# Patient Record
Sex: FEMALE | Race: BLACK OR AFRICAN AMERICAN | Employment: OTHER | URBAN - METROPOLITAN AREA
[De-identification: names, ages, dates, MRNs, and addresses within clinical notes are randomized per-mention and may not be internally consistent; named-entity substitution may affect disease eponyms.]

---

## 2017-01-05 ENCOUNTER — OP HISTORICAL/CONVERTED ENCOUNTER (OUTPATIENT)
Dept: OTHER | Age: 72
End: 2017-01-05

## 2017-01-12 ENCOUNTER — OP HISTORICAL/CONVERTED ENCOUNTER (OUTPATIENT)
Dept: OTHER | Age: 72
End: 2017-01-12

## 2017-01-19 ENCOUNTER — OP HISTORICAL/CONVERTED ENCOUNTER (OUTPATIENT)
Dept: OTHER | Age: 72
End: 2017-01-19

## 2017-02-02 ENCOUNTER — OP HISTORICAL/CONVERTED ENCOUNTER (OUTPATIENT)
Dept: OTHER | Age: 72
End: 2017-02-02

## 2017-02-09 ENCOUNTER — OP HISTORICAL/CONVERTED ENCOUNTER (OUTPATIENT)
Dept: OTHER | Age: 72
End: 2017-02-09

## 2017-02-16 ENCOUNTER — OP HISTORICAL/CONVERTED ENCOUNTER (OUTPATIENT)
Dept: OTHER | Age: 72
End: 2017-02-16

## 2017-02-23 ENCOUNTER — OP HISTORICAL/CONVERTED ENCOUNTER (OUTPATIENT)
Dept: OTHER | Age: 72
End: 2017-02-23

## 2017-03-17 ENCOUNTER — IP HISTORICAL/CONVERTED ENCOUNTER (OUTPATIENT)
Dept: OTHER | Age: 72
End: 2017-03-17

## 2017-04-26 ENCOUNTER — OP HISTORICAL/CONVERTED ENCOUNTER (OUTPATIENT)
Dept: OTHER | Age: 72
End: 2017-04-26

## 2017-05-04 ENCOUNTER — OP HISTORICAL/CONVERTED ENCOUNTER (OUTPATIENT)
Dept: OTHER | Age: 72
End: 2017-05-04

## 2017-05-11 ENCOUNTER — OP HISTORICAL/CONVERTED ENCOUNTER (OUTPATIENT)
Dept: OTHER | Age: 72
End: 2017-05-11

## 2017-05-15 ENCOUNTER — OP HISTORICAL/CONVERTED ENCOUNTER (OUTPATIENT)
Dept: OTHER | Age: 72
End: 2017-05-15

## 2017-05-25 ENCOUNTER — OP HISTORICAL/CONVERTED ENCOUNTER (OUTPATIENT)
Dept: OTHER | Age: 72
End: 2017-05-25

## 2017-06-02 ENCOUNTER — OP HISTORICAL/CONVERTED ENCOUNTER (OUTPATIENT)
Dept: OTHER | Age: 72
End: 2017-06-02

## 2017-06-08 ENCOUNTER — OP HISTORICAL/CONVERTED ENCOUNTER (OUTPATIENT)
Dept: OTHER | Age: 72
End: 2017-06-08

## 2017-06-28 ENCOUNTER — OP HISTORICAL/CONVERTED ENCOUNTER (OUTPATIENT)
Dept: OTHER | Age: 72
End: 2017-06-28

## 2017-06-29 ENCOUNTER — IP HISTORICAL/CONVERTED ENCOUNTER (OUTPATIENT)
Dept: OTHER | Age: 72
End: 2017-06-29

## 2017-06-29 ENCOUNTER — OP HISTORICAL/CONVERTED ENCOUNTER (OUTPATIENT)
Dept: OTHER | Age: 72
End: 2017-06-29

## 2017-08-17 ENCOUNTER — OP HISTORICAL/CONVERTED ENCOUNTER (OUTPATIENT)
Dept: OTHER | Age: 72
End: 2017-08-17

## 2017-10-11 ENCOUNTER — OP HISTORICAL/CONVERTED ENCOUNTER (OUTPATIENT)
Dept: OTHER | Age: 72
End: 2017-10-11

## 2018-02-27 ENCOUNTER — OP HISTORICAL/CONVERTED ENCOUNTER (OUTPATIENT)
Dept: OTHER | Age: 73
End: 2018-02-27

## 2018-04-23 ENCOUNTER — OP HISTORICAL/CONVERTED ENCOUNTER (OUTPATIENT)
Dept: OTHER | Age: 73
End: 2018-04-23

## 2018-04-29 ENCOUNTER — IP HISTORICAL/CONVERTED ENCOUNTER (OUTPATIENT)
Dept: OTHER | Age: 73
End: 2018-04-29

## 2018-07-20 LAB
CREATININE, EXTERNAL: 1.95
HBA1C MFR BLD HPLC: 6.1 %
LDL-C, EXTERNAL: 69
MICROALBUMIN UR TEST STR-MCNC: 180.5 MG/DL

## 2018-08-21 ENCOUNTER — ED HISTORICAL/CONVERTED ENCOUNTER (OUTPATIENT)
Dept: OTHER | Age: 73
End: 2018-08-21

## 2018-10-01 ENCOUNTER — OFFICE VISIT (OUTPATIENT)
Dept: ENDOCRINOLOGY | Age: 73
End: 2018-10-01

## 2018-10-01 VITALS
BODY MASS INDEX: 35.82 KG/M2 | RESPIRATION RATE: 16 BRPM | OXYGEN SATURATION: 95 % | HEART RATE: 57 BPM | TEMPERATURE: 97.6 F | HEIGHT: 64 IN | SYSTOLIC BLOOD PRESSURE: 143 MMHG | WEIGHT: 209.8 LBS | DIASTOLIC BLOOD PRESSURE: 59 MMHG

## 2018-10-01 DIAGNOSIS — E11.65 UNCONTROLLED TYPE 2 DIABETES MELLITUS WITH HYPERGLYCEMIA (HCC): ICD-10-CM

## 2018-10-01 DIAGNOSIS — I15.0 RENOVASCULAR HYPERTENSION WITH GOAL BLOOD PRESSURE LESS THAN 140/90: ICD-10-CM

## 2018-10-01 DIAGNOSIS — N18.4 STAGE 4 CHRONIC KIDNEY DISEASE (HCC): ICD-10-CM

## 2018-10-01 DIAGNOSIS — N18.4 TYPE 2 DIABETES MELLITUS WITH STAGE 4 CHRONIC KIDNEY DISEASE, WITH LONG-TERM CURRENT USE OF INSULIN (HCC): Primary | ICD-10-CM

## 2018-10-01 DIAGNOSIS — E11.22 TYPE 2 DIABETES MELLITUS WITH STAGE 4 CHRONIC KIDNEY DISEASE, WITH LONG-TERM CURRENT USE OF INSULIN (HCC): Primary | ICD-10-CM

## 2018-10-01 DIAGNOSIS — Z79.4 TYPE 2 DIABETES MELLITUS WITH STAGE 4 CHRONIC KIDNEY DISEASE, WITH LONG-TERM CURRENT USE OF INSULIN (HCC): Primary | ICD-10-CM

## 2018-10-01 RX ORDER — FAMOTIDINE 20 MG/1
20 TABLET, FILM COATED ORAL
COMMUNITY
End: 2020-09-16

## 2018-10-01 RX ORDER — DONEPEZIL HYDROCHLORIDE 5 MG/1
5 TABLET, FILM COATED ORAL
COMMUNITY
End: 2020-09-16

## 2018-10-01 RX ORDER — HYDRALAZINE HYDROCHLORIDE 10 MG/1
25 TABLET, FILM COATED ORAL 2 TIMES DAILY
COMMUNITY
End: 2020-09-16

## 2018-10-01 NOTE — PROGRESS NOTES
[facility]               Marisol Zuniga MD        [address] 78 [phone] Fax [phone]               Patient Information  Date:10/2/2018  Name : Kurtis Tran 75 y.o.     YOB: 1945         Referred by: No primary care provider on file. Chief Complaint   Patient presents with    Diabetes       History of Present Illness: Kurtis Tran is a 75 y.o. female here for fu of  Type 2 Diabetes Mellitus. She was referred by NP Ronen Escobar for evaluation and management of type 2 diabetes mellitus. She has diabetes for the last 14 years complicated by nephropathy as well as neuropathy. She was seen in 2016 and did not follow-up  She was changed from premixed insulin to Humulin N, analog insulins are expensive  She is taking insulin twice daily  Not on dialysis  Reports hypoglycemia but does not know when  Did not bring the meter, no logbook  No recent dietitian visit. She has had a CVA in the past.     Fasting < 100 sometimes  She has lost weight  No burning sensation in the feet, chest pain, shortness of breath        Wt Readings from Last 3 Encounters:   10/01/18 209 lb 12.8 oz (95.2 kg)   09/12/16 245 lb 9.6 oz (111.4 kg)   08/14/14 249 lb (112.9 kg)       BP Readings from Last 3 Encounters:   10/01/18 143/59   09/12/16 116/50   08/14/14 138/80           Past Medical History:   Diagnosis Date    Arthritis     CAD (coronary artery disease)     Chronic kidney disease     [facility] ([facility]     Mental depression     [facility]     Thyroid disease      Current Outpatient Prescriptions   Medication Sig    donepezil (ARICEPT) 5 mg tablet Take 5 mg by mouth nightly.  famotidine (PEPCID) 20 mg tablet Take 20 mg by mouth nightly.  hydrALAZINE (APRESOLINE) 10 mg tablet Take 10 mg by mouth three (3) times daily.  apixaban (ELIQUIS) 5 mg tablet Take 2.5 mg by mouth two (2) times a day.     nitroglycerin (NITROSTAT) 0.4 mg SL tablet by SubLINGual route every five (5) minutes as needed for Chest Pain.  venlafaxine (EFFEXOR) 75 mg tablet Take 75 mg by mouth daily.  gabapentin (NEURONTIN) 600 mg tablet Take 300 mg by mouth two (2) times a day.  furosemide (LASIX) 40 mg tablet Take 40 mg by mouth daily.  losartan (COZAAR) 100 mg tablet Take 25 mg by mouth daily.  acetaminophen (TYLENOL) 325 mg tablet Take  by mouth every four (4) hours as needed for Pain.  isosorbide mononitrate ER (IMDUR) 30 mg tablet Take 60 mg by mouth two (2) times a day.  latanoprost (XALATAN) 0.005 % ophthalmic solution Administer 1 Drop to both eyes nightly.  colchicine (COLCRYS) 0.6 mg tablet Take 0.6 mg by mouth daily.  pravastatin (PRAVACHOL) 80 mg tablet Take 80 mg by mouth nightly.  metoprolol succinate (TOPROL-XL) 50 mg XL tablet Take 25 mg by mouth daily.  aspirin 81 mg chewable tablet Take 81 mg by mouth daily.  allopurinol (ZYLOPRIM) 100 mg tablet Take 100 mg by mouth three (3) times daily.  insulin NPH (HUMULIN N) 100 unit/mL (3 mL) inpn Inject 20 units in the AM and 15 units in the evening (Stop Novolog 70/30)    metolazone (ZAROXOLYN) 5 mg tablet Take  by mouth daily.  esomeprazole (NEXIUM) 40 mg capsule Take  by mouth daily.  insulin aspart (NOVOLOG FLEXPEN) 100 unit/mL inpn by SubCUTAneous route.  insulin lispro (HUMALOG) 100 unit/mL injection by SubCUTAneous route.  buPROPion SR (WELLBUTRIN SR) 150 mg SR tablet Take  by mouth two (2) times a day.  cholecalciferol (VITAMIN D3) 400 unit tab tablet Take  by mouth daily.  Dexlansoprazole (DEXILANT) 60 mg CpDB Take  by mouth.  clopidogrel (PLAVIX) 75 mg tablet Take  by mouth daily. No current facility-administered medications for this visit.       No Known Allergies      Review of Systems:  - Constitutional Symptoms: no fevers, no chills,   - Eyes: no blurry vision no double vision  - Cardiovascular: no chest pain ,no palpitations  - Respiratory: no cough no shortness of breath  - Gastrointestinal: no dysphagia no  abdominal pain  - Musculoskeletal: no joint pains +  weakness  - Integumentary: no rashes  - Neurological: no numbness, tingling, + headaches  - Psychiatric: no depression no  anxiety  - Endocrine: no heat or cold intolerance    Physical Examination:   Blood pressure 143/59, pulse (!) 57, temperature 97.6 °F (36.4 °C), temperature source Oral, resp. rate 16, height 5' 4\" (1.626 m), weight 209 lb 12.8 oz (95.2 kg), SpO2 95 %. Estimated body mass index is 36.01 kg/(m^2) as calculated from the following:    Height as of this encounter: 5' 4\" (1.626 m). -   Weight as of this encounter: 209 lb 12.8 oz (95.2 kg). - General: pleasant, no distress, good eye contact  - HEENT: no pallor, no periorbital edema, EOMI  - Neck: supple, no thyromegaly, no nodules  - Cardiovascular: regular, normal rate, normal S1 and S2, no murmurs  - Respiratory: clear to auscultation bilaterally  - Gastrointestinal: soft, nontender, nondistended,  BS +  - Musculoskeletal: no proximal muscle weakness in upper or lower extremities  - Integumentary: no acanthosis nigricans, + edema,   - Neurological: ,alert and oriented  - Psychiatric: normal mood and affect  - Skin: color, texture, turgor normal.     Diabetic foot exam: October 2018    Left Foot:   Visual Exam: normal    Pulse DP: 2+ (normal)   Filament test: reduced sensation    Vibratory sensation: absent      Right Foot:   Visual Exam: normal    Pulse DP: 2+ (normal)   Filament test: reduced sensation    Vibratory sensation: absent        Data Reviewed:     [] Glucose records reviewed. [] See glucose records for details (to be scanned). [] A1C  [] Reviewed labs      Assessment/Plan:     1. Type 2 diabetes mellitus with stage 4 chronic kidney disease, with long-term current use of insulin ([facility]        1.  Type 2 Diabetes Mellitus with nephropathy,neuropathy,  Lab Results   Component Value Date/Time    Hemoglobin A1c, External 6.1 07/20/2018      Due to decrease in clearance of insulin secondary to CKD she is at  high risk for hypoglycemia,we will  stop Premixed insulin   Humulin N 20 units in AM and 15 units PM currently I have no data, due to, questionable hypoglycemia decreasing the insulin  Advised to check glucose 2 - 4 times daily   DM classes   Diabetic issues reviewed : , home glucose monitoring emphasized,  hypoglycemia management and long term diabetic complications discussed. FLU annually ,Pneumovax ,aspirin daily,annual eye exam,microalbumin    2. HTN : Continue current therapy     3. Hyperlipidemia : Continue statin. 4.Obesity:Body mass index is 36.01 kg/(m^2). Discussed about the importance of  carbohydrate portion control. 5 CKD /-followed by nephrology  Not on dialysis      Spent more than 40 minutes face-to-face with patient of which greater than 50% of the visit was spent in counseling,  and counseling regarding adjustment of insulin regimen. Patient Instructions   Humulin N 20 units in AM , 15 units at bedtime       Hold night insulin if sugars are less than 90 at bedtime     Eat a small bedtime snack -     Follow-up Disposition:  Return in about 3 months (around 1/1/2019). Thank you for allowing me to participate in the care of this patient.     Asa Garcia MD      Patient verbalized understanding

## 2018-10-01 NOTE — PROGRESS NOTES
Sandy Pulliam is a 75 y.o. female here for   Chief Complaint   Patient presents with    Diabetes       Functional glucose monitor and record keeping system? - yes  Eye exam within last year? - VEI  Foot exam within last year? -due     1. Have you been to the ER, urgent care clinic since your last visit? Hospitalized since your last visit? -no    2. Have you seen or consulted any other health care providers outside of the [address] [place] [address] since your last visit? Include any pap smears or colon screening. -PCP

## 2018-10-01 NOTE — MR AVS SNAPSHOT
[address] [facility] [address] 
[phone] Patient: Daniel Gastelum MRN: [id number] :1945 Visit Information Date & Time Provider Department Dept. Phone Encounter #  
 10/1/2018  1:30 PM Abad Joshi MD [facility] Diabetes & Endocrinology [phone] [id number] Follow-up Instructions Return in about 3 months (around 2019). Upcoming Health Maintenance Date Due  
 FOOT EXAM Q1 1954 EYE EXAM RETINAL OR DILATED Q1 1954 DTaP/Tdap/Td series (1 - Tdap) 1965 Shingrix Vaccine Age 50> (1 of 2) 1994 BREAST CANCER SCRN MAMMOGRAM 1994 FOBT Q 1 YEAR AGE 50-75 1994 GLAUCOMA SCREENING Q2Y 2009 Bone Densitometry (Dexa) Screening 2009 Pneumococcal 65+ Low/Medium Risk (1 of 2 - PCV13) 2009 MEDICARE YEARLY EXAM 3/14/2018 Influenza Age 5 to Adult 2018 HEMOGLOBIN A1C Q6M 2019 MICROALBUMIN Q1 2019 LIPID PANEL Q1 2019 Allergies as of 10/1/2018  Review Complete On: 10/1/2018 By: Abad Joshi MD  
 No Known Allergies Current Immunizations  Never Reviewed No immunizations on file. Not reviewed this visit You Were Diagnosed With   
  
 Codes Comments Type 2 diabetes mellitus with stage 4 chronic kidney disease, with long-term current use of insulin (HCC)    -  Primary ICD-10-CM: E11.22, N18.4, Z79.4 ICD-9-CM: 250.40, 585.4, V58.67 Vitals BP Pulse Temp Resp Height(growth percentile) Weight(growth percentile) 143/59 (BP 1 Location: Right arm, BP Patient Position: Sitting) (!) 57 97.6 °F (36.4 °C) (Oral) 16 5' 4\" (1.626 m) 209 lb 12.8 oz (95.2 kg) SpO2 BMI OB Status Smoking Status 95% 36.01 kg/m2 Menopause Never Smoker BMI and BSA Data Body Mass Index Body Surface Area 36.01 kg/m 2 2.07 m 2 Preferred Pharmacy Pharmacy Name Phone [address] [address] [phone] Your Updated Medication List  
  
   
This list is accurate as of 10/1/18  2:57 PM.  Always use your most recent med list.  
  
  
  
  
 acetaminophen 325 mg tablet Commonly known as:  TYLENOL Take  by mouth every four (4) hours as needed for Pain. allopurinol 100 mg tablet Commonly known as:  Jerrye Munda Take 100 mg by mouth three (3) times daily. aspirin 81 mg chewable tablet Take 81 mg by mouth daily. buPROPion  mg SR tablet Commonly known as:  [place] No Take  by mouth two (2) times a day. cholecalciferol 400 unit Tab tablet Commonly known as:  VITAMIN D3 Take  by mouth daily. COLCRYS 0.6 mg tablet Generic drug:  colchicine Take 0.6 mg by mouth daily. DEXILANT 60 mg Cpdb Generic drug:  Dexlansoprazole Take  by mouth. donepezil 5 mg tablet Commonly known as:  ARICEPT Take 5 mg by mouth nightly. ELIQUIS 5 mg tablet Generic drug:  apixaban Take 2.5 mg by mouth two (2) times a day. famotidine 20 mg tablet Commonly known as:  PEPCID Take 20 mg by mouth nightly.  
  
 gabapentin 600 mg tablet Commonly known as:  NEURONTIN Take 300 mg by mouth two (2) times a day. HumaLOG U-100 Insulin 100 unit/mL injection Generic drug:  insulin lispro  
by SubCUTAneous route. hydrALAZINE 10 mg tablet Commonly known as:  APRESOLINE Take 10 mg by mouth three (3) times daily. insulin  unit/mL (3 mL) Inpn Commonly known as:  HUMULIN N Inject 30 units in the AM and 20 units in the evening (Stop Novolog 70/30) isosorbide mononitrate ER 30 mg tablet Commonly known as:  IMDUR Take 60 mg by mouth two (2) times a day. LASIX 40 mg tablet Generic drug:  furosemide Take 40 mg by mouth daily. latanoprost 0.005 % ophthalmic solution Commonly known as:  [place]  
 Administer 1 Drop to both eyes nightly. losartan 100 mg tablet Commonly known as:  COZAAR Take 25 mg by mouth daily. metOLazone 5 mg tablet Commonly known as:  Jyoti Suzan Take  by mouth daily. metoprolol succinate 50 mg XL tablet Commonly known as:  TOPROL-XL Take 25 mg by mouth daily. NexIUM 40 mg capsule Generic drug:  esomeprazole Take  by mouth daily. NITROSTAT 0.4 mg SL tablet Generic drug:  nitroglycerin  
by SubLINGual route every five (5) minutes as needed for Chest Pain. NovoLOG Flexpen U-100 Insulin 100 unit/mL Inpn Generic drug:  insulin aspart U-100  
by SubCUTAneous route. PLAVIX 75 mg Tab Generic drug:  clopidogrel Take  by mouth daily. PRAVACHOL 80 mg tablet Generic drug:  pravastatin Take 80 mg by mouth nightly. venlafaxine 75 mg tablet Commonly known as:  [facility] Take 75 mg by mouth daily. Follow-up Instructions Return in about 3 months (around 1/1/2019). Patient Instructions Humulin N 20 units in AM , 15 units at bedtime Hold night insulin if sugars are less than 90 at bedtime Eat a small bedtime snack - Introducing \[facility] [place] [facility]\"" & HEALTH SERVICES! [employer] introduces [employer] patient portal. Now you can access parts of your medical record, email your doctor's office, and request medication refills online. 1. In your internet browser, go to [email or web address] 2. Click on the First Time User? Click Here link in the Sign In box. You will see the New Member Sign Up page. 3. Enter your [employer] Access Code exactly as it appears below. You will not need to use this code after youve completed the sign-up process. If you do not sign up before the expiration date, you must request a new code. · [employer] Access Code: [email or web address] Expires: 12/30/2018  2:56 PM 
 
4.  Enter the last four digits of your Social Security Number (xxxx) and Date of Birth (mm/dd/yyyy) as indicated and click Submit. You will be taken to the next sign-up page. 5. Create a [employer] ID. This will be your [employer] login ID and cannot be changed, so think of one that is secure and easy to remember. 6. Create a [employer] password. You can change your password at any time. 7. Enter your Password Reset Question and Answer. This can be used at a later time if you forget your password. 8. Enter your e-mail address. You will receive e-mail notification when new information is available in [address] 9. Click Sign Up. You can now view and download portions of your medical record. 10. Click the Download Summary menu link to download a portable copy of your medical information. If you have questions, please visit the Frequently Asked Questions section of the [employer] website. Remember, [employer] is NOT to be used for urgent needs. For medical emergencies, dial 911. Now available from your iPhone and Android! Please provide this summary of care documentation to your next provider. If you have any questions after today's visit, please call [phone].

## 2018-10-01 NOTE — PATIENT INSTRUCTIONS
Humulin N 20 units in AM , 15 units at bedtime       Hold night insulin if sugars are less than 90 at bedtime     Eat a small bedtime snack -

## 2018-10-04 DIAGNOSIS — Z79.4 TYPE 2 DIABETES MELLITUS WITH HYPERGLYCEMIA, WITH LONG-TERM CURRENT USE OF INSULIN (HCC): Primary | ICD-10-CM

## 2018-10-04 DIAGNOSIS — E11.65 TYPE 2 DIABETES MELLITUS WITH HYPERGLYCEMIA, WITH LONG-TERM CURRENT USE OF INSULIN (HCC): Primary | ICD-10-CM

## 2018-10-04 RX ORDER — SYRINGE AND NEEDLE,INSULIN,1ML 31GX15/64"
SYRINGE, EMPTY DISPOSABLE MISCELLANEOUS
Qty: 100 PEN NEEDLE | Refills: 11 | Status: SHIPPED | OUTPATIENT
Start: 2018-10-04 | End: 2020-03-12

## 2019-02-12 ENCOUNTER — ED HISTORICAL/CONVERTED ENCOUNTER (OUTPATIENT)
Dept: OTHER | Age: 74
End: 2019-02-12

## 2019-02-26 ENCOUNTER — OP HISTORICAL/CONVERTED ENCOUNTER (OUTPATIENT)
Dept: OTHER | Age: 74
End: 2019-02-26

## 2019-04-04 ENCOUNTER — OP HISTORICAL/CONVERTED ENCOUNTER (OUTPATIENT)
Dept: OTHER | Age: 74
End: 2019-04-04

## 2019-04-12 ENCOUNTER — OP HISTORICAL/CONVERTED ENCOUNTER (OUTPATIENT)
Dept: OTHER | Age: 74
End: 2019-04-12

## 2019-04-18 ENCOUNTER — OP HISTORICAL/CONVERTED ENCOUNTER (OUTPATIENT)
Dept: OTHER | Age: 74
End: 2019-04-18

## 2019-04-22 ENCOUNTER — TELEPHONE (OUTPATIENT)
Dept: ENDOCRINOLOGY | Age: 74
End: 2019-04-22

## 2019-04-22 NOTE — TELEPHONE ENCOUNTER
----- Message from Debbie Lind sent at 4/22/2019 12:12 PM EDT -----  Regarding: Dr. Nelly Olivas, [facility], stated, a fax is being sent to the office regarding a meter for pt.    Best contact number (v)[phone]

## 2019-04-24 ENCOUNTER — OP HISTORICAL/CONVERTED ENCOUNTER (OUTPATIENT)
Dept: OTHER | Age: 74
End: 2019-04-24

## 2019-04-25 ENCOUNTER — OP HISTORICAL/CONVERTED ENCOUNTER (OUTPATIENT)
Dept: OTHER | Age: 74
End: 2019-04-25

## 2019-05-11 LAB — LDL-C, EXTERNAL: 65

## 2019-05-30 DIAGNOSIS — Z79.4 TYPE 2 DIABETES MELLITUS WITH HYPERGLYCEMIA, WITH LONG-TERM CURRENT USE OF INSULIN (HCC): ICD-10-CM

## 2019-05-30 DIAGNOSIS — E11.65 TYPE 2 DIABETES MELLITUS WITH HYPERGLYCEMIA, WITH LONG-TERM CURRENT USE OF INSULIN (HCC): ICD-10-CM

## 2019-07-02 ENCOUNTER — OP HISTORICAL/CONVERTED ENCOUNTER (OUTPATIENT)
Dept: OTHER | Age: 74
End: 2019-07-02

## 2019-08-21 PROBLEM — M75.40 IMPINGEMENT SYNDROME OF SHOULDER REGION: Status: ACTIVE | Noted: 2019-08-21

## 2019-08-21 PROBLEM — R25.1 TREMOR: Status: ACTIVE | Noted: 2019-08-21

## 2019-08-21 PROBLEM — E78.5 HYPERLIPIDEMIA: Status: ACTIVE | Noted: 2019-08-21

## 2019-08-21 PROBLEM — F41.9 ANXIETY: Status: ACTIVE | Noted: 2019-08-21

## 2019-08-21 PROBLEM — F32.A DEPRESSIVE DISORDER: Status: ACTIVE | Noted: 2019-08-21

## 2019-08-21 PROBLEM — S62.339A FRACTURE OF NECK OF METACARPAL BONE: Status: ACTIVE | Noted: 2019-08-21

## 2019-08-21 PROBLEM — M19.90 ARTHRITIS: Status: ACTIVE | Noted: 2019-08-21

## 2019-08-21 PROBLEM — I10 HYPERTENSIVE DISORDER: Status: ACTIVE | Noted: 2019-08-21

## 2019-08-21 LAB — CREATININE, EXTERNAL: 2.09

## 2019-08-22 ENCOUNTER — OFFICE VISIT (OUTPATIENT)
Dept: ENDOCRINOLOGY | Age: 74
End: 2019-08-22

## 2019-08-22 VITALS
SYSTOLIC BLOOD PRESSURE: 136 MMHG | HEIGHT: 64 IN | WEIGHT: 203.1 LBS | HEART RATE: 61 BPM | BODY MASS INDEX: 34.67 KG/M2 | DIASTOLIC BLOOD PRESSURE: 57 MMHG | TEMPERATURE: 96.3 F | OXYGEN SATURATION: 96 % | RESPIRATION RATE: 14 BRPM

## 2019-08-22 DIAGNOSIS — Z79.4 TYPE 2 DIABETES MELLITUS WITH HYPERGLYCEMIA, WITH LONG-TERM CURRENT USE OF INSULIN (HCC): Primary | ICD-10-CM

## 2019-08-22 DIAGNOSIS — E11.65 TYPE 2 DIABETES MELLITUS WITH HYPERGLYCEMIA, WITH LONG-TERM CURRENT USE OF INSULIN (HCC): Primary | ICD-10-CM

## 2019-08-22 DIAGNOSIS — E78.2 MIXED HYPERLIPIDEMIA: ICD-10-CM

## 2019-08-22 DIAGNOSIS — I15.0 RENOVASCULAR HYPERTENSION WITH GOAL BLOOD PRESSURE LESS THAN 140/90: ICD-10-CM

## 2019-08-22 LAB
GLUCOSE POC: 111 MG/DL
HBA1C MFR BLD HPLC: 5.4 %

## 2019-08-22 NOTE — PROGRESS NOTES
[facility]               America Fontenot MD        [address] 78 [phone] Fax [phone]               Patient Information  Date:8/22/2019  Name : Yon Holden 75 y.o.     YOB: 1945         Referred by: Loretta Mayer NP         Chief Complaint   Patient presents with    Diabetes       History of Present Illness: Yon Holden is a 75 y.o. female here for fu of  Type 2 Diabetes Mellitus. She was referred by ROM Almaguer for evaluation and management of type 2 diabetes mellitus. She has diabetes for the last 14 years complicated by nephropathy as well as neuropathy. Follow-up is poor with me, she is here with her daughter  Has freestyle [place], she does not know how to use it,  She was changed from premixed insulin to Humulin N, analog insulins are expensive  She is taking insulin twice daily  No hypoglycemia  Did not bring the meter, no logbook            Wt Readings from Last 3 Encounters:   08/22/19 203 lb 1.6 oz (92.1 kg)   10/01/18 209 lb 12.8 oz (95.2 kg)   09/12/16 245 lb 9.6 oz (111.4 kg)       BP Readings from Last 3 Encounters:   08/22/19 136/57   10/01/18 143/59   09/12/16 116/50           Past Medical History:   Diagnosis Date    Arthritis     CAD (coronary artery disease)     Chronic kidney disease     [facility]     Mental depression     Stroke [facility]     Thyroid disease      Current Outpatient Medications   Medication Sig    insulin NPH (NOVOLIN N NPH U-100 INSULIN) 100 unit/mL injection Inject 20 units in the AM and 15 units in the evening. Stop Humulin    insulin syringe-needle U-100 1 mL 31 gauge x 15/64\" syrg Use to inject insulin BID. Dx code: E11.65    donepezil (ARICEPT) 5 mg tablet Take 5 mg by mouth nightly.  famotidine (PEPCID) 20 mg tablet Take 20 mg by mouth nightly.  hydrALAZINE (APRESOLINE) 10 mg tablet Take 10 mg by mouth three (3) times daily.     metolazone (ZAROXOLYN) 5 mg tablet Take  by mouth daily.  apixaban (ELIQUIS) 5 mg tablet Take 2.5 mg by mouth two (2) times a day.  nitroglycerin (NITROSTAT) 0.4 mg SL tablet by SubLINGual route every five (5) minutes as needed for Chest Pain.  venlafaxine (EFFEXOR) 75 mg tablet Take 75 mg by mouth daily.  esomeprazole (NEXIUM) 40 mg capsule Take  by mouth daily.  buPROPion SR (WELLBUTRIN SR) 150 mg SR tablet Take  by mouth two (2) times a day.  gabapentin (NEURONTIN) 600 mg tablet Take 300 mg by mouth two (2) times a day.  furosemide (LASIX) 40 mg tablet Take 40 mg by mouth daily.  losartan (COZAAR) 100 mg tablet Take 25 mg by mouth daily.  acetaminophen (TYLENOL) 325 mg tablet Take  by mouth every four (4) hours as needed for Pain.  cholecalciferol (VITAMIN D3) 400 unit tab tablet Take  by mouth daily.  isosorbide mononitrate ER (IMDUR) 30 mg tablet Take 60 mg by mouth two (2) times a day.  latanoprost (XALATAN) 0.005 % ophthalmic solution Administer 1 Drop to both eyes nightly.  Dexlansoprazole (DEXILANT) 60 mg CpDB Take  by mouth.  colchicine (COLCRYS) 0.6 mg tablet Take 0.6 mg by mouth daily.  pravastatin (PRAVACHOL) 80 mg tablet Take 80 mg by mouth nightly.  metoprolol succinate (TOPROL-XL) 50 mg XL tablet Take 25 mg by mouth daily.  aspirin 81 mg chewable tablet Take 81 mg by mouth daily.  allopurinol (ZYLOPRIM) 100 mg tablet Take 100 mg by mouth three (3) times daily.  insulin aspart (NOVOLOG FLEXPEN) 100 unit/mL inpn by SubCUTAneous route.  insulin lispro (HUMALOG) 100 unit/mL injection by SubCUTAneous route.  clopidogrel (PLAVIX) 75 mg tablet Take  by mouth daily. No current facility-administered medications for this visit.       No Known Allergies      Review of Systems:  -   - Respiratory: no cough no shortness of breath  - Gastrointestinal: no dysphagia no  abdominal pain  - Musculoskeletal: no joint pains +  weakness  - Integumentary: no rashes  - Neurological: no numbness, tingling, + headaches  - Psychiatric: no depression no  anxiety  - Endocrine: no heat or cold intolerance    Physical Examination:   Blood pressure 136/57, pulse 61, temperature 96.3 °F (35.7 °C), temperature source Oral, resp. rate 14, height 5' 4\" (1.626 m), weight 203 lb 1.6 oz (92.1 kg), SpO2 96 %. Estimated body mass index is 34.86 kg/m² as calculated from the following:    Height as of this encounter: 5' 4\" (1.626 m). -   Weight as of this encounter: 203 lb 1.6 oz (92.1 kg). - General: pleasant, no distress, good eye contact  - HEENT: no pallor, no periorbital edema, EOMI  - Neck: supple, no thyromegaly, no nodules  - Cardiovascular: regular, normal rate, normal S1 and S2, no murmurs  - Respiratory: clear to auscultation bilaterally  - Gastrointestinal: soft, nontender, nondistended,  BS +  - Musculoskeletal: no proximal muscle weakness in upper or lower extremities  - Integumentary: no acanthosis nigricans, + edema,   - Neurological: ,alert and oriented  - Psychiatric: normal mood and affect  - Skin: color, texture, turgor normal.     Diabetic foot exam: October 2018    Left Foot:   Visual Exam: normal    Pulse DP: 2+ (normal)   Filament test: reduced sensation    Vibratory sensation: absent      Right Foot:   Visual Exam: normal    Pulse DP: 2+ (normal)   Filament test: reduced sensation    Vibratory sensation: absent        Data Reviewed:   Lab Results   Component Value Date/Time    Hemoglobin A1c, External 6.1 07/20/2018    Glucose  08/22/2019 08:34 AM            Assessment/Plan:     1. Type 2 diabetes mellitus with hyperglycemia, with long-term current use of insulin ([address]    2. Mixed hyperlipidemia    3. 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  Type 2 Diabetes Mellitus with nephropathy,neuropathy,  Lab Results   Component Value Date/Time    Hemoglobin A1c (POC) 5.4 08/22/2019 08:37 AM    Hemoglobin A1c, External 6.1 07/20/2018      Due to decrease in clearance of insulin secondary to CKD she is at  high risk for hypoglycemia  Humulin N 20 units in AM and 15 units PM   Demonstrated how to use freestyle melva, inserted the device on the right arm, no complications    Diabetic issues reviewed : , home glucose monitoring emphasized,  hypoglycemia management and long term diabetic complications discussed. FLU annually ,Pneumovax ,aspirin daily,annual eye exam,microalbumin    2. HTN : Continue current therapy     3. Hyperlipidemia : Continue statin. 4.Obesity:Body mass index is 34.86 kg/m². Discussed about the importance of  carbohydrate portion control. 5 CKD /-followed by nephrology  Not on dialysis          There are no Patient Instructions on file for this visit. Thank you for allowing me to participate in the care of this patient.     Sandra Del Real MD      Patient verbalized understanding

## 2019-08-22 NOTE — PROGRESS NOTES
Michoacano Bradley is a 75 y.o. female here for   Chief Complaint   Patient presents with    Diabetes       Functional glucose monitor and record keeping system? - has new meter  Eye exam within last year? - Dr Hortencia Rojo exam within last year? - on file    1. Have you been to the ER, urgent care clinic since your last visit? Hospitalized since your last visit? - no    2. Have you seen or consulted any other health care providers outside of the [address] [place] [address] since your last visit?   Include any pap smears or colon screening.- PCP

## 2019-08-22 NOTE — LETTER
8/24/19 Patient: Ezekiel Gary YOB: 1945 Date of Visit: 8/22/2019 Marlon Foreman NP 
[address] [facility] [address] [place] [address] VIA Facsimile: [phone] Dear Marlon Foreman NP, Thank you for referring Ms. Jordan Henson to [id number] [place] [address] for evaluation. My notes for this consultation are attached. If you have questions, please do not hesitate to call me. I look forward to following your patient along with you. Sincerely, Minnie Moncada MD

## 2019-09-13 ENCOUNTER — OP HISTORICAL/CONVERTED ENCOUNTER (OUTPATIENT)
Dept: OTHER | Age: 74
End: 2019-09-13

## 2019-10-21 ENCOUNTER — OP HISTORICAL/CONVERTED ENCOUNTER (OUTPATIENT)
Dept: OTHER | Age: 74
End: 2019-10-21

## 2019-11-01 ENCOUNTER — OP HISTORICAL/CONVERTED ENCOUNTER (OUTPATIENT)
Dept: OTHER | Age: 74
End: 2019-11-01

## 2019-12-06 ENCOUNTER — OP HISTORICAL/CONVERTED ENCOUNTER (OUTPATIENT)
Dept: OTHER | Age: 74
End: 2019-12-06

## 2019-12-06 LAB — CREATININE, EXTERNAL: 1.79

## 2019-12-09 ENCOUNTER — OP HISTORICAL/CONVERTED ENCOUNTER (OUTPATIENT)
Dept: OTHER | Age: 74
End: 2019-12-09

## 2020-01-06 ENCOUNTER — OP HISTORICAL/CONVERTED ENCOUNTER (OUTPATIENT)
Dept: OTHER | Age: 75
End: 2020-01-06

## 2020-01-08 ENCOUNTER — OFFICE VISIT (OUTPATIENT)
Dept: ENDOCRINOLOGY | Age: 75
End: 2020-01-08

## 2020-01-08 VITALS
DIASTOLIC BLOOD PRESSURE: 65 MMHG | BODY MASS INDEX: 33.8 KG/M2 | OXYGEN SATURATION: 99 % | TEMPERATURE: 95.7 F | RESPIRATION RATE: 18 BRPM | SYSTOLIC BLOOD PRESSURE: 142 MMHG | WEIGHT: 198 LBS | HEIGHT: 64 IN | HEART RATE: 60 BPM

## 2020-01-08 DIAGNOSIS — E11.65 TYPE 2 DIABETES MELLITUS WITH HYPERGLYCEMIA, WITH LONG-TERM CURRENT USE OF INSULIN (HCC): Primary | ICD-10-CM

## 2020-01-08 DIAGNOSIS — N18.4 STAGE 4 CHRONIC KIDNEY DISEASE (HCC): ICD-10-CM

## 2020-01-08 DIAGNOSIS — Z79.4 TYPE 2 DIABETES MELLITUS WITH DIABETIC NEUROPATHY, WITH LONG-TERM CURRENT USE OF INSULIN (HCC): ICD-10-CM

## 2020-01-08 DIAGNOSIS — E11.40 TYPE 2 DIABETES MELLITUS WITH DIABETIC NEUROPATHY, WITH LONG-TERM CURRENT USE OF INSULIN (HCC): ICD-10-CM

## 2020-01-08 DIAGNOSIS — Z79.4 TYPE 2 DIABETES MELLITUS WITH HYPERGLYCEMIA, WITH LONG-TERM CURRENT USE OF INSULIN (HCC): Primary | ICD-10-CM

## 2020-01-08 DIAGNOSIS — E78.2 MIXED HYPERLIPIDEMIA: ICD-10-CM

## 2020-01-08 LAB — HBA1C MFR BLD HPLC: 5.9 %

## 2020-01-08 NOTE — LETTER
1/8/20 Patient: Miranda Tabor YOB: 1945 Date of Visit: 1/8/2020 Krystian Granados NP 
[address] [facility] One [address] [place] [address] VIA Facsimile: [phone] Dear Krystian Granados NP, Thank you for referring Ms. Justine Vargas to [id number] [place] [address] for evaluation. My notes for this consultation are attached. If you have questions, please do not hesitate to call me. I look forward to following your patient along with you. Sincerely, Soraida Lopez MD

## 2020-01-08 NOTE — PROGRESS NOTES
Malinda Hunt is a 75 y.o. female here for   Chief Complaint   Patient presents with    Diabetes       Functional glucose monitor and record keeping system? -yes   Eye exam within last year? - on file  Foot exam within last year? - on file    1. Have you been to the ER, urgent care clinic since your last visit? Hospitalized since your last visit? -no    2. Have you seen or consulted any other health care providers outside of the [address] [place] [address] since your last visit? Include any pap smears or colon screening. -PCP

## 2020-01-08 NOTE — PROGRESS NOTES
[facility]               Benoit Solis MD        [address] 78 [phone] Fax [phone]               Patient Information  Date:1/8/2020  Name : Yousuf Griffin 75 y.o.     YOB: 1945         Referred by: Becki Millan NP         Chief Complaint   Patient presents with    Diabetes       History of Present Illness: Yousuf Griffin is a 75 y.o. female here for fu of  Type 2 Diabetes Mellitus. She was referred by ROM Lindo for evaluation and management of type 2 diabetes mellitus. She has diabetes for the last 14 years complicated by nephropathy as well as neuropathy. Has freestyle melva, she is checking the blood glucose  She changed NPH to nighttime, she is taking 20 units at night. Reports no hypoglycemia,    Analog insulins are expensive            Wt Readings from Last 3 Encounters:   01/08/20 198 lb (89.8 kg)   08/22/19 203 lb 1.6 oz (92.1 kg)   10/01/18 209 lb 12.8 oz (95.2 kg)       BP Readings from Last 3 Encounters:   01/08/20 142/65   08/22/19 136/57   10/01/18 143/59           Past Medical History:   Diagnosis Date    Arthritis     CAD (coronary artery disease)     Chronic kidney disease     Diabetes ([facility] [facility]     Mental depression     Stroke ([facility] [address] [facility]     Thyroid disease      Current Outpatient Medications   Medication Sig    insulin NPH (NOVOLIN N NPH U-100 INSULIN) 100 unit/mL injection Inject 20 units in the AM and 15 units in the evening. Stop Humulin (Patient taking differently: Inject 20 units in the evening. Stop Humulin)    insulin syringe-needle U-100 1 mL 31 gauge x 15/64\" syrg Use to inject insulin BID. Dx code: E11.65    donepezil (ARICEPT) 5 mg tablet Take 5 mg by mouth nightly.  famotidine (PEPCID) 20 mg tablet Take 20 mg by mouth nightly.  hydrALAZINE (APRESOLINE) 10 mg tablet Take 10 mg by mouth three (3) times daily.  metolazone (ZAROXOLYN) 5 mg tablet Take  by mouth daily.     apixaban (ELIQUIS) 5 mg tablet Take 2.5 mg by mouth two (2) times a day.  nitroglycerin (NITROSTAT) 0.4 mg SL tablet by SubLINGual route every five (5) minutes as needed for Chest Pain.  venlafaxine (EFFEXOR) 75 mg tablet Take 75 mg by mouth daily.  esomeprazole (NEXIUM) 40 mg capsule Take  by mouth daily.  buPROPion SR (WELLBUTRIN SR) 150 mg SR tablet Take  by mouth two (2) times a day.  gabapentin (NEURONTIN) 600 mg tablet Take 300 mg by mouth two (2) times a day.  furosemide (LASIX) 40 mg tablet Take 40 mg by mouth daily.  losartan (COZAAR) 100 mg tablet Take 25 mg by mouth daily.  acetaminophen (TYLENOL) 325 mg tablet Take  by mouth every four (4) hours as needed for Pain.  cholecalciferol (VITAMIN D3) 400 unit tab tablet Take  by mouth daily.  isosorbide mononitrate ER (IMDUR) 30 mg tablet Take 60 mg by mouth two (2) times a day.  latanoprost (XALATAN) 0.005 % ophthalmic solution Administer 1 Drop to both eyes nightly.  Dexlansoprazole (DEXILANT) 60 mg CpDB Take  by mouth.  colchicine (COLCRYS) 0.6 mg tablet Take 0.6 mg by mouth daily.  pravastatin (PRAVACHOL) 80 mg tablet Take 80 mg by mouth nightly.  metoprolol succinate (TOPROL-XL) 50 mg XL tablet Take 25 mg by mouth daily.  aspirin 81 mg chewable tablet Take 81 mg by mouth daily.  allopurinol (ZYLOPRIM) 100 mg tablet Take 100 mg by mouth three (3) times daily.  clopidogrel (PLAVIX) 75 mg tablet Take  by mouth daily. No current facility-administered medications for this visit.       No Known Allergies      Review of Systems:  -   - Respiratory: no cough no shortness of breath  - Gastrointestinal: no dysphagia no  abdominal pain  - Musculoskeletal: no joint pains +  weakness  - Integumentary: no rashes  - Neurological: no numbness, tingling, + headaches  - Psychiatric: no depression no  anxiety  - Endocrine: no heat or cold intolerance    Physical Examination:   Blood pressure 142/65, pulse 60, temperature 95.7 °F (35.4 °C), temperature source Oral, resp. rate 18, height 5' 4\" (1.626 m), weight 198 lb (89.8 kg), SpO2 99 %. Estimated body mass index is 33.99 kg/m² as calculated from the following:    Height as of this encounter: 5' 4\" (1.626 m). -   Weight as of this encounter: 198 lb (89.8 kg). - General: pleasant, no distress, good eye contact  - HEENT: no pallor, no periorbital edema, EOMI  - Neck: supple,  - Cardiovascular: regular, normal rate, normal S1 and S2,  - Respiratory: clear to auscultation bilaterally  - Gastrointestinal: soft, nontender, nondistended,  BS +  - Musculoskeletal: no proximal muscle weakness in upper or lower extremities  - Integumentary: Edema  - Neurological: ,alert and oriented  - Psychiatric: normal mood and affect  - Skin: color, texture, turgor normal.     Diabetic foot exam: October 2018    Left Foot:   Visual Exam: normal    Pulse DP: 2+ (normal)   Filament test: reduced sensation    Vibratory sensation: absent      Right Foot:   Visual Exam: normal    Pulse DP: 2+ (normal)   Filament test: reduced sensation    Vibratory sensation: absent        Data Reviewed:   Lab Results   Component Value Date/Time    Hemoglobin A1c, External 6.1 07/20/2018    Glucose  08/22/2019 08:34 AM            Assessment/Plan:     1. Type 2 diabetes mellitus with hyperglycemia, with long-term current use of insulin ([address]    2. Mixed hyperlipidemia        1.  Type 2 Diabetes Mellitus with nephropathy,neuropathy,  Lab Results   Component Value Date/Time    Hemoglobin A1c (POC) 5.9 01/08/2020 12:30 PM    Hemoglobin A1c, External 6.1 07/20/2018      Due to decrease in clearance of insulin secondary to CKD she is at  high risk for hypoglycemia  Humulin N 20 units at night, she changed it tonight and reports no hypoglycemia  Continuous glucose monitor data downloaded for 2 weeks and reviewed with the patient  No postprandial hyperglycemia  No severe hypoglycemia      Diabetic issues reviewed : , home glucose monitoring emphasized,  hypoglycemia management and long term diabetic complications discussed. FLU annually ,Pneumovax ,aspirin daily,annual eye exam,microalbumin    2. HTN : Continue current therapy     3. Hyperlipidemia : Continue statin. 4.Obesity:Body mass index is 33.99 kg/m². Discussed about the importance of  carbohydrate portion control. 5 CKD /-followed by nephrology  Not on dialysis          There are no Patient Instructions on file for this visit. Thank you for allowing me to participate in the care of this patient.     Aileen Bradford MD      Patient verbalized understanding

## 2020-02-04 ENCOUNTER — OP HISTORICAL/CONVERTED ENCOUNTER (OUTPATIENT)
Dept: OTHER | Age: 75
End: 2020-02-04

## 2020-03-01 ENCOUNTER — OP HISTORICAL/CONVERTED ENCOUNTER (OUTPATIENT)
Dept: OTHER | Age: 75
End: 2020-03-01

## 2020-03-12 DIAGNOSIS — E11.65 TYPE 2 DIABETES MELLITUS WITH HYPERGLYCEMIA, WITH LONG-TERM CURRENT USE OF INSULIN (HCC): ICD-10-CM

## 2020-03-12 DIAGNOSIS — Z79.4 TYPE 2 DIABETES MELLITUS WITH HYPERGLYCEMIA, WITH LONG-TERM CURRENT USE OF INSULIN (HCC): ICD-10-CM

## 2020-03-12 RX ORDER — SYRINGE AND NEEDLE,INSULIN,1ML 31GX15/64"
SYRINGE, EMPTY DISPOSABLE MISCELLANEOUS
Qty: 100 PEN NEEDLE | Refills: 3 | Status: SHIPPED | OUTPATIENT
Start: 2020-03-12 | End: 2020-09-16

## 2020-06-23 ENCOUNTER — TELEPHONE (OUTPATIENT)
Dept: ENDOCRINOLOGY | Age: 75
End: 2020-06-23

## 2020-06-23 NOTE — TELEPHONE ENCOUNTER
Pt's daughter asked if Dr Felton Mueller sent a prescription for a blood sugar meter. Informed pt's daughter that Dr Felton Mueller has not sent a prescription for a glucometer. Asked pt to contact PCP or ask pharmacy who sent the rx. Pt's daughter verbalized understanding.

## 2020-06-25 ENCOUNTER — ED HISTORICAL/CONVERTED ENCOUNTER (OUTPATIENT)
Dept: OTHER | Age: 75
End: 2020-06-25

## 2020-07-14 ENCOUNTER — TELEPHONE (OUTPATIENT)
Dept: ENDOCRINOLOGY | Age: 75
End: 2020-07-14

## 2020-07-14 DIAGNOSIS — Z79.4 TYPE 2 DIABETES MELLITUS WITH HYPERGLYCEMIA, WITH LONG-TERM CURRENT USE OF INSULIN (HCC): ICD-10-CM

## 2020-07-14 DIAGNOSIS — E11.65 TYPE 2 DIABETES MELLITUS WITH HYPERGLYCEMIA, WITH LONG-TERM CURRENT USE OF INSULIN (HCC): Primary | ICD-10-CM

## 2020-07-14 DIAGNOSIS — E78.2 MIXED HYPERLIPIDEMIA: ICD-10-CM

## 2020-07-14 DIAGNOSIS — E11.65 TYPE 2 DIABETES MELLITUS WITH HYPERGLYCEMIA, WITH LONG-TERM CURRENT USE OF INSULIN (HCC): ICD-10-CM

## 2020-07-14 DIAGNOSIS — Z79.4 TYPE 2 DIABETES MELLITUS WITH HYPERGLYCEMIA, WITH LONG-TERM CURRENT USE OF INSULIN (HCC): Primary | ICD-10-CM

## 2020-07-14 NOTE — TELEPHONE ENCOUNTER
Order placed for pt per verbal order with read back from Dr. Gely Pereira 07/14/20    Lab slips mailed

## 2020-07-24 ENCOUNTER — OP HISTORICAL/CONVERTED ENCOUNTER (OUTPATIENT)
Dept: OTHER | Age: 75
End: 2020-07-24

## 2020-08-04 ENCOUNTER — OP HISTORICAL/CONVERTED ENCOUNTER (OUTPATIENT)
Dept: OTHER | Age: 75
End: 2020-08-04

## 2020-08-28 ENCOUNTER — OP HISTORICAL/CONVERTED ENCOUNTER (OUTPATIENT)
Dept: OTHER | Age: 75
End: 2020-08-28

## 2020-09-02 ENCOUNTER — OP HISTORICAL/CONVERTED ENCOUNTER (OUTPATIENT)
Dept: OTHER | Age: 75
End: 2020-09-02

## 2020-09-02 VITALS
SYSTOLIC BLOOD PRESSURE: 163 MMHG | HEIGHT: 72 IN | DIASTOLIC BLOOD PRESSURE: 77 MMHG | WEIGHT: 231 LBS | BODY MASS INDEX: 31.29 KG/M2 | TEMPERATURE: 98.2 F

## 2020-09-02 PROBLEM — I25.9 CHRONIC ISCHEMIC HEART DISEASE: Status: ACTIVE | Noted: 2020-09-02

## 2020-09-02 PROBLEM — E66.01 MORBID OBESITY (HCC): Status: ACTIVE | Noted: 2020-09-02

## 2020-09-02 PROBLEM — I73.9 PERIPHERAL VASCULAR DISEASE (HCC): Status: ACTIVE | Noted: 2020-09-02

## 2020-09-02 PROBLEM — C64.9 ADENOCARCINOMA, RENAL CELL (HCC): Status: ACTIVE | Noted: 2020-09-02

## 2020-09-02 PROBLEM — I89.0 LYMPHEDEMA: Status: ACTIVE | Noted: 2020-09-02

## 2020-09-02 PROBLEM — M12.9 ARTHROPATHY: Status: ACTIVE | Noted: 2020-09-02

## 2020-09-02 PROBLEM — C64.9 PRIMARY MALIGNANT NEOPLASM OF KIDNEY (HCC): Status: ACTIVE | Noted: 2020-09-02

## 2020-09-02 PROBLEM — I87.309 VENOUS HYPERTENSION: Status: ACTIVE | Noted: 2020-09-02

## 2020-09-02 PROBLEM — I70.209 ATHEROSCLEROSIS OF ARTERIES OF EXTREMITIES (HCC): Status: ACTIVE | Noted: 2020-09-02

## 2020-09-02 RX ORDER — HYDROCODONE BITARTRATE AND ACETAMINOPHEN 5; 325 MG/1; MG/1
TABLET ORAL
COMMUNITY
End: 2020-09-16

## 2020-09-02 RX ORDER — TRAMADOL HYDROCHLORIDE 50 MG/1
50 TABLET ORAL
COMMUNITY
End: 2020-09-16

## 2020-09-02 RX ORDER — AMOXICILLIN AND CLAVULANATE POTASSIUM 500; 125 MG/1; MG/1
TABLET, FILM COATED ORAL
COMMUNITY
End: 2020-09-16

## 2020-09-02 RX ORDER — CALCITRIOL 0.25 UG/1
0.25 CAPSULE ORAL DAILY
COMMUNITY
End: 2020-11-24

## 2020-09-02 RX ORDER — BLOOD SUGAR DIAGNOSTIC
STRIP MISCELLANEOUS
COMMUNITY
End: 2020-09-16

## 2020-09-02 RX ORDER — POLYETHYLENE GLYCOL 3350 17 G/17G
17 POWDER, FOR SOLUTION ORAL DAILY
COMMUNITY
End: 2020-09-16

## 2020-09-02 RX ORDER — LIRAGLUTIDE 6 MG/ML
0.6 INJECTION SUBCUTANEOUS
COMMUNITY
End: 2020-09-16

## 2020-09-02 RX ORDER — FLUTICASONE PROPIONATE 50 MCG
2 SPRAY, SUSPENSION (ML) NASAL DAILY
COMMUNITY
End: 2020-09-16

## 2020-09-02 RX ORDER — ISOPROPYL ALCOHOL 70 ML/100ML
SWAB TOPICAL
COMMUNITY
End: 2020-09-16

## 2020-09-02 RX ORDER — CETIRIZINE HCL 10 MG
10 TABLET ORAL DAILY
COMMUNITY
End: 2021-10-24

## 2020-09-02 RX ORDER — ACETAMINOPHEN AND CODEINE PHOSPHATE 300; 30 MG/1; MG/1
1 TABLET ORAL
COMMUNITY
End: 2020-09-16

## 2020-09-02 RX ORDER — AMPICILLIN 250 MG/1
CAPSULE ORAL
COMMUNITY
End: 2020-09-16

## 2020-09-02 RX ORDER — PEN NEEDLE, DIABETIC 31 GX3/16"
NEEDLE, DISPOSABLE MISCELLANEOUS
COMMUNITY
End: 2020-09-16

## 2020-09-02 RX ORDER — LUBIPROSTONE 24 UG/1
CAPSULE, GELATIN COATED ORAL
COMMUNITY
End: 2020-09-16

## 2020-09-02 RX ORDER — PEN NEEDLE, DIABETIC 29 G X1/2"
NEEDLE, DISPOSABLE MISCELLANEOUS
COMMUNITY
End: 2020-09-16

## 2020-09-02 RX ORDER — FLUCONAZOLE 150 MG/1
150 TABLET ORAL DAILY
COMMUNITY
End: 2020-09-16

## 2020-09-16 ENCOUNTER — HOSPITAL ENCOUNTER (OUTPATIENT)
Dept: PHYSICAL THERAPY | Age: 75
Discharge: HOME | End: 2020-09-16
Payer: MEDICARE

## 2020-09-16 ENCOUNTER — APPOINTMENT (OUTPATIENT)
Dept: GENERAL RADIOLOGY | Age: 75
End: 2020-09-16
Attending: EMERGENCY MEDICINE
Payer: MEDICARE

## 2020-09-16 ENCOUNTER — APPOINTMENT (OUTPATIENT)
Dept: CT IMAGING | Age: 75
End: 2020-09-16
Attending: EMERGENCY MEDICINE
Payer: MEDICARE

## 2020-09-16 ENCOUNTER — HOSPITAL ENCOUNTER (OUTPATIENT)
Age: 75
Setting detail: OBSERVATION
Discharge: HOME | End: 2020-09-17
Attending: EMERGENCY MEDICINE | Admitting: HOSPITALIST
Payer: MEDICARE

## 2020-09-16 DIAGNOSIS — R41.89 UNRESPONSIVE: ICD-10-CM

## 2020-09-16 DIAGNOSIS — G45.9 TIA (TRANSIENT ISCHEMIC ATTACK): Primary | ICD-10-CM

## 2020-09-16 PROBLEM — I10 HTN (HYPERTENSION): Status: ACTIVE | Noted: 2020-09-16

## 2020-09-16 PROBLEM — N18.5 STAGE 5 CHRONIC KIDNEY DISEASE (HCC): Status: ACTIVE | Noted: 2020-09-16

## 2020-09-16 PROBLEM — I25.10 ARTERIOSCLEROSIS OF CORONARY ARTERY: Status: ACTIVE | Noted: 2020-09-16

## 2020-09-16 PROBLEM — E11.9 TYPE 2 DIABETES MELLITUS (HCC): Status: ACTIVE | Noted: 2020-09-16

## 2020-09-16 PROBLEM — E78.5 DYSLIPIDEMIA: Status: ACTIVE | Noted: 2020-09-16

## 2020-09-16 LAB
ALBUMIN SERPL-MCNC: 3 G/DL (ref 3.5–5)
ALBUMIN/GLOB SERPL: 0.7 {RATIO} (ref 1.1–2.2)
ALP SERPL-CCNC: 99 U/L (ref 45–117)
ALT SERPL-CCNC: 13 U/L (ref 12–78)
ANION GAP SERPL CALC-SCNC: 4 MMOL/L (ref 5–15)
APTT PPP: 38.1 SEC (ref 23–35.7)
AST SERPL W P-5'-P-CCNC: 19 U/L (ref 15–37)
BASOPHILS # BLD: 0 K/UL (ref 0–0.1)
BASOPHILS NFR BLD: 0 % (ref 0–1)
BILIRUB SERPL-MCNC: 0.2 MG/DL (ref 0.2–1)
BUN SERPL-MCNC: 53 MG/DL (ref 6–20)
BUN/CREAT SERPL: 23 (ref 12–20)
CA-I BLD-MCNC: 9.4 MG/DL (ref 8.5–10.1)
CHLORIDE SERPL-SCNC: 106 MMOL/L (ref 97–108)
CO2 SERPL-SCNC: 30 MMOL/L (ref 21–32)
CREAT SERPL-MCNC: 2.3 MG/DL (ref 0.55–1.02)
D DIMER PPP FEU-MCNC: 0.49 UG/ML(FEU)
DIFFERENTIAL METHOD BLD: ABNORMAL
EOSINOPHIL # BLD: 0.1 K/UL (ref 0–0.4)
EOSINOPHIL NFR BLD: 1 % (ref 0–7)
ERYTHROCYTE [DISTWIDTH] IN BLOOD BY AUTOMATED COUNT: 15.1 % (ref 11.5–14.5)
GLOBULIN SER CALC-MCNC: 4.4 G/DL (ref 2–4)
GLUCOSE SERPL-MCNC: 138 MG/DL (ref 65–100)
HCT VFR BLD AUTO: 30.1 % (ref 35–47)
HGB BLD-MCNC: 9.7 % (ref 11.5–16)
IMM GRANULOCYTES # BLD AUTO: 0 K/UL (ref 0–0.04)
IMM GRANULOCYTES NFR BLD AUTO: 0 % (ref 0–0.5)
INR PPP: 1.2 (ref 0.9–1.1)
LYMPHOCYTES # BLD: 1.1 K/UL (ref 0.8–3.5)
LYMPHOCYTES NFR BLD: 11 % (ref 12–49)
MAGNESIUM SERPL-MCNC: 1.8 MG/DL (ref 1.6–2.4)
MCH RBC QN AUTO: 28.4 PG (ref 26–34)
MCHC RBC AUTO-ENTMCNC: 32.2 G/DL (ref 30–36.5)
MCV RBC AUTO: 88.3 FL (ref 80–99)
MONOCYTES # BLD: 1 K/UL (ref 0–1)
MONOCYTES NFR BLD: 10 % (ref 5–13)
NEUTS SEG # BLD: 7.8 K/UL (ref 1.8–8)
NEUTS SEG NFR BLD: 78 % (ref 32–75)
PLATELET # BLD AUTO: 260 K/UL (ref 150–400)
PMV BLD AUTO: 10.3 FL (ref 8.9–12.9)
POTASSIUM SERPL-SCNC: 3.7 MMOL/L (ref 3.5–5.1)
PROT SERPL-MCNC: 7.4 G/DL (ref 6.4–8.2)
PROTHROMBIN TIME: 14.9 SEC (ref 11.9–14.7)
RBC # BLD AUTO: 3.41 M/UL (ref 3.8–5.2)
SODIUM SERPL-SCNC: 140 MMOL/L (ref 136–145)
THERAPEUTIC RANGE,PTTT: ABNORMAL SEC (ref 68–109)
TROPONIN I SERPL-MCNC: <0.05 NG/ML
WBC # BLD AUTO: 10 K/UL (ref 3.6–11)

## 2020-09-16 PROCEDURE — 71045 X-RAY EXAM CHEST 1 VIEW: CPT

## 2020-09-16 PROCEDURE — 85379 FIBRIN DEGRADATION QUANT: CPT

## 2020-09-16 PROCEDURE — 36415 COLL VENOUS BLD VENIPUNCTURE: CPT

## 2020-09-16 PROCEDURE — 85025 COMPLETE CBC W/AUTO DIFF WBC: CPT

## 2020-09-16 PROCEDURE — 97110 THERAPEUTIC EXERCISES: CPT

## 2020-09-16 PROCEDURE — 85730 THROMBOPLASTIN TIME PARTIAL: CPT

## 2020-09-16 PROCEDURE — 84484 ASSAY OF TROPONIN QUANT: CPT

## 2020-09-16 PROCEDURE — 80053 COMPREHEN METABOLIC PANEL: CPT

## 2020-09-16 PROCEDURE — 85610 PROTHROMBIN TIME: CPT

## 2020-09-16 PROCEDURE — 83735 ASSAY OF MAGNESIUM: CPT

## 2020-09-16 PROCEDURE — 99218 HC RM OBSERVATION: CPT

## 2020-09-16 PROCEDURE — 99285 EMERGENCY DEPT VISIT HI MDM: CPT

## 2020-09-16 PROCEDURE — 70450 CT HEAD/BRAIN W/O DYE: CPT

## 2020-09-16 RX ORDER — FAMOTIDINE 20 MG/1
20 TABLET, FILM COATED ORAL 2 TIMES DAILY
COMMUNITY
Start: 2019-06-18 | End: 2020-12-22 | Stop reason: ALTCHOICE

## 2020-09-16 RX ORDER — ACETAMINOPHEN 650 MG/1
650 SUPPOSITORY RECTAL
Status: DISCONTINUED | OUTPATIENT
Start: 2020-09-16 | End: 2020-09-17 | Stop reason: HOSPADM

## 2020-09-16 RX ORDER — LOSARTAN POTASSIUM 25 MG/1
25 TABLET ORAL DAILY
COMMUNITY
Start: 2020-07-13 | End: 2020-11-24

## 2020-09-16 RX ORDER — INSULIN LISPRO 100 [IU]/ML
INJECTION, SOLUTION INTRAVENOUS; SUBCUTANEOUS
Status: DISCONTINUED | OUTPATIENT
Start: 2020-09-17 | End: 2020-09-17 | Stop reason: HOSPADM

## 2020-09-16 RX ORDER — DEXTROSE 50 % IN WATER (D50W) INTRAVENOUS SYRINGE
25-50 AS NEEDED
Status: DISCONTINUED | OUTPATIENT
Start: 2020-09-16 | End: 2020-09-17 | Stop reason: HOSPADM

## 2020-09-16 RX ORDER — MAGNESIUM SULFATE 100 %
4 CRYSTALS MISCELLANEOUS AS NEEDED
Status: DISCONTINUED | OUTPATIENT
Start: 2020-09-16 | End: 2020-09-16 | Stop reason: ALTCHOICE

## 2020-09-16 RX ORDER — FLUTICASONE PROPIONATE 110 UG/1
AEROSOL, METERED RESPIRATORY (INHALATION)
COMMUNITY
Start: 2019-06-18 | End: 2020-09-16

## 2020-09-16 RX ORDER — GUAIFENESIN 100 MG/5ML
81 LIQUID (ML) ORAL DAILY
Status: DISCONTINUED | OUTPATIENT
Start: 2020-09-17 | End: 2020-09-17

## 2020-09-16 RX ORDER — LANOLIN ALCOHOL/MO/W.PET/CERES
1 CREAM (GRAM) TOPICAL DAILY
COMMUNITY
End: 2020-11-24

## 2020-09-16 RX ORDER — HYDRALAZINE HYDROCHLORIDE 25 MG/1
25 TABLET, FILM COATED ORAL 2 TIMES DAILY
COMMUNITY
End: 2022-02-20

## 2020-09-16 RX ORDER — SODIUM CHLORIDE 0.9 % (FLUSH) 0.9 %
5-40 SYRINGE (ML) INJECTION EVERY 8 HOURS
Status: DISCONTINUED | OUTPATIENT
Start: 2020-09-16 | End: 2020-09-17 | Stop reason: HOSPADM

## 2020-09-16 RX ORDER — DICLOFENAC SODIUM 30 MG/G
0.5 GEL TOPICAL 2 TIMES DAILY
COMMUNITY
Start: 2020-06-29 | End: 2020-12-22 | Stop reason: ALTCHOICE

## 2020-09-16 RX ORDER — GABAPENTIN 300 MG/1
300 CAPSULE ORAL 2 TIMES DAILY
COMMUNITY
Start: 2020-06-29

## 2020-09-16 RX ORDER — METOPROLOL SUCCINATE 25 MG/1
25 TABLET, EXTENDED RELEASE ORAL DAILY
COMMUNITY
End: 2022-01-13

## 2020-09-16 RX ORDER — FUROSEMIDE 40 MG/1
40 TABLET ORAL DAILY
COMMUNITY
End: 2022-02-20

## 2020-09-16 RX ORDER — ACETAMINOPHEN 325 MG/1
650 TABLET ORAL
Status: DISCONTINUED | OUTPATIENT
Start: 2020-09-16 | End: 2020-09-17 | Stop reason: HOSPADM

## 2020-09-16 RX ORDER — FLUTICASONE PROPIONATE 50 MCG
SPRAY, SUSPENSION (ML) NASAL
COMMUNITY
End: 2021-10-24

## 2020-09-16 RX ORDER — SODIUM CHLORIDE 0.9 % (FLUSH) 0.9 %
5-40 SYRINGE (ML) INJECTION AS NEEDED
Status: DISCONTINUED | OUTPATIENT
Start: 2020-09-16 | End: 2020-09-17 | Stop reason: HOSPADM

## 2020-09-16 RX ORDER — POLYETHYLENE GLYCOL 3350 17 G/17G
17 POWDER, FOR SOLUTION ORAL DAILY
COMMUNITY
End: 2020-12-22 | Stop reason: ALTCHOICE

## 2020-09-16 RX ORDER — ISOSORBIDE MONONITRATE 60 MG/1
60 TABLET, EXTENDED RELEASE ORAL 2 TIMES DAILY
COMMUNITY
Start: 2019-07-17 | End: 2022-08-01

## 2020-09-16 RX ORDER — DONEPEZIL HYDROCHLORIDE 10 MG/1
10 TABLET, FILM COATED ORAL
COMMUNITY
End: 2020-09-26

## 2020-09-16 NOTE — ED PROVIDER NOTES
HPI   Chief Complaint   Patient presents with    Extremity Weakness     75yoF hx CAD, PE on Eliquis, RCC, stroke presents with episode of unconsciousness. Per EMS, family called 911 because at 3:30pm pt had witnessed 5 minutes of unconsciousness, she had defecated on herself. Initially upon awakening pt seemed to have slurred speech and right side weakness for the family. EMS arrived to find pt alert oriented, glucose 100. Per report she has baseline right side facial droop from previous stroke. Currently in ED pt reports feeling well, denies recent illness, unable to remember the event.    Past Medical History:   Diagnosis Date    Adenocarcinoma, renal cell ([facility] [address] 9/2/2020    Arthritis     CAD (coronary artery disease)     Chronic kidney disease     Diabetes ([facility] [address]     Gout     Hypercholesterolemia     Hypertension     Mental depression     Pulmonary embolism (HCC)     Stroke ([facility] [address]     Thyroid disease        Past Surgical History:   Procedure Laterality Date    CARDIAC SURG PROCEDURE UNLIST      stents placed     HX GYN      HX HYSTERECTOMY      HX NEPHRECTOMY  02/12/2015    HX ORTHOPAEDIC      HX UROLOGICAL  02/12/2015    PARTIAL URETERECTOMY          Family History:   Problem Relation Age of Onset    Heart Disease Mother     Heart Disease Father     Heart Disease Sister     Cancer Sister     Heart Disease Brother     Cancer Maternal Grandmother     Stroke Son        Social History     Socioeconomic History    Marital status: LEGALLY      Spouse name: Not on file    Number of children: Not on file    Years of education: Not on file    Highest education level: Not on file   Occupational History    Not on file   Social Needs    Financial resource strain: Not on file    Food insecurity     Worry: Not on file     Inability: Not on file    Transportation needs     Medical: Not on file     Non-medical: Not on file   Tobacco Use    Smoking status: Never Smoker    Smokeless tobacco: Never Used   Substance and Sexual Activity    Alcohol use: No    Drug use: No    Sexual activity: Not Currently   Lifestyle    Physical activity     Days per week: Not on file     Minutes per session: Not on file    Stress: Not on file   Relationships    Social connections     Talks on phone: Not on file     Gets together: Not on file     Attends Mandaeism service: Not on file     Active member of club or organization: Not on file     Attends meetings of clubs or organizations: Not on file     Relationship status: Not on file    Intimate partner violence     Fear of current or ex partner: Not on file     Emotionally abused: Not on file     Physically abused: Not on file     Forced sexual activity: Not on file   Other Topics Concern    Not on file   Social History Narrative    Not on file         ALLERGIES: Patient has no known allergies. Review of Systems   Gastrointestinal:        Fecal incontinence   Neurological: Positive for syncope. Slurred speech, right side weak. All other systems reviewed and are negative. Vitals:    09/16/20 1742 09/16/20 1812   BP:  (!) 166/77   Pulse: 70 69   Resp: 20 23   Temp: 97.9 °F (36.6 °C)    SpO2:  98%            Physical Exam   Patient Vitals for the past 8 hrs:   Temp Pulse Resp BP SpO2   09/16/20 1812 -- 69 23 (!) 166/77 98 %   09/16/20 1742 97.9 °F (36.6 °C) 70 20 -- --        Nursing note and vitals reviewed. Constitutional: NAD. Head: Normocephalic and atraumatic. Mouth/Throat: Airway patent. Moist mucous membranes. Eyes: EOMI. PERRL. No scleral icterus. Neck: Neck supple. Cardiovascular: Normal rate, regular rhythm. Good pulses throughout. Pulmonary/Chest: No respiratory distress. Clear to auscultation bilaterally. No wheezes, rales, or rhonchi. Abdominal/GI: BS normal, Soft, non-tender, non-distended. No rebound or guarding. Musculoskeletal: No gross injuries or deformities.   Neurological:Awake, alert, orientated x 4  Speech and naming normal  CN II-XII grossly intact; right facial droop is very minimal.   Normal coordination. Unable to test pronator drift and full motor strength due to right shoulder rotator cuff injury (old).  strength 5/5 in both hands. Plantar flexion and dorsiflexion 5/5 strength b/l. Pt unable to lift legs up at the hips per baseline motor issues. Psych: Pleasant, cooperative. Skin: Skin is warm and dry. No rash noted. MDM   Pt was level 1 stroke alert. CT head stroke protocol shows no acute stroke. DDx = TIA, stroke reactivation, dysrhythmia, vasovagal syncope, autonomic dysfunction, ACS, metabolic derangement, UTI. I [facility] ([facility] [address] [facility], he thinks she may have had TIA, recommend admission for TIA workup. Labs Reviewed   METABOLIC PANEL, COMPREHENSIVE   CBC WITH AUTOMATED DIFF   TROPONIN I   D DIMER   MAGNESIUM   PROTHROMBIN TIME + INR   PTT   URINALYSIS W/ REFLEX CULTURE     XR CHEST PORT   Final Result   Impression: No acute pulmonary process. CT CODE NEURO HEAD WO CONTRAST   Final Result   Impression: No acute intracranial process. Findings conveyed to Dr. Collin Ramachandran on 9/16/2020 at [facility].              Medications   sodium chloride (NS) flush 5-40 mL (has no administration in time range)   sodium chloride (NS) flush 5-40 mL (has no administration in time range)            Critical Care  Performed by: Queenie Valentin MD  Authorized by: Queenie Valentin MD     Critical care provider statement:     Critical care time (minutes):  20    Critical care time was exclusive of:  Separately billable procedures and treating other patients and teaching time    Critical care was necessary to treat or prevent imminent or life-threatening deterioration of the following conditions: level 1 stroke alert.     Critical care was time spent personally by me on the following activities:  Examination of patient, re-evaluation of patient's condition, ordering and review of radiographic studies and discussions with consultants

## 2020-09-16 NOTE — PROGRESS NOTES
PT DAILY TREATMENT NOTE - [facility] 2-15    Patient Name: Reyna Zaragoza  Date:2020  : 1945  [x]  Patient  Verified  Payor: [facility] MEDICARE / Plan: [facility] [address] [facility] CCP / Product Type: Managed Care Medicare /    In time:10:45am  Out time:11:25am  Total Treatment Time (min): 40   Total Timed Codes (min): 40  1:1 Treatment Time ( [address] only): 40   Visit #: 10    Treatment Area: Pain in left shoulder [M25.512]     *Please refer to paper chart for documentation regarding prior visits. SUBJECTIVE  Pain Level (0-10 scale): 2/10  Any medication changes, allergies to medications, adverse drug reactions, diagnosis change, or new procedure performed?: [x] No    [] Yes (see summary sheet for update)  Subjective functional status/changes:   [x] No changes reported    OBJECTIVE    40 min Therapeutic Exercise:  [x] See flow sheet :   Rationale: increase ROM and increase strength to improve the patients ability to reach overhead and carry objects without increase L shoulder pain. With   [x] TE   [] TA   [] neuro   [] other: Patient Education: [x] Review HEP    [] Progressed/Changed HEP based on:   [] positioning   [] body mechanics   [] transfers   [] heat/ice application    [] other:      Other Objective/Functional Measures: Added resisted shoulder ER/IR, resisted horiz abd and bent over ROWs. Pain Level (0-10 scale) post treatment: 1/10    ASSESSMENT/Changes in Function:   Pt able to perform all exercises without c/o increase L shoulder pain. Continues to have ER weakness but progressing. Pt's POC ends 2020. She would to come one more visit to review HEP then DC to HEP. []  See Plan of Care  []  See progress note/recertification  []  See Discharge Summary         Progress towards goals / Updated goals:    Impairment goals/STGs:   1. Pt will be indep with a HEP in 2-3 weeks. [x] Met [] Not met [] Partially met   2. Decreased pain by at least 2 points on the VAS in 2-3 weeks.  [x] Met [] Not met [] Partially met      Functional goals/LTGs  1. Pt will be able to raise her arms up at least 90 degrees of FF/scaption without pain in 4-8 weeks. [x] Met [] Not met [] Partially met   2. Pt will be able to carry at least a 1/2 gallon of milk without difficulty in 4-8 weeks. [x] Met [] Not met [] Partially met   3. Pt will be able to carry objects without dropping them in 4-8 weeks. [x] Met [] Not met [] Partially met   4. Pt will be able to resume light to moderate household and yard activities without difficulty in 4-6 weeks. [x] Met [] Not met [] Partially met    5. Decreased pain to <4/10 at the highest in 4-8 weeks. [x] Met [] Not met [] Partially met      PLAN  []  Upgrade activities as tolerated     []  Continue plan of care  []  Update interventions per flow sheet       []  Discharge due to:_  [x]  Other: complete one more visit to review SILVIA Blackwell 9/16/2020

## 2020-09-17 ENCOUNTER — APPOINTMENT (OUTPATIENT)
Dept: NON INVASIVE DIAGNOSTICS | Age: 75
End: 2020-09-17
Attending: INTERNAL MEDICINE
Payer: MEDICARE

## 2020-09-17 VITALS
HEART RATE: 75 BPM | SYSTOLIC BLOOD PRESSURE: 144 MMHG | RESPIRATION RATE: 18 BRPM | DIASTOLIC BLOOD PRESSURE: 56 MMHG | WEIGHT: 215 LBS | HEIGHT: 64 IN | BODY MASS INDEX: 36.7 KG/M2 | TEMPERATURE: 97.9 F | OXYGEN SATURATION: 98 %

## 2020-09-17 LAB
ALBUMIN SERPL-MCNC: 2.9 G/DL (ref 3.5–5)
ANION GAP SERPL CALC-SCNC: 2 MMOL/L (ref 5–15)
BUN SERPL-MCNC: 50 MG/DL (ref 6–20)
BUN/CREAT SERPL: 22 (ref 12–20)
CA-I BLD-MCNC: 8.7 MG/DL (ref 8.5–10.1)
CHLORIDE SERPL-SCNC: 108 MMOL/L (ref 97–108)
CO2 SERPL-SCNC: 32 MMOL/L (ref 21–32)
CREAT SERPL-MCNC: 2.26 MG/DL (ref 0.55–1.02)
ECHO AO ROOT DIAM: 3.7 CM
ECHO AV MEAN GRADIENT: 14 MMHG
ECHO AV PEAK GRADIENT: 27 MMHG
ECHO AV VTI: 57.7 CM
ECHO EST RA PRESSURE: 3 MMHG
ECHO LV E' SEPTAL VELOCITY: 5.46 CM/S
ECHO LV INTERNAL DIMENSION DIASTOLIC: 4.61 CM (ref 3.9–5.3)
ECHO LV INTERNAL DIMENSION SYSTOLIC: 2.56 CM
ECHO LV IVSD: 1.2 CM (ref 0.6–0.9)
ECHO LV MASS 2D: 185.3 G (ref 67–162)
ECHO LV MASS INDEX 2D: 91.7 G/M2 (ref 43–95)
ECHO LV POSTERIOR WALL DIASTOLIC: 1.03 CM (ref 0.6–0.9)
ECHO LVOT DIAM: 1.8 CM
ECHO LVOT PEAK GRADIENT: 9 MMHG
ECHO LVOT VTI: 32.8 CM
ECHO MV A VELOCITY: 77.1 CM/S
ECHO MV E DECELERATION TIME (DT): 0.14 S
ECHO MV E VELOCITY: 103 CM/S
ECHO MV E/A RATIO: 1.34
ECHO MV E/E' SEPTAL: 18.86
ECHO PV PEAK INSTANTANEOUS GRADIENT SYSTOLIC: 4 MMHG
ECHO PV PEAK INSTANTANEOUS GRADIENT SYSTOLIC: 7 MMHG
ECHO PV REGURGITANT MAX VELOCITY: 101 CM/S
ECHO PV REGURGITANT MAX VELOCITY: 136 CM/S
ECHO PV REGURGITANT MAX VELOCITY: 149 CM/S
ECHO PV REGURGITANT MAX VELOCITY: 262 CM/S
ECHO PVEIN A DURATION: 0.09 S
ECHO PVEIN A VELOCITY: 23.4 CM/S
ECHO RV INTERNAL DIMENSION: 3.65 CM
ECHO TV MAX VELOCITY: 348 CM/S
ECHO TV MEAN GRADIENT: 5 MMHG
ECHO TV REGURGITANT PEAK GRADIENT: 48 MMHG
ERYTHROCYTE [DISTWIDTH] IN BLOOD BY AUTOMATED COUNT: 15.2 % (ref 11.5–14.5)
GLUCOSE BLD STRIP.AUTO-MCNC: 115 MG/DL (ref 65–100)
GLUCOSE BLD STRIP.AUTO-MCNC: 130 MG/DL (ref 65–100)
GLUCOSE BLD STRIP.AUTO-MCNC: 195 MG/DL (ref 65–100)
GLUCOSE SERPL-MCNC: 216 MG/DL (ref 65–100)
HCT VFR BLD AUTO: 29 % (ref 35–47)
HGB BLD-MCNC: 9.4 % (ref 11.5–16)
MCH RBC QN AUTO: 28.6 PG (ref 26–34)
MCHC RBC AUTO-ENTMCNC: 32.4 G/DL (ref 30–36.5)
MCV RBC AUTO: 88.1 FL (ref 80–99)
PERFORMED BY, TECHID: ABNORMAL
PHOSPHATE SERPL-MCNC: 4.4 MG/DL (ref 2.6–4.7)
PLATELET # BLD AUTO: 241 K/UL (ref 150–400)
PMV BLD AUTO: 10.2 FL (ref 8.9–12.9)
POTASSIUM SERPL-SCNC: 3.5 MMOL/L (ref 3.5–5.1)
RBC # BLD AUTO: 3.29 M/UL (ref 3.8–5.2)
SODIUM SERPL-SCNC: 142 MMOL/L (ref 136–145)
TSH SERPL DL<=0.05 MIU/L-ACNC: 0.57 UIU/ML (ref 0.36–3.74)
WBC # BLD AUTO: 9.8 K/UL (ref 3.6–11)

## 2020-09-17 PROCEDURE — 80061 LIPID PANEL: CPT

## 2020-09-17 PROCEDURE — 82962 GLUCOSE BLOOD TEST: CPT

## 2020-09-17 PROCEDURE — 93306 TTE W/DOPPLER COMPLETE: CPT

## 2020-09-17 PROCEDURE — 97530 THERAPEUTIC ACTIVITIES: CPT

## 2020-09-17 PROCEDURE — 74011250637 HC RX REV CODE- 250/637: Performed by: HOSPITALIST

## 2020-09-17 PROCEDURE — 74011636637 HC RX REV CODE- 636/637: Performed by: HOSPITALIST

## 2020-09-17 PROCEDURE — 82746 ASSAY OF FOLIC ACID SERUM: CPT

## 2020-09-17 PROCEDURE — 99218 HC RM OBSERVATION: CPT

## 2020-09-17 PROCEDURE — 97161 PT EVAL LOW COMPLEX 20 MIN: CPT

## 2020-09-17 PROCEDURE — 97165 OT EVAL LOW COMPLEX 30 MIN: CPT

## 2020-09-17 PROCEDURE — 93880 EXTRACRANIAL BILAT STUDY: CPT

## 2020-09-17 PROCEDURE — 36415 COLL VENOUS BLD VENIPUNCTURE: CPT

## 2020-09-17 PROCEDURE — 80069 RENAL FUNCTION PANEL: CPT

## 2020-09-17 PROCEDURE — 85027 COMPLETE CBC AUTOMATED: CPT

## 2020-09-17 PROCEDURE — 84443 ASSAY THYROID STIM HORMONE: CPT

## 2020-09-17 RX ORDER — POLYETHYLENE GLYCOL 3350 17 G/17G
17 POWDER, FOR SOLUTION ORAL DAILY
Status: DISCONTINUED | OUTPATIENT
Start: 2020-09-17 | End: 2020-09-17 | Stop reason: HOSPADM

## 2020-09-17 RX ORDER — FAMOTIDINE 20 MG/1
20 TABLET, FILM COATED ORAL 2 TIMES DAILY
Status: DISCONTINUED | OUTPATIENT
Start: 2020-09-17 | End: 2020-09-17 | Stop reason: HOSPADM

## 2020-09-17 RX ORDER — ATORVASTATIN CALCIUM 20 MG/1
20 TABLET, FILM COATED ORAL
Status: DISCONTINUED | OUTPATIENT
Start: 2020-09-17 | End: 2020-09-17 | Stop reason: HOSPADM

## 2020-09-17 RX ORDER — GUAIFENESIN 100 MG/5ML
81 LIQUID (ML) ORAL DAILY
Status: DISCONTINUED | OUTPATIENT
Start: 2020-09-17 | End: 2020-09-17 | Stop reason: HOSPADM

## 2020-09-17 RX ORDER — ALLOPURINOL 100 MG/1
200 TABLET ORAL 3 TIMES DAILY
Status: DISCONTINUED | OUTPATIENT
Start: 2020-09-17 | End: 2020-09-17 | Stop reason: HOSPADM

## 2020-09-17 RX ORDER — HYDRALAZINE HYDROCHLORIDE 25 MG/1
25 TABLET, FILM COATED ORAL 2 TIMES DAILY
Status: DISCONTINUED | OUTPATIENT
Start: 2020-09-17 | End: 2020-09-17 | Stop reason: HOSPADM

## 2020-09-17 RX ORDER — LOSARTAN POTASSIUM 50 MG/1
25 TABLET ORAL DAILY
Status: DISCONTINUED | OUTPATIENT
Start: 2020-09-17 | End: 2020-09-17 | Stop reason: HOSPADM

## 2020-09-17 RX ORDER — ISOSORBIDE MONONITRATE 30 MG/1
60 TABLET, EXTENDED RELEASE ORAL DAILY
Status: DISCONTINUED | OUTPATIENT
Start: 2020-09-17 | End: 2020-09-17 | Stop reason: HOSPADM

## 2020-09-17 RX ORDER — DONEPEZIL HYDROCHLORIDE 5 MG/1
10 TABLET, FILM COATED ORAL
Status: DISCONTINUED | OUTPATIENT
Start: 2020-09-17 | End: 2020-09-17 | Stop reason: HOSPADM

## 2020-09-17 RX ORDER — LANOLIN ALCOHOL/MO/W.PET/CERES
1 CREAM (GRAM) TOPICAL DAILY
Status: DISCONTINUED | OUTPATIENT
Start: 2020-09-17 | End: 2020-09-17 | Stop reason: HOSPADM

## 2020-09-17 RX ORDER — GABAPENTIN 300 MG/1
300 CAPSULE ORAL 2 TIMES DAILY
Status: DISCONTINUED | OUTPATIENT
Start: 2020-09-17 | End: 2020-09-17 | Stop reason: HOSPADM

## 2020-09-17 RX ORDER — CALCITRIOL 0.25 UG/1
0.25 CAPSULE ORAL DAILY
Status: DISCONTINUED | OUTPATIENT
Start: 2020-09-17 | End: 2020-09-17 | Stop reason: HOSPADM

## 2020-09-17 RX ORDER — METOPROLOL SUCCINATE 25 MG/1
25 TABLET, EXTENDED RELEASE ORAL DAILY
Status: DISCONTINUED | OUTPATIENT
Start: 2020-09-17 | End: 2020-09-17 | Stop reason: HOSPADM

## 2020-09-17 RX ORDER — LATANOPROST 50 UG/ML
1 SOLUTION/ DROPS OPHTHALMIC
Status: DISCONTINUED | OUTPATIENT
Start: 2020-09-17 | End: 2020-09-17 | Stop reason: HOSPADM

## 2020-09-17 RX ORDER — VENLAFAXINE 37.5 MG/1
75 TABLET ORAL DAILY
Status: DISCONTINUED | OUTPATIENT
Start: 2020-09-17 | End: 2020-09-17 | Stop reason: HOSPADM

## 2020-09-17 RX ORDER — PRAVASTATIN SODIUM 40 MG/1
80 TABLET ORAL
Status: DISCONTINUED | OUTPATIENT
Start: 2020-09-17 | End: 2020-09-17

## 2020-09-17 RX ORDER — FLUTICASONE PROPIONATE 50 MCG
2 SPRAY, SUSPENSION (ML) NASAL DAILY
Status: DISCONTINUED | OUTPATIENT
Start: 2020-09-17 | End: 2020-09-17 | Stop reason: HOSPADM

## 2020-09-17 RX ORDER — CETIRIZINE HCL 10 MG
10 TABLET ORAL DAILY
Status: DISCONTINUED | OUTPATIENT
Start: 2020-09-17 | End: 2020-09-17 | Stop reason: HOSPADM

## 2020-09-17 RX ORDER — FUROSEMIDE 40 MG/1
60 TABLET ORAL DAILY
Status: DISCONTINUED | OUTPATIENT
Start: 2020-09-17 | End: 2020-09-17 | Stop reason: HOSPADM

## 2020-09-17 RX ORDER — CYANOCOBALAMIN (VITAMIN B-12) 500 MCG
400 TABLET ORAL DAILY
Status: DISCONTINUED | OUTPATIENT
Start: 2020-09-17 | End: 2020-09-17 | Stop reason: HOSPADM

## 2020-09-17 RX ADMIN — FLUTICASONE PROPIONATE 2 SPRAY: 50 SPRAY, METERED NASAL at 11:41

## 2020-09-17 RX ADMIN — APIXABAN 2.5 MG: 2.5 TABLET, FILM COATED ORAL at 08:43

## 2020-09-17 RX ADMIN — FAMOTIDINE 20 MG: 20 TABLET ORAL at 17:36

## 2020-09-17 RX ADMIN — ALLOPURINOL 200 MG: 100 TABLET ORAL at 08:43

## 2020-09-17 RX ADMIN — FUROSEMIDE 60 MG: 40 TABLET ORAL at 08:43

## 2020-09-17 RX ADMIN — DONEPEZIL HYDROCHLORIDE 10 MG: 5 TABLET, FILM COATED ORAL at 17:36

## 2020-09-17 RX ADMIN — FAMOTIDINE 20 MG: 20 TABLET ORAL at 08:43

## 2020-09-17 RX ADMIN — LOSARTAN POTASSIUM 25 MG: 50 TABLET, FILM COATED ORAL at 08:43

## 2020-09-17 RX ADMIN — HYDRALAZINE HYDROCHLORIDE 25 MG: 25 TABLET, FILM COATED ORAL at 08:42

## 2020-09-17 RX ADMIN — POLYETHYLENE GLYCOL 3350 17 G: 17 POWDER, FOR SOLUTION ORAL at 08:42

## 2020-09-17 RX ADMIN — CALCITRIOL CAPSULES 0.25 MCG 0.25 MCG: 0.25 CAPSULE ORAL at 11:41

## 2020-09-17 RX ADMIN — ACETAMINOPHEN 650 MG: 325 TABLET, FILM COATED ORAL at 17:40

## 2020-09-17 RX ADMIN — ALLOPURINOL 200 MG: 100 TABLET ORAL at 16:19

## 2020-09-17 RX ADMIN — Medication 1 TABLET: at 11:41

## 2020-09-17 RX ADMIN — VENLAFAXINE 75 MG: 37.5 TABLET ORAL at 11:41

## 2020-09-17 RX ADMIN — GABAPENTIN 300 MG: 300 CAPSULE ORAL at 08:42

## 2020-09-17 RX ADMIN — APIXABAN 2.5 MG: 2.5 TABLET, FILM COATED ORAL at 17:36

## 2020-09-17 RX ADMIN — METOPROLOL SUCCINATE 25 MG: 25 TABLET, EXTENDED RELEASE ORAL at 08:43

## 2020-09-17 RX ADMIN — HYDRALAZINE HYDROCHLORIDE 25 MG: 25 TABLET, FILM COATED ORAL at 17:36

## 2020-09-17 RX ADMIN — FERROUS SULFATE TAB 325 MG (65 MG ELEMENTAL FE) 325 MG: 325 (65 FE) TAB at 08:43

## 2020-09-17 RX ADMIN — ASPIRIN 81 MG CHEWABLE TABLET 81 MG: 81 TABLET CHEWABLE at 08:43

## 2020-09-17 RX ADMIN — LINACLOTIDE 145 MCG: 145 CAPSULE, GELATIN COATED ORAL at 07:30

## 2020-09-17 RX ADMIN — CETIRIZINE HYDROCHLORIDE 10 MG: 10 TABLET, FILM COATED ORAL at 11:41

## 2020-09-17 RX ADMIN — GABAPENTIN 300 MG: 300 CAPSULE ORAL at 17:36

## 2020-09-17 RX ADMIN — INSULIN HUMAN 20 UNITS: 100 INJECTION, SUSPENSION SUBCUTANEOUS at 17:35

## 2020-09-17 RX ADMIN — ISOSORBIDE MONONITRATE 60 MG: 30 TABLET, EXTENDED RELEASE ORAL at 08:43

## 2020-09-17 NOTE — ROUTINE PROCESS
TRANSFER - OUT REPORT:    Verbal report given to Mar(name) on Bette Duron  being transferred to [facility](unit) for routine progression of care       Report consisted of patients Situation, Background, Assessment and   Recommendations(SBAR). Information from the following report(s) SBAR and ED Summary was reviewed with the receiving nurse. Lines:   Peripheral IV 09/16/20 Right Antecubital (Active)   Site Assessment Clean, dry, & intact 09/17/20 0004   Infiltration Assessment 0 09/17/20 0004   Dressing Status Clean, dry, & intact 09/17/20 0004   Dressing Type Transparent 09/17/20 0004   Hub Color/Line Status Pink 09/17/20 0004        Opportunity for questions and clarification was provided.       Patient transported with:   Registered Nurse  Tech

## 2020-09-17 NOTE — PROGRESS NOTES
Hospitalist Progress Note               Daily Progress Note: 9/17/2020      Subjective: The patient is seen for follow  up. She is a 69-year-old female with a history of renal cell carcinoma, diabetes, CAD, hypertension and chronic kidney disease who was admitted last night after having an episode of unresponsiveness at home. She apparently defecated on herself. It lasted about 10 minutes and afterward she had some slurred speech initially. On arrival to the ED she was back to baseline. No seizure activity was noted. She was admitted for possible TIA. Says she sees Dr. Karyn Gao once a year for a \"checkup. \"    Problem List:  Problem List as of 9/17/2020 Date Reviewed: 1/8/2020          Codes Class Noted - Resolved    Arteriosclerosis of coronary artery ICD-10-CM: I25.10  ICD-9-CM: 414.00  9/16/2020 - Present        Dyslipidemia ICD-10-CM: E78.5  ICD-9-CM: 272.4  9/16/2020 - Present        Stage 5 chronic kidney disease ([facility] ICD-10-CM: N18.5  ICD-9-CM: 585.5  9/16/2020 - Present        HTN (hypertension) ICD-10-CM: I10  ICD-9-CM: 401.9  9/16/2020 - Present        Type 2 diabetes mellitus ([facility] ICD-10-CM: E11.9  ICD-9-CM: 250.00  9/16/2020 - Present        TIA (transient ischemic attack) ICD-10-CM: G45.9  ICD-9-CM: 435.9  9/16/2020 - Present        Adenocarcinoma, renal cell ([facility] ICD-10-CM: C64.9  ICD-9-CM: 189.0  9/2/2020 - Present        Morbid obesity ([facility] ICD-10-CM: E66.01  ICD-9-CM: 278.01  9/2/2020 - Present        Peripheral vascular disease ([facility] ICD-10-CM: I73.9  ICD-9-CM: 443.9  9/2/2020 - Present        Arthritis ICD-10-CM: M19.90  ICD-9-CM: 716.90  8/21/2019 - Present        Hyperlipidemia ICD-10-CM: E78.5  ICD-9-CM: 272.4  8/21/2019 - Present        Essential hypertension ICD-10-CM: I10  ICD-9-CM: 401.9  8/21/2019 - Present        Impingement syndrome of shoulder region ICD-10-CM: M75.40  ICD-9-CM: 726.2  8/21/2019 - Present        Pulmonary embolism ([facility] ICD-10-CM: I26.99  ICD-9-CM: 415.19  2/1/2015 - Present        Cerebrovascular accident (CVA) [facility]) ICD-10-CM: I63.9  ICD-9-CM: 434.91  8/1/2014 - Present              Medications reviewed  Current Facility-Administered Medications   Medication Dose Route Frequency    allopurinoL (ZYLOPRIM) tablet 200 mg  200 mg Oral TID    apixaban (ELIQUIS) tablet 2.5 mg  2.5 mg Oral BID    cholecalciferol (VITAMIN D3) (400 Units /10 mcg) tablet 1 Tab  400 Units Oral DAILY    latanoprost (XALATAN) 0.005 % ophthalmic solution 1 Drop  1 Drop Both Eyes QHS    aspirin chewable tablet 81 mg  81 mg Oral DAILY    venlafaxine (EFFEXOR) tablet 75 mg  75 mg Oral DAILY    cetirizine (ZYRTEC) tablet 10 mg  10 mg Oral DAILY    linaCLOtide (LINZESS) capsule 145 mcg  145 mcg Oral ACB    calcitRIOL (ROCALTROL) capsule 0.25 mcg  0.25 mcg Oral DAILY    ferrous sulfate tablet 325 mg  1 Tab Oral DAILY    polyethylene glycol (MIRALAX) packet 17 g  17 g Oral DAILY    . PHARMACY TO SUBSTITUTE PER PROTOCOL (Reordered from: diclofenac (SOLARAZE) 3 % topical gel)    Per Protocol    insulin NPH (NOVOLIN N, HUMULIN N) injection 20 Units  20 Units SubCUTAneous DAILY WITH DINNER    donepeziL (ARICEPT) tablet 10 mg  10 mg Oral DAILY WITH DINNER    isosorbide mononitrate ER (IMDUR) tablet 60 mg  60 mg Oral DAILY    hydrALAZINE (APRESOLINE) tablet 25 mg  25 mg Oral BID    losartan (COZAAR) tablet 25 mg  25 mg Oral DAILY    metoprolol succinate (TOPROL-XL) XL tablet 25 mg  25 mg Oral DAILY    gabapentin (NEURONTIN) capsule 300 mg  300 mg Oral BID    furosemide (LASIX) tablet 60 mg  60 mg Oral DAILY    fluticasone propionate (FLONASE) 50 mcg/actuation nasal spray 2 Spray  2 Spray Both Nostrils DAILY    famotidine (PEPCID) tablet 20 mg  20 mg Oral BID    atorvastatin (LIPITOR) tablet 20 mg  20 mg Oral QHS    sodium chloride (NS) flush 5-40 mL  5-40 mL IntraVENous Q8H    sodium chloride (NS) flush 5-40 mL  5-40 mL IntraVENous PRN    insulin lispro (HUMALOG) injection SubCUTAneous AC&HS    dextrose (D50W) injection syrg 12.5-25 g  25-50 mL IntraVENous PRN    glucagon (GLUCAGEN) injection 1 mg  1 mg IntraMUSCular PRN    acetaminophen (TYLENOL) tablet 650 mg  650 mg Oral Q4H PRN    Or    acetaminophen (TYLENOL) solution 650 mg  650 mg Per NG tube Q4H PRN    Or    acetaminophen (TYLENOL) suppository 650 mg  650 mg Rectal Q4H PRN       Review of Systems:   A comprehensive review of systems was negative except for that written in the HPI. Objective:   Physical Exam:     Visit Vitals  BP (!) 161/75   Pulse 76   Temp 98.3 °F (36.8 °C)   Resp 18   SpO2 98%      O2 Device: Room air    Temp (24hrs), Av.3 °F (36.8 °C), Min:97.9 °F (36.6 °C), Max:98.7 °F (37.1 °C)    No intake/output data recorded. No intake/output data recorded. General:  Alert, cooperative, no distress, appears stated age. Lungs:   Clear to auscultation bilaterally. Chest wall:  No tenderness or deformity. Heart:  Regular rate and rhythm, S1, S2 normal, no murmur, click, rub or gallop. Abdomen:   Soft, non-tender. Bowel sounds normal. No masses,  No organomegaly. Extremities: Extremities normal, atraumatic, no cyanosis or edema. Pulses: 2+ and symmetric all extremities. Skin: Skin color, texture, turgor normal. No rashes or lesions   Neurologic: CNII-XII intact. No gross sensory or motor deficits     Data Review:       Recent Days:  Recent Labs     20  0214 20  1730   WBC 9.8 10.0   HGB 9.4* 9.7*   HCT 29.0* 30.1*    260     Recent Labs     20  0214 20  1730    140   K 3.5 3.7    106   CO2 32 30   * 138*   BUN 50* 53*   CREA 2.26* 2.30*   CA 8.7 9.4   MG  --  1.8   PHOS 4.4  --    ALB 2.9* 3.0*   TBILI  --  0.2   ALT  --  13   INR  --  1.2*     No results for input(s): PH, PCO2, PO2, HCO3, FIO2 in the last 72 hours.     24 Hour Results:  Recent Results (from the past 24 hour(s))   METABOLIC PANEL, COMPREHENSIVE    Collection Time: 20 5:30 PM   Result Value Ref Range    Sodium 140 136 - 145 mmol/L    Potassium 3.7 3.5 - 5.1 mmol/L    Chloride 106 97 - 108 mmol/L    CO2 30 21 - 32 mmol/L    Anion gap 4 (L) 5 - 15 mmol/L    Glucose 138 (H) 65 - 100 mg/dL    BUN 53 (H) 6 - 20 mg/dL    Creatinine 2.30 (H) 0.55 - 1.02 mg/dL    BUN/Creatinine ratio 23 (H) 12 - 20      GFR est AA 25 (L) >60 ml/min/1.73m2    GFR est non-AA 21 (L) >60 ml/min/1.73m2    Calcium 9.4 8.5 - 10.1 mg/dL    Bilirubin, total 0.2 0.2 - 1.0 mg/dL    AST (SGOT) 19 15 - 37 U/L    ALT (SGPT) 13 12 - 78 U/L    Alk. phosphatase 99 45 - 117 U/L    Protein, total 7.4 6.4 - 8.2 g/dL    Albumin 3.0 (L) 3.5 - 5.0 g/dL    Globulin 4.4 (H) 2.0 - 4.0 g/dL    A-G Ratio 0.7 (L) 1.1 - 2.2     CBC WITH AUTOMATED DIFF    Collection Time: 09/16/20  5:30 PM   Result Value Ref Range    WBC 10.0 3.6 - 11.0 K/uL    RBC 3.41 (L) 3.80 - 5.20 M/uL    HGB 9.7 (L) 11.5 - 16.0 %    HCT 30.1 (L) 35.0 - 47.0 %    MCV 88.3 80.0 - 99.0 FL    MCH 28.4 26.0 - 34.0 PG    MCHC 32.2 30.0 - 36.5 g/dL    RDW 15.1 (H) 11.5 - 14.5 %    PLATELET 997 685 - 583 K/uL    MPV 10.3 8.9 - 12.9 FL    NEUTROPHILS 78 (H) 32 - 75 %    LYMPHOCYTES 11 (L) 12 - 49 %    MONOCYTES 10 5 - 13 %    EOSINOPHILS 1 0 - 7 %    BASOPHILS 0 0 - 1 %    IMMATURE GRANULOCYTES 0 0.0 - 0.5 %    ABS. NEUTROPHILS 7.8 1.8 - 8.0 K/UL    ABS. LYMPHOCYTES 1.1 0.8 - 3.5 K/UL    ABS. MONOCYTES 1.0 0.0 - 1.0 K/UL    ABS. EOSINOPHILS 0.1 0.0 - 0.4 K/UL    ABS. BASOPHILS 0.0 0.0 - 0.1 K/UL    ABS. IMM.  GRANS. 0.0 0.00 - 0.04 K/UL    DF AUTOMATED     TROPONIN I    Collection Time: 09/16/20  5:30 PM   Result Value Ref Range    Troponin-I, Qt. <0.05 <0.05 ng/mL   D DIMER    Collection Time: 09/16/20  5:30 PM   Result Value Ref Range    D DIMER 0.49 <0.50 ug/ml(FEU)   MAGNESIUM    Collection Time: 09/16/20  5:30 PM   Result Value Ref Range    Magnesium 1.8 1.6 - 2.4 mg/dL   PROTHROMBIN TIME + INR    Collection Time: 09/16/20  5:30 PM   Result Value Ref Range Prothrombin time 14.9 (H) 11.9 - 14.7 sec    INR 1.2 (H) 0.9 - 1.1     PTT    Collection Time: 09/16/20  5:30 PM   Result Value Ref Range    aPTT 38.1 (H) 23.0 - 35.7 sec    aPTT, therapeutic range   68 - 109 sec   CBC W/O DIFF    Collection Time: 09/17/20  2:14 AM   Result Value Ref Range    WBC 9.8 3.6 - 11.0 K/uL    RBC 3.29 (L) 3.80 - 5.20 M/uL    HGB 9.4 (L) 11.5 - 16.0 %    HCT 29.0 (L) 35.0 - 47.0 %    MCV 88.1 80.0 - 99.0 FL    MCH 28.6 26.0 - 34.0 PG    MCHC 32.4 30.0 - 36.5 g/dL    RDW 15.2 (H) 11.5 - 14.5 %    PLATELET 931 548 - 956 K/uL    MPV 10.2 8.9 - 12.9 FL   TSH 3RD GENERATION    Collection Time: 09/17/20  2:14 AM   Result Value Ref Range    TSH 0.57 0.36 - 3.74 uIU/mL   RENAL FUNCTION PANEL    Collection Time: 09/17/20  2:14 AM   Result Value Ref Range    Sodium 142 136 - 145 mmol/L    Potassium 3.5 3.5 - 5.1 mmol/L    Chloride 108 97 - 108 mmol/L    CO2 32 21 - 32 mmol/L    Anion gap 2 (L) 5 - 15 mmol/L    Glucose 216 (H) 65 - 100 mg/dL    BUN 50 (H) 6 - 20 mg/dL    Creatinine 2.26 (H) 0.55 - 1.02 mg/dL    BUN/Creatinine ratio 22 (H) 12 - 20      GFR est AA 26 (L) >60 ml/min/1.73m2    GFR est non-AA 21 (L) >60 ml/min/1.73m2    Calcium 8.7 8.5 - 10.1 mg/dL    Phosphorus 4.4 2.6 - 4.7 mg/dL    Albumin 2.9 (L) 3.5 - 5.0 g/dL   GLUCOSE, POC    Collection Time: 09/17/20  8:49 AM   Result Value Ref Range    Glucose (POC) 130 (H) 65 - 100 mg/dL    Performed by NILES AGUILERA        XR CHEST PORT   Final Result   Impression: No acute pulmonary process. CT CODE NEURO HEAD WO CONTRAST   Final Result   Impression: No acute intracranial process. Findings conveyed to Dr. Neo Guzman on 9/16/2020 at [address] [facility].                 Assessment/     Transient altered mental status: TIA versus seizure versus syncope     History of CVA     History of pulmonary embolism on anticoagulation which we will continue,     History of coronary artery disease:  On aspirin     Benign essential hypertension:       Chronic kidney disease stage IV: Stable renal functions, history of nephrectomy secondary to renal cell carcinoma     Anemia secondary to chronic kidney disease    Pulmonary hypertension    Plan:  Obtain echo and carotid duplex  Await neurology input  Likely discharge home later today    Care Plan discussed with: Patient/Family    Total time spent with patient: 30 minutes.     Robert Montes MD

## 2020-09-17 NOTE — DISCHARGE SUMMARY
Physician Discharge Summary     Patient ID:    Patricia Correia  [id number]  47 y.o.  1945    Admit date: 9/16/2020    Discharge date : 9/17/2020    Chronic Diagnoses:    Problem List as of 9/17/2020 Date Reviewed: 1/8/2020          Codes Class Noted - Resolved    Arteriosclerosis of coronary artery ICD-10-CM: I25.10  ICD-9-CM: 414.00  9/16/2020 - Present        Dyslipidemia ICD-10-CM: E78.5  ICD-9-CM: 272.4  9/16/2020 - Present        Stage 5 chronic kidney disease ([facility] ICD-10-CM: N18.5  ICD-9-CM: 585.5  9/16/2020 - Present        HTN (hypertension) ICD-10-CM: I10  ICD-9-CM: 401.9  9/16/2020 - Present        Type 2 diabetes mellitus ([facility] ICD-10-CM: E11.9  ICD-9-CM: 250.00  9/16/2020 - Present        TIA (transient ischemic attack) ICD-10-CM: G45.9  ICD-9-CM: 435.9  9/16/2020 - Present        Adenocarcinoma, renal cell ([facility] ICD-10-CM: C64.9  ICD-9-CM: 189.0  9/2/2020 - Present        Morbid obesity ([facility] ICD-10-CM: E66.01  ICD-9-CM: 278.01  9/2/2020 - Present        Peripheral vascular disease ([facility] ICD-10-CM: I73.9  ICD-9-CM: 443.9  9/2/2020 - Present        Arthritis ICD-10-CM: M19.90  ICD-9-CM: 716.90  8/21/2019 - Present        Hyperlipidemia ICD-10-CM: E78.5  ICD-9-CM: 272.4  8/21/2019 - Present        Essential hypertension ICD-10-CM: I10  ICD-9-CM: 401.9  8/21/2019 - Present        Impingement syndrome of shoulder region ICD-10-CM: M75.40  ICD-9-CM: 726.2  8/21/2019 - Present        Pulmonary embolism ([facility] ICD-10-CM: I26.99  ICD-9-CM: 415.19  2/1/2015 - Present        Cerebrovascular accident (CVA) ([facility] ICD-10-CM: I63.9  ICD-9-CM: 434.91  8/1/2014 - Present          22    Final Diagnoses:   TIA (transient ischemic attack) [G45.9] Transsient altered mental status: TIA versus seizure versus syncope     History of CVA     History of pulmonary embolism on anticoagulation which we will continue,     History of coronary artery disease:  On aspirin     Benign essential hypertension:    Chronic kidney disease stage IV: Stable renal functions, history of nephrectomy secondary to renal cell carcinoma     Anemia secondary to chronic kidney disease     Pulmonary hypertension    Reason for Hospitalization:  She is a 69-year-old female with a history of renal cell carcinoma, diabetes, CAD, hypertension and chronic kidney disease who was admitted last night after having an episode of unresponsiveness at home. She apparently defecated on herself. It lasted about 10 minutes and afterward she had some slurred speech initially. On arrival to the ED she was back to baseline. No seizure activity was noted. She was admitted for possible TIA.     Says she sees Dr. Nolasco Ran once a year for a \"checkup. \"    CT of the head was unremarkable      Hospital Course:   Patient was admitted overnight as observation. She had no recurrence of symptoms. Echocardiogram was obtained which showed a normal ejection fraction and negative bubble study. Carotid duplex demonstrated no significant carotid stenosis    She was felt stable for discharge home on 9/17. She will continue her previous medication regimen and follow-up with her primary neurologist and PCP          Discharge Medications:   Current Discharge Medication List      CONTINUE these medications which have NOT CHANGED    Details   ferrous sulfate (Iron) 325 mg (65 mg iron) tablet Take 1 Tab by mouth daily. diclofenac (SOLARAZE) 3 % topical gel Apply 0.5 mg to affected area two (2) times a day. apixaban (ELIQUIS) 2.5 mg tablet Take 2.5 mg by mouth two (2) times a day. insulin NPH (NOVOLIN N, HUMULIN N) 100 unit/mL injection 20 Units by SubCUTAneous route daily (with dinner). donepeziL (ARICEPT) 10 mg tablet Take 10 mg by mouth daily (with dinner). isosorbide mononitrate ER (IMDUR) 60 mg CR tablet Take 60 mg by mouth daily. hydrALAZINE (APRESOLINE) 25 mg tablet Take 25 mg by mouth two (2) times a day.       losartan (COZAAR) 25 mg tablet Take 25 mg by mouth daily. metoprolol succinate (TOPROL-XL) 25 mg XL tablet Take 25 mg by mouth daily. gabapentin (NEURONTIN) 300 mg capsule Take 300 mg by mouth two (2) times a day. furosemide (LASIX) 40 mg tablet Take 60 mg by mouth daily. fluticasone propionate (FLONASE) 50 mcg/actuation nasal spray fluticasone propionate 50 mcg/actuation nasal spray,suspension      famotidine (PEPCID) 20 mg tablet Take 20 mg by mouth two (2) times a day. cetirizine (ZYRTEC) 10 mg tablet Take 10 mg by mouth daily. linaCLOtide (Linzess) 145 mcg cap capsule Take  by mouth Daily (before breakfast). calcitRIOL (ROCALTROL) 0.25 mcg capsule Take 0.25 mcg by mouth daily. venlafaxine (EFFEXOR) 75 mg tablet Take 75 mg by mouth daily. cholecalciferol (VITAMIN D3) 400 unit tab tablet Take  by mouth daily. latanoprost (XALATAN) 0.005 % ophthalmic solution Administer 1 Drop to both eyes nightly. pravastatin (PRAVACHOL) 80 mg tablet Take 80 mg by mouth nightly. aspirin 81 mg chewable tablet Take 81 mg by mouth daily. allopurinol (ZYLOPRIM) 100 mg tablet Take 200 mg by mouth three (3) times daily. polyethylene glycol (MIRALAX) 17 gram packet Take 17 g/day by mouth daily. Follow up Care:    1. Emile De Jesus NP in 1-2 weeks. Please call to set up an appointment shortly after discharge. Diet:  Diabetic Diet    Disposition:  Home. Advanced Directive:   FULL x   DNR      Discharge Exam:  See today's note. CONSULTATIONS: Neurology    Significant Diagnostic Studies:   9/16/2020: BUN 53 mg/dL* (Ref range: 6 - 20 mg/dL); Calcium 9.4 mg/dL (Ref range: 8.5 - 10.1 mg/dL); CO2 30 mmol/L (Ref range: 21 - 32 mmol/L); Creatinine 2.30 mg/dL* (Ref range: 0.55 - 1.02 mg/dL); Glucose 138 mg/dL* (Ref range: 65 - 100 mg/dL); HCT 30.1 %* (Ref range: 35.0 - 47.0 %); HGB 9.7 %* (Ref range: 11.5 - 16.0 %);  Potassium 3.7 mmol/L (Ref range: 3.5 - 5.1 mmol/L); Sodium 140 mmol/L (Ref range: 136 - 145 mmol/L)  9/17/2020: BUN 50 mg/dL* (Ref range: 6 - 20 mg/dL); Calcium 8.7 mg/dL (Ref range: 8.5 - 10.1 mg/dL); CO2 32 mmol/L (Ref range: 21 - 32 mmol/L); Creatinine 2.26 mg/dL* (Ref range: 0.55 - 1.02 mg/dL); Glucose 216 mg/dL* (Ref range: 65 - 100 mg/dL); HCT 29.0 %* (Ref range: 35.0 - 47.0 %); HGB 9.4 %* (Ref range: 11.5 - 16.0 %); Potassium 3.5 mmol/L (Ref range: 3.5 - 5.1 mmol/L); Sodium 142 mmol/L (Ref range: 136 - 145 mmol/L)  Recent Labs     09/17/20 0214 09/16/20  1730   WBC 9.8 10.0   HGB 9.4* 9.7*   HCT 29.0* 30.1*    260     Recent Labs     09/17/20 0214 09/16/20  1730    140   K 3.5 3.7    106   CO2 32 30   BUN 50* 53*   CREA 2.26* 2.30*   * 138*   CA 8.7 9.4   MG  --  1.8   PHOS 4.4  --      Recent Labs     09/17/20 0214 09/16/20  1730   ALT  --  13   AP  --  99   TBILI  --  0.2   TP  --  7.4   ALB 2.9* 3.0*   GLOB  --  4.4*     Recent Labs     09/16/20  1730   INR 1.2*   PTP 14.9*   APTT 38.1*      No results for input(s): FE, TIBC, PSAT, FERR in the last 72 hours. No results for input(s): PH, PCO2, PO2 in the last 72 hours. No results for input(s): CPK, CKMB in the last 72 hours.     No lab exists for component: TROPONINI  Lab Results   Component Value Date/Time    Glucose (POC) 115 (H) 09/17/2020 03:55 PM    Glucose (POC) 195 (H) 09/17/2020 11:28 AM    Glucose (POC) 130 (H) 09/17/2020 08:49 AM    Glucose  08/22/2019 08:34 AM       Discharge time spent 35 minutes    Signed:  Robert Montes MD  9/17/2020  3:30 PM

## 2020-09-17 NOTE — H&P
History and Physical            History and Physical    Subjective:      67 y.o. female with a history of renal cell carcinoma, coronary artery disease, chronic kidney disease, hypertension presents with with an episode of unconsciousness. She was at home, suddenly she became unresponsive, defecated on herself. It lasted more than 10 minutes as per the family, after that patient was awake and alert by the time EMS arrived. According to the family she had slurred speech upon waking up, but that is completely resolved by the time she came to the emergency room. Now patient states she is doing fine with no issues. She denies any chest pain, palpitations. States she felt dizzy before the incident happened. Family did not notice any seizure-like episode. She has little facial droop that is her baseline. Due to her risk factors admitted to rule out for cardiac etiology or TIA. Past Medical History:   Diagnosis Date    Adenocarcinoma, renal cell ([address] 9/2/2020    Arthritis     CAD (coronary artery disease)     Chronic kidney disease     Diabetes ([address]     Gout     Hypercholesterolemia     Hypertension     Mental depression     Pulmonary embolism ([address]     [facility]     Thyroid disease      Past Surgical History:   Procedure Laterality Date    CARDIAC SURG PROCEDURE UNLIST      stents placed     HX GYN      HX HYSTERECTOMY      HX NEPHRECTOMY  02/12/2015    HX ORTHOPAEDIC      HX UROLOGICAL  02/12/2015    PARTIAL URETERECTOMY      Family History   Problem Relation Age of Onset    Heart Disease Mother     Heart Disease Father     Heart Disease Sister     Cancer Sister     Heart Disease Brother     Cancer Maternal Grandmother     Stroke Son       Social History     Tobacco Use    Smoking status: Never Smoker    Smokeless tobacco: Never Used   Substance Use Topics    Alcohol use: No       Prior to Admission medications    Medication Sig Start Date End Date Taking?  Authorizing Provider ferrous sulfate (Iron) 325 mg (65 mg iron) tablet Take 1 Tab by mouth daily. Yes Provider, Historical   diclofenac (SOLARAZE) 3 % topical gel Apply 0.5 mg to affected area two (2) times a day. 6/29/20  Yes Provider, Historical   apixaban (ELIQUIS) 2.5 mg tablet Take 2.5 mg by mouth two (2) times a day. 6/18/19  Yes Provider, Historical   insulin NPH (NOVOLIN N, HUMULIN N) 100 unit/mL injection 20 Units by SubCUTAneous route daily (with dinner). 6/18/19  Yes Provider, Historical   donepeziL (ARICEPT) 10 mg tablet Take 10 mg by mouth daily (with dinner). Yes Provider, Historical   isosorbide mononitrate ER (IMDUR) 60 mg CR tablet Take 60 mg by mouth daily. 7/17/19  Yes Provider, Historical   hydrALAZINE (APRESOLINE) 25 mg tablet Take 25 mg by mouth two (2) times a day. Yes Provider, Historical   losartan (COZAAR) 25 mg tablet Take 25 mg by mouth daily. 7/13/20  Yes Provider, Historical   metoprolol succinate (TOPROL-XL) 25 mg XL tablet Take 25 mg by mouth daily. Yes Provider, Historical   gabapentin (NEURONTIN) 300 mg capsule Take 300 mg by mouth two (2) times a day. 6/29/20  Yes Provider, Historical   furosemide (LASIX) 40 mg tablet Take 60 mg by mouth daily. Yes Provider, Historical   fluticasone propionate (FLONASE) 50 mcg/actuation nasal spray fluticasone propionate 50 mcg/actuation nasal spray,suspension   Yes Provider, Historical   famotidine (PEPCID) 20 mg tablet Take 20 mg by mouth two (2) times a day. 6/18/19  Yes Provider, Historical   cetirizine (ZYRTEC) 10 mg tablet Take 10 mg by mouth daily. Yes Provider, Historical   linaCLOtide (Linzess) 145 mcg cap capsule Take  by mouth Daily (before breakfast). Yes Provider, Historical   calcitRIOL (ROCALTROL) 0.25 mcg capsule Take 0.25 mcg by mouth daily. Yes Provider, Historical   venlafaxine (EFFEXOR) 75 mg tablet Take 75 mg by mouth daily. Yes Provider, Historical   cholecalciferol (VITAMIN D3) 400 unit tab tablet Take  by mouth daily.    Yes Provider, Historical   latanoprost (XALATAN) 0.005 % ophthalmic solution Administer 1 Drop to both eyes nightly. Yes Provider, Historical   pravastatin (PRAVACHOL) 80 mg tablet Take 80 mg by mouth nightly. Yes Provider, Historical   aspirin 81 mg chewable tablet Take 81 mg by mouth daily. Yes Provider, Historical   allopurinol (ZYLOPRIM) 100 mg tablet Take 200 mg by mouth three (3) times daily. Yes Provider, Historical   polyethylene glycol (MIRALAX) 17 gram packet Take 17 g/day by mouth daily. Provider, Historical     No Known Allergies     Review of Systems:  Constitutional: Appetite is good, denies weight loss, no fever, no chills, no night sweats  Eye: No recent visual disturbances, no discharge, no double vision  Ear/nose/mouth/throat : Has slight hard of hearing, no ear pain, no nasal congestion, no sore throat, no trouble swallowing. Respiratory : No trouble breathing, no cough, ++ shortness of breath with exertion, no hemoptysis, no wheezing  Cardiovascular : No chest pain, no palpitation, no racing of heart, no orthopnea, no paroxysmal nocturnal dyspnea, no peripheral edema  Gastrointestinal : No nausea, no vomiting, no diarrhea, constipation, heartburn, abdominal pain  Genitourinary : No dysuria, no hematuria, no increased frequency, incontinence,  Lymphatics : No swollen glands  Endocrine : No excessive thirst, no polyuria no cold intolerance, no heat intolerance. Immunologic : No hives, urticaria, no seasonal allergies, medication allergies  Musculoskeletal : No joint swelling, pain, effusion,  no back pain, no neck pain,   Integumentary : No rash, no pruritus, no ecchymosis  Hematology : No petechiae, No easy bruising,  No tendency to bleed easy  Neurology : As in history of present illness. Denies any headache, double vision, or focal weakness at this time.   Psychiatric : No mood swings, no anxiety, depression    Objective:     Visit Vitals  BP (!) 151/88   Pulse 77   Temp 97.9 °F (36.6 °C)   Resp 22   SpO2 98%     Physical Exam:   General : Looks good for her age and condition, very pleasant, no respiratory distress noted  HEENT : PERRLA, normal oral mucosa, atraumatic normocephalic, ear,  nose normal,   Neck : Supple, no JVD, no masses noted, no carotid bruit  Lungs : Breath sounds with good air entry bilaterally, no wheezes or rales, no accessory muscle use,  CVS : Rhythm rate regular, she has murmur left parasternal, 2/6. Abdomen : Soft, nontender, no organomegaly, bowel sounds active  Extremities : No edema noted,  pedal pulses palpable  Musculoskeletal : Fair range of motion, no joint swelling or effusion, muscle tone and power appears fair,   Skin : Moist, warm, skin turgor, no pathological rash  Lymphatic : No cervical lymphadenopathy. Neurological : Awake, alert, oriented to time place person. Cranial nerves intact,  no sensory disturbance, rapid movements of upper extremities normal, finger-nose test appears normal.  Psychiatric : Mood and affect appears appropriate to the situation. Data Review:   Recent Results (from the past 24 hour(s))   METABOLIC PANEL, COMPREHENSIVE    Collection Time: 09/16/20  5:30 PM   Result Value Ref Range    Sodium 140 136 - 145 mmol/L    Potassium 3.7 3.5 - 5.1 mmol/L    Chloride 106 97 - 108 mmol/L    CO2 30 21 - 32 mmol/L    Anion gap 4 (L) 5 - 15 mmol/L    Glucose 138 (H) 65 - 100 mg/dL    BUN 53 (H) 6 - 20 mg/dL    Creatinine 2.30 (H) 0.55 - 1.02 mg/dL    BUN/Creatinine ratio 23 (H) 12 - 20      GFR est AA 25 (L) >60 ml/min/1.73m2    GFR est non-AA 21 (L) >60 ml/min/1.73m2    Calcium 9.4 8.5 - 10.1 mg/dL    Bilirubin, total 0.2 0.2 - 1.0 mg/dL    AST (SGOT) 19 15 - 37 U/L    ALT (SGPT) 13 12 - 78 U/L    Alk.  phosphatase 99 45 - 117 U/L    Protein, total 7.4 6.4 - 8.2 g/dL    Albumin 3.0 (L) 3.5 - 5.0 g/dL    Globulin 4.4 (H) 2.0 - 4.0 g/dL    A-G Ratio 0.7 (L) 1.1 - 2.2     CBC WITH AUTOMATED DIFF    Collection Time: 09/16/20  5:30 PM   Result Value Ref Range    WBC 10.0 3.6 - 11.0 K/uL    RBC 3.41 (L) 3.80 - 5.20 M/uL    HGB 9.7 (L) 11.5 - 16.0 %    HCT 30.1 (L) 35.0 - 47.0 %    MCV 88.3 80.0 - 99.0 FL    MCH 28.4 26.0 - 34.0 PG    MCHC 32.2 30.0 - 36.5 g/dL    RDW 15.1 (H) 11.5 - 14.5 %    PLATELET 862 796 - 261 K/uL    MPV 10.3 8.9 - 12.9 FL    NEUTROPHILS 78 (H) 32 - 75 %    LYMPHOCYTES 11 (L) 12 - 49 %    MONOCYTES 10 5 - 13 %    EOSINOPHILS 1 0 - 7 %    BASOPHILS 0 0 - 1 %    IMMATURE GRANULOCYTES 0 0.0 - 0.5 %    ABS. NEUTROPHILS 7.8 1.8 - 8.0 K/UL    ABS. LYMPHOCYTES 1.1 0.8 - 3.5 K/UL    ABS. MONOCYTES 1.0 0.0 - 1.0 K/UL    ABS. EOSINOPHILS 0.1 0.0 - 0.4 K/UL    ABS. BASOPHILS 0.0 0.0 - 0.1 K/UL    ABS. IMM. GRANS. 0.0 0.00 - 0.04 K/UL    DF AUTOMATED     TROPONIN I    Collection Time: 09/16/20  5:30 PM   Result Value Ref Range    Troponin-I, Qt. <0.05 <0.05 ng/mL   D DIMER    Collection Time: 09/16/20  5:30 PM   Result Value Ref Range    D DIMER 0.49 <0.50 ug/ml(FEU)   MAGNESIUM    Collection Time: 09/16/20  5:30 PM   Result Value Ref Range    Magnesium 1.8 1.6 - 2.4 mg/dL   PROTHROMBIN TIME + INR    Collection Time: 09/16/20  5:30 PM   Result Value Ref Range    Prothrombin time 14.9 (H) 11.9 - 14.7 sec    INR 1.2 (H) 0.9 - 1.1     PTT    Collection Time: 09/16/20  5:30 PM   Result Value Ref Range    aPTT 38.1 (H) 23.0 - 35.7 sec    aPTT, therapeutic range   68 - 109 sec             Assessment and Plan :     Transient altered mental status: She has this loss of consciousness for more than 10 minutes, and she was incontinent at that time. With a history of CVA need to make sure there is no underlying seizures that may be causing this. We will monitor her, neurology consultation is placed. She stated she followed with primary neurologist in the past so consultation placed    History of CVA    History of pulmonary embolism on anticoagulation which we will continue,    History of coronary artery disease:  On aspirin    Benign essential hypertension: On multiple medications, once she is stable we will get an orthostatic hypotension. Chronic kidney disease stage IV: Stable renal functions, history of nephrectomy secondary to renal cell carcinoma    Anemia secondary to chronic kidney disease: Hemoglobin low but appears stable, no further interventions needed at this time. Admitted for observation status, if no improvement we will change to the inpatient status. Full CODE STATUS, home medications reviewed and documented. Her daughter who she lives with will make decisions in case if she cannot.     Signed by Birgti Thompson MD

## 2020-09-17 NOTE — ROUTINE PROCESS
Initial skin assessment performed by myself and Evelin Segal RN. Patient skin is very dry; bilateral lower extremities are darkened; patient has fistula to left upper arm with palpable thrill and audible bruit.

## 2020-09-17 NOTE — CONSULTS
This is an elderly woman that lives in [place] and passed out in front of her daughter while watching television. She became silent. Her eyes closed. She drooled at the mouth. Her daughter lifted her eyes and they had rolled back in her head. The rescue squad was called and brought her to the emergency room where head CT scan was negative. Stroke alert suggested that she be evaluated for a transient ischemic attack. I may have seen the patient in the office a long time ago but I do not remember seeing her recently. It sounds like she used to see Dr. Romayne Slocumb a neurologist in [place] who gave her donepezil for memory loss. To complicate things she has renal cell carcinoma and sees Dr. Ashley Sampson, ran from oncology here in [place]. She lives with her daughter. She is retired. She is a . She is had a right knee replacement and needs a left knee replaced. She is had cataract surgery in both eyes. On exam she was wearing a hair piece. She was alert conversant and obedient. Face was symmetric and tongue is midline. She had no heart murmurs or carotid bruits. She had antigravity strength in her arms. She moved her right leg more easily than the left possibly due to pain in the left knee. She had no tremor or rigidity. I did not test her gait. Reflexes were sluggish. She localize touch stimuli. She had good toe position sense. She had no Babinski sign. Stated above CT of the head showed no acute change. She may have had a strokelike episode in the past and ended up at [facility] neurology having been transported by helicopter but I will have to look at my office computer to get the details. At this point she seems neurologically intact for her age. I am not sure what happened to her. She will be evaluated for transient ischemic attack with a carotid duplex scan and possibly an EEG. I will go to see her in follow-up at the office after discharge.    Dr. Osito Merirll

## 2020-09-17 NOTE — PROGRESS NOTES
Problem: Mobility Impaired (Adult and Pediatric)  Goal: *Acute Goals and Plan of Care (Insert Text)  Description: Bed mobility with  Ind within 7 days   Transfers with Ind within 7 days   Amb approx 150 ft with LRAD  and Mod ind within 7 days   Ind with HEP for LE within 7 days      Outcome: Not Met     Problem: Patient Education: Go to Patient Education Activity  Goal: Patient/Family Education  Outcome: Not Met     PHYSICAL THERAPY EVALUATION  Patient: Jolie Yuen (68 y.o. female)  Date: 9/17/2020  Primary Diagnosis: TIA (transient ischemic attack) [G45.9]        Precautions:   Fall    ASSESSMENT  Based on the objective data described below, the patient presents with decr ROM, strength, bed mobility, transfers, balance, gait, activity tolerance, safety awareness, and functional mobility. Pt agreeable to PT evaluation. Pt admitted following period of unconsciousness, slurred speech. Pmx: history of renal cell carcinoma, coronary artery disease, chronic kidney disease, hypertension. A& O x 4. Pt denies recent falls. Pt reports 8/10 L knee pain. Current Level of Function Impacting Discharge (mobility/balance): Pt presents with decr funcitonal mobility compared to Pt reported PLOF Ind with dressing/bathing and amb with cane due to L knee pain/weakness. Pt denies recent falls. Pt reports 8/10 L knee pain. Other factors to consider for discharge: poor activity tolerance     Patient will benefit from skilled therapy intervention to address the above noted impairments. PLAN :  Recommendations and Planned Interventions: bed mobility training, transfer training, gait training, therapeutic exercises, neuromuscular re-education, patient and family training/education, and therapeutic activities      Frequency/Duration: Patient will be followed by physical therapy:  3 times a week to address goals.     Recommendation for discharge: (in order for the patient to meet his/her long term goals)  PT d/c recc return home with family care when medically appropriate. This discharge recommendation:  Has been made in collaboration with the attending provider and/or case management    IF patient discharges home will need the following DME: rolling walker for safe negotiation in home and community to prevent falls. SUBJECTIVE:   Patient stated my knee hurts.     OBJECTIVE DATA SUMMARY:   HISTORY:    Past Medical History:   Diagnosis Date    Adenocarcinoma, renal cell ([facility] 9/2/2020    Arthritis     CAD (coronary artery disease)     Chronic kidney disease     Diabetes ([facility]     Gout     Hypercholesterolemia     Hypertension     Mental depression     Pulmonary embolism ([facility]     Stroke ([facility]     Thyroid disease      Past Surgical History:   Procedure Laterality Date    CARDIAC SURG PROCEDURE UNLIST      stents placed     HX GYN      HX HYSTERECTOMY      HX NEPHRECTOMY  02/12/2015    HX ORTHOPAEDIC      HX UROLOGICAL  02/12/2015    PARTIAL URETERECTOMY        Personal factors and/or comorbidities impacting plan of care: complicated medical history    Home Situation  Home Environment: Private residence  # Steps to Enter: 5  Rails to Enter: Yes  Hand Rails : Bilateral  One/Two Story Residence: One story  Living Alone: No  Support Systems: Child(connie)(lives with daughter)  Patient Expects to be Discharged to[de-identified] Private residence  Current DME Used/Available at Home: Amelia Piles, straight  Tub or Shower Type: Shower    EXAMINATION/PRESENTATION/DECISION MAKING:   Critical Behavior:  Neurologic State: Alert  Orientation Level: Oriented X4  Cognition: Appropriate decision making, Follows commands  Safety/Judgement: Good awareness of safety precautions  Hearing:   Auditory  Auditory Impairment: None  Skin:  intact  Edema: unremarkable  Range Of Motion:  AROM: Generally decreased, functional, decr on LLE                Strength:    Strength: Generally decreased, functional(Gross RLE 4/5, LLE 3/5)           Tone & Sensation: Sensation: Intact        Functional Mobility:  Bed Mobility:  Rolling: Supervision  Supine to Sit: Supervision  Sit to Supine: Supervision  Scooting: Contact guard assistance    Transfers:  Sit to Stand: Contact guard assistance  Stand to Sit: Contact guard assistance  Stand Pivot Transfers: Contact guard assistance     Bed to Chair: Contact guard assistance         Pt performed bed mobility with Sup and transfers with CGA, req cues for hand placement, sequencing, and safety. Pt presents with fair sitting balance req support with dynamic activities. Pt denies dizziness with change in position. CNA entered room while pt sitting EOB, vitals were taken: /75, O2 Sat 100% on RA, HR 76 bpm     Balance:   Sitting: Intact  Standing: Intact; With support  Ambulation/Gait Training:  Distance (ft): 40 Feet (ft)  Assistive Device: Gait belt;Walker, rolling  Ambulation - Level of Assistance: Contact guard assistance     Gait Description (WDL): Exceptions to WDL     Base of Support: Widened     Speed/Liya: Slow;Shuffled      Pt amb approx 40 ft with RW and CGA, req min cues for walker management, activity pacing, and safety to prevent LOB. Pt presents with slow steady gait and educated to keep walker close to ROBER. Pt amb 10 ft to chair without walker and presents with unsteady gait, UE reaching for surface support. Pt would benefit from RW upon d/c to negotiate safely in home/community and prevent falls. Stairs: Therapeutic Exercises:   Pt educated on LE therex for ROM, strengthening, and functional mobility.           Physical Therapy Evaluation Charge Determination   History Examination Presentation Decision-Making   HIGH Complexity :3+ comorbidities / personal factors will impact the outcome/ POC  MEDIUM Complexity : 3 Standardized tests and measures addressing body structure, function, activity limitation and / or participation in recreation  LOW Complexity : Stable, uncomplicated  Other outcome measures a  LOW       Based on the above components, the patient evaluation is determined to be of the following complexity level: [address] [facility] Form  How much difficulty does the patient currently have. .. Unable A Lot A Little None   1. Turning over in bed (including adjusting bedclothes, sheets and blankets)? [] 1   [] 2   [x] 3   [] 4   2. Sitting down on and standing up from a chair with arms ( e.g., wheelchair, bedside commode, etc.)   [] 1   [] 2   [x] 3   [] 4   3. Moving from lying on back to sitting on the side of the bed? [] 1   [] 2   [x] 3   [] 4          How much help from another person does the patient currently need. .. Total A Lot A Little None   4. Moving to and from a bed to a chair (including a wheelchair)? [] 1   [] 2   [x] 3   [] 4   5. Need to walk in hospital room? [] 1   [] 2   [x] 3   [] 4   6. Climbing 3-5 steps with a railing? [] 1   [] 2   [] 3   [] 4   © , Trustees of [address] [place] [address] [address] [address], under license to [employer]. All rights reserved     Score:  Initial: EN Most Recent: X (Date: 20 )   Interpretation of Tool:  Represents activities that are increasingly more difficult (i.e. Bed mobility, Transfers, Gait). Score 24 23 22-20 19-15 14-10 9-7 6   Modifier CH CI CJ CK CL CM CN      Pain Ratin/10 L knee pain    Activity Tolerance:   Good and requires rest breaks  Please refer to the flowsheet for vital signs taken during this treatment. After treatment patient left in no apparent distress:   Sitting in chair and Call bell within reach    COMMUNICATION/EDUCATION:   The patients plan of care was discussed with: Occupational therapist and Registered nurse. Fall prevention education was provided and the patient/caregiver indicated understanding. and Patient/family have participated as able in goal setting and plan of care.     Thank you for this referral.  Yennifer Landon, PT   Time Calculation: 24 mins

## 2020-09-17 NOTE — DISCHARGE INSTRUCTIONS
Patient Education        Transient Ischemic Attack: Care Instructions  Your Care Instructions     A transient ischemic attack (TIA) is when blood flow to a part of your brain is blocked for a short time. A TIA is like a stroke but usually lasts only a few minutes. A TIA does not cause lasting brain damage. Any vision problems, slurred speech, or other symptoms usually go away in 10 to 20 minutes. But they may last for up to 24 hours. TIAs are often warning signs of a stroke. Some people who have a TIA may have a stroke in the future. A stroke can cause symptoms like those of a TIA. But a stroke causes lasting damage to your brain. You can take steps to help prevent a stroke. One thing you can do is get early treatment. If you have other new symptoms, or if your symptoms do not get better, go back to the emergency room or call your doctor right away. Getting treatment right away may prevent long-term brain damage caused by a stroke. The doctor has checked you carefully, but problems can develop later. If you notice any problems or new symptoms, get medical treatment right away. Follow-up care is a key part of your treatment and safety. Be sure to make and go to all appointments, and call your doctor if you are having problems. It's also a good idea to know your test results and keep a list of the medicines you take. How can you care for yourself at home? Medicines    · Be safe with medicines. Take your medicines exactly as prescribed. Call your doctor if you think you are having a problem with your medicine.     · If you take a blood thinner, such as aspirin, be sure you get instructions about how to take your medicine safely.  Blood thinners can cause serious bleeding problems.     · Call your doctor if you are not able to take your medicines for any reason.     · Do not take any over-the-counter medicines or herbal products without talking to your doctor first.     · If you take birth control pills or hormone therapy, talk to your doctor. Ask if these treatments are right for you. Lifestyle changes    · Do not smoke. If you need help quitting, talk to your doctor about stop-smoking programs and medicines.     · Be active. If your doctor recommends it, get more exercise. Walking is a good choice. Bit by bit, increase the amount you walk every day. Try for at least 30 minutes on most days of the week. You also may want to swim, bike, or do other activities.     · Eat heart-healthy foods. These include fruits, vegetables, high-fiber foods, lean meats, beans, peas, nuts, seeds, and soy products, and foods that are low in sodium, saturated fat, and trans fat.     · Stay at a healthy weight. Lose weight if you need to.     · Limit alcohol to 2 drinks a day for men and 1 drink a day for women. Staying healthy    · Manage other health problems such as diabetes, high blood pressure, and high cholesterol.     · Get the flu vaccine every year. When should you call for help? Call 911 anytime you think you may need emergency care. For example, call if:    · You have new or worse symptoms of a stroke. These may include:  ? Sudden numbness, tingling, weakness, or loss of movement in your face, arm, or leg, especially on only one side of your body. ? Sudden vision changes. ? Sudden trouble speaking. ? Sudden confusion or trouble understanding simple statements. ? Sudden problems with walking or balance. ? A sudden, severe headache that is different from past headaches. Call 911 even if these symptoms go away in a few minutes.     · You feel like you are having another TIA. Watch closely for changes in your health, and be sure to contact your doctor if you have any problems. Where can you learn more? Go to http://www.[employer].com/  Enter I231 in the search box to learn more about \"Transient Ischemic Attack: Care Instructions. \"  Current as of: March 4, 2020               Content Version: 12.6  © 1723-2207 [employer]. Care instructions adapted under license by [employer] (which disclaims liability or warranty for this information). If you have questions about a medical condition or this instruction, always ask your healthcare professional. [place] [address] any warranty or liability for your use of this information. Patient Education        Learning About Transient Ischemic Attack (TIA)  What is a TIA? A transient ischemic attack (TIA) means that the blood flow to a part of the brain is blocked for a short time. A TIA feels like a stroke but usually lasts only 10 to 20 minutes. Unlike a stroke, a TIA does not cause lasting brain damage. A TIA is usually caused by a blood clot that blocks blood flow in the brain. A blood clot can form in another part of the body (often the heart) and travel through the bloodstream to the brain. When blood flow to part of the brain is blocked, the brain cells in that area are affected within seconds. This causes symptoms in parts of the body controlled by those brain cells. When the blood clot dissolves, blood flow returns, and the symptoms go away. Blood clots can be the result of hardening of the arteries (atherosclerosis) or a heart attack. Sometimes a TIA is caused by a sharp drop in blood pressure that reduces blood flow to the brain. This is called a \"low-flow\" TIA. It is not as common as a TIA caused by a blood clot. What happens after a TIA? TIA is a serious warning sign of a possible stroke in the future. If you have other medical conditions such as coronary artery disease or atherosclerosis, you may also have an increased risk for a heart attack. Talk to your doctor about your risk. Understanding your risk will help you and your doctor plan your treatment options. You can do a lot to lower your chance of having another TIA or a stroke. Medicines can help, and you may also need to make lifestyle changes.   What are the symptoms? Symptoms of a TIA are the same as symptoms of a stroke. But symptoms of a TIA don't last very long. Most of the time, they go away in 10 to 20 minutes. They may include:  · Sudden numbness, tingling, weakness, or loss of movement in your face, arm, or leg, especially on only one side of your body. · Sudden vision changes. · Sudden trouble speaking. · Sudden confusion or trouble understanding simple statements. · Sudden problems with walking or balance. If you have any of these symptoms, call 911 or other emergency services right away. Ask your family, friends, and coworkers to learn the signs of a TIA. They may notice these signs before you do. Make sure they know to call 911 if these signs appear. How is a TIA treated? If you've had a TIA, you may need more testing and treatment after you get checked by your doctor. If you have a high risk of stroke, you may have to stay in the hospital for treatment. Your treatment for a TIA may include taking medicines to prevent blood clots or a stroke, or having surgery to reopen narrow arteries. How can you prevent another TIA? · Work with your doctor to treat any health problems you have. High blood pressure, high cholesterol, atrial fibrillation, and diabetes all raise your chances of having a stroke. · Be safe with medicines. Take your medicine exactly as prescribed. Call your doctor if you think you are having a problem with your medicine. · Have a healthy lifestyle. ? Do not smoke or allow others to smoke around you. If you need help quitting, talk to your doctor about stop-smoking programs and medicines. These can increase your chances of quitting for good. Smoking makes a stroke more likely. ? Limit alcohol to 2 drinks a day for men and 1 drink a day for women. ? Lose weight if you need to. A healthy weight will help you keep your heart and body healthy. ? Be active.  Ask your doctor what type and level of activity are safe for you.  ? Eat heart-healthy foods, like fruits, vegetables, and high-fiber foods. Follow-up care is a key part of your treatment and safety. Be sure to make and go to all appointments, and call your doctor if you are having problems. It's also a good idea to know your test results and keep a list of the medicines you take. Where can you learn more? Go to http://www.gray.com/  Enter Z925 in the search box to learn more about \"Learning About Transient Ischemic Attack (TIA). \"  Current as of: March 4, 2020               Content Version: 12.6  © 4608-0989 [employer]. Care instructions adapted under license by [employer] (which disclaims liability or warranty for this information). If you have questions about a medical condition or this instruction, always ask your healthcare professional. [address] any warranty or liability for your use of this information.

## 2020-09-17 NOTE — PROGRESS NOTES
OCCUPATIONAL THERAPY EVALUATION/DISCHARGE  Patient: Ortega Solano (89 y.o. female)  Date: 9/17/2020  Primary Diagnosis: TIA (transient ischemic attack) [G45.9]        Precautions: none       ASSESSMENT  Pt received sitting in chair A&Ox4 and agreeable for OT eval/tx. Per pt report, pt is MI for self care (standing to shower) and functional transfers/mobility with use of cane at times. Pt currently with slight decreased balance (new use of RW as no cane present) and at baseline independence for self care and functional transfers/mobility. PT educated on safe hand placement for sit<->stands and pt demonstrating and verbalizing understanding. Pt with no acute OT needs at this time thus will D/C after OT eval.     Current Level of Function (ADLs/self-care): supervision     PLAN :    Recommendation for discharge: (in order for the patient to meet his/her long term goals)  No skilled occupational therapy/ follow up rehabilitation needs identified at this time. This discharge recommendation:  Has been made in collaboration with the attending provider and/or case management    IF patient discharges home will need the following DME: none       SUBJECTIVE:   Patient stated Mammie Pool would feel better using the walker since I dont have the cane.     OBJECTIVE DATA SUMMARY:   HISTORY:   Past Medical History:   Diagnosis Date    Adenocarcinoma, renal cell ([facility] 9/2/2020    Arthritis     CAD (coronary artery disease)     Chronic kidney disease     Diabetes ([facility]     Gout     Hypercholesterolemia     Hypertension     Mental depression     Pulmonary embolism ([address]     Stroke ([facility]     Thyroid disease      Past Surgical History:   Procedure Laterality Date    CARDIAC SURG PROCEDURE UNLIST      stents placed     HX GYN      HX HYSTERECTOMY      HX NEPHRECTOMY  02/12/2015    HX ORTHOPAEDIC      HX UROLOGICAL  02/12/2015    PARTIAL URETERECTOMY        Prior Level of Function/Environment/Context: independent for self care standing sink, MI for functional transfers/mobility using cane at times  Expanded or extensive additional review of patient history:   Home Situation  Home Environment: Private residence  # Steps to Enter: 5  Rails to Enter: Yes  Hand Rails : Bilateral  One/Two Story Residence: One story  Living Alone: No  Support Systems: Child(connie)(daughter)  Patient Expects to be Discharged to[de-identified] Private residence  Current DME Used/Available at Home: [employer], straight, Wheelchair  Tub or Shower Type: Shower    EXAMINATION OF PERFORMANCE DEFICITS:  Cognitive/Behavioral Status:  Neurologic State: Alert  Orientation Level: Appropriate for age;Oriented X4  Cognition: Appropriate for age attention/concentration        Safety/Judgement: Good awareness of safety precautions    Hearing: Auditory  Auditory Impairment: None    Range of Motion:  VERENICE UE AROM WFL however slower on LUE compared to right which pt states is baseline 2/2 rotator cuff injury      Strength:  RUE Strength  Observation: grossly observed to be 3+/5     LUE Strength  Observation: grossly observed to be 3+/5    Coordination:  LUE Coordination - finger opposition intact, diadochokinesia intact  RUE Coordination - finger opposition intact, diadochokinesia intact    Tone & Sensation:  LUE Sensation - light touch lighter compared to right which pt states is baseline  RUE Sensation - light touch intact     Balance:  Sitting: Intact  Standing: Intact; With support    Functional Mobility and Transfers for ADLs:    Transfers:  Sit to Stand: Supervision  Stand to Sit: Supervision  Bathroom Mobility: Supervision/set up  Toilet Transfer : Supervision  Assistive Device : Gait Belt;Walker, rolling    ADL Assessment:    Oral Facial Hygiene/Grooming: Supervision    Lower Body Dressing: Supervision    Toileting: Supervision     ADL Intervention and task modifications:  Feeding  Feeding Assistance: Independent    Grooming  Grooming Assistance: Supervision  Position Performed: Standing  Washing Face: Supervision  Washing Hands: Supervision  Brushing Teeth: Supervision    Lower Body Dressing Assistance  Dressing Assistance: Supervision  Socks: Supervision  Slip on Shoes with Back: Supervision    Toileting  Toileting Assistance: Supervision  Bladder Hygiene: Supervision  Bowel Hygiene: Supervision  Clothing Management: Supervision  Adaptive Equipment: Grab bars    Cognitive Retraining  Safety/Judgement: Good awareness of safety precautions      [address] [place], [place] [id number] AM-PACTM \"6 Clicks\"                                                       Daily Activity Inpatient Short Form  How much help from another person does the patient currently need. .. Total; A Lot A Little None   1. Putting on and taking off regular lower body clothing? 1   2   3   4   2. Bathing (including washing, rinsing, drying)? 1   2   3   4   3. Toileting, which includes using toilet, bedpan or urinal?   1   2   3   4   4. Putting on and taking off regular upper body clothing? 1   2   3   4   5. Taking care of personal grooming such as brushing teeth? 1   2   3   4   6. Eating meals? 1   2   3   4   © 2007, Trustees of [address] [place] [address], under license to [email or web address]. All rights reserved     Score: 2424     Interpretation of Tool:  Represents clinically-significant functional categories (i.e. Activities of daily living).   Percentage of Impairment CH    0%   CI    1-19% CJ    20-39% CK    40-59% CL    60-79% CM    80-99% CN     100%   [facility]  Score 6-24 24 23 20-22 15-19 10-14 7-9 6       Occupational Therapy Evaluation Charge Determination   History Examination Decision-Making   LOW Complexity : Brief history review  LOW Complexity : 1-3 performance deficits relating to physical, cognitive , or psychosocial skils that result in activity limitations and / or participation restrictions  LOW Complexity : No comorbidities that affect functional and no verbal or physical assistance needed to complete eval tasks       Based on the above components, the patient evaluation is determined to be of the following complexity level: LOW   Pain Ratin/10 initially reduced to 6/10 left knee pain after activity    Activity Tolerance: WNL  Please refer to the flowsheet for vital signs taken during this treatment. After treatment patient left in no apparent distress:    Sitting in chair and Call bell within reach    COMMUNICATION/EDUCATION:   The patients plan of care was discussed with: Physical therapist and Registered nurse.      Thank you for this referral.  Nadege Lynn  Time Calculation: 25 mins

## 2020-09-18 ENCOUNTER — APPOINTMENT (OUTPATIENT)
Dept: PHYSICAL THERAPY | Age: 75
End: 2020-09-18
Payer: MEDICARE

## 2020-09-18 LAB
FOLATE SERPL-MCNC: 10 NG/ML (ref 5–21)
LEFT CCA DIST DIAS: 12.2 CM/S
LEFT CCA DIST SYS: 66.5 CM/S
LEFT CCA PROX DIAS: 13 CM/S
LEFT CCA PROX SYS: 70.3 CM/S
LEFT ECA DIAS: 0 CM/S
LEFT ECA SYS: 66.5 CM/S
LEFT ICA DIST DIAS: 18.4 CM/S
LEFT ICA DIST SYS: 99.2 CM/S
LEFT ICA PROX DIAS: 19.1 CM/S
LEFT ICA PROX SYS: 102 CM/S
LEFT SUBCLAVIAN DIAS: 34.9 CM/S
LEFT SUBCLAVIAN SYS: 148 CM/S
LEFT VERTEBRAL DIAS: 5.89 CM/S
LEFT VERTEBRAL SYS: 26 CM/S
RIGHT CCA DIST DIAS: 9.7 CM/S
RIGHT CCA DIST SYS: 60.3 CM/S
RIGHT CCA PROX DIAS: 10.9 CM/S
RIGHT CCA PROX SYS: 54.3 CM/S
RIGHT ECA DIAS: 0 CM/S
RIGHT ECA SYS: 35.8 CM/S
RIGHT ICA DIST DIAS: 22.9 CM/S
RIGHT ICA DIST SYS: 143 CM/S
RIGHT ICA PROX DIAS: 19.6 CM/S
RIGHT ICA PROX SYS: 73.5 CM/S
RIGHT SUBCLAVIAN DIAS: 0 CM/S
RIGHT SUBCLAVIAN SYS: 90.1 CM/S
RIGHT VERTEBRAL DIAS: 8.94 CM/S
RIGHT VERTEBRAL SYS: 66.7 CM/S
VIT B12 SERPL-MCNC: 331 PG/ML (ref 193–986)

## 2020-09-23 ENCOUNTER — OFFICE VISIT (OUTPATIENT)
Dept: ENDOCRINOLOGY | Age: 75
End: 2020-09-23
Payer: MEDICARE

## 2020-09-23 ENCOUNTER — APPOINTMENT (OUTPATIENT)
Dept: PHYSICAL THERAPY | Age: 75
End: 2020-09-23
Payer: MEDICARE

## 2020-09-23 VITALS
DIASTOLIC BLOOD PRESSURE: 55 MMHG | SYSTOLIC BLOOD PRESSURE: 144 MMHG | BODY MASS INDEX: 34.31 KG/M2 | WEIGHT: 201 LBS | OXYGEN SATURATION: 97 % | HEART RATE: 63 BPM | RESPIRATION RATE: 18 BRPM | HEIGHT: 64 IN | TEMPERATURE: 96.8 F

## 2020-09-23 DIAGNOSIS — N18.5 STAGE 5 CHRONIC KIDNEY DISEASE (HCC): ICD-10-CM

## 2020-09-23 DIAGNOSIS — I10 ESSENTIAL HYPERTENSION: ICD-10-CM

## 2020-09-23 DIAGNOSIS — Z79.4 TYPE 2 DIABETES MELLITUS WITH HYPERGLYCEMIA, WITH LONG-TERM CURRENT USE OF INSULIN (HCC): Primary | ICD-10-CM

## 2020-09-23 DIAGNOSIS — E78.2 MIXED HYPERLIPIDEMIA: ICD-10-CM

## 2020-09-23 DIAGNOSIS — E11.65 TYPE 2 DIABETES MELLITUS WITH HYPERGLYCEMIA, WITH LONG-TERM CURRENT USE OF INSULIN (HCC): Primary | ICD-10-CM

## 2020-09-23 LAB — HBA1C MFR BLD HPLC: 5.8 %

## 2020-09-23 PROCEDURE — G8400 PT W/DXA NO RESULTS DOC: HCPCS | Performed by: INTERNAL MEDICINE

## 2020-09-23 PROCEDURE — G8417 CALC BMI ABV UP PARAM F/U: HCPCS | Performed by: INTERNAL MEDICINE

## 2020-09-23 PROCEDURE — 83036 HEMOGLOBIN GLYCOSYLATED A1C: CPT | Performed by: INTERNAL MEDICINE

## 2020-09-23 PROCEDURE — G8432 DEP SCR NOT DOC, RNG: HCPCS | Performed by: INTERNAL MEDICINE

## 2020-09-23 PROCEDURE — 99214 OFFICE O/P EST MOD 30 MIN: CPT | Performed by: INTERNAL MEDICINE

## 2020-09-23 PROCEDURE — G9231 DOC ESRD DIA TRANS PREG: HCPCS | Performed by: INTERNAL MEDICINE

## 2020-09-23 PROCEDURE — 1101F PT FALLS ASSESS-DOCD LE1/YR: CPT | Performed by: INTERNAL MEDICINE

## 2020-09-23 PROCEDURE — G8536 NO DOC ELDER MAL SCRN: HCPCS | Performed by: INTERNAL MEDICINE

## 2020-09-23 PROCEDURE — 1090F PRES/ABSN URINE INCON ASSESS: CPT | Performed by: INTERNAL MEDICINE

## 2020-09-23 PROCEDURE — G8427 DOCREV CUR MEDS BY ELIG CLIN: HCPCS | Performed by: INTERNAL MEDICINE

## 2020-09-23 RX ORDER — NITROGLYCERIN 0.4 MG/1
0.4 TABLET SUBLINGUAL
COMMUNITY
End: 2022-08-01

## 2020-09-23 RX ORDER — OMEPRAZOLE 20 MG/1
20 CAPSULE, DELAYED RELEASE ORAL DAILY
COMMUNITY
End: 2022-01-13

## 2020-09-23 RX ORDER — LUBIPROSTONE 24 UG/1
24 CAPSULE, GELATIN COATED ORAL AS NEEDED
COMMUNITY
End: 2022-01-13

## 2020-09-23 NOTE — LETTER
9/23/20 Patient: Galina Unger YOB: 1945 Date of Visit: 9/23/2020 Vanessa Alvarez NP 
[address] [facility] [address] [place] [address] VIA Facsimile: [phone] Dear Vanessa Alvarez NP, Thank you for referring Ms. John Fajardo to [address] [address] for evaluation. My notes for this consultation are attached. If you have questions, please do not hesitate to call me. I look forward to following your patient along with you. Sincerely, Kemar Driscoll MD

## 2020-09-23 NOTE — PROGRESS NOTES
Mariya Carmona MD        Patient Information  Date:9/23/2020  Name : Galina Unger 67 y.o.     YOB: 1945         Referred by: Abel Barrera NP         Chief Complaint   Patient presents with    Diabetes       History of Present Illness: Galina Unger is a 67 y.o. female here for fu of  Type 2 Diabetes Mellitus. She was referred by ROM Darby for evaluation and management of type 2 diabetes mellitus. She has diabetes for the last 14 years complicated by nephropathy as well as neuropathy. She had TIA, carotid Dopplers was ordered, followed by neurology  She is on statin  Did not bring meter     Fasting < 130  Dinner < 150   Has freestyle melva, she is checking the blood glucose  She changed NPH to nighttime,   Reports no hypoglycemia,    Analog insulins are expensive            Wt Readings from Last 3 Encounters:   09/23/20 201 lb (91.2 kg)   09/17/20 215 lb (97.5 kg)   01/22/20 231 lb (104.8 kg)       BP Readings from Last 3 Encounters:   09/23/20 (!) 144/55   09/17/20 (!) 144/56   01/22/20 163/77           Past Medical History:   Diagnosis Date    Aden[facility] 9/2/2020    Arthritis     CAD (coronary artery disease)     Chronic kidney disease     Diabetes [facility]     Gout     Hypercholesterolemia     Hypertension     Mental depression     Pulmonary embolism (HCC)     Stroke (HCC)     Thyroid disease      Current Outpatient Medications   Medication Sig    omeprazole (PRILOSEC) 20 mg capsule Take 20 mg by mouth daily.  nitroglycerin (NITROSTAT) 0.4 mg SL tablet 0.4 mg by SubLINGual route every five (5) minutes as needed for Chest Pain. Up to 3 doses.  lubiPROStone (Amitiza) 24 mcg capsule Take 24 mcg by mouth as needed for Constipation.  polyethylene glycol (MIRALAX) 17 gram packet Take 17 g/day by mouth daily.  apixaban (ELIQUIS) 2.5 mg tablet Take 2.5 mg by mouth two (2) times a day.     insulin NPH (NOVOLIN N, HUMULIN N) 100 unit/mL injection 20 Units by SubCUTAneous route daily (with dinner).  donepeziL (ARICEPT) 10 mg tablet Take 10 mg by mouth daily (with dinner).  isosorbide mononitrate ER (IMDUR) 60 mg CR tablet Take 60 mg by mouth daily.  hydrALAZINE (APRESOLINE) 25 mg tablet Take 25 mg by mouth two (2) times a day.  metoprolol succinate (TOPROL-XL) 25 mg XL tablet Take 25 mg by mouth daily.  gabapentin (NEURONTIN) 300 mg capsule Take 300 mg by mouth two (2) times a day.  furosemide (LASIX) 40 mg tablet Take 60 mg by mouth daily.  fluticasone propionate (FLONASE) 50 mcg/actuation nasal spray fluticasone propionate 50 mcg/actuation nasal spray,suspension    cetirizine (ZYRTEC) 10 mg tablet Take 10 mg by mouth daily.  venlafaxine (EFFEXOR) 75 mg tablet Take 75 mg by mouth daily.  latanoprost (XALATAN) 0.005 % ophthalmic solution Administer 1 Drop to both eyes nightly.  pravastatin (PRAVACHOL) 80 mg tablet Take 80 mg by mouth nightly.  aspirin 81 mg chewable tablet Take 81 mg by mouth daily.  allopurinol (ZYLOPRIM) 100 mg tablet Take 200 mg by mouth daily.  ferrous sulfate (Iron) 325 mg (65 mg iron) tablet Take 1 Tab by mouth daily.  diclofenac (SOLARAZE) 3 % topical gel Apply 0.5 mg to affected area two (2) times a day.  losartan (COZAAR) 25 mg tablet Take 25 mg by mouth daily.  famotidine (PEPCID) 20 mg tablet Take 20 mg by mouth two (2) times a day.  linaCLOtide (Linzess) 145 mcg cap capsule Take  by mouth Daily (before breakfast).  calcitRIOL (ROCALTROL) 0.25 mcg capsule Take 0.25 mcg by mouth daily.  cholecalciferol (VITAMIN D3) 400 unit tab tablet Take  by mouth daily. No current facility-administered medications for this visit.       No Known Allergies      Review of Systems:  -   - Respiratory: no cough no shortness of breath  - Gastrointestinal: no dysphagia no  abdominal pain  - Musculoskeletal: no joint pains + weakness  - Integumentary: no rashes  - Neurological: no numbness, tingling, + headaches  - Psychiatric: no depression no  anxiety  - Endocrine: no heat or cold intolerance    Physical Examination:   Blood pressure (!) 144/55, pulse 63, temperature 96.8 °F (36 °C), temperature source Oral, resp. rate 18, height 5' 4.17\" (1.63 m), weight 201 lb (91.2 kg), SpO2 97 %. Estimated body mass index is 34.32 kg/m² as calculated from the following:    Height as of this encounter: 5' 4.17\" (1.63 m). -   Weight as of this encounter: 201 lb (91.2 kg). - General: pleasant, no distress, good eye contact  - HEENT: no pallor, no periorbital edema, EOMI  - Neck: supple,  - Cardiovascular: regular, normal rate, normal S1 and S2,murmur   - Respiratory: clear to auscultation bilaterally  - Gastrointestinal: soft, nontender, nondistended,  BS +  - Musculoskeletal: no proximal muscle weakness in upper or lower extremities  - Integumentary: Edema  - Neurological: ,alert and oriented  - Psychiatric: normal mood and affect  - Skin: color, texture, turgor normal.     Diabetic foot exam: October 2018    Left Foot:   Visual Exam: normal    Pulse DP: 2+ (normal)   Filament test: reduced sensation    Vibratory sensation: absent      Right Foot:   Visual Exam: normal    Pulse DP: 2+ (normal)   Filament test: reduced sensation    Vibratory sensation: absent        Data Reviewed:   Lab Results   Component Value Date/Time    Hemoglobin A1c, External 6.1 07/20/2018    Glucose 216 (H) 09/17/2020 02:14 AM    Glucose (POC) 115 (H) 09/17/2020 03:55 PM    Creatinine 2.26 (H) 09/17/2020 02:14 AM            Assessment/Plan:     1. Type 2 diabetes mellitus with hyperglycemia, with long-term current use of insulin ([address]    2. Essential hypertension    3. Stage 5 chronic kidney disease ([address]    4. Mixed hyperlipidemia        1.  Type 2 Diabetes Mellitus with nephropathy,neuropathy,  Lab Results   Component Value Date/Time    Hemoglobin A1c (POC) 5.9 01/08/2020 12:30 PM    Hemoglobin A1c, External 6.1 07/20/2018      Due to decrease in clearance of insulin secondary to CKD she is at  high risk for hypoglycemia  Humulin N 20 units at night,  Asking for Free style, does not meet the criteria      Diabetic issues reviewed : , home glucose monitoring emphasized,  hypoglycemia management and long term diabetic complications discussed. FLU annually ,Pneumovax ,aspirin daily,annual eye exam,microalbumin    2. HTN : Continue current therapy     3. Hyperlipidemia : Continue statin. 4.Obesity:Body mass index is 34.32 kg/m². Discussed about the importance of  carbohydrate portion control. 5 CKD /-followed by nephrology, renal cell carcinoma   Not on dialysis          There are no Patient Instructions on file for this visit. Thank you for allowing me to participate in the care of this patient.     Lexi Saab MD      Patient verbalized understanding

## 2020-09-23 NOTE — PROGRESS NOTES
Jade Alvarez is a 67 y.o. female here for   Chief Complaint   Patient presents with    Diabetes       1. Have you been to the ER, urgent care clinic since your last visit? Hospitalized since your last visit? [facility] last week for light stroke    2. Have you seen or consulted any other health care providers outside of the [address] [place] [address] since your last visit?   Include any pap smears or colon screening.-no

## 2020-09-24 ENCOUNTER — APPOINTMENT (OUTPATIENT)
Dept: CT IMAGING | Age: 75
End: 2020-09-24
Attending: EMERGENCY MEDICINE
Payer: MEDICARE

## 2020-09-24 ENCOUNTER — HOSPITAL ENCOUNTER (OUTPATIENT)
Age: 75
Setting detail: OBSERVATION
Discharge: HOME | End: 2020-09-26
Attending: EMERGENCY MEDICINE | Admitting: HOSPITALIST
Payer: MEDICARE

## 2020-09-24 ENCOUNTER — APPOINTMENT (OUTPATIENT)
Dept: GENERAL RADIOLOGY | Age: 75
End: 2020-09-24
Attending: EMERGENCY MEDICINE
Payer: MEDICARE

## 2020-09-24 DIAGNOSIS — R56.9 SEIZURE (HCC): Primary | ICD-10-CM

## 2020-09-24 DIAGNOSIS — R53.1 RIGHT SIDED WEAKNESS: ICD-10-CM

## 2020-09-24 DIAGNOSIS — R41.82 ALTERED MENTAL STATUS, UNSPECIFIED ALTERED MENTAL STATUS TYPE: ICD-10-CM

## 2020-09-24 PROBLEM — R41.89 EPISODE OF UNRESPONSIVENESS: Status: ACTIVE | Noted: 2020-09-24

## 2020-09-24 LAB
APTT PPP: 35.7 SEC (ref 23–35.7)
ATRIAL RATE: 70 BPM
BASOPHILS # BLD: 0 K/UL (ref 0–0.1)
BASOPHILS NFR BLD: 0 % (ref 0–1)
CALCULATED P AXIS, ECG09: 68 DEGREES
CALCULATED R AXIS, ECG10: 55 DEGREES
CALCULATED T AXIS, ECG11: 39 DEGREES
DIAGNOSIS, 93000: NORMAL
DIFFERENTIAL METHOD BLD: ABNORMAL
EOSINOPHIL # BLD: 0.1 K/UL (ref 0–0.4)
EOSINOPHIL NFR BLD: 1 % (ref 0–7)
ERYTHROCYTE [DISTWIDTH] IN BLOOD BY AUTOMATED COUNT: 15.4 % (ref 11.5–14.5)
GLUCOSE BLD STRIP.AUTO-MCNC: 137 MG/DL (ref 65–100)
HCT VFR BLD AUTO: 29.6 % (ref 35–47)
HGB BLD-MCNC: 9.6 % (ref 11.5–16)
IMM GRANULOCYTES # BLD AUTO: 0 K/UL (ref 0–0.04)
IMM GRANULOCYTES NFR BLD AUTO: 0 % (ref 0–0.5)
INR PPP: 1.2 (ref 0.9–1.1)
LACTATE SERPL-SCNC: 1.4 MMOL/L (ref 0.4–2)
LYMPHOCYTES # BLD: 1.6 K/UL (ref 0.8–3.5)
LYMPHOCYTES NFR BLD: 14 % (ref 12–49)
MCH RBC QN AUTO: 28.6 PG (ref 26–34)
MCHC RBC AUTO-ENTMCNC: 32.4 G/DL (ref 30–36.5)
MCV RBC AUTO: 88.1 FL (ref 80–99)
MONOCYTES # BLD: 1.1 K/UL (ref 0–1)
MONOCYTES NFR BLD: 9 % (ref 5–13)
NEUTS SEG # BLD: 9 K/UL (ref 1.8–8)
NEUTS SEG NFR BLD: 76 % (ref 32–75)
P-R INTERVAL, ECG05: 108 MS
PERFORMED BY, TECHID: ABNORMAL
PLATELET # BLD AUTO: 243 K/UL (ref 150–400)
PMV BLD AUTO: 10 FL (ref 8.9–12.9)
PROTHROMBIN TIME: 15.4 SEC (ref 11.9–14.7)
Q-T INTERVAL, ECG07: 414 MS
QRS DURATION, ECG06: 86 MS
QTC CALCULATION (BEZET), ECG08: 447 MS
RBC # BLD AUTO: 3.36 M/UL (ref 3.8–5.2)
THERAPEUTIC RANGE,PTTT: NORMAL SEC (ref 68–109)
VENTRICULAR RATE, ECG03: 70 BPM
WBC # BLD AUTO: 11.8 K/UL (ref 3.6–11)

## 2020-09-24 PROCEDURE — 85610 PROTHROMBIN TIME: CPT

## 2020-09-24 PROCEDURE — 93005 ELECTROCARDIOGRAM TRACING: CPT

## 2020-09-24 PROCEDURE — 85025 COMPLETE CBC W/AUTO DIFF WBC: CPT

## 2020-09-24 PROCEDURE — 99285 EMERGENCY DEPT VISIT HI MDM: CPT

## 2020-09-24 PROCEDURE — 70450 CT HEAD/BRAIN W/O DYE: CPT

## 2020-09-24 PROCEDURE — 99218 HC RM OBSERVATION: CPT

## 2020-09-24 PROCEDURE — 83605 ASSAY OF LACTIC ACID: CPT

## 2020-09-24 PROCEDURE — 74011250637 HC RX REV CODE- 250/637: Performed by: HOSPITALIST

## 2020-09-24 PROCEDURE — 36415 COLL VENOUS BLD VENIPUNCTURE: CPT

## 2020-09-24 PROCEDURE — G0378 HOSPITAL OBSERVATION PER HR: HCPCS

## 2020-09-24 PROCEDURE — 94762 N-INVAS EAR/PLS OXIMTRY CONT: CPT

## 2020-09-24 PROCEDURE — 71045 X-RAY EXAM CHEST 1 VIEW: CPT

## 2020-09-24 PROCEDURE — 85730 THROMBOPLASTIN TIME PARTIAL: CPT

## 2020-09-24 PROCEDURE — 82962 GLUCOSE BLOOD TEST: CPT

## 2020-09-24 RX ORDER — LOSARTAN POTASSIUM 25 MG/1
25 TABLET ORAL DAILY
Status: DISCONTINUED | OUTPATIENT
Start: 2020-09-25 | End: 2020-09-26 | Stop reason: HOSPADM

## 2020-09-24 RX ORDER — GUAIFENESIN 100 MG/5ML
81 LIQUID (ML) ORAL DAILY
Status: DISCONTINUED | OUTPATIENT
Start: 2020-09-25 | End: 2020-09-26 | Stop reason: HOSPADM

## 2020-09-24 RX ORDER — ISOSORBIDE MONONITRATE 60 MG/1
60 TABLET, EXTENDED RELEASE ORAL DAILY
Status: DISCONTINUED | OUTPATIENT
Start: 2020-09-25 | End: 2020-09-26 | Stop reason: HOSPADM

## 2020-09-24 RX ORDER — DEXTROSE 50 % IN WATER (D50W) INTRAVENOUS SYRINGE
25-50 AS NEEDED
Status: DISCONTINUED | OUTPATIENT
Start: 2020-09-24 | End: 2020-09-26 | Stop reason: HOSPADM

## 2020-09-24 RX ORDER — FAMOTIDINE 20 MG/1
20 TABLET, FILM COATED ORAL 2 TIMES DAILY
Status: DISCONTINUED | OUTPATIENT
Start: 2020-09-24 | End: 2020-09-26 | Stop reason: HOSPADM

## 2020-09-24 RX ORDER — MAGNESIUM SULFATE 100 %
4 CRYSTALS MISCELLANEOUS AS NEEDED
Status: DISCONTINUED | OUTPATIENT
Start: 2020-09-24 | End: 2020-09-26 | Stop reason: HOSPADM

## 2020-09-24 RX ORDER — LANOLIN ALCOHOL/MO/W.PET/CERES
1 CREAM (GRAM) TOPICAL DAILY
Status: DISCONTINUED | OUTPATIENT
Start: 2020-09-25 | End: 2020-09-26 | Stop reason: HOSPADM

## 2020-09-24 RX ORDER — FLUTICASONE PROPIONATE 50 MCG
2 SPRAY, SUSPENSION (ML) NASAL DAILY
Status: DISCONTINUED | OUTPATIENT
Start: 2020-09-25 | End: 2020-09-26 | Stop reason: HOSPADM

## 2020-09-24 RX ORDER — METOPROLOL SUCCINATE 25 MG/1
25 TABLET, EXTENDED RELEASE ORAL DAILY
Status: DISCONTINUED | OUTPATIENT
Start: 2020-09-25 | End: 2020-09-26 | Stop reason: HOSPADM

## 2020-09-24 RX ORDER — POLYETHYLENE GLYCOL 3350 17 G/17G
17 POWDER, FOR SOLUTION ORAL DAILY
Status: DISCONTINUED | OUTPATIENT
Start: 2020-09-25 | End: 2020-09-26 | Stop reason: HOSPADM

## 2020-09-24 RX ORDER — LATANOPROST 50 UG/ML
1 SOLUTION/ DROPS OPHTHALMIC
Status: DISCONTINUED | OUTPATIENT
Start: 2020-09-24 | End: 2020-09-26 | Stop reason: HOSPADM

## 2020-09-24 RX ORDER — DONEPEZIL HYDROCHLORIDE 5 MG/1
10 TABLET, FILM COATED ORAL
Status: DISCONTINUED | OUTPATIENT
Start: 2020-09-24 | End: 2020-09-25

## 2020-09-24 RX ORDER — CALCITRIOL 0.25 UG/1
0.25 CAPSULE ORAL DAILY
Status: DISCONTINUED | OUTPATIENT
Start: 2020-09-25 | End: 2020-09-26 | Stop reason: HOSPADM

## 2020-09-24 RX ORDER — FUROSEMIDE 40 MG/1
60 TABLET ORAL DAILY
Status: DISCONTINUED | OUTPATIENT
Start: 2020-09-25 | End: 2020-09-25

## 2020-09-24 RX ORDER — GABAPENTIN 300 MG/1
300 CAPSULE ORAL 2 TIMES DAILY
Status: DISCONTINUED | OUTPATIENT
Start: 2020-09-24 | End: 2020-09-26 | Stop reason: HOSPADM

## 2020-09-24 RX ORDER — ALLOPURINOL 100 MG/1
200 TABLET ORAL DAILY
Status: DISCONTINUED | OUTPATIENT
Start: 2020-09-25 | End: 2020-09-26 | Stop reason: HOSPADM

## 2020-09-24 RX ORDER — LUBIPROSTONE 24 UG/1
24 CAPSULE, GELATIN COATED ORAL AS NEEDED
Status: DISCONTINUED | OUTPATIENT
Start: 2020-09-24 | End: 2020-09-26 | Stop reason: HOSPADM

## 2020-09-24 RX ORDER — INSULIN LISPRO 100 [IU]/ML
INJECTION, SOLUTION INTRAVENOUS; SUBCUTANEOUS
Status: DISCONTINUED | OUTPATIENT
Start: 2020-09-24 | End: 2020-09-26 | Stop reason: HOSPADM

## 2020-09-24 RX ORDER — PRAVASTATIN SODIUM 80 MG/1
80 TABLET ORAL
Status: DISCONTINUED | OUTPATIENT
Start: 2020-09-24 | End: 2020-09-26 | Stop reason: HOSPADM

## 2020-09-24 RX ORDER — PANTOPRAZOLE SODIUM 40 MG/1
40 TABLET, DELAYED RELEASE ORAL DAILY
Status: DISCONTINUED | OUTPATIENT
Start: 2020-09-25 | End: 2020-09-26 | Stop reason: HOSPADM

## 2020-09-24 RX ORDER — HYDRALAZINE HYDROCHLORIDE 25 MG/1
25 TABLET, FILM COATED ORAL 2 TIMES DAILY
Status: DISCONTINUED | OUTPATIENT
Start: 2020-09-24 | End: 2020-09-26 | Stop reason: HOSPADM

## 2020-09-24 RX ORDER — LEVETIRACETAM 10 MG/ML
1000 INJECTION INTRAVASCULAR ONCE
Status: COMPLETED | OUTPATIENT
Start: 2020-09-24 | End: 2020-09-25

## 2020-09-24 RX ORDER — HEPARIN SODIUM 10000 [USP'U]/ML
5000 INJECTION, SOLUTION INTRAVENOUS; SUBCUTANEOUS EVERY 8 HOURS
Status: DISCONTINUED | OUTPATIENT
Start: 2020-09-24 | End: 2020-09-24

## 2020-09-24 RX ORDER — DICLOFENAC SODIUM 30 MG/G
1 GEL TOPICAL 2 TIMES DAILY
Status: DISCONTINUED | OUTPATIENT
Start: 2020-09-24 | End: 2020-09-26 | Stop reason: HOSPADM

## 2020-09-24 RX ORDER — VENLAFAXINE 37.5 MG/1
75 TABLET ORAL DAILY
Status: DISCONTINUED | OUTPATIENT
Start: 2020-09-25 | End: 2020-09-26 | Stop reason: HOSPADM

## 2020-09-24 RX ORDER — NITROGLYCERIN 0.4 MG/1
0.4 TABLET SUBLINGUAL
Status: DISCONTINUED | OUTPATIENT
Start: 2020-09-24 | End: 2020-09-26 | Stop reason: HOSPADM

## 2020-09-24 RX ORDER — LORATADINE 10 MG/1
10 TABLET ORAL DAILY
Status: DISCONTINUED | OUTPATIENT
Start: 2020-09-25 | End: 2020-09-26 | Stop reason: HOSPADM

## 2020-09-24 RX ADMIN — HYDRALAZINE HYDROCHLORIDE 25 MG: 25 TABLET, FILM COATED ORAL at 21:17

## 2020-09-24 RX ADMIN — FAMOTIDINE 20 MG: 20 TABLET ORAL at 21:16

## 2020-09-24 RX ADMIN — GABAPENTIN 300 MG: 300 CAPSULE ORAL at 21:19

## 2020-09-24 RX ADMIN — APIXABAN 2.5 MG: 2.5 TABLET, FILM COATED ORAL at 21:24

## 2020-09-24 NOTE — ED TRIAGE NOTES
Pt had CVA one week ago. Right side deficits. Pt had a headache at 1315 that got progressively worse. Pt became unresponsive per family. EMS stated pt steadily increased mental status during transport. EMS stated BS was 101. EMS stated decreased SPO2 in 60's and increased with oxygen.

## 2020-09-24 NOTE — ED PROVIDER NOTES
EMERGENCY DEPARTMENT HISTORY AND PHYSICAL EXAM      Date: 9/24/2020  Patient Name: Mani Aguilar    History of Presenting Illness     Chief Complaint   Patient presents with    Altered mental status       History Provided By: Patient and EMS    HPI: Mani Aguilar, 67 y.o. female with a past medical history significant patient CVA on September 16 with some right-sided persistent weakness, obesity, hypertension, renal failure on dialysis, PE,  who had worsening on side of headache around 130 and then acute loss of consciousness about 30 minutes prior to arrival, when EMS arrived patient's sats were in the 60s blood pressures 70s over 50s patient only responsive to sternal rub brought in as a stroke alert. There are no other complaints, changes, or physical findings at this time. PCP: Maribeth Lange NP    Current Outpatient Medications   Medication Sig Dispense Refill    omeprazole (PRILOSEC) 20 mg capsule Take 20 mg by mouth daily.  nitroglycerin (NITROSTAT) 0.4 mg SL tablet 0.4 mg by SubLINGual route every five (5) minutes as needed for Chest Pain. Up to 3 doses.  lubiPROStone (Amitiza) 24 mcg capsule Take 24 mcg by mouth as needed for Constipation.  ferrous sulfate (Iron) 325 mg (65 mg iron) tablet Take 1 Tab by mouth daily.  polyethylene glycol (MIRALAX) 17 gram packet Take 17 g/day by mouth daily.  diclofenac (SOLARAZE) 3 % topical gel Apply 0.5 mg to affected area two (2) times a day.  apixaban (ELIQUIS) 2.5 mg tablet Take 2.5 mg by mouth two (2) times a day.  insulin NPH (NOVOLIN N, HUMULIN N) 100 unit/mL injection 20 Units by SubCUTAneous route daily (with dinner).  donepeziL (ARICEPT) 10 mg tablet Take 10 mg by mouth daily (with dinner).  isosorbide mononitrate ER (IMDUR) 60 mg CR tablet Take 60 mg by mouth daily.  hydrALAZINE (APRESOLINE) 25 mg tablet Take 25 mg by mouth two (2) times a day.       losartan (COZAAR) 25 mg tablet Take 25 mg by mouth daily.  metoprolol succinate (TOPROL-XL) 25 mg XL tablet Take 25 mg by mouth daily.  gabapentin (NEURONTIN) 300 mg capsule Take 300 mg by mouth two (2) times a day.  furosemide (LASIX) 40 mg tablet Take 60 mg by mouth daily.  fluticasone propionate (FLONASE) 50 mcg/actuation nasal spray fluticasone propionate 50 mcg/actuation nasal spray,suspension      famotidine (PEPCID) 20 mg tablet Take 20 mg by mouth two (2) times a day.  cetirizine (ZYRTEC) 10 mg tablet Take 10 mg by mouth daily.  linaCLOtide (Linzess) 145 mcg cap capsule Take  by mouth Daily (before breakfast).  calcitRIOL (ROCALTROL) 0.25 mcg capsule Take 0.25 mcg by mouth daily.  venlafaxine (EFFEXOR) 75 mg tablet Take 75 mg by mouth daily.  cholecalciferol (VITAMIN D3) 400 unit tab tablet Take  by mouth daily.  latanoprost (XALATAN) 0.005 % ophthalmic solution Administer 1 Drop to both eyes nightly.  pravastatin (PRAVACHOL) 80 mg tablet Take 80 mg by mouth nightly.  aspirin 81 mg chewable tablet Take 81 mg by mouth daily.  allopurinol (ZYLOPRIM) 100 mg tablet Take 200 mg by mouth daily.          Past History     Past Medical History:  Past Medical History:   Diagnosis Date    Adenocarcinoma, renal cell ([address] 9/2/2020    Arthritis     CAD (coronary artery disease)     Chronic kidney disease     Diabetes ([address]     Gout     Hypercholesterolemia     Hypertension     Mental depression     Pulmonary embolism ([address]     Stroke ([address]     Thyroid disease        Past Surgical History:  Past Surgical History:   Procedure Laterality Date    CARDIAC SURG PROCEDURE UNLIST      stents placed     HX GYN      HX HYSTERECTOMY      HX NEPHRECTOMY  02/12/2015    HX ORTHOPAEDIC      HX UROLOGICAL  02/12/2015    PARTIAL URETERECTOMY        Family History:  Family History   Problem Relation Age of Onset    Heart Disease Mother     Heart Disease Father     Heart Disease Sister     Cancer Sister     Heart Disease Brother     Cancer Maternal Grandmother     Stroke Son        Social History:  Social History     Tobacco Use    Smoking status: Never Smoker    Smokeless tobacco: Never Used   Substance Use Topics    Alcohol use: No    Drug use: No       Allergies:  No Known Allergies      Review of Systems     Review of Systems   Constitutional: Negative for activity change, appetite change, fatigue and fever. HENT: Negative. Negative for congestion, rhinorrhea and sore throat. Respiratory: Negative. Negative for cough, shortness of breath and wheezing. Cardiovascular: Negative. Negative for chest pain and leg swelling. Gastrointestinal: Negative. Negative for abdominal distention, abdominal pain, constipation, diarrhea, nausea and vomiting. Endocrine: Negative. Genitourinary: Negative for difficulty urinating, dysuria, menstrual problem, vaginal bleeding and vaginal discharge. Musculoskeletal: Negative. Negative for arthralgias, joint swelling and myalgias. Right sided weakness   Skin: Negative. Negative for rash. Neurological: Positive for weakness and headaches. Negative for dizziness and light-headedness. Psychiatric/Behavioral: Positive for confusion. Physical Exam  Vitals signs and nursing note reviewed. Constitutional:       General: She is not in acute distress. Appearance: She is obese. HENT:      Head: Atraumatic. Eyes:      Conjunctiva/sclera: Conjunctivae normal.      Pupils: Pupils are equal, round, and reactive to light. Neck:      Musculoskeletal: Normal range of motion. Cardiovascular:      Rate and Rhythm: Normal rate and regular rhythm. Heart sounds: Normal heart sounds. No murmur. Comments: Left upper extremity fistula, positive thrill  Pulmonary:      Effort: Pulmonary effort is normal. No respiratory distress. Breath sounds: Normal breath sounds. No wheezing or rales. Chest:      Chest wall: No tenderness. Abdominal:      General: Bowel sounds are normal. There is no distension. Palpations: Abdomen is soft. There is no mass. Tenderness: There is no abdominal tenderness. There is no guarding or rebound. Musculoskeletal: Normal range of motion. Comments: Patient has a mild right-sided pronator drift, equal and symmetric  strength. Plantar flexor plantar flexion and dorsiflexion symmetric mild weakness right hip flexor compared to left. Skin:     General: Skin is warm. Findings: No erythema or rash. Neurological:      Mental Status: She is confused. Cranial Nerves: No cranial nerve deficit. Motor: Weakness present. Diagnostic Study Results     Labs -   No results found for this or any previous visit (from the past 12 hour(s)). I note some  lab abnormalities that have been stable over time,     Anemia HGB 9. 4  Cr 2.26 (BASELINE MID 2'S)  NA+ 142  K+ 4  (9/25)    Radiologic Studies -   [unfilled]  CT Results  (Last 48 hours)    None        15:08)  Provider Status: Open    Study Result     History is stroke. Code neuro.     Comparison is with the head CT of 9/16/2020.     Serial axial images were obtained through the brain at 5 mm intervals without  contrast administration. Bone, stroke and brain windows are evaluated as well as  sagittal and coronal reformatted images.     Dose reduction:  All CT scans at this facility are performed using dose  reduction optimization techniques as appropriate to a performed exam including  the following: automated exposure control, adjustments of the mA and/or kV  according to patient size, or use of iterative reconstruction technique.      The ventricles are midline without extrinsic compression or dilatation. There is  no change in the small bilateral basal ganglia lacunar infarcts. There is no  intra or extra-axial hemorrhage, mass, acute infarct or edema. No midline shift  is identified.  The orbits and their contents appear unremarkable. There is  atrophy, mild periventricular white matter ischemic disease, and vascular  calcifications present.     On bone windows there is no skull fracture or soft tissue swelling. No sinusitis  or mastoiditis is identified.     IMPRESSION  Impression: Small bilateral basal ganglia lacunar infarcts unchanged from the  prior exam.  No acute infarct, mass, or  hemorrhage       CXR Results  (Last 48 hours)    None      Results   XR CHEST PORT (Accession [id number]) (Order [id number])   Allergies      No Known Allergies    Exam Information     Status  Exam Begun   Exam Ended     Final [99]  9/24/2020  16:45  9/24/2020  16:50    Result Information     Status: Final result (Exam End: 9/24/2020 16:50)  Provider Status: Open    Study Result     History is acute mental status changes.        Comparison is with chest x-ray of 9/16/2020.     An AP portable semierect view of the chest demonstrates cardiac monitor leads. The heart remains enlarged. There is no pneumonia, pneumothorax or effusion. There is degenerative change of the osseous structures.     IMPRESSION  IMPRESSION: No acute cardiopulmonary disease identified. Please see full report         Medical Decision Making and ED Course   I am the first provider for this patient. I reviewed the vital signs, available nursing notes, past medical history, past surgical history, family history and social history. Vital Signs-Reviewed the patient's vital signs. No data found. EKG interpretation: (Preliminary) 1459   Rhythm: normal sinus rhythm; and regular . Rate (approx.): 70; Axis: normal; ME interval: short; QRS interval: normal ; ST/T wave: normal; Other findings: borderline ekg. Records Reviewed: Nursing Notes and Old Medical Records    The patient presents with AMS    Provider Notes (Medical Decision Making): MDM  CVA, TIA, ICH, SDH, CHI, Metabolic encephalopathy, Seizures, electrolyte abnormality    ED Course:   Initial assessment performed. The patients presenting problems have been discussed, and they are in agreement with the care plan formulated and outlined with them. I have encouraged them to ask questions as they arise throughout their visit. Procedures       Nura Santana MD      CRITICAL CARE NOTE :  3:02 PM  Amount of Critical Care Time: __65__(minutes)__    IMPENDING DETERIORATION -Cardiovascular and CNS  ASSOCIATED RISK FACTORS - Hypoxia, Dysrhythmia and CNS Decompensation  MANAGEMENT- Bedside Assessment and Supervision of Care  INTERPRETATION -  CT Scan, ECG and Blood Pressure  INTERVENTIONS - hemodynamic mngmt and Neurologic interventions   CASE REVIEW - Hospitalist, Medical Sub-Specialist and Nursing  TREATMENT RESPONSE -Improved  PERFORMED BY - Self    NOTES   :  Pre-arrival time spent assessing patient as patient was reported to be arouseable only by sternal rub, hypoxic into 60%, and hypotensive. .Pt was also assessed for acute CVA and need to TPA. It was determined that this may be more consistent with a seizure versus new stroke so no TPA given as patient's clinical status improved. Pt will need admission for observation and seizure workup and to monitor for recurrence or to determine if this acute event was not neurologic as believed but could be cardiac and syncopal in nature   I have spent critical care time involved in lab review, consultations with specialist, family decision- making, bedside attention and documentationThis time excludes time spent in any separate billed procedures. During this entire length of time I was immediately available to the patient . Nura Santana MD        Disposition     CONSULT NOTE:   4:03 PM  Dr Wendy Erickson spoke with Dr Nava Bhakta, Neurology  Specialty: Neurology  Discussed pt's hx, disposition, and available diagnostic and imaging results. He agrees this is consistent with possible seizure activity and to admit for obs and continue evaluation and workup for cardiac etiology as well.  Reviewed care plans. Consultant will evaluate pt for admission. Written by Gayle Martinez MD    CONSULT NOTE:   4:07 PM  Dr Kathy Reinoso spoke with   Specialty: Hospitalist  Discussed pt's hx, disposition, and available diagnostic and imaging results. Reviewed care plans. Consultant will evaluate pt for admission. Diagnosis     Clinical Impression:   1. Seizure ([address]    2. Altered mental status, unspecified altered mental status type    3. Right sided weakness        Attestations:    Gayle Martinez MD    Please note that this dictation was completed with [employer], the [employer] voice recognition software. Quite often unanticipated grammatical, syntax, homophones, and other interpretive errors are inadvertently transcribed by the computer software. Please disregard these errors. Please excuse any errors that have escaped final proofreading. Thank you.

## 2020-09-24 NOTE — H&P
History and Physical    Patient: Horace Amezcua MRN: [id number]  SSN: [id number]    YOB: 1945  Age: 67 y.o. Sex: female      Subjective:    70F, H/o L-CVA with right sided minimal residual weakness, pulmonary embolism on AC with an episode of unresponsiveness early in the afternoon    According to her and ER and EMS, she was at home when all of a sudden had a LOC, at 17 developed a severe headache and fell to the ground family at home rushed to her where they found her to have had bowel incontinence and an episode shortness of breath. By the time EMS arrived she was still drowsy but recovered enroute. Unsure of any post ictal drowsiness    She denies any auora, chest pain, shortness of breath    ER: concern for seizure    Chronically had left sided stroke with right sided weakness. Past Medical History:   Diagnosis Date    Adenocarcinoma, renal cell ([address] 9/2/2020    Arthritis     CAD (coronary artery disease)     Chronic kidney disease     [facility]     Gout     Hypercholesterolemia     Hypertension     Mental depression     Pul[facility] embolism [facility]     Stroke ([facility]     Thyroid disease      Past Surgical History:   Procedure Laterality Date    CARDIAC SURG PROCEDURE UNLIST      stents placed     HX GYN      HX HYSTERECTOMY      HX NEPHRECTOMY  02/12/2015    HX ORTHOPAEDIC      HX UROLOGICAL  02/12/2015    PARTIAL URETERECTOMY       Family History   Problem Relation Age of Onset    Heart Disease Mother     Heart Disease Father     Heart Disease Sister     Cancer Sister     Heart Disease Brother     Cancer Maternal Grandmother     Stroke Son      Social History     Tobacco Use    Smoking status: Never Smoker    Smokeless tobacco: Never Used   Substance Use Topics    Alcohol use: No      Prior to Admission medications    Medication Sig Start Date End Date Taking? Authorizing Provider   omeprazole (PRILOSEC) 20 mg capsule Take 20 mg by mouth daily. Provider, Historical   nitroglycerin (NITROSTAT) 0.4 mg SL tablet 0.4 mg by SubLINGual route every five (5) minutes as needed for Chest Pain. Up to 3 doses. Provider, Historical   lubiPROStone (Amitiza) 24 mcg capsule Take 24 mcg by mouth as needed for Constipation. Provider, Historical   ferrous sulfate (Iron) 325 mg (65 mg iron) tablet Take 1 Tab by mouth daily. Provider, Historical   polyethylene glycol (MIRALAX) 17 gram packet Take 17 g/day by mouth daily. Provider, Historical   diclofenac (SOLARAZE) 3 % topical gel Apply 0.5 mg to affected area two (2) times a day. 6/29/20   Provider, Historical   apixaban (ELIQUIS) 2.5 mg tablet Take 2.5 mg by mouth two (2) times a day. 6/18/19   Provider, Historical   insulin NPH (NOVOLIN N, HUMULIN N) 100 unit/mL injection 20 Units by SubCUTAneous route daily (with dinner). 6/18/19   Provider, Historical   donepeziL (ARICEPT) 10 mg tablet Take 10 mg by mouth daily (with dinner). Provider, Historical   isosorbide mononitrate ER (IMDUR) 60 mg CR tablet Take 60 mg by mouth daily. 7/17/19   Provider, Historical   hydrALAZINE (APRESOLINE) 25 mg tablet Take 25 mg by mouth two (2) times a day. Provider, Historical   losartan (COZAAR) 25 mg tablet Take 25 mg by mouth daily. 7/13/20   Provider, Historical   metoprolol succinate (TOPROL-XL) 25 mg XL tablet Take 25 mg by mouth daily. Provider, Historical   gabapentin (NEURONTIN) 300 mg capsule Take 300 mg by mouth two (2) times a day. 6/29/20   Provider, Historical   furosemide (LASIX) 40 mg tablet Take 60 mg by mouth daily. Provider, Historical   fluticasone propionate (FLONASE) 50 mcg/actuation nasal spray fluticasone propionate 50 mcg/actuation nasal spray,suspension    Provider, Historical   famotidine (PEPCID) 20 mg tablet Take 20 mg by mouth two (2) times a day. 6/18/19   Provider, Historical   cetirizine (ZYRTEC) 10 mg tablet Take 10 mg by mouth daily.     Provider, Historical   linaCLOtide (Linzess) 145 mcg cap capsule Take  by mouth Daily (before breakfast). Provider, Historical   calcitRIOL (ROCALTROL) 0.25 mcg capsule Take 0.25 mcg by mouth daily. Provider, Historical   venlafaxine (EFFEXOR) 75 mg tablet Take 75 mg by mouth daily. Provider, Historical   cholecalciferol (VITAMIN D3) 400 unit tab tablet Take  by mouth daily. Provider, Historical   latanoprost (XALATAN) 0.005 % ophthalmic solution Administer 1 Drop to both eyes nightly. Provider, Historical   pravastatin (PRAVACHOL) 80 mg tablet Take 80 mg by mouth nightly. Provider, Historical   aspirin 81 mg chewable tablet Take 81 mg by mouth daily. Provider, Historical   allopurinol (ZYLOPRIM) 100 mg tablet Take 200 mg by mouth daily. Provider, Historical        No Known Allergies    Review of Systems:  Review of Systems:  Constitutional: Appetite is good, denies weight loss, no fever, no chills, no night sweats  Eye: No recent visual disturbances, no discharge, no double vision  Ear/nose/mouth/throat : No hearing disturbance, no ear pain, no nasal congestion, no sore throat, no trouble swallowing. Respiratory : No trouble breathing, no cough, +++ shortness of breath, no hemoptysis, no wheezing  Cardiovascular : No chest pain, no palpitation, no racing of heart, no orthopnea, no paroxysmal nocturnal dyspnea, no peripheral edema  Gastrointestinal : No nausea, no vomiting, no diarrhea, constipation, heartburn, abdominal pain  Genitourinary : No dysuria, no hematuria, no increased frequency, incontinence,  Lymphatics : No swollen glands -Neck, axillary, inguinal  Endocrine : No excessive thirst, no polyuria no cold intolerance, no heat intolerance.   Immunologic : No hives, urticaria, no seasonal allergies,   Musculoskeletal : Left upper back pain.  No joint swelling, pain, effusion,  no neck pain,   Integumentary : No rash, no pruritus, no ecchymosis  Hematology : No petechiae, No easy bruising,  No tendency to bleed easy  Neurology : Denies change in mental status, no abnormal balance, no headache, no confusion, numbness, tingling,  Psychiatric : No mood swings, no anxiety, depression    Objective:     Vitals:    09/24/20 1455   BP: (!) 163/65   Pulse: 71   Resp: 16   Temp: 98.2 °F (36.8 °C)   SpO2: 100%   Weight: 100.7 kg (222 lb)   Height: 5' 4\" (1.626 m)        Physical Exam:  Physical Exam:   General : Appearance, looks comfortable, no respiratory distress noted  HEENT : PERRLA, normal oral mucosa, atraumatic normocephalic, Normal ear and nose. Neck : Supple, no JVD, no masses noted, no carotid bruit  Lungs : Breath sounds with good  air entry on right side, left side absent breath sounds up to the scapula level.  No wheezing, no rales.  He is splinting on deep breathing. CVS : Rhythm rate regular, S1+, S2+, no murmur or gallop  Abdomen : Soft, nontender, no organomegaly, bowel sounds active  Extremities : No edema noted,  pedal pulses palpable  Musculoskeletal : Fair range of motion, no joint swelling or effusion, muscle tone and power appears normal,   Skin : Moist, warm, skin turgor, no pathological rash  Lymphatic : No cervical lymphadenopathy. Neurological : Awake, alert, oriented to time place person.  No neurological deficits.  Psychiatric : Mood and affect appears appropriate to the situation.       Assessment:     (1) episode of unresponsiveness likely seizure D/D: complex migraine. : 1g keppra. PRN toradol for migraine headache    (2) chronic CVA: stable. (3) chronic pulmonary embolism: stable. apixaban    (4) HTN: stable. Losartan, metoprolo succinate    (5) HLD: statin    (6) CHFpEF: stable lasix    (7) CAD unsure about stent: iso.  Losartan Metoprolol    DVT ppx: heparin    DISPO: Obs, once seen by neurology  Signed By: Amanda Flynn MD     September 24, 2020

## 2020-09-25 ENCOUNTER — APPOINTMENT (OUTPATIENT)
Dept: PHYSICAL THERAPY | Age: 75
End: 2020-09-25
Payer: MEDICARE

## 2020-09-25 LAB
ANION GAP SERPL CALC-SCNC: 4 MMOL/L (ref 5–15)
BUN SERPL-MCNC: 43 MG/DL (ref 6–20)
BUN/CREAT SERPL: 18 (ref 12–20)
CA-I BLD-MCNC: 8.2 MG/DL (ref 8.5–10.1)
CHLORIDE SERPL-SCNC: 108 MMOL/L (ref 97–108)
CO2 SERPL-SCNC: 30 MMOL/L (ref 21–32)
CREAT SERPL-MCNC: 2.39 MG/DL (ref 0.55–1.02)
GLUCOSE SERPL-MCNC: 139 MG/DL (ref 65–100)
POTASSIUM SERPL-SCNC: 4 MMOL/L (ref 3.5–5.1)
SODIUM SERPL-SCNC: 142 MMOL/L (ref 136–145)

## 2020-09-25 PROCEDURE — 74011250637 HC RX REV CODE- 250/637: Performed by: HOSPITALIST

## 2020-09-25 PROCEDURE — 99218 HC RM OBSERVATION: CPT

## 2020-09-25 PROCEDURE — 94760 N-INVAS EAR/PLS OXIMETRY 1: CPT

## 2020-09-25 PROCEDURE — G0378 HOSPITAL OBSERVATION PER HR: HCPCS

## 2020-09-25 PROCEDURE — 80048 BASIC METABOLIC PNL TOTAL CA: CPT

## 2020-09-25 PROCEDURE — 96374 THER/PROPH/DIAG INJ IV PUSH: CPT

## 2020-09-25 PROCEDURE — 74011636637 HC RX REV CODE- 636/637: Performed by: HOSPITALIST

## 2020-09-25 PROCEDURE — 36415 COLL VENOUS BLD VENIPUNCTURE: CPT

## 2020-09-25 PROCEDURE — 74011250636 HC RX REV CODE- 250/636: Performed by: HOSPITALIST

## 2020-09-25 RX ORDER — ACETAMINOPHEN 325 MG/1
650 TABLET ORAL
Status: DISCONTINUED | OUTPATIENT
Start: 2020-09-25 | End: 2020-09-26 | Stop reason: HOSPADM

## 2020-09-25 RX ADMIN — PANTOPRAZOLE SODIUM 40 MG: 40 TABLET, DELAYED RELEASE ORAL at 09:47

## 2020-09-25 RX ADMIN — HYDRALAZINE HYDROCHLORIDE 25 MG: 25 TABLET, FILM COATED ORAL at 09:47

## 2020-09-25 RX ADMIN — GABAPENTIN 300 MG: 300 CAPSULE ORAL at 09:47

## 2020-09-25 RX ADMIN — ALLOPURINOL 200 MG: 100 TABLET ORAL at 09:00

## 2020-09-25 RX ADMIN — APIXABAN 2.5 MG: 2.5 TABLET, FILM COATED ORAL at 20:58

## 2020-09-25 RX ADMIN — FAMOTIDINE 20 MG: 20 TABLET ORAL at 20:58

## 2020-09-25 RX ADMIN — FUROSEMIDE 60 MG: 40 TABLET ORAL at 09:48

## 2020-09-25 RX ADMIN — LOSARTAN POTASSIUM 25 MG: 25 TABLET, FILM COATED ORAL at 09:47

## 2020-09-25 RX ADMIN — INSULIN HUMAN 20 UNITS: 100 INJECTION, SUSPENSION SUBCUTANEOUS at 22:15

## 2020-09-25 RX ADMIN — HYDRALAZINE HYDROCHLORIDE 25 MG: 25 TABLET, FILM COATED ORAL at 20:58

## 2020-09-25 RX ADMIN — LEVETIRACETAM 1000 MG: 10 INJECTION INTRAVENOUS at 00:11

## 2020-09-25 RX ADMIN — SODIUM CHLORIDE 1000 ML: 9 INJECTION, SOLUTION INTRAVENOUS at 21:02

## 2020-09-25 RX ADMIN — ASPIRIN 81 MG CHEWABLE TABLET 81 MG: 81 TABLET CHEWABLE at 09:47

## 2020-09-25 RX ADMIN — VENLAFAXINE 75 MG: 37.5 TABLET ORAL at 09:47

## 2020-09-25 RX ADMIN — LORATADINE 10 MG: 10 TABLET ORAL at 09:47

## 2020-09-25 RX ADMIN — FERROUS SULFATE TAB 325 MG (65 MG ELEMENTAL FE) 325 MG: 325 (65 FE) TAB at 09:47

## 2020-09-25 RX ADMIN — PRAVASTATIN SODIUM 80 MG: 80 TABLET ORAL at 20:58

## 2020-09-25 RX ADMIN — APIXABAN 2.5 MG: 2.5 TABLET, FILM COATED ORAL at 09:47

## 2020-09-25 RX ADMIN — ISOSORBIDE MONONITRATE 60 MG: 60 TABLET, EXTENDED RELEASE ORAL at 09:47

## 2020-09-25 RX ADMIN — METOPROLOL SUCCINATE 25 MG: 25 TABLET, EXTENDED RELEASE ORAL at 09:47

## 2020-09-25 RX ADMIN — ACETAMINOPHEN 650 MG: 325 TABLET, FILM COATED ORAL at 09:47

## 2020-09-25 RX ADMIN — FAMOTIDINE 20 MG: 20 TABLET ORAL at 09:47

## 2020-09-25 RX ADMIN — GABAPENTIN 300 MG: 300 CAPSULE ORAL at 20:58

## 2020-09-25 RX ADMIN — CALCITRIOL CAPSULES 0.25 MCG 0.25 MCG: 0.25 CAPSULE ORAL at 09:48

## 2020-09-25 NOTE — PROGRESS NOTES
Orthostatic Vitals    Laying-  BP- 136/66  HR-69  O2%-95    Sitting-  BP-138/69  HR-68  O2%-99    Standing  BP-145/71  HR-74  O2-98%

## 2020-09-25 NOTE — ROUTINE PROCESS
Two nurse skin assessment performed by myself and Bubba Friedman RN. Skin is clean, dry, and intact with no signs of breakdown.

## 2020-09-25 NOTE — ROUTINE PROCESS
Birgit Merchant TRANSFER - OUT REPORT:    Verbal report given to Marielle on Gus Shoulder  being transferred to [facility] for routine progression of care       Report consisted of patients Situation, Background, Assessment and   Recommendations(SBAR). Information from the following report(s) SBAR, ED Summary and MAR was reviewed with the receiving nurse. Lines:   Peripheral IV 09/24/20 Anterior;Right Forearm (Active)   Site Assessment Clean, dry, & intact 09/24/20 1452   Phlebitis Assessment 0 09/24/20 1452   Infiltration Assessment 0 09/24/20 1452   Dressing Status Clean, dry, & intact 09/24/20 1452   Dressing Type Transparent 09/24/20 1452   Hub Color/Line Status Blue;Flushed 09/24/20 1452   Alcohol Cap Used Yes 09/24/20 1452        Opportunity for questions and clarification was provided.       Patient transported with:   [employer]

## 2020-09-25 NOTE — CONSULTS
Consult Date: 9/25/2020    IP CONSULT TO NEUROLOGY  Consult performed by: Lyla Woodson MD  Consult ordered by: Cristina Gunderson MD       Ms. Maier Case is a 67year old woman who was admitted here on 09/16 for an episode of unresponsiveness and slurred speech when she awoke. CT head showed an old right caudate stroke, CUS and TTE wNL on that admission. She came back similar symptoms this time. One note says en route her O2 sat was in the 60s and she required supplemental oxygen. Patient tells me she was sitting talking to her daughter and passed out. She awoke 15 min later in ambulance. She has no history of seizures but was loaded with levetiracetam yesterday for suspected seizures.  Her lactate level was normal.     Subjective  no issues     Past Medical History:   Diagnosis Date    Adenocarcinoma, renal cell ([address] 9/2/2020    Arthritis     CAD (coronary artery disease)     Chronic kidney disease     Diabetes ([address]     Gout     Hypercholesterolemia     Hypertension     Mental depression     Pulmonary embolism ([address]     Stroke ([address]     Thyroid disease       Past Surgical History:   Procedure Laterality Date    CARDIAC SURG PROCEDURE UNLIST      stents placed     HX GYN      HX HYSTERECTOMY      HX NEPHRECTOMY  02/12/2015    HX ORTHOPAEDIC      HX UROLOGICAL  02/12/2015    PARTIAL URETERECTOMY      Family History   Problem Relation Age of Onset    Heart Disease Mother     Heart Disease Father     Heart Disease Sister     Cancer Sister     Heart Disease Brother     Cancer Maternal Grandmother     Stroke Son       Social History     Tobacco Use    Smoking status: Never Smoker    Smokeless tobacco: Never Used   Substance Use Topics    Alcohol use: No       Current Facility-Administered Medications   Medication Dose Route Frequency Provider Last Rate Last Dose    acetaminophen (TYLENOL) tablet 650 mg  650 mg Oral Q4H PRN Cedric Hilliard MD        allopurinoL (ZYLOPRIM) tablet 200 mg 200 mg Oral DAILY Honor MD Michelle        apixaban Parris Alvarengaufman) tablet 2.5 mg  2.5 mg Oral BID Honor MD Michelle   2.5 mg at 09/24/20 2124    aspirin chewable tablet 81 mg  81 mg Oral DAILY Honor MD Michelle        calcitRIOL (ROCALTROL) capsule 0.25 mcg  0.25 mcg Oral DAILY Honor MD Michelle        loratadine (CLARITIN) tablet 10 mg  10 mg Oral DAILY [place] MD Michelle        ([employer]-Own-[employer]) diclofenac (SOLARAZE) 3 % topical gel 1 g (Patient Supplied)  1 g Topical BID Honor MD Michelle   Stopped at 09/24/20 2100    famotidine (PEPCID) tablet 20 mg  20 mg Oral BID Honor MD Michelle   20 mg at 09/24/20 2116    ferrous sulfate tablet 325 mg  1 Tab Oral DAILY [place] MD Michelle        fluticasone propionate (FLONASE) 50 mcg/actuation nasal spray 2 Spray  2 Spray Both Nostrils DAILY Uday Montiel MD        furosemide (LASIX) tablet 60 mg  60 mg Oral DAILY Honor MD Michelle        gabapentin (NEURONTIN) capsule 300 mg  300 mg Oral BID Honor MD Michelle   300 mg at 09/24/20 2119    hydrALAZINE (APRESOLINE) tablet 25 mg  25 mg Oral BID Honor MD Michelle   25 mg at 09/24/20 2117    insulin NPH (NOVOLIN N, HUMULIN N) injection 20 Units  20 Units SubCUTAneous DAILY WITH DINNER ManzanolaUday MD        isosorbide mononitrate ER (IMDUR) tablet 60 mg  60 mg Oral DAILY Honor MD Michelle        latanoprost (XALATAN) 0.005 % ophthalmic solution 1 Drop  1 Drop Both Eyes QHS Uday Montiel MD        linaCLOtide [facility]) capsule 145 mcg  145 mcg Oral ACB Honor MD Michelle        losartan (COZAAR) tablet 25 mg  25 mg Oral DAILY Honor MD Michelle        lubiPROStone (AMITIZA) capsule 24 mcg  24 mcg Oral PRN Honor MD Michelle        metoprolol succinate (TOPROL-XL) XL tablet 25 mg  25 mg Oral DAILY Honor MD Michelle        nitroglycerin (NITROSTAT) tablet 0.4 mg  0.4 mg SubLINGual Q5MIN PRN Honor MD Michelle        pantoprazole (PROTONIX) tablet 40 mg  40 mg Oral DAILY Linsey Montiel MD        polyethylene glycol [facility]) packet 17 g  17 g Oral DAILY Brianna Velazquez MD        pravastatin (PRAVACHOL) tablet 80 mg  80 mg Oral QHS Brianna Velazquez MD        venlafaxine [facility]) tablet 75 mg  75 mg Oral DAILY Brianna Velazquez MD        insulin lispro (HUMALOG) injection   SubCUTAneous TIDAC Brianna Velazquez MD   Stopped at 09/24/20 1913    glucose chewable tablet 16 g  4 Tab Oral PRN Brianna Velazquez MD        glucagon (GLUCAGEN) injection 1 mg  1 mg IntraMUSCular PRN Brianna Velazquez MD        dextrose (D50W) injection syrg 12.5-25 g  25-50 mL IntraVENous PRN Brianna Velazquez MD            Review of Systems   All other systems reviewed and are negative. Objective     Vital signs for last 24 hours:  Visit Vitals  BP (!) 151/67   Pulse 72   Temp 98.7 °F (37.1 °C)   Resp 20   Ht 5' 4\" (1.626 m)   Wt 100.7 kg (222 lb)   SpO2 96%   Breastfeeding No   BMI 38.11 kg/m²       Intake/Output this shift:  Current Shift: No intake/output data recorded. Last 3 Shifts: No intake/output data recorded.     Data Review:   Recent Results (from the past 24 hour(s))   EKG, 12 LEAD, INITIAL    Collection Time: 09/24/20  2:59 PM   Result Value Ref Range    Ventricular Rate 70 BPM    Atrial Rate 70 BPM    P-R Interval 108 ms    QRS Duration 86 ms    Q-T Interval 414 ms    QTC Calculation (Bezet) 447 ms    Calculated P Axis 68 degrees    Calculated R Axis 55 degrees    Calculated T Axis 39 degrees    Diagnosis       Sinus rhythm with short [place]  Otherwise normal ECG  When compared with ECG of 25-JUN-2020 09:21,  No significant change was found  Confirmed by NICOL CUMMINS, Helen Brooks ([id number]) on 9/24/2020 5:02:23 PM     CBC WITH AUTOMATED DIFF    Collection Time: 09/24/20  3:00 PM   Result Value Ref Range    WBC 11.8 (H) 3.6 - 11.0 K/uL    RBC 3.36 (L) 3.80 - 5.20 M/uL    HGB 9.6 (L) 11.5 - 16.0 %    HCT 29.6 (L) 35.0 - 47.0 %    MCV 88.1 80.0 - 99.0 FL MCH 28.6 26.0 - 34.0 PG    MCHC 32.4 30.0 - 36.5 g/dL    RDW 15.4 (H) 11.5 - 14.5 %    PLATELET 238 463 - 236 K/uL    MPV 10.0 8.9 - 12.9 FL    NEUTROPHILS 76 (H) 32 - 75 %    LYMPHOCYTES 14 12 - 49 %    MONOCYTES 9 5 - 13 %    EOSINOPHILS 1 0 - 7 %    BASOPHILS 0 0 - 1 %    IMMATURE GRANULOCYTES 0 0.0 - 0.5 %    ABS. NEUTROPHILS 9.0 (H) 1.8 - 8.0 K/UL    ABS. LYMPHOCYTES 1.6 0.8 - 3.5 K/UL    ABS. MONOCYTES 1.1 (H) 0.0 - 1.0 K/UL    ABS. EOSINOPHILS 0.1 0.0 - 0.4 K/UL    ABS. BASOPHILS 0.0 0.0 - 0.1 K/UL    ABS. IMM. GRANS. 0.0 0.00 - 0.04 K/UL    DF AUTOMATED     PROTHROMBIN TIME + INR    Collection Time: 09/24/20  3:00 PM   Result Value Ref Range    Prothrombin time 15.4 (H) 11.9 - 14.7 sec    INR 1.2 (H) 0.9 - 1.1     PTT    Collection Time: 09/24/20  3:00 PM   Result Value Ref Range    aPTT 35.7 23.0 - 35.7 sec    aPTT, therapeutic range   68 - 109 sec   LACTIC ACID    Collection Time: 09/24/20  3:00 PM   Result Value Ref Range    Lactic acid 1.4 0.4 - 2.0 mmol/L   GLUCOSE, POC    Collection Time: 09/24/20  9:13 PM   Result Value Ref Range    Glucose (POC) 137 (H) 65 - 100 mg/dL    Performed by Jayy Montero        Physical Exam    Neuro Physical Exam      General: Well developed, well nourished. Patient in no apparent distress. Neurological Exam:  Mental Status: Alert, awake oriented x4    Cranial Nerves:   Intact visual fields. PERRL, EOM's full, no nystagmus, no ptosis. Facial sensation is normal. Facial movement is symmetric. Palate is midline. Normal sternocleidomastoid strength. Tongue is midline. Hearing is intact bilaterally. Motor:  5/5 strength in upper and lower proximal and distal muscles. Normal bulk and tone. Reflexes:   Deep tendon reflexes 2+/4 and symmetrical.   Sensory:   Normal to temperature and vibration. Gait:  Normal gait. Tremor:   No tremor noted. Cerebellar:  No cerebellar signs present.     Assessment and Plan: Ms. Shelly Johnson is a 67year old woman with 2 episodes of unresponsiveness this month. These episodes are not consistent with seizures. Will not evaluate for seizures. Discontinued donepezil as this medication can sometimes do this. Needs cards evaluation for syncope as well. Recommend orthostatic Bps. No further neuro workup. Riki Chavez

## 2020-09-25 NOTE — DISCHARGE SUMMARY
Physician Discharge Summary     Patient ID:    Rodrigo Castro  [id number]  19 y.o.  1945    Admit date: 9/24/2020    Discharge date : 9/25/2020    Chronic Diagnoses:    Problem List as of 9/25/2020 Date Reviewed: 9/23/2020          Codes Class Noted - Resolved    Episode of unresponsiveness ICD-10-CM: R41.89  ICD-9-CM: 780.09  9/24/2020 - Present        Arteriosclerosis of coronary artery ICD-10-CM: I25.10  ICD-9-CM: 414.00  9/16/2020 - Present        Dyslipidemia ICD-10-CM: E78.5  ICD-9-CM: 272.4  9/16/2020 - Present        Stage 5 chronic kidney disease ([facility] ICD-10-CM: N18.5  ICD-9-CM: 585.5  9/16/2020 - Present        HTN (hypertension) ICD-10-CM: I10  ICD-9-CM: 401.9  9/16/2020 - Present        Type 2 diabetes mellitus ([facility] ICD-10-CM: E11.9  ICD-9-CM: 250.00  9/16/2020 - Present        TIA (transient ischemic attack) ICD-10-CM: G45.9  ICD-9-CM: 435.9  9/16/2020 - Present        Adenocarcinoma, renal cell ([facility] ICD-10-CM: C64.9  ICD-9-CM: 189.0  9/2/2020 - Present        Morbid obesity ([facility] ICD-10-CM: E66.01  ICD-9-CM: 278.01  9/2/2020 - Present        Peripheral vascular disease ([facility] ICD-10-CM: I73.9  ICD-9-CM: 443.9  9/2/2020 - Present        Arthritis ICD-10-CM: M19.90  ICD-9-CM: 716.90  8/21/2019 - Present        Hyperlipidemia ICD-10-CM: E78.5  ICD-9-CM: 272.4  8/21/2019 - Present        Essential hypertension ICD-10-CM: I10  ICD-9-CM: 401.9  8/21/2019 - Present        Impingement syndrome of shoulder region ICD-10-CM: M75.40  ICD-9-CM: 726.2  8/21/2019 - Present        Pulmonary embolism ([facility] ICD-10-CM: I26.99  ICD-9-CM: 415.19  2/1/2015 - Present        Cerebrovascular accident (CVA) ([facility] ICD-10-CM: I63.9  ICD-9-CM: 434.91  8/1/2014 - Present              Final Diagnoses:   Episode of unresponsiveness [R41.89]     (1) episode of unresponsiveness likely  complex migraine.     (2) chronic CVA: stable.      (3) chronic pulmonary embolism: stable. apixaban     (4) HTN: stable. Losartan, metoprolo succinate     (5) HLD: statin     (6) CHFpEF: stable lasix     (7) CAD unsure about stent: iso. Losartan Metoprolol        Hospital Course:   Ms. Estuardo Rivas is a 67year old woman who was admitted here on 09/16 for an episode of unresponsiveness and slurred speech when she awoke. CT head showed an old right caudate stroke, CUS and TTE wNL on that admission. She came back similar symptoms this time. One note says en route her O2 sat was in the 60s and she required supplemental oxygen. Patient tells me she was sitting talking to her daughter and passed out. She awoke 15 min later in ambulance. She has no history of seizures but was loaded with levetiracetam yesterday for suspected seizures. During the course of her stay, she remained stable and NSR on telle. , was planned for discharge with the following instructions. INSTRUCTIONS ON DISCHARGE     (1) Please note that we have stopped your DONEPEZIL. We dont think it was seizures, possibly complex migraine.     (2) Please follow up with cardiology for further cardiac workup. To rule out cardiac causes. Discharge Medications:   Current Discharge Medication List      CONTINUE these medications which have NOT CHANGED    Details   omeprazole (PRILOSEC) 20 mg capsule Take 20 mg by mouth daily. nitroglycerin (NITROSTAT) 0.4 mg SL tablet 0.4 mg by SubLINGual route every five (5) minutes as needed for Chest Pain. Up to 3 doses. lubiPROStone (Amitiza) 24 mcg capsule Take 24 mcg by mouth as needed for Constipation. ferrous sulfate (Iron) 325 mg (65 mg iron) tablet Take 1 Tab by mouth daily. polyethylene glycol (MIRALAX) 17 gram packet Take 17 g/day by mouth daily. diclofenac (SOLARAZE) 3 % topical gel Apply 0.5 mg to affected area two (2) times a day. apixaban (ELIQUIS) 2.5 mg tablet Take 2.5 mg by mouth two (2) times a day.       insulin NPH (NOVOLIN N, HUMULIN N) 100 unit/mL injection 20 Units by SubCUTAneous route daily (with dinner). isosorbide mononitrate ER (IMDUR) 60 mg CR tablet Take 60 mg by mouth daily. hydrALAZINE (APRESOLINE) 25 mg tablet Take 25 mg by mouth two (2) times a day. losartan (COZAAR) 25 mg tablet Take 25 mg by mouth daily. metoprolol succinate (TOPROL-XL) 25 mg XL tablet Take 25 mg by mouth daily. gabapentin (NEURONTIN) 300 mg capsule Take 300 mg by mouth two (2) times a day. furosemide (LASIX) 40 mg tablet Take 60 mg by mouth daily. fluticasone propionate (FLONASE) 50 mcg/actuation nasal spray fluticasone propionate 50 mcg/actuation nasal spray,suspension      famotidine (PEPCID) 20 mg tablet Take 20 mg by mouth two (2) times a day. cetirizine (ZYRTEC) 10 mg tablet Take 10 mg by mouth daily. linaCLOtide (Linzess) 145 mcg cap capsule Take  by mouth Daily (before breakfast). calcitRIOL (ROCALTROL) 0.25 mcg capsule Take 0.25 mcg by mouth daily. venlafaxine (EFFEXOR) 75 mg tablet Take 75 mg by mouth daily. cholecalciferol (VITAMIN D3) 400 unit tab tablet Take  by mouth daily. latanoprost (XALATAN) 0.005 % ophthalmic solution Administer 1 Drop to both eyes nightly. pravastatin (PRAVACHOL) 80 mg tablet Take 80 mg by mouth nightly. aspirin 81 mg chewable tablet Take 81 mg by mouth daily. allopurinol (ZYLOPRIM) 100 mg tablet Take 200 mg by mouth daily. STOP taking these medications       donepeziL (ARICEPT) 10 mg tablet Comments:   Reason for Stopping: Follow up Care:    1. Piotr Molina NP in 1-2 weeks. Please call to set up an appointment shortly after discharge. Diet:  diabetic    Disposition:  Home. Advanced Directive:   FULL x   DNR      Discharge Exam:  See today's note.     CONSULTATIONS: Neurology    Significant Diagnostic Studies:     Radiologic Studies -   Results from Hospital Encounter encounter on 09/24/20   XR CHEST PORT    Narrative History is acute mental status changes. Comparison is with chest x-ray of 9/16/2020. An AP portable semierect view of the chest demonstrates cardiac monitor leads. The heart remains enlarged. There is no pneumonia, pneumothorax or effusion. There is degenerative change of the osseous structures. Impression IMPRESSION: No acute cardiopulmonary disease identified. Please see full report. CT Results  (Last 48 hours)               09/24/20 1508  CT CODE NEURO HEAD WO CONTRAST Final result    Impression:  Impression: Small bilateral basal ganglia lacunar infarcts unchanged from the   prior exam.  No acute infarct, mass, or  hemorrhage, but MRI is more sensitive   for the evaluation of acute ischemic infarct. Narrative:  History is stroke. Code neuro. Comparison is with the head CT of 9/16/2020. Serial axial images were obtained through the brain at 5 mm intervals without   contrast administration. Bone, stroke and brain windows are evaluated as well as   sagittal and coronal reformatted images. Dose reduction:  All CT scans at this facility are performed using dose   reduction optimization techniques as appropriate to a performed exam including   the following: automated exposure control, adjustments of the mA and/or kV   according to patient size, or use of iterative reconstruction technique. The ventricles are midline without extrinsic compression or dilatation. There is   no change in the small bilateral basal ganglia lacunar infarcts. There is no   intra or extra-axial hemorrhage, mass, acute infarct or edema. No midline shift   is identified. The orbits and their contents appear unremarkable. There is   atrophy, mild periventricular white matter ischemic disease, and vascular   calcifications present. On bone windows there is no skull fracture or soft tissue swelling. No sinusitis   or mastoiditis is identified.                      Discharge time spent 25 minutes    Signed:  Geraldine Guidry Shola Mills MD  9/25/2020  9:32 AM

## 2020-09-26 VITALS
RESPIRATION RATE: 16 BRPM | OXYGEN SATURATION: 99 % | HEIGHT: 64 IN | BODY MASS INDEX: 37.9 KG/M2 | DIASTOLIC BLOOD PRESSURE: 65 MMHG | SYSTOLIC BLOOD PRESSURE: 143 MMHG | TEMPERATURE: 97.8 F | HEART RATE: 67 BPM | WEIGHT: 222 LBS

## 2020-09-26 LAB
ANION GAP SERPL CALC-SCNC: 3 MMOL/L (ref 5–15)
BUN SERPL-MCNC: 38 MG/DL (ref 6–20)
BUN/CREAT SERPL: 19 (ref 12–20)
CA-I BLD-MCNC: 8.6 MG/DL (ref 8.5–10.1)
CHLORIDE SERPL-SCNC: 109 MMOL/L (ref 97–108)
CO2 SERPL-SCNC: 31 MMOL/L (ref 21–32)
CREAT SERPL-MCNC: 1.97 MG/DL (ref 0.55–1.02)
ERYTHROCYTE [DISTWIDTH] IN BLOOD BY AUTOMATED COUNT: 15.5 % (ref 11.5–14.5)
GLUCOSE SERPL-MCNC: 94 MG/DL (ref 65–100)
HCT VFR BLD AUTO: 29.2 % (ref 35–47)
HGB BLD-MCNC: 9.4 % (ref 11.5–16)
MCH RBC QN AUTO: 28.7 PG (ref 26–34)
MCHC RBC AUTO-ENTMCNC: 32.2 G/DL (ref 30–36.5)
MCV RBC AUTO: 89.3 FL (ref 80–99)
PLATELET # BLD AUTO: 237 K/UL (ref 150–400)
PMV BLD AUTO: 10 FL (ref 8.9–12.9)
POTASSIUM SERPL-SCNC: 4.3 MMOL/L (ref 3.5–5.1)
RBC # BLD AUTO: 3.27 M/UL (ref 3.8–5.2)
SODIUM SERPL-SCNC: 143 MMOL/L (ref 136–145)
WBC # BLD AUTO: 6.4 K/UL (ref 3.6–11)

## 2020-09-26 PROCEDURE — 74011250637 HC RX REV CODE- 250/637: Performed by: HOSPITALIST

## 2020-09-26 PROCEDURE — 80048 BASIC METABOLIC PNL TOTAL CA: CPT

## 2020-09-26 PROCEDURE — 36415 COLL VENOUS BLD VENIPUNCTURE: CPT

## 2020-09-26 PROCEDURE — G0378 HOSPITAL OBSERVATION PER HR: HCPCS

## 2020-09-26 PROCEDURE — 85027 COMPLETE CBC AUTOMATED: CPT

## 2020-09-26 PROCEDURE — 99218 HC RM OBSERVATION: CPT

## 2020-09-26 RX ADMIN — LINACLOTIDE 145 MCG: 145 CAPSULE, GELATIN COATED ORAL at 07:30

## 2020-09-26 RX ADMIN — APIXABAN 2.5 MG: 2.5 TABLET, FILM COATED ORAL at 09:38

## 2020-09-26 RX ADMIN — FLUTICASONE PROPIONATE 2 SPRAY: 50 SPRAY, METERED NASAL at 12:29

## 2020-09-26 RX ADMIN — POLYETHYLENE GLYCOL 3350 17 G: 17 POWDER, FOR SOLUTION ORAL at 09:37

## 2020-09-26 RX ADMIN — CALCITRIOL CAPSULES 0.25 MCG 0.25 MCG: 0.25 CAPSULE ORAL at 09:38

## 2020-09-26 RX ADMIN — ISOSORBIDE MONONITRATE 60 MG: 60 TABLET, EXTENDED RELEASE ORAL at 09:38

## 2020-09-26 RX ADMIN — FERROUS SULFATE TAB 325 MG (65 MG ELEMENTAL FE) 325 MG: 325 (65 FE) TAB at 09:38

## 2020-09-26 RX ADMIN — METOPROLOL SUCCINATE 25 MG: 25 TABLET, EXTENDED RELEASE ORAL at 09:38

## 2020-09-26 RX ADMIN — PANTOPRAZOLE SODIUM 40 MG: 40 TABLET, DELAYED RELEASE ORAL at 09:38

## 2020-09-26 RX ADMIN — FAMOTIDINE 20 MG: 20 TABLET ORAL at 09:38

## 2020-09-26 RX ADMIN — LOSARTAN POTASSIUM 25 MG: 25 TABLET, FILM COATED ORAL at 09:38

## 2020-09-26 RX ADMIN — ASPIRIN 81 MG CHEWABLE TABLET 81 MG: 81 TABLET CHEWABLE at 09:37

## 2020-09-26 RX ADMIN — LORATADINE 10 MG: 10 TABLET ORAL at 09:38

## 2020-09-26 RX ADMIN — GABAPENTIN 300 MG: 300 CAPSULE ORAL at 09:38

## 2020-09-26 RX ADMIN — HYDRALAZINE HYDROCHLORIDE 25 MG: 25 TABLET, FILM COATED ORAL at 09:38

## 2020-09-26 RX ADMIN — VENLAFAXINE 75 MG: 37.5 TABLET ORAL at 09:38

## 2020-09-26 RX ADMIN — ALLOPURINOL 200 MG: 100 TABLET ORAL at 09:37

## 2020-09-26 NOTE — PROGRESS NOTES
Progress Note    Patient: Gala Marte MRN: [id number]  SSN: [id number]    YOB: 1945  Age: 67 y.o. Sex: female      Admit Date: 9/24/2020    LOS: 0 days     Subjective:     Ms. Jenn Blanc is a 67year old woman who was admitted here on 09/16 for an episode of unresponsiveness and slurred speech when she awoke. CT head showed an old right caudate stroke, CUS and TTE wNL on that admission. She came back similar symptoms this time. One note says en route her O2 sat was in the 60s and she required supplemental oxygen. Patient tells me she was sitting talking to her daughter and passed out. She awoke 15 min later in ambulance. She has no history of seizures but was loaded with levetiracetam yesterday for suspected seizures.     During the course of her stay, she remained stable and NSR on telle. , was planned for discharge with the following instructions.     INSTRUCTIONS ON DISCHARGE     (1) Please note that we have stopped your DONEPEZIL. We dont think it was seizures, possibly complex migraine.     (2) Please follow up with cardiology for further cardiac workup. To rule out cardiac causes. (3) please hold lasix for 2 more days , then resume this medication.     (4) meclizine as needed for dizziness. Review of Systems:  Constitutional:  denies weight loss, no fever, no chills, no night sweats  Eye: No recent visual disturbances, no discharge, no double vision  Ear/nose/mouth/throat : No hearing disturbance, no ear pain, no nasal congestion, no sore throat, no trouble swallowing.   Respiratory : No trouble breathing, no cough, no shortness of breath, no hemoptysis, no wheezing  Cardiovascular : No chest pain, no palpitation, no racing of heart, no orthopnea, no paroxysmal nocturnal dyspnea, no peripheral edema  Gastrointestinal : No nausea, no vomiting, no diarrhea, constipation, heartburn, abdominal pain  Genitourinary : No dysuria, no hematuria, no increased frequency, incontinence,  Lymphatics : No swollen glands -Neck, axillary, inguinal  Endocrine : No excessive thirst, no polyuria no cold intolerance, no heat intolerance. Immunologic : No hives, urticaria, no seasonal allergies,   Musculoskeletal : Left upper back pain. No joint swelling, pain, effusion,  no neck pain,   Integumentary : No rash, no pruritus, no ecchymosis  Hematology : No petechiae, No easy bruising,  No tendency to bleed easy  Neurology : Denies change in mental status, no abnormal balance, no headache, no confusion, numbness, tingling,  Psychiatric : No mood swings, no anxiety, depression      Objective:     Vitals:    09/26/20 0400 09/26/20 0740 09/26/20 0800 09/26/20 1200   BP:  (!) 161/87     Pulse: 70 79 79 72   Resp:  18     Temp:  98.2 °F (36.8 °C)     SpO2:  97%     Weight:       Height:            Intake and Output:  Current Shift: No intake/output data recorded. Last three shifts: No intake/output data recorded. Physical Exam:   General : Appearance:  no respiratory distress noted  HEENT : PERRLA, normal oral mucosa, atraumatic normocephalic, Normal ear and nose. Neck : Supple, no JVD, no masses noted, no carotid bruit  Lungs : normal breath sounds. No wheezing, no rales. He is splinting on deep breathing. CVS : Rhythm rate regular, S1+, S2+, no murmur or gallop  Abdomen : Soft, nontender, no organomegaly, bowel sounds active  Extremities : No edema noted,  pedal pulses palpable  Musculoskeletal : Fair range of motion, no joint swelling or effusion, muscle tone and power appears normal,   Skin : Moist, warm, skin turgor, no pathological rash  Lymphatic : No cervical lymphadenopathy. Neurological : Awake, alert, oriented to time place person. No neurological deficits. Psychiatric : Mood and affect appears appropriate to the situation. Lab/Data Review:  No results found for this or any previous visit (from the past 24 hour(s)).       Assessment and plan:    (1) episode of unresponsiveness likely  complex migraine.     (2) chronic CVA: stable.      (3) chronic pulmonary embolism: stable. apixaban     (4) HTN: stable. Losartan, metoprolo succinate     (5) HLD: statin     (6) CHFpEF: stable lasix     (7) CAD unsure about stent: iso.  Losartan Metoprolol      Plan for dc as soon as labs show up.,   Plan discussed with patient.,       Signed By: Delores Gage MD     September 26, 2020

## 2020-09-26 NOTE — PROGRESS NOTES
Dr. Deloris Waldrop notified of pt labs and pt cleared for discharge. Discharge instruction completed and reviewed with pt and pt family. Pt verbalizes understanding including scheduled follow ups. IV and cardiac monitor discontinued. Pt dressed and all pt belongings packed and sent with pt. Pt discharged home via private vehicle with all belongings. Pt stable upon discharge.

## 2020-09-26 NOTE — DISCHARGE INSTRUCTIONS
INSTRUCTIONS ON DISCHARGE    (1) Please note that we have stopped your DONEPEZIL. We dont think it was seizures, possibly complex migraine. (2) Please follow up with cardiology for further cardiac workup. To rule out cardiac causes. (3) please hold lasix for 2 more days , then resume this medication. (4) meclizine as needed for dizziness.

## 2020-09-26 NOTE — PROGRESS NOTES
Problem: Falls - Risk of  Goal: *Absence of Falls  Description: Document Thor Palm Fall Risk and appropriate interventions in the flowsheet. Outcome: Progressing Towards Goal  Note: Fall Risk Interventions:  Mobility Interventions: Bed/chair exit alarm, OT consult for ADLs, Patient to call before getting OOB, PT Consult for mobility concerns         Medication Interventions: Bed/chair exit alarm, Patient to call before getting OOB, Teach patient to arise slowly                   Problem: Patient Education: Go to Patient Education Activity  Goal: Patient/Family Education  Outcome: Progressing Towards Goal     Problem: Discharge Planning  Goal: *Discharge to safe environment  Outcome: Progressing Towards Goal  Goal: *Knowledge of medication management  Outcome: Progressing Towards Goal  Goal: *Knowledge of discharge instructions  Outcome: Progressing Towards Goal     Problem: Patient Education: Go to Patient Education Activity  Goal: Patient/Family Education  Outcome: Progressing Towards Goal     Problem: Syncope  Goal: *Absence of injury  Outcome: Progressing Towards Goal  Goal: Decrease or eliminate episodes of syncope  Outcome: Progressing Towards Goal     Problem: Patient Education: Go to Patient Education Activity  Goal: Patient/Family Education  Outcome: Progressing Towards Goal     Problem: Pressure Injury - Risk of  Goal: *Prevention of pressure injury  Description: Document Kirit Scale and appropriate interventions in the flowsheet.   Outcome: Progressing Towards Goal  Note: Pressure Injury Interventions:  Sensory Interventions: Minimize linen layers, Maintain/enhance activity level, Keep linens dry and wrinkle-free    Moisture Interventions: Minimize layers, Offer toileting Q_hr    Activity Interventions: Increase time out of bed, PT/OT evaluation    Mobility Interventions: PT/OT evaluation    Nutrition Interventions: Document food/fluid/supplement intake, Offer support with meals,snacks and hydration Problem: Patient Education: Go to Patient Education Activity  Goal: Patient/Family Education  Outcome: Progressing Towards Goal

## 2020-09-28 ENCOUNTER — HOSPITAL ENCOUNTER (OUTPATIENT)
Dept: ULTRASOUND IMAGING | Age: 75
Discharge: HOME | End: 2020-09-28
Attending: HOSPITALIST

## 2020-09-30 ENCOUNTER — APPOINTMENT (OUTPATIENT)
Dept: PHYSICAL THERAPY | Age: 75
End: 2020-09-30
Payer: MEDICARE

## 2020-10-01 ENCOUNTER — OP HISTORICAL/CONVERTED ENCOUNTER (OUTPATIENT)
Dept: OTHER | Age: 75
End: 2020-10-01

## 2020-10-02 ENCOUNTER — APPOINTMENT (OUTPATIENT)
Dept: PHYSICAL THERAPY | Age: 75
End: 2020-10-02

## 2020-10-30 NOTE — ANCILLARY DISCHARGE INSTRUCTIONS
[address] [address] [facility] [address] Suite [facility], [address] [place] [address]  Ph: [phone]  Fax: [phone]    Discharge Summary 2-15    Patient name: Emory Espinal  : 1945  Provider#: [id number]  Referral source: LIZBETH Pulliam      Medical/Treatment Diagnosis: Pain in left shoulder [M25.512]     Prior Hospitalization: see medical history     Comorbidities: See Plan of Care  Prior Level of Function: See Plan of Care  Medications: Verified on Patient Summary List  Start of Care: 2020   Onset Date:2020   Visits from Start of Care: 10 Missed Visits: 1  Reporting Period :  to 2020    Assessment/Summary of care: Pt had been progressing well with ROM/Strengthening, manual therapy and modalities to address L shoulder pain. The pt was going to be seen for 1 more session to seane and review HEP. The pt was hospitalized on 2020 with a possible TIA. The pt did not return after hospitalization,  GOALS:  Impairment goals/STGs:   1. Pt will be indep with a HEP in 2-3 weeks. [x]? Met []? Not met []? Partially met   2. Decreased pain by at least 2 points on the VAS in 2-3 weeks. [x]? Met []? Not met []? Partially met       Functional goals/LTGs  1. Pt will be able to raise her arms up at least 90 degrees of FF/scaption without pain in 4-8 weeks. [x]? Met []? Not met []? Partially met   2. Pt will be able to carry at least a 1/2 gallon of milk without difficulty in 4-8 weeks. [x]? Met []? Not met []? Partially met   3. Pt will be able to carry objects without dropping them in 4-8 weeks. [x]? Met []? Not met []? Partially met   4. Pt will be able to resume light to moderate household and yard activities without difficulty in 4-6 weeks. [x]? Met []? Not met []? Partially met    5. Decreased pain to <4/10 at the highest in 4-8 weeks. [x]? Met []? Not met []?  Partially met      RECOMMENDATIONS:  [x]Discontinue therapy: []Patient has reached or is progressing toward set goals     []Patient is non-compliant or has abdicated     []Due to lack of appreciable progress towards set goals     [x]Other  Pt hospitalized  Gay Lowe 10/30/2020

## 2020-11-21 ENCOUNTER — HOSPITAL ENCOUNTER (OUTPATIENT)
Age: 75
Setting detail: OBSERVATION
Discharge: HOME | End: 2020-11-24
Attending: EMERGENCY MEDICINE | Admitting: FAMILY MEDICINE
Payer: MEDICARE

## 2020-11-21 ENCOUNTER — APPOINTMENT (OUTPATIENT)
Dept: CT IMAGING | Age: 75
End: 2020-11-21
Attending: EMERGENCY MEDICINE
Payer: MEDICARE

## 2020-11-21 ENCOUNTER — APPOINTMENT (OUTPATIENT)
Dept: GENERAL RADIOLOGY | Age: 75
End: 2020-11-21
Attending: EMERGENCY MEDICINE
Payer: MEDICARE

## 2020-11-21 DIAGNOSIS — D64.9 ANEMIA, UNSPECIFIED TYPE: ICD-10-CM

## 2020-11-21 DIAGNOSIS — R55 SYNCOPE AND COLLAPSE: Primary | ICD-10-CM

## 2020-11-21 DIAGNOSIS — N28.9 RENAL INSUFFICIENCY: ICD-10-CM

## 2020-11-21 LAB
ANION GAP SERPL CALC-SCNC: 8 MMOL/L (ref 5–15)
BASOPHILS # BLD: 0 K/UL (ref 0–0.1)
BASOPHILS NFR BLD: 0 % (ref 0–1)
BNP SERPL-MCNC: 278 PG/ML
BUN SERPL-MCNC: 55 MG/DL (ref 6–20)
BUN/CREAT SERPL: 21 (ref 12–20)
CA-I BLD-MCNC: 9.6 MG/DL (ref 8.5–10.1)
CHLORIDE SERPL-SCNC: 106 MMOL/L (ref 97–108)
CO2 SERPL-SCNC: 25 MMOL/L (ref 21–32)
CREAT SERPL-MCNC: 2.63 MG/DL (ref 0.55–1.02)
DIFFERENTIAL METHOD BLD: ABNORMAL
EOSINOPHIL # BLD: 0.1 K/UL (ref 0–0.4)
EOSINOPHIL NFR BLD: 1 % (ref 0–7)
ERYTHROCYTE [DISTWIDTH] IN BLOOD BY AUTOMATED COUNT: 15.8 % (ref 11.5–14.5)
GLUCOSE SERPL-MCNC: 109 MG/DL (ref 65–100)
HCT VFR BLD AUTO: 33.1 % (ref 35–47)
HGB BLD-MCNC: 10.7 G/DL (ref 11.5–16)
IMM GRANULOCYTES # BLD AUTO: 0 K/UL (ref 0–0.04)
IMM GRANULOCYTES NFR BLD AUTO: 0 % (ref 0–0.5)
LYMPHOCYTES # BLD: 1.5 K/UL (ref 0.8–3.5)
LYMPHOCYTES NFR BLD: 23 % (ref 12–49)
MCH RBC QN AUTO: 28.2 PG (ref 26–34)
MCHC RBC AUTO-ENTMCNC: 32.3 G/DL (ref 30–36.5)
MCV RBC AUTO: 87.3 FL (ref 80–99)
MONOCYTES # BLD: 0.5 K/UL (ref 0–1)
MONOCYTES NFR BLD: 8 % (ref 5–13)
NEUTS SEG # BLD: 4.5 K/UL (ref 1.8–8)
NEUTS SEG NFR BLD: 68 % (ref 32–75)
PLATELET # BLD AUTO: 192 K/UL (ref 150–400)
PMV BLD AUTO: 9.7 FL (ref 8.9–12.9)
POTASSIUM SERPL-SCNC: 4.1 MMOL/L (ref 3.5–5.1)
RBC # BLD AUTO: 3.79 M/UL (ref 3.8–5.2)
SODIUM SERPL-SCNC: 139 MMOL/L (ref 136–145)
TROPONIN I SERPL-MCNC: <0.05 NG/ML
WBC # BLD AUTO: 6.6 K/UL (ref 3.6–11)

## 2020-11-21 PROCEDURE — 74011250637 HC RX REV CODE- 250/637: Performed by: EMERGENCY MEDICINE

## 2020-11-21 PROCEDURE — 99285 EMERGENCY DEPT VISIT HI MDM: CPT

## 2020-11-21 PROCEDURE — 36415 COLL VENOUS BLD VENIPUNCTURE: CPT

## 2020-11-21 PROCEDURE — 93005 ELECTROCARDIOGRAM TRACING: CPT

## 2020-11-21 PROCEDURE — 80048 BASIC METABOLIC PNL TOTAL CA: CPT

## 2020-11-21 PROCEDURE — 83880 ASSAY OF NATRIURETIC PEPTIDE: CPT

## 2020-11-21 PROCEDURE — 99218 HC RM OBSERVATION: CPT

## 2020-11-21 PROCEDURE — 83036 HEMOGLOBIN GLYCOSYLATED A1C: CPT

## 2020-11-21 PROCEDURE — 71045 X-RAY EXAM CHEST 1 VIEW: CPT

## 2020-11-21 PROCEDURE — 70450 CT HEAD/BRAIN W/O DYE: CPT

## 2020-11-21 PROCEDURE — 74011250636 HC RX REV CODE- 250/636: Performed by: EMERGENCY MEDICINE

## 2020-11-21 PROCEDURE — 84484 ASSAY OF TROPONIN QUANT: CPT

## 2020-11-21 PROCEDURE — 85025 COMPLETE CBC W/AUTO DIFF WBC: CPT

## 2020-11-21 RX ORDER — LANOLIN ALCOHOL/MO/W.PET/CERES
1 CREAM (GRAM) TOPICAL DAILY
Status: DISCONTINUED | OUTPATIENT
Start: 2020-11-22 | End: 2020-11-24 | Stop reason: HOSPADM

## 2020-11-21 RX ORDER — HYDRALAZINE HYDROCHLORIDE 25 MG/1
25 TABLET, FILM COATED ORAL
Status: DISCONTINUED | OUTPATIENT
Start: 2020-11-21 | End: 2020-11-24 | Stop reason: HOSPADM

## 2020-11-21 RX ORDER — ASPIRIN 325 MG
325 TABLET ORAL
Status: COMPLETED | OUTPATIENT
Start: 2020-11-21 | End: 2020-11-21

## 2020-11-21 RX ORDER — ACETAMINOPHEN 325 MG/1
650 TABLET ORAL
Status: DISCONTINUED | OUTPATIENT
Start: 2020-11-21 | End: 2020-11-24 | Stop reason: HOSPADM

## 2020-11-21 RX ORDER — DEXTROSE 50 % IN WATER (D50W) INTRAVENOUS SYRINGE
25-50 AS NEEDED
Status: DISCONTINUED | OUTPATIENT
Start: 2020-11-21 | End: 2020-11-24 | Stop reason: HOSPADM

## 2020-11-21 RX ORDER — ACETAMINOPHEN 650 MG/1
650 SUPPOSITORY RECTAL
Status: DISCONTINUED | OUTPATIENT
Start: 2020-11-21 | End: 2020-11-24 | Stop reason: HOSPADM

## 2020-11-21 RX ORDER — INSULIN LISPRO 100 [IU]/ML
INJECTION, SOLUTION INTRAVENOUS; SUBCUTANEOUS
Status: DISCONTINUED | OUTPATIENT
Start: 2020-11-22 | End: 2020-11-24 | Stop reason: HOSPADM

## 2020-11-21 RX ORDER — SODIUM CHLORIDE 0.9 % (FLUSH) 0.9 %
5-40 SYRINGE (ML) INJECTION AS NEEDED
Status: DISCONTINUED | OUTPATIENT
Start: 2020-11-21 | End: 2020-11-24 | Stop reason: HOSPADM

## 2020-11-21 RX ORDER — SODIUM CHLORIDE 0.9 % (FLUSH) 0.9 %
5-40 SYRINGE (ML) INJECTION EVERY 8 HOURS
Status: DISCONTINUED | OUTPATIENT
Start: 2020-11-21 | End: 2020-11-24 | Stop reason: HOSPADM

## 2020-11-21 RX ORDER — CHOLECALCIFEROL (VITAMIN D3) 50 MCG
20 CAPSULE ORAL DAILY
COMMUNITY
Start: 2020-10-07 | End: 2020-11-24

## 2020-11-21 RX ORDER — PROMETHAZINE HYDROCHLORIDE 25 MG/1
12.5 TABLET ORAL
Status: DISCONTINUED | OUTPATIENT
Start: 2020-11-21 | End: 2020-11-24 | Stop reason: HOSPADM

## 2020-11-21 RX ORDER — GUAIFENESIN 100 MG/5ML
81 LIQUID (ML) ORAL DAILY
Status: DISCONTINUED | OUTPATIENT
Start: 2020-11-22 | End: 2020-11-24 | Stop reason: HOSPADM

## 2020-11-21 RX ORDER — POLYETHYLENE GLYCOL 3350 17 G/17G
17 POWDER, FOR SOLUTION ORAL DAILY PRN
Status: DISCONTINUED | OUTPATIENT
Start: 2020-11-21 | End: 2020-11-24 | Stop reason: HOSPADM

## 2020-11-21 RX ORDER — MAGNESIUM SULFATE 100 %
4 CRYSTALS MISCELLANEOUS AS NEEDED
Status: DISCONTINUED | OUTPATIENT
Start: 2020-11-21 | End: 2020-11-24 | Stop reason: HOSPADM

## 2020-11-21 RX ORDER — ONDANSETRON 2 MG/ML
4 INJECTION INTRAMUSCULAR; INTRAVENOUS
Status: DISCONTINUED | OUTPATIENT
Start: 2020-11-21 | End: 2020-11-24 | Stop reason: HOSPADM

## 2020-11-21 RX ORDER — SODIUM CHLORIDE 9 MG/ML
100 INJECTION, SOLUTION INTRAVENOUS CONTINUOUS
Status: DISCONTINUED | OUTPATIENT
Start: 2020-11-21 | End: 2020-11-24

## 2020-11-21 RX ORDER — GABAPENTIN 100 MG/1
100 CAPSULE ORAL ONCE
Status: COMPLETED | OUTPATIENT
Start: 2020-11-21 | End: 2020-11-22

## 2020-11-21 RX ADMIN — SODIUM CHLORIDE 1000 ML: 9 INJECTION, SOLUTION INTRAVENOUS at 18:59

## 2020-11-21 RX ADMIN — SODIUM CHLORIDE 100 ML/HR: 9 INJECTION, SOLUTION INTRAVENOUS at 21:14

## 2020-11-21 RX ADMIN — ASPIRIN 325 MG ORAL TABLET 325 MG: 325 PILL ORAL at 21:14

## 2020-11-21 NOTE — ED TRIAGE NOTES
Patient here for syncopal episode at home. EMS was called by family who found patient unresponsive on toilet at home. Patient became responsive in ambulance en route. Patient currently awake, alert, oriented to person, place, confused to time slightly. Patient was also initially hypoxic with EMS; however, patient's oxygen saturations in triage 100% on room air with no respiratory complaints.

## 2020-11-22 ENCOUNTER — APPOINTMENT (OUTPATIENT)
Dept: GENERAL RADIOLOGY | Age: 75
End: 2020-11-22
Attending: NURSE PRACTITIONER
Payer: MEDICARE

## 2020-11-22 LAB
ALBUMIN SERPL-MCNC: 3 G/DL (ref 3.5–5)
ALBUMIN/GLOB SERPL: 0.7 {RATIO} (ref 1.1–2.2)
ALP SERPL-CCNC: 119 U/L (ref 45–117)
ALT SERPL-CCNC: 27 U/L (ref 12–78)
ANION GAP SERPL CALC-SCNC: 6 MMOL/L (ref 5–15)
AST SERPL W P-5'-P-CCNC: 36 U/L (ref 15–37)
ATRIAL RATE: 81 BPM
BILIRUB SERPL-MCNC: 0.2 MG/DL (ref 0.2–1)
BUN SERPL-MCNC: 55 MG/DL (ref 6–20)
BUN/CREAT SERPL: 22 (ref 12–20)
CA-I BLD-MCNC: 9.4 MG/DL (ref 8.5–10.1)
CALCULATED P AXIS, ECG09: 42 DEGREES
CALCULATED R AXIS, ECG10: 48 DEGREES
CALCULATED T AXIS, ECG11: 27 DEGREES
CHLORIDE SERPL-SCNC: 107 MMOL/L (ref 97–108)
CHOLEST SERPL-MCNC: 164 MG/DL
CO2 SERPL-SCNC: 27 MMOL/L (ref 21–32)
CREAT SERPL-MCNC: 2.54 MG/DL (ref 0.55–1.02)
DIAGNOSIS, 93000: NORMAL
ERYTHROCYTE [DISTWIDTH] IN BLOOD BY AUTOMATED COUNT: 15.9 % (ref 11.5–14.5)
EST. AVERAGE GLUCOSE BLD GHB EST-MCNC: 131 MG/DL
GLOBULIN SER CALC-MCNC: 4.4 G/DL (ref 2–4)
GLUCOSE BLD STRIP.AUTO-MCNC: 129 MG/DL (ref 65–100)
GLUCOSE BLD STRIP.AUTO-MCNC: 142 MG/DL (ref 65–100)
GLUCOSE BLD STRIP.AUTO-MCNC: 178 MG/DL (ref 65–100)
GLUCOSE BLD STRIP.AUTO-MCNC: 256 MG/DL (ref 65–100)
GLUCOSE SERPL-MCNC: 186 MG/DL (ref 65–100)
HBA1C MFR BLD: 6.2 % (ref 4–5.6)
HCT VFR BLD AUTO: 32.4 % (ref 35–47)
HDLC SERPL-MCNC: 64 MG/DL
HDLC SERPL: 2.6 {RATIO} (ref 0–5)
HGB BLD-MCNC: 10.5 G/DL (ref 11.5–16)
LDLC SERPL CALC-MCNC: 73.2 MG/DL (ref 0–100)
LIPID PROFILE,FLP: NORMAL
MAGNESIUM SERPL-MCNC: 1.6 MG/DL (ref 1.6–2.4)
MCH RBC QN AUTO: 28.2 PG (ref 26–34)
MCHC RBC AUTO-ENTMCNC: 32.4 G/DL (ref 30–36.5)
MCV RBC AUTO: 87.1 FL (ref 80–99)
P-R INTERVAL, ECG05: 142 MS
PERFORMED BY, TECHID: ABNORMAL
PLATELET # BLD AUTO: 225 K/UL (ref 150–400)
PMV BLD AUTO: 10 FL (ref 8.9–12.9)
POTASSIUM SERPL-SCNC: 4.3 MMOL/L (ref 3.5–5.1)
PROT SERPL-MCNC: 7.4 G/DL (ref 6.4–8.2)
Q-T INTERVAL, ECG07: 396 MS
QRS DURATION, ECG06: 86 MS
QTC CALCULATION (BEZET), ECG08: 460 MS
RBC # BLD AUTO: 3.72 M/UL (ref 3.8–5.2)
SODIUM SERPL-SCNC: 140 MMOL/L (ref 136–145)
TRIGL SERPL-MCNC: 134 MG/DL (ref ?–150)
TROPONIN I SERPL-MCNC: 0.07 NG/ML
TROPONIN I SERPL-MCNC: 0.09 NG/ML
VENTRICULAR RATE, ECG03: 81 BPM
VLDLC SERPL CALC-MCNC: 26.8 MG/DL
WBC # BLD AUTO: 9.6 K/UL (ref 3.6–11)

## 2020-11-22 PROCEDURE — 85027 COMPLETE CBC AUTOMATED: CPT

## 2020-11-22 PROCEDURE — 97116 GAIT TRAINING THERAPY: CPT | Performed by: PHYSICAL THERAPIST

## 2020-11-22 PROCEDURE — 74011636637 HC RX REV CODE- 636/637: Performed by: FAMILY MEDICINE

## 2020-11-22 PROCEDURE — 74011250637 HC RX REV CODE- 250/637: Performed by: HOSPITALIST

## 2020-11-22 PROCEDURE — 74011250637 HC RX REV CODE- 250/637: Performed by: NURSE PRACTITIONER

## 2020-11-22 PROCEDURE — 97165 OT EVAL LOW COMPLEX 30 MIN: CPT

## 2020-11-22 PROCEDURE — 83735 ASSAY OF MAGNESIUM: CPT

## 2020-11-22 PROCEDURE — 80061 LIPID PANEL: CPT

## 2020-11-22 PROCEDURE — 99218 HC RM OBSERVATION: CPT

## 2020-11-22 PROCEDURE — 73502 X-RAY EXAM HIP UNI 2-3 VIEWS: CPT

## 2020-11-22 PROCEDURE — 97161 PT EVAL LOW COMPLEX 20 MIN: CPT | Performed by: PHYSICAL THERAPIST

## 2020-11-22 PROCEDURE — 97530 THERAPEUTIC ACTIVITIES: CPT

## 2020-11-22 PROCEDURE — 95816 EEG AWAKE AND DROWSY: CPT | Performed by: PSYCHIATRY & NEUROLOGY

## 2020-11-22 PROCEDURE — 74011250637 HC RX REV CODE- 250/637: Performed by: FAMILY MEDICINE

## 2020-11-22 PROCEDURE — 80053 COMPREHEN METABOLIC PANEL: CPT

## 2020-11-22 PROCEDURE — 84484 ASSAY OF TROPONIN QUANT: CPT

## 2020-11-22 PROCEDURE — 82962 GLUCOSE BLOOD TEST: CPT

## 2020-11-22 RX ORDER — ISOSORBIDE MONONITRATE 60 MG/1
60 TABLET, EXTENDED RELEASE ORAL DAILY
Status: DISCONTINUED | OUTPATIENT
Start: 2020-11-22 | End: 2020-11-24 | Stop reason: HOSPADM

## 2020-11-22 RX ORDER — GABAPENTIN 300 MG/1
300 CAPSULE ORAL 2 TIMES DAILY
Status: DISCONTINUED | OUTPATIENT
Start: 2020-11-22 | End: 2020-11-24 | Stop reason: HOSPADM

## 2020-11-22 RX ORDER — FUROSEMIDE 40 MG/1
60 TABLET ORAL DAILY
Status: DISCONTINUED | OUTPATIENT
Start: 2020-11-22 | End: 2020-11-22

## 2020-11-22 RX ORDER — PRAVASTATIN SODIUM 20 MG/1
80 TABLET ORAL
Status: DISCONTINUED | OUTPATIENT
Start: 2020-11-22 | End: 2020-11-24 | Stop reason: HOSPADM

## 2020-11-22 RX ORDER — CETIRIZINE HCL 10 MG
10 TABLET ORAL DAILY
Status: DISCONTINUED | OUTPATIENT
Start: 2020-11-22 | End: 2020-11-24 | Stop reason: HOSPADM

## 2020-11-22 RX ORDER — HYDRALAZINE HYDROCHLORIDE 25 MG/1
25 TABLET, FILM COATED ORAL 2 TIMES DAILY
Status: DISCONTINUED | OUTPATIENT
Start: 2020-11-22 | End: 2020-11-24 | Stop reason: HOSPADM

## 2020-11-22 RX ORDER — FUROSEMIDE 40 MG/1
40 TABLET ORAL DAILY
Status: DISCONTINUED | OUTPATIENT
Start: 2020-11-22 | End: 2020-11-24 | Stop reason: HOSPADM

## 2020-11-22 RX ORDER — CALCITRIOL 0.25 UG/1
0.25 CAPSULE ORAL DAILY
Status: DISCONTINUED | OUTPATIENT
Start: 2020-11-22 | End: 2020-11-24 | Stop reason: HOSPADM

## 2020-11-22 RX ORDER — METOPROLOL SUCCINATE 25 MG/1
25 TABLET, EXTENDED RELEASE ORAL DAILY
Status: DISCONTINUED | OUTPATIENT
Start: 2020-11-22 | End: 2020-11-24 | Stop reason: HOSPADM

## 2020-11-22 RX ORDER — VENLAFAXINE 50 MG/1
75 TABLET ORAL DAILY
Status: DISCONTINUED | OUTPATIENT
Start: 2020-11-22 | End: 2020-11-24 | Stop reason: HOSPADM

## 2020-11-22 RX ADMIN — ASPIRIN 81 MG CHEWABLE TABLET 81 MG: 81 TABLET CHEWABLE at 08:47

## 2020-11-22 RX ADMIN — METOPROLOL SUCCINATE 25 MG: 25 TABLET, EXTENDED RELEASE ORAL at 11:19

## 2020-11-22 RX ADMIN — APIXABAN 2.5 MG: 2.5 TABLET, FILM COATED ORAL at 00:23

## 2020-11-22 RX ADMIN — GABAPENTIN 300 MG: 300 CAPSULE ORAL at 21:00

## 2020-11-22 RX ADMIN — PRAVASTATIN SODIUM 80 MG: 20 TABLET ORAL at 21:00

## 2020-11-22 RX ADMIN — POLYETHYLENE GLYCOL 3350 17 G: 17 POWDER, FOR SOLUTION ORAL at 21:49

## 2020-11-22 RX ADMIN — APIXABAN 2.5 MG: 2.5 TABLET, FILM COATED ORAL at 08:47

## 2020-11-22 RX ADMIN — ISOSORBIDE MONONITRATE 60 MG: 60 TABLET, EXTENDED RELEASE ORAL at 11:20

## 2020-11-22 RX ADMIN — FUROSEMIDE 40 MG: 40 TABLET ORAL at 11:19

## 2020-11-22 RX ADMIN — HYDRALAZINE HYDROCHLORIDE 25 MG: 25 TABLET, FILM COATED ORAL at 21:00

## 2020-11-22 RX ADMIN — CALCITRIOL CAPSULES 0.25 MCG 0.25 MCG: 0.25 CAPSULE ORAL at 11:20

## 2020-11-22 RX ADMIN — ACETAMINOPHEN 650 MG: 325 TABLET, FILM COATED ORAL at 21:49

## 2020-11-22 RX ADMIN — INSULIN LISPRO 3 UNITS: 100 INJECTION, SOLUTION INTRAVENOUS; SUBCUTANEOUS at 12:19

## 2020-11-22 RX ADMIN — APIXABAN 2.5 MG: 2.5 TABLET, FILM COATED ORAL at 21:00

## 2020-11-22 RX ADMIN — Medication 10 ML: at 14:00

## 2020-11-22 RX ADMIN — GABAPENTIN 100 MG: 100 CAPSULE ORAL at 00:23

## 2020-11-22 RX ADMIN — VENLAFAXINE 75 MG: 50 TABLET ORAL at 11:20

## 2020-11-22 RX ADMIN — ACETAMINOPHEN 650 MG: 325 TABLET, FILM COATED ORAL at 00:23

## 2020-11-22 RX ADMIN — CETIRIZINE HYDROCHLORIDE 10 MG: 10 TABLET, FILM COATED ORAL at 11:19

## 2020-11-22 NOTE — H&P
History and Physical    Patient: Renu Laura MRN: [id number]  SSN: [id number]    YOB: 1945  Age: 67 y.o. Sex: female      Subjective:      Chief Complaint: Syncope    HPI: Renu Laura is a 67 y.o. female with multiple medical problems including chronic kidney disease stage V (baseline creatinine 2.0), pulmonary embolism on Eliquis, hypertension, hyperlipidemia and insulin-dependent diabetes mellitus type 2 presenting from home after syncopal episode. Ms. Steven Velásquez was sitting on the toilet having a bowel movement when she started feeling \"funny. \"  Patient denies having chest pain, shortness of breath, palpitations, nausea, vomiting, diarrhea. She reports feeling good and eating well over the past several days. Family went to check on her in the bathroom and found her slumped over on the toilet unresponsive. Patient has little recollection of events and next remembers waking up in the ambulance. Emergency medical services were called. Initially, patient was hypoxic requiring oxygen supplementation. She presented to this facility with a temperature 98.2 °F, pulse 80, respirations 17, blood pressure 100/63 and oxygen saturation 8% on room air. EKG has normal sinus rhythm, 81 bpm.  Initial troponin was within normal limits. CT of the head has no evidence of acute intracranial abnormalities. Chest x-ray has no evidence of acute cardiopulmonary process. Hospital service has been asked to admit Ms. Steven Velásquez for further treatment and evaluation. Ms. Steven Velásquez was admitted in September for transient ischemic attack and episode of unresponsiveness. On September 17, echocardiogram was obtained, report indicates normal left ventricle ejection fraction, moderate pulmonary hypertension and no evidence of atrial septal defect. Carotid duplex studies reports mild stenosis of the right ICA (less than 50%) and minimal stenosis of the left ICA.     Past medical history, past surgical history, family history and social history was obtained at the time of admission. I reviewed 2 previous discharge summaries including outpatient office visits and medication lists are inconsistent. Ms. Martell Husain could not verify all home medications and dosing at the time of admission. Patient is a full code. Point of contact is her daughter, Yuni Mixon, who can be reached at [phone]. Past Medical History:   Diagnosis Date    Adenocarcinoma, renal cell ([facility] [address] 9/2/2020    Arthritis     CAD (coronary artery disease)     Chronic kidney disease     Diabetes ([facility] [address]     Gout     Hypercholesterolemia     Hypertension     Mental depression     Pulmonary embolism ([facility] [address]     Stroke ([facility] [address]     Thyroid disease      Past Surgical History:   Procedure Laterality Date    CARDIAC SURG PROCEDURE UNLIST      stents placed     HX GYN      HX HYSTERECTOMY      HX NEPHRECTOMY  02/12/2015    HX ORTHOPAEDIC      HX UROLOGICAL  02/12/2015    PARTIAL URETERECTOMY       Family History   Problem Relation Age of Onset    Heart Disease Mother     Heart Disease Father     Heart Disease Sister     Cancer Sister     Heart Disease Brother     Cancer Maternal Grandmother     Stroke Son      Social History     Tobacco Use    Smoking status: Never Smoker    Smokeless tobacco: Never Used   Substance Use Topics    Alcohol use: No      Prior to Admission medications    Medication Sig Start Date End Date Taking? Authorizing Provider   Omeprazole Magnesium (Acid Reducer, omeprazole,) 20 mg cpDR Take 20 mg by mouth daily. 10/7/20  Yes Provider, Historical   omeprazole (PRILOSEC) 20 mg capsule Take 20 mg by mouth daily. Provider, Historical   nitroglycerin (NITROSTAT) 0.4 mg SL tablet 0.4 mg by SubLINGual route every five (5) minutes as needed for Chest Pain. Up to 3 doses. Provider, Historical   lubiPROStone (Amitiza) 24 mcg capsule Take 24 mcg by mouth as needed for Constipation.     Provider, Historical   ferrous sulfate (Iron) 325 mg (65 mg iron) tablet Take 1 Tab by mouth daily. Provider, Historical   polyethylene glycol (MIRALAX) 17 gram packet Take 17 g/day by mouth daily. Provider, Historical   diclofenac (SOLARAZE) 3 % topical gel Apply 0.5 mg to affected area two (2) times a day. 6/29/20   Provider, Historical   apixaban (ELIQUIS) 2.5 mg tablet Take 2.5 mg by mouth two (2) times a day. 6/18/19   Provider, Historical   insulin NPH (NOVOLIN N, HUMULIN N) 100 unit/mL injection 20 Units by SubCUTAneous route daily (with dinner). 6/18/19   Provider, Historical   isosorbide mononitrate ER (IMDUR) 60 mg CR tablet Take 60 mg by mouth daily. 7/17/19   Provider, Historical   hydrALAZINE (APRESOLINE) 25 mg tablet Take 25 mg by mouth two (2) times a day. Provider, Historical   losartan (COZAAR) 25 mg tablet Take 25 mg by mouth daily. 7/13/20   Provider, Historical   metoprolol succinate (TOPROL-XL) 25 mg XL tablet Take 25 mg by mouth daily. Provider, Historical   gabapentin (NEURONTIN) 300 mg capsule Take 300 mg by mouth two (2) times a day. 6/29/20   Provider, Historical   furosemide (LASIX) 40 mg tablet Take 60 mg by mouth daily. Provider, Historical   fluticasone propionate (FLONASE) 50 mcg/actuation nasal spray fluticasone propionate 50 mcg/actuation nasal spray,suspension    Provider, Historical   famotidine (PEPCID) 20 mg tablet Take 20 mg by mouth two (2) times a day. 6/18/19   Provider, Historical   cetirizine (ZYRTEC) 10 mg tablet Take 10 mg by mouth daily. Provider, Historical   linaCLOtide (Linzess) 145 mcg cap capsule Take  by mouth Daily (before breakfast). Provider, Historical   calcitRIOL (ROCALTROL) 0.25 mcg capsule Take 0.25 mcg by mouth daily. Provider, Historical   venlafaxine (EFFEXOR) 75 mg tablet Take 75 mg by mouth daily. Provider, Historical   cholecalciferol (VITAMIN D3) 400 unit tab tablet Take  by mouth daily.     Provider, Historical   latanoprost (XALATAN) 0.005 % ophthalmic solution Administer 1 Drop to both eyes nightly. Provider, Historical   pravastatin (PRAVACHOL) 80 mg tablet Take 80 mg by mouth nightly. Provider, Historical   aspirin 81 mg chewable tablet Take 81 mg by mouth daily. Provider, Historical   allopurinol (ZYLOPRIM) 100 mg tablet Take 200 mg by mouth daily. Provider, Historical        No Known Allergies    Review of Systems:  Constitutional: Denies fevers, chills, fatigue, weakness, unexplained weight loss, night sweats. Head, Eyes, Ears, Nose, Mouth, Throat: Denies nasal congestion, sore throat, rhinorrhea, earache, ringing of the ears, difficulty hearing, facial pain, facial swelling. Respiratory: Denies shortness of breath, wheezing, cough, sputum production, hemoptysis. Denies use of oxygen at home. Cardiovascular: Denies chest pain, irregular heart beat, racing pulse, lower extremity edema, dizziness, dyspnea on exertion, orthopnea. No lower extremity edema. Gastrointestinal: Denies nausea, vomiting, diarrhea, constipation, abdominal pain, loss of appetite, acid reflux, melena, hematochezia, change in bowel habits. Endocrine: Denies intolerance to heat or cold. Denies polyuria, polydipsia, polyphagia. Denies recent weigh changes. Genitourinary: Denies increased urinary frequency, dysuria, hematuria, urinary incontinence, increased urinary frequency. Integument/Breast: Denies rash, itching or new skin lesions. Musculoskeletal: Denies joint swelling, joint pain, myalgias, neck pain, back pain. Neurological: Positive for syncope. No headaches, dizziness, confusion, tremors, numbness/tingling, paresthesias, weakness, problems with balance, loss of consciousness. Hematologic: Denies easy bleeding, easy bruising, lymphadenopathy. Behavioral/Psychiatric: Denies anxiety, depression, increased irritability, mood swings, delusions, hallucination, SI/HI.           Objective:     Vitals:    11/21/20 1806 11/21/20 1821 11/21/20 1824 11/21/20 2114   BP: 100/63 (!) 143/72 (!) 150/71 (!) 154/76   Pulse: 80 81 84 88   Resp: 17 15 12 18   Temp: 98.2 °F (36.8 °C)      SpO2:    98%        Physical Exam:  General: Alert and Oriented x 3. Cooperative and friendly. No acute distress. Nourished and well developed. And daughter is at bedside. HEAD, EYES: Normocephalic, atraumatic, EOMI, PERRLA. Nose: Turbinates within normal limits, No drainage. Throat and Neck: Posterior pharynx without erythema or exudate. No masses, JVD, thyromegaly or lymphadenopathy appreciated. Cervical spine has good range of motion without pain. Lungs: Clear to auscultation bilaterally without wheezes, rhonchi or crackles. Good air movement bilaterally. Symmetric chest rise with respirations. Heart: Regular rate and rhythm. Normal S1/S2. 3/6 GERALDINE. No appreciated rubs or gallops. No lower extremity edema. Abdomen: Soft, non-tender, non-distended. Bowel sounds present in all four quadrants. No masses or hepatosplenomegaly appreciated. Extremities:  Atraumatic. Able to move all extremities symmetrically. No abnormal bony protuberances appreciated. Joints without swelling. Back: No pain with palpation over spinous processes or paraspinal musculature. No CVA tenderness. Skin: Clean, dry and intact without appreciated lesions. Neurologic: A&Ox3. Cranial nerves 2-12 are grossly intact. Intact sensation. 5/5 motor strength in all 4 extremities. Facial features are symmetric. Speech is fluent and clear. No focal deficits appreciated. Psychiatric: Normal affect, normal thought process, good eye contact.      Recent Results (from the past 24 hour(s))   TROPONIN I    Collection Time: 11/21/20  6:10 PM   Result Value Ref Range    Troponin-I, Qt. <0.05 <0.05 ng/mL   CBC WITH AUTOMATED DIFF    Collection Time: 11/21/20  6:10 PM   Result Value Ref Range    WBC 6.6 3.6 - 11.0 K/uL    RBC 3.79 (L) 3.80 - 5.20 M/uL    HGB 10.7 (L) 11.5 - 16.0 g/dL    HCT 33.1 (L) 35.0 - 47.0 %    MCV 87.3 80.0 - 99.0 FL    MCH 28.2 26.0 - 34.0 PG    MCHC 32.3 30.0 - 36.5 g/dL    RDW 15.8 (H) 11.5 - 14.5 %    PLATELET 708 082 - 813 K/uL    MPV 9.7 8.9 - 12.9 FL    NEUTROPHILS 68 32 - 75 %    LYMPHOCYTES 23 12 - 49 %    MONOCYTES 8 5 - 13 %    EOSINOPHILS 1 0 - 7 %    BASOPHILS 0 0 - 1 %    IMMATURE GRANULOCYTES 0 0.0 - 0.5 %    ABS. NEUTROPHILS 4.5 1.8 - 8.0 K/UL    ABS. LYMPHOCYTES 1.5 0.8 - 3.5 K/UL    ABS. MONOCYTES 0.5 0.0 - 1.0 K/UL    ABS. EOSINOPHILS 0.1 0.0 - 0.4 K/UL    ABS. BASOPHILS 0.0 0.0 - 0.1 K/UL    ABS. IMM. GRANS. 0.0 0.00 - 0.04 K/UL    DF AUTOMATED     METABOLIC PANEL, BASIC    Collection Time: 11/21/20  6:10 PM   Result Value Ref Range    Sodium 139 136 - 145 mmol/L    Potassium 4.1 3.5 - 5.1 mmol/L    Chloride 106 97 - 108 mmol/L    CO2 25 21 - 32 mmol/L    Anion gap 8 5 - 15 mmol/L    Glucose 109 (H) 65 - 100 mg/dL    BUN 55 (H) 6 - 20 mg/dL    Creatinine 2.63 (H) 0.55 - 1.02 mg/dL    BUN/Creatinine ratio 21 (H) 12 - 20      GFR est AA 21 (L) >60 ml/min/1.73m2    GFR est non-AA 18 (L) >60 ml/min/1.73m2    Calcium 9.6 8.5 - 10.1 mg/dL   BNP    Collection Time: 11/21/20  6:10 PM   Result Value Ref Range    NT pro- <450 pg/mL       XR Results (maximum last 3): Results from [place] encounter on 11/21/20   XR CHEST PORT    Narrative Chest single view. Comparison single view chest September 24, 2020    The lungs are clear. There is no interstitial or alveolar pulmonary edema. Cardiac and mediastinal structures unchanged. No pneumothorax or sizable pleural  effusion. Results from Hospital Encounter encounter on 09/24/20   XR CHEST PORT    Narrative History is acute mental status changes. Comparison is with chest x-ray of 9/16/2020. An AP portable semierect view of the chest demonstrates cardiac monitor leads. The heart remains enlarged. There is no pneumonia, pneumothorax or effusion. There is degenerative change of the osseous structures.       Impression IMPRESSION: No acute cardiopulmonary disease identified. Please see full report. Results from [place] encounter on 09/16/20   XR CHEST PORT    Narrative Chest, frontal view, 9/16/2020    History: Stroke alert. Comparison: No prior studies electronically accessible at time of  interpretation. Findings: The cardiac silhouette is enlarged. The lungs are adequately  expanded. No hydrostatic edema is present. No focal consolidation, pleural  effusions or pneumothorax is identified. Degenerative changes are present in  the thoracic spine. The patient is status post cholecystectomy. Impression Impression: No acute pulmonary process. CT Results (maximum last 3): Results from [place] encounter on 11/21/20   CT HEAD WO CONT    Narrative CT head. Comparison CT head September 24, 2020    Axial images are reviewed along with reformatted sagittal/coronal images. Dose reduction: All CT scans at this facility are performed using dose reduction  optimization techniques as appropriate to a performed exam including the  following-  automated exposure control, adjustments of mA and/or Kv according to patient  size, or use of iterative reconstructive technique. Bone windows demonstrate normal aeration of the imaged sinuses and mastoid air  cells. Review of intracranial content demonstrates periventricular/subcortical white  matter gliosis similar compared to prior imaging. Evidence to suggest advanced  nonacute end vessel occlusive change. Falx-based calcification. No intracranial  hemorrhage. Impression IMPRESSION: No change compared to previous CT head September 24, 2020. Results from [place] encounter on 09/24/20   CT CODE NEURO HEAD WO CONTRAST    Narrative History is stroke. Code neuro. Comparison is with the head CT of 9/16/2020. Serial axial images were obtained through the brain at 5 mm intervals without  contrast administration.  Bone, stroke and brain windows are evaluated as well as  sagittal and coronal reformatted images. Dose reduction:  All CT scans at this facility are performed using dose  reduction optimization techniques as appropriate to a performed exam including  the following: automated exposure control, adjustments of the mA and/or kV  according to patient size, or use of iterative reconstruction technique. The ventricles are midline without extrinsic compression or dilatation. There is  no change in the small bilateral basal ganglia lacunar infarcts. There is no  intra or extra-axial hemorrhage, mass, acute infarct or edema. No midline shift  is identified. The orbits and their contents appear unremarkable. There is  atrophy, mild periventricular white matter ischemic disease, and vascular  calcifications present. On bone windows there is no skull fracture or soft tissue swelling. No sinusitis  or mastoiditis is identified. Impression Impression: Small bilateral basal ganglia lacunar infarcts unchanged from the  prior exam.  No acute infarct, mass, or  hemorrhage, but MRI is more sensitive  for the evaluation of acute ischemic infarct. Results from [place] encounter on 09/16/20   CT CODE NEURO HEAD WO CONTRAST    Narrative CT head, 9/16/2020    History: Stroke alert. Slurred speech. Weakness. Syncope. Technique: No intravenous contrast was administered. Multiple contiguous axial  images were acquired from the vertex to the skull base. Coronal and sagittal  reconstruction was made. All CT scans at this facility are performed using dose reduction optimization  techniques as appropriate to perform the exam including the following: Automated  exposure control, adjustments of the mA and/or kV according to patient size, or  use iterative reconstruction technique. Comparison: No prior studies electronically accessible at time of  interpretation. Findings:  Cortical volume loss with associated ventricular system dilatation  are noted. Periventricular and subcortical low attenuation is suggestive of  small vessel ischemic disease. Nonacute appearing infarctions are seen in the  basal ganglia. Gray-white differentiation is preserved. No acute intracranial  hemorrhage or midline shift is identified. The calvarium is intact. The orbits and globes are intact. The visualized paranasal sinuses and mastoid  air cells are clear. Impression Impression: No acute intracranial process. Findings conveyed to Dr. Bolivar Rogel on 9/16/2020 at [address] [facility],[address].           Assessment:     Maday Alexandra is a 67 y.o. female who presents to the ER after syncopal episode during bowel movement. Syncopal episode is most likely secondary to vasovagal syncope. Admit patient under observation status to monitor cardiac rhythm and trend troponin levels. Plan:     1. Admit to telemetry bed under observation status. 2. Trend troponin. Check lipid panel. 3. Check orthostatic vital signs. Place on fall precautions. 4. Carotid artery duplex and echocardiogram was obtained in September 2020.  5. History of diabetes mellitus type 2, creatinine is mildly elevated. I have asked nursing staff to check blood glucose with meals and at bedtime. Order sensitive sliding scale insulin for blood glucose greater than 200.  6. Acute on chronic kidney injury. Last creatinine value was 1.97 on September 26. Hold nephrotoxic medications. Encourage p.o. intake. Continue to monitor creatinine closely during hospitalization. 7. Daughter will provide medication list prior to discharge for reconciliation. GI PPX: Diet ordered. DVT PPX: Continue home dose of Eliquis. Further chemical prophylaxis is not indicated.     Signed By: Jany Orellana MD     November 21, 2020

## 2020-11-22 NOTE — ROUTINE PROCESS
TRANSFER - OUT REPORT:    Verbal report given to Serina(name) on Sue Safe  being transferred to (unit) for routine progression of care       Report consisted of patients Situation, Background, Assessment and   Recommendations(SBAR). Information from the following report(s) SBAR and ED Summary was reviewed with the receiving nurse. Lines:   Peripheral IV Right Hand (Active)   Site Assessment Clean, dry, & intact 11/21/20 1809   Phlebitis Assessment 0 11/21/20 1809   Infiltration Assessment 0 11/21/20 1809   Dressing Status Clean, dry, & intact 11/21/20 1809   Dressing Type Transparent 11/21/20 1809   Hub Color/Line Status Pink;Flushed;Patent 11/21/20 1809        Opportunity for questions and clarification was provided.       Patient transported with:   Monitor  Tech

## 2020-11-22 NOTE — PROGRESS NOTES
Problem: Falls - Risk of  Goal: *Absence of Falls  Description: Document [facility] Fall Risk and appropriate interventions in the flowsheet.   Outcome: Progressing Towards Goal  Note: Fall Risk Interventions:  Mobility Interventions: Utilize walker, cane, or other assistive device                             Problem: Patient Education: Go to Patient Education Activity  Goal: Patient/Family Education  Outcome: Progressing Towards Goal

## 2020-11-22 NOTE — PROGRESS NOTES
Problem: Mobility Impaired (Adult and Pediatric)  Goal: *Acute Goals and Plan of Care (Insert Text)  Description: Pt will be I with LE HEP in 7 days. Pt will perform bed mobility with Ind in 7 days. Pt will perform transfers with SBA  in 7 days. Pt will amb 150 feet with LRAD safely with mod I in 7 days. Outcome: Not Met     Problem: Patient Education: Go to Patient Education Activity  Goal: Patient/Family Education  Outcome: Not Met   PHYSICAL THERAPY EVALUATION  Patient: Víctor Leblanc (7 y.o. female)  Date: 11/22/2020  Primary Diagnosis: Syncope [R55]        Precautions: falls    ASSESSMENT  Pt is 68 y/o female came to [facility] s/p syncope and placed under observation 11/21/2020 for syncope. Head CT negative for acute events. Pt has hx of CKD stage V, PE on eliquis, HTN, HLD, DM type II, TIA, adenocarcinoma, arthritis, GOUT, depression, thyroid disease. Pt received semi supine in bed A&Ox4 and agreeable for PT eval/tx. Per pt report, pt lives with daughter in 1 story home with ramp to enter and is MI for self care and functional transfers/mobility using cane outside home and furniture walking in home. Pt with 8/10 left hip pain (x-ray ordered precautionarily however OT/PT cleared to get pt up by NP Fatemeh Burroughs as pt has been getting up to bathroom). Pt currently presents with decreased balance, decreased activity tolerance 2/2 pain and functional transfers/mobility (SBA sup->sit and scooting EOB, CGA sit<->Stand and toilet transfer with gait belt). Pt would benefit from skilled PT services while at [facility] in order to increase safety and independence with self care and functional transfers/mobility. Recommend discharge to home with HHPT when medically appropriate. Patient will benefit from skilled therapy intervention to address the above noted impairments.        PLAN :  Recommendations and Planned Interventions: bed mobility training, transfer training, gait training, therapeutic exercises, neuromuscular re-education, and therapeutic activities      Frequency/Duration: Patient will be followed by physical therapy:  3 times a week to address goals. Recommendation for discharge: (in order for the patient to meet his/her long term goals)   PT    This discharge recommendation:  Has not yet been discussed the attending provider and/or case management    IF patient discharges home will need the following DME: none         SUBJECTIVE:   Patient stated I have some L hip pain, it's better than yesterday.     OBJECTIVE DATA SUMMARY:   HISTORY:    Past Medical History:   Diagnosis Date    Adenocarcinoma, renal cell ([facility] [address] 9/2/2020    Arthritis     CAD (coronary artery disease)     Chronic kidney disease     Diabetes ([facility] [address]     Gout     Hypercholesterolemia     Hypertension     Mental depression     Pulmonary embolism (HCC)     Stroke ([facility] [address]     Thyroid disease      Past Surgical History:   Procedure Laterality Date    CARDIAC SURG PROCEDURE UNLIST      stents placed     HX GYN      HX HYSTERECTOMY      HX NEPHRECTOMY  02/12/2015    HX ORTHOPAEDIC      HX UROLOGICAL  02/12/2015    PARTIAL URETERECTOMY        Personal factors and/or comorbidities impacting plan of care: see pt chart, L hip pain    Home Situation  Home Environment: Private residence  # Steps to Enter: 5  Rails to Enter: No  Wheelchair Ramp: Yes  One/Two Story Residence: One story  Living Alone: No  Support Systems: Child(connie)(daughter)  Patient Expects to be Discharged to[de-identified] Private residence  Current DME Used/Available at Home: karl Yo(uses in the community)    PLOF: Pt Mod I  for ADLS/IADLS, Mod I with mobility prior to admission. EXAMINATION/PRESENTATION/DECISION MAKING:   Critical Behavior:  Neurologic State: Alert  Orientation Level: Oriented X4  Cognition: Follows commands     Hearing:   Auditory  Auditory Impairment: Hard of hearing, right side    Range Of Motion:  AROM: Generally decreased, functional Strength:    Strength: Generally decreased, functional               Functional Mobility:  Bed Mobility:  Rolling: Stand-by assistance  Supine to Sit: Stand-by assistance  Sit to Supine: Stand-by assistance     Transfers:  Sit to Stand: Contact guard assistance  Stand to Sit: Contact guard assistance        Bed to Chair: Contact guard assistance              Balance:   Sitting: Intact  Standing: Intact; With support  Ambulation/Gait Training:  Distance (ft): 10 Feet (ft)  Assistive Device: Gait belt  Ambulation - Level of Assistance: Contact guard assistance(HHA)     Gait Description (WDL): Exceptions to WDL           Base of Support: Shift to right     Speed/Liya: Slow  Step Length: Left lengthened;Left shortened              Functional Measure:    [facility] [address] [address] AM-PAC 6 Clicks         Basic Mobility Inpatient Short Form  How much difficulty does the patient currently have. .. Unable A Lot A Little None   1. Turning over in bed (including adjusting bedclothes, sheets and blankets)? [] 1   [] 2   [x] 3   [] 4   2. Sitting down on and standing up from a chair with arms ( e.g., wheelchair, bedside commode, etc.)   [] 1   [] 2   [x] 3   [] 4   3. Moving from lying on back to sitting on the side of the bed? [] 1   [] 2   [x] 3   [] 4          How much help from another person does the patient currently need. .. Total A Lot A Little None   4. Moving to and from a bed to a chair (including a wheelchair)? [] 1   [] 2   [x] 3   [] 4   5. Need to walk in hospital room? [] 1   [] 2   [x] 3   [] 4   6. Climbing 3-5 steps with a railing? [x] 1   [] 2   [] 3   [] 4   © 2007, Trustees of [address] [place] [address] [address] [id number], under license to [employer]. All rights reserved     Score:  Initial: MS Most Recent: X (Date: -- )   Interpretation of Tool:  Represents activities that are increasingly more difficult (i.e. Bed mobility, Transfers, Gait).   Score 24 23 22-20 19-15 14-10 9-7 6   Modifier CH CI CJ CK CL CM CN Physical Therapy Evaluation Charge Determination   History Examination Presentation Decision-Making   LOW Complexity : Zero comorbidities / personal factors that will impact the outcome / POC LOW Complexity : 1-2 Standardized tests and measures addressing body structure, function, activity limitation and / or participation in recreation  LOW Complexity : Stable, uncomplicated  Other Functional Measure [facility] 6 16      Based on the above components, the patient evaluation is determined to be of the following complexity level: LOW     Pain Ratin/10    Activity Tolerance:   Fair  Please refer to the flowsheet for vital signs taken during this treatment. After treatment patient left in no apparent distress:   Sitting in chair and Call bell within reach    COMMUNICATION/EDUCATION:   The patients plan of care was discussed with: Occupational therapist.     Patient/family agree to work toward stated goals and plan of care. PT/OT sessions occurred together for increased safety of pt and clinician.       Thank you for this referral.  Lona Epstein   Time Calculation: 26 mins

## 2020-11-22 NOTE — ED PROVIDER NOTES
EMERGENCY DEPARTMENT HISTORY AND PHYSICAL EXAM      Date: 11/21/2020  Patient Name: Delgado Mason    History of Presenting Illness     Chief Complaint   Patient presents with    Dizziness       History Provided By: Patient    HPI: Delgado Mason, 67 y.o. female with a past medical history significant hypertension, hyperlipidemia, myocardial infarction and renal insufficiency presents to the ED with chief complaint of Dizziness  . Patient is from home. Patient had a syncopal event with family found her down in the bathroom. Out for maybe about 10 minutes. Improved in the ambulance. Now is not hypoxic. Never had any chest pain shortness of breath. No nausea or vomiting. There are no other complaints, changes, or physical findings at this time. PCP: Ariel Rodriguez NP    Current Facility-Administered Medications   Medication Dose Route Frequency Provider Last Rate Last Dose    0.9% sodium chloride infusion  100 mL/hr IntraVENous CONTINUOUS Seven Berry MD        aspirin tablet 325 mg  325 mg Oral NOW Seven Berry MD         Current Outpatient Medications   Medication Sig Dispense Refill    omeprazole (PRILOSEC) 20 mg capsule Take 20 mg by mouth daily.  nitroglycerin (NITROSTAT) 0.4 mg SL tablet 0.4 mg by SubLINGual route every five (5) minutes as needed for Chest Pain. Up to 3 doses.  lubiPROStone (Amitiza) 24 mcg capsule Take 24 mcg by mouth as needed for Constipation.  ferrous sulfate (Iron) 325 mg (65 mg iron) tablet Take 1 Tab by mouth daily.  polyethylene glycol (MIRALAX) 17 gram packet Take 17 g/day by mouth daily.  diclofenac (SOLARAZE) 3 % topical gel Apply 0.5 mg to affected area two (2) times a day.  apixaban (ELIQUIS) 2.5 mg tablet Take 2.5 mg by mouth two (2) times a day.  insulin NPH (NOVOLIN N, HUMULIN N) 100 unit/mL injection 20 Units by SubCUTAneous route daily (with dinner).       isosorbide mononitrate ER (IMDUR) 60 mg CR tablet Take 60 mg by mouth daily.  hydrALAZINE (APRESOLINE) 25 mg tablet Take 25 mg by mouth two (2) times a day.  losartan (COZAAR) 25 mg tablet Take 25 mg by mouth daily.  metoprolol succinate (TOPROL-XL) 25 mg XL tablet Take 25 mg by mouth daily.  gabapentin (NEURONTIN) 300 mg capsule Take 300 mg by mouth two (2) times a day.  furosemide (LASIX) 40 mg tablet Take 60 mg by mouth daily.  fluticasone propionate (FLONASE) 50 mcg/actuation nasal spray fluticasone propionate 50 mcg/actuation nasal spray,suspension      famotidine (PEPCID) 20 mg tablet Take 20 mg by mouth two (2) times a day.  cetirizine (ZYRTEC) 10 mg tablet Take 10 mg by mouth daily.  linaCLOtide (Linzess) 145 mcg cap capsule Take  by mouth Daily (before breakfast).  calcitRIOL (ROCALTROL) 0.25 mcg capsule Take 0.25 mcg by mouth daily.  venlafaxine (EFFEXOR) 75 mg tablet Take 75 mg by mouth daily.  cholecalciferol (VITAMIN D3) 400 unit tab tablet Take  by mouth daily.  latanoprost (XALATAN) 0.005 % ophthalmic solution Administer 1 Drop to both eyes nightly.  pravastatin (PRAVACHOL) 80 mg tablet Take 80 mg by mouth nightly.  aspirin 81 mg chewable tablet Take 81 mg by mouth daily.  allopurinol (ZYLOPRIM) 100 mg tablet Take 200 mg by mouth daily.          Past History     Past Medical History:  Past Medical History:   Diagnosis Date    Adenocarcinoma, renal cell ([facility] [address] 9/2/2020    Arthritis     CAD (coronary artery disease)     Chronic kidney disease     Diabetes ([facility] [address]     Gout     Hypercholesterolemia     Hypertension     Mental depression     Pulmonary embolism ([address]     Stroke ([facility] [address]     Thyroid disease        Past Surgical History:  Past Surgical History:   Procedure Laterality Date    CARDIAC SURG PROCEDURE UNLIST      stents placed     HX GYN      HX HYSTERECTOMY      HX NEPHRECTOMY  02/12/2015    HX ORTHOPAEDIC      HX UROLOGICAL  02/12/2015    PARTIAL URETERECTOMY        Family History:  Family History   Problem Relation Age of Onset    Heart Disease Mother     Heart Disease Father     Heart Disease Sister     Cancer Sister     Heart Disease Brother     Cancer Maternal Grandmother     Stroke Son        Social History:  Social History     Tobacco Use    Smoking status: Never Smoker    Smokeless tobacco: Never Used   Substance Use Topics    Alcohol use: No    Drug use: No       Allergies:  No Known Allergies      Review of Systems   Review of Systems   Constitutional: Negative. Negative for chills, fatigue and fever. HENT: Negative. Negative for congestion, nosebleeds and sore throat. Eyes: Negative. Negative for pain, discharge and visual disturbance. Respiratory: Negative. Negative for cough, chest tightness and shortness of breath. Cardiovascular: Negative for chest pain, palpitations and leg swelling. Gastrointestinal: Negative for abdominal pain, blood in stool, constipation, diarrhea, nausea and vomiting. Endocrine: Negative. Genitourinary: Negative. Negative for difficulty urinating, dysuria, pelvic pain and vaginal bleeding. Musculoskeletal: Negative. Negative for arthralgias, back pain and myalgias. Skin: Negative. Negative for rash and wound. Allergic/Immunologic: Negative. Neurological: Positive for syncope. Negative for dizziness, weakness, numbness and headaches. Hematological: Negative. Psychiatric/Behavioral: Negative. Negative for agitation, confusion and suicidal ideas. All other systems reviewed and are negative. Physical Exam   Physical Exam  Vitals signs and nursing note reviewed. Exam conducted with a chaperone present. Constitutional:       Appearance: Normal appearance. She is normal weight. HENT:      Head: Normocephalic and atraumatic. Nose: Nose normal.      Mouth/Throat:      Mouth: Mucous membranes are moist.      Pharynx: Oropharynx is clear.    Eyes:      Extraocular Movements: Extraocular movements intact. Conjunctiva/sclera: Conjunctivae normal.      Pupils: Pupils are equal, round, and reactive to light. Neck:      Musculoskeletal: Normal range of motion and neck supple. Cardiovascular:      Rate and Rhythm: Normal rate and regular rhythm. Pulses: Normal pulses. Heart sounds: Normal heart sounds. Pulmonary:      Effort: Pulmonary effort is normal. No respiratory distress. Breath sounds: Normal breath sounds. Abdominal:      General: Abdomen is flat. Bowel sounds are normal. There is no distension. Palpations: Abdomen is soft. Tenderness: There is no abdominal tenderness. There is no guarding. Musculoskeletal: Normal range of motion. General: No swelling, tenderness, deformity or signs of injury. Right lower leg: No edema. Left lower leg: No edema. Skin:     General: Skin is warm and dry. Capillary Refill: Capillary refill takes less than 2 seconds. Findings: No lesion or rash. Neurological:      General: No focal deficit present. Mental Status: She is alert and oriented to person, place, and time. Mental status is at baseline. Cranial Nerves: No cranial nerve deficit. Psychiatric:         Mood and Affect: Mood normal.         Behavior: Behavior normal.         Thought Content:  Thought content normal.         Judgment: Judgment normal.         Diagnostic Study Results     Labs -     Recent Results (from the past 12 hour(s))   TROPONIN I    Collection Time: 11/21/20  6:10 PM   Result Value Ref Range    Troponin-I, Qt. <0.05 <0.05 ng/mL   CBC WITH AUTOMATED DIFF    Collection Time: 11/21/20  6:10 PM   Result Value Ref Range    WBC 6.6 3.6 - 11.0 K/uL    RBC 3.79 (L) 3.80 - 5.20 M/uL    HGB 10.7 (L) 11.5 - 16.0 g/dL    HCT 33.1 (L) 35.0 - 47.0 %    MCV 87.3 80.0 - 99.0 FL    MCH 28.2 26.0 - 34.0 PG    MCHC 32.3 30.0 - 36.5 g/dL    RDW 15.8 (H) 11.5 - 14.5 %    PLATELET 174 657 - 178 K/uL    MPV 9.7 8.9 - 12.9 FL    NEUTROPHILS 68 32 - 75 %    LYMPHOCYTES 23 12 - 49 %    MONOCYTES 8 5 - 13 %    EOSINOPHILS 1 0 - 7 %    BASOPHILS 0 0 - 1 %    IMMATURE GRANULOCYTES 0 0.0 - 0.5 %    ABS. NEUTROPHILS 4.5 1.8 - 8.0 K/UL    ABS. LYMPHOCYTES 1.5 0.8 - 3.5 K/UL    ABS. MONOCYTES 0.5 0.0 - 1.0 K/UL    ABS. EOSINOPHILS 0.1 0.0 - 0.4 K/UL    ABS. BASOPHILS 0.0 0.0 - 0.1 K/UL    ABS. IMM. GRANS. 0.0 0.00 - 0.04 K/UL    DF AUTOMATED     METABOLIC PANEL, BASIC    Collection Time: 11/21/20  6:10 PM   Result Value Ref Range    Sodium 139 136 - 145 mmol/L    Potassium 4.1 3.5 - 5.1 mmol/L    Chloride 106 97 - 108 mmol/L    CO2 25 21 - 32 mmol/L    Anion gap 8 5 - 15 mmol/L    Glucose 109 (H) 65 - 100 mg/dL    BUN 55 (H) 6 - 20 mg/dL    Creatinine 2.63 (H) 0.55 - 1.02 mg/dL    BUN/Creatinine ratio 21 (H) 12 - 20      GFR est AA 21 (L) >60 ml/min/1.73m2    GFR est non-AA 18 (L) >60 ml/min/1.73m2    Calcium 9.6 8.5 - 10.1 mg/dL   BNP    Collection Time: 11/21/20  6:10 PM   Result Value Ref Range    NT pro- <450 pg/mL       Radiologic Studies -   CT HEAD WO CONT   Final Result   IMPRESSION: No change compared to previous CT head September 24, 2020. XR CHEST PORT   Final Result        CT Results  (Last 48 hours)               11/21/20 1856  CT HEAD WO CONT Final result    Impression:  IMPRESSION: No change compared to previous CT head September 24, 2020. Narrative:  CT head. Comparison CT head September 24, 2020       Axial images are reviewed along with reformatted sagittal/coronal images. Dose reduction: All CT scans at this facility are performed using dose reduction   optimization techniques as appropriate to a performed exam including the   following-   automated exposure control, adjustments of mA and/or Kv according to patient   size, or use of iterative reconstructive technique. Bone windows demonstrate normal aeration of the imaged sinuses and mastoid air   cells.        Review of intracranial content demonstrates periventricular/subcortical white   matter gliosis similar compared to prior imaging. Evidence to suggest advanced   nonacute end vessel occlusive change. Falx-based calcification. No intracranial   hemorrhage. CXR Results  (Last 48 hours)               11/21/20 1837  XR CHEST PORT Final result    Narrative:  Chest single view. Comparison single view chest September 24, 2020       The lungs are clear. There is no interstitial or alveolar pulmonary edema. Cardiac and mediastinal structures unchanged. No pneumothorax or sizable pleural   effusion. Medical Decision Making and ED Course   I am the first provider for this patient. I reviewed the vital signs, available nursing notes, past medical history, past surgical history, family history and social history. Vital Signs-Reviewed the patient's vital signs. Patient Vitals for the past 12 hrs:   Temp Pulse Resp BP   11/21/20 1824 -- 84 12 (!) 150/71   11/21/20 1821 -- 81 15 (!) 143/72   11/21/20 1806 98.2 °F (36.8 °C) 80 17 100/63       EKG interpretation:   1816. Normal sinus rhythm rate of 81. No ST changes. No T wave inversions. Reason rule out dysrhythmia. Interpreted by ER physician. Records Reviewed: Previous Hospital chart. EMS run report      ED Course:   Initial assessment performed. The patients presenting problems have been discussed, and they are in agreement with the care plan formulated and outlined with them. I have encouraged them to ask questions as they arise throughout their visit.     Orders Placed This Encounter    CT HEAD WO CONT     Standing Status:   Standing     Number of Occurrences:   1     Order Specific Question:   Transport     Answer:   Stretcher [5]     Order Specific Question:   Reason for Exam     Answer:   syncope    XR CHEST PORT     Standing Status:   Standing     Number of Occurrences:   1     Order Specific Question:   Reason for Exam     Answer: syncope    TROPONIN I     Standing Status:   Standing     Number of Occurrences:   1    CBC WITH AUTOMATED DIFF     Standing Status:   Standing     Number of Occurrences:   1    METABOLIC PANEL, BASIC     Standing Status:   Standing     Number of Occurrences:   1    BNP     Standing Status:   Standing     Number of Occurrences:   1    EKG 12 LEAD INITIAL     Standing Status:   Standing     Number of Occurrences:   1     Order Specific Question:   Reason for Exam:     Answer:   syncope    sodium chloride 0.9 % bolus infusion 1,000 mL    0.9% sodium chloride infusion    aspirin tablet 325 mg    IP CONSULT TO HOSPITALIST     Standing Status:   Standing     Number of Occurrences:   1     Order Specific Question:   Reason for Consult: Answer:   TO ADMIT     Order Specific Question:   Did you call or speak to the consulting provider? Answer: Yes              CONSULTANTS:  Consults  8:36 PM Dr whittington consult for admit. Will call back in 5 min    Provider Notes (Medical Decision Making):   67year-old syncope. Stable vitals now. Differential includes ACS, electrolyte abnormality, pneumonia, sepsis. Procedures                       Disposition       Emergency Department Disposition:  Admittedadmit [facility]      Diagnosis     Clinical Impression:   1. Syncope and collapse    2. Anemia, unspecified type    3. Renal insufficiency        Attestations:    Dianna Kemp MD    Please note that this dictation was completed with [employer], the computer voice recognition software. Quite often unanticipated grammatical, syntax, homophones, and other interpretive errors are inadvertently transcribed by the computer software. Please disregard these errors. Please excuse any errors that have escaped final proofreading. Thank you.

## 2020-11-22 NOTE — CONSULTS
Consult Date: 11/22/2020    Consults Ms. Joel Cao is a 67year old woman with CAD, HTN who was brought in to the hospital because she was found passed out on the toilet. Per the patient she thinks she was out for 30 min. She remembers going to the toilet and sitting down and then remembers waking up to the EMS crew being there. This has happened to her twice before and both times on the toilet. She is back to baseline now. Ct head unrevealing. Per HPI she was hypoxic according to EMS but that resolved in the ER. In Sept she was seen by me for a similar episode.      Subjective     Past Medical History:   Diagnosis Date    Adenocarcinoma, renal cell ([facility] [address] 9/2/2020    Arthritis     CAD (coronary artery disease)     Chronic kidney disease     Diabetes ([facility] [address]     Gout     Hypercholesterolemia     Hypertension     Mental depression     Pulmonary embolism ([facility] [address]     Stroke ([facility] [address]     Thyroid disease       Past Surgical History:   Procedure Laterality Date    CARDIAC SURG PROCEDURE UNLIST      stents placed     HX GYN      HX HYSTERECTOMY      HX NEPHRECTOMY  02/12/2015    HX ORTHOPAEDIC      HX UROLOGICAL  02/12/2015    PARTIAL URETERECTOMY      Family History   Problem Relation Age of Onset    Heart Disease Mother     Heart Disease Father     Heart Disease Sister     Cancer Sister     Heart Disease Brother     Cancer Maternal Grandmother     Stroke Son       Social History     Tobacco Use    Smoking status: Never Smoker    Smokeless tobacco: Never Used   Substance Use Topics    Alcohol use: No       Current Facility-Administered Medications   Medication Dose Route Frequency Provider Last Rate Last Dose    metoprolol succinate (TOPROL-XL) XL tablet 25 mg  25 mg Oral DAILY Joesphine Ace, NP   25 mg at 11/22/20 1119    pravastatin (PRAVACHOL) tablet 80 mg  80 mg Oral QHS Joesphine Ace, NP        hydrALAZINE (APRESOLINE) tablet 25 mg  25 mg Oral BID Joesphine Ace, NP        calcitRIOL (ROCALTROL) capsule 0.25 mcg  0.25 mcg Oral DAILY Osborne Goon, NP   0.25 mcg at 11/22/20 1120    cetirizine (ZYRTEC) tablet 10 mg  10 mg Oral DAILY Osborne Goon, NP   10 mg at 11/22/20 1119    isosorbide mononitrate ER (IMDUR) tablet 60 mg  60 mg Oral DAILY Osborne Goon, NP   60 mg at 11/22/20 1120    venlafaxine (EFFEXOR) tablet 75 mg  75 mg Oral DAILY Osborne Goon, NP   75 mg at 11/22/20 1120    furosemide (LASIX) tablet 40 mg  40 mg Oral DAILY Osborne Goon, NP   40 mg at 11/22/20 1119    0.9% sodium chloride infusion  100 mL/hr IntraVENous CONTINUOUS Nusrat MD Adolfo 100 mL/hr at 11/21/20 2114 100 mL/hr at 11/21/20 2114    sodium chloride (NS) flush 5-40 mL  5-40 mL IntraVENous Q8H Solo Ba MD        sodium chloride (NS) flush 5-40 mL  5-40 mL IntraVENous PRN Solo Ba MD        acetaminophen (TYLENOL) tablet 650 mg  650 mg Oral Q6H PRN Solo Ba MD   650 mg at 11/22/20 0023    Or    acetaminophen (TYLENOL) suppository 650 mg  650 mg Rectal Q6H PRN Solo Ba MD        polyethylene glycol (MIRALAX) packet 17 g  17 g Oral DAILY PRN Solo Ba MD        promethazine (PHENERGAN) tablet 12.5 mg  12.5 mg Oral Q6H PRN Solo Ba MD        Or    [facility] injection 4 mg  4 mg IntraVENous Q6H PRN Solo Ba MD        glucose chewable tablet 16 g  4 Tab Oral PRN Solo Ba MD        dextrose (D50W) injection syrg 12.5-25 g  25-50 mL IntraVENous PRN Solo Ba MD        glucagon (GLUCAGEN) injection 1 mg  1 mg IntraMUSCular PRN Solo Ba MD        insulin lispro (HUMALOG) injection   SubCUTAneous AC&HS Solo Ba MD   3 Units at 11/22/20 1219    apixaban (ELIQUIS) tablet 2.5 mg  2.5 mg Oral BID Solo Ba MD   2.5 mg at 11/22/20 0847    aspirin chewable tablet 81 mg  81 mg Oral DAILY Solo Ba MD   81 mg at 11/22/20 0847    ferrous sulfate tablet 325 mg  1 Tab Oral DAILY Leyla Magallanes, Mirna Dahl MD        hydrALAZINE (APRESOLINE) tablet 25 mg  25 mg Oral Q6H PRN Jamal Cote MD            Review of Systems   All other systems reviewed and are negative. Objective     Vital signs for last 24 hours:  Visit Vitals  BP (!) 155/74 (BP Patient Position: Supine)   Pulse 70   Temp 98.4 °F (36.9 °C)   Resp 20   Ht 5' 4.02\" (1.626 m)   Wt 89.7 kg (197 lb 12 oz)   SpO2 98%   BMI 33.93 kg/m²       Intake/Output this shift:  Current Shift: 11/22 0701 - 11/22 1900  In: 236 [P.O.:236]  Out: -   Last 3 Shifts: No intake/output data recorded. Data Review:   Recent Results (from the past 24 hour(s))   TROPONIN I    Collection Time: 11/21/20  6:10 PM   Result Value Ref Range    Troponin-I, Qt. <0.05 <0.05 ng/mL   CBC WITH AUTOMATED DIFF    Collection Time: 11/21/20  6:10 PM   Result Value Ref Range    WBC 6.6 3.6 - 11.0 K/uL    RBC 3.79 (L) 3.80 - 5.20 M/uL    HGB 10.7 (L) 11.5 - 16.0 g/dL    HCT 33.1 (L) 35.0 - 47.0 %    MCV 87.3 80.0 - 99.0 FL    MCH 28.2 26.0 - 34.0 PG    MCHC 32.3 30.0 - 36.5 g/dL    RDW 15.8 (H) 11.5 - 14.5 %    PLATELET 652 759 - 768 K/uL    MPV 9.7 8.9 - 12.9 FL    NEUTROPHILS 68 32 - 75 %    LYMPHOCYTES 23 12 - 49 %    MONOCYTES 8 5 - 13 %    EOSINOPHILS 1 0 - 7 %    BASOPHILS 0 0 - 1 %    IMMATURE GRANULOCYTES 0 0.0 - 0.5 %    ABS. NEUTROPHILS 4.5 1.8 - 8.0 K/UL    ABS. LYMPHOCYTES 1.5 0.8 - 3.5 K/UL    ABS. MONOCYTES 0.5 0.0 - 1.0 K/UL    ABS. EOSINOPHILS 0.1 0.0 - 0.4 K/UL    ABS. BASOPHILS 0.0 0.0 - 0.1 K/UL    ABS. IMM.  GRANS. 0.0 0.00 - 0.04 K/UL    DF AUTOMATED     METABOLIC PANEL, BASIC    Collection Time: 11/21/20  6:10 PM   Result Value Ref Range    Sodium 139 136 - 145 mmol/L    Potassium 4.1 3.5 - 5.1 mmol/L    Chloride 106 97 - 108 mmol/L    CO2 25 21 - 32 mmol/L    Anion gap 8 5 - 15 mmol/L    Glucose 109 (H) 65 - 100 mg/dL    BUN 55 (H) 6 - 20 mg/dL    Creatinine 2.63 (H) 0.55 - 1.02 mg/dL    BUN/Creatinine ratio 21 (H) 12 - 20      GFR est AA 21 (L) >60 ml/min/1.73m2    GFR est non-AA 18 (L) >60 ml/min/1.73m2    Calcium 9.6 8.5 - 10.1 mg/dL   BNP    Collection Time: 11/21/20  6:10 PM   Result Value Ref Range    NT pro- <450 pg/mL   EKG, 12 LEAD, INITIAL    Collection Time: 11/21/20  6:16 PM   Result Value Ref Range    Ventricular Rate 81 BPM    Atrial Rate 81 BPM    P-R Interval 142 ms    QRS Duration 86 ms    Q-T Interval 396 ms    QTC Calculation (Bezet) 460 ms    Calculated P Axis 42 degrees    Calculated R Axis 48 degrees    Calculated T Axis 27 degrees    Diagnosis       Normal sinus rhythm  Normal ECG  When compared with ECG of 24-SEP-2020 14:59,  MI interval has increased  Nonspecific T wave abnormality no longer evident in Lateral leads  Confirmed by TAYLOR CUMMINS, Angie Norwood ([id number]) on 11/22/2020 13:59:62 AM     METABOLIC PANEL, COMPREHENSIVE    Collection Time: 11/22/20  1:12 AM   Result Value Ref Range    Sodium 140 136 - 145 mmol/L    Potassium 4.3 3.5 - 5.1 mmol/L    Chloride 107 97 - 108 mmol/L    CO2 27 21 - 32 mmol/L    Anion gap 6 5 - 15 mmol/L    Glucose 186 (H) 65 - 100 mg/dL    BUN 55 (H) 6 - 20 mg/dL    Creatinine 2.54 (H) 0.55 - 1.02 mg/dL    BUN/Creatinine ratio 22 (H) 12 - 20      GFR est AA 22 (L) >60 ml/min/1.73m2    GFR est non-AA 18 (L) >60 ml/min/1.73m2    Calcium 9.4 8.5 - 10.1 mg/dL    Bilirubin, total 0.2 0.2 - 1.0 mg/dL    AST (SGOT) 36 15 - 37 U/L    ALT (SGPT) 27 12 - 78 U/L    Alk.  phosphatase 119 (H) 45 - 117 U/L    Protein, total 7.4 6.4 - 8.2 g/dL    Albumin 3.0 (L) 3.5 - 5.0 g/dL    Globulin 4.4 (H) 2.0 - 4.0 g/dL    A-G Ratio 0.7 (L) 1.1 - 2.2     MAGNESIUM    Collection Time: 11/22/20  1:12 AM   Result Value Ref Range    Magnesium 1.6 1.6 - 2.4 mg/dL   CBC W/O DIFF    Collection Time: 11/22/20  1:12 AM   Result Value Ref Range    WBC 9.6 3.6 - 11.0 K/uL    RBC 3.72 (L) 3.80 - 5.20 M/uL    HGB 10.5 (L) 11.5 - 16.0 g/dL    HCT 32.4 (L) 35.0 - 47.0 %    MCV 87.1 80.0 - 99.0 FL    MCH 28.2 26.0 - 34.0 PG    MCHC 32.4 30.0 - 36.5 g/dL    RDW 15.9 (H) 11.5 - 14.5 %    PLATELET 152 630 - 986 K/uL    MPV 10.0 8.9 - 12.9 FL   TROPONIN I    Collection Time: 11/22/20  1:12 AM   Result Value Ref Range    Troponin-I, Qt. 0.09 (H) <0.05 ng/mL   GLUCOSE, POC    Collection Time: 11/22/20  8:26 AM   Result Value Ref Range    Glucose (POC) 129 (H) 65 - 100 mg/dL    Performed by Ivette Arroyo    TROPONIN I    Collection Time: 11/22/20  9:50 AM   Result Value Ref Range    Troponin-I, Qt. 0.07 (H) <0.05 ng/mL   GLUCOSE, POC    Collection Time: 11/22/20 12:08 PM   Result Value Ref Range    Glucose (POC) 256 (H) 65 - 100 mg/dL    Performed by Ivette Arroyo        Physical Exam    Neuro Physical Exam      General: Well developed, well nourished. Patient in no apparent distress. Neurological Exam:  Mental Status: Awake, alert, oriented x4   Cranial Nerves:   Intact visual fields. PERRL, EOM's full, no nystagmus, no ptosis. Facial sensation is normal. Facial movement is symmetric. Palate is midline. Normal sternocleidomastoid strength. Tongue is midline. Hearing is intact bilaterally. Motor:  5/5 strength in upper and lower proximal and distal muscles. Normal bulk and tone. Reflexes:   Deep tendon reflexes 1+/4 and symmetrical.   Sensory:   Normal to temperature and vibration. Gait:  Not tested    Tremor:   No tremor noted. Cerebellar:  No cerebellar signs present. Babinski:      Down b/l    Assessment and Plan: Ms. Jessica Beverly is a 67year old woman with already 3 episodes of loss of consciousness which were more prolonged than a typical episode of syncope. She should get a cardiac evaluation for syncope as the story does not sound consistent with seizures. No tonic clonic activity observed and she had no post ictal state. All episodes happened on the toilet. Will do an EEG and if this does not show any clear abnormalities consistent with seizure disorder then no further neuro workup.

## 2020-11-22 NOTE — PROGRESS NOTES
Progress Note Date:2020       Room:[facility] Patient Name:Tamy Moore     YOB: 1945     Age:75 y.o. Subjective Subjective: 
Symptoms:  Stable. No shortness of breath, cough, chest pain or chest pressure. Diet:  Adequate intake. No nausea or vomiting. Activity level: Returning to normal.   
Pain:  She complains of pain that is moderate. She reports pain is unchanged. Pain is partially controlled. (Left knee pain ). Review of Systems Constitutional: Negative for chills, fatigue and fever. HENT: Negative. Eyes: Negative. Respiratory: Negative. Negative for cough, choking and shortness of breath. Cardiovascular: Negative. Negative for chest pain. Gastrointestinal: Negative. Negative for abdominal distention, abdominal pain, nausea and vomiting. Endocrine: Negative. Genitourinary: Negative. Musculoskeletal: Positive for arthralgias. Left knee pain Skin: Negative. Allergic/Immunologic: Negative. Neurological: Negative. Hematological: Negative. Psychiatric/Behavioral: Negative. Objective Vitals Last 24 Hours: TEMPERATURE:  Temp  Av.3 °F (36.8 °C)  Min: 98 °F (36.7 °C)  Max: 98.6 °F (37 °C) RESPIRATIONS RANGE: Resp  Av.3  Min: 12  Max: 18 PULSE OXIMETRY RANGE: SpO2  Av.3 %  Min: 98 %  Max: 99 % PULSE RANGE: Pulse  Av.4  Min: 78  Max: 90 BLOOD PRESSURE RANGE: Systolic (15BUP), RBY:125 , Min:100 , IIO:944  
; Diastolic (98UJV), CUY:10, Min:63, Max:80 I/O (24Hr): No intake or output data in the 24 hours ending 20 09 Objective: 
General Appearance:  Comfortable, well-appearing, in no acute distress and in pain. Vital signs: (most recent): Blood pressure (!) 147/80, pulse 90, temperature 98.6 °F (37 °C), resp. rate 18, height 5' 4.02\" (1.626 m), weight 89.7 kg (197 lb 12 oz), SpO2 98 %. Vital signs are normal.  No fever. Output: Producing urine.    
HEENT: Normal HEENT exam.   
 Lungs:  Normal effort and normal respiratory rate. Breath sounds clear to auscultation. She is not in respiratory distress. No decreased breath sounds. Heart: Normal rate. Regular rhythm. S1 normal and S2 normal.  No murmur or gallop. Chest: Symmetric chest wall expansion. No chest wall tenderness. Abdomen: Abdomen is soft and non-distended. There are no signs of ascites. Bowel sounds are normal.   There is no abdominal tenderness. Extremities: Normal range of motion. (Mild bilat lower extremity edema ) Pulses: Distal pulses are intact. Neurological: Patient is alert and oriented to person, place and time. Pupils:  Pupils are equal, round, and reactive to light. Skin:  Warm and dry. Labs/Imaging/Diagnostics Labs: CBC: 
Recent Labs  
  11/22/20 0112 11/21/20 
1810 WBC 9.6 6.6  
RBC 3.72* 3.79* HGB 10.5* 10.7* HCT 32.4* 33.1* MCV 87.1 87.3 RDW 15.9* 15.8*  192 CHEMISTRIES: 
Recent Labs  
  11/22/20 0112 11/21/20 
1810  139  
K 4.3 4.1  106 CO2 27 25 BUN 55* 55* CREA 2.54* 2.63* CA 9.4 9.6 MG 1.6  --   
PT/INR:No results for input(s): INR, INREXT in the last 72 hours. No lab exists for component: PROTIME APTT:No results for input(s): APTT in the last 72 hours. LIVER PROFILE: 
Recent Labs  
  11/22/20 0112 AST 36 ALT 27 Lab Results Component Value Date/Time ALT (SGPT) 27 11/22/2020 01:12 AM  
 AST (SGOT) 36 11/22/2020 01:12 AM  
 Alk. phosphatase 119 (H) 11/22/2020 01:12 AM  
 Bilirubin, total 0.2 11/22/2020 01:12 AM  
 
 
Imaging Last 24 Hours: 
Ct Head Wo Cont Result Date: 11/21/2020 CT head. Comparison CT head September 24, 2020 Axial images are reviewed along with reformatted sagittal/coronal images.  Dose reduction: All CT scans at this facility are performed using dose reduction optimization techniques as appropriate to a performed exam including the following- automated exposure control, adjustments of mA and/or Kv according to patient size, or use of iterative reconstructive technique. Bone windows demonstrate normal aeration of the imaged sinuses and mastoid air cells. Review of intracranial content demonstrates periventricular/subcortical white matter gliosis similar compared to prior imaging. Evidence to suggest advanced nonacute end vessel occlusive change. Falx-based calcification. No intracranial hemorrhage. IMPRESSION: No change compared to previous CT head September 24, 2020. Xr Chest [facility] Result Date: 11/21/2020 Chest single view. Comparison single view chest September 24, 2020 The lungs are clear. There is no interstitial or alveolar pulmonary edema. Cardiac and mediastinal structures unchanged. No pneumothorax or sizable pleural effusion. Assessment//Plan Patient Active Problem List  
 Diagnosis Date Noted  Syncope 11/21/2020  Episode of unresponsiveness 09/24/2020  Arteriosclerosis of coronary artery 09/16/2020  Dyslipidemia 09/16/2020  Stage 5 chronic kidney disease ([address] 09/16/2020  
 HTN (hypertension) 09/16/2020  Type 2 diabetes mellitus ([address] 09/16/2020  TIA (transient ischemic attack) 09/16/2020  Adenocarcinoma, renal cell ([address] 09/02/2020  Morbid obesity ([address] 09/02/2020  Peripheral vascular disease ([address] 09/02/2020  Arthritis 08/21/2019  Hyperlipidemia 08/21/2019  Essential hypertension 08/21/2019  Impingement syndrome of shoulder region 08/21/2019  Pulmonary embolism ([address] 02/01/2015  Cerebrovascular accident (CVA) ([address] 08/01/2014 Assessment & Plan Syncopal episode - CT 11/21 - Negative. Xray Chest - Neg. Bilat Carotid US 9/17/20 - Tortuous distal LEATHA with minimal stenosis,calcified plaque L-bulb without LICA stenosis,normal vertebral dopplers. Initial Trop - Neg. Repeat 0.09. Orthostatic BP. Consult Neurology Elevated Troponin: Initial Troponin <0.05. Repeat 0.09. Trend Troponin until peaked. Continue telemetry. Consult Cardiology. Hypertension - BP Stable. Continue home dose of hydralazine, Metoprolol, Imdur Anemia - Hgb 10.5. Will Coninue to monitor. Continue Ferrous sulfate CKD Stage V - Creat 2.54. Baseline creat 2.0. Consult Nephrology for recs. DM Type II - ACHS blood glucose checks. Continue ISS. Monitor for signs of Hypoglycemia Hx PE - Continue home dose of Eliquis. Monitor for signs of bleeding. Hx Hyperlipidemia - Continue home dose of Pravastatin Generalized Weakness: PT/OT evaluation and treatment for Mobilization optimization Plan:  
Consult Cardiology Consult Neurology Continue Tele Monitoring AM Labs Trend Troponin Orthostatic BP Consult Nephrology PT/OT for Mobilization CM for assistance with discharge planning.   
  
 
 
 
Electronically signed by Ishaan Yoo on 11/22/2020 at 9:29 AM

## 2020-11-22 NOTE — PROGRESS NOTES
Problem: Self Care Deficits Care Plan (Adult)  Goal: *Acute Goals and Plan of Care (Insert Text)  Description: Pt will be MI sup<->sit in prep for EOB ADL's  Pt will be MI  LB dressing sitting/standing level  Pt will be MI  sit<-> prep for toilet transfer  Pt will be MI  toilet transfer with LRAD  Pt will be MI  toileting/cloth mgmt LRAD  Pt will be MI  grooming standing sink  Pt will be MI bathing sitting/standing sink LRAD  Pt will be MI reji UE HEP in prep for self care tasks      Outcome: Not Met     OCCUPATIONAL THERAPY EVALUATION  Patient: Dora Moser (88 y.o. female)  Date: 11/22/2020  Primary Diagnosis: Syncope [R55]        Precautions:        ASSESSMENT  Pt is 68 y/o female came to [facility] s/p syncope and placed under observation 11/21/2020 for syncope. Head CT negative for acute events. Pt has hx of CKD stage V, PE on eliquis, HTN, HLD, DM type II, TIA, adenocarcinoma, arthritis, GOUT, depression, thyroid disease. Pt received semi supine in bed A&Ox4 and agreeable for OT eval/tx. Per pt report, pt lives with daughter in 1 story home with ramp to enter and is MI for self care and functional transfers/mobility using cane outside home and furniture walking in home. Pt  with 8/10 left hip pain (x-ray ordered precautionarily however OT/PT cleared to get pt up by NP Jg Vickers as pt has been getting up to bathroom). Pt currently presents with decreased balance, decreased activity tolerance 2/2 pain and increased need for assist with self care (SBA LB dressing EOB, SBA grooming standing sink, SBA toileting/cloth mgmt) and functional transfers/mobility (SBA sup->sit and scooting EOB, CGA sit<->Stand and toilet transfer with gait belt). Pt would benefit from skilled OT services while at [facility] in order to increase safety and independence with self care and functional transfers/mobility. Recommend discharge to home with [facility] when medically appropriate.         Current Level of Function Impacting Discharge (ADLs/self-care): SBA    Other factors to consider for discharge: has 24/7 family support        PLAN :  Recommendations and Planned Interventions: self care training, functional mobility training, therapeutic exercise, balance training, therapeutic activities, endurance activities, patient education, and home safety training    Frequency/Duration: Patient will be followed by occupational therapy 3 times a week to address goals. Recommendation for discharge: (in order for the patient to meet his/her long term goals)  HHOT    This discharge recommendation:  Has been made in collaboration with the attending provider and/or case management    IF patient discharges home will need the following DME: TBD       SUBJECTIVE:   Patient stated Estefania Levin do everything myself.     OBJECTIVE DATA SUMMARY:   HISTORY:   Past Medical History:   Diagnosis Date    Adenocarcinoma, renal cell ([facility] [address] 9/2/2020    Arthritis     CAD (coronary artery disease)     Chronic kidney disease     Diabetes ([facility] [address]     Gout     Hypercholesterolemia     Hypertension     Mental depression     Pulmonary embolism (HCC)     Stroke ([facility] [address]     Thyroid disease      Past Surgical History:   Procedure Laterality Date    CARDIAC SURG PROCEDURE UNLIST      stents placed     HX GYN      HX HYSTERECTOMY      HX NEPHRECTOMY  02/12/2015    HX ORTHOPAEDIC      HX UROLOGICAL  02/12/2015    PARTIAL URETERECTOMY        Expanded or extensive additional review of patient history:     Home Situation  Home Environment: Private residence  # Steps to Enter: 5  Rails to Enter: No  Wheelchair Ramp: Yes  One/Two Story Residence: One story  Living Alone: No  Support Systems: Child(connie)(daughter)  Patient Expects to be Discharged to[de-identified] Private residence  Current DME Used/Available at Home: [place] [employer](uses in the community)    PLOF: Pt MI for ADLS/IADLS, MI with mobility prior to admission.      Hand dominance: Right    EXAMINATION OF PERFORMANCE DEFICITS:  Cognitive/Behavioral Status:  Neurologic State: Alert  Orientation Level: Oriented X4  Cognition: Follows commands     Hearing: Auditory  Auditory Impairment: Hard of hearing, right side    Range of Motion:  AROM: Generally decreased, functional     Strength:    Strength: Generally decreased, functional grossly observed to be 3+/5     Balance:  Sitting: Intact  Standing: Intact; With support    Functional Mobility and Transfers for ADLs:  Bed Mobility:  Rolling: Stand-by assistance  Supine to Sit: Stand-by assistance  Sit to Supine: Stand-by assistance    Transfers:  Sit to Stand: Contact guard assistance  Stand to Sit: Contact guard assistance  Bed to Chair: Contact guard assistance  Bathroom Mobility: Contact guard assistance  Toilet Transfer : Contact guard assistance  Assistive Device : Gait Belt    ADL Assessment:     Oral Facial Hygiene/Grooming: Stand-by assistance     Lower Body Dressing: Stand-by assistance    Toileting: Stand by assistance     ADL Intervention and task modifications:     Grooming  Grooming Assistance: Stand-by assistance  Position Performed: Standing  Washing Face: Stand-by assistance  Washing Hands: Stand-by assistance  Brushing Teeth: Stand-by assistance    Lower Body Dressing Assistance  Socks: Stand-by assistance  Position Performed: Seated in chair    Toileting  Toileting Assistance: Stand-by assistance  Bladder Hygiene: Stand-by assistance  Bowel Hygiene: Stand-by assistance  Clothing Management: Stand-by assistance    [address] [address] AM-PACTM \"6 Clicks\"                                                       Daily Activity Inpatient Short Form  How much help from another person does the patient currently need. .. Total; A Lot A Little None   1. Putting on and taking off regular lower body clothing? []  1 []  2 [x]  3 []  4   2. Bathing (including washing, rinsing, drying)? []  1 []  2 [x]  3 []  4   3. Toileting, which includes using toilet, bedpan or urinal? [] 1 []  2 [x]  3 []  4   4.   Putting on and taking off regular upper body clothing? []  1 []  2 []  3 [x]  4   5. Taking care of personal grooming such as brushing teeth? []  1 []  2 [x]  3 []  4   6. Eating meals? []  1 []  2 []  3 [x]  4   © , Trustees of [address] [place] [address] [address] [address], under license to [employer]. All rights reserved     Score: 20/24     Interpretation of Tool:  Represents clinically-significant functional categories (i.e. Activities of daily living). Percentage of Impairment CH    0%   CI    1-19% CJ    20-39% CK    40-59% CL    60-79% CM    80-99% CN     100%   [facility]  Score 6-24 24 23 20-22 15-19 10-14 7-9 6        Occupational Therapy Evaluation Charge Determination   History Examination Decision-Making   LOW Complexity : Brief history review  LOW Complexity : 1-3 performance deficits relating to physical, cognitive , or psychosocial skils that result in activity limitations and / or participation restrictions  MEDIUM Complexity : Patient may present with comorbidities that affect occupational performnce. Miniml to moderate modification of tasks or assistance (eg, physical or verbal ) with assesment(s) is necessary to enable patient to complete evaluation       Based on the above components, the patient evaluation is determined to be of the following complexity level: LOW   Pain Ratin/10 left hip pain    Activity Tolerance:   Good  Please refer to the flowsheet for vital signs taken during this treatment. After treatment patient left in no apparent distress:    Sitting in chair and Call bell within reach    COMMUNICATION/EDUCATION:   The patients plan of care was discussed with: Physical therapist.   PT/OT sessions occurred together for increased safety of pt and clinician. Home safety education was provided and the patient/caregiver indicated understanding. and Patient/family have participated as able in goal setting and plan of care. This patients plan of care is appropriate for delegation to \[facility]\"".     Thank you for this referral.  Mary Hidden  Time Calculation: 26 mins

## 2020-11-22 NOTE — PROGRESS NOTES
Hospitalist Progress Note         PASHA Llamas, FNP-C    Daily Progress Note: 11/22/2020    HPI: Katey Fish is a 67 y.o. female with multiple medical problems including chronic kidney disease stage V (baseline creatinine 2.0), pulmonary embolism on Eliquis, hypertension, hyperlipidemia and insulin-dependent diabetes mellitus type 2 presenting from home after syncopal episode. Ms. Barbara Dewitt was sitting on the toilet having a bowel movement when she started feeling \"funny. \"  Patient denies having chest pain, shortness of breath, palpitations, nausea, vomiting, diarrhea. She reports feeling good and eating well over the past several days. Family went to check on her in the bathroom and found her slumped over on the toilet unresponsive. Patient has little recollection of events and next remembers waking up in the ambulance. Emergency medical services were called. Initially, patient was hypoxic requiring oxygen supplementation. She presented to this facility with a temperature 98.2 °F, pulse 80, respirations 17, blood pressure 100/63 and oxygen saturation 8% on room air. EKG has normal sinus rhythm, 81 bpm.  Initial troponin was within normal limits. CT of the head has no evidence of acute intracranial abnormalities. Chest x-ray has no evidence of acute cardiopulmonary process. Hospital service has been asked to admit Ms. Barbara Dewitt for further treatment and evaluation.      Ms. Barbara Dewitt was admitted in September for transient ischemic attack and episode of unresponsiveness. On September 17, echocardiogram was obtained, report indicates normal left ventricle ejection fraction, moderate pulmonary hypertension and no evidence of atrial septal defect. Carotid duplex studies reports mild stenosis of the right ICA (less than 50%) and minimal stenosis of the left ICA.     Past medical history, past surgical history, family history and social history was obtained at the time of admission.   I reviewed 2 previous discharge summaries including outpatient office visits and medication lists are inconsistent. Ms. Estuardo Rivas could not verify all home medications and dosing at the time of admission. Patient is a full code. Point of contact is her daughter, Paulette Moore, who can be reached at [phone]. Subjective:   Subjective   Patient seen on f/u alert and oriented laying in bed  Reports left hip pain  No acute distress noted    Review of Systems:   Review of Systems   Constitutional: Negative. HENT: Negative. Eyes: Negative. Respiratory: Negative. Cardiovascular: Negative. Gastrointestinal: Negative. Genitourinary: Negative. Musculoskeletal: Positive for joint pain. Left hip pain   Skin: Negative. Neurological: Negative. Endo/Heme/Allergies: Negative. Psychiatric/Behavioral: Negative. Objective:   Objective      Vitals:  Patient Vitals for the past 12 hrs:   Temp Pulse Resp BP SpO2   11/22/20 0820 98.6 °F (37 °C) 90 18 (!) 147/80 98 %   11/22/20 0400 -- 78 -- -- --   11/22/20 0147 98 °F (36.7 °C) 78 18 (!) 158/71 99 %   11/22/20 0000 -- 80 -- -- --   11/21/20 2114 -- 88 18 (!) 154/76 98 %        Physical Exam:  Physical Exam  Vitals signs and nursing note reviewed. Constitutional:       Appearance: She is obese. HENT:      Head: Normocephalic. Nose: Nose normal.      Mouth/Throat:      Mouth: Mucous membranes are moist.   Neck:      Musculoskeletal: Normal range of motion. Cardiovascular:      Rate and Rhythm: Normal rate and regular rhythm. Pulmonary:      Effort: Pulmonary effort is normal.      Breath sounds: Normal breath sounds. Abdominal:      General: Bowel sounds are normal.      Palpations: Abdomen is soft. Musculoskeletal:      Comments: Limited rom LLE   Skin:     General: Skin is warm and dry. Capillary Refill: Capillary refill takes less than 2 seconds. Neurological:      General: No focal deficit present.       Mental Status: She is alert and oriented to person, place, and time. Psychiatric:         Mood and Affect: Mood normal.         Behavior: Behavior normal.          Lab Results:  Recent Results (from the past 24 hour(s))   TROPONIN I    Collection Time: 11/21/20  6:10 PM   Result Value Ref Range    Troponin-I, Qt. <0.05 <0.05 ng/mL   CBC WITH AUTOMATED DIFF    Collection Time: 11/21/20  6:10 PM   Result Value Ref Range    WBC 6.6 3.6 - 11.0 K/uL    RBC 3.79 (L) 3.80 - 5.20 M/uL    HGB 10.7 (L) 11.5 - 16.0 g/dL    HCT 33.1 (L) 35.0 - 47.0 %    MCV 87.3 80.0 - 99.0 FL    MCH 28.2 26.0 - 34.0 PG    MCHC 32.3 30.0 - 36.5 g/dL    RDW 15.8 (H) 11.5 - 14.5 %    PLATELET 852 157 - 216 K/uL    MPV 9.7 8.9 - 12.9 FL    NEUTROPHILS 68 32 - 75 %    LYMPHOCYTES 23 12 - 49 %    MONOCYTES 8 5 - 13 %    EOSINOPHILS 1 0 - 7 %    BASOPHILS 0 0 - 1 %    IMMATURE GRANULOCYTES 0 0.0 - 0.5 %    ABS. NEUTROPHILS 4.5 1.8 - 8.0 K/UL    ABS. LYMPHOCYTES 1.5 0.8 - 3.5 K/UL    ABS. MONOCYTES 0.5 0.0 - 1.0 K/UL    ABS. EOSINOPHILS 0.1 0.0 - 0.4 K/UL    ABS. BASOPHILS 0.0 0.0 - 0.1 K/UL    ABS. IMM.  GRANS. 0.0 0.00 - 0.04 K/UL    DF AUTOMATED     METABOLIC PANEL, BASIC    Collection Time: 11/21/20  6:10 PM   Result Value Ref Range    Sodium 139 136 - 145 mmol/L    Potassium 4.1 3.5 - 5.1 mmol/L    Chloride 106 97 - 108 mmol/L    CO2 25 21 - 32 mmol/L    Anion gap 8 5 - 15 mmol/L    Glucose 109 (H) 65 - 100 mg/dL    BUN 55 (H) 6 - 20 mg/dL    Creatinine 2.63 (H) 0.55 - 1.02 mg/dL    BUN/Creatinine ratio 21 (H) 12 - 20      GFR est AA 21 (L) >60 ml/min/1.73m2    GFR est non-AA 18 (L) >60 ml/min/1.73m2    Calcium 9.6 8.5 - 10.1 mg/dL   BNP    Collection Time: 11/21/20  6:10 PM   Result Value Ref Range    NT pro- <236 pg/mL   METABOLIC PANEL, COMPREHENSIVE    Collection Time: 11/22/20  1:12 AM   Result Value Ref Range    Sodium 140 136 - 145 mmol/L    Potassium 4.3 3.5 - 5.1 mmol/L    Chloride 107 97 - 108 mmol/L    CO2 27 21 - 32 mmol/L    Anion gap 6 5 - 15 mmol/L Glucose 186 (H) 65 - 100 mg/dL    BUN 55 (H) 6 - 20 mg/dL    Creatinine 2.54 (H) 0.55 - 1.02 mg/dL    BUN/Creatinine ratio 22 (H) 12 - 20      GFR est AA 22 (L) >60 ml/min/1.73m2    GFR est non-AA 18 (L) >60 ml/min/1.73m2    Calcium 9.4 8.5 - 10.1 mg/dL    Bilirubin, total 0.2 0.2 - 1.0 mg/dL    AST (SGOT) 36 15 - 37 U/L    ALT (SGPT) 27 12 - 78 U/L    Alk. phosphatase 119 (H) 45 - 117 U/L    Protein, total 7.4 6.4 - 8.2 g/dL    Albumin 3.0 (L) 3.5 - 5.0 g/dL    Globulin 4.4 (H) 2.0 - 4.0 g/dL    A-G Ratio 0.7 (L) 1.1 - 2.2     MAGNESIUM    Collection Time: 11/22/20  1:12 AM   Result Value Ref Range    Magnesium 1.6 1.6 - 2.4 mg/dL   CBC W/O DIFF    Collection Time: 11/22/20  1:12 AM   Result Value Ref Range    WBC 9.6 3.6 - 11.0 K/uL    RBC 3.72 (L) 3.80 - 5.20 M/uL    HGB 10.5 (L) 11.5 - 16.0 g/dL    HCT 32.4 (L) 35.0 - 47.0 %    MCV 87.1 80.0 - 99.0 FL    MCH 28.2 26.0 - 34.0 PG    MCHC 32.4 30.0 - 36.5 g/dL    RDW 15.9 (H) 11.5 - 14.5 %    PLATELET 719 207 - 131 K/uL    MPV 10.0 8.9 - 12.9 FL   TROPONIN I    Collection Time: 11/22/20  1:12 AM   Result Value Ref Range    Troponin-I, Qt. 0.09 (H) <0.05 ng/mL          Diagnostic Images:  CT Results  (Last 48 hours)               11/21/20 1856  CT HEAD WO CONT Final result    Impression:  IMPRESSION: No change compared to previous CT head September 24, 2020. Narrative:  CT head. Comparison CT head September 24, 2020       Axial images are reviewed along with reformatted sagittal/coronal images. Dose reduction: All CT scans at this facility are performed using dose reduction   optimization techniques as appropriate to a performed exam including the   following-   automated exposure control, adjustments of mA and/or Kv according to patient   size, or use of iterative reconstructive technique. Bone windows demonstrate normal aeration of the imaged sinuses and mastoid air   cells.        Review of intracranial content demonstrates periventricular/subcortical white   matter gliosis similar compared to prior imaging. Evidence to suggest advanced   nonacute end vessel occlusive change. Falx-based calcification. No intracranial   hemorrhage. Current Medications:    Current Facility-Administered Medications:     0.9% sodium chloride infusion, 100 mL/hr, IntraVENous, CONTINUOUS, Tr Berry MD, Last Rate: 100 mL/hr at 11/21/20 2114, 100 mL/hr at 11/21/20 2114    sodium chloride (NS) flush 5-40 mL, 5-40 mL, IntraVENous, Q8H, Erika Mena MD    sodium chloride (NS) flush 5-40 mL, 5-40 mL, IntraVENous, PRN, Erika Mena MD    acetaminophen (TYLENOL) tablet 650 mg, 650 mg, Oral, Q6H PRN, 650 mg at 11/22/20 0023 **OR** acetaminophen (TYLENOL) suppository 650 mg, 650 mg, Rectal, Q6H PRN, Erika Mena MD    polyethylene glycol (MIRALAX) packet 17 g, 17 g, Oral, DAILY PRN, Erika Mena MD    promethazine (PHENERGAN) tablet 12.5 mg, 12.5 mg, Oral, Q6H PRN **OR** ondansetron (ZOFRAN) injection 4 mg, 4 mg, IntraVENous, Q6H PRN, Erika Mena MD    glucose chewable tablet 16 g, 4 Tab, Oral, PRN, Erika Mena MD    dextrose (D50W) injection syrg 12.5-25 g, 25-50 mL, IntraVENous, PRN, Erika Mena MD    glucagon (GLUCAGEN) injection 1 mg, 1 mg, IntraMUSCular, PRN, Erika Mena MD    insulin lispro (HUMALOG) injection, , SubCUTAneous, AC&HS, Erika Mena MD    apixaban Parrisaline Iqbal) tablet 2.5 mg, 2.5 mg, Oral, BID, Erika Mena MD, 2.5 mg at 11/22/20 0023    aspirin chewable tablet 81 mg, 81 mg, Oral, DAILY, Erika Mena MD    ferrous sulfate tablet 325 mg, 1 Tab, Oral, DAILY, Erika Mena MD    hydrALAZINE (APRESOLINE) tablet 25 mg, 25 mg, Oral, Q6H PRN, Erika Mena MD     Carotid duplex studies: mild stenosis of the right ICA (less than 50%) and minimal stenosis of the left ICA.     Echocardiogram: normal left ventricle ejection fraction, moderate pulmonary hypertension and no evidence of atrial septal defect. ASSESSMENT:    Syncope: suspects secondary to vasovagal. Continue fall precautions. Consult neurology. Carotid duplex and echocardiogram performed September 2020 results reviewed. HTN: at goal, resume home antihypertensives: hydralazine, metoprolol, imdur, lasix    HX PE: resume home eliquis dosage    Type 2 DM: ac/hs accu check, med dose ssi    HX CVA: managed with statin and asa    Left hip pain: obtain left hip xr        Full Code  Eliquis Dvt Prophylaxis  GI Prophylaxis  Home medications reviewed and reconciled      Above treatment plan reviewed and discussed with patient in detail at bedside, all questions answered.     Care Plan discussed with: interdisciplinary team    Florence Cox NP

## 2020-11-23 ENCOUNTER — APPOINTMENT (OUTPATIENT)
Dept: MRI IMAGING | Age: 75
End: 2020-11-23
Attending: STUDENT IN AN ORGANIZED HEALTH CARE EDUCATION/TRAINING PROGRAM
Payer: MEDICARE

## 2020-11-23 LAB
GLUCOSE BLD STRIP.AUTO-MCNC: 121 MG/DL (ref 65–100)
GLUCOSE BLD STRIP.AUTO-MCNC: 124 MG/DL (ref 65–100)
GLUCOSE BLD STRIP.AUTO-MCNC: 136 MG/DL (ref 65–100)
PERFORMED BY, TECHID: ABNORMAL

## 2020-11-23 PROCEDURE — 99218 HC RM OBSERVATION: CPT

## 2020-11-23 PROCEDURE — 82962 GLUCOSE BLOOD TEST: CPT

## 2020-11-23 PROCEDURE — 70544 MR ANGIOGRAPHY HEAD W/O DYE: CPT

## 2020-11-23 PROCEDURE — 94760 N-INVAS EAR/PLS OXIMETRY 1: CPT

## 2020-11-23 PROCEDURE — 74011250637 HC RX REV CODE- 250/637: Performed by: HOSPITALIST

## 2020-11-23 PROCEDURE — 74011250637 HC RX REV CODE- 250/637: Performed by: FAMILY MEDICINE

## 2020-11-23 PROCEDURE — 74011250637 HC RX REV CODE- 250/637: Performed by: NURSE PRACTITIONER

## 2020-11-23 RX ORDER — CAPSAICIN 0.03 G/100G
CREAM TOPICAL 3 TIMES DAILY
Status: DISCONTINUED | OUTPATIENT
Start: 2020-11-23 | End: 2020-11-24 | Stop reason: HOSPADM

## 2020-11-23 RX ADMIN — FERROUS SULFATE TAB 325 MG (65 MG ELEMENTAL FE) 325 MG: 325 (65 FE) TAB at 09:16

## 2020-11-23 RX ADMIN — CAPSAICIN: 0.25 CREAM TOPICAL at 10:00

## 2020-11-23 RX ADMIN — HYDRALAZINE HYDROCHLORIDE 25 MG: 25 TABLET, FILM COATED ORAL at 22:01

## 2020-11-23 RX ADMIN — GABAPENTIN 300 MG: 300 CAPSULE ORAL at 09:16

## 2020-11-23 RX ADMIN — METOPROLOL SUCCINATE 25 MG: 25 TABLET, EXTENDED RELEASE ORAL at 09:15

## 2020-11-23 RX ADMIN — ISOSORBIDE MONONITRATE 60 MG: 60 TABLET, EXTENDED RELEASE ORAL at 09:15

## 2020-11-23 RX ADMIN — GABAPENTIN 300 MG: 300 CAPSULE ORAL at 22:00

## 2020-11-23 RX ADMIN — CETIRIZINE HYDROCHLORIDE 10 MG: 10 TABLET, FILM COATED ORAL at 09:16

## 2020-11-23 RX ADMIN — PRAVASTATIN SODIUM 80 MG: 20 TABLET ORAL at 22:01

## 2020-11-23 RX ADMIN — APIXABAN 2.5 MG: 2.5 TABLET, FILM COATED ORAL at 22:01

## 2020-11-23 RX ADMIN — APIXABAN 2.5 MG: 2.5 TABLET, FILM COATED ORAL at 09:16

## 2020-11-23 RX ADMIN — FUROSEMIDE 40 MG: 40 TABLET ORAL at 09:16

## 2020-11-23 RX ADMIN — Medication 10 ML: at 22:00

## 2020-11-23 RX ADMIN — POLYETHYLENE GLYCOL 3350 17 G: 17 POWDER, FOR SOLUTION ORAL at 22:00

## 2020-11-23 RX ADMIN — CALCITRIOL CAPSULES 0.25 MCG 0.25 MCG: 0.25 CAPSULE ORAL at 09:16

## 2020-11-23 RX ADMIN — ACETAMINOPHEN 650 MG: 325 TABLET, FILM COATED ORAL at 09:15

## 2020-11-23 RX ADMIN — Medication 10 ML: at 01:10

## 2020-11-23 RX ADMIN — ASPIRIN 81 MG CHEWABLE TABLET 81 MG: 81 TABLET CHEWABLE at 09:16

## 2020-11-23 RX ADMIN — VENLAFAXINE 75 MG: 50 TABLET ORAL at 09:16

## 2020-11-23 RX ADMIN — Medication 10 ML: at 17:43

## 2020-11-23 RX ADMIN — HYDRALAZINE HYDROCHLORIDE 25 MG: 25 TABLET, FILM COATED ORAL at 09:15

## 2020-11-23 RX ADMIN — CAPSAICIN: 0.25 CREAM TOPICAL at 17:41

## 2020-11-23 RX ADMIN — CAPSAICIN: 0.25 CREAM TOPICAL at 22:00

## 2020-11-23 NOTE — PROGRESS NOTES
NEUROLOGY  PROGRESS NOTE    Admission History/Pertinent Events  Natalie Alamo is a 67y.o. year old female who presented on 11/21/2020. Patient has a past medical history of TIA, HTN, HL, DM, CAD, CKD4, Gout, PE (apixaban), Hypothyroidism who presented for syncopal episodes. Per chart review, patient presented with a syncopal episode which occurred while patient was on the toilet and having a bowel movement. Patient reportedly had funny feeling without any chest discomfort and subsequently was found by family to be slumped over on the toilet with minimal responsiveness. In the ED, patient was found to have blood pressures of 100/63. Emergency CT head was negative for any acute findings. ASSESSMENT/PLAN      Impression  Patient's MRA of the head without any significant findings and negative for vertebrobasilar syndrome. Patient's EEG essentially normal.  No further neurologic work-up is necessary on an inpatient basis. Neuroimaging    [facility] WO  NA[facility]  [facility]    CUS 9/17/20  No significant stenosis in bilateral carotid systems  Physiologically for flowing bilateral vertebral arteries    EEG  Normal study    MRA Head WO  No occlusions, dissections, significant stenosis, pseudoaneurysms or aneurysms in the vasculature of the head      Other Studies/Events    TTE 9/17/20  EF normal, LA, RA normal, No PFO        Plan      1) Syncopal Episodes  -[id number], TELE   -Consider outpatient Holter monitor, stress test or tilt table test if the patient has syncopal episodes continue    2) History of TIA  -Stroke Prophylaxis: Apixaban 2.5 BID, ASA 81, Pravastatin 80  -SBP Goal < 160    No further neurologic work-up is necessary. Patient to follow-up with Dr. [facility]) in a week and a half from discharge. SUBJECTIVE   Patient doing well this morning without any complaints. She reports she was able to ambulate to the bathroom without any difficulty. No return of syncopal episodes. Neurological examination stable.       OBJECTIVE  Vital Signs  Temp:  [97.9 °F (36.6 °C)-98.6 °F (37 °C)]   Pulse (Heart Rate):  [68-90]   BP: (100-158)/(51-80)   Resp Rate:  [12-20]   O2 Sat (%):  [98 %-99 %]   Weight:  [89.7 kg (197 lb 12 oz)]     MEDICATIONS    Current Facility-Administered Medications:     capsicum oleoresin 0.025 % cream, , Topical, TID, Carlton Guan NP    metoprolol succinate (TOPROL-XL) XL tablet 25 mg, 25 mg, Oral, DAILY, Serina Perez NP, 25 mg at 11/22/20 1119    pravastatin (PRAVACHOL) tablet 80 mg, 80 mg, Oral, QHS, Serina Perez NP, 80 mg at 11/22/20 2100    hydrALAZINE (APRESOLINE) tablet 25 mg, 25 mg, Oral, BID, Carlton Guan NP, 25 mg at 11/22/20 2100    calcitRIOL (ROCALTROL) capsule 0.25 mcg, 0.25 mcg, Oral, DAILY, Serina Perez NP, 0.25 mcg at 11/22/20 1120    cetirizine (ZYRTEC) tablet 10 mg, 10 mg, Oral, DAILY, Serina Perez NP, 10 mg at 11/22/20 1119    isosorbide mononitrate ER (IMDUR) tablet 60 mg, 60 mg, Oral, DAILY, Serina Perez NP, 60 mg at 11/22/20 1120    venlafaxine (EFFEXOR) tablet 75 mg, 75 mg, Oral, DAILY, Serina Perez NP, 75 mg at 11/22/20 1120    furosemide (LASIX) tablet 40 mg, 40 mg, Oral, DAILY, Serina Perez NP, 40 mg at 11/22/20 1119    gabapentin (NEURONTIN) capsule 300 mg, 300 mg, Oral, BID, Vicenta Montiel MD, 300 mg at 11/22/20 2100    0.9% sodium chloride infusion, 100 mL/hr, IntraVENous, CONTINUOUS, Michelle Berry MD, Last Rate: 100 mL/hr at 11/21/20 2114, 100 mL/hr at 11/21/20 2114    sodium chloride (NS) flush 5-40 mL, 5-40 mL, IntraVENous, Q8H, Anna Ellison MD, 10 mL at 11/23/20 0110    sodium chloride (NS) flush 5-40 mL, 5-40 mL, IntraVENous, PRN, Kin King MD    acetaminophen (TYLENOL) tablet 650 mg, 650 mg, Oral, Q6H PRN, 650 mg at 11/22/20 2149 **OR** acetaminophen (TYLENOL) suppository 650 mg, 650 mg, Rectal, Q6H PRN, Kin King MD    polyethylene glycol (MIRALAX) packet 17 g, 17 g, Oral, DAILY PRN, Dara Mohan MD, 17 g at 11/22/20 2149    promethazine (PHENERGAN) tablet 12.5 mg, 12.5 mg, Oral, Q6H PRN **OR** ondansetron (ZOFRAN) injection 4 mg, 4 mg, IntraVENous, Q6H PRN, Dara Mohan MD    glucose chewable tablet 16 g, 4 Tab, Oral, PRN, Dara Mohan MD    dextrose (D50W) injection syrg 12.5-25 g, 25-50 mL, IntraVENous, PRN, Dara Mohan MD    glucagon (GLUCAGEN) injection 1 mg, 1 mg, IntraMUSCular, PRN, Dara Mohan MD    insulin lispro (HUMALOG) injection, , SubCUTAneous, AC&HS, Dara Mohan MD, Stopped at 11/22/20 1630    apixaban (ELIQUIS) tablet 2.5 mg, 2.5 mg, Oral, BID, Dara Mohan MD, 2.5 mg at 11/22/20 2100    aspirin chewable tablet 81 mg, 81 mg, Oral, DAILY, Dara Mohan MD, 81 mg at 11/22/20 0847    ferrous sulfate tablet 325 mg, 1 Tab, Oral, DAILY, Dara Mohan MD    hydrALAZINE (APRESOLINE) tablet 25 mg, 25 mg, Oral, Q6H PRN, Dara Mohan MD      Physical/Neurological Exam  General: Elderly -American female     Patient awake, alert; following central and peripheral commands   No expressive or receptive aphasia; No dysarthria   Pupils react to light bilaterally; EOM Intact   No visual field deficits on gross exam   Intact to light touch on face bilaterally   No facial droop   Motor: 5/5 Throughout except left lower extremity which is 3/5 from patient is reported knee replacement in pain limited  No abnormal movements   Sensation to light touch intact grossly throughout       Labs: I've reviewed the labs for today     This document has been prepared by the Dragon voice recognition system, typographical errors may have occurred.  Attempts have been made to correct errors, however inadvertent errors may persist.

## 2020-11-23 NOTE — ROUTINE PROCESS
Verbal bedside change of shift report provided to Jeanine Mcintosh RN. Report included SBAR, relevant labs and assessment data, plan of care for the day, and opportunities to ask questions.

## 2020-11-23 NOTE — PROGRESS NOTES
NEUROLOGY  PROGRESS NOTE    Admission History/Pertinent Events  Gus Patricia is a 67y.o. year old female who presented on 11/21/2020. Patient has a past medical history of TIA, HTN, HL, DM, CAD, CKD4, Gout, PE (apixaban), Hypothyroidism who presented for syncopal episodes. Per chart review, patient presented with a syncopal episode which occurred while patient was on the toilet and having a bowel movement. Patient reportedly had funny feeling without any chest discomfort and subsequently was found by family to be slumped over on the toilet with minimal responsiveness. In the ED, patient was found to have blood pressures of 100/63. Emergency CT head was negative for any acute findings. ASSESSMENT/PLAN      Impression  Will follow up on patient's EEG and plan MRA of the head without contrast to evaluate for vertebrobasilar insufficiency. If these are within normal limits that no further neurologic workup will be necessary on an inpatient basis. Neuroimaging    CT[facility] WO  NAICA  [facility]    CUS 9/17/20  No significant stenosis in bilateral carotid systems  Physiologically for flowing bilateral vertebral arteries    EEG  -[]    -MRA Head WO  []    Other Studies/Events    TTE 9/17/20  EF normal, LA, RA normal, No PFO        Plan      1) Syncopal Episodes  -[id number], TELE   -Consider outpatient Holter monitor, stress test or tilt table test the patient has syncopal episodes continue  -F/U EEG, MRA Head WO    2) History of TIA  -Stroke Prophylaxis: Apixaban 2.5 BID, ASA 81, Pravastatin 80  -SBP Goal < 160        SUBJECTIVE   Patient reporting a slight headache this morning but otherwise without complaints.       OBJECTIVE  Vital Signs  Temp:  [98 °F (36.7 °C)-98.6 °F (37 °C)]   Pulse (Heart Rate):  [70-90]   BP: (100-158)/(53-80)   Resp Rate:  [12-20]   O2 Sat (%):  [98 %-99 %]   Weight:  [89.7 kg (197 lb 12 oz)]     MEDICATIONS    Current Facility-Administered Medications:     metoprolol succinate (TOPROL-XL) XL tablet 25 mg, 25 mg, Oral, DAILY, Corky Rye, NP, 25 mg at 11/22/20 1119    pravastatin (PRAVACHOL) tablet 80 mg, 80 mg, Oral, QHS, Corky Rye, NP    hydrALAZINE (APRESOLINE) tablet 25 mg, 25 mg, Oral, BID, Corky Rye, NP    calcitRIOL (ROCALTROL) capsule 0.25 mcg, 0.25 mcg, Oral, DAILY, Corky Rye, NP, 0.25 mcg at 11/22/20 1120    cetirizine (ZYRTEC) tablet 10 mg, 10 mg, Oral, DAILY, Corky Rye, NP, 10 mg at 11/22/20 1119    isosorbide mononitrate ER (IMDUR) tablet 60 mg, 60 mg, Oral, DAILY, Corky Rye, NP, 60 mg at 11/22/20 1120    venlafaxine (EFFEXOR) tablet 75 mg, 75 mg, Oral, DAILY, Corky Rye, NP, 75 mg at 11/22/20 1120    furosemide (LASIX) tablet 40 mg, 40 mg, Oral, DAILY, Corky Rye, NP, 40 mg at 11/22/20 1119    gabapentin (NEURONTIN) capsule 300 mg, 300 mg, Oral, BID, Cristina Gunderson MD    0.9% sodium chloride infusion, 100 mL/hr, IntraVENous, CONTINUOUS, Kristi [facility], MD, Last Rate: 100 mL/hr at 11/21/20 2114, 100 mL/hr at 11/21/20 2114    sodium chloride (NS) flush 5-40 mL, 5-40 mL, IntraVENous, Q8H, Michael Cortes MD, 10 mL at 11/22/20 1400    sodium chloride (NS) flush 5-40 mL, 5-40 mL, IntraVENous, PRN, Michael Cortes MD    acetaminophen (TYLENOL) tablet 650 mg, 650 mg, Oral, Q6H PRN, 650 mg at 11/22/20 0023 **OR** acetaminophen (TYLENOL) suppository 650 mg, 650 mg, Rectal, Q6H PRN, Michael Cortes MD    polyethylene glycol (MIRALAX) packet 17 g, 17 g, Oral, DAILY PRN, Michael Cortes MD    promethazine (PHENERGAN) tablet 12.5 mg, 12.5 mg, Oral, Q6H PRN **OR** ondansetron (ZOFRAN) injection 4 mg, 4 mg, IntraVENous, Q6H PRN, Michael Cortes MD    glucose chewable tablet 16 g, 4 Tab, Oral, PRN, Michael Cortes MD    dextrose (D50W) injection syrg 12.5-25 g, 25-50 mL, IntraVENous, PRN, Michael Cortes MD    glucagon (GLUCAGEN) injection 1 mg, 1 mg, IntraMUSCular, PRN, Michael Cortes MD    insulin lispro (HUMALOG) injection, , SubCUTAneous, AC&HS, Anila Benitez MD, Stopped at 11/22/20 1630    apixaban (ELIQUIS) tablet 2.5 mg, 2.5 mg, Oral, BID, Anila Benitez MD, 2.5 mg at 11/22/20 0847    aspirin chewable tablet 81 mg, 81 mg, Oral, DAILY, Anila Benitez MD, 81 mg at 11/22/20 0847    ferrous sulfate tablet 325 mg, 1 Tab, Oral, DAILY, Anila Benitez MD    hydrALAZINE (APRESOLINE) tablet 25 mg, 25 mg, Oral, Q6H PRN, Anila Benitez MD      Physical/Neurological Exam  General: Elderly -American female     Patient awake, alert; following central and peripheral commands   No expressive or receptive aphasia; No dysarthria   Pupils react to light bilaterally; EOM Intact   No visual field deficits on gross exam   Intact to light touch on face bilaterally   No facial droop   Motor: 5/5 Throughout except left lower extremity which is 3/5 from patient is reported knee replacement in pain limited  No abnormal movements   Sensation to light touch intact grossly throughout       Labs: I've reviewed the labs for today     This document has been prepared by the Dragon voice recognition system, typographical errors may have occurred.  Attempts have been made to correct errors, however inadvertent errors may persist.

## 2020-11-23 NOTE — PROGRESS NOTES
Hospitalist Progress Note         PASHA Desai, FNP-C    Daily Progress Note: 11/23/2020    HPI: Kaela Murrieta is a 67 y.o. female with multiple medical problems including chronic kidney disease stage V (baseline creatinine 2.0), pulmonary embolism on Eliquis, hypertension, hyperlipidemia and insulin-dependent diabetes mellitus type 2 presenting from home after syncopal episode. Ms. Eileen Langston was sitting on the toilet having a bowel movement when she started feeling \"funny. \"  Patient denies having chest pain, shortness of breath, palpitations, nausea, vomiting, diarrhea. She reports feeling good and eating well over the past several days. Family went to check on her in the bathroom and found her slumped over on the toilet unresponsive. Patient has little recollection of events and next remembers waking up in the ambulance. Emergency medical services were called. Initially, patient was hypoxic requiring oxygen supplementation. She presented to this facility with a temperature 98.2 °F, pulse 80, respirations 17, blood pressure 100/63 and oxygen saturation 8% on room air. EKG has normal sinus rhythm, 81 bpm.  Initial troponin was within normal limits. CT of the head has no evidence of acute intracranial abnormalities. Chest x-ray has no evidence of acute cardiopulmonary process. Hospital service has been asked to admit Ms. Eileen Langston for further treatment and evaluation.      Ms. Eileen Langston was admitted in September for transient ischemic attack and episode of unresponsiveness. On September 17, echocardiogram was obtained, report indicates normal left ventricle ejection fraction, moderate pulmonary hypertension and no evidence of atrial septal defect. Carotid duplex studies reports mild stenosis of the right ICA (less than 50%) and minimal stenosis of the left ICA.     Past medical history, past surgical history, family history and social history was obtained at the time of admission.   I reviewed 2 previous discharge summaries including outpatient office visits and medication lists are inconsistent. Ms. Maier Case could not verify all home medications and dosing at the time of admission. Patient is a full code. Point of contact is her daughter, Layla Duran, who can be reached at [phone]. Subjective:   Subjective   Patient seen on f/u alert and oriented laying in bed  Reports left knee pain today  No acute distress noted    Review of Systems:   Review of Systems   Constitutional: Negative. HENT: Negative. Eyes: Negative. Respiratory: Negative. Cardiovascular: Negative. Gastrointestinal: Negative. Genitourinary: Negative. Musculoskeletal: Positive for joint pain. Left hip pain   Skin: Negative. Neurological: Negative. Endo/Heme/Allergies: Negative. Psychiatric/Behavioral: Negative. Objective:   Objective      Vitals:  Patient Vitals for the past 12 hrs:   Temp Pulse Resp BP SpO2   11/23/20 0432 97.9 °F (36.6 °C) 72 18 136/66 98 %   11/23/20 0021 98.3 °F (36.8 °C) 69 18 (!) 146/65 98 %   11/23/20 0000 -- 73 -- -- --        Physical Exam:  Physical Exam  Vitals signs and nursing note reviewed. Constitutional:       Appearance: She is obese. HENT:      Head: Normocephalic. Nose: Nose normal.      Mouth/Throat:      Mouth: Mucous membranes are moist.   Neck:      Musculoskeletal: Normal range of motion. Cardiovascular:      Rate and Rhythm: Normal rate and regular rhythm. Pulmonary:      Effort: Pulmonary effort is normal.      Breath sounds: Normal breath sounds. Abdominal:      General: Bowel sounds are normal.      Palpations: Abdomen is soft. Musculoskeletal:      Comments: Limited rom LLE   Skin:     General: Skin is warm and dry. Capillary Refill: Capillary refill takes less than 2 seconds. Neurological:      General: No focal deficit present. Mental Status: She is alert and oriented to person, place, and time.    Psychiatric:         Mood and Affect: Mood normal.         Behavior: Behavior normal.          Lab Results:  Recent Results (from the past 24 hour(s))   TROPONIN I    Collection Time: 11/22/20  9:50 AM   Result Value Ref Range    Troponin-I, Qt. 0.07 (H) <0.05 ng/mL   GLUCOSE, POC    Collection Time: 11/22/20 12:08 PM   Result Value Ref Range    Glucose (POC) 256 (H) 65 - 100 mg/dL    Performed by [address], POC    Collection Time: 11/22/20  3:59 PM   Result Value Ref Range    Glucose (POC) 178 (H) 65 - 100 mg/dL    Performed by [address], POC    Collection Time: 11/22/20  8:49 PM   Result Value Ref Range    Glucose (POC) 142 (H) 65 - 100 mg/dL    Performed by [facility]           Diagnostic Images:  CT Results  (Last 48 hours)               11/21/20 1856  CT HEAD WO CONT Final result    Impression:  IMPRESSION: No change compared to previous CT head September 24, 2020. Narrative:  CT head. Comparison CT head September 24, 2020       Axial images are reviewed along with reformatted sagittal/coronal images. Dose reduction: All CT scans at this facility are performed using dose reduction   optimization techniques as appropriate to a performed exam including the   following-   automated exposure control, adjustments of mA and/or Kv according to patient   size, or use of iterative reconstructive technique. Bone windows demonstrate normal aeration of the imaged sinuses and mastoid air   cells. Review of intracranial content demonstrates periventricular/subcortical white   matter gliosis similar compared to prior imaging. Evidence to suggest advanced   nonacute end vessel occlusive change. Falx-based calcification. No intracranial   hemorrhage.                  Current Medications:    Current Facility-Administered Medications:     metoprolol succinate (TOPROL-XL) XL tablet 25 mg, 25 mg, Oral, DAILY, Serina Perez NP, 25 mg at 11/22/20 1119    pravastatin (PRAVACHOL) tablet 80 mg, 80 mg, Oral, Tanisha Ards, NP, 80 mg at 11/22/20 2100    hydrALAZINE (APRESOLINE) tablet 25 mg, 25 mg, Oral, BID, Nan Earnest, NP, 25 mg at 11/22/20 2100    calcitRIOL (ROCALTROL) capsule 0.25 mcg, 0.25 mcg, Oral, DAILY, Nan Earnest, NP, 0.25 mcg at 11/22/20 1120    cetirizine (ZYRTEC) tablet 10 mg, 10 mg, Oral, DAILY, Nan Earnest, NP, 10 mg at 11/22/20 1119    isosorbide mononitrate ER (IMDUR) tablet 60 mg, 60 mg, Oral, DAILY, Nan Earnest, NP, 60 mg at 11/22/20 1120    venlafaxine (EFFEXOR) tablet 75 mg, 75 mg, Oral, DAILY, Nan Earnest, NP, 75 mg at 11/22/20 1120    furosemide (LASIX) tablet 40 mg, 40 mg, Oral, DAILY, Nan Earnest, NP, 40 mg at 11/22/20 1119    gabapentin (NEURONTIN) capsule 300 mg, 300 mg, Oral, BID, Karen Grier MD, 300 mg at 11/22/20 2100    0.9% sodium chloride infusion, 100 mL/hr, IntraVENous, CONTINUOUS, Joanne Berry MD, Last Rate: 100 mL/hr at 11/21/20 2114, 100 mL/hr at 11/21/20 2114    sodium chloride (NS) flush 5-40 mL, 5-40 mL, IntraVENous, Q8H, Sunita Anthony MD, 10 mL at 11/23/20 0110    sodium chloride (NS) flush 5-40 mL, 5-40 mL, IntraVENous, PRN, Sunita Anthony MD    acetaminophen (TYLENOL) tablet 650 mg, 650 mg, Oral, Q6H PRN, 650 mg at 11/22/20 2149 **OR** acetaminophen (TYLENOL) suppository 650 mg, 650 mg, Rectal, Q6H PRN, Sunita Anthony MD    polyethylene glycol (MIRALAX) packet 17 g, 17 g, Oral, DAILY PRN, Sunita Anthony MD, 17 g at 11/22/20 2149    promethazine (PHENERGAN) tablet 12.5 mg, 12.5 mg, Oral, Q6H PRN **OR** ondansetron (ZOFRAN) injection 4 mg, 4 mg, IntraVENous, Q6H PRN, Sunita Anthony MD    glucose chewable tablet 16 g, 4 Tab, Oral, PRN, Sunita Anthony MD    dextrose (D50W) injection syrg 12.5-25 g, 25-50 mL, IntraVENous, PRN, Sunita Anthony MD    glucagon (GLUCAGEN) injection 1 mg, 1 mg, IntraMUSCular, PRN, Sunita Anthony MD    insulin lispro (HUMALOG) injection, , SubCUTAneous, AC&HS, Miroslava Hightower, Kolby Camp MD, Stopped at 11/22/20 1630    apixaban (ELIQUIS) tablet 2.5 mg, 2.5 mg, Oral, BID, Connie Hale MD, 2.5 mg at 11/22/20 2100    aspirin chewable tablet 81 mg, 81 mg, Oral, DAILY, Connie Hale MD, 81 mg at 11/22/20 0847    ferrous sulfate tablet 325 mg, 1 Tab, Oral, DAILY, Connie Hale MD    hydrALAZINE (APRESOLINE) tablet 25 mg, 25 mg, Oral, Q6H PRN, Connie Hale MD     Carotid duplex studies: mild stenosis of the right ICA (less than 50%) and minimal stenosis of the left ICA. Echocardiogram: normal left ventricle ejection fraction, moderate pulmonary hypertension and no evidence of atrial septal defect. ASSESSMENT:    Syncope: suspects secondary to vasovagal. Continue fall precautions. Carotid duplex and echocardiogram performed September 2020 results reviewed. MRI brain and EEG pending, neurology following. HTN: at goal, continue home antihypertensives: hydralazine, metoprolol, imdur, lasix    HX PE: continue home eliquis dosage    Type 2 DM: ac/hs accu check, med dose ssi    HX CVA: managed with statin and asa        Full Code  Eliquis Dvt Prophylaxis  GI Prophylaxis  Home medications reviewed and reconciled      Above treatment plan reviewed and discussed with patient in detail at bedside, all questions answered.     Care Plan discussed with: interdisciplinary team    Lonnie Starkey NP

## 2020-11-23 NOTE — PROGRESS NOTES
Progress Note Date:2020       Room:[facility] Patient Name:Tamy Moore     YOB: 1945     Age:75 y.o. Subjective Subjective: 
Symptoms:  Stable. No shortness of breath, cough, chest pain or chest pressure. Diet:  Adequate intake. No nausea or vomiting. Activity level: Returning to normal.   
Pain:  She complains of pain that is mild. She reports pain is unchanged. Pain is partially controlled. (Left knee/hip ). Review of Systems Constitutional: Positive for activity change. Negative for appetite change, chills, fatigue and fever. HENT: Negative. Eyes: Negative. Respiratory: Negative for cough, choking and shortness of breath. Cardiovascular: Negative for chest pain and leg swelling. Gastrointestinal: Negative for abdominal distention, abdominal pain, nausea and vomiting. Endocrine: Negative. Genitourinary: Negative. Musculoskeletal: Negative. Skin: Negative. Allergic/Immunologic: Negative. Neurological: Negative. Hematological: Negative. Psychiatric/Behavioral: Negative. Objective Vitals Last 24 Hours: TEMPERATURE:  Temp  Av.2 °F (36.8 °C)  Min: 97.9 °F (36.6 °C)  Max: 98.4 °F (36.9 °C) RESPIRATIONS RANGE: Resp  Av.4  Min: 18  Max: 20 
PULSE OXIMETRY RANGE: SpO2  Av %  Min: 98 %  Max: 98 % PULSE RANGE: Pulse  Av.9  Min: 68  Max: 74 BLOOD PRESSURE RANGE: Systolic (02ECE), LXZ:251 , Min:130 , YMI:490  
; Diastolic (08GYX), DEU:21, Min:51, Max:74 I/O (24Hr): Intake/Output Summary (Last 24 hours) at 2020 3032 Last data filed at 2020 4152 Gross per 24 hour Intake 798 ml Output  Net 798 ml Objective: 
General Appearance:  Comfortable, in no acute distress and not in pain. Vital signs: (most recent): Blood pressure 136/66, pulse 72, temperature 97.9 °F (36.6 °C), resp.  rate 18, height 5' 4.02\" (1.626 m), weight 89.7 kg (197 lb 12 oz), SpO2 98 %. Vital signs are normal.  No fever. Output: Producing urine. HEENT: Normal HEENT exam.   
Lungs:  Normal effort and normal respiratory rate. Breath sounds clear to auscultation. She is not in respiratory distress. No decreased breath sounds. Heart: Normal rate. Regular rhythm. S1 normal and S2 normal.  No murmur or gallop. Chest: Symmetric chest wall expansion. No chest wall tenderness. Abdomen: Abdomen is soft and non-distended. There are no signs of ascites. Bowel sounds are normal.   There is no abdominal tenderness. Extremities: Normal range of motion. Pulses: Distal pulses are intact. Neurological: Patient is alert and oriented to person, place and time. Normal strength. GCS score is 15. Pupils:  Pupils are equal, round, and reactive to light. Skin:  Warm and dry. Labs/Imaging/Diagnostics Labs: CBC: 
Recent Labs  
  11/22/20 0112 11/21/20 
1810 WBC 9.6 6.6  
RBC 3.72* 3.79* HGB 10.5* 10.7* HCT 32.4* 33.1* MCV 87.1 87.3 RDW 15.9* 15.8*  192 CHEMISTRIES: 
Recent Labs  
  11/22/20 0112 11/21/20 
1810  139  
K 4.3 4.1  106 CO2 27 25 BUN 55* 55* CREA 2.54* 2.63* CA 9.4 9.6 MG 1.6  --   
PT/INR:No results for input(s): INR, INREXT in the last 72 hours. No lab exists for component: PROTIME APTT:No results for input(s): APTT in the last 72 hours. LIVER PROFILE: 
Recent Labs  
  11/22/20 0112 AST 36 ALT 27 Lab Results Component Value Date/Time ALT (SGPT) 27 11/22/2020 01:12 AM  
 AST (SGOT) 36 11/22/2020 01:12 AM  
 Alk. phosphatase 119 (H) 11/22/2020 01:12 AM  
 Bilirubin, total 0.2 11/22/2020 01:12 AM  
 
 
Imaging Last 24 Hours: 
Xr Hip Lt W Or Wo Pelv 2-3 Vws Result Date: 11/22/2020 Left hip, 3 views No plain film evidence for fracture or dislocation. Mild/moderate DJD left hip. Assessment//Plan Patient Active Problem List  
 Diagnosis Date Noted  Syncope 11/21/2020  Episode of unresponsiveness 09/24/2020  Arteriosclerosis of coronary artery 09/16/2020  Dyslipidemia 09/16/2020  Stage 5 chronic [facility] 09/16/2020  
 HTN (hypertension) 09/16/2020  Type 2 diabetes mellitus ([facility] 09/16/2020  TIA (transient ischemic attack) 09/16/2020  Adenocarcinoma, renal cell ([facility] 09/02/2020  Morbid obesity ([facility] 09/02/2020  Peripheral vascular disease ([facility] 09/02/2020  Arthritis 08/21/2019  Hyperlipidemia 08/21/2019  Essential hypertension 08/21/2019  Impingement syndrome of shoulder region 08/21/2019  Pulmonary embolism ([facility] 02/01/2015  Cerebrovascular accident (CVA) ([facility] 08/01/2014 Assessment & Plan Syncopal episode - CT 11/21 - Negative. Xray Chest - Neg. Bilat Carotid US 9/17/20 - Tortuous distal LEATHA with minimal stenosis,calcified plaque L-bulb without LICA stenosis,normal vertebral dopplers. Initial Trop - Neg. Repeat 0.09. Orthostatic BP. Consult Neurology. Plan for EEG,  MRA w/out contrast 
  
Elevated Troponin: Troponin 0.07 - 0.09. Continue telemetry. Monitor for chest pain. Consult Cardiology Hypertension - BP Stable. Continue home dose of hydralazine, Metoprolol, Imdur  
  
Anemia - Hgb 10.5. Will Coninue to monitor. Continue Ferrous sulfate  
  
CKD Stage V - Creat 2.54. Baseline creat 2.0. Consult Nephrology for recs.   
  
DM Type II - ACHS blood glucose checks. -256. Continue ISS. Monitor for signs of Hypoglycemia  
  
Hx PE - Continue home dose of Eliquis. Monitor for signs of bleeding.  
  
Hx Hyperlipidemia - Continue home dose of Pravastatin 
  
Generalized Weakness: PT/OT for Mobilization optimization  
  
  
Plan:  
Neurology following Consult Cardiology EEG/MRAw/out contrast pending Continue Tele Monitoring AM Labs PT/OT for Mobilization  
  
CM for assistance with discharge planning.   
  
 
 
 
Electronically signed by Latanya Greer on 11/23/2020 at 8:29 AM

## 2020-11-24 VITALS
BODY MASS INDEX: 33.76 KG/M2 | HEIGHT: 64 IN | OXYGEN SATURATION: 99 % | TEMPERATURE: 97.4 F | WEIGHT: 197.75 LBS | HEART RATE: 63 BPM | RESPIRATION RATE: 21 BRPM | DIASTOLIC BLOOD PRESSURE: 56 MMHG | SYSTOLIC BLOOD PRESSURE: 131 MMHG

## 2020-11-24 LAB
GLUCOSE BLD STRIP.AUTO-MCNC: 142 MG/DL (ref 65–100)
GLUCOSE BLD STRIP.AUTO-MCNC: 176 MG/DL (ref 65–100)
PERFORMED BY, TECHID: ABNORMAL
PERFORMED BY, TECHID: ABNORMAL

## 2020-11-24 PROCEDURE — 99218 HC RM OBSERVATION: CPT

## 2020-11-24 PROCEDURE — 82962 GLUCOSE BLOOD TEST: CPT

## 2020-11-24 PROCEDURE — 74011250637 HC RX REV CODE- 250/637: Performed by: NURSE PRACTITIONER

## 2020-11-24 PROCEDURE — 74011636637 HC RX REV CODE- 636/637: Performed by: FAMILY MEDICINE

## 2020-11-24 PROCEDURE — 74011250637 HC RX REV CODE- 250/637: Performed by: HOSPITALIST

## 2020-11-24 PROCEDURE — 74011250637 HC RX REV CODE- 250/637: Performed by: FAMILY MEDICINE

## 2020-11-24 RX ADMIN — APIXABAN 2.5 MG: 2.5 TABLET, FILM COATED ORAL at 08:40

## 2020-11-24 RX ADMIN — GABAPENTIN 300 MG: 300 CAPSULE ORAL at 09:00

## 2020-11-24 RX ADMIN — Medication 10 ML: at 06:00

## 2020-11-24 RX ADMIN — ISOSORBIDE MONONITRATE 60 MG: 60 TABLET, EXTENDED RELEASE ORAL at 08:40

## 2020-11-24 RX ADMIN — FERROUS SULFATE TAB 325 MG (65 MG ELEMENTAL FE) 325 MG: 325 (65 FE) TAB at 08:41

## 2020-11-24 RX ADMIN — ASPIRIN 81 MG CHEWABLE TABLET 81 MG: 81 TABLET CHEWABLE at 08:40

## 2020-11-24 RX ADMIN — VENLAFAXINE 75 MG: 50 TABLET ORAL at 08:40

## 2020-11-24 RX ADMIN — CETIRIZINE HYDROCHLORIDE 10 MG: 10 TABLET, FILM COATED ORAL at 08:41

## 2020-11-24 RX ADMIN — CAPSAICIN: 0.25 CREAM TOPICAL at 08:46

## 2020-11-24 RX ADMIN — CALCITRIOL CAPSULES 0.25 MCG 0.25 MCG: 0.25 CAPSULE ORAL at 08:40

## 2020-11-24 RX ADMIN — INSULIN LISPRO 2 UNITS: 100 INJECTION, SOLUTION INTRAVENOUS; SUBCUTANEOUS at 13:35

## 2020-11-24 RX ADMIN — FUROSEMIDE 40 MG: 40 TABLET ORAL at 08:41

## 2020-11-24 RX ADMIN — METOPROLOL SUCCINATE 25 MG: 25 TABLET, EXTENDED RELEASE ORAL at 08:41

## 2020-11-24 RX ADMIN — HYDRALAZINE HYDROCHLORIDE 25 MG: 25 TABLET, FILM COATED ORAL at 08:40

## 2020-11-24 NOTE — PROCEDURES
[facility]  Neuro[facility] Lab  Routine Electroencephalogram Report     Jeremiah Hayden  [id number]  Study Number: [id number]  Referring Provider: Virgie Mullins MD  Study Date: 11/23/20  Interpretation Date: 11/24/20       INDICATIONS FOR PROCEDURE    Patient is a 58-year-old female with a past medical history of TIA, HTN, HL, DM, CAD, CKD 4, gout, pulmonary emboli on apixaban, hypothyroidism who presented with multiple syncopal episodes. Routine EEG being done to evaluate for abnormal brain activity, cortical irritability or epileptic activity as to the etiology of patient's presenting symptoms.        MEDICATIONS      Current Facility-Administered Medications:     capsicum oleoresin 0.025 % cream, , Topical, TID, Gloria Earwilliant, NP    metoprolol succinate (TOPROL-XL) XL tablet 25 mg, 25 mg, Oral, DAILY, Chris, Serina, NP, 25 mg at 11/23/20 0915    pravastatin (PRAVACHOL) tablet 80 mg, 80 mg, Oral, QHS, ChrisMistyi Joe, NP, 80 mg at 11/22/20 2100    hydrALAZINE (APRESOLINE) tablet 25 mg, 25 mg, Oral, BID, Gloria Earnest, NP, 25 mg at 11/23/20 0915    calcitRIOL (ROCALTROL) capsule 0.25 mcg, 0.25 mcg, Oral, DAILY, Nan Earnest, NP, 0.25 mcg at 11/23/20 0916    cetirizine (ZYRTEC) tablet 10 mg, 10 mg, Oral, DAILY, ChrisMistyi Joe, NP, 10 mg at 11/23/20 5223    isosorbide mononitrate ER (IMDUR) tablet 60 mg, 60 mg, Oral, DAILY, Chris, Radha Nestle, NP, 60 mg at 11/23/20 0915    venlafaxine (EFFEXOR) tablet 75 mg, 75 mg, Oral, DAILY, Chris Radha Nestle, NP, 75 mg at 11/23/20 0916    furosemide (LASIX) tablet 40 mg, 40 mg, Oral, DAILY, Chris, Radha Nestle, NP, 40 mg at 11/23/20 0916    gabapentin (NEURONTIN) capsule 300 mg, 300 mg, Oral, BID, Karen Grier MD, 300 mg at 11/23/20 0916    0.9% sodium chloride infusion, 100 mL/hr, IntraVENous, CONTINUOUS, JuniataJoanne MD, Last Rate: 100 mL/hr at 11/21/20 2114, 100 mL/hr at 11/21/20 2114    sodium chloride (NS) flush 5-40 mL, 5-40 mL, IntraVENous, Q8H, Erika Mena MD, 10 mL at 11/23/20 1743    sodium chloride (NS) flush 5-40 mL, 5-40 mL, IntraVENous, PRN, Erika Mena MD    acetaminophen (TYLENOL) tablet 650 mg, 650 mg, Oral, Q6H PRN, 650 mg at 11/23/20 0915 **OR** acetaminophen (TYLENOL) suppository 650 mg, 650 mg, Rectal, Q6H PRN, Erika Mena MD    polyethylene glycol (MIRALAX) packet 17 g, 17 g, Oral, DAILY PRN, Erika Mena MD, 17 g at 11/22/20 2149    promethazine (PHENERGAN) tablet 12.5 mg, 12.5 mg, Oral, Q6H PRN **OR** ondansetron (ZOFRAN) injection 4 mg, 4 mg, IntraVENous, Q6H PRN, Erika Mena MD    glucose chewable tablet 16 g, 4 Tab, Oral, PRN, Erika Mena MD    dextrose (D50W) injection syrg 12.5-25 g, 25-50 mL, IntraVENous, PRN, Erika Mena MD    glucagon (GLUCAGEN) injection 1 mg, 1 mg, IntraMUSCular, PRN, Erika Mena MD    insulin lispro (HUMALOG) injection, , SubCUTAneous, AC&HS, Erika Mena MD, Stopped at 11/22/20 1630    apixaban (ELIQUIS) tablet 2.5 mg, 2.5 mg, Oral, BID, Erika Mena MD, 2.5 mg at 11/23/20 8273    aspirin chewable tablet 81 mg, 81 mg, Oral, DAILY, Erika Mena MD, 81 mg at 11/23/20 4118    ferrous sulfate tablet 325 mg, 1 Tab, Oral, DAILY, Erika Mena MD, 325 mg at 11/23/20 8636    hydrALAZINE (APRESOLINE) tablet 25 mg, 25 mg, Oral, Q6H PRN, Erika Mena MD      DESCRIPTION OF PROCEDURE    Electrodes were applied using paste technique in positions dictated by the International 10-20 system of placement. Recording montages included both referential and bipolar derivations. In addition to EEG data, EKG and eye movements were recorded. This routine EEG with video began at 1156 AM on 11/23/20. The recording time of the study is 31 minutes and 35 seconds.         States: awake, drowsy, asleep      DESCRIPTION OF ACTIVITIES    The background is continuous with a frequency of 8 to 8.5 Hz and amplitude of 15 to 35 µV intermixed with faster medium voltage theta activity. The background is reactive to physical stimulation and attenuates symmetrically with eye opening. There is an anterior posterior gradient with rhythms which is well organized. Anteriorly, there are low voltage beta frequencies that are symmetrical bilaterally. During drowsiness, there is generalized attenuation and slowing of the background. Normal symmetric vertex waves are appreciated indicating initial stages of sleep. Photic stimulation does not elicit a driving response or abnormalities. Hyperventilation was not performed. There are no persistent asymmetries. There are no epileptiform discharges. Single-channel EKG showed a sinus rhythm with a heart rate between 62 and 70 bpm.         CLINICAL INTERPRETATION    This is a normal awake, drowsy and asleep EEG. Note, there are no definite epileptiform discharges or seizures captured in this study. Note, a normal EEG does not definitively rule out epilepsy.

## 2020-11-24 NOTE — DISCHARGE INSTRUCTIONS
Patient Education        Vasovagal Syncope: Care Instructions  Your Care Instructions     Vasovagal syncope (say \"vnd-qey-YDMJossy BARRIGA-kuh-pee\")is sudden dizziness or fainting that can be set off by things such as pain, stress, fear, or trauma. You may sweat or feel lightheaded, sick to your stomach, or tingly. The problem causes the heart rate to slow and the blood vessels to widen, or dilate, for a short time. When this happens, blood pools in the lower body, and less blood goes to the brain. You can usually get relief by lying down with your legs raised (elevated). This helps more blood to flow to your brain and may help relieve symptoms like feeling dizzy. Some doctors may recommend a technique that involves tensing your fists and arms. This type of fainting is often easy to predict. For example, it happens to some people when they see blood or have to get a shot. They may feel symptoms before they faint. An episode of vasovagal syncope usually responds well to self-care. Other treatment often isn't needed. But if the fainting keeps happening, your doctor may suggest further treatments. Follow-up care is a key part of your treatment and safety. Be sure to make and go to all appointments, and call your doctor if you are having problems. It's also a good idea to know your test results and keep a list of the medicines you take. How can you care for yourself at home? · Drink plenty of fluids to prevent dehydration. If you have kidney, heart, or liver disease and have to limit fluids, talk with your doctor before you increase your fluid intake. · Try to avoid things that you think may set off vasovagal syncope. · Talk to your doctor about any medicines you take. Some medicines may increase the chance of this condition occurring. · If you feel symptoms, lie down with your legs raised. Talk to your doctor about what to do if your symptoms come back. When should you call for help?    Call 911 anytime you think you may need emergency care. For example, call if:    · You have symptoms of a heart problem. These may include:  ? Chest pain or pressure. ? Severe trouble breathing. ? A fast or irregular heartbeat. Watch closely for changes in your health, and be sure to contact your doctor if:    · You have more episodes of fainting at home.     · You do not get better as expected. Where can you learn more? Go to http://www.gray.com/  Enter L754 in the search box to learn more about \"Vasovagal Syncope: Care Instructions. \"  Current as of: June 26, 2019               Content Version: 12.6  © 9934-7352 [employer]. Care instructions adapted under license by [employer] (which disclaims liability or warranty for this information). If you have questions about a medical condition or this instruction, always ask your healthcare professional. [address] any warranty or liability for your use of this information.

## 2020-11-24 NOTE — PROGRESS NOTES
Discharge plan of care/case management plan validated with provider discharge order. IV removed; telemetry removed; discharge instructions discussed with and given to patient, who verbalized understanding. 1430 - Patient transported to front entrance via wheelchair, discharged home with daughter.

## 2020-11-24 NOTE — PROGRESS NOTES
Problem: Falls - Risk of  Goal: *Absence of Falls  Description: Document Dani Flores Fall Risk and appropriate interventions in the flowsheet.   Outcome: Progressing Towards Goal  Note: Fall Risk Interventions:  Mobility Interventions: Patient to call before getting OOB, Utilize walker, cane, or other assistive device         Medication Interventions: Patient to call before getting OOB, Teach patient to arise slowly    Elimination Interventions: Call light in reach

## 2020-11-24 NOTE — DISCHARGE SUMMARY
Hospitalist Discharge Summary     Patient ID:    Gus Patricia  [id number]  48 y.o.  1945    Admit date: 11/21/2020    Discharge date : 11/24/2020    Chronic Diagnoses:    Problem List as of 11/24/2020 Date Reviewed: 9/23/2020          Codes Class Noted - Resolved    * (Principal) Syncope ICD-10-CM: R55  ICD-9-CM: 780.2  11/21/2020 - Present        Episode of unresponsiveness ICD-10-CM: R41.89  ICD-9-CM: 780.09  9/24/2020 - Present        Arteriosclerosis of coronary artery ICD-10-CM: I25.10  ICD-9-CM: 414.00  9/16/2020 - Present        Dyslipidemia ICD-10-CM: E78.5  ICD-9-CM: 272.4  9/16/2020 - Present        Stage 5 chronic kidney disease ([facility] ICD-10-CM: N18.5  ICD-9-CM: 585.5  9/16/2020 - Present        HTN (hypertension) ICD-10-CM: I10  ICD-9-CM: 401.9  9/16/2020 - Present        Type 2 diabetes mellitus ([facility] ICD-10-CM: E11.9  ICD-9-CM: 250.00  9/16/2020 - Present        TIA (transient ischemic attack) ICD-10-CM: G45.9  ICD-9-CM: 435.9  9/16/2020 - Present        Adenocarcinoma, renal cell ([facility] ICD-10-CM: C64.9  ICD-9-CM: 189.0  9/2/2020 - Present        Morbid obesity ([facility] ICD-10-CM: E66.01  ICD-9-CM: 278.01  9/2/2020 - Present        Peripheral vascular disease ([facility] ICD-10-CM: I73.9  ICD-9-CM: 443.9  9/2/2020 - Present        Arthritis ICD-10-CM: M19.90  ICD-9-CM: 716.90  8/21/2019 - Present        Hyperlipidemia ICD-10-CM: E78.5  ICD-9-CM: 272.4  8/21/2019 - Present        Essential hypertension ICD-10-CM: I10  ICD-9-CM: 401.9  8/21/2019 - Present        Impingement syndrome of shoulder region ICD-10-CM: M75.40  ICD-9-CM: 726.2  8/21/2019 - Present        Pulmonary embolism ([facility] ICD-10-CM: I26.99  ICD-9-CM: 415.19  2/1/2015 - Present        Cerebrovascular accident (CVA) ([facility] ICD-10-CM: I63.9  ICD-9-CM: 434.91  8/1/2014 - Present          22    Final Diagnoses:   Principal Problem:    Syncope (11/21/2020)        Reason for Hospitalization:  Gus Shoulder is a 67 y.o. female with multiple medical problems including chronic kidney disease stage V (baseline creatinine 2.0), pulmonary embolism on Eliquis, hypertension, hyperlipidemia and insulin-dependent diabetes mellitus type 2 presenting from home after syncopal episode. Ms. Geoff Valdez was sitting on the toilet having a bowel movement when she started feeling \"funny. \"  Patient denies having chest pain, shortness of breath, palpitations, nausea, vomiting, diarrhea. She reports feeling good and eating well over the past several days. Family went to check on her in the bathroom and found her slumped over on the toilet unresponsive. Patient has little recollection of events and next remembers waking up in the ambulance. Emergency medical services were called. Initially, patient was hypoxic requiring oxygen supplementation. She presented to this facility with a temperature 98.2 °F, pulse 80, respirations 17, blood pressure 100/63 and oxygen saturation 8% on room air. EKG has normal sinus rhythm, 81 bpm.  Initial troponin was within normal limits. CT of the head has no evidence of acute intracranial abnormalities. Chest x-ray has no evidence of acute cardiopulmonary process. Hospital service has been asked to admit Ms. Geoff Valdez for further treatment and evaluation.        Hospital Course:   Pt admitted for acute syncope, was evaluated by neurology for stroke and seizure evaluation. Tests were negative for acute processes, pt to follow up with neurology in 2 weeks, and continue with current medications , follow up with PCP in 3 weeks. Pt to discharge with home health for OT/PT and medication management. Discharge Medications:   Current Discharge Medication List      CONTINUE these medications which have NOT CHANGED    Details   omeprazole (PRILOSEC) 20 mg capsule Take 20 mg by mouth daily. nitroglycerin (NITROSTAT) 0.4 mg SL tablet 0.4 mg by SubLINGual route every five (5) minutes as needed for Chest Pain. Up to 3 doses. lubiPROStone (Amitiza) 24 mcg capsule Take 24 mcg by mouth as needed for Constipation. polyethylene glycol (MIRALAX) 17 gram packet Take 17 g/day by mouth daily. apixaban (ELIQUIS) 2.5 mg tablet Take 2.5 mg by mouth two (2) times a day. insulin NPH (NOVOLIN N, HUMULIN N) 100 unit/mL injection 20 Units by SubCUTAneous route daily (with dinner). isosorbide mononitrate ER (IMDUR) 60 mg CR tablet Take 60 mg by mouth two (2) times a day. hydrALAZINE (APRESOLINE) 25 mg tablet Take 25 mg by mouth two (2) times a day. metoprolol succinate (TOPROL-XL) 25 mg XL tablet Take 25 mg by mouth daily. gabapentin (NEURONTIN) 300 mg capsule Take 300 mg by mouth two (2) times a day. furosemide (LASIX) 40 mg tablet Take 40 mg by mouth daily. fluticasone propionate (FLONASE) 50 mcg/actuation nasal spray fluticasone propionate 50 mcg/actuation nasal spray,suspension      cetirizine (ZYRTEC) 10 mg tablet Take 10 mg by mouth daily. venlafaxine (EFFEXOR) 75 mg tablet Take 75 mg by mouth daily. latanoprost (XALATAN) 0.005 % ophthalmic solution Administer 1 Drop to both eyes nightly. pravastatin (PRAVACHOL) 80 mg tablet Take 80 mg by mouth nightly. aspirin 81 mg chewable tablet Take 81 mg by mouth daily. allopurinol (ZYLOPRIM) 100 mg tablet Take 200 mg by mouth daily. diclofenac (SOLARAZE) 3 % topical gel Apply 0.5 mg to affected area two (2) times a day. famotidine (PEPCID) 20 mg tablet Take 20 mg by mouth two (2) times a day. cholecalciferol (VITAMIN D3) 400 unit tab tablet Take  by mouth daily.          STOP taking these medications       Omeprazole Magnesium (Acid Reducer, omeprazole,) 20 mg cpDR Comments:   Reason for Stopping:         ferrous sulfate (Iron) 325 mg (65 mg iron) tablet Comments:   Reason for Stopping:         losartan (COZAAR) 25 mg tablet Comments:   Reason for Stopping:         linaCLOtide (Linzess) 145 mcg cap capsule Comments: Reason for Stopping:         calcitRIOL (ROCALTROL) 0.25 mcg capsule Comments:   Reason for Stopping: Follow up Care:    1. Heidi Guan NP in 1-2 weeks. Follow-up Information     Follow up With Specialties Details Why Contact Info    Heidi Guan NP Nurse Practitioner On 12/1/2020 @ 1:45 p.m. [address] [place], [place] [phone]      Stra Rios MD Neurology On 12/4/2020 @ 3:45 p.m. [address] [facility]  [phone]              Patient Follow Up Instructions: Activity: Activity as tolerated  Diet:  Regular Diet    Condition at Discharge:  Stable  __________________________________________________________________    Disposition  [facility]  ____________________________________________________________________    Code Status:  Full Code  ___________________________________________________________________    Discharge Exam:  Patient seen and examined by me on discharge day. Physical Exam   Constitutional: She is oriented to person, place, and time. She appears well-developed. No distress. Neck: Normal range of motion. Neck supple. Cardiovascular: Normal rate, regular rhythm, normal heart sounds and intact distal pulses. Pulmonary/Chest: Effort normal and breath sounds normal.   Abdominal: Soft. Bowel sounds are normal.   Musculoskeletal: Normal range of motion. Neurological: She is alert and oriented to person, place, and time. Skin: Skin is warm and dry. Psychiatric: She has a normal mood and affect.  Her behavior is normal.        CONSULTATIONS: Neurology    Significant Diagnostic Studies:   Recent Results (from the past 24 hour(s))   GLUCOSE, POC    Collection Time: 11/23/20  1:27 PM   Result Value Ref Range    Glucose (POC) 121 (H) 65 - 100 mg/dL    Performed by Liborio Abdi, POC    Collection Time: 11/23/20  4:50 PM   Result Value Ref Range    Glucose (POC) 136 (H) 65 - 100 mg/dL Performed by Ruth Valdez, POC    Collection Time: 11/23/20  9:34 PM   Result Value Ref Range    Glucose (POC) 124 (H) 65 - 100 mg/dL    Performed by Augusta GALLEGO, POC    Collection Time: 11/24/20  8:20 AM   Result Value Ref Range    Glucose (POC) 142 (H) 65 - 100 mg/dL    Performed by Colette Smith, POC    Collection Time: 11/24/20 10:08 AM   Result Value Ref Range    Glucose (POC) 176 (H) 65 - 100 mg/dL    Performed by JUNAID CHOW MRA BRAIN WO CONT   Final Result   IMPRESSION: Essentially negative study. XR HIP LT W OR WO PELV 2-3 VWS   Final Result      CT HEAD WO CONT   Final Result   IMPRESSION: No change compared to previous CT head September 24, 2020. XR CHEST PORT   Final Result          Discharge: time spent 35 minutes in discharge  Education and counseling.      Signed:  Jocelyne Cruz NP  11/24/2020  10:24 AM

## 2020-11-24 NOTE — PROGRESS NOTES
CM met with patient to discuss [place]. CM explained the benefits of HH (PT, OT, SN, disease education and medication management). Patient politely declined and reported that her daughter handles her medications and that she is at her baseline physically. CM explained that if patient were to change her mind in the future she could follow up with her PCP. CM notified nurse and attending. Patient clear to d/c from CM to home self care.

## 2020-12-05 ENCOUNTER — APPOINTMENT (OUTPATIENT)
Dept: GENERAL RADIOLOGY | Age: 75
End: 2020-12-05
Attending: EMERGENCY MEDICINE
Payer: MEDICARE

## 2020-12-05 ENCOUNTER — HOSPITAL ENCOUNTER (EMERGENCY)
Age: 75
Discharge: HOME | End: 2020-12-05
Attending: EMERGENCY MEDICINE
Payer: MEDICARE

## 2020-12-05 VITALS
HEART RATE: 77 BPM | SYSTOLIC BLOOD PRESSURE: 147 MMHG | HEIGHT: 64 IN | OXYGEN SATURATION: 100 % | DIASTOLIC BLOOD PRESSURE: 92 MMHG | BODY MASS INDEX: 35.17 KG/M2 | TEMPERATURE: 97.8 F | RESPIRATION RATE: 20 BRPM | WEIGHT: 206 LBS

## 2020-12-05 DIAGNOSIS — R55 VASOVAGAL SYNCOPE: Primary | ICD-10-CM

## 2020-12-05 LAB
ALBUMIN SERPL-MCNC: 3 G/DL (ref 3.5–5)
ALBUMIN/GLOB SERPL: 0.7 {RATIO} (ref 1.1–2.2)
ALP SERPL-CCNC: 109 U/L (ref 45–117)
ALT SERPL-CCNC: 17 U/L (ref 12–78)
ANION GAP SERPL CALC-SCNC: 5 MMOL/L (ref 5–15)
AST SERPL W P-5'-P-CCNC: 32 U/L (ref 15–37)
BASOPHILS # BLD: 0 K/UL (ref 0–0.1)
BASOPHILS NFR BLD: 0 % (ref 0–1)
BILIRUB SERPL-MCNC: 0.3 MG/DL (ref 0.2–1)
BUN SERPL-MCNC: 48 MG/DL (ref 6–20)
BUN/CREAT SERPL: 21 (ref 12–20)
CA-I BLD-MCNC: 9.4 MG/DL (ref 8.5–10.1)
CHLORIDE SERPL-SCNC: 107 MMOL/L (ref 97–108)
CK SERPL-CCNC: 115 NG/ML (ref 26–192)
CO2 SERPL-SCNC: 31 MMOL/L (ref 21–32)
CREAT SERPL-MCNC: 2.26 MG/DL (ref 0.55–1.02)
DIFFERENTIAL METHOD BLD: ABNORMAL
EOSINOPHIL # BLD: 0.1 K/UL (ref 0–0.4)
EOSINOPHIL NFR BLD: 1 % (ref 0–7)
ERYTHROCYTE [DISTWIDTH] IN BLOOD BY AUTOMATED COUNT: 16.5 % (ref 11.5–14.5)
GLOBULIN SER CALC-MCNC: 4.2 G/DL (ref 2–4)
GLUCOSE SERPL-MCNC: 122 MG/DL (ref 65–100)
HCT VFR BLD AUTO: 35.5 % (ref 35–47)
HGB BLD-MCNC: 11.4 G/DL (ref 11.5–16)
IMM GRANULOCYTES # BLD AUTO: 0 K/UL (ref 0–0.04)
IMM GRANULOCYTES NFR BLD AUTO: 0 % (ref 0–0.5)
LYMPHOCYTES # BLD: 1.3 K/UL (ref 0.8–3.5)
LYMPHOCYTES NFR BLD: 15 % (ref 12–49)
MCH RBC QN AUTO: 28 PG (ref 26–34)
MCHC RBC AUTO-ENTMCNC: 32.1 G/DL (ref 30–36.5)
MCV RBC AUTO: 87.2 FL (ref 80–99)
MONOCYTES # BLD: 0.5 K/UL (ref 0–1)
MONOCYTES NFR BLD: 6 % (ref 5–13)
NEUTS SEG # BLD: 6.7 K/UL (ref 1.8–8)
NEUTS SEG NFR BLD: 78 % (ref 32–75)
PLATELET # BLD AUTO: 232 K/UL (ref 150–400)
PMV BLD AUTO: 10.5 FL (ref 8.9–12.9)
POTASSIUM SERPL-SCNC: 5 MMOL/L (ref 3.5–5.1)
PROT SERPL-MCNC: 7.2 G/DL (ref 6.4–8.2)
RBC # BLD AUTO: 4.07 M/UL (ref 3.8–5.2)
SODIUM SERPL-SCNC: 143 MMOL/L (ref 136–145)
TROPONIN I SERPL-MCNC: <0.05 NG/ML
WBC # BLD AUTO: 8.5 K/UL (ref 3.6–11)

## 2020-12-05 PROCEDURE — 84484 ASSAY OF TROPONIN QUANT: CPT

## 2020-12-05 PROCEDURE — 85025 COMPLETE CBC W/AUTO DIFF WBC: CPT

## 2020-12-05 PROCEDURE — 93005 ELECTROCARDIOGRAM TRACING: CPT

## 2020-12-05 PROCEDURE — 71045 X-RAY EXAM CHEST 1 VIEW: CPT

## 2020-12-05 PROCEDURE — 82550 ASSAY OF CK (CPK): CPT

## 2020-12-05 PROCEDURE — 99285 EMERGENCY DEPT VISIT HI MDM: CPT

## 2020-12-05 PROCEDURE — 80053 COMPREHEN METABOLIC PANEL: CPT

## 2020-12-05 PROCEDURE — 36415 COLL VENOUS BLD VENIPUNCTURE: CPT

## 2020-12-05 NOTE — ED TRIAGE NOTES
Pt arrived to ED via EMS with CO syncopal episode while on toilet. EMS reports pt has had the same 4 times this month. No follow up care established yet. Per EMS BP was 50/palp on arrival. 18G PIV established in RFA and 1L normal saline give PTA. BP WNL now. Pt A&O x3. Denies hitting head.

## 2020-12-05 NOTE — ED PROVIDER NOTES
EMERGENCY DEPARTMENT HISTORY AND PHYSICAL EXAM      Date: 12/5/2020  Patient Name: Citlaly Patton      History of Presenting Illness     Chief Complaint   Patient presents with    Dizziness       History Provided By: Patient and Patient's Daughter    HPI: Citlaly Patton, 67 y.o. female with a past medical history significant diabetes, hypertension, hyperlipidemia and renal insufficiency presents to the ED with cc of syncope. Patient states that she was having a bowel movement earlier today when she began to feel dizzy and then she passed out. Her daughter estimates that she was unconscious for approximately 30 to 45 minutes. She had no fall or any head injury. EMS reports that her blood pressure was 50/P on arrival.  She was given a liter of normal saline in route. Patient states that she is feeling better but still feels a little \"numb\" in her head. She denies any vision changes, numbness or weakness in her extremities. Daughter reports that this is the fourth time she has had a syncopal episode. She has been seen here every time and has been admitted to the hospital in the past.  She was seen her primary care doctor for this as well. Patient denies any associated chest pain, shortness of breath, nausea or vomiting or any changes in her urinary or bowel habits. She has not had any changes in her medications. There are no other complaints, changes, or physical findings at this time. PCP: Jose Cash NP    Current Outpatient Medications   Medication Sig Dispense Refill    omeprazole (PRILOSEC) 20 mg capsule Take 20 mg by mouth daily.  nitroglycerin (NITROSTAT) 0.4 mg SL tablet 0.4 mg by SubLINGual route every five (5) minutes as needed for Chest Pain. Up to 3 doses.  lubiPROStone (Amitiza) 24 mcg capsule Take 24 mcg by mouth as needed for Constipation.  polyethylene glycol (MIRALAX) 17 gram packet Take 17 g/day by mouth daily.       diclofenac (SOLARAZE) 3 % topical gel Apply 0.5 mg to affected area two (2) times a day.  apixaban (ELIQUIS) 2.5 mg tablet Take 2.5 mg by mouth two (2) times a day.  insulin NPH (NOVOLIN N, HUMULIN N) 100 unit/mL injection 20 Units by SubCUTAneous route daily (with dinner).  isosorbide mononitrate ER (IMDUR) 60 mg CR tablet Take 60 mg by mouth two (2) times a day.  hydrALAZINE (APRESOLINE) 25 mg tablet Take 25 mg by mouth two (2) times a day.  metoprolol succinate (TOPROL-XL) 25 mg XL tablet Take 25 mg by mouth daily.  gabapentin (NEURONTIN) 300 mg capsule Take 300 mg by mouth two (2) times a day.  furosemide (LASIX) 40 mg tablet Take 40 mg by mouth daily.  fluticasone propionate (FLONASE) 50 mcg/actuation nasal spray fluticasone propionate 50 mcg/actuation nasal spray,suspension      famotidine (PEPCID) 20 mg tablet Take 20 mg by mouth two (2) times a day.  cetirizine (ZYRTEC) 10 mg tablet Take 10 mg by mouth daily.  venlafaxine (EFFEXOR) 75 mg tablet Take 75 mg by mouth daily.  cholecalciferol (VITAMIN D3) 400 unit tab tablet Take  by mouth daily.  latanoprost (XALATAN) 0.005 % ophthalmic solution Administer 1 Drop to both eyes nightly.  pravastatin (PRAVACHOL) 80 mg tablet Take 80 mg by mouth nightly.  aspirin 81 mg chewable tablet Take 81 mg by mouth daily.  allopurinol (ZYLOPRIM) 100 mg tablet Take 200 mg by mouth daily.          Past History     Past Medical History:  Past Medical History:   Diagnosis Date    Adenocarcinoma, renal cell ([facility] [address] 9/2/2020    Arthritis     CAD (coronary artery disease)     Chronic kidney disease     Diabetes ([facility] [address]     Gout     Hypercholesterolemia     Hypertension     Mental depression     Pulmonary embolism ([facility] [address]     Stroke ([facility] [address]     Thyroid disease        Past Surgical History:  Past Surgical History:   Procedure Laterality Date    CARDIAC SURG PROCEDURE UNLIST      stents placed     HX GYN      HX HYSTERECTOMY      HX NEPHRECTOMY 02/12/2015    HX ORTHOPAEDIC      HX UROLOGICAL  02/12/2015    PARTIAL URETERECTOMY        Family History:  Family History   Problem Relation Age of Onset    Heart Disease Mother     Heart Disease Father     Heart Disease Sister     Cancer Sister     Heart Disease Brother     Cancer Maternal Grandmother     Stroke Son        Social History:  Social History     Tobacco Use    Smoking status: Never Smoker    Smokeless tobacco: Never Used   Substance Use Topics    Alcohol use: No    Drug use: No       Allergies:  No Known Allergies      Review of Systems     Review of Systems   Constitutional: Negative for fatigue and fever. HENT: Negative. Eyes: Negative. Negative for visual disturbance. Respiratory: Negative for shortness of breath and wheezing. Cardiovascular: Negative for chest pain and leg swelling. Gastrointestinal: Negative for abdominal pain, blood in stool, constipation, diarrhea, nausea and vomiting. Endocrine: Negative. Genitourinary: Negative for difficulty urinating and dysuria. Musculoskeletal: Negative. Skin: Negative for rash. Allergic/Immunologic: Negative. Neurological: Positive for syncope. Negative for weakness, numbness and headaches. Hematological: Negative. Psychiatric/Behavioral: Negative. Physical Exam     Physical Exam  Vitals signs and nursing note reviewed. Constitutional:       General: She is not in acute distress. Appearance: She is well-developed. HENT:      Head: Normocephalic and atraumatic. Eyes:      Conjunctiva/sclera: Conjunctivae normal.   Neck:      Musculoskeletal: Neck supple. Vascular: No JVD. Trachea: No tracheal deviation. Cardiovascular:      Rate and Rhythm: Normal rate and regular rhythm. Heart sounds: No murmur. No friction rub. No gallop. Pulmonary:      Effort: Pulmonary effort is normal. No respiratory distress. Breath sounds: Normal breath sounds. No stridor. No wheezing. Abdominal:      General: Bowel sounds are normal. There is no distension. Palpations: Abdomen is soft. There is no mass. Tenderness: There is no abdominal tenderness. There is no guarding. Musculoskeletal: Normal range of motion. General: No tenderness. Comments: No deformity   Skin:     General: Skin is warm and dry. Findings: No rash. Neurological:      Mental Status: She is alert and oriented to person, place, and time. Comments: No focal deficits   Psychiatric:         Behavior: Behavior normal.         Thought Content: Thought content normal.         Judgment: Judgment normal.         Lab and Diagnostic Study Results     Labs -  Recent Results (from the past 72 hour(s))   CBC WITH AUTOMATED DIFF    Collection Time: 12/05/20  6:00 PM   Result Value Ref Range    WBC 8.5 3.6 - 11.0 K/uL    RBC 4.07 3.80 - 5.20 M/uL    HGB 11.4 (L) 11.5 - 16.0 g/dL    HCT 35.5 35.0 - 47.0 %    MCV 87.2 80.0 - 99.0 FL    MCH 28.0 26.0 - 34.0 PG    MCHC 32.1 30.0 - 36.5 g/dL    RDW 16.5 (H) 11.5 - 14.5 %    PLATELET 637 725 - 715 K/uL    MPV 10.5 8.9 - 12.9 FL    NEUTROPHILS 78 (H) 32 - 75 %    LYMPHOCYTES 15 12 - 49 %    MONOCYTES 6 5 - 13 %    EOSINOPHILS 1 0 - 7 %    BASOPHILS 0 0 - 1 %    IMMATURE GRANULOCYTES 0 0.0 - 0.5 %    ABS. NEUTROPHILS 6.7 1.8 - 8.0 K/UL    ABS. LYMPHOCYTES 1.3 0.8 - 3.5 K/UL    ABS. MONOCYTES 0.5 0.0 - 1.0 K/UL    ABS. EOSINOPHILS 0.1 0.0 - 0.4 K/UL    ABS. BASOPHILS 0.0 0.0 - 0.1 K/UL    ABS. IMM.  GRANS. 0.0 0.00 - 0.04 K/UL    DF AUTOMATED     METABOLIC PANEL, COMPREHENSIVE    Collection Time: 12/05/20  6:00 PM   Result Value Ref Range    Sodium 143 136 - 145 mmol/L    Potassium 5.0 3.5 - 5.1 mmol/L    Chloride 107 97 - 108 mmol/L    CO2 31 21 - 32 mmol/L    Anion gap 5 5 - 15 mmol/L    Glucose 122 (H) 65 - 100 mg/dL    BUN 48 (H) 6 - 20 mg/dL    Creatinine 2.26 (H) 0.55 - 1.02 mg/dL    BUN/Creatinine ratio 21 (H) 12 - 20      GFR est AA 26 (L) >60 ml/min/1.73m2 GFR est non-AA 21 (L) >60 ml/min/1.73m2    Calcium 9.4 8.5 - 10.1 mg/dL    Bilirubin, total 0.3 0.2 - 1.0 mg/dL    AST (SGOT) 32 15 - 37 U/L    ALT (SGPT) 17 12 - 78 U/L    Alk. phosphatase 109 45 - 117 U/L    Protein, total 7.2 6.4 - 8.2 g/dL    Albumin 3.0 (L) 3.5 - 5.0 g/dL    Globulin 4.2 (H) 2.0 - 4.0 g/dL    A-G Ratio 0.7 (L) 1.1 - 2.2     CK W/ REFLX CKMB    Collection Time: 12/05/20  6:00 PM   Result Value Ref Range    .0 26 - 192 ng/mL   TROPONIN I    Collection Time: 12/05/20  6:00 PM   Result Value Ref Range    Troponin-I, Qt. <0.05 <0.05 ng/mL   EKG, 12 LEAD, INITIAL    Collection Time: 12/05/20  7:36 PM   Result Value Ref Range    Ventricular Rate 73 BPM    Atrial Rate 73 BPM    P-R Interval 136 ms    QRS Duration 92 ms    Q-T Interval 410 ms    QTC Calculation (Bezet) 451 ms    Calculated P Axis 64 degrees    Calculated R Axis 47 degrees    Calculated T Axis 77 degrees    Diagnosis       Normal sinus rhythm  Normal ECG  When compared with ECG of 21-NOV-2020 18:16,  No significant change was found  Confirmed by JASMYN CUMMINS, Kimberly Beverly ([id number]) on 12/6/2020 9:52:06 AM            Radiologic Studies -     CT Results  (Last 48 hours)    None        CXR Results  (Last 48 hours)               12/05/20 8878  XR CHEST PORT Final result    Impression:  Impression: No acute abnormality demonstrated. Narrative:  AP upright view the chest compared to 11/21/2020. Cardiac silhouette is   magnified by projection but is unchanged. Lungs are clear of infiltrate. No   pneumothorax or pleural effusion is seen. No acute bony abnormalities are   identified. Medical Decision Making and ED Course   - I am the first and primary provider for this patient. - I reviewed the vital signs, available nursing notes, past medical history, past surgical history, family history and social history. - Initial assessment performed.  The patients presenting problems have been discussed, and the staff are in agreement with the care plan formulated and outlined with them. I have encouraged them to ask questions as they arise throughout their visit. Vital Signs-Reviewed the patient's vital signs. Patient Vitals for the past 12 hrs:   Temp Pulse Resp SpO2   12/05/20 1705 97.8 °F (36.6 °C) 62 16 96 %       EKG interpretation: (Preliminary):   EKG interpreted by me. Shows a normal sinus rhythm with a heart rate of 73. No ST elevations or depressions concerning for ischemia. Normal intervals. Records Reviewed: Nursing Notes        ED Course:            9:00 PM  Pt not found to be orthostatic by nursing. No focal deficits on exam and labs are WNL. Given that the patient was just admitted for a syncopal work up and she was noted to be significantly hypotensive on arrival, repeat admission for her would likely be low yield. I suspect that her syncope today was vasovagal in nature. She is already on Eliquis, so low suspicion for PE. Will discharge home, encourage aggressive hydration and have her f/u with her PCP. Provider Notes (Medical Decision Making):   MDM  Number of Diagnoses or Management Options  Diagnosis management comments: Patient is a 14-year-old female presenting status post syncopal episode while having a bowel movement. Patient was noted to be significantly hypotensive on arrival by EMS. Patient has received a liter of fluids and is now at baseline. Patient has no focal neurologic deficits. She was last seen here in November for a similar episode though at that time she was not so hypotensive. She was seen by neurology and evaluated for seizure and stroke. She was scheduled to follow-up with neurology. Low suspicion for TIA or stroke at this time given that she has no focal deficits and her hypotensive episode. Differentials include orthostatic hypotension, vasovagal syncope, arrhythmia, ACS, dehydration, electrolyte abnormality.                                    Disposition Disposition:  Discharge          DISCHARGE PLAN:  1. Current Discharge Medication List      CONTINUE these medications which have NOT CHANGED    Details   omeprazole (PRILOSEC) 20 mg capsule Take 20 mg by mouth daily. nitroglycerin (NITROSTAT) 0.4 mg SL tablet 0.4 mg by SubLINGual route every five (5) minutes as needed for Chest Pain. Up to 3 doses. lubiPROStone (Amitiza) 24 mcg capsule Take 24 mcg by mouth as needed for Constipation. polyethylene glycol (MIRALAX) 17 gram packet Take 17 g/day by mouth daily. diclofenac (SOLARAZE) 3 % topical gel Apply 0.5 mg to affected area two (2) times a day. apixaban (ELIQUIS) 2.5 mg tablet Take 2.5 mg by mouth two (2) times a day. insulin NPH (NOVOLIN N, HUMULIN N) 100 unit/mL injection 20 Units by SubCUTAneous route daily (with dinner). isosorbide mononitrate ER (IMDUR) 60 mg CR tablet Take 60 mg by mouth two (2) times a day. hydrALAZINE (APRESOLINE) 25 mg tablet Take 25 mg by mouth two (2) times a day. metoprolol succinate (TOPROL-XL) 25 mg XL tablet Take 25 mg by mouth daily. gabapentin (NEURONTIN) 300 mg capsule Take 300 mg by mouth two (2) times a day. furosemide (LASIX) 40 mg tablet Take 40 mg by mouth daily. fluticasone propionate (FLONASE) 50 mcg/actuation nasal spray fluticasone propionate 50 mcg/actuation nasal spray,suspension      famotidine (PEPCID) 20 mg tablet Take 20 mg by mouth two (2) times a day. cetirizine (ZYRTEC) 10 mg tablet Take 10 mg by mouth daily. venlafaxine (EFFEXOR) 75 mg tablet Take 75 mg by mouth daily. cholecalciferol (VITAMIN D3) 400 unit tab tablet Take  by mouth daily. latanoprost (XALATAN) 0.005 % ophthalmic solution Administer 1 Drop to both eyes nightly. pravastatin (PRAVACHOL) 80 mg tablet Take 80 mg by mouth nightly. aspirin 81 mg chewable tablet Take 81 mg by mouth daily. allopurinol (ZYLOPRIM) 100 mg tablet Take 200 mg by mouth daily. 2.   Follow-up Information    None       3. Return to ED if worse   4. Discharge Medication List as of 12/5/2020  9:50 PM          Diagnosis     Clinical Impression:   1. Vasovagal syncope        Attestations:    Nickie Freitas, DO    Please note that this dictation was completed with [employer], the computer voice recognition software. Quite often unanticipated grammatical, syntax, homophones, and other interpretive errors are inadvertently transcribed by the computer software. Please disregard these errors. Please excuse any errors that have escaped final proofreading. Thank you.

## 2020-12-06 LAB
ATRIAL RATE: 73 BPM
CALCULATED P AXIS, ECG09: 64 DEGREES
CALCULATED R AXIS, ECG10: 47 DEGREES
CALCULATED T AXIS, ECG11: 77 DEGREES
DIAGNOSIS, 93000: NORMAL
P-R INTERVAL, ECG05: 136 MS
Q-T INTERVAL, ECG07: 410 MS
QRS DURATION, ECG06: 92 MS
QTC CALCULATION (BEZET), ECG08: 451 MS
VENTRICULAR RATE, ECG03: 73 BPM

## 2020-12-06 NOTE — ED NOTES
Past Medical History:   Diagnosis Date    Adenocarcinoma, renal cell ([facility] [address] 9/2/2020    Arthritis     CAD (coronary artery disease)     Chronic kidney disease     Diabetes ([facility] [address]     Gout     Hypercholesterolemia     Hypertension     Mental depression     Pulmonary embolism ([facility] [address]     Stroke ([facility] [address]     Thyroid disease      Past Surgical History:   Procedure Laterality Date    CARDIAC SURG PROCEDURE UNLIST      stents placed     HX GYN      HX HYSTERECTOMY      HX NEPHRECTOMY  02/12/2015    HX ORTHOPAEDIC      HX UROLOGICAL  02/12/2015    PARTIAL URETERECTOMY      75yo female pt with significant PMH of CAD, CKD, HTN presents to ED via EMS with CO syncopal episode while on toilet. EMS reports pt has had the same 4 times this month. No follow up care established yet. Per EMS BP was 50/palp on arrival. 18G PIV established in RFA and 1L normal saline give PTA. BP WNL now. Pt A&O x3. Denies hitting head. , oriented to room and call bell, cardiac and continuous spO2 monitoring initiated, IV started, blood samples collected and sent to lab for analysis, EKG and chest Xray completed, orthostatic VS, WNL, plan of care reviewed, call bell in reach, side rails up x2, will continue to monitor. 9:50 PM  Provider at bedside for dispo and follow up, all treatments completed as ordered. Discharge plan reviewed and paperwork signed, teaching completed regarding treatment received, medications and follow up care demonstrated and read back, IV removed, catheter intact, pain level within manageable comfortable limits, ambulatory to exit, gait steady, safety maintained.

## 2020-12-06 NOTE — DISCHARGE INSTRUCTIONS
Patient Education        Vasovagal Syncope: Care Instructions  Your Care Instructions     Vasovagal syncope (say \"fzu-elg-JRHJossy BARRIGA-kuh-pee\")is sudden dizziness or fainting that can be set off by things such as pain, stress, fear, or trauma. You may sweat or feel lightheaded, sick to your stomach, or tingly. The problem causes the heart rate to slow and the blood vessels to widen, or dilate, for a short time. When this happens, blood pools in the lower body, and less blood goes to the brain. You can usually get relief by lying down with your legs raised (elevated). This helps more blood to flow to your brain and may help relieve symptoms like feeling dizzy. Some doctors may recommend a technique that involves tensing your fists and arms. This type of fainting is often easy to predict. For example, it happens to some people when they see blood or have to get a shot. They may feel symptoms before they faint. An episode of vasovagal syncope usually responds well to self-care. Other treatment often isn't needed. But if the fainting keeps happening, your doctor may suggest further treatments. Follow-up care is a key part of your treatment and safety. Be sure to make and go to all appointments, and call your doctor if you are having problems. It's also a good idea to know your test results and keep a list of the medicines you take. How can you care for yourself at home? · Drink plenty of fluids to prevent dehydration. If you have kidney, heart, or liver disease and have to limit fluids, talk with your doctor before you increase your fluid intake. · Try to avoid things that you think may set off vasovagal syncope. · Talk to your doctor about any medicines you take. Some medicines may increase the chance of this condition occurring. · If you feel symptoms, lie down with your legs raised. Talk to your doctor about what to do if your symptoms come back. When should you call for help?    Call 911 anytime you think you may need emergency care. For example, call if:    · You have symptoms of a heart problem. These may include:  ? Chest pain or pressure. ? Severe trouble breathing. ? A fast or irregular heartbeat. Watch closely for changes in your health, and be sure to contact your doctor if:    · You have more episodes of fainting at home.     · You do not get better as expected. Where can you learn more? Go to http://www.gray.com/  Enter L754 in the search box to learn more about \"Vasovagal Syncope: Care Instructions. \"  Current as of: June 26, 2019               Content Version: 12.6  © 1332-5323 [employer]. Care instructions adapted under license by [employer] (which disclaims liability or warranty for this information). If you have questions about a medical condition or this instruction, always ask your healthcare professional. [address] any warranty or liability for your use of this information.

## 2020-12-08 ENCOUNTER — TRANSCRIBE ORDER (OUTPATIENT)
Dept: SCHEDULING | Age: 75
End: 2020-12-08

## 2020-12-08 DIAGNOSIS — C64.2 MALIGNANT NEOPLASM OF LEFT KIDNEY, EXCEPT RENAL PELVIS (HCC): Primary | ICD-10-CM

## 2020-12-22 ENCOUNTER — OFFICE VISIT (OUTPATIENT)
Dept: UROLOGY | Age: 75
End: 2020-12-22
Payer: MEDICARE

## 2020-12-22 VITALS — HEIGHT: 64 IN | WEIGHT: 206 LBS | BODY MASS INDEX: 35.17 KG/M2 | TEMPERATURE: 97.8 F

## 2020-12-22 DIAGNOSIS — C64.2 RENAL CELL CARCINOMA OF LEFT KIDNEY (HCC): ICD-10-CM

## 2020-12-22 DIAGNOSIS — N18.5 STAGE 5 CHRONIC KIDNEY DISEASE (HCC): ICD-10-CM

## 2020-12-22 LAB
BILIRUB UR QL STRIP: NEGATIVE
GLUCOSE UR-MCNC: NEGATIVE MG/DL
KETONES P FAST UR STRIP-MCNC: NEGATIVE MG/DL
PH UR STRIP: 5.5 [PH] (ref 4.6–8)
PROT UR QL STRIP: NORMAL
SP GR UR STRIP: 1.01 (ref 1–1.03)
UA UROBILINOGEN AMB POC: NORMAL (ref 0.2–1)
URINALYSIS CLARITY POC: CLEAR
URINALYSIS COLOR POC: YELLOW
URINE BLOOD POC: NORMAL
URINE LEUKOCYTES POC: NEGATIVE
URINE NITRITES POC: NEGATIVE

## 2020-12-22 PROCEDURE — 99203 OFFICE O/P NEW LOW 30 MIN: CPT | Performed by: UROLOGY

## 2020-12-22 PROCEDURE — 81003 URINALYSIS AUTO W/O SCOPE: CPT | Performed by: UROLOGY

## 2020-12-22 NOTE — ASSESSMENT & PLAN NOTE
She has a history of a left radical nephrectomy for cancer. It sounds like Dr. Kristopher Santana did her surgery in 2015. She follows with Dr. Tereso Navarrete. She does not have a specific complaints. She has imaging and follow-up plan with Dr. Tereso Navarrete. She advised that she should follow with him. If there are any concerns he could send her to see me    Lab Results   Component Value Date/Time    WBC 8.5 12/05/2020 06:00 PM    ABS.  NEUTROPHILS 6.7 12/05/2020 06:00 PM    HGB 11.4 12/05/2020 06:00 PM    HCT 35.5 12/05/2020 06:00 PM    PLATELET 388 06/85/0545 06:00 PM    Creatinine 2.26 12/05/2020 06:00 PM    Creatinine, External 1.79 12/06/2019    BUN 48 12/05/2020 06:00 PM    ALT (SGPT) 17 12/05/2020 06:00 PM    Albumin 3.0 12/05/2020 06:00 PM

## 2020-12-22 NOTE — PROGRESS NOTES
HISTORY OF PRESENT ILLNESS  Mark Quijano is a 67 y.o. female. Chief Complaint   Patient presents with    New Patient    Renal Cell Carcinoma     She had a left nephrectomy 2015 at [facility]. She states she has been doing well. She has flank pain, hematuria or shortness of breath. She states she follows with , and has CT studies ordered. I have no prior records at this time. Chronic Conditions Addressed Today     1. Adenocarcinoma, renal cell ([facility] [address]     Overview      Previously seen by Dr. Taryn Davsi. Followed also by Dr. Edward Babcock. HX of left radical nephrectomy. Current Assessment & Plan      She has a history of a left radical nephrectomy for cancer. It sounds like Dr. Jhony Geller did her surgery in 2015. She follows with Dr. Edward Babcock. She does not have a specific complaints. She has imaging and follow-up plan with Dr. Edward Babcock. She advised that she should follow with him. If there are any concerns he could send her to see me    Lab Results   Component Value Date/Time    WBC 8.5 12/05/2020 06:00 PM    ABS. NEUTROPHILS 6.7 12/05/2020 06:00 PM    HGB 11.4 12/05/2020 06:00 PM    HCT 35.5 12/05/2020 06:00 PM    PLATELET 150 27/76/9397 06:00 PM    Creatinine 2.26 12/05/2020 06:00 PM    Creatinine, External 1.79 12/06/2019    BUN 48 12/05/2020 06:00 PM    ALT (SGPT) 17 12/05/2020 06:00 PM    Albumin 3.0 12/05/2020 06:00 PM             Relevant Orders     URINALYSIS W/MICROSCOPIC     AMB POC URINALYSIS DIP STICK AUTO W/O MICRO (Completed)    2. Stage 5 chronic kidney disease (HCC)     Overview      Cr in 2.6 range. She sees Dr. Graham Screen                 Review of Systems   All other systems reviewed and are negative.       Past Medical History:   Diagnosis Date    Adenocarcinoma, renal cell ([facility] [address] 9/2/2020    Arthritis     CAD (coronary artery disease)     Chronic kidney disease     [facility]     Gout     Hypercholesterolemia     Hypertension     Mental depression  Pulmonary embolism (HCC)     Seizures ([facility] [address]     Stroke ([facility] [address] [facility]     Thyroid disease       Past Surgical History:   Procedure Laterality Date    CARDIAC SURG PROCEDURE UNLIST      stents placed     HX GYN      HX HYSTERECTOMY      HX NEPHRECTOMY Left 02/12/2015    HX ORTHOPAEDIC      HX UROLOGICAL  02/12/2015    PARTIAL URETERECTOMY      Family History   Problem Relation Age of Onset    Heart Disease Mother     Heart Disease Father     Heart Disease Sister     Cancer Sister     Heart Disease Brother     Cancer Maternal Grandmother     Stroke Son     Cancer Sister         Physical Exam  Vitals signs reviewed. Constitutional:       General: She is not in acute distress. Appearance: Normal appearance. She is obese. She is not ill-appearing, toxic-appearing or diaphoretic. HENT:      Head: Normocephalic and atraumatic. Mouth/Throat:      Mouth: Mucous membranes are moist.      Pharynx: Oropharynx is clear. Eyes:      Extraocular Movements: Extraocular movements intact. Conjunctiva/sclera: Conjunctivae normal.      Pupils: Pupils are equal, round, and reactive to light. Neck:      Musculoskeletal: Normal range of motion. Cardiovascular:      Rate and Rhythm: Normal rate and regular rhythm. Pulmonary:      Effort: Pulmonary effort is normal. No respiratory distress. Breath sounds: Normal breath sounds. Abdominal:      General: Bowel sounds are normal.      Palpations: Abdomen is soft. Musculoskeletal: Normal range of motion. General: No swelling or deformity. Lymphadenopathy:      Cervical: No cervical adenopathy. Upper Body:      Right upper body: No supraclavicular adenopathy. Left upper body: No supraclavicular adenopathy. Skin:     General: Skin is warm and dry. Neurological:      General: No focal deficit present. Mental Status: She is alert and oriented to person, place, and time.    Psychiatric:         Mood and Affect: Mood normal. Behavior: Behavior normal.                     ASSESSMENT and PLAN  Diagnoses and all orders for this visit:    1. Renal cell carcinoma of left kidney [facility])  Assessment & Plan:  She has a history of a left radical nephrectomy for cancer. It sounds like Dr. Matthew Willams did her surgery in 2015. She follows with Dr. Renetta Choudhary. She does not have a specific complaints. She has imaging and follow-up plan with Dr. Renetta Choudhary. She advised that she should follow with him. If there are any concerns he could send her to see me    Lab Results   Component Value Date/Time    WBC 8.5 12/05/2020 06:00 PM    ABS. NEUTROPHILS 6.7 12/05/2020 06:00 PM    HGB 11.4 12/05/2020 06:00 PM    HCT 35.5 12/05/2020 06:00 PM    PLATELET 094 93/96/3099 06:00 PM    Creatinine 2.26 12/05/2020 06:00 PM    Creatinine, External 1.79 12/06/2019    BUN 48 12/05/2020 06:00 PM    ALT (SGPT) 17 12/05/2020 06:00 PM    Albumin 3.0 12/05/2020 06:00 PM       Orders:  -     URINALYSIS W/MICROSCOPIC  -     AMB POC URINALYSIS DIP STICK AUTO W/O MICRO    2. Stage 5 chronic kidney disease (HCC)         Follow-up and Dispositions    · Return if symptoms worsen or fail to improve.          Rohith Valdes MD

## 2020-12-23 LAB
APPEARANCE UR: CLEAR
BACTERIA #/AREA URNS HPF: ABNORMAL /[HPF]
BILIRUB UR QL STRIP: NEGATIVE
CASTS URNS MICRO: ABNORMAL
CASTS URNS QL MICRO: PRESENT /LPF
COLOR UR: YELLOW
EPI CELLS #/AREA URNS HPF: ABNORMAL /HPF (ref 0–10)
GLUCOSE UR QL: NEGATIVE
HGB UR QL STRIP: NEGATIVE
KETONES UR QL STRIP: NEGATIVE
LEUKOCYTE ESTERASE UR QL STRIP: NEGATIVE
MICRO URNS: ABNORMAL
MUCOUS THREADS URNS QL MICRO: PRESENT
NITRITE UR QL STRIP: NEGATIVE
PH UR STRIP: 5.5 [PH] (ref 5–7.5)
PROT UR QL STRIP: ABNORMAL
RBC #/AREA URNS HPF: ABNORMAL /HPF (ref 0–2)
SP GR UR: 1.01 (ref 1–1.03)
UROBILINOGEN UR STRIP-MCNC: 0.2 MG/DL (ref 0.2–1)
WBC #/AREA URNS HPF: ABNORMAL /HPF (ref 0–5)

## 2021-01-13 ENCOUNTER — APPOINTMENT (OUTPATIENT)
Dept: GENERAL RADIOLOGY | Age: 76
End: 2021-01-13
Attending: EMERGENCY MEDICINE
Payer: MEDICARE

## 2021-01-13 ENCOUNTER — HOSPITAL ENCOUNTER (EMERGENCY)
Age: 76
Discharge: HOME | End: 2021-01-13
Attending: STUDENT IN AN ORGANIZED HEALTH CARE EDUCATION/TRAINING PROGRAM
Payer: MEDICARE

## 2021-01-13 VITALS
SYSTOLIC BLOOD PRESSURE: 165 MMHG | HEART RATE: 78 BPM | DIASTOLIC BLOOD PRESSURE: 80 MMHG | OXYGEN SATURATION: 99 % | WEIGHT: 210 LBS | TEMPERATURE: 98 F | RESPIRATION RATE: 18 BRPM | BODY MASS INDEX: 35.85 KG/M2 | HEIGHT: 64 IN

## 2021-01-13 DIAGNOSIS — R55 VASOVAGAL SYNCOPES: Primary | ICD-10-CM

## 2021-01-13 LAB
ALBUMIN SERPL-MCNC: 3.3 G/DL (ref 3.5–5)
ALBUMIN/GLOB SERPL: 0.8 {RATIO} (ref 1.1–2.2)
ALP SERPL-CCNC: 113 U/L (ref 45–117)
ALT SERPL-CCNC: 15 U/L (ref 12–78)
ANION GAP SERPL CALC-SCNC: 6 MMOL/L (ref 5–15)
AST SERPL W P-5'-P-CCNC: 25 U/L (ref 15–37)
BASOPHILS # BLD: 0 K/UL (ref 0–0.1)
BASOPHILS NFR BLD: 0 % (ref 0–1)
BILIRUB SERPL-MCNC: 0.2 MG/DL (ref 0.2–1)
BUN SERPL-MCNC: 62 MG/DL (ref 6–20)
BUN/CREAT SERPL: 28 (ref 12–20)
CA-I BLD-MCNC: 9.8 MG/DL (ref 8.5–10.1)
CHLORIDE SERPL-SCNC: 101 MMOL/L (ref 97–108)
CK SERPL-CCNC: 120 NG/ML (ref 26–192)
CO2 SERPL-SCNC: 29 MMOL/L (ref 21–32)
CREAT SERPL-MCNC: 2.22 MG/DL (ref 0.55–1.02)
DIFFERENTIAL METHOD BLD: ABNORMAL
EOSINOPHIL # BLD: 0.1 K/UL (ref 0–0.4)
EOSINOPHIL NFR BLD: 1 % (ref 0–7)
ERYTHROCYTE [DISTWIDTH] IN BLOOD BY AUTOMATED COUNT: 17.1 % (ref 11.5–14.5)
GLOBULIN SER CALC-MCNC: 4.3 G/DL (ref 2–4)
GLUCOSE SERPL-MCNC: 114 MG/DL (ref 65–100)
HCT VFR BLD AUTO: 31.2 % (ref 35–47)
HGB BLD-MCNC: 10.1 G/DL (ref 11.5–16)
IMM GRANULOCYTES # BLD AUTO: 0 K/UL (ref 0–0.04)
IMM GRANULOCYTES NFR BLD AUTO: 0 % (ref 0–0.5)
LYMPHOCYTES # BLD: 1.5 K/UL (ref 0.8–3.5)
LYMPHOCYTES NFR BLD: 20 % (ref 12–49)
MCH RBC QN AUTO: 27.7 PG (ref 26–34)
MCHC RBC AUTO-ENTMCNC: 32.4 G/DL (ref 30–36.5)
MCV RBC AUTO: 85.7 FL (ref 80–99)
MONOCYTES # BLD: 0.7 K/UL (ref 0–1)
MONOCYTES NFR BLD: 10 % (ref 5–13)
NEUTS SEG # BLD: 5.2 K/UL (ref 1.8–8)
NEUTS SEG NFR BLD: 69 % (ref 32–75)
PLATELET # BLD AUTO: 286 K/UL (ref 150–400)
PMV BLD AUTO: 10.3 FL (ref 8.9–12.9)
POTASSIUM SERPL-SCNC: 3.9 MMOL/L (ref 3.5–5.1)
PROT SERPL-MCNC: 7.6 G/DL (ref 6.4–8.2)
RBC # BLD AUTO: 3.64 M/UL (ref 3.8–5.2)
SODIUM SERPL-SCNC: 136 MMOL/L (ref 136–145)
TROPONIN I SERPL-MCNC: <0.05 NG/ML
WBC # BLD AUTO: 7.5 K/UL (ref 3.6–11)

## 2021-01-13 PROCEDURE — 80053 COMPREHEN METABOLIC PANEL: CPT

## 2021-01-13 PROCEDURE — 74011250637 HC RX REV CODE- 250/637: Performed by: STUDENT IN AN ORGANIZED HEALTH CARE EDUCATION/TRAINING PROGRAM

## 2021-01-13 PROCEDURE — 71045 X-RAY EXAM CHEST 1 VIEW: CPT

## 2021-01-13 PROCEDURE — 99283 EMERGENCY DEPT VISIT LOW MDM: CPT

## 2021-01-13 PROCEDURE — 82550 ASSAY OF CK (CPK): CPT

## 2021-01-13 PROCEDURE — 84484 ASSAY OF TROPONIN QUANT: CPT

## 2021-01-13 PROCEDURE — 99284 EMERGENCY DEPT VISIT MOD MDM: CPT

## 2021-01-13 PROCEDURE — 93005 ELECTROCARDIOGRAM TRACING: CPT

## 2021-01-13 PROCEDURE — 36415 COLL VENOUS BLD VENIPUNCTURE: CPT

## 2021-01-13 PROCEDURE — 85025 COMPLETE CBC W/AUTO DIFF WBC: CPT

## 2021-01-13 PROCEDURE — 74011250636 HC RX REV CODE- 250/636: Performed by: STUDENT IN AN ORGANIZED HEALTH CARE EDUCATION/TRAINING PROGRAM

## 2021-01-13 RX ORDER — ACETAMINOPHEN 325 MG/1
650 TABLET ORAL
Status: COMPLETED | OUTPATIENT
Start: 2021-01-13 | End: 2021-01-13

## 2021-01-13 RX ADMIN — SODIUM CHLORIDE 500 ML: 9 INJECTION, SOLUTION INTRAVENOUS at 14:49

## 2021-01-13 RX ADMIN — ACETAMINOPHEN 650 MG: 325 TABLET, FILM COATED ORAL at 14:12

## 2021-01-13 NOTE — ED PROVIDER NOTES
EMERGENCY DEPARTMENT HISTORY AND PHYSICAL EXAM      Date: 1/13/2021  Patient Name: Minus Landau    History of Presenting Illness     Chief Complaint   Patient presents with    Dizziness    Syncope       History Provided By: Patient    HPI: Minus Landau, 67 y.o. female with a past medical history significant diabetes, hypertension and CAD presents to the ED with cc of syncopal episode while having a bowel movement today. Patient states she was straining to have a bowel movement suddenly felt very weak states that she lost consciousness for several seconds, however did not fall off the toilet did not injure herself. Daughter came in to help her with patient was able to ambulate to bed. Patient states that since that time she has felt very weak complaining of headache. Patient denies any chest pains or palpitations, no shortness of breath. Denies any weakness of any extremity. Patient did note that she has had some intermittent blood in stool over the last week. Denies any pain on defecation no abdominal pain. There are no other complaints, changes, or physical findings at this time. PCP: Jeet Davila NP    Current Facility-Administered Medications   Medication Dose Route Frequency Provider Last Rate Last Admin    sodium chloride 0.9 % bolus infusion 500 mL  500 mL IntraVENous ONCE David Suresh MD 1,000 mL/hr at 01/13/21 1449 500 mL at 01/13/21 1449     Current Outpatient Medications   Medication Sig Dispense Refill    omeprazole (PRILOSEC) 20 mg capsule Take 20 mg by mouth daily.  nitroglycerin (NITROSTAT) 0.4 mg SL tablet 0.4 mg by SubLINGual route every five (5) minutes as needed for Chest Pain. Up to 3 doses.  lubiPROStone (Amitiza) 24 mcg capsule Take 24 mcg by mouth as needed for Constipation.  apixaban (ELIQUIS) 2.5 mg tablet Take 2.5 mg by mouth two (2) times a day.       insulin NPH (NOVOLIN N, HUMULIN N) 100 unit/mL injection 20 Units by SubCUTAneous route daily (with dinner).  isosorbide mononitrate ER (IMDUR) 60 mg CR tablet Take 60 mg by mouth two (2) times a day.  hydrALAZINE (APRESOLINE) 25 mg tablet Take 25 mg by mouth two (2) times a day.  metoprolol succinate (TOPROL-XL) 25 mg XL tablet Take 25 mg by mouth daily.  gabapentin (NEURONTIN) 300 mg capsule Take 300 mg by mouth two (2) times a day.  furosemide (LASIX) 40 mg tablet Take 40 mg by mouth daily.  fluticasone propionate (FLONASE) 50 mcg/actuation nasal spray fluticasone propionate 50 mcg/actuation nasal spray,suspension      cetirizine (ZYRTEC) 10 mg tablet Take 10 mg by mouth daily.  venlafaxine (EFFEXOR) 75 mg tablet Take 75 mg by mouth daily.  latanoprost (XALATAN) 0.005 % ophthalmic solution Administer 1 Drop to both eyes nightly.  pravastatin (PRAVACHOL) 80 mg tablet Take 80 mg by mouth nightly.  aspirin 81 mg chewable tablet Take 81 mg by mouth daily.  allopurinol (ZYLOPRIM) 100 mg tablet Take 200 mg by mouth daily.          Past History     Past Medical History:  Past Medical History:   Diagnosis Date    Adenocarcinoma, renal cell ([facility] [address] 9/2/2020    Arthritis     CAD (coronary artery disease)     Chronic kidney disease     Diabetes ([facility] [address]     Gout     Hypercholesterolemia     Hypertension     Mental depression     Pulmonary embolism (HCC)     Seizures (HCC)     Stroke ([facility] [address]     Thyroid disease        Past Surgical History:  Past Surgical History:   Procedure Laterality Date    HX GYN      HX HYSTERECTOMY      HX NEPHRECTOMY Left 02/12/2015    HX ORTHOPAEDIC      HX UROLOGICAL  02/12/2015    PARTIAL URETERECTOMY     MI CARDIAC SURG PROCEDURE UNLIST      stents placed        Family History:  Family History   Problem Relation Age of Onset    Heart Disease Mother     Heart Disease Father     Heart Disease Sister     Cancer Sister     Heart Disease Brother     Cancer Maternal Grandmother     Stroke Son     Cancer Sister Social History:  Social History     Tobacco Use    Smoking status: Former Smoker     Years: 15.00    Smokeless tobacco: Never Used   Substance Use Topics    Alcohol use: Not Currently    Drug use: Never       Allergies:  No Known Allergies      Review of Systems     Review of Systems   Constitutional: Negative for activity change, appetite change, chills, fatigue and fever. Respiratory: Negative for chest tightness and shortness of breath. Cardiovascular: Negative for chest pain and palpitations. Gastrointestinal: Positive for blood in stool. Negative for abdominal pain, nausea and vomiting. Genitourinary: Negative for dysuria, hematuria and vaginal bleeding. Musculoskeletal: Negative for arthralgias and back pain. Skin: Negative for color change. Neurological: Positive for syncope. Negative for dizziness, seizures, numbness and headaches. Physical Exam     Physical Exam  Vitals signs and nursing note reviewed. Constitutional:       General: She is not in acute distress. Appearance: Normal appearance. She is normal weight. She is not ill-appearing. HENT:      Head: Normocephalic and atraumatic. Nose: Nose normal.      Mouth/Throat:      Mouth: Mucous membranes are moist.   Eyes:      Extraocular Movements: Extraocular movements intact. Pupils: Pupils are equal, round, and reactive to light. Neck:      Musculoskeletal: Normal range of motion and neck supple. No muscular tenderness. Cardiovascular:      Rate and Rhythm: Normal rate and regular rhythm. Pulses: Normal pulses. Pulmonary:      Effort: Pulmonary effort is normal.   Abdominal:      General: Abdomen is flat. Bowel sounds are normal.      Palpations: Abdomen is soft. Tenderness: There is no abdominal tenderness. There is no guarding. Musculoskeletal: Normal range of motion. General: No tenderness. Skin:     General: Skin is warm and dry.    Neurological:      General: No focal deficit present. Mental Status: She is alert and oriented to person, place, and time. Sensory: No sensory deficit. Motor: No weakness. Diagnostic Study Results     Labs -     Recent Results (from the past 12 hour(s))   CBC WITH AUTOMATED DIFF    Collection Time: 01/13/21  1:10 PM   Result Value Ref Range    WBC 7.5 3.6 - 11.0 K/uL    RBC 3.64 (L) 3.80 - 5.20 M/uL    HGB 10.1 (L) 11.5 - 16.0 g/dL    HCT 31.2 (L) 35.0 - 47.0 %    MCV 85.7 80.0 - 99.0 FL    MCH 27.7 26.0 - 34.0 PG    MCHC 32.4 30.0 - 36.5 g/dL    RDW 17.1 (H) 11.5 - 14.5 %    PLATELET 558 139 - 870 K/uL    MPV 10.3 8.9 - 12.9 FL    NEUTROPHILS 69 32 - 75 %    LYMPHOCYTES 20 12 - 49 %    MONOCYTES 10 5 - 13 %    EOSINOPHILS 1 0 - 7 %    BASOPHILS 0 0 - 1 %    IMMATURE GRANULOCYTES 0 0.0 - 0.5 %    ABS. NEUTROPHILS 5.2 1.8 - 8.0 K/UL    ABS. LYMPHOCYTES 1.5 0.8 - 3.5 K/UL    ABS. MONOCYTES 0.7 0.0 - 1.0 K/UL    ABS. EOSINOPHILS 0.1 0.0 - 0.4 K/UL    ABS. BASOPHILS 0.0 0.0 - 0.1 K/UL    ABS. IMM. GRANS. 0.0 0.00 - 0.04 K/UL    DF AUTOMATED     METABOLIC PANEL, COMPREHENSIVE    Collection Time: 01/13/21  1:10 PM   Result Value Ref Range    Sodium 136 136 - 145 mmol/L    Potassium 3.9 3.5 - 5.1 mmol/L    Chloride 101 97 - 108 mmol/L    CO2 29 21 - 32 mmol/L    Anion gap 6 5 - 15 mmol/L    Glucose 114 (H) 65 - 100 mg/dL    BUN 62 (H) 6 - 20 mg/dL    Creatinine 2.22 (H) 0.55 - 1.02 mg/dL    BUN/Creatinine ratio 28 (H) 12 - 20      GFR est AA 26 (L) >60 ml/min/1.73m2    GFR est non-AA 21 (L) >60 ml/min/1.73m2    Calcium 9.8 8.5 - 10.1 mg/dL    Bilirubin, total 0.2 0.2 - 1.0 mg/dL    AST (SGOT) 25 15 - 37 U/L    ALT (SGPT) 15 12 - 78 U/L    Alk.  phosphatase 113 45 - 117 U/L    Protein, total 7.6 6.4 - 8.2 g/dL    Albumin 3.3 (L) 3.5 - 5.0 g/dL    Globulin 4.3 (H) 2.0 - 4.0 g/dL    A-G Ratio 0.8 (L) 1.1 - 2.2     CK W/ REFLX CKMB    Collection Time: 01/13/21  1:10 PM   Result Value Ref Range    .0 26 - 192 ng/mL   TROPONIN I    Collection Time: 01/13/21  1:10 PM   Result Value Ref Range    Troponin-I, Qt. <0.05 <0.05 ng/mL       Radiologic Studies -   [unfilled]  CT Results  (Last 48 hours)    None        CXR Results  (Last 48 hours)               01/13/21 1338  XR CHEST PORT Final result    Impression:  Impression:   No acute findings. Narrative:  Study: XR CHEST PORT       Clinical indication: chest pain       Comparison: Chest x-ray 12/5/2020. Findings:       No consolidative airspace disease, pleural effusion or pneumothorax. Cardiomediastinal contours are within normal limits. No pulmonary edema. No acute osseous abnormality identified. Medical Decision Making and ED Course   I am the first provider for this patient. I reviewed the vital signs, available nursing notes, past medical history, past surgical history, family history and social history. Vital Signs-Reviewed the patient's vital signs. Patient Vitals for the past 12 hrs:   Temp Pulse Resp BP SpO2   01/13/21 1302 98 °F (36.7 °C) 78 18 (!) 165/80 99 %       EKG interpretation: (Preliminary)  Sinus rhythm 69, no ST elevations, normal axis, normal intervals    Records Reviewed: Nursing Notes and Old Medical Records    The patient presents with syncope with a differential diagnosis of vasovagal syncope, anemia, cardiac arrhythmia, ACS      Provider Notes (Medical Decision Making):     MDM   69-year-old female, past medical history hypertension, CAD, diabetes presents to the emergency department after syncopal episode while straining to use the toilet today. Patient denies any trauma no head injury. No chest pain shortness of breath    Physical exam shows stable vitals, no signs of head or extremity trauma. Nonfocal neurological exam.    Basic lab work drawn including cardiac enzymes  Chest x-ray ordered    All medical record reviewed, patient has been worked up for syncopes multiple times, last admission was in December.   With a negative work-up. Will order orthostatics as well. ED Course:   Initial assessment performed. The patients presenting problems have been discussed, and they are in agreement with the care plan formulated and outlined with them. I have encouraged them to ask questions as they arise throughout their visit. ED Course as of    1423 Patient's lab work resulted, troponins negative, metabolic panel shows baseline renal insufficiency BUN 62 creatinine 2.22 which appears at baseline. Patient CBC unremarkable hemoglobin 10 hematocrit 31.2.    [PZ]   1502 Chest x-ray normal, patient reassessed, states she feels well, no weakness dizziness. [PZ]   1513 I obtained orthostatics,   Lyin/82, P 77  Sittin/87  P 79  Standing 165/84, P 83    Patient shows slight orthostatic changes upon standing, possibly from fluid status, or autonomic dysregulation, I suspect significant cardiac etiology or central neural etiology, plan on initiating 500 mill bolus, and discharge patient home. Patient amenable to plan. [PZ]      ED Course User Index  [PZ] Bacilio Shay MD         Procedures       Dru Mitchell MD  Procedures             Disposition       Discharged    Remove if not discharged  DISCHARGE PLAN:  1. Current Discharge Medication List      CONTINUE these medications which have NOT CHANGED    Details   omeprazole (PRILOSEC) 20 mg capsule Take 20 mg by mouth daily. nitroglycerin (NITROSTAT) 0.4 mg SL tablet 0.4 mg by SubLINGual route every five (5) minutes as needed for Chest Pain. Up to 3 doses. lubiPROStone (Amitiza) 24 mcg capsule Take 24 mcg by mouth as needed for Constipation. apixaban (ELIQUIS) 2.5 mg tablet Take 2.5 mg by mouth two (2) times a day. insulin NPH (NOVOLIN N, HUMULIN N) 100 unit/mL injection 20 Units by SubCUTAneous route daily (with dinner).       isosorbide mononitrate ER (IMDUR) 60 mg CR tablet Take 60 mg by mouth two (2) times a day.      hydrALAZINE (APRESOLINE) 25 mg tablet Take 25 mg by mouth two (2) times a day. metoprolol succinate (TOPROL-XL) 25 mg XL tablet Take 25 mg by mouth daily. gabapentin (NEURONTIN) 300 mg capsule Take 300 mg by mouth two (2) times a day. furosemide (LASIX) 40 mg tablet Take 40 mg by mouth daily. fluticasone propionate (FLONASE) 50 mcg/actuation nasal spray fluticasone propionate 50 mcg/actuation nasal spray,suspension      cetirizine (ZYRTEC) 10 mg tablet Take 10 mg by mouth daily. venlafaxine (EFFEXOR) 75 mg tablet Take 75 mg by mouth daily. latanoprost (XALATAN) 0.005 % ophthalmic solution Administer 1 Drop to both eyes nightly. pravastatin (PRAVACHOL) 80 mg tablet Take 80 mg by mouth nightly. aspirin 81 mg chewable tablet Take 81 mg by mouth daily. allopurinol (ZYLOPRIM) 100 mg tablet Take 200 mg by mouth daily. 2.   Follow-up Information     Follow up With Specialties Details Why Contact Info    Serena Chavez NP Nurse Practitioner In 3 days As needed [phone] [place] [address] EMERGENCY DEPT Emergency Medicine  If symptoms worsen [facility] [address] [phone]        3. Return to ED if worse     Diagnosis     Clinical Impression:   1. Vasovagal syncopes        Attestations:    Dariela Skinner MD    Please note that this dictation was completed with [employer], the computer voice recognition software. Quite often unanticipated grammatical, syntax, homophones, and other interpretive errors are inadvertently transcribed by the computer software. Please disregard these errors. Please excuse any errors that have escaped final proofreading. Thank you.

## 2021-01-13 NOTE — ED NOTES
Patient discharged home following routine work found to be within defined limits. Medications, fluids and treatments tolerated well by patient. Normal vital signs. Reviewed follow up instructions and medications with patient. Extensive conversation regarding return precautions discussed. Patient acknowledged understanding. All personal belongings taken by patient.

## 2021-01-15 LAB
ATRIAL RATE: 69 BPM
CALCULATED P AXIS, ECG09: 71 DEGREES
CALCULATED R AXIS, ECG10: 73 DEGREES
CALCULATED T AXIS, ECG11: 76 DEGREES
DIAGNOSIS, 93000: NORMAL
P-R INTERVAL, ECG05: 142 MS
Q-T INTERVAL, ECG07: 426 MS
QRS DURATION, ECG06: 96 MS
QTC CALCULATION (BEZET), ECG08: 456 MS
VENTRICULAR RATE, ECG03: 69 BPM

## 2021-01-21 ENCOUNTER — HOSPITAL ENCOUNTER (OUTPATIENT)
Dept: CT IMAGING | Age: 76
Discharge: HOME | End: 2021-01-21
Attending: INTERNAL MEDICINE
Payer: MEDICARE

## 2021-01-21 DIAGNOSIS — C64.2 MALIGNANT NEOPLASM OF LEFT KIDNEY, EXCEPT RENAL PELVIS (HCC): ICD-10-CM

## 2021-01-21 PROCEDURE — 74176 CT ABD & PELVIS W/O CONTRAST: CPT

## 2021-01-21 PROCEDURE — 71250 CT THORAX DX C-: CPT

## 2021-04-02 ENCOUNTER — OFFICE VISIT (OUTPATIENT)
Dept: ENDOCRINOLOGY | Age: 76
End: 2021-04-02
Payer: MEDICARE

## 2021-04-02 VITALS
SYSTOLIC BLOOD PRESSURE: 149 MMHG | DIASTOLIC BLOOD PRESSURE: 75 MMHG | HEART RATE: 79 BPM | OXYGEN SATURATION: 99 % | BODY MASS INDEX: 34.49 KG/M2 | HEIGHT: 64 IN | TEMPERATURE: 96.9 F | WEIGHT: 202 LBS | RESPIRATION RATE: 18 BRPM

## 2021-04-02 DIAGNOSIS — Z79.4 TYPE 2 DIABETES MELLITUS WITH HYPERGLYCEMIA, WITH LONG-TERM CURRENT USE OF INSULIN (HCC): Primary | ICD-10-CM

## 2021-04-02 DIAGNOSIS — E78.2 MIXED HYPERLIPIDEMIA: ICD-10-CM

## 2021-04-02 DIAGNOSIS — E11.65 TYPE 2 DIABETES MELLITUS WITH HYPERGLYCEMIA, WITH LONG-TERM CURRENT USE OF INSULIN (HCC): Primary | ICD-10-CM

## 2021-04-02 PROCEDURE — 99214 OFFICE O/P EST MOD 30 MIN: CPT | Performed by: INTERNAL MEDICINE

## 2021-04-02 RX ORDER — HUMAN INSULIN 100 [IU]/ML
INJECTION, SUSPENSION SUBCUTANEOUS
Qty: 10 ML | Refills: 11 | Status: SHIPPED | OUTPATIENT
Start: 2021-04-02 | End: 2022-04-01

## 2021-04-02 NOTE — PROGRESS NOTES
Mele Garza MD        Patient Information  Date:4/2/2021  Name : Amando Tejeda 67 y.o.     YOB: 1945         Referred by: Sofia Banda NP         Chief Complaint   Patient presents with    Diabetes       History of Present Illness: Amando Tejeda is a 67 y.o. female here for fu of  Type 2 Diabetes Mellitus. She was initially referred by [facility] for evaluation and management of type 2 diabetes mellitus. She has diabetes since age 60  complicated by nephropathy as well as neuropathy. She had TIA  She is on statin  Did not bring meter again     Fasting < 140  Bedtime < none  Used to have  freestyle melva, not anymore  She changed NPH to nighttime,   Reports no hypoglycemia,    Analog insulins are expensive            Wt Readings from Last 3 Encounters:   04/02/21 202 lb (91.6 kg)   01/13/21 210 lb (95.3 kg)   12/22/20 206 lb (93.4 kg)       BP Readings from Last 3 Encounters:   04/02/21 (!) 149/75   01/13/21 (!) 165/80   12/05/20 (!) 147/92           Past Medical History:   Diagnosis Date    Adenocarcinoma, renal cell ([facility] [address] 9/2/2020    Arthritis     CAD (coronary artery disease)     Chronic kidney disease     Diabetes ([facility] [address]     Gout     Hypercholesterolemia     Hypertension     Mental depression     Pulmonary embolism (HCC)     Seizures (HCC)     Stroke ([facility] [address]     Thyroid disease      Current Outpatient Medications   Medication Sig    NovoLIN N NPH U-100 Insulin 100 unit/mL injection INJECT 20 UNITS SUBCUTANEOUSLY IN THE EVENING **STOP  HUMULIN**    omeprazole (PRILOSEC) 20 mg capsule Take 20 mg by mouth daily.  nitroglycerin (NITROSTAT) 0.4 mg SL tablet 0.4 mg by SubLINGual route every five (5) minutes as needed for Chest Pain. Up to 3 doses.  lubiPROStone (Amitiza) 24 mcg capsule Take 24 mcg by mouth as needed for Constipation.  apixaban (ELIQUIS) 2.5 mg tablet Take 2.5 mg by mouth two (2) times a day.  isosorbide mononitrate ER (IMDUR) 60 mg CR tablet Take 60 mg by mouth two (2) times a day.  hydrALAZINE (APRESOLINE) 25 mg tablet Take 25 mg by mouth two (2) times a day.  metoprolol succinate (TOPROL-XL) 25 mg XL tablet Take 25 mg by mouth daily.  gabapentin (NEURONTIN) 300 mg capsule Take 300 mg by mouth two (2) times a day.  furosemide (LASIX) 40 mg tablet Take 40 mg by mouth daily.  fluticasone propionate (FLONASE) 50 mcg/actuation nasal spray fluticasone propionate 50 mcg/actuation nasal spray,suspension    cetirizine (ZYRTEC) 10 mg tablet Take 10 mg by mouth daily.  venlafaxine (EFFEXOR) 75 mg tablet Take 75 mg by mouth daily.  latanoprost (XALATAN) 0.005 % ophthalmic solution Administer 1 Drop to both eyes nightly.  pravastatin (PRAVACHOL) 80 mg tablet Take 80 mg by mouth nightly.  aspirin 81 mg chewable tablet Take 81 mg by mouth daily.  allopurinol (ZYLOPRIM) 100 mg tablet Take 200 mg by mouth daily. No current facility-administered medications for this visit. No Known Allergies      Review of Systems:  -   - Per HPI    Physical Examination:   Blood pressure (!) 149/75, pulse 79, temperature 96.9 °F (36.1 °C), temperature source Oral, resp. rate 18, height 5' 4\" (1.626 m), weight 202 lb (91.6 kg), SpO2 99 %. Estimated body mass index is 34.67 kg/m² as calculated from the following:    Height as of this encounter: 5' 4\" (1.626 m). -   Weight as of this encounter: 202 lb (91.6 kg).   - General: pleasant, no distress, good eye contact  - HEENT: no exophthalmos, no periorbital edema, EOMI  - Neck: No visible thyromegaly  - RS: Normal respiratory effort  - Musculoskeletal: no tremors  - Neurological: alert and oriented  - Psychiatric: normal mood and affect  - Skin: Normal color            Data Reviewed:   Lab Results   Component Value Date/Time    Hemoglobin A1c 6.2 (H) 11/21/2020 01:20 AM    Hemoglobin A1c, External 6.1 07/20/2018    Glucose 114 (H) 01/13/2021 01:10 PM    Glucose (POC) 176 (H) 11/24/2020 10:08 AM    LDL, calculated 73.2 11/22/2020 01:12 AM    Creatinine 2.22 (H) 01/13/2021 01:10 PM            Assessment/Plan:     1. Type 2 diabetes mellitus with hyperglycemia, with long-term current use of insulin ([facility] [address]    2. Mixed hyperlipidemia        1. Type 2 Diabetes Mellitus with nephropathy,neuropathy,  Lab Results   Component Value Date/Time    Hemoglobin A1c 6.2 (H) 11/21/2020 01:20 AM    Hemoglobin A1c (POC) 5.8 09/23/2020 01:15 PM    Hemoglobin A1c, External 6.1 07/20/2018      Due to decrease in clearance of insulin secondary to CKD she is at  high risk for hypoglycemia  Humulin N 20 units at night,  Does not meet insurance criteria for freestyle melva    Check sugars TID     Diabetic issues reviewed : , home glucose monitoring emphasized,  hypoglycemia management and long term diabetic complications discussed. FLU annually ,Pneumovax ,aspirin daily,annual eye exam,microalbumin    2. HTN : Continue current therapy     3. Hyperlipidemia : Continue statin. 4.Obesity:Body mass index is 34.67 kg/m². Discussed about the importance of  carbohydrate portion control. 5 CKD /-followed by nephrology, renal cell carcinoma   Not on dialysis    CAD - Dr Donella Bernheim           There are no Patient Instructions on file for this visit. Thank you for allowing me to participate in the care of this patient.     Sharol Crigler, MD      Patient verbalized understanding

## 2021-04-02 NOTE — PROGRESS NOTES
Sammy Swift is a 67 y.o. female here for   Chief Complaint   Patient presents with    Diabetes       1. Have you been to the ER, urgent care clinic since your last visit? Hospitalized since your last visit? -[facility] in dec and [place] for dizziness and syncope    2. Have you seen or consulted any other health care providers outside of the [address] [place] [address] since your last visit? Include any pap smears or colon screening. -PCP    Has had first COVID vaccine

## 2021-04-02 NOTE — LETTER
4/5/2021 Patient: Grayson Cooley YOB: 1945 Date of Visit: 4/2/2021 Russel Sims NP 
[facility] [address] [place] [address] Via Fax: [phone] Dear Russel Sims NP, Thank you for referring Ms. Pamela Martinez to [address] [place] [address] for evaluation. My notes for this consultation are attached. If you have questions, please do not hesitate to call me. I look forward to following your patient along with you. Sincerely, Baron Mendosa MD

## 2021-05-07 DIAGNOSIS — Z79.4 TYPE 2 DIABETES MELLITUS WITH HYPERGLYCEMIA, WITH LONG-TERM CURRENT USE OF INSULIN (HCC): ICD-10-CM

## 2021-05-07 DIAGNOSIS — E11.65 TYPE 2 DIABETES MELLITUS WITH HYPERGLYCEMIA, WITH LONG-TERM CURRENT USE OF INSULIN (HCC): ICD-10-CM

## 2021-05-07 RX ORDER — SYRINGE AND NEEDLE,INSULIN,1ML 31GX15/64"
SYRINGE, EMPTY DISPOSABLE MISCELLANEOUS
Qty: 100 PEN NEEDLE | Refills: 5 | Status: SHIPPED | OUTPATIENT
Start: 2021-05-07 | End: 2021-10-24

## 2021-07-28 LAB
ALBUMIN SERPL-MCNC: 3.6 G/DL (ref 3.7–4.7)
ALBUMIN/CREAT UR: 883 MG/G CREAT (ref 0–29)
ALBUMIN/GLOB SERPL: 1.2 {RATIO} (ref 1.2–2.2)
ALP SERPL-CCNC: 110 IU/L (ref 48–121)
ALT SERPL-CCNC: 11 IU/L (ref 0–32)
AST SERPL-CCNC: 19 IU/L (ref 0–40)
BILIRUB SERPL-MCNC: <0.2 MG/DL (ref 0–1.2)
BUN SERPL-MCNC: 34 MG/DL (ref 8–27)
BUN/CREAT SERPL: 17 (ref 12–28)
CALCIUM SERPL-MCNC: 9 MG/DL (ref 8.7–10.3)
CHLORIDE SERPL-SCNC: 103 MMOL/L (ref 96–106)
CO2 SERPL-SCNC: 27 MMOL/L (ref 20–29)
CREAT SERPL-MCNC: 1.95 MG/DL (ref 0.57–1)
CREAT UR-MCNC: 78.4 MG/DL
EST. AVERAGE GLUCOSE BLD GHB EST-MCNC: 166 MG/DL
GLOBULIN SER CALC-MCNC: 2.9 G/DL (ref 1.5–4.5)
GLUCOSE SERPL-MCNC: 207 MG/DL (ref 65–99)
HBA1C MFR BLD: 7.4 % (ref 4.8–5.6)
INTERPRETATION: NORMAL
LDLC SERPL DIRECT ASSAY-MCNC: 86 MG/DL (ref 0–99)
MICROALBUMIN UR-MCNC: 692.4 UG/ML
POTASSIUM SERPL-SCNC: 4.2 MMOL/L (ref 3.5–5.2)
PROT SERPL-MCNC: 6.5 G/DL (ref 6–8.5)
SODIUM SERPL-SCNC: 142 MMOL/L (ref 134–144)

## 2021-08-03 PROBLEM — E78.5 HYPERLIPIDEMIA: Status: RESOLVED | Noted: 2019-08-21 | Resolved: 2021-08-03

## 2021-08-03 PROBLEM — I10 ESSENTIAL HYPERTENSION: Status: RESOLVED | Noted: 2019-08-21 | Resolved: 2021-08-03

## 2021-08-24 ENCOUNTER — TRANSCRIBE ORDER (OUTPATIENT)
Dept: SCHEDULING | Age: 76
End: 2021-08-24

## 2021-08-24 DIAGNOSIS — Z12.31 SCREENING MAMMOGRAM FOR HIGH-RISK PATIENT: Primary | ICD-10-CM

## 2021-09-29 ENCOUNTER — HOSPITAL ENCOUNTER (OUTPATIENT)
Dept: MAMMOGRAPHY | Age: 76
Discharge: HOME | End: 2021-09-29
Payer: MEDICARE

## 2021-09-29 DIAGNOSIS — Z12.31 SCREENING MAMMOGRAM FOR HIGH-RISK PATIENT: ICD-10-CM

## 2021-09-29 PROCEDURE — 77063 BREAST TOMOSYNTHESIS BI: CPT

## 2021-10-12 ENCOUNTER — TRANSCRIBE ORDER (OUTPATIENT)
Dept: SCHEDULING | Age: 76
End: 2021-10-12

## 2021-10-12 DIAGNOSIS — C64.9 RENAL CELL CARCINOMA (HCC): Primary | ICD-10-CM

## 2021-10-13 ENCOUNTER — TRANSCRIBE ORDER (OUTPATIENT)
Dept: SCHEDULING | Age: 76
End: 2021-10-13

## 2021-10-13 DIAGNOSIS — C64.2 MALIGNANT NEOPLASM OF KIDNEY, LEFT (HCC): Primary | ICD-10-CM

## 2021-10-13 DIAGNOSIS — I26.99 PULMONARY EMBOLISM AND INFARCTION (HCC): ICD-10-CM

## 2021-10-21 ENCOUNTER — APPOINTMENT (OUTPATIENT)
Dept: CT IMAGING | Age: 76
End: 2021-10-21
Attending: EMERGENCY MEDICINE
Payer: MEDICARE

## 2021-10-21 ENCOUNTER — APPOINTMENT (OUTPATIENT)
Dept: GENERAL RADIOLOGY | Age: 76
End: 2021-10-21
Attending: EMERGENCY MEDICINE
Payer: MEDICARE

## 2021-10-21 ENCOUNTER — HOSPITAL ENCOUNTER (OUTPATIENT)
Age: 76
Setting detail: OBSERVATION
Discharge: HOME | End: 2021-10-24
Attending: EMERGENCY MEDICINE | Admitting: FAMILY MEDICINE
Payer: MEDICARE

## 2021-10-21 DIAGNOSIS — R77.8 ELEVATED TROPONIN: ICD-10-CM

## 2021-10-21 DIAGNOSIS — R55 SYNCOPE, UNSPECIFIED SYNCOPE TYPE: Primary | ICD-10-CM

## 2021-10-21 LAB
ALBUMIN SERPL-MCNC: 3.2 G/DL (ref 3.5–5)
ALBUMIN/GLOB SERPL: 0.9 {RATIO} (ref 1.1–2.2)
ALP SERPL-CCNC: 147 U/L (ref 45–117)
ALT SERPL-CCNC: 29 U/L (ref 12–78)
ANION GAP SERPL CALC-SCNC: 9 MMOL/L (ref 5–15)
AST SERPL W P-5'-P-CCNC: 37 U/L (ref 15–37)
BASOPHILS # BLD: 0.1 K/UL (ref 0–0.1)
BASOPHILS NFR BLD: 1 % (ref 0–1)
BILIRUB SERPL-MCNC: 0.4 MG/DL (ref 0.2–1)
BNP SERPL-MCNC: 1411 PG/ML
BUN SERPL-MCNC: 58 MG/DL (ref 6–20)
BUN/CREAT SERPL: 23 (ref 12–20)
CA-I BLD-MCNC: 9.6 MG/DL (ref 8.5–10.1)
CHLORIDE SERPL-SCNC: 101 MMOL/L (ref 97–108)
CO2 SERPL-SCNC: 28 MMOL/L (ref 21–32)
CREAT SERPL-MCNC: 2.56 MG/DL (ref 0.55–1.02)
DIFFERENTIAL METHOD BLD: ABNORMAL
EOSINOPHIL # BLD: 0.1 K/UL (ref 0–0.4)
EOSINOPHIL NFR BLD: 1 % (ref 0–7)
ERYTHROCYTE [DISTWIDTH] IN BLOOD BY AUTOMATED COUNT: 15.9 % (ref 11.5–14.5)
GLOBULIN SER CALC-MCNC: 3.7 G/DL (ref 2–4)
GLUCOSE BLD STRIP.AUTO-MCNC: 112 MG/DL (ref 65–117)
GLUCOSE SERPL-MCNC: 121 MG/DL (ref 65–100)
HCT VFR BLD AUTO: 37.9 % (ref 35–47)
HGB BLD-MCNC: 12.4 G/DL (ref 11.5–16)
IMM GRANULOCYTES # BLD AUTO: 0 K/UL (ref 0–0.04)
IMM GRANULOCYTES NFR BLD AUTO: 0 % (ref 0–0.5)
LYMPHOCYTES # BLD: 1.5 K/UL (ref 0.8–3.5)
LYMPHOCYTES NFR BLD: 19 % (ref 12–49)
MCH RBC QN AUTO: 29.2 PG (ref 26–34)
MCHC RBC AUTO-ENTMCNC: 32.7 G/DL (ref 30–36.5)
MCV RBC AUTO: 89.4 FL (ref 80–99)
MONOCYTES # BLD: 0.7 K/UL (ref 0–1)
MONOCYTES NFR BLD: 10 % (ref 5–13)
NEUTS SEG # BLD: 5.2 K/UL (ref 1.8–8)
NEUTS SEG NFR BLD: 69 % (ref 32–75)
NRBC # BLD: 0 K/UL (ref 0–0.01)
NRBC BLD-RTO: 0 PER 100 WBC
PERFORMED BY, TECHID: NORMAL
PLATELET # BLD AUTO: 205 K/UL (ref 150–400)
PMV BLD AUTO: 10.7 FL (ref 8.9–12.9)
POTASSIUM SERPL-SCNC: 3.7 MMOL/L (ref 3.5–5.1)
PROT SERPL-MCNC: 6.9 G/DL (ref 6.4–8.2)
RBC # BLD AUTO: 4.24 M/UL (ref 3.8–5.2)
SODIUM SERPL-SCNC: 138 MMOL/L (ref 136–145)
TROPONIN-HIGH SENSITIVITY: 75 NG/L (ref 0–51)
WBC # BLD AUTO: 7.5 K/UL (ref 3.6–11)

## 2021-10-21 PROCEDURE — 70450 CT HEAD/BRAIN W/O DYE: CPT

## 2021-10-21 PROCEDURE — 93005 ELECTROCARDIOGRAM TRACING: CPT

## 2021-10-21 PROCEDURE — 36415 COLL VENOUS BLD VENIPUNCTURE: CPT

## 2021-10-21 PROCEDURE — 82962 GLUCOSE BLOOD TEST: CPT

## 2021-10-21 PROCEDURE — 99285 EMERGENCY DEPT VISIT HI MDM: CPT

## 2021-10-21 PROCEDURE — 85025 COMPLETE CBC W/AUTO DIFF WBC: CPT

## 2021-10-21 PROCEDURE — 83880 ASSAY OF NATRIURETIC PEPTIDE: CPT

## 2021-10-21 PROCEDURE — 65270000029 HC RM PRIVATE

## 2021-10-21 PROCEDURE — 71045 X-RAY EXAM CHEST 1 VIEW: CPT

## 2021-10-21 PROCEDURE — 80053 COMPREHEN METABOLIC PANEL: CPT

## 2021-10-21 PROCEDURE — 84484 ASSAY OF TROPONIN QUANT: CPT

## 2021-10-21 RX ORDER — LUBIPROSTONE 24 UG/1
24 CAPSULE, GELATIN COATED ORAL
Status: DISCONTINUED | OUTPATIENT
Start: 2021-10-22 | End: 2021-10-24 | Stop reason: HOSPADM

## 2021-10-21 RX ORDER — SODIUM CHLORIDE 9 MG/ML
25 INJECTION, SOLUTION INTRAVENOUS CONTINUOUS
Status: DISCONTINUED | OUTPATIENT
Start: 2021-10-21 | End: 2021-10-24 | Stop reason: HOSPADM

## 2021-10-21 RX ORDER — ACETAMINOPHEN 650 MG/1
650 SUPPOSITORY RECTAL
Status: DISCONTINUED | OUTPATIENT
Start: 2021-10-21 | End: 2021-10-24 | Stop reason: HOSPADM

## 2021-10-21 RX ORDER — METOPROLOL SUCCINATE 25 MG/1
25 TABLET, EXTENDED RELEASE ORAL DAILY
Status: DISCONTINUED | OUTPATIENT
Start: 2021-10-22 | End: 2021-10-24 | Stop reason: HOSPADM

## 2021-10-21 RX ORDER — ACETAMINOPHEN 325 MG/1
650 TABLET ORAL
Status: DISCONTINUED | OUTPATIENT
Start: 2021-10-21 | End: 2021-10-24 | Stop reason: HOSPADM

## 2021-10-21 RX ORDER — ONDANSETRON 2 MG/ML
4 INJECTION INTRAMUSCULAR; INTRAVENOUS
Status: DISCONTINUED | OUTPATIENT
Start: 2021-10-21 | End: 2021-10-24 | Stop reason: HOSPADM

## 2021-10-21 RX ORDER — ASPIRIN 325 MG
325 TABLET ORAL DAILY
Status: DISCONTINUED | OUTPATIENT
Start: 2021-10-22 | End: 2021-10-21

## 2021-10-21 RX ORDER — VENLAFAXINE 50 MG/1
75 TABLET ORAL DAILY
Status: DISCONTINUED | OUTPATIENT
Start: 2021-10-22 | End: 2021-10-24 | Stop reason: HOSPADM

## 2021-10-21 RX ORDER — POLYETHYLENE GLYCOL 3350 17 G/17G
17 POWDER, FOR SOLUTION ORAL DAILY PRN
Status: DISCONTINUED | OUTPATIENT
Start: 2021-10-21 | End: 2021-10-24 | Stop reason: HOSPADM

## 2021-10-21 RX ORDER — PANTOPRAZOLE SODIUM 20 MG/1
40 TABLET, DELAYED RELEASE ORAL
Status: DISCONTINUED | OUTPATIENT
Start: 2021-10-22 | End: 2021-10-24 | Stop reason: HOSPADM

## 2021-10-21 RX ORDER — GABAPENTIN 300 MG/1
300 CAPSULE ORAL 2 TIMES DAILY
Status: DISCONTINUED | OUTPATIENT
Start: 2021-10-22 | End: 2021-10-24 | Stop reason: HOSPADM

## 2021-10-21 RX ORDER — PRAVASTATIN SODIUM 40 MG/1
80 TABLET ORAL
Status: DISCONTINUED | OUTPATIENT
Start: 2021-10-22 | End: 2021-10-24 | Stop reason: HOSPADM

## 2021-10-21 RX ORDER — GUAIFENESIN 100 MG/5ML
81 LIQUID (ML) ORAL DAILY
Status: DISCONTINUED | OUTPATIENT
Start: 2021-10-22 | End: 2021-10-24 | Stop reason: HOSPADM

## 2021-10-21 RX ORDER — ISOSORBIDE MONONITRATE 60 MG/1
60 TABLET, EXTENDED RELEASE ORAL 2 TIMES DAILY
Status: DISCONTINUED | OUTPATIENT
Start: 2021-10-22 | End: 2021-10-24 | Stop reason: HOSPADM

## 2021-10-21 RX ORDER — ATORVASTATIN CALCIUM 40 MG/1
40 TABLET, FILM COATED ORAL
Status: DISCONTINUED | OUTPATIENT
Start: 2021-10-21 | End: 2021-10-21

## 2021-10-21 RX ORDER — FUROSEMIDE 40 MG/1
40 TABLET ORAL DAILY
Status: DISCONTINUED | OUTPATIENT
Start: 2021-10-22 | End: 2021-10-24 | Stop reason: HOSPADM

## 2021-10-21 RX ORDER — NITROGLYCERIN 0.4 MG/1
0.4 TABLET SUBLINGUAL
Status: DISCONTINUED | OUTPATIENT
Start: 2021-10-21 | End: 2021-10-24 | Stop reason: HOSPADM

## 2021-10-21 RX ORDER — INSULIN LISPRO 100 [IU]/ML
INJECTION, SOLUTION INTRAVENOUS; SUBCUTANEOUS
Status: DISCONTINUED | OUTPATIENT
Start: 2021-10-22 | End: 2021-10-24 | Stop reason: HOSPADM

## 2021-10-21 RX ORDER — MAGNESIUM SULFATE 100 %
4 CRYSTALS MISCELLANEOUS AS NEEDED
Status: DISCONTINUED | OUTPATIENT
Start: 2021-10-21 | End: 2021-10-24 | Stop reason: HOSPADM

## 2021-10-21 RX ORDER — HYDRALAZINE HYDROCHLORIDE 25 MG/1
25 TABLET, FILM COATED ORAL 2 TIMES DAILY
Status: DISCONTINUED | OUTPATIENT
Start: 2021-10-22 | End: 2021-10-22

## 2021-10-21 RX ORDER — ONDANSETRON 4 MG/1
4 TABLET, ORALLY DISINTEGRATING ORAL
Status: DISCONTINUED | OUTPATIENT
Start: 2021-10-21 | End: 2021-10-24 | Stop reason: HOSPADM

## 2021-10-21 RX ORDER — DEXTROSE 50 % IN WATER (D50W) INTRAVENOUS SYRINGE
25-50 AS NEEDED
Status: DISCONTINUED | OUTPATIENT
Start: 2021-10-21 | End: 2021-10-24 | Stop reason: HOSPADM

## 2021-10-21 RX ORDER — LATANOPROST 50 UG/ML
1 SOLUTION/ DROPS OPHTHALMIC
Status: DISCONTINUED | OUTPATIENT
Start: 2021-10-22 | End: 2021-10-24 | Stop reason: HOSPADM

## 2021-10-21 RX ORDER — ALLOPURINOL 100 MG/1
200 TABLET ORAL DAILY
Status: DISCONTINUED | OUTPATIENT
Start: 2021-10-22 | End: 2021-10-24 | Stop reason: HOSPADM

## 2021-10-21 NOTE — Clinical Note
Patient Class[de-identified] OBSERVATION [104]   Type of Bed: Telemetry [19]   Cardiac Monitoring Required?: Yes   Reason for Observation: syncope   Admitting Diagnosis: Syncope [739080]   Admitting Physician: Per Maravilla [[id number]]   Attending Physician: Per Maravilla [[id number]]

## 2021-10-22 PROBLEM — N25.81 SECONDARY HYPERPARATHYROIDISM (HCC): Status: ACTIVE | Noted: 2021-10-22

## 2021-10-22 PROBLEM — N18.4 CHRONIC KIDNEY DISEASE (CKD), STAGE IV (SEVERE) (HCC): Status: ACTIVE | Noted: 2020-09-16

## 2021-10-22 LAB
ALBUMIN SERPL-MCNC: 3.1 G/DL (ref 3.5–5)
ALBUMIN/GLOB SERPL: 0.9 {RATIO} (ref 1.1–2.2)
ALP SERPL-CCNC: 147 U/L (ref 45–117)
ALT SERPL-CCNC: 26 U/L (ref 12–78)
ANION GAP SERPL CALC-SCNC: 7 MMOL/L (ref 5–15)
AST SERPL W P-5'-P-CCNC: 33 U/L (ref 15–37)
ATRIAL RATE: 441 BPM
BASOPHILS # BLD: 0 K/UL (ref 0–0.1)
BASOPHILS NFR BLD: 1 % (ref 0–1)
BILIRUB SERPL-MCNC: 0.4 MG/DL (ref 0.2–1)
BNP SERPL-MCNC: 1152 PG/ML
BNP SERPL-MCNC: 836 PG/ML
BUN SERPL-MCNC: 53 MG/DL (ref 6–20)
BUN/CREAT SERPL: 23 (ref 12–20)
CA-I BLD-MCNC: 9.6 MG/DL (ref 8.5–10.1)
CALCULATED P AXIS, ECG09: 46 DEGREES
CALCULATED R AXIS, ECG10: -14 DEGREES
CALCULATED T AXIS, ECG11: 68 DEGREES
CHLORIDE SERPL-SCNC: 103 MMOL/L (ref 97–108)
CO2 SERPL-SCNC: 30 MMOL/L (ref 21–32)
CREAT SERPL-MCNC: 2.32 MG/DL (ref 0.55–1.02)
DIAGNOSIS, 93000: NORMAL
DIFFERENTIAL METHOD BLD: ABNORMAL
EOSINOPHIL # BLD: 0.2 K/UL (ref 0–0.4)
EOSINOPHIL NFR BLD: 2 % (ref 0–7)
ERYTHROCYTE [DISTWIDTH] IN BLOOD BY AUTOMATED COUNT: 16 % (ref 11.5–14.5)
EST. AVERAGE GLUCOSE BLD GHB EST-MCNC: 140 MG/DL
GLOBULIN SER CALC-MCNC: 3.6 G/DL (ref 2–4)
GLUCOSE BLD STRIP.AUTO-MCNC: 110 MG/DL (ref 65–117)
GLUCOSE BLD STRIP.AUTO-MCNC: 169 MG/DL (ref 65–117)
GLUCOSE BLD STRIP.AUTO-MCNC: 211 MG/DL (ref 65–117)
GLUCOSE BLD STRIP.AUTO-MCNC: 76 MG/DL (ref 65–117)
GLUCOSE SERPL-MCNC: 111 MG/DL (ref 65–100)
HBA1C MFR BLD: 6.5 % (ref 4–5.6)
HCT VFR BLD AUTO: 38.2 % (ref 35–47)
HGB BLD-MCNC: 12.6 G/DL (ref 11.5–16)
IMM GRANULOCYTES # BLD AUTO: 0 K/UL (ref 0–0.04)
IMM GRANULOCYTES NFR BLD AUTO: 0 % (ref 0–0.5)
LYMPHOCYTES # BLD: 1.6 K/UL (ref 0.8–3.5)
LYMPHOCYTES NFR BLD: 22 % (ref 12–49)
MCH RBC QN AUTO: 29.4 PG (ref 26–34)
MCHC RBC AUTO-ENTMCNC: 33 G/DL (ref 30–36.5)
MCV RBC AUTO: 89.3 FL (ref 80–99)
MONOCYTES # BLD: 0.7 K/UL (ref 0–1)
MONOCYTES NFR BLD: 10 % (ref 5–13)
NEUTS SEG # BLD: 4.7 K/UL (ref 1.8–8)
NEUTS SEG NFR BLD: 65 % (ref 32–75)
NRBC # BLD: 0 K/UL (ref 0–0.01)
NRBC BLD-RTO: 0 PER 100 WBC
PERFORMED BY, TECHID: ABNORMAL
PERFORMED BY, TECHID: ABNORMAL
PERFORMED BY, TECHID: NORMAL
PERFORMED BY, TECHID: NORMAL
PLATELET # BLD AUTO: 209 K/UL (ref 150–400)
PMV BLD AUTO: 10.4 FL (ref 8.9–12.9)
POTASSIUM SERPL-SCNC: 3.3 MMOL/L (ref 3.5–5.1)
PROT SERPL-MCNC: 6.7 G/DL (ref 6.4–8.2)
Q-T INTERVAL, ECG07: 420 MS
QRS DURATION, ECG06: 98 MS
QTC CALCULATION (BEZET), ECG08: 484 MS
RBC # BLD AUTO: 4.28 M/UL (ref 3.8–5.2)
SODIUM SERPL-SCNC: 140 MMOL/L (ref 136–145)
TROPONIN-HIGH SENSITIVITY: 91 NG/L (ref 0–51)
VENTRICULAR RATE, ECG03: 80 BPM
WBC # BLD AUTO: 7.2 K/UL (ref 3.6–11)

## 2021-10-22 PROCEDURE — 80053 COMPREHEN METABOLIC PANEL: CPT

## 2021-10-22 PROCEDURE — 84484 ASSAY OF TROPONIN QUANT: CPT

## 2021-10-22 PROCEDURE — 74011250637 HC RX REV CODE- 250/637: Performed by: INTERNAL MEDICINE

## 2021-10-22 PROCEDURE — 74011250636 HC RX REV CODE- 250/636: Performed by: FAMILY MEDICINE

## 2021-10-22 PROCEDURE — 36415 COLL VENOUS BLD VENIPUNCTURE: CPT

## 2021-10-22 PROCEDURE — 87186 SC STD MICRODIL/AGAR DIL: CPT

## 2021-10-22 PROCEDURE — 99218 HC RM OBSERVATION: CPT

## 2021-10-22 PROCEDURE — 87153 DNA/RNA SEQUENCING: CPT

## 2021-10-22 PROCEDURE — 85025 COMPLETE CBC W/AUTO DIFF WBC: CPT

## 2021-10-22 PROCEDURE — 83036 HEMOGLOBIN GLYCOSYLATED A1C: CPT

## 2021-10-22 PROCEDURE — 82962 GLUCOSE BLOOD TEST: CPT

## 2021-10-22 PROCEDURE — 74011636637 HC RX REV CODE- 636/637: Performed by: FAMILY MEDICINE

## 2021-10-22 PROCEDURE — 83880 ASSAY OF NATRIURETIC PEPTIDE: CPT

## 2021-10-22 PROCEDURE — 87040 BLOOD CULTURE FOR BACTERIA: CPT

## 2021-10-22 PROCEDURE — 74011250637 HC RX REV CODE- 250/637: Performed by: FAMILY MEDICINE

## 2021-10-22 RX ORDER — POTASSIUM CHLORIDE 750 MG/1
40 TABLET, FILM COATED, EXTENDED RELEASE ORAL
Status: COMPLETED | OUTPATIENT
Start: 2021-10-22 | End: 2021-10-22

## 2021-10-22 RX ORDER — HYDRALAZINE HYDROCHLORIDE 50 MG/1
50 TABLET, FILM COATED ORAL 2 TIMES DAILY
Status: DISCONTINUED | OUTPATIENT
Start: 2021-10-22 | End: 2021-10-24

## 2021-10-22 RX ORDER — LOSARTAN POTASSIUM 50 MG/1
50 TABLET ORAL DAILY
Status: DISCONTINUED | OUTPATIENT
Start: 2021-10-23 | End: 2021-10-24 | Stop reason: HOSPADM

## 2021-10-22 RX ADMIN — INSULIN LISPRO 4 UNITS: 100 INJECTION, SOLUTION INTRAVENOUS; SUBCUTANEOUS at 12:45

## 2021-10-22 RX ADMIN — HYDRALAZINE HYDROCHLORIDE 25 MG: 25 TABLET, FILM COATED ORAL at 09:09

## 2021-10-22 RX ADMIN — VENLAFAXINE 75 MG: 50 TABLET ORAL at 09:06

## 2021-10-22 RX ADMIN — ALLOPURINOL 200 MG: 100 TABLET ORAL at 09:08

## 2021-10-22 RX ADMIN — SODIUM CHLORIDE 25 ML/HR: 9 INJECTION, SOLUTION INTRAVENOUS at 01:46

## 2021-10-22 RX ADMIN — GABAPENTIN 300 MG: 300 CAPSULE ORAL at 21:11

## 2021-10-22 RX ADMIN — POLYETHYLENE GLYCOL 3350 17 G: 17 POWDER, FOR SOLUTION ORAL at 09:21

## 2021-10-22 RX ADMIN — HYDRALAZINE HYDROCHLORIDE 50 MG: 50 TABLET, FILM COATED ORAL at 21:11

## 2021-10-22 RX ADMIN — PANTOPRAZOLE SODIUM 40 MG: 20 TABLET, DELAYED RELEASE ORAL at 09:08

## 2021-10-22 RX ADMIN — APIXABAN 2.5 MG: 2.5 TABLET, FILM COATED ORAL at 09:08

## 2021-10-22 RX ADMIN — INSULIN HUMAN 20 UNITS: 100 INJECTION, SUSPENSION SUBCUTANEOUS at 01:45

## 2021-10-22 RX ADMIN — METOPROLOL SUCCINATE 25 MG: 25 TABLET, EXTENDED RELEASE ORAL at 09:08

## 2021-10-22 RX ADMIN — APIXABAN 2.5 MG: 2.5 TABLET, FILM COATED ORAL at 21:13

## 2021-10-22 RX ADMIN — INSULIN HUMAN 20 UNITS: 100 INJECTION, SUSPENSION SUBCUTANEOUS at 21:11

## 2021-10-22 RX ADMIN — ASPIRIN 81 MG CHEWABLE TABLET 81 MG: 81 TABLET CHEWABLE at 09:07

## 2021-10-22 RX ADMIN — FUROSEMIDE 40 MG: 40 TABLET ORAL at 09:09

## 2021-10-22 RX ADMIN — PRAVASTATIN SODIUM 80 MG: 40 TABLET ORAL at 21:11

## 2021-10-22 RX ADMIN — ISOSORBIDE MONONITRATE 60 MG: 60 TABLET, EXTENDED RELEASE ORAL at 21:11

## 2021-10-22 RX ADMIN — GABAPENTIN 300 MG: 300 CAPSULE ORAL at 09:08

## 2021-10-22 RX ADMIN — ISOSORBIDE MONONITRATE 60 MG: 60 TABLET, EXTENDED RELEASE ORAL at 09:07

## 2021-10-22 RX ADMIN — ACETAMINOPHEN 650 MG: 325 TABLET ORAL at 09:21

## 2021-10-22 RX ADMIN — POTASSIUM CHLORIDE 40 MEQ: 750 TABLET, FILM COATED, EXTENDED RELEASE ORAL at 12:46

## 2021-10-22 NOTE — PROGRESS NOTES
Reason for Admission: syncope                     RUR Score:          16%         Plan for utilizing home health:     No need for home health at this time. PCP: First and Last name:  Rylee Rogel NP     Name of Practice:    Are you a current patient: Yes/No: yes   Approximate date of last visit:  Last month   Can you participate in a virtual visit with your PCP:                     Current Advanced Directive/Advance Care Plan: Full Code      Healthcare Decision Maker:   Click here to complete [address] including selection of the Healthcare Decision Maker Relationship (ie \"Primary\")             Primary Decision MakeMelissa Cutler Child - [phone]                  Transition of Care Plan:                      Patient will discharge home with her daughter, Delfin Lefort, [phone]. Patient lives with her daughter in a one story home with 4 steps to entrance that patient is able to walk up, but her son has also built a front and back ramp. Patient states she uses a cane, but has no other DME at this time. Patient received [facility] services many years ago but is unsure of the company name at this time. Patient has never received SNF or IRF services. Patient does not have an advanced directive and is not interested in information at this time. Patient uses [facility] in [place] to fill her prescriptions.

## 2021-10-22 NOTE — BH NOTES
Patient will discharge home with her daughter Daphney Hamilton, [phone]. Daughter will transport home at discharge.

## 2021-10-22 NOTE — H&P
History and Physical    NAME: Cody Ho   :  1945   MRN:  [id number]     Date/Time:  10/22/2021 3:00 PM    Patient PCP: Joao Marie, NP  ______________________________________________________________________             Subjective:     CHIEF COMPLAINT:     Syncopal episode    HISTORY OF PRESENT ILLNESS:       Patient is a 67y.o. year old female history of chronic kidney disease stage IV hypertension diabetes CAD gout hypertension hyperlipidemia depression came to emergency room after sustaining a fall patient have of multiple falls over last 1 year when the patient she passed out briefly patient was with her daughter patient denies any headache nausea vomiting diarrhea no constipation, sister fifth event patient have work-up done in the past which was normal    Past Medical History:   Diagnosis Date    Adenocarcinoma, renal cell ([facility] 2020    Arthritis     CAD (coronary artery disease)     Chronic kidney disease     Diabetes ([address]     Gout     Hypercholesterolemia     Hypertension     Mental depression     Pulmonary embolism ([address]     Seizures ([address]     Stroke ([address]     Thyroid disease         Past Surgical History:   Procedure Laterality Date    HX GYN      HX HYSTERECTOMY      HX NEPHRECTOMY Left 2015    HX ORTHOPAEDIC      HX UROLOGICAL  2015    PARTIAL URETERECTOMY     [place] CARDIAC SURG PROCEDURE UNLIST      stents placed        Social History     Tobacco Use    Smoking status: Former Smoker     Years: 15.00    Smokeless tobacco: Never Used   Substance Use Topics    Alcohol use: Not Currently        Family History   Problem Relation Age of Onset    Heart Disease Mother     Heart Disease Father     Heart Disease Sister     Cancer Sister     Heart Disease Brother     Cancer Maternal Grandmother     Stroke Son     Cancer Sister        No Known Allergies     Prior to Admission medications    Medication Sig Start Date End Date Taking?  Authorizing Provider insulin syringe-needle U-100 1 mL 31 gauge x 15/64\" syrg USE TO INJECT INSULIN TWICE A DAY 5/7/21  Yes MD Raquel Cardoso N NPH U-100 Insulin 100 unit/mL injection INJECT 20 UNITS SUBCUTANEOUSLY IN THE EVENING **STOP  HUMULIN** 4/2/21  Yes Lidia Rowell MD   omeprazole (PRILOSEC) 20 mg capsule Take 20 mg by mouth daily. Yes Provider, Historical   nitroglycerin (NITROSTAT) 0.4 mg SL tablet 0.4 mg by SubLINGual route every five (5) minutes as needed for Chest Pain. Up to 3 doses. Yes Provider, Historical   lubiPROStone (Amitiza) 24 mcg capsule Take 24 mcg by mouth as needed for Constipation. Yes Provider, Historical   apixaban (ELIQUIS) 2.5 mg tablet Take 2.5 mg by mouth two (2) times a day. 6/18/19  Yes Provider, Historical   isosorbide mononitrate ER (IMDUR) 60 mg CR tablet Take 60 mg by mouth two (2) times a day. 7/17/19  Yes Provider, Historical   hydrALAZINE (APRESOLINE) 25 mg tablet Take 25 mg by mouth two (2) times a day. Yes Provider, Historical   metoprolol succinate (TOPROL-XL) 25 mg XL tablet Take 25 mg by mouth daily. Yes Provider, Historical   gabapentin (NEURONTIN) 300 mg capsule Take 300 mg by mouth two (2) times a day. 6/29/20  Yes Provider, Historical   furosemide (LASIX) 40 mg tablet Take 40 mg by mouth daily. Yes Provider, Historical   fluticasone propionate (FLONASE) 50 mcg/actuation nasal spray fluticasone propionate 50 mcg/actuation nasal spray,suspension   Yes Provider, Historical   cetirizine (ZYRTEC) 10 mg tablet Take 10 mg by mouth daily. Yes Provider, Historical   venlafaxine (EFFEXOR) 75 mg tablet Take 75 mg by mouth daily. Yes Provider, Historical   latanoprost (XALATAN) 0.005 % ophthalmic solution Administer 1 Drop to both eyes nightly. Yes Provider, Historical   pravastatin (PRAVACHOL) 80 mg tablet Take 80 mg by mouth nightly. Yes Provider, Historical   aspirin 81 mg chewable tablet Take 81 mg by mouth daily.    Yes Provider, Historical allopurinol (ZYLOPRIM) 100 mg tablet Take 200 mg by mouth daily.    Yes Provider, Historical         Current Facility-Administered Medications:     hydrALAZINE (APRESOLINE) tablet 50 mg, 50 mg, Oral, BID, Galina Mendez MD    [START ON 10/23/2021] losartan (COZAAR) tablet 50 mg, 50 mg, Oral, DAILY, Galina Mendez MD    insulin NPH (NOVOLIN N, HUMULIN N) injection 20 Units, 20 Units, SubCUTAneous, QHS, Ana Herbert MD, 20 Units at 10/22/21 0145    allopurinoL (ZYLOPRIM) tablet 200 mg, 200 mg, Oral, DAILY, Ana Herbert MD, 200 mg at 10/22/21 0908    apixaban (ELIQUIS) tablet 2.5 mg, 2.5 mg, Oral, BID, Ana Herbert MD, 2.5 mg at 10/22/21 0908    aspirin chewable tablet 81 mg, 81 mg, Oral, DAILY, Ana Herbert MD, 81 mg at 10/22/21 0907    furosemide (LASIX) tablet 40 mg, 40 mg, Oral, DAILY, Ana Herbert MD, 40 mg at 10/22/21 0909    gabapentin (NEURONTIN) capsule 300 mg, 300 mg, Oral, BID, Ana Herbert MD, 300 mg at 10/22/21 0908    isosorbide mononitrate ER (IMDUR) tablet 60 mg, 60 mg, Oral, BID, Ana Herbert MD, 60 mg at 10/22/21 0907    latanoprost (XALATAN) 0.005 % ophthalmic solution 1 Drop, 1 Drop, Both Eyes, QHS, Ana Herbert MD    lubiPROStone (AMITIZA) capsule 24 mcg, 24 mcg, Oral, DAILY WITH BREAKFAST, Ana Herbert MD    metoprolol succinate (TOPROL-XL) XL tablet 25 mg, 25 mg, Oral, DAILY, Ana Herbert MD, 25 mg at 10/22/21 0908    nitroglycerin (NITROSTAT) tablet 0.4 mg, 0.4 mg, SubLINGual, Q5MIN PRN, Ana Herbert MD    pantoprazole (PROTONIX) tablet 40 mg, 40 mg, Oral, ACB, Ana Herbert MD, 40 mg at 10/22/21 0908    pravastatin (PRAVACHOL) tablet 80 mg, 80 mg, Oral, QHS, Ana Herbert MD    [facility]) tablet 75 mg, 75 mg, Oral, DAILY, Ana Herbert MD, 75 mg at 10/22/21 0906    insulin lispro (HUMALOG) injection, , SubCUTAneous, AC&HS, Ana Herbert MD, 4 Units at 10/22/21 1245    glucose chewable tablet 16 g, 4 Tablet, Oral, PRN, Ana Herbert MD    dextrose (D50W) injection syrg 12.5-25 g, 25-50 mL, IntraVENous, PRN, Rae Herbert MD    glucagon [facility]) injection 1 mg, 1 mg, IntraMUSCular, PRN, Rae Herbert MD    0.9% sodium chloride infusion, 25 mL/hr, IntraVENous, CONTINUOUS, Ana Herbert MD, Last Rate: 25 mL/hr at 10/22/21 0146, 25 mL/hr at 10/22/21 0146    acetaminophen (TYLENOL) tablet 650 mg, 650 mg, Oral, Q6H PRN, 650 mg at 10/22/21 3659 **OR** acetaminophen (TYLENOL) suppository 650 mg, 650 mg, Rectal, Q6H PRN, Ana Herbert MD    polyethylene glycol (MIRALAX) packet 17 g, 17 g, Oral, DAILY PRN, Ana Herbert MD, 17 g at 10/22/21 0921    ondansetron (ZOFRAN ODT) tablet 4 mg, 4 mg, Oral, Q8H PRN **OR** ondansetron (ZOFRAN) injection 4 mg, 4 mg, IntraVENous, Q6H PRN, Ana Herbert MD    LAB DATA REVIEWED:    Recent Results (from the past 24 hour(s))   CBC WITH AUTOMATED DIFF    Collection Time: 10/21/21  7:30 PM   Result Value Ref Range    WBC 7.5 3.6 - 11.0 K/uL    RBC 4.24 3.80 - 5.20 M/uL    HGB 12.4 11.5 - 16.0 g/dL    HCT 37.9 35.0 - 47.0 %    MCV 89.4 80.0 - 99.0 FL    MCH 29.2 26.0 - 34.0 PG    MCHC 32.7 30.0 - 36.5 g/dL    RDW 15.9 (H) 11.5 - 14.5 %    PLATELET 786 625 - 621 K/uL    MPV 10.7 8.9 - 12.9 FL    NRBC 0.0 0.0  WBC    ABSOLUTE NRBC 0.00 0.00 - 0.01 K/uL    NEUTROPHILS 69 32 - 75 %    LYMPHOCYTES 19 12 - 49 %    MONOCYTES 10 5 - 13 %    EOSINOPHILS 1 0 - 7 %    BASOPHILS 1 0 - 1 %    IMMATURE GRANULOCYTES 0 0 - 0.5 %    ABS. NEUTROPHILS 5.2 1.8 - 8.0 K/UL    ABS. LYMPHOCYTES 1.5 0.8 - 3.5 K/UL    ABS. MONOCYTES 0.7 0.0 - 1.0 K/UL    ABS. EOSINOPHILS 0.1 0.0 - 0.4 K/UL    ABS. BASOPHILS 0.1 0.0 - 0.1 K/UL    ABS. IMM.  GRANS. 0.0 0.00 - 0.04 K/UL    DF AUTOMATED     METABOLIC PANEL, COMPREHENSIVE    Collection Time: 10/21/21  7:30 PM   Result Value Ref Range    Sodium 138 136 - 145 mmol/L    Potassium 3.7 3.5 - 5.1 mmol/L    Chloride 101 97 - 108 mmol/L    CO2 28 21 - 32 mmol/L    Anion gap 9 5 - 15 mmol/L    Glucose 121 (H) 65 - 100 mg/dL    BUN 58 (H) 6 - 20 mg/dL    Creatinine 2.56 (H) 0.55 - 1.02 mg/dL    BUN/Creatinine ratio 23 (H) 12 - 20      GFR est AA 22 (L) >60 ml/min/1.73m2    GFR est non-AA 18 (L) >60 ml/min/1.73m2    Calcium 9.6 8.5 - 10.1 mg/dL    Bilirubin, total 0.4 0.2 - 1.0 mg/dL    AST (SGOT) 37 15 - 37 U/L    ALT (SGPT) 29 12 - 78 U/L    Alk.  phosphatase 147 (H) 45 - 117 U/L    Protein, total 6.9 6.4 - 8.2 g/dL    Albumin 3.2 (L) 3.5 - 5.0 g/dL    Globulin 3.7 2.0 - 4.0 g/dL    A-G Ratio 0.9 (L) 1.1 - 2.2     TROPONIN-HIGH SENSITIVITY    Collection Time: 10/21/21  7:30 PM   Result Value Ref Range    Troponin-High Sensitivity 75 (HH) 0 - 51 ng/L   NT-PRO BNP    Collection Time: 10/21/21  7:30 PM   Result Value Ref Range    NT pro-BNP 1,411 (H) <450 pg/mL   GLUCOSE, POC    Collection Time: 10/21/21  8:32 PM   Result Value Ref Range    Glucose (POC) 112 65 - 117 mg/dL    Performed by Kurtz Salts    GLUCOSE, POC    Collection Time: 10/22/21  1:44 AM   Result Value Ref Range    Glucose (POC) 211 (H) 65 - 117 mg/dL    Performed by Kurtz Salts    HEMOGLOBIN A1C WITH EAG    Collection Time: 10/22/21  2:09 AM   Result Value Ref Range    Hemoglobin A1c 6.5 (H) 4.0 - 5.6 %    Est. average glucose 140 mg/dL   NT-PRO BNP    Collection Time: 10/22/21  2:09 AM   Result Value Ref Range    NT pro-BNP 1,152 (H) <450 pg/mL   GLUCOSE, POC    Collection Time: 10/22/21  7:54 AM   Result Value Ref Range    Glucose (POC) 110 65 - 117 mg/dL    Performed by Lito Sos    METABOLIC PANEL, COMPREHENSIVE    Collection Time: 10/22/21  8:05 AM   Result Value Ref Range    Sodium 140 136 - 145 mmol/L    Potassium 3.3 (L) 3.5 - 5.1 mmol/L    Chloride 103 97 - 108 mmol/L    CO2 30 21 - 32 mmol/L    Anion gap 7 5 - 15 mmol/L    Glucose 111 (H) 65 - 100 mg/dL    BUN 53 (H) 6 - 20 mg/dL    Creatinine 2.32 (H) 0.55 - 1.02 mg/dL    BUN/Creatinine ratio 23 (H) 12 - 20      GFR est AA 25 (L) >60 ml/min/1.73m2    GFR est non-AA 20 (L) >60 ml/min/1.73m2    Calcium 9.6 8.5 - 10.1 mg/dL    Bilirubin, total 0.4 0.2 - 1.0 mg/dL    AST (SGOT) 33 15 - 37 U/L    ALT (SGPT) 26 12 - 78 U/L    Alk. phosphatase 147 (H) 45 - 117 U/L    Protein, total 6.7 6.4 - 8.2 g/dL    Albumin 3.1 (L) 3.5 - 5.0 g/dL    Globulin 3.6 2.0 - 4.0 g/dL    A-G Ratio 0.9 (L) 1.1 - 2.2     CBC WITH AUTOMATED DIFF    Collection Time: 10/22/21  8:05 AM   Result Value Ref Range    WBC 7.2 3.6 - 11.0 K/uL    RBC 4.28 3.80 - 5.20 M/uL    HGB 12.6 11.5 - 16.0 g/dL    HCT 38.2 35.0 - 47.0 %    MCV 89.3 80.0 - 99.0 FL    MCH 29.4 26.0 - 34.0 PG    MCHC 33.0 30.0 - 36.5 g/dL    RDW 16.0 (H) 11.5 - 14.5 %    PLATELET 917 050 - 676 K/uL    MPV 10.4 8.9 - 12.9 FL    NRBC 0.0 0.0  WBC    ABSOLUTE NRBC 0.00 0.00 - 0.01 K/uL    NEUTROPHILS 65 32 - 75 %    LYMPHOCYTES 22 12 - 49 %    MONOCYTES 10 5 - 13 %    EOSINOPHILS 2 0 - 7 %    BASOPHILS 1 0 - 1 %    IMMATURE GRANULOCYTES 0 0 - 0.5 %    ABS. NEUTROPHILS 4.7 1.8 - 8.0 K/UL    ABS. LYMPHOCYTES 1.6 0.8 - 3.5 K/UL    ABS. MONOCYTES 0.7 0.0 - 1.0 K/UL    ABS. EOSINOPHILS 0.2 0.0 - 0.4 K/UL    ABS. BASOPHILS 0.0 0.0 - 0.1 K/UL    ABS. IMM. GRANS. 0.0 0.00 - 0.04 K/UL    DF AUTOMATED     TROPONIN-HIGH SENSITIVITY    Collection Time: 10/22/21 12:05 PM   Result Value Ref Range    Troponin-High Sensitivity 91 (HH) 0 - 51 ng/L       XR Results (most recent):  Results from [facility] encounter on 10/21/21    XR CHEST PORT    Narrative  Chest single view. Comparison single view chest January 13, 2021. Lungs clear; no interstitial or alveolar pulmonary edema. Cardiac and  mediastinal structures unchanged. Thoracic aorta atherosclerosis. No  pneumothorax or sizable pleural effusion. CT HEAD WO CONT   Final Result   No acute intracranial abnormality. Remote bilateral basal ganglial   infarcts.   Mild generalized atrophy with moderate nonspecific white matter abnormality that may indicate chronic small vessel disease. XR CHEST PORT   Final Result           Review of Systems:  Constitutional: Negative for chills and fever. HENT: Negative. Eyes: Negative. Respiratory: Negative. Cardiovascular: Negative. Gastrointestinal: Negative for abdominal pain and nausea. Skin: Negative. Neurological: Negative. Objective:   VITALS:    Visit Vitals  BP (!) 172/76 (BP 1 Location: Right upper arm, BP Patient Position: At rest)   Pulse 74   Temp 97.7 °F (36.5 °C)   Resp 18   Ht 5' 6\" (1.676 m)   Wt 90.7 kg (200 lb)   SpO2 97%   BMI 32.28 kg/m²       Physical Exam:   Constitutional: pt is oriented to person, place, and time. HENT:   Head: Normocephalic and atraumatic. Eyes: Pupils are equal, round, and reactive to light. EOM are normal.   Cardiovascular: Normal rate, regular rhythm and normal heart sounds. Pulmonary/Chest: Breath sounds normal. No wheezes. No rales. Exhibits no tenderness. Abdominal: Soft. Bowel sounds are normal. There is no abdominal tenderness. There is no rebound and no guarding. Musculoskeletal: Normal range of motion. Neurological: pt is alert and oriented to person, place, and time. Alert. Normal strength. No cranial nerve deficit or sensory deficit. Displays a negative Romberg sign.         ASSESSMENT & PLAN:    Syncopal episode  Chronic kidney disease stage IV  Hypertension  Hyperlipidemia  Type 2 diabetes  Hypothyroidism  Mildly elevated troponin   history of pulmonary embolism  History of TIA  Hypokalemia      Admit to telemetry floor cardiology neurology and nephrology consult    Continue home medication  PT OT consult      Current Facility-Administered Medications:     hydrALAZINE (APRESOLINE) tablet 50 mg, 50 mg, Oral, BID, Damian Manning MD    [START ON 10/23/2021] losartan (COZAAR) tablet 50 mg, 50 mg, Oral, DAILY, Damian Manning MD    insulin NPH (NOVOLIN N, HUMULIN N) injection 20 Units, 20 Units, SubCUTAneous, QHS, Ana Herbert MD, 20 Units at 10/22/21 0145    allopurinoL (ZYLOPRIM) tablet 200 mg, 200 mg, Oral, DAILY, Ana Herbert MD, 200 mg at 10/22/21 0908    apixaban (ELIQUIS) tablet 2.5 mg, 2.5 mg, Oral, BID, Ana Herbert MD, 2.5 mg at 10/22/21 0908    aspirin chewable tablet 81 mg, 81 mg, Oral, DAILY, Ana Herbert MD, 81 mg at 10/22/21 0907    furosemide (LASIX) tablet 40 mg, 40 mg, Oral, DAILY, Ana Herbert MD, 40 mg at 10/22/21 0909    gabapentin (NEURONTIN) capsule 300 mg, 300 mg, Oral, BID, Ana Herbert MD, 300 mg at 10/22/21 0908    isosorbide mononitrate ER (IMDUR) tablet 60 mg, 60 mg, Oral, BID, Ana Herbert MD, 60 mg at 10/22/21 0907    latanoprost (XALATAN) 0.005 % ophthalmic solution 1 Drop, 1 Drop, Both Eyes, QHS, Ana Herbert MD    lubiPROStone (AMITIZA) capsule 24 mcg, 24 mcg, Oral, DAILY WITH BREAKFAST, Telma Herbert MD    metoprolol succinate (TOPROL-XL) XL tablet 25 mg, 25 mg, Oral, DAILY, Ana Herbert MD, 25 mg at 10/22/21 0908    nitroglycerin (NITROSTAT) tablet 0.4 mg, 0.4 mg, SubLINGual, Q5MIN PRN, Ana Herbert MD    pantoprazole (PROTONIX) tablet 40 mg, 40 mg, Oral, ACB, Ana Herbert MD, 40 mg at 10/22/21 0908    pravastatin (PRAVACHOL) tablet 80 mg, 80 mg, Oral, QHS, Ana Herbert MD    [facility]) tablet 75 mg, 75 mg, Oral, DAILY, Ana Herbert MD, 75 mg at 10/22/21 0906    insulin lispro (HUMALOG) injection, , SubCUTAneous, AC&HS, Ana Herbert MD, 4 Units at 10/22/21 1245    glucose chewable tablet 16 g, 4 Tablet, Oral, PRN, Ana Herbert MD    dextrose (D50W) injection syrg 12.5-25 g, 25-50 mL, IntraVENous, PRN, Ana Herbert MD    glucagon (GLUCAGEN) injection 1 mg, 1 mg, IntraMUSCular, PRN, Ana Herbert MD    0.9% sodium chloride infusion, 25 mL/hr, IntraVENous, CONTINUOUS, Ana Herbert MD, Last Rate: 25 mL/hr at 10/22/21 0146, 25 mL/hr at 10/22/21 0146   acetaminophen (TYLENOL) tablet 650 mg, 650 mg, Oral, Q6H PRN, 650 mg at 10/22/21 5420 **OR** acetaminophen (TYLENOL) suppository 650 mg, 650 mg, Rectal, Q6H PRN, Ana Herbert MD    polyethylene glycol (MIRALAX) packet 17 g, 17 g, Oral, DAILY PRN, Ana Herbert MD, 17 g at 10/22/21 0921    ondansetron (ZOFRAN ODT) tablet 4 mg, 4 mg, Oral, Q8H PRN **OR** ondansetron (ZOFRAN) injection 4 mg, 4 mg, IntraVENous, Q6H PRN, Ana Herbert MD    ________________________________________________________________________    Signed: Sunni Fonseca MD

## 2021-10-22 NOTE — ED NOTES
TRANSFER - OUT REPORT:    Verbal report given to JAN Monte on Regino Carter  being transferred to [phone], [id number]. Report consisted of patients Situation, Background, Assessment and   Recommendations(SBAR). Information from the following report(s) SBAR, ED Summary, [facility] and Recent Results was reviewed with the receiving nurse. Lines:   Peripheral IV 10/21/21 Right Hand (Active)   Site Assessment Clean, dry, & intact 10/21/21 2037   Phlebitis Assessment 0 10/21/21 2037   Infiltration Assessment 0 10/21/21 2037   Dressing Status Clean, dry, & intact 10/21/21 2037   Dressing Type Transparent 10/21/21 2037   Hub Color/Line Status Pink 10/21/21 2037        Opportunity for questions and clarification was provided.

## 2021-10-22 NOTE — ASSESSMENT & PLAN NOTE
Hypertension  Patient has had longstanding hypertension, with organ damage as evidenced by CT head.   Currently on hydralazine 25 mg twice daily, metoprolol 25 mg daily, furosemide 40 mg daily    Plan  Restart patient's losartan at 50 mg daily  Increase hydralazine to 50 mg twice daily  Continue with furosemide 40 mg daily

## 2021-10-22 NOTE — ASSESSMENT & PLAN NOTE
patient follows regularly with CKD clinic at [address]. Patient's managed for CKD stage IV, with secondary hyperparathyroidism, anemia, and control of blood pressure. Patient has had AV fistula placed on her left upper extremity which appears to be well-developed. Patient's blood pressure has been controlled with hydralazine, losartan, and furosemide. Patient's most recent creatinine was 2.3 in September 2021. Patient has not had any other episode of swelling, or urinary complaints.

## 2021-10-22 NOTE — CONSULTS
Cardiology Consult    NAME: Mihai Tejada   :  1945   MRN:  [id number]     Date/Time:  10/22/2021 1:14 PM    Patient PCP: Taiwo oJhnson NP  ________________________________________________________________________     Assessment/Plan:   Syncope,  Secondary to straining/defecation, prior episodes also related to straining or abdominal pain. Had 5 episodes though the second spell was while she was sitting on a chair. Patient in the past has declined loop recorder. Continue telemetry, watch for significant arrhythmias, repeat EKG. Discharge can follow-up with Dr. Diallo Bryan and reevaluate for loop recorder. Coronary artery disease, denies chest pain, EKG in chart is unremarkable, mildly elevated troponin,? Secondary to kidney disease. Hypertension, blood pressure is up today, may need to uptitrate medications guided by clinical course    History of pulmonary embolism, on Eliquis,    Diabetes mellitus    History of TIA    Mild aortic stenosis             Subjective:   CHIEF COMPLAINT:   Syncope    REASON FOR CONSULT:    Syncope  HISTORY OF PRESENT ILLNESS:     Mihai Tejada is a 67 y.o. BLACK/ female who presents with syncope. Patient says she was sitting on the potty, straining when she passed out. Daughter try to wake her up, called rescue squad. Patient denies any chest pain or shortness of breath. Had similar episodes before, patient says all but the second episode was related to straining or abdominal pain. Says she has to strain to move her bowels. Dr. Diallo Bryan had stopped one of her medications and this did help for a while. She says this is the fifth time she passed out. Patient had a hospitalizations at [facility] also. Patient apparently had declined reveal loop recorder in the past.  Dr. Diallo Bryan s  office note is reviewed, history of coronary artery disease status post PTCA/SHU. Negative Cardiolite stress test in 2017 in .   History of pulmonary embolism, on Eliquis. Hypertension, blood pressure is up today. Diabetes mellitus. History of TIA.   Kidney disease, has AV fistula left upper limb, not on dialysis  Mild aortic stenosis, 9/20 echo with peak gradient of 27 mmHg      Past Medical History:   Diagnosis Date    Adenocarcinoma, renal cell ([address] 9/2/2020    Arthritis     CAD (coronary artery disease)     Chronic kidney disease     Diabetes ([address]     Gout     Hypercholesterolemia     Hypertension     Mental depression     Pulmonary embolism (HCC)     Seizures ([address]     [facility]     Thyroid disease       Past Surgical History:   Procedure Laterality Date    HX GYN      HX HYSTERECTOMY      HX NEPHRECTOMY Left 02/12/2015    HX ORTHOPAEDIC      HX UROLOGICAL  02/12/2015    PARTIAL URETERECTOMY     [place] CARDIAC SURG PROCEDURE UNLIST      stents placed      No Known Allergies   Meds:  See below  Social History     Tobacco Use    Smoking status: Former Smoker     Years: 15.00    Smokeless tobacco: Never Used   Substance Use Topics    Alcohol use: Not Currently      Family History   Problem Relation Age of Onset    Heart Disease Mother     Heart Disease Father     Heart Disease Sister     Cancer Sister     Heart Disease Brother     Cancer Maternal Grandmother     Stroke Son     Cancer Sister        REVIEW OF SYSTEMS:     []         Unable to obtain  ROS due to ---   [x]         Total of 12 systems reviewed as follows:    Constitutional: negative fever, negative chills, negative weight loss  Eyes:   negative visual changes  ENT:   negative sore throat, tongue or lip swelling  Respiratory:  negative cough, negative dyspnea  Cards:  negative for chest pain, palpitations, lower extremity edema  GI:   negative for nausea, vomiting, diarrhea, and abdominal pain  Genitourinary: negative for frequency, dysuria  Integument:  negative for rash   Hematologic:  negative for easy bruising and gum/nose bleeding  Musculoskel: negative for myalgias,  back pain  Neurological:  negative for headaches, dizziness, vertigo, weakness  Behavl/Psych: negative for feelings of anxiety, depression     Pertinent Positives include :    Objective:      Physical Exam:    Last 24hrs VS reviewed since prior progress note. Most recent are:    Visit Vitals  BP (!) 172/76 (BP 1 Location: Right upper arm, BP Patient Position: At rest)   Pulse 83   Temp 97.7 °F (36.5 °C)   Resp 18   Ht 5' 6\" (1.676 m)   Wt 90.7 kg (200 lb)   SpO2 97%   BMI 32.28 kg/m²     No intake or output data in the 24 hours ending 10/22/21 1314     General Appearance: Well developed, alert, no acute distress. Ears/Nose/Mouth/Throat: Pupils equal and round, Hearing grossly normal.  Neck: Supple. JVP within normal limits. Carotids good upstrokes, with no bruit. Chest: Lungs clear to auscultation bilaterally. Cardiovascular: Regular rate and rhythm, S1S2 normal, no murmur, rubs, gallops. Abdomen: Soft, non-tender, bowel sounds are active. No organomegaly. Extremities: No edema bilaterally. Femoral pulses +2, Distal Pulses +1. Skin: Warm and dry. Neuro: Alert and oriented x3, normal speech; follows simple commands  Psychiatric: Cooperative, normal affect and judgment    []         Post-cath site without hematoma, bruit, tenderness, or thrill. Distal pulses intact. Data:      Telemetry:    EKG:  []  No new EKG for review. Prior to Admission medications    Medication Sig Start Date End Date Taking? Authorizing Provider   insulin syringe-needle U-100 1 mL 31 gauge x 15/64\" syrg USE TO INJECT INSULIN TWICE A DAY 5/7/21  Yes Marybel Brown MD   NovoLIN N NPH U-100 Insulin 100 unit/mL injection INJECT 20 UNITS SUBCUTANEOUSLY IN THE EVENING **STOP  HUMULIN** 4/2/21  Yes Marybel Brown MD   omeprazole (PRILOSEC) 20 mg capsule Take 20 mg by mouth daily. Yes Provider, Historical   nitroglycerin (NITROSTAT) 0.4 mg SL tablet 0.4 mg by SubLINGual route every five (5) minutes as needed for Chest Pain.  Up to 3 doses. Yes Provider, Historical   lubiPROStone (Amitiza) 24 mcg capsule Take 24 mcg by mouth as needed for Constipation. Yes Provider, Historical   apixaban (ELIQUIS) 2.5 mg tablet Take 2.5 mg by mouth two (2) times a day. 6/18/19  Yes Provider, Historical   isosorbide mononitrate ER (IMDUR) 60 mg CR tablet Take 60 mg by mouth two (2) times a day. 7/17/19  Yes Provider, Historical   hydrALAZINE (APRESOLINE) 25 mg tablet Take 25 mg by mouth two (2) times a day. Yes Provider, Historical   metoprolol succinate (TOPROL-XL) 25 mg XL tablet Take 25 mg by mouth daily. Yes Provider, Historical   gabapentin (NEURONTIN) 300 mg capsule Take 300 mg by mouth two (2) times a day. 6/29/20  Yes Provider, Historical   furosemide (LASIX) 40 mg tablet Take 40 mg by mouth daily. Yes Provider, Historical   fluticasone propionate (FLONASE) 50 mcg/actuation nasal spray fluticasone propionate 50 mcg/actuation nasal spray,suspension   Yes Provider, Historical   cetirizine (ZYRTEC) 10 mg tablet Take 10 mg by mouth daily. Yes Provider, Historical   venlafaxine (EFFEXOR) 75 mg tablet Take 75 mg by mouth daily. Yes Provider, Historical   latanoprost (XALATAN) 0.005 % ophthalmic solution Administer 1 Drop to both eyes nightly. Yes Provider, Historical   pravastatin (PRAVACHOL) 80 mg tablet Take 80 mg by mouth nightly. Yes Provider, Historical   aspirin 81 mg chewable tablet Take 81 mg by mouth daily. Yes Provider, Historical   allopurinol (ZYLOPRIM) 100 mg tablet Take 200 mg by mouth daily.    Yes Provider, Historical       Recent Results (from the past 24 hour(s))   CBC WITH AUTOMATED DIFF    Collection Time: 10/21/21  7:30 PM   Result Value Ref Range    WBC 7.5 3.6 - 11.0 K/uL    RBC 4.24 3.80 - 5.20 M/uL    HGB 12.4 11.5 - 16.0 g/dL    HCT 37.9 35.0 - 47.0 %    MCV 89.4 80.0 - 99.0 FL    MCH 29.2 26.0 - 34.0 PG    MCHC 32.7 30.0 - 36.5 g/dL    RDW 15.9 (H) 11.5 - 14.5 %    PLATELET 444 329 - 712 K/uL    MPV 10.7 8.9 - 12.9 FL    NRBC 0.0 0.0  WBC    ABSOLUTE NRBC 0.00 0.00 - 0.01 K/uL    NEUTROPHILS 69 32 - 75 %    LYMPHOCYTES 19 12 - 49 %    MONOCYTES 10 5 - 13 %    EOSINOPHILS 1 0 - 7 %    BASOPHILS 1 0 - 1 %    IMMATURE GRANULOCYTES 0 0 - 0.5 %    ABS. NEUTROPHILS 5.2 1.8 - 8.0 K/UL    ABS. LYMPHOCYTES 1.5 0.8 - 3.5 K/UL    ABS. MONOCYTES 0.7 0.0 - 1.0 K/UL    ABS. EOSINOPHILS 0.1 0.0 - 0.4 K/UL    ABS. BASOPHILS 0.1 0.0 - 0.1 K/UL    ABS. IMM. GRANS. 0.0 0.00 - 0.04 K/UL    DF AUTOMATED     METABOLIC PANEL, COMPREHENSIVE    Collection Time: 10/21/21  7:30 PM   Result Value Ref Range    Sodium 138 136 - 145 mmol/L    Potassium 3.7 3.5 - 5.1 mmol/L    Chloride 101 97 - 108 mmol/L    CO2 28 21 - 32 mmol/L    Anion gap 9 5 - 15 mmol/L    Glucose 121 (H) 65 - 100 mg/dL    BUN 58 (H) 6 - 20 mg/dL    Creatinine 2.56 (H) 0.55 - 1.02 mg/dL    BUN/Creatinine ratio 23 (H) 12 - 20      GFR est AA 22 (L) >60 ml/min/1.73m2    GFR est non-AA 18 (L) >60 ml/min/1.73m2    Calcium 9.6 8.5 - 10.1 mg/dL    Bilirubin, total 0.4 0.2 - 1.0 mg/dL    AST (SGOT) 37 15 - 37 U/L    ALT (SGPT) 29 12 - 78 U/L    Alk.  phosphatase 147 (H) 45 - 117 U/L    Protein, total 6.9 6.4 - 8.2 g/dL    Albumin 3.2 (L) 3.5 - 5.0 g/dL    Globulin 3.7 2.0 - 4.0 g/dL    A-G Ratio 0.9 (L) 1.1 - 2.2     TROPONIN-HIGH SENSITIVITY    Collection Time: 10/21/21  7:30 PM   Result Value Ref Range    Troponin-High Sensitivity 75 (HH) 0 - 51 ng/L   NT-PRO BNP    Collection Time: 10/21/21  7:30 PM   Result Value Ref Range    NT pro-BNP 1,411 (H) <450 pg/mL   GLUCOSE, POC    Collection Time: 10/21/21  8:32 PM   Result Value Ref Range    Glucose (POC) 112 65 - 117 mg/dL    Performed by Nereyda Oliva    GLUCOSE, POC    Collection Time: 10/22/21  1:44 AM   Result Value Ref Range    Glucose (POC) 211 (H) 65 - 117 mg/dL    Performed by Nereyda Oliva    HEMOGLOBIN A1C WITH EAG    Collection Time: 10/22/21  2:09 AM   Result Value Ref Range    Hemoglobin A1c 6.5 (H) 4.0 - 5.6 % Est. average glucose 140 mg/dL   NT-PRO BNP    Collection Time: 10/22/21  2:09 AM   Result Value Ref Range    NT pro-BNP 1,152 (H) <450 pg/mL   GLUCOSE, POC    Collection Time: 10/22/21  7:54 AM   Result Value Ref Range    Glucose (POC) 110 65 - 117 mg/dL    Performed by Peggy Sport    METABOLIC PANEL, COMPREHENSIVE    Collection Time: 10/22/21  8:05 AM   Result Value Ref Range    Sodium 140 136 - 145 mmol/L    Potassium 3.3 (L) 3.5 - 5.1 mmol/L    Chloride 103 97 - 108 mmol/L    CO2 30 21 - 32 mmol/L    Anion gap 7 5 - 15 mmol/L    Glucose 111 (H) 65 - 100 mg/dL    BUN 53 (H) 6 - 20 mg/dL    Creatinine 2.32 (H) 0.55 - 1.02 mg/dL    BUN/Creatinine ratio 23 (H) 12 - 20      GFR est AA 25 (L) >60 ml/min/1.73m2    GFR est non-AA 20 (L) >60 ml/min/1.73m2    Calcium 9.6 8.5 - 10.1 mg/dL    Bilirubin, total 0.4 0.2 - 1.0 mg/dL    AST (SGOT) 33 15 - 37 U/L    ALT (SGPT) 26 12 - 78 U/L    Alk. phosphatase 147 (H) 45 - 117 U/L    Protein, total 6.7 6.4 - 8.2 g/dL    Albumin 3.1 (L) 3.5 - 5.0 g/dL    Globulin 3.6 2.0 - 4.0 g/dL    A-G Ratio 0.9 (L) 1.1 - 2.2     CBC WITH AUTOMATED DIFF    Collection Time: 10/22/21  8:05 AM   Result Value Ref Range    WBC 7.2 3.6 - 11.0 K/uL    RBC 4.28 3.80 - 5.20 M/uL    HGB 12.6 11.5 - 16.0 g/dL    HCT 38.2 35.0 - 47.0 %    MCV 89.3 80.0 - 99.0 FL    MCH 29.4 26.0 - 34.0 PG    MCHC 33.0 30.0 - 36.5 g/dL    RDW 16.0 (H) 11.5 - 14.5 %    PLATELET 052 579 - 761 K/uL    MPV 10.4 8.9 - 12.9 FL    NRBC 0.0 0.0  WBC    ABSOLUTE NRBC 0.00 0.00 - 0.01 K/uL    NEUTROPHILS 65 32 - 75 %    LYMPHOCYTES 22 12 - 49 %    MONOCYTES 10 5 - 13 %    EOSINOPHILS 2 0 - 7 %    BASOPHILS 1 0 - 1 %    IMMATURE GRANULOCYTES 0 0 - 0.5 %    ABS. NEUTROPHILS 4.7 1.8 - 8.0 K/UL    ABS. LYMPHOCYTES 1.6 0.8 - 3.5 K/UL    ABS. MONOCYTES 0.7 0.0 - 1.0 K/UL    ABS. EOSINOPHILS 0.2 0.0 - 0.4 K/UL    ABS. BASOPHILS 0.0 0.0 - 0.1 K/UL    ABS. IMM.  GRANS. 0.0 0.00 - 0.04 K/UL    DF AUTOMATED     TROPONIN-HIGH SENSITIVITY    Collection Time: 10/22/21 12:05 PM   Result Value Ref Range    Troponin-High Sensitivity 91 (HH) 0 - 51 ng/L        Ashwini Thrasher MD

## 2021-10-22 NOTE — PROGRESS NOTES
Admission skin assessment done. Skin abrasion noted to left leg that patient verbalized was a blister at first. Left upper arm av fistula noted. Skin was otherwise intact and overall unremarkable.

## 2021-10-22 NOTE — CONSULTS
Consult Date: 10/22/2021    Consults Ms. Kit Buenrostro is a 67year old woman who was sitting on the toilet after a bowel movement and lost consciousness. She awoke soon after. This is her 5th such event. She had a full neurological workup done last year for this.  EEg was normal.     Subjective     Past Medical History:   Diagnosis Date    Adenocarcinoma, renal cell ([facility] 9/2/2020    Arthritis     CAD (coronary artery disease)     Chronic kidney disease     Diabetes ([facility]     Gout     Hypercholesterolemia     Hypertension     Mental depression     Pulmonary embolism (HCC)     Seizures (HCC)     Stroke ([facility]     Thyroid disease       Past Surgical History:   Procedure Laterality Date    HX GYN      HX HYSTERECTOMY      HX NEPHRECTOMY Left 02/12/2015    HX ORTHOPAEDIC      HX UROLOGICAL  02/12/2015    PARTIAL URETERECTOMY     [place] CARDIAC SURG PROCEDURE UNLIST      stents placed      Family History   Problem Relation Age of Onset    Heart Disease Mother     Heart Disease Father     Heart Disease Sister     Cancer Sister     Heart Disease Brother     Cancer Maternal Grandmother     Stroke Son     Cancer Sister       Social History     Tobacco Use    Smoking status: Former Smoker     Years: 15.00    Smokeless tobacco: Never Used   Substance Use Topics    Alcohol use: Not Currently       Current Facility-Administered Medications   Medication Dose Route Frequency Provider Last Rate Last Admin    hydrALAZINE (APRESOLINE) tablet 50 mg  50 mg Oral BID Rock Simone MD        Brice Stains ON 10/23/2021] losartan (COZAAR) tablet 50 mg  50 mg Oral DAILY Rock Simone MD        insulin NPH (NOVOLIN N, HUMULIN N) injection 20 Units  20 Units SubCUTAneous QHS Ana Herbert MD   20 Units at 10/22/21 0145    allopurinoL (ZYLOPRIM) tablet 200 mg  200 mg Oral DAILY Ana Herbert MD   200 mg at 10/22/21 0908    apixaban (ELIQUIS) tablet 2.5 mg  2.5 mg Oral BID Raphael Herbert MD   2.5 mg at 10/22/21 0908    aspirin chewable tablet 81 mg  81 mg Oral DAILY Ana Herbert MD   81 mg at 10/22/21 8982    furosemide (LASIX) tablet 40 mg  40 mg Oral DAILY Ana Herbert MD   40 mg at 10/22/21 8973    gabapentin (NEURONTIN) capsule 300 mg  300 mg Oral BID Azul Herbert MD   300 mg at 10/22/21 0908    isosorbide mononitrate ER (IMDUR) tablet 60 mg  60 mg Oral BID Ana Herbert MD   60 mg at 10/22/21 0907    latanoprost (XALATAN) 0.005 % ophthalmic solution 1 Drop  1 Drop Both Eyes QHS Ana Herbert MD        lubiPROStone (AMITIZA) capsule 24 mcg  24 mcg Oral DAILY WITH BREAKFAST Azul Herbert MD        metoprolol succinate (TOPROL-XL) XL tablet 25 mg  25 mg Oral DAILY Ana Herbert MD   25 mg at 10/22/21 0908    nitroglycerin (NITROSTAT) tablet 0.4 mg  0.4 mg SubLINGual Q5MIN PRN Azul Herbert MD        pantoprazole (PROTONIX) tablet 40 mg  40 mg Oral ACB Ana Herbert MD   40 mg at 10/22/21 0908    pravastatin (PRAVACHOL) tablet 80 mg  80 mg Oral QHS Ana Herbert MD        [facility] tablet 75 mg  75 mg Oral DAILY Ana Herbert MD   75 mg at 10/22/21 3658    insulin lispro (HUMALOG) injection   SubCUTAneous AC&HS Ana Herbert MD   4 Units at 10/22/21 1245    glucose chewable tablet 16 g  4 Tablet Oral PRN Azul Herbert MD        dextrose (D50W) injection syrg 12.5-25 g  25-50 mL IntraVENous PRN Azul Herbert MD        glucagon (GLUCAGEN) injection 1 mg  1 mg IntraMUSCular PRN Mickey Evans MD        0.9% sodium chloride infusion  25 mL/hr IntraVENous CONTINUOUS Ana Herbert MD 25 mL/hr at 10/22/21 0146 25 mL/hr at 10/22/21 0146    acetaminophen (TYLENOL) tablet 650 mg  650 mg Oral Q6H PRN Azul Herbert MD   650 mg at 10/22/21 8221    Or    acetaminophen (TYLENOL) suppository 650 mg  650 mg Rectal Q6H PRN Azul Herbert MD        polyethylene glycol (MIRALAX) packet 17 g  17 g Oral DAILY PRN Azul Herbert, MD   17 g at 10/22/21 0921    ondansetron (ZOFRAN ODT) tablet 4 mg  4 mg Oral Q8H PRN Paty Herbert MD        Or    ondansetron [facility]) injection 4 mg  4 mg IntraVENous Q6H PRN Carri Bowie MD            Review of Systems   All other systems reviewed and are negative. Objective     Vital signs for last 24 hours:  Visit Vitals  BP (!) 172/76 (BP 1 Location: Right upper arm, BP Patient Position: At rest)   Pulse 83   Temp 97.7 °F (36.5 °C)   Resp 18   Ht 5' 6\" (1.676 m)   Wt 90.7 kg (200 lb)   SpO2 97%   BMI 32.28 kg/m²       Intake/Output this shift:  Current Shift: No intake/output data recorded. Last 3 Shifts: No intake/output data recorded. Data Review:   Recent Results (from the past 24 hour(s))   CBC WITH AUTOMATED DIFF    Collection Time: 10/21/21  7:30 PM   Result Value Ref Range    WBC 7.5 3.6 - 11.0 K/uL    RBC 4.24 3.80 - 5.20 M/uL    HGB 12.4 11.5 - 16.0 g/dL    HCT 37.9 35.0 - 47.0 %    MCV 89.4 80.0 - 99.0 FL    MCH 29.2 26.0 - 34.0 PG    MCHC 32.7 30.0 - 36.5 g/dL    RDW 15.9 (H) 11.5 - 14.5 %    PLATELET 212 439 - 324 K/uL    MPV 10.7 8.9 - 12.9 FL    NRBC 0.0 0.0  WBC    ABSOLUTE NRBC 0.00 0.00 - 0.01 K/uL    NEUTROPHILS 69 32 - 75 %    LYMPHOCYTES 19 12 - 49 %    MONOCYTES 10 5 - 13 %    EOSINOPHILS 1 0 - 7 %    BASOPHILS 1 0 - 1 %    IMMATURE GRANULOCYTES 0 0 - 0.5 %    ABS. NEUTROPHILS 5.2 1.8 - 8.0 K/UL    ABS. LYMPHOCYTES 1.5 0.8 - 3.5 K/UL    ABS. MONOCYTES 0.7 0.0 - 1.0 K/UL    ABS. EOSINOPHILS 0.1 0.0 - 0.4 K/UL    ABS. BASOPHILS 0.1 0.0 - 0.1 K/UL    ABS. IMM.  GRANS. 0.0 0.00 - 0.04 K/UL    DF AUTOMATED     METABOLIC PANEL, COMPREHENSIVE    Collection Time: 10/21/21  7:30 PM   Result Value Ref Range    Sodium 138 136 - 145 mmol/L    Potassium 3.7 3.5 - 5.1 mmol/L    Chloride 101 97 - 108 mmol/L    CO2 28 21 - 32 mmol/L    Anion gap 9 5 - 15 mmol/L    Glucose 121 (H) 65 - 100 mg/dL    BUN 58 (H) 6 - 20 mg/dL    Creatinine 2.56 (H) 0.55 - 1.02 mg/dL    BUN/Creatinine ratio 23 (H) 12 - 20      GFR est AA 22 (L) >60 ml/min/1.73m2    GFR est non-AA 18 (L) >60 ml/min/1.73m2    Calcium 9.6 8.5 - 10.1 mg/dL    Bilirubin, total 0.4 0.2 - 1.0 mg/dL    AST (SGOT) 37 15 - 37 U/L    ALT (SGPT) 29 12 - 78 U/L    Alk.  phosphatase 147 (H) 45 - 117 U/L    Protein, total 6.9 6.4 - 8.2 g/dL    Albumin 3.2 (L) 3.5 - 5.0 g/dL    Globulin 3.7 2.0 - 4.0 g/dL    A-G Ratio 0.9 (L) 1.1 - 2.2     TROPONIN-HIGH SENSITIVITY    Collection Time: 10/21/21  7:30 PM   Result Value Ref Range    Troponin-High Sensitivity 75 (HH) 0 - 51 ng/L   NT-PRO BNP    Collection Time: 10/21/21  7:30 PM   Result Value Ref Range    NT pro-BNP 1,411 (H) <450 pg/mL   GLUCOSE, POC    Collection Time: 10/21/21  8:32 PM   Result Value Ref Range    Glucose (POC) 112 65 - 117 mg/dL    Performed by Rohith Jones    GLUCOSE, POC    Collection Time: 10/22/21  1:44 AM   Result Value Ref Range    Glucose (POC) 211 (H) 65 - 117 mg/dL    Performed by Rohith Jones    HEMOGLOBIN A1C WITH EAG    Collection Time: 10/22/21  2:09 AM   Result Value Ref Range    Hemoglobin A1c 6.5 (H) 4.0 - 5.6 %    Est. average glucose 140 mg/dL   NT-PRO BNP    Collection Time: 10/22/21  2:09 AM   Result Value Ref Range    NT pro-BNP 1,152 (H) <450 pg/mL   GLUCOSE, POC    Collection Time: 10/22/21  7:54 AM   Result Value Ref Range    Glucose (POC) 110 65 - 117 mg/dL    Performed by Geraldine Zarate    METABOLIC PANEL, COMPREHENSIVE    Collection Time: 10/22/21  8:05 AM   Result Value Ref Range    Sodium 140 136 - 145 mmol/L    Potassium 3.3 (L) 3.5 - 5.1 mmol/L    Chloride 103 97 - 108 mmol/L    CO2 30 21 - 32 mmol/L    Anion gap 7 5 - 15 mmol/L    Glucose 111 (H) 65 - 100 mg/dL    BUN 53 (H) 6 - 20 mg/dL    Creatinine 2.32 (H) 0.55 - 1.02 mg/dL    BUN/Creatinine ratio 23 (H) 12 - 20      GFR est AA 25 (L) >60 ml/min/1.73m2    GFR est non-AA 20 (L) >60 ml/min/1.73m2    Calcium 9.6 8.5 - 10.1 mg/dL    Bilirubin, total 0.4 0.2 - 1.0 mg/dL    AST (SGOT) 33 15 - 37 U/L    ALT (SGPT) 26 12 - 78 U/L    Alk. phosphatase 147 (H) 45 - 117 U/L    Protein, total 6.7 6.4 - 8.2 g/dL    Albumin 3.1 (L) 3.5 - 5.0 g/dL    Globulin 3.6 2.0 - 4.0 g/dL    A-G Ratio 0.9 (L) 1.1 - 2.2     CBC WITH AUTOMATED DIFF    Collection Time: 10/22/21  8:05 AM   Result Value Ref Range    WBC 7.2 3.6 - 11.0 K/uL    RBC 4.28 3.80 - 5.20 M/uL    HGB 12.6 11.5 - 16.0 g/dL    HCT 38.2 35.0 - 47.0 %    MCV 89.3 80.0 - 99.0 FL    MCH 29.4 26.0 - 34.0 PG    MCHC 33.0 30.0 - 36.5 g/dL    RDW 16.0 (H) 11.5 - 14.5 %    PLATELET 334 059 - 601 K/uL    MPV 10.4 8.9 - 12.9 FL    NRBC 0.0 0.0  WBC    ABSOLUTE NRBC 0.00 0.00 - 0.01 K/uL    NEUTROPHILS 65 32 - 75 %    LYMPHOCYTES 22 12 - 49 %    MONOCYTES 10 5 - 13 %    EOSINOPHILS 2 0 - 7 %    BASOPHILS 1 0 - 1 %    IMMATURE GRANULOCYTES 0 0 - 0.5 %    ABS. NEUTROPHILS 4.7 1.8 - 8.0 K/UL    ABS. LYMPHOCYTES 1.6 0.8 - 3.5 K/UL    ABS. MONOCYTES 0.7 0.0 - 1.0 K/UL    ABS. EOSINOPHILS 0.2 0.0 - 0.4 K/UL    ABS. BASOPHILS 0.0 0.0 - 0.1 K/UL    ABS. IMM. GRANS. 0.0 0.00 - 0.04 K/UL    DF AUTOMATED     TROPONIN-HIGH SENSITIVITY    Collection Time: 10/22/21 12:05 PM   Result Value Ref Range    Troponin-High Sensitivity 91 (HH) 0 - 51 ng/L       Physical Exam    Neuro Physical Exam      General: Well developed, well nourished. Patient in no apparent distress. Neurological Exam:  Mental Status: Awake, alert, oriented x4, normal speech   Cranial Nerves:   Intact visual fields. PERRL, EOM's full, no nystagmus, no ptosis. Facial sensation is normal. Facial movement is symmetric. Palate is midline. Normal sternocleidomastoid strength. Tongue is midline. Hearing is intact bilaterally. Motor:  5/5 strength in upper and lower proximal and distal muscles. Normal bulk and tone. Reflexes:   Deep tendon reflexes 2+/4 and symmetrical.   Sensory:   Normal to temperature and vibration. Gait:  Not tested    Tremor:   No tremor noted.    Cerebellar:  No cerebellar signs present. babinski:      Down b/l     Assessment and Plan: Ms. Marianne Stark had one event of syncope while on the bathroom. All of her syncope has been while having a bowel movement. This is likely vasovagal syncope.  Will sign off

## 2021-10-22 NOTE — CONSULTS
Consult Date: 10/22/2021    IP CONSULT TO NEPHROLOGY  Consult performed by: Daniel Paniagua MD  Consult ordered by: Trent Gil MD          Subjective   This is a 70-year-old female with past medical history of chronic kidney disease stage IV, hypertension, diabetes, who presented to the emergency room after sustaining a fall. Patient has had multiple falls over the last 1 year. Patient reported that she passed out briefly. Patient was with her daughter. Patient did not have any confusion after waking up. Her prior falls have coincided with episodes of defecation versus others. With regards to her CKD management, patient follows regularly with CKD clinic at [address]. Patient's managed for CKD stage IV, with secondary hyperparathyroidism, anemia, and control of blood pressure. Patient has had AV fistula placed on her left upper extremity which appears to be well-developed. Patient's blood pressure has been controlled with hydralazine, losartan, and furosemide. Patient's most recent creatinine was 2.3 in September 2021. Patient has not had any other episode of swelling, or urinary complaints. In the hospital patient is noted to have elevated creatinine, which is same as her baseline. Patient's BUN is also elevated at 53. Patient's potassium is reduced to 3.3. CT of the head is significant for subcortical and periventricular white matter hypodensity, and bilateral basal ganglial infarcts. This appears to be consistent with chronic hypertension.         Past Medical History:   Diagnosis Date    Adenocarcinoma, renal cell ([address] 9/2/2020    Arthritis     CAD (coronary artery disease)     Chronic kidney disease     Diabetes ([address]     Gout     Hypercholesterolemia     Hypertension     Mental depression     Pulmonary embolism (HCC)     Seizures ([address]     Stroke ([address]     Thyroid disease       Past Surgical History:   Procedure Laterality Date    HX GYN      HX HYSTERECTOMY      HX NEPHRECTOMY Left 02/12/2015    HX ORTHOPAEDIC      HX UROLOGICAL  02/12/2015    PARTIAL URETERECTOMY     UT CARDIAC SURG PROCEDURE UNLIST      stents placed      Family History   Problem Relation Age of Onset    Heart Disease Mother     Heart Disease Father     Heart Disease Sister     Cancer Sister     Heart Disease Brother     Cancer Maternal Grandmother     Stroke Son     Cancer Sister       Social History     Tobacco Use    Smoking status: Former Smoker     Years: 15.00    Smokeless tobacco: Never Used   Substance Use Topics    Alcohol use: Not Currently       Current Facility-Administered Medications   Medication Dose Route Frequency Provider Last Rate Last Admin    insulin NPH (Cesar Liang N, HUMULIN N) injection 20 Units  20 Units SubCUTAneous QHS Ana Herbert MD   20 Units at 10/22/21 0145    allopurinoL (ZYLOPRIM) tablet 200 mg  200 mg Oral DAILY Ana Herbert MD   200 mg at 10/22/21 0908    apixaban (ELIQUIS) tablet 2.5 mg  2.5 mg Oral BID Ana Herbert MD   2.5 mg at 10/22/21 0908    aspirin chewable tablet 81 mg  81 mg Oral DAILY Ana Herbert MD   81 mg at 10/22/21 4936    furosemide (LASIX) tablet 40 mg  40 mg Oral DAILY Ana Herbert MD   40 mg at 10/22/21 6508    gabapentin (NEURONTIN) capsule 300 mg  300 mg Oral BID Ana Herbert MD   300 mg at 10/22/21 0908    hydrALAZINE (APRESOLINE) tablet 25 mg  25 mg Oral BID Ana Herbert MD   25 mg at 10/22/21 4315    isosorbide mononitrate ER (IMDUR) tablet 60 mg  60 mg Oral BID Ana Herbert MD   60 mg at 10/22/21 0907    latanoprost (XALATAN) 0.005 % ophthalmic solution 1 Drop  1 Drop Both Eyes QHS Ana Herbert MD        lubiPROStone (AMITIZA) capsule 24 mcg  24 mcg Oral DAILY WITH BREAKFAST Ana Herbert MD        metoprolol succinate (TOPROL-XL) XL tablet 25 mg  25 mg Oral DAILY Ana Herbert MD   25 mg at 10/22/21 0908    nitroglycerin (NITROSTAT) tablet 0.4 mg 0.4 mg SubLINGual Q5MIN PRN Srinivasa Herbert MD        pantoprazole (PROTONIX) tablet 40 mg  40 mg Oral ACB Ana Herebrt MD   40 mg at 10/22/21 0908    pravastatin (PRAVACHOL) tablet 80 mg  80 mg Oral QHS Ana Herbert MD        [facility] tablet 75 mg  75 mg Oral DAILY Ana Herbert MD   75 mg at 10/22/21 5113    insulin lispro (HUMALOG) injection   SubCUTAneous AC&HS Srinivasa Herbert MD        glucose chewable tablet 16 g  4 Tablet Oral PRN Srinivasa Herbert MD        dextrose (D50W) injection syrg 12.5-25 g  25-50 mL IntraVENous PRN Srinivasa Herbert MD        glucagon (GLUCAGEN) injection 1 mg  1 mg IntraMUSCular PRN Sandra Monroe MD        0.9% sodium chloride infusion  25 mL/hr IntraVENous CONTINUOUS Ana Herbert MD 25 mL/hr at 10/22/21 0146 25 mL/hr at 10/22/21 0146    acetaminophen (TYLENOL) tablet 650 mg  650 mg Oral Q6H PRN Srinivasa Herbert MD   650 mg at 10/22/21 4343    Or    acetaminophen (TYLENOL) suppository 650 mg  650 mg Rectal Q6H PRN Srinivasa Herbert MD        polyethylene glycol (MIRALAX) packet 17 g  17 g Oral DAILY PRN Ana Herbert MD   17 g at 10/22/21 0921    ondansetron (ZOFRAN ODT) tablet 4 mg  4 mg Oral Q8H PRN Ana Herbert MD        Or    [facility]ron [facility] injection 4 mg  4 mg IntraVENous Q6H PRN Sandra Monroe MD            Review of Systems   Constitutional: Positive for fatigue. Negative for activity change, appetite change and chills. HENT: Negative. Respiratory: Negative. Cardiovascular: Negative. Gastrointestinal: Negative. Genitourinary: Negative.         Objective   Physical Exam:   General: Patient is alert and oriented, pleasant appearing  HEENT: No JVD noted, moist mucous membranes  CVS: Regular rate and rhythm, pronounced S2  Respiratory: Reduced aeration bilaterally on lung fields  Extremities: Tenderness to touch in lower extremities  Vital signs for last 24 hours:  Visit Vitals  BP (!) 172/76 (BP 1 Location: Right upper arm, BP Patient Position: At rest)   Pulse 83   Temp 97.7 °F (36.5 °C)   Resp 18   Ht 5' 6\" (1.676 m)   Wt 90.7 kg (200 lb)   SpO2 97%   BMI 32.28 kg/m²       Intake/Output this shift:  Current Shift: No intake/output data recorded. Last 3 Shifts: No intake/output data recorded. Data Review:   Recent Results (from the past 24 hour(s))   CBC WITH AUTOMATED DIFF    Collection Time: 10/21/21  7:30 PM   Result Value Ref Range    WBC 7.5 3.6 - 11.0 K/uL    RBC 4.24 3.80 - 5.20 M/uL    HGB 12.4 11.5 - 16.0 g/dL    HCT 37.9 35.0 - 47.0 %    MCV 89.4 80.0 - 99.0 FL    MCH 29.2 26.0 - 34.0 PG    MCHC 32.7 30.0 - 36.5 g/dL    RDW 15.9 (H) 11.5 - 14.5 %    PLATELET 049 030 - 937 K/uL    MPV 10.7 8.9 - 12.9 FL    NRBC 0.0 0.0  WBC    ABSOLUTE NRBC 0.00 0.00 - 0.01 K/uL    NEUTROPHILS 69 32 - 75 %    LYMPHOCYTES 19 12 - 49 %    MONOCYTES 10 5 - 13 %    EOSINOPHILS 1 0 - 7 %    BASOPHILS 1 0 - 1 %    IMMATURE GRANULOCYTES 0 0 - 0.5 %    ABS. NEUTROPHILS 5.2 1.8 - 8.0 K/UL    ABS. LYMPHOCYTES 1.5 0.8 - 3.5 K/UL    ABS. MONOCYTES 0.7 0.0 - 1.0 K/UL    ABS. EOSINOPHILS 0.1 0.0 - 0.4 K/UL    ABS. BASOPHILS 0.1 0.0 - 0.1 K/UL    ABS. IMM. GRANS. 0.0 0.00 - 0.04 K/UL    DF AUTOMATED     METABOLIC PANEL, COMPREHENSIVE    Collection Time: 10/21/21  7:30 PM   Result Value Ref Range    Sodium 138 136 - 145 mmol/L    Potassium 3.7 3.5 - 5.1 mmol/L    Chloride 101 97 - 108 mmol/L    CO2 28 21 - 32 mmol/L    Anion gap 9 5 - 15 mmol/L    Glucose 121 (H) 65 - 100 mg/dL    BUN 58 (H) 6 - 20 mg/dL    Creatinine 2.56 (H) 0.55 - 1.02 mg/dL    BUN/Creatinine ratio 23 (H) 12 - 20      GFR est AA 22 (L) >60 ml/min/1.73m2    GFR est non-AA 18 (L) >60 ml/min/1.73m2    Calcium 9.6 8.5 - 10.1 mg/dL    Bilirubin, total 0.4 0.2 - 1.0 mg/dL    AST (SGOT) 37 15 - 37 U/L    ALT (SGPT) 29 12 - 78 U/L    Alk.  phosphatase 147 (H) 45 - 117 U/L    Protein, total 6.9 6.4 - 8.2 g/dL    Albumin 3.2 (L) 3.5 - 5.0 g/dL    Globulin 3.7 2.0 - 4.0 g/dL    A-G Ratio 0.9 (L) 1.1 - 2.2     TROPONIN-HIGH SENSITIVITY    Collection Time: 10/21/21  7:30 PM   Result Value Ref Range    Troponin-High Sensitivity 75 (HH) 0 - 51 ng/L   NT-PRO BNP    Collection Time: 10/21/21  7:30 PM   Result Value Ref Range    NT pro-BNP 1,411 (H) <450 pg/mL   GLUCOSE, POC    Collection Time: 10/21/21  8:32 PM   Result Value Ref Range    Glucose (POC) 112 65 - 117 mg/dL    Performed by Kurtz Salts    GLUCOSE, POC    Collection Time: 10/22/21  1:44 AM   Result Value Ref Range    Glucose (POC) 211 (H) 65 - 117 mg/dL    Performed by Kurtz Salts    NT-PRO BNP    Collection Time: 10/22/21  2:09 AM   Result Value Ref Range    NT pro-BNP 1,152 (H) <450 pg/mL   GLUCOSE, POC    Collection Time: 10/22/21  7:54 AM   Result Value Ref Range    Glucose (POC) 110 65 - 117 mg/dL    Performed by [place] Sos    METABOLIC PANEL, COMPREHENSIVE    Collection Time: 10/22/21  8:05 AM   Result Value Ref Range    Sodium 140 136 - 145 mmol/L    Potassium 3.3 (L) 3.5 - 5.1 mmol/L    Chloride 103 97 - 108 mmol/L    CO2 30 21 - 32 mmol/L    Anion gap 7 5 - 15 mmol/L    Glucose 111 (H) 65 - 100 mg/dL    BUN 53 (H) 6 - 20 mg/dL    Creatinine 2.32 (H) 0.55 - 1.02 mg/dL    BUN/Creatinine ratio 23 (H) 12 - 20      GFR est AA 25 (L) >60 ml/min/1.73m2    GFR est non-AA 20 (L) >60 ml/min/1.73m2    Calcium 9.6 8.5 - 10.1 mg/dL    Bilirubin, total 0.4 0.2 - 1.0 mg/dL    AST (SGOT) 33 15 - 37 U/L    ALT (SGPT) 26 12 - 78 U/L    Alk.  phosphatase 147 (H) 45 - 117 U/L    Protein, total 6.7 6.4 - 8.2 g/dL    Albumin 3.1 (L) 3.5 - 5.0 g/dL    Globulin 3.6 2.0 - 4.0 g/dL    A-G Ratio 0.9 (L) 1.1 - 2.2     CBC WITH AUTOMATED DIFF    Collection Time: 10/22/21  8:05 AM   Result Value Ref Range    WBC 7.2 3.6 - 11.0 K/uL    RBC 4.28 3.80 - 5.20 M/uL    HGB 12.6 11.5 - 16.0 g/dL    HCT 38.2 35.0 - 47.0 %    MCV 89.3 80.0 - 99.0 FL    MCH 29.4 26.0 - 34.0 PG    MCHC 33.0 30.0 - 36.5 g/dL    RDW 16.0 (H) 11.5 - 14.5 %    PLATELET 721 610 - 860 K/uL    MPV 10.4 8.9 - 12.9 FL    NRBC 0.0 0.0  WBC    ABSOLUTE NRBC 0.00 0.00 - 0.01 K/uL    NEUTROPHILS 65 32 - 75 %    LYMPHOCYTES 22 12 - 49 %    MONOCYTES 10 5 - 13 %    EOSINOPHILS 2 0 - 7 %    BASOPHILS 1 0 - 1 %    IMMATURE GRANULOCYTES 0 0 - 0.5 %    ABS. NEUTROPHILS 4.7 1.8 - 8.0 K/UL    ABS. LYMPHOCYTES 1.6 0.8 - 3.5 K/UL    ABS. MONOCYTES 0.7 0.0 - 1.0 K/UL    ABS. EOSINOPHILS 0.2 0.0 - 0.4 K/UL    ABS. BASOPHILS 0.0 0.0 - 0.1 K/UL    ABS. IMM. GRANS. 0.0 0.00 - 0.04 K/UL    DF AUTOMATED         Chronic kidney disease (CKD), stage IV (severe) ([facility])  patient follows regularly with CKD clinic at [address]. Patient's managed for CKD stage IV, with secondary hyperparathyroidism, anemia, and control of blood pressure. Patient has had AV fistula placed on her left upper extremity which appears to be well-developed. Patient's blood pressure has been controlled with hydralazine, losartan, and furosemide. Patient's most recent creatinine was 2.3 in September 2021. Patient has not had any other episode of swelling, or urinary complaints. Secondary hyperparathyroidism ([facility]  Patient is on outpatient regimen of calcitriol 0.25 mcg daily      Essential hypertension  Hypertension  Patient has had longstanding hypertension, with organ damage as evidenced by CT head.   Currently on hydralazine 25 mg twice daily, metoprolol 25 mg daily, furosemide 40 mg daily    Plan  Restart patient's losartan at 50 mg daily  Increase hydralazine to 50 mg twice daily  Continue with furosemide 40 mg daily

## 2021-10-22 NOTE — ED PROVIDER NOTES
EMERGENCY DEPARTMENT HISTORY AND PHYSICAL EXAM        Date: 10/21/2021  Patient Name: Kirby Cruz    History of Presenting Illness     Chief Complaint   Patient presents with    Syncope       History Provided By: Patient    HPI: Kirby Cruz, 67 y.o. female with history of CAD, CKD, diabetes, hyperlipidemia, hypertension, seizure, and stroke who presents with syncopal episode. Patient was on the bathroom. She was sitting. She does not member what happened but lost consciousness. This was witnessed by family member. She did not fall the ground or hit her head. Earlier in the day she was having a mild headache in the frontal, constant, 9/10, nonradiating. She still has a mild headache. She has had syncopal episodes in the past.  This is not the worst headache of her life. This not thunderclap. Was not sudden onset. Eyes any chest pain or palpitations. PCP: Serena Chavez NP    Current Outpatient Medications   Medication Sig Dispense Refill    insulin syringe-needle U-100 1 mL 31 gauge x 15/64\" syrg USE TO INJECT INSULIN TWICE A  Pen Needle 5    NovoLIN N NPH U-100 Insulin 100 unit/mL injection INJECT 20 UNITS SUBCUTANEOUSLY IN THE EVENING **STOP  HUMULIN** 10 mL 11    omeprazole (PRILOSEC) 20 mg capsule Take 20 mg by mouth daily.  nitroglycerin (NITROSTAT) 0.4 mg SL tablet 0.4 mg by SubLINGual route every five (5) minutes as needed for Chest Pain. Up to 3 doses.  lubiPROStone (Amitiza) 24 mcg capsule Take 24 mcg by mouth as needed for Constipation.  apixaban (ELIQUIS) 2.5 mg tablet Take 2.5 mg by mouth two (2) times a day.  isosorbide mononitrate ER (IMDUR) 60 mg CR tablet Take 60 mg by mouth two (2) times a day.  hydrALAZINE (APRESOLINE) 25 mg tablet Take 25 mg by mouth two (2) times a day.  metoprolol succinate (TOPROL-XL) 25 mg XL tablet Take 25 mg by mouth daily.  gabapentin (NEURONTIN) 300 mg capsule Take 300 mg by mouth two (2) times a day.  furosemide (LASIX) 40 mg tablet Take 40 mg by mouth daily.  fluticasone propionate (FLONASE) 50 mcg/actuation nasal spray fluticasone propionate 50 mcg/actuation nasal spray,suspension      cetirizine (ZYRTEC) 10 mg tablet Take 10 mg by mouth daily.  venlafaxine (EFFEXOR) 75 mg tablet Take 75 mg by mouth daily.  latanoprost (XALATAN) 0.005 % ophthalmic solution Administer 1 Drop to both eyes nightly.  pravastatin (PRAVACHOL) 80 mg tablet Take 80 mg by mouth nightly.  aspirin 81 mg chewable tablet Take 81 mg by mouth daily.  allopurinol (ZYLOPRIM) 100 mg tablet Take 200 mg by mouth daily. Past History     Past Medical History:  Past Medical History:   Diagnosis Date    Adenocarcinoma, renal cell ([facility] 9/2/2020    Arthritis     CAD (coronary artery disease)     Chronic kidney disease     Diabetes ([facility]     Gout     Hypercholesterolemia     Hypertension     Mental depression     Pulmonary embolism (HCC)     Seizures (HCC)     Stroke ([facility]     Thyroid disease        Past Surgical History:  Past Surgical History:   Procedure Laterality Date    HX GYN      HX HYSTERECTOMY      HX NEPHRECTOMY Left 02/12/2015    HX ORTHOPAEDIC      HX UROLOGICAL  02/12/2015    PARTIAL URETERECTOMY     IN CARDIAC SURG PROCEDURE UNLIST      stents placed        Family History:  Family History   Problem Relation Age of Onset    Heart Disease Mother     Heart Disease Father     Heart Disease Sister     Cancer Sister     Heart Disease Brother     Cancer Maternal Grandmother     Stroke Son     Cancer Sister        Social History:  Social History     Tobacco Use    Smoking status: Former Smoker     Years: 15.00    Smokeless tobacco: Never Used   Vaping Use    Vaping Use: Never used   Substance Use Topics    Alcohol use: Not Currently    Drug use: Never       Allergies:  No Known Allergies      Review of Systems   Review of Systems   Constitutional: Negative for fever. HENT: Negative for congestion. Eyes: Negative for visual disturbance. Respiratory: Negative for shortness of breath. Cardiovascular: Negative for chest pain. Gastrointestinal: Negative for abdominal pain. Genitourinary: Negative for dysuria. Musculoskeletal: Negative for arthralgias. Skin: Negative for rash. Neurological: Positive for syncope and headaches. Physical Exam   Constitutional: No acute distress. Well-nourished. Skin: No rash. ENT: No rhinorrhea. No cough. Head is normocephalic and atraumatic. Eye: No proptosis or conjunctival injections. Respiratory: No apparent respiratory distress. Lung sounds are clear. Cardiovascular: Regular rate and rhythm. 2+ radial pulses. No murmurs. Gastrointestinal: Nondistended. Musculoskeletal: No obvious bony deformities. Neuro: 5/5 strength in bilateral upper extremities and lower extremities. No dysmetria with finger-nose testing. Cranial nerves II through XII grossly intact. Diagnostic Study Results     Labs -     Recent Results (from the past 24 hour(s))   CBC WITH AUTOMATED DIFF    Collection Time: 10/21/21  7:30 PM   Result Value Ref Range    WBC 7.5 3.6 - 11.0 K/uL    RBC 4.24 3.80 - 5.20 M/uL    HGB 12.4 11.5 - 16.0 g/dL    HCT 37.9 35.0 - 47.0 %    MCV 89.4 80.0 - 99.0 FL    MCH 29.2 26.0 - 34.0 PG    MCHC 32.7 30.0 - 36.5 g/dL    RDW 15.9 (H) 11.5 - 14.5 %    PLATELET 478 412 - 213 K/uL    MPV 10.7 8.9 - 12.9 FL    NRBC 0.0 0.0  WBC    ABSOLUTE NRBC 0.00 0.00 - 0.01 K/uL    NEUTROPHILS 69 32 - 75 %    LYMPHOCYTES 19 12 - 49 %    MONOCYTES 10 5 - 13 %    EOSINOPHILS 1 0 - 7 %    BASOPHILS 1 0 - 1 %    IMMATURE GRANULOCYTES 0 0 - 0.5 %    ABS. NEUTROPHILS 5.2 1.8 - 8.0 K/UL    ABS. LYMPHOCYTES 1.5 0.8 - 3.5 K/UL    ABS. MONOCYTES 0.7 0.0 - 1.0 K/UL    ABS. EOSINOPHILS 0.1 0.0 - 0.4 K/UL    ABS. BASOPHILS 0.1 0.0 - 0.1 K/UL    ABS. IMM.  GRANS. 0.0 0.00 - 0.04 K/UL    DF AUTOMATED     METABOLIC PANEL, COMPREHENSIVE Collection Time: 10/21/21  7:30 PM   Result Value Ref Range    Sodium 138 136 - 145 mmol/L    Potassium 3.7 3.5 - 5.1 mmol/L    Chloride 101 97 - 108 mmol/L    CO2 28 21 - 32 mmol/L    Anion gap 9 5 - 15 mmol/L    Glucose 121 (H) 65 - 100 mg/dL    BUN 58 (H) 6 - 20 mg/dL    Creatinine 2.56 (H) 0.55 - 1.02 mg/dL    BUN/Creatinine ratio 23 (H) 12 - 20      GFR est AA 22 (L) >60 ml/min/1.73m2    GFR est non-AA 18 (L) >60 ml/min/1.73m2    Calcium 9.6 8.5 - 10.1 mg/dL    Bilirubin, total 0.4 0.2 - 1.0 mg/dL    AST (SGOT) 37 15 - 37 U/L    ALT (SGPT) 29 12 - 78 U/L    Alk. phosphatase 147 (H) 45 - 117 U/L    Protein, total 6.9 6.4 - 8.2 g/dL    Albumin 3.2 (L) 3.5 - 5.0 g/dL    Globulin 3.7 2.0 - 4.0 g/dL    A-G Ratio 0.9 (L) 1.1 - 2.2     TROPONIN-HIGH SENSITIVITY    Collection Time: 10/21/21  7:30 PM   Result Value Ref Range    Troponin-High Sensitivity 75 (HH) 0 - 51 ng/L   NT-PRO BNP    Collection Time: 10/21/21  7:30 PM   Result Value Ref Range    NT pro-BNP 1,411 (H) <450 pg/mL   GLUCOSE, POC    Collection Time: 10/21/21  8:32 PM   Result Value Ref Range    Glucose (POC) 112 65 - 117 mg/dL    Performed by Angela Points        Radiologic Studies -   CT HEAD WO CONT   Final Result   No acute intracranial abnormality. Remote bilateral basal ganglial   infarcts. Mild generalized atrophy with moderate nonspecific white matter   abnormality that may indicate chronic small vessel disease. XR CHEST PORT   Final Result        CT Results  (Last 48 hours)               10/21/21 2235  CT HEAD WO CONT Final result    Impression:  No acute intracranial abnormality. Remote bilateral basal ganglial   infarcts. Mild generalized atrophy with moderate nonspecific white matter   abnormality that may indicate chronic small vessel disease. Narrative:  HISTORY:  syncope, headache   Dose reduction technique:    All CT scans at this facility are performed using dose reduction optimization   technique as appropriate on the exam including the following: Automated exposure   control, adjustment of the MA and/or KV according to patient size and/or use of   iterative reconstructive technique. TECHNIQUE: [Brain CT without contrast]   COMPARISON: [11/21/2020]   LIMITATIONS: [None]       BRAIN: Moderate patchy areas of subcortical and periventricular white matter   hypodensity. Redemonstration of remote bilateral basal ganglial infarcts. No   acute parenchymal hemorrhage, mass effect or midline shift. VENTRICLES: Mild prominence of the cerebral ventricles commensurate with the   degree of atrophy. EXTRA-AXIAL SPACES/SULCI: Mild generalized cerebral/cerebellar atrophy. No   extra-axial hemorrhage, fluid collection or mass. CALVARIUM/SKULL BASE: Normal.           FACE/SINUSES: Visualized portions normal.           SOFT TISSUES: Normal.          OTHER: None. CXR Results  (Last 48 hours)               10/21/21 1940  XR CHEST PORT Final result    Narrative:  Chest single view. Comparison single view chest January 13, 2021. Lungs clear; no interstitial or alveolar pulmonary edema. Cardiac and   mediastinal structures unchanged. Thoracic aorta atherosclerosis. No   pneumothorax or sizable pleural effusion. Medical Decision Making and ED Course     I reviewed the available vital signs, nursing notes, past medical history, past surgical history, family history, and social history. Vital Signs - Reviewed the patient's vital signs. Patient Vitals for the past 12 hrs:   Temp Pulse Resp BP SpO2   10/21/21 2235 -- 79 22 132/65 99 %   10/21/21 1916 98.7 °F (37.1 °C) 81 20 (!) 158/69 98 %       EKG interpretation: Obtained on 10/21/2021 at 1944. Read at 1950 by myself. Atrial flutter at rate of 80 bpm.  NE interval is difficult to distinguish. QRS duration 98 ms. QTc 44 ms. Normal axis. No ST segment abnormalities.       Medical Decision Making:   Presented with syncope. The differential diagnosis is arrhythmia, orthostatic hypotension, vasovagal episode, TIA, stroke, intracranial hemorrhage. Work-up shows mildly elevated troponin. Due to this will admit for observation. Also has atrial flutter. Discussed with Dr. Olive Carvajal. Disposition     Admitted      Diagnosis     Clinical impression:   1. Syncope, unspecified syncope type    2. Elevated troponin           Attestation:  Please note that this dictation was completed with [email or web address], the [employer] medical voice recognition software. Quite often unanticipated grammatical, syntax, homophones, and other interpretive errors are inadvertently transcribed by the computer software. Please disregard these errors. Please excuse any errors that have escaped final proofreading. Thank you.   Monae Garcia, DO

## 2021-10-23 LAB
ALBUMIN SERPL-MCNC: 2.9 G/DL (ref 3.5–5)
ALBUMIN/GLOB SERPL: 0.8 {RATIO} (ref 1.1–2.2)
ALP SERPL-CCNC: 139 U/L (ref 45–117)
ALT SERPL-CCNC: 22 U/L (ref 12–78)
ANION GAP SERPL CALC-SCNC: 5 MMOL/L (ref 5–15)
AST SERPL W P-5'-P-CCNC: 27 U/L (ref 15–37)
BILIRUB SERPL-MCNC: 0.2 MG/DL (ref 0.2–1)
BUN SERPL-MCNC: 55 MG/DL (ref 6–20)
BUN/CREAT SERPL: 26 (ref 12–20)
CA-I BLD-MCNC: 9.3 MG/DL (ref 8.5–10.1)
CHLORIDE SERPL-SCNC: 107 MMOL/L (ref 97–108)
CO2 SERPL-SCNC: 29 MMOL/L (ref 21–32)
CREAT SERPL-MCNC: 2.15 MG/DL (ref 0.55–1.02)
ERYTHROCYTE [DISTWIDTH] IN BLOOD BY AUTOMATED COUNT: 15.9 % (ref 11.5–14.5)
GLOBULIN SER CALC-MCNC: 3.5 G/DL (ref 2–4)
GLUCOSE BLD STRIP.AUTO-MCNC: 114 MG/DL (ref 65–117)
GLUCOSE BLD STRIP.AUTO-MCNC: 201 MG/DL (ref 65–117)
GLUCOSE BLD STRIP.AUTO-MCNC: 98 MG/DL (ref 65–117)
GLUCOSE SERPL-MCNC: 121 MG/DL (ref 65–100)
HCT VFR BLD AUTO: 36.6 % (ref 35–47)
HGB BLD-MCNC: 12.1 G/DL (ref 11.5–16)
MCH RBC QN AUTO: 29.4 PG (ref 26–34)
MCHC RBC AUTO-ENTMCNC: 33.1 G/DL (ref 30–36.5)
MCV RBC AUTO: 89.1 FL (ref 80–99)
NRBC # BLD: 0 K/UL (ref 0–0.01)
NRBC BLD-RTO: 0 PER 100 WBC
PERFORMED BY, TECHID: ABNORMAL
PERFORMED BY, TECHID: NORMAL
PERFORMED BY, TECHID: NORMAL
PLATELET # BLD AUTO: 197 K/UL (ref 150–400)
PMV BLD AUTO: 10.2 FL (ref 8.9–12.9)
POTASSIUM SERPL-SCNC: 4.3 MMOL/L (ref 3.5–5.1)
PROT SERPL-MCNC: 6.4 G/DL (ref 6.4–8.2)
RBC # BLD AUTO: 4.11 M/UL (ref 3.8–5.2)
SODIUM SERPL-SCNC: 141 MMOL/L (ref 136–145)
WBC # BLD AUTO: 6.6 K/UL (ref 3.6–11)

## 2021-10-23 PROCEDURE — 74011636637 HC RX REV CODE- 636/637: Performed by: FAMILY MEDICINE

## 2021-10-23 PROCEDURE — 74011250637 HC RX REV CODE- 250/637: Performed by: INTERNAL MEDICINE

## 2021-10-23 PROCEDURE — 97161 PT EVAL LOW COMPLEX 20 MIN: CPT

## 2021-10-23 PROCEDURE — 36415 COLL VENOUS BLD VENIPUNCTURE: CPT

## 2021-10-23 PROCEDURE — 97602 WOUND(S) CARE NON-SELECTIVE: CPT

## 2021-10-23 PROCEDURE — 74011250637 HC RX REV CODE- 250/637: Performed by: FAMILY MEDICINE

## 2021-10-23 PROCEDURE — 99218 HC RM OBSERVATION: CPT

## 2021-10-23 PROCEDURE — 80053 COMPREHEN METABOLIC PANEL: CPT

## 2021-10-23 PROCEDURE — 97530 THERAPEUTIC ACTIVITIES: CPT

## 2021-10-23 PROCEDURE — 82962 GLUCOSE BLOOD TEST: CPT

## 2021-10-23 PROCEDURE — 85027 COMPLETE CBC AUTOMATED: CPT

## 2021-10-23 PROCEDURE — 74011000250 HC RX REV CODE- 250: Performed by: FAMILY MEDICINE

## 2021-10-23 RX ADMIN — LOSARTAN POTASSIUM 50 MG: 50 TABLET, FILM COATED ORAL at 08:21

## 2021-10-23 RX ADMIN — WHITE PETROLATUM,ZINC OXIDE: 57; 17 PASTE TOPICAL at 21:53

## 2021-10-23 RX ADMIN — FUROSEMIDE 40 MG: 40 TABLET ORAL at 08:21

## 2021-10-23 RX ADMIN — HYDRALAZINE HYDROCHLORIDE 50 MG: 50 TABLET, FILM COATED ORAL at 08:20

## 2021-10-23 RX ADMIN — GABAPENTIN 300 MG: 300 CAPSULE ORAL at 08:22

## 2021-10-23 RX ADMIN — APIXABAN 2.5 MG: 2.5 TABLET, FILM COATED ORAL at 21:52

## 2021-10-23 RX ADMIN — LATANOPROST 1 DROP: 50 SOLUTION OPHTHALMIC at 21:52

## 2021-10-23 RX ADMIN — PRAVASTATIN SODIUM 80 MG: 40 TABLET ORAL at 21:52

## 2021-10-23 RX ADMIN — ALLOPURINOL 200 MG: 100 TABLET ORAL at 08:21

## 2021-10-23 RX ADMIN — HYDRALAZINE HYDROCHLORIDE 50 MG: 50 TABLET, FILM COATED ORAL at 21:52

## 2021-10-23 RX ADMIN — VENLAFAXINE 75 MG: 50 TABLET ORAL at 08:19

## 2021-10-23 RX ADMIN — GABAPENTIN 300 MG: 300 CAPSULE ORAL at 21:51

## 2021-10-23 RX ADMIN — INSULIN LISPRO 4 UNITS: 100 INJECTION, SOLUTION INTRAVENOUS; SUBCUTANEOUS at 17:02

## 2021-10-23 RX ADMIN — PANTOPRAZOLE SODIUM 40 MG: 20 TABLET, DELAYED RELEASE ORAL at 08:20

## 2021-10-23 RX ADMIN — APIXABAN 2.5 MG: 2.5 TABLET, FILM COATED ORAL at 09:00

## 2021-10-23 RX ADMIN — METOPROLOL SUCCINATE 25 MG: 25 TABLET, EXTENDED RELEASE ORAL at 08:21

## 2021-10-23 RX ADMIN — ISOSORBIDE MONONITRATE 60 MG: 60 TABLET, EXTENDED RELEASE ORAL at 08:22

## 2021-10-23 RX ADMIN — ACETAMINOPHEN 650 MG: 325 TABLET ORAL at 17:02

## 2021-10-23 RX ADMIN — ASPIRIN 81 MG CHEWABLE TABLET 81 MG: 81 TABLET CHEWABLE at 08:19

## 2021-10-23 RX ADMIN — ISOSORBIDE MONONITRATE 60 MG: 60 TABLET, EXTENDED RELEASE ORAL at 21:51

## 2021-10-23 RX ADMIN — LUBIPROSTONE 24 MCG: 24 CAPSULE, GELATIN COATED ORAL at 08:19

## 2021-10-23 NOTE — PROGRESS NOTES
General Daily Progress Note          Patient Name:   Porter Gallo       YOB: 1945       Age:  67 y.o.       Admit Date: 10/21/2021      Subjective:     Patient is a 67y.o. year old female history of chronic kidney disease stage IV hypertension diabetes CAD gout hypertension hyperlipidemia depression came to emergency room after sustaining a fall patient have of multiple falls over last 1 year when the patient she passed out briefly patient was with her daughter patient denies any headache nausea vomiting diarrhea no constipation, sister fifth event patient have work-up done in the past which was normal             Objective:     Visit Vitals  BP (!) 148/72   Pulse 68   Temp 97.3 °F (36.3 °C)   Resp 19   Ht 5' 6\" (1.676 m)   Wt 90.7 kg (200 lb)   SpO2 100%   BMI 32.28 kg/m²        Recent Results (from the past 24 hour(s))   GLUCOSE, POC    Collection Time: 10/22/21  4:27 PM   Result Value Ref Range    Glucose (POC) 76 65 - 117 mg/dL    Performed by Lamont Mart    NT-PRO BNP    Collection Time: 10/22/21  4:30 PM   Result Value Ref Range    NT pro- (H) <450 pg/mL   GLUCOSE, POC    Collection Time: 10/22/21  9:02 PM   Result Value Ref Range    Glucose (POC) 169 (H) 65 - 117 mg/dL    Performed by Emmanuel Thrasher [facility])    CBC W/O DIFF    Collection Time: 10/23/21  6:10 AM   Result Value Ref Range    WBC 6.6 3.6 - 11.0 K/uL    RBC 4.11 3.80 - 5.20 M/uL    HGB 12.1 11.5 - 16.0 g/dL    HCT 36.6 35.0 - 47.0 %    MCV 89.1 80.0 - 99.0 FL    MCH 29.4 26.0 - 34.0 PG    MCHC 33.1 30.0 - 36.5 g/dL    RDW 15.9 (H) 11.5 - 14.5 %    PLATELET 362 203 - 121 K/uL    MPV 10.2 8.9 - 12.9 FL    NRBC 0.0 0.0  WBC    ABSOLUTE NRBC 0.00 0.00 - 4.68 K/uL   METABOLIC PANEL, COMPREHENSIVE    Collection Time: 10/23/21  6:10 AM   Result Value Ref Range    Sodium 141 136 - 145 mmol/L    Potassium 4.3 3.5 - 5.1 mmol/L    Chloride 107 97 - 108 mmol/L    CO2 29 21 - 32 mmol/L    Anion gap 5 5 - 15 mmol/L    Glucose 121 (H) 65 - 100 mg/dL    BUN 55 (H) 6 - 20 mg/dL    Creatinine 2.15 (H) 0.55 - 1.02 mg/dL    BUN/Creatinine ratio 26 (H) 12 - 20      GFR est AA 27 (L) >60 ml/min/1.73m2    GFR est non-AA 22 (L) >60 ml/min/1.73m2    Calcium 9.3 8.5 - 10.1 mg/dL    Bilirubin, total 0.2 0.2 - 1.0 mg/dL    AST (SGOT) 27 15 - 37 U/L    ALT (SGPT) 22 12 - 78 U/L    Alk. phosphatase 139 (H) 45 - 117 U/L    Protein, total 6.4 6.4 - 8.2 g/dL    Albumin 2.9 (L) 3.5 - 5.0 g/dL    Globulin 3.5 2.0 - 4.0 g/dL    A-G Ratio 0.8 (L) 1.1 - 2.2     GLUCOSE, POC    Collection Time: 10/23/21  8:04 AM   Result Value Ref Range    Glucose (POC) 114 65 - 117 mg/dL    Performed by Kiara Rodriguez [facility])      [unfilled]      Review of Systems    Constitutional: Negative for chills and fever. HENT: Negative. Eyes: Negative. Respiratory: Negative. Cardiovascular: Negative. Gastrointestinal: Negative for abdominal pain and nausea. Skin: Negative. Neurological: Negative. Physical Exam:      Constitutional: pt is oriented to person, place, and time. HENT:   Head: Normocephalic and atraumatic. Eyes: Pupils are equal, round, and reactive to light. EOM are normal.   Cardiovascular: Normal rate, regular rhythm and normal heart sounds. Pulmonary/Chest: Breath sounds normal. No wheezes. No rales. Exhibits no tenderness. Abdominal: Soft. Bowel sounds are normal. There is no abdominal tenderness. There is no rebound and no guarding. Musculoskeletal: Normal range of motion. Neurological: pt is alert and oriented to person, place, and time. CT HEAD WO CONT   Final Result   No acute intracranial abnormality. Remote bilateral basal ganglial   infarcts. Mild generalized atrophy with moderate nonspecific white matter   abnormality that may indicate chronic small vessel disease.            XR CHEST PORT   Final Result           Recent Results (from the past 24 hour(s))   GLUCOSE, POC    Collection Time: 10/22/21  4:27 PM Result Value Ref Range    Glucose (POC) 76 65 - 117 mg/dL    Performed by Alpesh Stephenson    NT-PRO BNP    Collection Time: 10/22/21  4:30 PM   Result Value Ref Range    NT pro- (H) <450 pg/mL   GLUCOSE, POC    Collection Time: 10/22/21  9:02 PM   Result Value Ref Range    Glucose (POC) 169 (H) 65 - 117 mg/dL    Performed by [facility])    CBC W/O DIFF    Collection Time: 10/23/21  6:10 AM   Result Value Ref Range    WBC 6.6 3.6 - 11.0 K/uL    RBC 4.11 3.80 - 5.20 M/uL    HGB 12.1 11.5 - 16.0 g/dL    HCT 36.6 35.0 - 47.0 %    MCV 89.1 80.0 - 99.0 FL    MCH 29.4 26.0 - 34.0 PG    MCHC 33.1 30.0 - 36.5 g/dL    RDW 15.9 (H) 11.5 - 14.5 %    PLATELET 741 829 - 031 K/uL    MPV 10.2 8.9 - 12.9 FL    NRBC 0.0 0.0  WBC    ABSOLUTE NRBC 0.00 0.00 - 3.10 K/uL   METABOLIC PANEL, COMPREHENSIVE    Collection Time: 10/23/21  6:10 AM   Result Value Ref Range    Sodium 141 136 - 145 mmol/L    Potassium 4.3 3.5 - 5.1 mmol/L    Chloride 107 97 - 108 mmol/L    CO2 29 21 - 32 mmol/L    Anion gap 5 5 - 15 mmol/L    Glucose 121 (H) 65 - 100 mg/dL    BUN 55 (H) 6 - 20 mg/dL    Creatinine 2.15 (H) 0.55 - 1.02 mg/dL    BUN/Creatinine ratio 26 (H) 12 - 20      GFR est AA 27 (L) >60 ml/min/1.73m2    GFR est non-AA 22 (L) >60 ml/min/1.73m2    Calcium 9.3 8.5 - 10.1 mg/dL    Bilirubin, total 0.2 0.2 - 1.0 mg/dL    AST (SGOT) 27 15 - 37 U/L    ALT (SGPT) 22 12 - 78 U/L    Alk.  phosphatase 139 (H) 45 - 117 U/L    Protein, total 6.4 6.4 - 8.2 g/dL    Albumin 2.9 (L) 3.5 - 5.0 g/dL    Globulin 3.5 2.0 - 4.0 g/dL    A-G Ratio 0.8 (L) 1.1 - 2.2     GLUCOSE, POC    Collection Time: 10/23/21  8:04 AM   Result Value Ref Range    Glucose (POC) 114 65 - 117 mg/dL    Performed by [facility])        Results     Procedure Component Value Units Date/Time    CULTURE, BLOOD #1 [[id number]] Collected: 10/22/21 0209    Order Status: Completed Specimen: Blood Updated: 10/22/21 0234    CULTURE, BLOOD #2 [[id number]] Collected: 10/22/21 0209    Order Status: Completed Specimen: Blood Updated: 10/23/21 6828     Special Requests: No Special Requests        Culture result:       Gram Positive Rods growing in 1 of 2 bottles drawn NO SITE LISTED                  CALLED TO AND READ BACK BY  MARY ONEILL R.N. 1300 10/23/2021 BY TRISTAN             Labs:     Recent Labs     10/23/21  0610 10/22/21  0805   WBC 6.6 7.2   HGB 12.1 12.6   HCT 36.6 38.2    209     Recent Labs     10/23/21  0610 10/22/21  0805 10/21/21  1930    140 138   K 4.3 3.3* 3.7    103 101   CO2 29 30 28   BUN 55* 53* 58*   CREA 2.15* 2.32* 2.56*   * 111* 121*   CA 9.3 9.6 9.6     Recent Labs     10/23/21  0610 10/22/21  0805 10/21/21  1930   ALT 22 26 29   * 147* 147*   TBILI 0.2 0.4 0.4   TP 6.4 6.7 6.9   ALB 2.9* 3.1* 3.2*   GLOB 3.5 3.6 3.7     No results for input(s): INR, PTP, APTT, INREXT in the last 72 hours. No results for input(s): FE, TIBC, PSAT, FERR in the last 72 hours. Lab Results   Component Value Date/Time    Folate 10.0 09/17/2020 02:14 AM      No results for input(s): PH, PCO2, PO2 in the last 72 hours. No results for input(s): CPK, CKNDX, TROIQ in the last 72 hours.     No lab exists for component: CPKMB  Lab Results   Component Value Date/Time    Cholesterol, total 164 11/22/2020 01:12 AM    HDL Cholesterol 64 11/22/2020 01:12 AM    LDL,Direct 86 07/27/2021 12:00 AM    LDL, calculated 73.2 11/22/2020 01:12 AM    Triglyceride 134 11/22/2020 01:12 AM    CHOL/HDL Ratio 2.6 11/22/2020 01:12 AM     Lab Results   Component Value Date/Time    Glucose (POC) 114 10/23/2021 08:04 AM    Glucose (POC) 169 (H) 10/22/2021 09:02 PM    Glucose (POC) 76 10/22/2021 04:27 PM    Glucose (POC) 110 10/22/2021 07:54 AM    Glucose (POC) 211 (H) 10/22/2021 01:44 AM     Lab Results   Component Value Date/Time    Color Yellow 12/22/2020 12:54 PM    Appearance Clear 12/22/2020 12:54 PM    pH (UA) 5.5 12/22/2020 12:54 PM    Ketone Negative 12/22/2020 12:54 PM    Bilirubin Negative 12/22/2020 12:54 PM    Nitrites Negative 12/22/2020 12:54 PM    Leukocyte Esterase Negative 12/22/2020 12:54 PM    Bacteria Few 12/22/2020 12:54 PM    WBC 0-5 12/22/2020 12:54 PM    RBC 0-2 12/22/2020 12:54 PM         Assessment:     Syncopal episode  Chronic kidney disease stage IV  Hypertension  Hyperlipidemia  Type 2 diabetes  Hypothyroidism  Mildly elevated troponin   history of pulmonary embolism  History of TIA  Hypokalemia      Plan:     Seen by the cardiology recommend implanted loop recorder as an outpatient    Continue current medications    PT OT consult possible discharge home tomorrow        Current Facility-Administered Medications:     hydrALAZINE (APRESOLINE) tablet 50 mg, 50 mg, Oral, BID, Miller Cheema MD, 50 mg at 10/23/21 0820    losartan (COZAAR) tablet 50 mg, 50 mg, Oral, DAILY, Miller Cheema MD, 50 mg at 10/23/21 0821    insulin NPH (NOVOLIN N, HUMULIN N) injection 20 Units, 20 Units, SubCUTAneous, QHS, Ana Herbert MD, 20 Units at 10/22/21 2111    allopurinoL (ZYLOPRIM) tablet 200 mg, 200 mg, Oral, DAILY, Lavonne Frankel, MD, 200 mg at 10/23/21 8286    apixaban (ELIQUIS) tablet 2.5 mg, 2.5 mg, Oral, BID, Lavonne Frankel, MD, 2.5 mg at 10/23/21 0900    aspirin chewable tablet 81 mg, 81 mg, Oral, DAILY, Ana Herbert MD, 81 mg at 10/23/21 7487    furosemide (LASIX) tablet 40 mg, 40 mg, Oral, DAILY, Ana Herbert MD, 40 mg at 10/23/21 3477    gabapentin (NEURONTIN) capsule 300 mg, 300 mg, Oral, BID, Ana Herbert MD, 300 mg at 10/23/21 2891    isosorbide mononitrate ER (IMDUR) tablet 60 mg, 60 mg, Oral, BID, Ana Herbert MD, 60 mg at 10/23/21 4587    latanoprost (XALATAN) 0.005 % ophthalmic solution 1 Drop, 1 Drop, Both Eyes, QHS, Ana Herbert MD    lubiPROStone (AMITIZA) capsule 24 mcg, 24 mcg, Oral, DAILY WITH BREAKFAST, Ana Herbert MD, 24 mcg at 10/23/21 0819    metoprolol succinate (TOPROL-XL) XL tablet 25 mg, 25 mg, Oral, DAILY, Ana Herbert MD, 25 mg at 10/23/21 8712    nitroglycerin (NITROSTAT) tablet 0.4 mg, 0.4 mg, SubLINGual, Q5MIN PRN, Sarah Herbert MD    pantoprazole (PROTONIX) tablet 40 mg, 40 mg, Oral, ACB, Ana Herbert MD, 40 mg at 10/23/21 0820    pravastatin (PRAVACHOL) tablet 80 mg, 80 mg, Oral, QHS, Ana Herbert MD, 80 mg at 10/22/21 2111    venlafaxine [facility]) tablet 75 mg, 75 mg, Oral, DAILY, Ana Herbert MD, 75 mg at 10/23/21 5238    insulin lispro (HUMALOG) injection, , SubCUTAneous, AC&HS, Ana Herbert MD, 4 Units at 10/22/21 1245    glucose chewable tablet 16 g, 4 Tablet, Oral, PRN, Sarah Herbert MD    dextrose (D50W) injection syrg 12.5-25 g, 25-50 mL, IntraVENous, PRN, Sarah Herbert MD    glucagon (GLUCAGEN) injection 1 mg, 1 mg, IntraMUSCular, PRN, Sarah Herbert MD    0.9% sodium chloride infusion, 25 mL/hr, IntraVENous, CONTINUOUS, Ana Herbert MD, Last Rate: 25 mL/hr at 10/22/21 0146, 25 mL/hr at 10/22/21 0146    acetaminophen (TYLENOL) tablet 650 mg, 650 mg, Oral, Q6H PRN, 650 mg at 10/22/21 4890 **OR** acetaminophen (TYLENOL) suppository 650 mg, 650 mg, Rectal, Q6H PRN, Ana Herbert MD    polyethylene glycol (MIRALAX) packet 17 g, 17 g, Oral, DAILY PRN, Ana Herbert MD, 17 g at 10/22/21 0921    ondansetron (ZOFRAN ODT) tablet 4 mg, 4 mg, Oral, Q8H PRN **OR** ondansetron (ZOFRAN) injection 4 mg, 4 mg, IntraVENous, Q6H PRN, Sandrine Redd MD

## 2021-10-23 NOTE — PROGRESS NOTES
10/23/2021, 12:05 PM        Franky Espinal at [facility]  Phone: [phone]      Daily Progress Note:      Patient identification:     Minus Landau  67 y. o.female  1945      Primary Cardiologist:  Dr. Amara Del Toro    Chief Complaint:  Syncope    Assessment:     1. Syncope,  Secondary to straining/defecation, prior episodes also related to straining or abdominal pain. Had 5 episodes though the second spell was while she was sitting on a chair. Patient in the past has declined loop recorder. Continue telemetry, watch for significant arrhythmias, repeat EKG. Discharge can follow-up with Dr. Amara Del Toro and reevaluate for loop recorder.     2. Coronary artery disease, denies chest pain, EKG in chart is unremarkable, mildly elevated troponin,? Secondary to kidney disease.     3. Hypertension, blood pressure is up today, may need to uptitrate medications guided by clinical course     4. History of pulmonary embolism, on Eliquis,     5.  Diabetes mellitus     6. History of TIA     7.  Mild aortic stenosis: Mean gradient 14 mmHg by echocardiogram September 17, 2020    8. Pulmonary hypertension with estimated RV systolic pressure 51 mmHg by echocardiogram September 17, 2020. Recommendation:     1. Patient will follow up with Dr. Amara Del Toro as an outpatient for consideration of implantable loop recorder. 2. Continue current cardiac medications. 3. No obvious cardiac barriers to discharge at this time. Interval History:  No new interval cardiac developments. Subjective:  Pt. States no chest pain. No syncope or lightheadedness. No shortness of breath. Review of Systems:   No angina. No shortness of breath. No edema. No TIA or strokelike symptoms. No syncope or lightheadedness. No bleeding.     Medications:     Current Facility-Administered Medications   Medication Dose Route Frequency    hydrALAZINE (APRESOLINE) tablet 50 mg  50 mg Oral BID    losartan (COZAAR) tablet 50 mg  50 mg Oral DAILY    insulin NPH (NOVOLIN N, HUMULIN N) injection 20 Units  20 Units SubCUTAneous QHS    allopurinoL (ZYLOPRIM) tablet 200 mg  200 mg Oral DAILY    apixaban (ELIQUIS) tablet 2.5 mg  2.5 mg Oral BID    aspirin chewable tablet 81 mg  81 mg Oral DAILY    furosemide (LASIX) tablet 40 mg  40 mg Oral DAILY    gabapentin (NEURONTIN) capsule 300 mg  300 mg Oral BID    isosorbide mononitrate ER (IMDUR) tablet 60 mg  60 mg Oral BID    latanoprost (XALATAN) 0.005 % ophthalmic solution 1 Drop  1 Drop Both Eyes QHS    lubiPROStone (AMITIZA) capsule 24 mcg  24 mcg Oral DAILY WITH BREAKFAST    metoprolol succinate (TOPROL-XL) XL tablet 25 mg  25 mg Oral DAILY    nitroglycerin (NITROSTAT) tablet 0.4 mg  0.4 mg SubLINGual Q5MIN PRN    pantoprazole (PROTONIX) tablet 40 mg  40 mg Oral ACB    pravastatin (PRAVACHOL) tablet 80 mg  80 mg Oral QHS    venlafaxine (EFFEXOR) tablet 75 mg  75 mg Oral DAILY    insulin lispro (HUMALOG) injection   SubCUTAneous AC&HS    glucose chewable tablet 16 g  4 Tablet Oral PRN    dextrose (D50W) injection syrg 12.5-25 g  25-50 mL IntraVENous PRN    glucagon (GLUCAGEN) injection 1 mg  1 mg IntraMUSCular PRN    0.9% sodium chloride infusion  25 mL/hr IntraVENous CONTINUOUS    acetaminophen (TYLENOL) tablet 650 mg  650 mg Oral Q6H PRN    Or    acetaminophen (TYLENOL) suppository 650 mg  650 mg Rectal Q6H PRN    polyethylene glycol (MIRALAX) packet 17 g  17 g Oral DAILY PRN    ondansetron (ZOFRAN ODT) tablet 4 mg  4 mg Oral Q8H PRN    Or    ondansetron (ZOFRAN) injection 4 mg  4 mg IntraVENous Q6H PRN       Allergies:   No Known Allergies    Physical Exam:   Visit Vitals  BP (!) 148/72   Pulse 68   Temp 97.3 °F (36.3 °C)   Resp 19   Ht 5' 6\" (1.676 m)   Wt 90.7 kg (200 lb)   SpO2 100%   BMI 32.28 kg/m²       General:  NAD, calm, cooperative  CVS:  Regular rate and rhythm, normal A0P9, 2/6 systolic murmur, no rubs or gallops  Pulm:  Normal effort, clear to auscultation bilaterally  Abd:  Soft, non-tender, non-distended  Ext:  Warm and well perfused without edema  Neuro:  Alert and oriented, answering questions appropriately    Telemetry reviewed:      Labs:    CBC  Lab Results   Component Value Date/Time    WBC 6.6 10/23/2021 06:10 AM    RBC 4.11 10/23/2021 06:10 AM    HCT 36.6 10/23/2021 06:10 AM    MCV 89.1 10/23/2021 06:10 AM    MCH 29.4 10/23/2021 06:10 AM    RDW 15.9 (H) 10/23/2021 06:10 AM    MONOS 10 10/22/2021 08:05 AM    EOS 2 10/22/2021 08:05 AM    BASOS 1 10/22/2021 08:05 AM       Basic Metabolic Profile  Lab Results   Component Value Date     10/23/2021    CO2 29 10/23/2021    BUN 55 (H) 10/23/2021       Diagnostic Studies:    Echo Results  (Last 48 hours)    None        CT HEAD WO CONT   Final Result   No acute intracranial abnormality. Remote bilateral basal ganglial   infarcts. Mild generalized atrophy with moderate nonspecific white matter   abnormality that may indicate chronic small vessel disease.            Cherri Marino   Final Result          Dr. Chago Paredes MD, [place]

## 2021-10-23 NOTE — PROGRESS NOTES
Problem: Mobility Impaired (Adult and Pediatric)  Goal: *Acute Goals and Plan of Care (Insert Text)  Description: Patient will move from supine to sit and sit to supine , scoot up and down, and roll side to side in bed with independence within 7 day(s). Patient will transfer from bed to chair and chair to bed with independence using the least restrictive device within 7 day(s). Patient will improve static standing balance to modified independence within 1 week(s). Patient will ambulate 50 feet with modified independence with least restrictive device within 1 weeks. Outcome: Progressing Towards Goal   PHYSICAL THERAPY EVALUATION  Patient: Piyush Murphy (83 y.o. female)  Date: 10/23/2021  Primary Diagnosis: Syncope [R55]  Syncopal episodes [R55]        Precautions: fall    ASSESSMENT     Patient is a 67y.o. year old female history of chronic kidney disease stage IV hypertension diabetes CAD gout hypertension hyperlipidemia depression came to emergency room after sustaining a fall patient have of multiple falls over last 1 year when the patient she passed out briefly patient was with her daughter patient denies any headache nausea vomiting diarrhea no constipation, sister fifth event patient have work-up done in the past which was normal   patient found in bed sleeping, able to wake up with Vcs.patient agreeable to PT eval.Patient reports lives with children in 1 level home with 2 steps to get in. Patient uses cane (occationally)at home. indep in bed mob and transfers. Able to amb to the bathroom and back to bed with cg. Required holding on to the wall or door for balance. Patient advised to use the RW at this time for better balance and safety. Otherwise at baseline. Patient will benefit from skilled therapy intervention to address the above noted impairments.        PLAN :  Recommendations and Planned Interventions: bed mobility training, transfer training, gait training, therapeutic exercises, patient and family training/education, and therapeutic activities      Frequency/Duration: Patient will be followed by physical therapy:  2-3x/week to address goals. Recommendation for discharge: (in order for the patient to meet his/her long term goals)  Home with [address] [place] [address]    This discharge recommendation:  Has been made in collaboration with the attending provider and/or case management    IF patient discharges home will need the following DME: rolling walker         SUBJECTIVE:   Patient stated i am ok.     OBJECTIVE DATA SUMMARY:   HISTORY:    Past Medical History:   Diagnosis Date    Adenocarcinoma, renal cell ([facility] [address] 9/2/2020    Arthritis     CAD (coronary artery disease)     Chronic kidney disease     Diabetes ([facility] [address]     Gout     Hypercholesterolemia     Hypertension     Mental depression     Pulmonary embolism (H[facility])     Seizures ([facility] [address]     Stroke ([facility] [address]     Thyroid disease      Past Surgical History:   Procedure Laterality Date    HX GYN      HX HYSTERECTOMY      HX NEPHRECTOMY Left 02/12/2015    HX ORTHOPAEDIC      HX UROLOGICAL  02/12/2015    PARTIAL URETERECTOMY     ND CARDIAC SURG PROCEDURE UNLIST      stents placed        Personal factors and/or comorbidities impacting plan of care:   Home Situation  Home Environment: Private residence  # Steps to Enter: 4  Rails to Enter: Yes  Hand Rails : Bilateral  One/Two Story Residence: One story  Living Alone: No  Support Systems: Child(connie)  Patient Expects to be Discharged to[de-identified] [employer]  Current DME Used/Available at Home: [employer], straight, Walker, rolling, Wheelchair    EXAMINATION/PRESENTATION/DECISION MAKING:   Critical Behavior:  Neurologic State: Alert  Orientation Level: Oriented X4  Cognition: Appropriate decision making     Hearing:   Auditory  Auditory Impairment: None  Range Of Motion:  AROM: Generally decreased, functional                       Strength:    Strength: Generally decreased, functional                    Tone & Sensation:   Tone: Normal Functional Mobility:  Bed Mobility:  Rolling: Stand-by assistance  Supine to Sit: Stand-by assistance  Sit to Supine: Stand-by assistance  Scooting: Stand-by assistance  Transfers:  Sit to Stand: Contact guard assistance  Stand to Sit: Contact guard assistance                       Balance:   Sitting: Intact  Standing: Impaired;Pull to stand; With support  Standing - Static: Fair;Constant support  Standing - Dynamic : Fair;Constant support  Ambulation/Gait Training:  Distance (ft): 25 Feet (ft)  Assistive Device: Gait belt  Ambulation - Level of Assistance: Contact guard assistance     Gait Description (WDL): Exceptions to [facility]           Base of Support: Widened     Speed/Liya: Shuffled; Slow  Step Length: Right shortened;Left shortened                Functional Measure:  [address] [facility] [address] [address] AM-PAC 6 Clicks         Basic Mobility Inpatient Short Form  How much difficulty does the patient currently have. .. Unable A Lot A Little None   1. Turning over in bed (including adjusting bedclothes, sheets and blankets)? [] 1   [] 2   [] 3   [x] 4   2. Sitting down on and standing up from a chair with arms ( e.g., wheelchair, bedside commode, etc.)   [] 1   [] 2   [x] 3   [] 4   3. Moving from lying on back to sitting on the side of the bed? [] 1   [] 2   [] 3   [x] 4          How much help from another person does the patient currently need. .. Total A Lot A Little None   4. Moving to and from a bed to a chair (including a wheelchair)? [] 1   [] 2   [x] 3   [] 4   5. Need to walk in hospital room? [] 1   [] 2   [x] 3   [] 4   6. Climbing 3-5 steps with a railing? [] 1   [] 2   [x] 3   [] 4   © 2007, Trustees of [address] [facility] [address] [address], under license to [employer]. All rights reserved     Score:  Initial: 20/24 Most Recent: X (Date: 10/23/2021 )   Interpretation of Tool:  Represents activities that are increasingly more difficult (i.e. Bed mobility, Transfers, Gait).   Score 24 23 22-20 19-15 14-10 9-7 6   Modifier CH CI CJ CK CL CM CN           Physical Therapy Evaluation Charge Determination   History Examination Presentation Decision-Making   LOW Complexity : Zero comorbidities / personal factors that will impact the outcome / POC LOW Complexity : 1-2 Standardized tests and measures addressing body structure, function, activity limitation and / or participation in recreation  LOW Complexity : Stable, uncomplicated  LOW Complexity : FOTO score of       Based on the above components, the patient evaluation is determined to be of the following complexity level: LOW     Pain Ratin/10    Activity Tolerance:   Good  Please refer to the flowsheet for vital signs taken during this treatment. After treatment patient left in no apparent distress:   Supine in bed, Call bell within reach, and Bed / chair alarm activated    COMMUNICATION/EDUCATION:   The patients plan of care was discussed with: Registered nurse. Fall prevention education was provided and the patient/caregiver indicated understanding., Patient/family have participated as able in goal setting and plan of care. , and Patient/family agree to work toward stated goals and plan of care. Thank you for this referral.  Shameka Steve PT   Time calculated 20 mins.

## 2021-10-24 ENCOUNTER — APPOINTMENT (OUTPATIENT)
Dept: NON INVASIVE DIAGNOSTICS | Age: 76
End: 2021-10-24
Attending: INTERNAL MEDICINE
Payer: MEDICARE

## 2021-10-24 VITALS
HEART RATE: 74 BPM | WEIGHT: 201.28 LBS | DIASTOLIC BLOOD PRESSURE: 62 MMHG | OXYGEN SATURATION: 99 % | HEIGHT: 66 IN | RESPIRATION RATE: 15 BRPM | BODY MASS INDEX: 32.35 KG/M2 | SYSTOLIC BLOOD PRESSURE: 121 MMHG | TEMPERATURE: 98 F

## 2021-10-24 LAB
ALBUMIN SERPL-MCNC: 2.7 G/DL (ref 3.5–5)
ALBUMIN/GLOB SERPL: 0.8 {RATIO} (ref 1.1–2.2)
ALP SERPL-CCNC: 127 U/L (ref 45–117)
ALT SERPL-CCNC: 20 U/L (ref 12–78)
ANION GAP SERPL CALC-SCNC: 3 MMOL/L (ref 5–15)
AST SERPL W P-5'-P-CCNC: 25 U/L (ref 15–37)
BASOPHILS # BLD: 0 K/UL (ref 0–0.1)
BASOPHILS NFR BLD: 1 % (ref 0–1)
BILIRUB SERPL-MCNC: 0.4 MG/DL (ref 0.2–1)
BUN SERPL-MCNC: 49 MG/DL (ref 6–20)
BUN/CREAT SERPL: 27 (ref 12–20)
CA-I BLD-MCNC: 8.9 MG/DL (ref 8.5–10.1)
CHLORIDE SERPL-SCNC: 106 MMOL/L (ref 97–108)
CO2 SERPL-SCNC: 29 MMOL/L (ref 21–32)
CREAT SERPL-MCNC: 1.84 MG/DL (ref 0.55–1.02)
DIFFERENTIAL METHOD BLD: ABNORMAL
ECHO AO ROOT DIAM: 3.4 CM
ECHO AV PEAK GRADIENT: 20 MMHG
ECHO AV PEAK VELOCITY: 226 CM/S
ECHO LA AREA 4C: 26.65 CM2
ECHO LA MAJOR AXIS: 3.4 CM
ECHO LA MAJOR AXIS: 3.4 CM
ECHO LV E' SEPTAL VELOCITY: 3.56 CM/S
ECHO LV EDV A2C: 57.1 CM3
ECHO LV ESV A2C: 18.6 CM3
ECHO LV INTERNAL DIMENSION DIASTOLIC: 3.85 CM (ref 3.9–5.3)
ECHO LV INTERNAL DIMENSION SYSTOLIC: 2.65 CM
ECHO LV IVSD: 1.28 CM (ref 0.6–0.9)
ECHO LV MASS 2D: 172.2 G (ref 67–162)
ECHO LV MASS INDEX 2D: 85.7 G/M2 (ref 43–95)
ECHO LV POSTERIOR WALL DIASTOLIC: 1.28 CM (ref 0.6–0.9)
ECHO LVOT PEAK GRADIENT: 6 MMHG
ECHO LVOT PEAK VELOCITY: 120 CM/S
ECHO MV A VELOCITY: 96.3 CM/S
ECHO MV E DECELERATION TIME (DT): 229 MS
ECHO MV E VELOCITY: 59.8 CM/S
ECHO MV E/A RATIO: 0.62
ECHO MV E/E' SEPTAL: 16.8
ECHO PV MAX VELOCITY: 73.9 CM/S
ECHO PV PEAK INSTANTANEOUS GRADIENT SYSTOLIC: 2 MMHG
ECHO PV PEAK INSTANTANEOUS GRADIENT SYSTOLIC: 9 MMHG
ECHO PV REGURGITANT MAX VELOCITY: 154 CM/S
ECHO RA AREA 4C: 25.52 CM2
ECHO RV INTERNAL DIMENSION: 3.62 CM
ECHO RV TAPSE: 1.7 CM (ref 1.5–2)
ECHO TV MAX VELOCITY: 342 CM/S
ECHO TV REGURGITANT PEAK GRADIENT: 47 MMHG
EOSINOPHIL # BLD: 0.3 K/UL (ref 0–0.4)
EOSINOPHIL NFR BLD: 3 % (ref 0–7)
ERYTHROCYTE [DISTWIDTH] IN BLOOD BY AUTOMATED COUNT: 15.9 % (ref 11.5–14.5)
GLOBULIN SER CALC-MCNC: 3.5 G/DL (ref 2–4)
GLUCOSE BLD STRIP.AUTO-MCNC: 169 MG/DL (ref 65–117)
GLUCOSE SERPL-MCNC: 158 MG/DL (ref 65–100)
HCT VFR BLD AUTO: 35.7 % (ref 35–47)
HGB BLD-MCNC: 11.5 G/DL (ref 11.5–16)
IMM GRANULOCYTES # BLD AUTO: 0 K/UL (ref 0–0.04)
IMM GRANULOCYTES NFR BLD AUTO: 0 % (ref 0–0.5)
LYMPHOCYTES # BLD: 2.1 K/UL (ref 0.8–3.5)
LYMPHOCYTES NFR BLD: 26 % (ref 12–49)
MCH RBC QN AUTO: 29.1 PG (ref 26–34)
MCHC RBC AUTO-ENTMCNC: 32.2 G/DL (ref 30–36.5)
MCV RBC AUTO: 90.4 FL (ref 80–99)
MONOCYTES # BLD: 0.9 K/UL (ref 0–1)
MONOCYTES NFR BLD: 12 % (ref 5–13)
NEUTS SEG # BLD: 4.8 K/UL (ref 1.8–8)
NEUTS SEG NFR BLD: 58 % (ref 32–75)
NRBC # BLD: 0 K/UL (ref 0–0.01)
NRBC BLD-RTO: 0 PER 100 WBC
PERFORMED BY, TECHID: ABNORMAL
PLATELET # BLD AUTO: 198 K/UL (ref 150–400)
PMV BLD AUTO: 11.3 FL (ref 8.9–12.9)
POTASSIUM SERPL-SCNC: 4.3 MMOL/L (ref 3.5–5.1)
PROT SERPL-MCNC: 6.2 G/DL (ref 6.4–8.2)
RBC # BLD AUTO: 3.95 M/UL (ref 3.8–5.2)
SODIUM SERPL-SCNC: 138 MMOL/L (ref 136–145)
WBC # BLD AUTO: 8.1 K/UL (ref 3.6–11)

## 2021-10-24 PROCEDURE — 74011636637 HC RX REV CODE- 636/637: Performed by: FAMILY MEDICINE

## 2021-10-24 PROCEDURE — 36415 COLL VENOUS BLD VENIPUNCTURE: CPT

## 2021-10-24 PROCEDURE — 85025 COMPLETE CBC W/AUTO DIFF WBC: CPT

## 2021-10-24 PROCEDURE — 80053 COMPREHEN METABOLIC PANEL: CPT

## 2021-10-24 PROCEDURE — 99218 HC RM OBSERVATION: CPT

## 2021-10-24 PROCEDURE — 93306 TTE W/DOPPLER COMPLETE: CPT

## 2021-10-24 PROCEDURE — 82962 GLUCOSE BLOOD TEST: CPT

## 2021-10-24 PROCEDURE — 74011250637 HC RX REV CODE- 250/637: Performed by: INTERNAL MEDICINE

## 2021-10-24 PROCEDURE — 74011250637 HC RX REV CODE- 250/637: Performed by: FAMILY MEDICINE

## 2021-10-24 RX ORDER — HYDRALAZINE HYDROCHLORIDE 50 MG/1
100 TABLET, FILM COATED ORAL 2 TIMES DAILY
Status: DISCONTINUED | OUTPATIENT
Start: 2021-10-24 | End: 2021-10-24 | Stop reason: HOSPADM

## 2021-10-24 RX ORDER — HYDRALAZINE HYDROCHLORIDE 50 MG/1
100 TABLET, FILM COATED ORAL ONCE
Status: COMPLETED | OUTPATIENT
Start: 2021-10-24 | End: 2021-10-24

## 2021-10-24 RX ORDER — LOSARTAN POTASSIUM 50 MG/1
50 TABLET ORAL DAILY
Qty: 30 TABLET | Refills: 0 | Status: SHIPPED | OUTPATIENT
Start: 2021-10-25 | End: 2022-02-20

## 2021-10-24 RX ADMIN — LUBIPROSTONE 24 MCG: 24 CAPSULE, GELATIN COATED ORAL at 12:56

## 2021-10-24 RX ADMIN — ISOSORBIDE MONONITRATE 60 MG: 60 TABLET, EXTENDED RELEASE ORAL at 09:35

## 2021-10-24 RX ADMIN — METOPROLOL SUCCINATE 25 MG: 25 TABLET, EXTENDED RELEASE ORAL at 09:34

## 2021-10-24 RX ADMIN — APIXABAN 2.5 MG: 2.5 TABLET, FILM COATED ORAL at 09:34

## 2021-10-24 RX ADMIN — FUROSEMIDE 40 MG: 40 TABLET ORAL at 09:35

## 2021-10-24 RX ADMIN — HYDRALAZINE HYDROCHLORIDE 100 MG: 50 TABLET, FILM COATED ORAL at 01:05

## 2021-10-24 RX ADMIN — INSULIN LISPRO 3 UNITS: 100 INJECTION, SOLUTION INTRAVENOUS; SUBCUTANEOUS at 09:40

## 2021-10-24 RX ADMIN — VENLAFAXINE 75 MG: 50 TABLET ORAL at 09:35

## 2021-10-24 RX ADMIN — HYDRALAZINE HYDROCHLORIDE 100 MG: 50 TABLET, FILM COATED ORAL at 09:35

## 2021-10-24 RX ADMIN — ACETAMINOPHEN 650 MG: 325 TABLET ORAL at 04:38

## 2021-10-24 RX ADMIN — LOSARTAN POTASSIUM 50 MG: 50 TABLET, FILM COATED ORAL at 09:35

## 2021-10-24 RX ADMIN — ACETAMINOPHEN 650 MG: 325 TABLET ORAL at 17:26

## 2021-10-24 RX ADMIN — INSULIN LISPRO 3 UNITS: 100 INJECTION, SOLUTION INTRAVENOUS; SUBCUTANEOUS at 16:30

## 2021-10-24 RX ADMIN — ALLOPURINOL 200 MG: 100 TABLET ORAL at 09:35

## 2021-10-24 RX ADMIN — GABAPENTIN 300 MG: 300 CAPSULE ORAL at 09:35

## 2021-10-24 RX ADMIN — ASPIRIN 81 MG CHEWABLE TABLET 81 MG: 81 TABLET CHEWABLE at 09:34

## 2021-10-24 NOTE — DISCHARGE INSTRUCTIONS
Discharge Instructions       PATIENT ID: Bette Duron  MRN: [id number]   YOB: 1945    DATE OF ADMISSION: 10/21/2021  7:15 PM    DATE OF DISCHARGE: 10/24/2021    PRIMARY CARE PROVIDER: Varinder Suresh NP     ATTENDING PHYSICIAN: Dia Diaz MD  DISCHARGING PROVIDER: Gunnar Winston MD    To contact this individual call [phone] and ask the  to page. If unavailable ask to be transferred the Adult Hospitalist Department. DISCHARGE DIAGNOSES syncopal episode    CONSULTATIONS: IP CONSULT TO NEUROLOGY  IP CONSULT TO NEPHROLOGY  IP CONSULT TO CARDIOLOGY    PROCEDURES/SURGERIES: * No surgery found *    PENDING TEST RESULTS:   At the time of discharge the following test results are still pending: None      FOLLOW UP APPOINTMENTS:   Follow-up Information     Follow up With Specialties Details Why Contact Info    Varinder Suresh NP Nurse Practitioner In 1 week  [address] [place] [address]  [phone]      Dinah Bender MD Cardiology In 1 week  [place] [address]  [phone]             ADDITIONAL CARE RECOMMENDATIONS: Follow-up with cardiology regarding loop recorder implantation    DIET: Cardiac Diet      ACTIVITY: Activity as tolerated    Wound care: Wound Care Order: submitted to [facility] Please view [email or web address]    EQUIPMENT needed: None      DISCHARGE MEDICATIONS:   See Medication Reconciliation Form    · It is important that you take the medication exactly as they are prescribed. · Keep your medication in the bottles provided by the pharmacist and keep a list of the medication names, dosages, and times to be taken in your wallet. · Do not take other medications without consulting your doctor. NOTIFY YOUR PHYSICIAN FOR ANY OF THE FOLLOWING:   Fever over 101 degrees for 24 hours.    Chest pain, shortness of breath, fever, chills, nausea, vomiting, diarrhea, change in mentation, falling, weakness, bleeding. Severe pain or pain not relieved by medications. Or, any other signs or symptoms that you may have questions about. DISPOSITION:    Home With:   OT  PT  HH  RN       SNF/Inpatient Rehab/LTAC    Independent/assisted living    Hospice    Other:         PROBLEM LIST Updated:  Yes ***       Signed:   Jair Carter MD  10/24/2021  1:16 PM     Discharge Instructions       PATIENT ID: Magdalena Dyer  MRN: [id number]   YOB: 1945    DATE OF ADMISSION: 10/21/2021  7:15 PM    DATE OF DISCHARGE: 10/24/2021    PRIMARY CARE PROVIDER: Phoebe Valenzuela NP     ATTENDING PHYSICIAN: Marcy Parker MD  DISCHARGING PROVIDER: Jair Carter MD    To contact this individual call [phone] and ask the  to page. If unavailable ask to be transferred the Adult Hospitalist Department. DISCHARGE DIAGNOSES ***    CONSULTATIONS: IP CONSULT TO NEUROLOGY  IP CONSULT TO NEPHROLOGY  IP CONSULT TO CARDIOLOGY    PROCEDURES/SURGERIES: * No surgery found *    PENDING TEST RESULTS:   At the time of discharge the following test results are still pending: ***    FOLLOW UP APPOINTMENTS:   Follow-up Information     Follow up With Specialties Details Why Contact Info    Phoebe Valenzuela NP Nurse Practitioner In 1 week  [address] [place] [address]  [phone]      Federico Calderon MD Cardiology In 1 week  [place] [address]  [phone]             ADDITIONAL CARE RECOMMENDATIONS: ***    DIET: {diet:21013}      ACTIVITY: {discharge activity:75164}    Wound care: {[facility] Wound Care Instructions:65953}    EQUIPMENT needed: ***      DISCHARGE MEDICATIONS:   See Medication Reconciliation Form    · It is important that you take the medication exactly as they are prescribed. · Keep your medication in the bottles provided by the pharmacist and keep a list of the medication names, dosages, and times to be taken in your wallet.    · Do not take other medications without consulting your doctor. NOTIFY YOUR PHYSICIAN FOR ANY OF THE FOLLOWING:   Fever over 101 degrees for 24 hours. Chest pain, shortness of breath, fever, chills, nausea, vomiting, diarrhea, change in mentation, falling, weakness, bleeding. Severe pain or pain not relieved by medications. Or, any other signs or symptoms that you may have questions about.       DISPOSITION:    Home With:   OT  PT  HH  RN       SNF/Inpatient Rehab/LTAC    Independent/assisted living    Hospice    Other:         PROBLEM LIST Updated:  Yes ***       Signed:   Kika Alfonso MD  10/24/2021  1:16 PM

## 2021-10-24 NOTE — DISCHARGE SUMMARY
Discharge Summary       PATIENT ID: Diandra Ryder  MRN: [id number]   YOB: 1945    DATE OF ADMISSION: 10/21/2021  7:15 PM    DATE OF DISCHARGE:   PRIMARY CARE PROVIDER: Van Stokes NP     ATTENDING PHYSICIAN: Rae Herbert  DISCHARGING PROVIDER: Rae Herbert      CONSULTATIONS: IP CONSULT TO NEUROLOGY  IP CONSULT TO NEPHROLOGY  IP CONSULT TO CARDIOLOGY    PROCEDURES/SURGERIES: * No surgery found *    ADMITTING DIAGNOSES:    Patient Active Problem List    Diagnosis Date Noted    Secondary hyperparathyroidism ([facility] 10/22/2021    Syncopal episodes 10/21/2021    Syncope 11/21/2020    Episode of unresponsiveness 09/24/2020    Arteriosclerosis of coronary artery 09/16/2020    Dyslipidemia 09/16/2020    Chronic kidney disease (CKD), stage IV (severe) ([facility] 09/16/2020    Hypertension 09/16/2020    Type 2 diabetes mellitus ([facility] 09/16/2020    TIA (transient ischemic attack) 09/16/2020    Adenocarcinoma, renal cell ([facility] 09/02/2020    Morbid obesity ([facility] 09/02/2020    Peripheral vascular disease ([facility] 09/02/2020    Arthritis 08/21/2019    Essential hypertension 08/21/2019    Impingement syndrome of shoulder region 08/21/2019    Pulmonary embolism ([facility] 02/01/2015    Cerebrovascular accident (CVA) ([facility] 08/01/2014       DISCHARGE DIAGNOSES / PLAN:      Syncopal episode  Chronic kidney disease stage IV  Hypertension  Hyperlipidemia  Type 2 diabetes  Hypothyroidism  Mildly elevated troponin   history of pulmonary embolism  History of TIA  Hypokalemia        Patient is a 74 y. o. year old female history of chronic kidney disease stage IV hypertension diabetes CAD gout hypertension hyperlipidemia depression came to emergency room after sustaining a fall patient have of multiple falls over last 1 year when the patient she passed out briefly patient was with her daughter patient denies any headache nausea vomiting diarrhea no constipation, sister fifth event patient have work-up done in the past which was normal    Patient was seen by the neurology no further neurology work-up recommended she sign of seen by the cardiology she did recommend loop recorder implantation as an up patient    Today patient denies any chest pain or shortness of breath nausea vomiting diarrhea no constipation  DISCHARGE MEDICATIONS:  Current Discharge Medication List      START taking these medications    Details   losartan (COZAAR) 50 mg tablet Take 1 Tablet by mouth daily. Qty: 30 Tablet, Refills: 0  Start date: 10/25/2021         CONTINUE these medications which have NOT CHANGED    Details   NovoLIN N NPH U-100 Insulin 100 unit/mL injection INJECT 20 UNITS SUBCUTANEOUSLY IN THE EVENING **STOP  HUMULIN**  Qty: 10 mL, Refills: 11      omeprazole (PRILOSEC) 20 mg capsule Take 20 mg by mouth daily. nitroglycerin (NITROSTAT) 0.4 mg SL tablet 0.4 mg by SubLINGual route every five (5) minutes as needed for Chest Pain. Up to 3 doses. lubiPROStone (Amitiza) 24 mcg capsule Take 24 mcg by mouth as needed for Constipation. apixaban (ELIQUIS) 2.5 mg tablet Take 2.5 mg by mouth two (2) times a day. isosorbide mononitrate ER (IMDUR) 60 mg CR tablet Take 60 mg by mouth two (2) times a day. hydrALAZINE (APRESOLINE) 25 mg tablet Take 25 mg by mouth two (2) times a day. metoprolol succinate (TOPROL-XL) 25 mg XL tablet Take 25 mg by mouth daily. gabapentin (NEURONTIN) 300 mg capsule Take 300 mg by mouth two (2) times a day. furosemide (LASIX) 40 mg tablet Take 40 mg by mouth daily. venlafaxine (EFFEXOR) 75 mg tablet Take 75 mg by mouth daily. latanoprost (XALATAN) 0.005 % ophthalmic solution Administer 1 Drop to both eyes nightly. pravastatin (PRAVACHOL) 80 mg tablet Take 80 mg by mouth nightly. aspirin 81 mg chewable tablet Take 81 mg by mouth daily. allopurinol (ZYLOPRIM) 100 mg tablet Take 200 mg by mouth daily.          STOP taking these medications       insulin syringe-needle U-100 1 mL 31 gauge x 15/64\" syrg Comments:   Reason for Stopping:         fluticasone propionate (FLONASE) 50 mcg/actuation nasal spray Comments:   Reason for Stopping:         cetirizine (ZYRTEC) 10 mg tablet Comments:   Reason for Stopping:                 NOTIFY YOUR PHYSICIAN FOR ANY OF THE FOLLOWING:   Fever over 101 degrees for 24 hours. Chest pain, shortness of breath, fever, chills, nausea, vomiting, diarrhea, change in mentation, falling, weakness, bleeding. Severe pain or pain not relieved by medications. Or, any other signs or symptoms that you may have questions about. DISPOSITION:  x  Home With:   OT  PT  HH  RN       Long term SNF/Inpatient Rehab    Independent/assisted living    Hospice    Other:       PATIENT CONDITION AT DISCHARGE: Stable      PHYSICAL EXAMINATION AT DISCHARGE:  General:          Alert, cooperative, no distress, appears stated age. HEENT:           Atraumatic, anicteric sclerae, pink conjunctivae                          No oral ulcers, mucosa moist, throat clear, dentition fair  Neck:               Supple, symmetrical  Lungs:             Clear to auscultation bilaterally. No Wheezing or Rhonchi. No rales. Chest wall:      No tenderness  No Accessory muscle use. Heart:              Regular  rhythm,  No  murmur   No edema  Abdomen:        Soft, non-tender. Not distended. Bowel sounds normal  Extremities:     No cyanosis. No clubbing,                            Skin turgor normal, Capillary refill normal  Skin:                Not pale. Not Jaundiced  No rashes   Psych:             Not anxious or agitated. Neurologic:      Alert, moves all extremities, answers questions appropriately and responds to commands     CT HEAD WO CONT   Final Result   No acute intracranial abnormality. Remote bilateral basal ganglial   infarcts. Mild generalized atrophy with moderate nonspecific white matter   abnormality that may indicate chronic small vessel disease. XR CHEST PORT   Final Result           Recent Results (from the past 24 hour(s))   GLUCOSE, POC    Collection Time: 10/23/21  4:55 PM   Result Value Ref Range    Glucose (POC) 201 (H) 65 - 117 mg/dL    Performed by Cesar [facility] [place])    GLUCOSE, POC    Collection Time: 10/23/21  8:39 PM   Result Value Ref Range    Glucose (POC) 98 65 - 117 mg/dL    Performed by SERENITY KIRKPATRICK    CBC WITH AUTOMATED DIFF    Collection Time: 10/24/21  5:40 AM   Result Value Ref Range    WBC 8.1 3.6 - 11.0 K/uL    RBC 3.95 3.80 - 5.20 M/uL    HGB 11.5 11.5 - 16.0 g/dL    HCT 35.7 35.0 - 47.0 %    MCV 90.4 80.0 - 99.0 FL    MCH 29.1 26.0 - 34.0 PG    MCHC 32.2 30.0 - 36.5 g/dL    RDW 15.9 (H) 11.5 - 14.5 %    PLATELET 125 823 - 833 K/uL    MPV 11.3 8.9 - 12.9 FL    NRBC 0.0 0.0  WBC    ABSOLUTE NRBC 0.00 0.00 - 0.01 K/uL    NEUTROPHILS 58 32 - 75 %    LYMPHOCYTES 26 12 - 49 %    MONOCYTES 12 5 - 13 %    EOSINOPHILS 3 0 - 7 %    BASOPHILS 1 0 - 1 %    IMMATURE GRANULOCYTES 0 0 - 0.5 %    ABS. NEUTROPHILS 4.8 1.8 - 8.0 K/UL    ABS. LYMPHOCYTES 2.1 0.8 - 3.5 K/UL    ABS. MONOCYTES 0.9 0.0 - 1.0 K/UL    ABS. EOSINOPHILS 0.3 0.0 - 0.4 K/UL    ABS. BASOPHILS 0.0 0.0 - 0.1 K/UL    ABS. IMM. GRANS. 0.0 0.00 - 0.04 K/UL    DF AUTOMATED     METABOLIC PANEL, COMPREHENSIVE    Collection Time: 10/24/21  5:40 AM   Result Value Ref Range    Sodium 138 136 - 145 mmol/L    Potassium 4.3 3.5 - 5.1 mmol/L    Chloride 106 97 - 108 mmol/L    CO2 29 21 - 32 mmol/L    Anion gap 3 (L) 5 - 15 mmol/L    Glucose 158 (H) 65 - 100 mg/dL    BUN 49 (H) 6 - 20 mg/dL    Creatinine 1.84 (H) 0.55 - 1.02 mg/dL    BUN/Creatinine ratio 27 (H) 12 - 20      GFR est AA 32 (L) >60 ml/min/1.73m2    GFR est non-AA 27 (L) >60 ml/min/1.73m2    Calcium 8.9 8.5 - 10.1 mg/dL    Bilirubin, total 0.4 0.2 - 1.0 mg/dL    AST (SGOT) 25 15 - 37 U/L    ALT (SGPT) 20 12 - 78 U/L    Alk.  phosphatase 127 (H) 45 - 117 U/L    Protein, total 6.2 (L) 6.4 - 8.2 g/dL    Albumin 2.7 (L) 3.5 - 5.0 g/dL    Globulin 3.5 2.0 - 4.0 g/dL    A-G Ratio 0.8 (L) 1.1 - 2.2     ECHO ADULT COMPLETE    Collection Time: 10/24/21 11:49 AM   Result Value Ref Range    Aortic Valve Systolic Peak Velocity 008.34 cm/s    AoV PG 20.00 mmHg    Ao Root D 3.40 cm    IVSd 1.28 (A) 0.60 - 0.90 cm    LVIDd 3.85 (A) 3.90 - 5.30 cm    LVIDs 2.65 cm    LVOT Peak Velocity 120.00 cm/s    LVOT Peak Gradient 6.00 mmHg    LVPWd 1.28 (A) 0.60 - 0.90 cm    LV E' Septal Velocity 3.56 cm/s    LV ED Vol A2C 57.10 cm3    LV ES Vol A2C 18.60 cm3    E/E' septal 16.80     Left Atrium Major Axis 3.40 cm    Left Atrium Major Axis 3.40 cm    Mitral Valve E Wave Deceleration Time 229.00 ms    MV A Andres 96.30 cm/s    MV E Andres 59.80 cm/s    MV E/A 0.62     Pulmonic Regurgitant End Max Velocity 154.00 cm/s    Pulmonic Valve Systolic Peak Instantaneous Gradient 9.00 mmHg    Pulmonic Valve Max Velocity 73.90 cm/s    Pulmonic Valve Systolic Peak Instantaneous Gradient 2.00 mmHg    RVIDd 3.62 cm    Tricuspid Valve Max Velocity 342.00 cm/s    Triscuspid Valve Regurgitation Peak Gradient 47.00 mmHg    Tapse 1.70 1.50 - 2.00 cm    Right Atrial Area 4C 25.52 cm2    LA Area 4C 26.65 cm2    LV Mass .2 67.0 - 162.0 g    LV Mass AL Index 85.7 43.0 - 95.0 g/m2   GLUCOSE, POC    Collection Time: 10/24/21 12:59 PM   Result Value Ref Range    Glucose (POC) 169 (H) 65 - 117 mg/dL    Performed by Brian Sheppard               Signed:   Jair Carter MD  10/24/2021  1:17 PM

## 2021-10-24 NOTE — PROGRESS NOTES
Paged Dr. Virgin Gilford regarding patient elevated blood pressure 187/86. Orders received increase hydralazine to 100 mg BID and give one dose now.

## 2021-10-25 LAB
BACTERIA SPEC CULT: ABNORMAL
BACTERIA SPEC CULT: ABNORMAL
SPECIAL REQUESTS,SREQ: ABNORMAL

## 2021-10-29 LAB
BACTERIA SPEC CULT: NORMAL
SPECIAL REQUESTS,SREQ: NORMAL

## 2021-11-10 LAB
ORG ID BY SEQUENCING, ORI1T: NORMAL
SPECIMEN SOURCE: NORMAL

## 2021-11-11 LAB
AEROBIC ID BY SEQ, ORI2T: NORMAL
SPECIMEN SOURCE: NORMAL

## 2021-11-19 LAB
BACTERIA ISLT: ABNORMAL
OTHER ANTIBIOTIC SUSC ISLT: ABNORMAL
SPECIMEN SOURCE: ABNORMAL
SPECIMEN SOURCE: ABNORMAL

## 2021-11-22 LAB
AMPICILLIN MIC, SUS2T: NORMAL UG/ML
BACTERIA SPEC: NORMAL
ERYTHROMYCIN MIC, SUS3T: NORMAL UG/ML
GENTAMICIN MIC, SUS4T: NORMAL UG/ML
PENICILLIN MIC, SUS5T: NORMAL UG/ML
RIFAMPIN MIC, SUS6T: NORMAL UG/ML
TETRACYCLINE MIC, SUS7T: NORMAL UG/ML
VANCOMYCIN MIC, SUS8T: NORMAL UG/ML

## 2022-01-13 ENCOUNTER — HOSPITAL ENCOUNTER (OUTPATIENT)
Dept: WOUND CARE | Age: 77
Discharge: HOME | End: 2022-01-13
Payer: MEDICARE

## 2022-01-13 VITALS
HEART RATE: 84 BPM | RESPIRATION RATE: 18 BRPM | TEMPERATURE: 97.9 F | BODY MASS INDEX: 34.66 KG/M2 | WEIGHT: 203 LBS | SYSTOLIC BLOOD PRESSURE: 135 MMHG | DIASTOLIC BLOOD PRESSURE: 67 MMHG | HEIGHT: 64 IN

## 2022-01-13 DIAGNOSIS — S81.802A WOUND OF LEFT LEG, INITIAL ENCOUNTER: ICD-10-CM

## 2022-01-13 DIAGNOSIS — I73.9 PERIPHERAL VASCULAR DISEASE (HCC): Primary | ICD-10-CM

## 2022-01-13 PROCEDURE — 11042 DBRDMT SUBQ TIS 1ST 20SQCM/<: CPT

## 2022-01-13 PROCEDURE — 99213 OFFICE O/P EST LOW 20 MIN: CPT

## 2022-01-13 PROCEDURE — 74011000250 HC RX REV CODE- 250: Performed by: ORTHOPAEDIC SURGERY

## 2022-01-13 RX ORDER — DONEPEZIL HYDROCHLORIDE 10 MG/1
10 TABLET, FILM COATED ORAL
COMMUNITY

## 2022-01-13 RX ORDER — CALCITRIOL 0.25 UG/1
0.25 CAPSULE ORAL
COMMUNITY

## 2022-01-13 RX ORDER — METOPROLOL TARTRATE 25 MG/1
TABLET, FILM COATED ORAL 2 TIMES DAILY
COMMUNITY
End: 2022-02-20

## 2022-01-13 RX ORDER — LIDOCAINE HYDROCHLORIDE 20 MG/ML
15 SOLUTION OROPHARYNGEAL AS NEEDED
Status: DISCONTINUED | OUTPATIENT
Start: 2022-01-13 | End: 2022-01-15 | Stop reason: HOSPADM

## 2022-01-13 RX ORDER — TRIAMCINOLONE ACETONIDE 1 MG/G
CREAM TOPICAL 2 TIMES DAILY
COMMUNITY
End: 2022-08-02

## 2022-01-13 RX ORDER — CETIRIZINE HCL 10 MG
TABLET ORAL
COMMUNITY
End: 2022-02-20

## 2022-01-13 RX ORDER — COLCHICINE 0.6 MG/1
0.6 TABLET ORAL DAILY
COMMUNITY
End: 2022-08-01

## 2022-01-13 NOTE — ASSESSMENT & PLAN NOTE
I have recommended silver cell and Tubigrip, patient will need vascular testing, follow-up in 1 to 2 weeks

## 2022-01-13 NOTE — DISCHARGE INSTRUCTIONS
Discharge Instructions for  [address] [place] [address], [facility]  Telephone: [phone]     NAME:  Daniela Carter OF BIRTH:  1945  MEDICAL RECORD NUMBER:  [id number]  DATE:  1/13/2022        Return Appointment:  [x]? Dressing supply provider: OLYA  []? Home Healthcare:  [x]? Return Appointment:  1 Week(s)  []? Nurse Visit:   Week(s)     []? Discharge from [facility]  [x]? Ordered tests: PVR AND VENOUS REFLUX TO BE DONE AT [facility]  []? Referrals:   []? Rx:      Wound Cleansing:   Do not scrub or use excessive force. Cleanse wound prior to applying a clean dressing with:  [x]? Normal Saline          [x]? Mild Soap & Water    []? Keep Wound Dry in Shower  []? Other:       Topical Treatments:  Do not apply lotions, creams, or ointments to wound bed unless directed. []? Apply moisturizing lotion to skin surrounding the wound prior to dressing change. []? Apply antifungal ointment to skin surrounding the wound prior to dressing change. []? Apply thin film of moisture barrier ointment to skin immediately around wound. []? Other:                  Dressings:                  Wound Location LEFT LEG              [x]? Apply Primary Dressing:                                          []? MediHoney Gel         []? MediHoney Alginate                     []? Calcium Alginate      [x]? Calcium Alginate with Silver              []? Collagen                   []? Collagen with Silver                []? Santyl with Moistened saline gauze              []? Hydrofera Blue (cut to size and moistened)  []? Hydrofera Blue Ready (Cut to size)              []? NS wet to dry            []? Betadine wet to dry              []? Hydrogel                   []? Mepitel                   []? Bactroban/Mupirocin                       []? Other:      [x]? Cover and Secure with:                   [x]? Gauze        [x]? Almon Webster           []?  Kerlix              []? Foam []? Super Absorbant    []? ABD                                      []? ACE Wrap            []? Other:               Limit contact of tape with skin.     [x]? Change dressing:  []? Daily           [x]? Every Other Day    []? Once per week   []? Twice per week              []? Three times per week        []? Do Not Change Dressing    []? Other:                Negative Pressure:           Wound Location:   []? Pressure @  mm/Hg                      []?Continuous  []? Intermittent              []? Black          []? White Foam              []? Bridge:               []? Change vac dressing three times per week     Pressure Relief:  []? When sitting, shift position or do seat lifts every 15 minutes. []? Wheelchair cushion           []? Specialty Bed/Mattress  []? Turn every 2 hours when in bed. Avoid direct pressure on wound site. Limit side lying to 30 degree tilt. Limit HOB elevation to 30 degrees. Edema Control:  Apply:  []? Compression Stocking:    mmHg   []? Right Leg     []? Left Leg              [x]? Tubigrip      []? Right Leg Double Layer      []? Left Leg Double Layer                                      []?Right Leg Single Layer       [x]? Left Leg Single Layer                 []? Elevate leg(s) above the level of the heart when sitting. []? Avoid prolonged standing in one place.         Compression:  Apply:  []? Multilayer Compression Wrap:      []? RightLeg      []? Left Leg                                 []?Coflex              []?Coflex Lite             []?Unna                 []? Do not get leg(s) with wrap wet. If wraps become too tight call the center or completely remove the wrap. []? Elevate leg(s) above the level of the heart when sitting. []? Avoid prolonged standing in one place.     Off-Loading:   []? Off-loading when   []? walking       []? in bed         []? sitting  []? Total non-weight bearing  []? Right Leg  []?  Left Leg []? Assistive Device    []? Abbott Dessert        []? Cane      []? Wheelchair      []? Crutches              []? Surgical shoe    []? Podus Boot(s)   []? Foam Boot(s)  []? Roll About              []? Cast Boot   []? CROW Boot  []? Wedge Shoe  []? Other:     Contact Cast:  Apply:  []? Total Contact Cast Applied in Clinic          []? RightLeg      []? Left Leg              []? Do not get cast wet. Contact center or go to emergency room if there is a foul odor or becomes uncomfortable due to feeling tight or swelling. Do not use objects inside of cast to scratch.                  Dietary:  []? Diet as tolerated:   [x]? Calorie Diabetic Diet:         []? No Added Salt:  [x]? Increase Protein:   []? Other:              Activity:  [x]? Activity as tolerated:    [x]? Patient has no activity restrictions       []? Strict Bedrest:          []? Remain off Work:       []? May return to full duty work:                                               []? Return to work with restrictions:      [address] [place] [address] Avenue: Should you experience any significant changes in your wound(s) or have questions about your wound care, please contact Jaimee Oropeza at [place] [address] 8:00 am - 4:30. If you need help with your wound outside of these hours and cannot wait until we are again available, contact your PCP or go to the hospital emergency room.      PLEASE NOTE: IF YOU ARE UNABLE TO [place] [address], CONTINUE TO USE THE SUPPLIES YOU HAVE AVAILABLE UNTIL YOU ARE ABLE TO [facility]. IT IS MOST IMPORTANT TO KEEP THE WOUND COVERED AT ALL TIMES.     []? Dr. Татьяна Pina   [x]? Dr. Aldona Councilman  []?  Dr. Jonatan Nicholas

## 2022-01-13 NOTE — WOUND CARE
Discharge Instructions for  [address] [place] [address], [address] [place] [address]  Telephone: [phone]    NAME:  Roosevelt Jain OF BIRTH:  1945  MEDICAL RECORD NUMBER:  [id number]  DATE:  1/13/2022      Return Appointment:  [x] Dressing supply provider: OLYA  [] Home Healthcare:  [x] Return Appointment:  1 Week(s)  [] Nurse Visit:   Week(s)    [] Discharge from [facility]  [x] Ordered tests: PVR AND VENOUS REFLUX TO BE DONE AT [facility]  [] Referrals:   [] Rx:     Wound Cleansing:   Do not scrub or use excessive force. Cleanse wound prior to applying a clean dressing with:  [x] Normal Saline   [x] Mild Soap & Water    [] Keep Wound Dry in Shower  [] Other:      Topical Treatments:  Do not apply lotions, creams, or ointments to wound bed unless directed. [] Apply moisturizing lotion to skin surrounding the wound prior to dressing change.  [] Apply antifungal ointment to skin surrounding the wound prior to dressing change.  [] Apply thin film of moisture barrier ointment to skin immediately around wound. [] Other:       Dressings:           Wound Location LEFT LEG   [x] Apply Primary Dressing:       [] MediHoney Gel    [] MediHoney Alginate               [] Calcium Alginate      [x] Calcium Alginate with Silver   [] Collagen                   [] Collagen with Silver                [] Santyl with Moistened saline gauze              [] Hydrofera Blue (cut to size and moistened)  [] Hydrofera Blue Ready (Cut to size)   [] NS wet to dry    [] Betadine wet to dry              [] Hydrogel                [] Mepitel     [] Bactroban/Mupirocin               [] Other:     [x] Cover and Secure with:     [x] Gauze [x] Jerri [] Kerlix   [] Foam [] Super Absorbant [] ABD     [] ACE Wrap            [] Other:    Limit contact of tape with skin.     [x] Change dressing: [] Daily    [x] Every Other Day [] Once per week   [] Twice per week   [] Three times per week [] Do Not Change Dressing   [] Other:     Negative Pressure:           Wound Location:   [] Pressure @  mm/Hg  []Continuous []Intermittent   [] Black  [] White Foam              [] Bridge:               [] Change vac dressing three times per week    Pressure Relief:  [] When sitting, shift position or do seat lifts every 15 minutes.  [] Wheelchair cushion [] Specialty Bed/Mattress  [] Turn every 2 hours when in bed. Avoid direct pressure on wound site. Limit side lying to 30 degree tilt. Limit HOB elevation to 30 degrees. Edema Control:  Apply: [] Compression Stocking:    mmHg  []Right Leg []Left Leg   [x] Tubigrip []Right Leg Double Layer []Left Leg Double Layer      []Right Leg Single Layer [x]Left Leg Single Layer     [] Elevate leg(s) above the level of the heart when sitting. [] Avoid prolonged standing in one place. Compression:  Apply: [] Multilayer Compression Wrap: []RightLeg []Left Leg                                 []Coflex   []Coflex Lite             []Unna     [] Do not get leg(s) with wrap wet. If wraps become too tight call the center or completely remove the wrap. [] Elevate leg(s) above the level of the heart when sitting. [] Avoid prolonged standing in one place. Off-Loading:   [] Off-loading when [] walking  [] in bed [] sitting  [] Total non-weight bearing  [] Right Leg  [] Left Leg   [] Assistive Device [] Walker [] Cane      [] Wheelchair  [] Crutches   [] Surgical shoe    [] Podus Boot(s)   [] Foam Boot(s)  [] Roll About    [] Cast Boot [] CROW Boot  [] Wedge Shoe  [] Other:    Contact Cast:  Apply: [] Total Contact Cast Applied in Clinic []RightLeg []Left Leg   [] Do not get cast wet. Contact center or go to emergency room if there is a foul odor or becomes uncomfortable due to feeling tight or swelling. Do not use objects inside of cast to scratch.       Dietary:  [] Diet as tolerated: [x] Calorie Diabetic Diet: [] No Added Salt:  [x] Increase Protein: [] Other:     Activity:  [x] Activity as tolerated:    [x] Patient has no activity restrictions       [] Strict Bedrest:   [] Remain off Work:       [] May return to full duty work:                                     [] Return to work with restrictions:               [address] Information: Should you experience any significant changes in your wound(s) or have questions about your wound care, please contact Jaimee Oneil 26 at [place] [phone] 8:00 am - 4:30. If you need help with your wound outside of these hours and cannot wait until we are again available, contact your PCP or go to the hospital emergency room. PLEASE NOTE: IF YOU ARE UNABLE TO OBTAIN WOUND SUPPLIES, CONTINUE TO USE THE SUPPLIES YOU HAVE AVAILABLE UNTIL YOU ARE ABLE TO REACH US. IT IS MOST IMPORTANT TO KEEP THE WOUND COVERED AT ALL TIMES.     [] Dr. Sarah Concepcion   [x] Dr. Wei Anderson  [] Dr. Jacob Madden

## 2022-01-13 NOTE — WOUND CARE
[facility] [address] f: [phone] f: [phone] p: [phone] [email or web address]     Ordering Center:     [address] [facility] OP WOUND CARE  [address] [facility] [phone]  WOUND CARE [unfilled]   FAX NUMBER [unfilled]    Patient Information:      [address] [phone]   [unfilled]   : 1945  AGE: 67 y.o. GENDER: female   TODAYS DATE:  2022    Insurance:      PRIMARY INSURANCE:  [id number] - (Medicare)    Payor/Plan Subscr  Sex Relation Sub. Ins. ID Effective Group Num   1. 52 [facility] A 1945 Female Self [id number]                                    [address] [address]   2.  [facility] MEDICAID* JESSE BAILEY A 1945 Female Self [id number] 20 [facility]                                   [address]       Patient Wound Information:      Problem List Items Addressed This Visit     None          WOUNDS REQUIRING DRESSING SUPPLIES:     Wound Leg lower Left;Medial #1 (Active)   Wound Image   22   Wound Etiology Venous 22   Dressing Status Other (Comment) 22   Cleansed Cleansed with saline 22   Wound Length (cm) 1.5 cm 22   Wound Width (cm) 2.4 cm 22   Wound Depth (cm) 0.1 cm 22   Wound Surface Area (cm^2) 3.6 cm^2 22   Wound Volume (cm^3) 0.36 cm^3 22   Post-Procedure Length (cm) 1.7 cm 22   Post-Procedure Width (cm) 2.6 cm 22   Post-Procedure Depth (cm) 0.3 cm 22   Post-Procedure Surface Area (cm^2) 4.42 cm^2 22   Post-Procedure Volume (cm^3) 1.326 cm^3 22   Wound Assessment Dry;Granulation tissue;Eschar dry 22   Drainage Amount None 22   Wound Odor None 22   Radha-Wound/Incision Assessment Hyperpigmented;Dry/flaky 22   Edges Attached edges 01/13/22 0827   Wound Thickness Description Full thickness 01/13/22 0827   Number of days: 0       Wound Leg lower Left;Lateral #2 (Active)   Wound Image   01/13/22 0827   Wound Etiology Venous 01/13/22 0827   Dressing Status Other (Comment) 01/13/22 0827   Cleansed Cleansed with saline 01/13/22 0827   Wound Length (cm) 1.2 cm 01/13/22 0827   Wound Width (cm) 0.5 cm 01/13/22 0827   Wound Depth (cm) 0.1 cm 01/13/22 0827   Wound Surface Area (cm^2) 0.6 cm^2 01/13/22 0827   Wound Volume (cm^3) 0.06 cm^3 01/13/22 0827   Post-Procedure Length (cm) 1.4 cm 01/13/22 0915   Post-Procedure Width (cm) 0.7 cm 01/13/22 0915   Post-Procedure Depth (cm) 0.3 cm 01/13/22 0915   Post-Procedure Surface Area (cm^2) 0.98 cm^2 01/13/22 0915   Post-Procedure Volume (cm^3) 0.294 cm^3 01/13/22 0915   Wound Assessment Eschar dry;Dry;Granulation tissue 01/13/22 0827   Drainage Amount None 01/13/22 0827   Wound Odor None 01/13/22 0827   Radha-Wound/Incision Assessment Hyperpigmented;Dry/flaky 01/13/22 0827   Edges Attached edges 01/13/22 0827   Wound Thickness Description Full thickness 01/13/22 0827   Number of days: 0        Supplies Requested :      WOUND #:1   PRIMARY DRESSING:  Other: SILVERCEL    4X4 gauze pad, Bulky roll gauze and Other TUBIGRIP SIZE E     FREQUENCY OF DRESSING CHANGES:  Every other day           ADDITIONAL ITEMS:  [] Gloves Small  [x] Gloves Medium [] Gloves Large [] Gloves XLarge  [] Tape 1\" [x] Tape 2\" [] Tape 3\"  [x] Medipore Tape  [x] Saline  [] Skin Prep   [] Adhesive Remover   [] Cotton Tip Applicators   [] Other:    Patient Wound(s) Debrided: [x] Yes if yes please add date 1/13/2022     [] No    Debribement Type: Excisional/Sharp    Patient currently being seen by Home Health: [] Yes   [x] No    Duration for needed supplies:  []15  [x]30  []60  []90 Days    Electronically signed by Angelique Bain RN on 1/13/2022 at 9:24 AM    Provider Information:      PROVIDER'S NAME: DR BOWEN [facility]    NPI:

## 2022-01-13 NOTE — WOUND CARE
[address]   Progress Note and Procedure Note     Vikas Rodarte  MEDICAL RECORD NUMBER:  [id number]  AGE: 67 y.o. RACE BLACK/  GENDER: female  : 1945  EPISODE DATE:  2022      Subjective:     Chief Complaint   Patient presents with    Wound Check     L leg         HISTORY of PRESENT ILLNESS HPI    Vikas Rodarte is a 67 y.o. female who presents today for wound/ulcer evaluation.    History of Wound Context: Patient comes for evaluation of left leg ulcer has been going on for last 3 months, patient had previous problems with the left leg ulcers, swelling of the legs, past medical history is positive for multiple medical problems including diabetes, chronic kidney disease hyperparathyroidism  Wound/Ulcer Pain Timing/Severity: None   Quality of pain: none  Severity:  0/ 10   Modifying Factors: None  Associated Signs/Symptoms: edema          PAST MEDICAL HISTORY    Past Medical History:   Diagnosis Date    Adenocarcinoma, renal cell ([facility] 2020    Arthritis     CAD (coronary artery disease)     Chronic kidney disease     Diabetes ([address]     Gout     Hypercholesterolemia     Hypertension     Mental depression     Pulmonary embolism (HCC)     Seizures ([address]     [facility] [address]     Thyroid disease         PAST SURGICAL HISTORY    Past Surgical History:   Procedure Laterality Date    HX GYN      HX HYSTERECTOMY      HX NEPHRECTOMY Left 2015    HX ORTHOPAEDIC      HX UROLOGICAL  2015    PARTIAL URETERECTOMY     AR CARDIAC SURG PROCEDURE UNLIST      stents placed        FAMILY HISTORY    Family History   Problem Relation Age of Onset    Heart Disease Mother     Heart Disease Father     Heart Disease Sister     Cancer Sister     Heart Disease Brother     Cancer Maternal Grandmother     Stroke Son     Cancer Sister        SOCIAL HISTORY    Social History     Tobacco Use    Smoking status: Former Smoker     Years: 15.00    Smokeless tobacco: Never Used   Vaping Use    Vaping Use: Never used   Substance Use Topics    Alcohol use: Not Currently    Drug use: Never       ALLERGIES    No Known Allergies    MEDICATIONS    Current Outpatient Medications on File Prior to Encounter   Medication Sig Dispense Refill    calcitRIOL (ROCALTROL) 0.25 mcg capsule Take 0.25 mcg by mouth daily.  cetirizine (ZYRTEC) 10 mg tablet Take  by mouth.  colchicine 0.6 mg tablet Take 0.6 mg by mouth daily.  fluticasone propionate (FLOVENT DISKUS) 50 mcg/actuation inhaler Take  by inhalation.  metoprolol tartrate (LOPRESSOR) 25 mg tablet Take  by mouth two (2) times a day.  donepeziL (ARICEPT) 10 mg tablet Take 10 mg by mouth nightly.  triamcinolone acetonide (KENALOG) 0.1 % topical cream Apply  to affected area two (2) times a day. use thin layer      plecanatide (TRULANCE PO) Take  by mouth.  losartan (COZAAR) 50 mg tablet Take 1 Tablet by mouth daily. 30 Tablet 0    NovoLIN N NPH U-100 Insulin 100 unit/mL injection INJECT 20 UNITS SUBCUTANEOUSLY IN THE EVENING **STOP  HUMULIN** 10 mL 11    apixaban (ELIQUIS) 2.5 mg tablet Take 2.5 mg by mouth two (2) times a day.  isosorbide mononitrate ER (IMDUR) 60 mg CR tablet Take 60 mg by mouth two (2) times a day.  hydrALAZINE (APRESOLINE) 25 mg tablet Take 25 mg by mouth two (2) times a day.  gabapentin (NEURONTIN) 300 mg capsule Take 300 mg by mouth two (2) times a day.  furosemide (LASIX) 40 mg tablet Take 40 mg by mouth daily.  venlafaxine (EFFEXOR) 75 mg tablet Take 75 mg by mouth daily.  latanoprost (XALATAN) 0.005 % ophthalmic solution Administer 1 Drop to both eyes nightly.  pravastatin (PRAVACHOL) 80 mg tablet Take 80 mg by mouth nightly.  aspirin 81 mg chewable tablet Take 81 mg by mouth daily.  allopurinol (ZYLOPRIM) 100 mg tablet Take 200 mg by mouth daily.       nitroglycerin (NITROSTAT) 0.4 mg SL tablet 0.4 mg by SubLINGual route every five (5) minutes as needed for Chest Pain. Up to 3 doses. No current facility-administered medications on file prior to encounter. REVIEW OF SYSTEMS    A comprehensive review of systems was negative except for that written in the HPI. Objective:     Visit Vitals  /67 (BP 1 Location: Right upper arm, BP Patient Position: At rest;Sitting)   Pulse 84   Temp 97.9 °F (36.6 °C)   Resp 18   Ht 5' 4\" (1.626 m)   Wt 92.1 kg (203 lb)   BMI 34.84 kg/m²       Wt Readings from Last 3 Encounters:   01/13/22 92.1 kg (203 lb)   10/24/21 91.3 kg (201 lb 4.5 oz)   04/02/21 91.6 kg (202 lb)       PHYSICAL EXAM    Constitutional: Patient is awake, ambulating with no devices , no acute distress  Head: normocephalic, atraumatic. Behavioral/Psych: patient is pleasant, cooperative, awake and alert    Debridement Wound Care        Problem List Items Addressed This Visit        Other    Wound of left leg, initial encounter     I have recommended silver cell and Tubigrip, patient will need vascular testing, follow-up in 1 to 2 weeks               Procedure Note  Indications:  Based on my examination of this patient's wound(s)/ulcer(s) today, debridement is required to promote healing and evaluate the wound base.     Performed by: Alaina Donato MD    Consent obtained: yes  Time out taken: yes  Debridement: excisional    Using curette/scalpel the wound(s)/ulcer(s) was/were sharply debrided down through and including the removal of    subcutaneous tissue    Devitalized Tissue Debrided: slough    Pre Debridement Measurements:  Are located in the Wound/Ulcer Documentation Flow Sheet      Wound/Ulcer #: 1 and 2    Post Debridement Measurements:  Wound/Ulcer Descriptions are Pre Debridement except measurements:    Wound Leg lower Left;Medial #1 (Active)   Wound Image   01/13/22 0827   Wound Etiology Venous 01/13/22 0827   Dressing Status Other (Comment) 01/13/22 0827   Cleansed Cleansed with saline 01/13/22 0827 Wound Length (cm) 1.5 cm 01/13/22 0827   Wound Width (cm) 2.4 cm 01/13/22 0827   Wound Depth (cm) 0.1 cm 01/13/22 0827   Wound Surface Area (cm^2) 3.6 cm^2 01/13/22 0827   Wound Volume (cm^3) 0.36 cm^3 01/13/22 0827   Post-Procedure Length (cm) 1.7 cm 01/13/22 0915   Post-Procedure Width (cm) 2.6 cm 01/13/22 0915   Post-Procedure Depth (cm) 0.3 cm 01/13/22 0915   Post-Procedure Surface Area (cm^2) 4.42 cm^2 01/13/22 0915   Post-Procedure Volume (cm^3) 1.326 cm^3 01/13/22 0915   Wound Assessment Dry;Granulation tissue;Eschar dry 01/13/22 0827   Drainage Amount None 01/13/22 0827   Wound Odor None 01/13/22 0827   Radha-Wound/Incision Assessment Hyperpigmented;Dry/flaky 01/13/22 0827   Edges Attached edges 01/13/22 0827   Wound Thickness Description Full thickness 01/13/22 0827   Number of days: 0       Wound Leg lower Left;Lateral #2 (Active)   Wound Image   01/13/22 0827   Wound Etiology Venous 01/13/22 0827   Dressing Status Other (Comment) 01/13/22 0827   Cleansed Cleansed with saline 01/13/22 0827   Wound Length (cm) 1.2 cm 01/13/22 0827   Wound Width (cm) 0.5 cm 01/13/22 0827   Wound Depth (cm) 0.1 cm 01/13/22 0827   Wound Surface Area (cm^2) 0.6 cm^2 01/13/22 0827   Wound Volume (cm^3) 0.06 cm^3 01/13/22 0827   Post-Procedure Length (cm) 1.4 cm 01/13/22 0915   Post-Procedure Width (cm) 0.7 cm 01/13/22 0915   Post-Procedure Depth (cm) 0.3 cm 01/13/22 0915   Post-Procedure Surface Area (cm^2) 0.98 cm^2 01/13/22 0915   Post-Procedure Volume (cm^3) 0.294 cm^3 01/13/22 0915   Wound Assessment Eschar dry;Dry;Granulation tissue 01/13/22 0827   Drainage Amount None 01/13/22 0827   Wound Odor None 01/13/22 0827   Radha-Wound/Incision Assessment Hyperpigmented;Dry/flaky 01/13/22 0827   Edges Attached edges 01/13/22 0827   Wound Thickness Description Full thickness 01/13/22 0827   Number of days: 0        Total Surface Area Debrided:  1 sq cm     Estimated Blood Loss:  Minimal     Hemostasis Achieved: with pressure    Procedural Pain: 0/ 10     Post Procedural Pain: 0/ 10     Response to treatment: Well-tolerated by the patient        Assessment:      Problem List Items Addressed This Visit        Other    Wound of left leg, initial encounter     I have recommended silver cell and Tubigrip, patient will need vascular testing, follow-up in 1 to 2 weeks               Other complicating factors include obesity, venous insufficiency, edema, chronic kidney disease, diabetes, secondary hyperparathyroidism, peripheral vascular    Plan:     Treatment Note please see attached Discharge Instructions  Return Appointment:  [x]? Dressing supply provider: OLYA  []? Home Healthcare:  [x]? Return Appointment:  1 Week(s)  []? Nurse Visit:   Week(s)     []? [facility]charge from [facility]  [x]? Ordered tests: PVR AND VENOUS REFLUX TO BE DONE AT [facility]  []? Referrals:   []? Rx:      Wound Cleansing:   Do not scrub or use excessive force. Cleanse wound prior to applying a clean dressing with:  [x]? Normal Saline          [x]? Mild Soap & Water    []? Keep Wound Dry in Shower  []? Other:       Topical Treatments:  Do not apply lotions, creams, or ointments to wound bed unless directed. []? Apply moisturizing lotion to skin surrounding the wound prior to dressing change. []? Apply antifungal ointment to skin surrounding the wound prior to dressing change. []? Apply thin film of moisture barrier ointment to skin immediately around wound. []? Other:                  Dressings:                  Wound Location LEFT LEG              [x]? Apply Primary Dressing:                                          []? MediHoney Gel         []? MediHoney Alginate                     []? Calcium Alginate      [x]? Calcium Alginate with Silver              []? Collagen                   []? Collagen with Silver                []? Santyl with Moistened saline gauze              []? Hydrofera Blue (cut to size and moistened)  []?  Hydrofera Blue Ready (Cut to size) []? NS wet to dry            []? Betadine wet to dry              []? Hydrogel                   []? Mepitel                   []? Bactroban/Mupirocin                       []? Other:      [x]? Cover and Secure with:                   [x]? Gauze        [x]? Lilly Fanti           []? Kerlix              []? Foam          []? Super Absorbant    []? ABD                                      []? ACE Wrap            []? Other:               Limit contact of tape with skin.     [x]? Change dressing:  []? Daily           [x]? Every Other Day    []? Once per week   []? Twice per week              []? Three times per week        []? Do Not Change Dressing    []? Other:                Negative Pressure:           Wound Location:   []? Pressure @  mm/Hg                      []?Continuous  []? Intermittent              []? Black          []? White Foam              []? Bridge:               []? Change vac dressing three times per week     Pressure Relief:  []? When sitting, shift position or do seat lifts every 15 minutes. []? Wheelchair cushion           []? Specialty Bed/Mattress  []? Turn every 2 hours when in bed. Avoid direct pressure on wound site. Limit side lying to 30 degree tilt. Limit HOB elevation to 30 degrees. Edema Control:  Apply:  []? Compression Stocking:    mmHg   []? Right Leg     []? Left Leg              [x]? Tubigrip      []? Right Leg Double Layer      []? Left Leg Double Layer                                      []?Right Leg Single Layer       [x]? Left Leg Single Layer                 []? Elevate leg(s) above the level of the heart when sitting. []? Avoid prolonged standing in one place.         Compression:  Apply:  []? Multilayer Compression Wrap:      []? RightLeg      []? Left Leg                                 []?Coflex              []?Coflex Lite             []?Unna                 []? Do not get leg(s) with wrap wet.   If wraps become too tight call the center or completely remove the wrap. []? Elevate leg(s) above the level of the heart when sitting. []? Avoid prolonged standing in one place.     Off-Loading:   []? Off-loading when   []? walking       []? in bed         []? sitting  []? Total non-weight bearing  []? Right Leg  []? Left Leg         []? Assistive Device    []? Jodeen Risen        []? Cane      []? Wheelchair      []? Crutches              []? Surgical shoe    []? Podus Boot(s)   []? Foam Boot(s)  []? Roll About              []? Cast Boot   []? CROW Boot  []? Wedge Shoe  []? Other:     Contact Cast:  Apply:  []? Total Contact Cast Applied in Clinic          []? RightLeg      []? Left Leg              []? Do not get cast wet. Contact center or go to emergency room if there is a foul odor or becomes uncomfortable due to feeling tight or swelling. Do not use objects inside of cast to scratch.                  Dietary:  []? Diet as tolerated:   [x]? Calorie Diabetic Diet:         []? No Added Salt:  [x]? Increase Protein:   []? Other:              Activity:  [x]? Activity as tolerated:    [x]? Patient has no activity restrictions       []? Strict Bedrest:          []? Remain off Work:       []? May return to full duty work:                                               []? Return to work with restrictions:                Written patient dismissal instructions given to patient or POA.          Electronically signed by Marlen Humphries MD on 1/13/2022 at 9:37 AM

## 2022-01-20 ENCOUNTER — HOSPITAL ENCOUNTER (OUTPATIENT)
Dept: WOUND CARE | Age: 77
Discharge: HOME | End: 2022-01-20
Payer: MEDICARE

## 2022-01-20 VITALS
HEART RATE: 67 BPM | DIASTOLIC BLOOD PRESSURE: 68 MMHG | SYSTOLIC BLOOD PRESSURE: 130 MMHG | TEMPERATURE: 98.1 F | RESPIRATION RATE: 18 BRPM

## 2022-01-20 PROCEDURE — 99213 OFFICE O/P EST LOW 20 MIN: CPT

## 2022-01-20 PROCEDURE — 74011000250 HC RX REV CODE- 250: Performed by: ORTHOPAEDIC SURGERY

## 2022-01-20 RX ORDER — LIDOCAINE HYDROCHLORIDE 20 MG/ML
15 SOLUTION OROPHARYNGEAL AS NEEDED
Status: DISCONTINUED | OUTPATIENT
Start: 2022-01-20 | End: 2022-01-22 | Stop reason: HOSPADM

## 2022-01-20 NOTE — WOUND CARE
[address]   Progress Note and Procedure Note     Steve Lake  MEDICAL RECORD NUMBER:  [id number]  AGE: 67 y.o. RACE BLACK/  GENDER: female  : 1945  EPISODE DATE:  2022      Subjective:     Chief Complaint   Patient presents with    Wound Check     left leg         HISTORY of PRESENT ILLNESS HPI    Stevejelena Lake is a 67 y.o. female who presents today for wound/ulcer evaluation.    History of Wound Context: Patient is having wounds on the left leg, swelling noted, she has a history of diabetes and chronic renal failure, patient is awaiting vascular evaluation  Wound/Ulcer Pain Timing/Severity: None   Quality of pain: none  Severity:  0/ 10   Modifying Factors: None  Associated Signs/Symptoms: edema          PAST MEDICAL HISTORY    Past Medical History:   Diagnosis Date    Adenocarcinoma, renal cell ([facility] 2020    Arthritis     CAD (coronary artery disease)     Chronic kidney disease     Diabetes ([facility]     Gout     Hypercholesterolemia     Hypertension     Mental depression     Pulmonary embolism (HCC)     Seizures (HCC)     Stroke ([facility]     Thyroid disease         PAST SURGICAL HISTORY    Past Surgical History:   Procedure Laterality Date    HX GYN      HX HYSTERECTOMY      HX NEPHRECTOMY Left 2015    HX ORTHOPAEDIC      HX UROLOGICAL  2015    PARTIAL URETERECTOMY     AL CARDIAC SURG PROCEDURE UNLIST      stents placed        FAMILY HISTORY    Family History   Problem Relation Age of Onset    Heart Disease Mother     Heart Disease Father     Heart Disease Sister     Cancer Sister     Heart Disease Brother     Cancer Maternal Grandmother     Stroke Son     Cancer Sister        SOCIAL HISTORY    Social History     Tobacco Use    Smoking status: Former Smoker     Years: 15.00    Smokeless tobacco: Never Used   Vaping Use    Vaping Use: Never used   Substance Use Topics    Alcohol use: Not Currently    Drug use: Never       ALLERGIES    No Known Allergies    MEDICATIONS    Current Outpatient Medications on File Prior to Encounter   Medication Sig Dispense Refill    calcitRIOL (ROCALTROL) 0.25 mcg capsule Take 0.25 mcg by mouth daily.  cetirizine (ZYRTEC) 10 mg tablet Take  by mouth.  colchicine 0.6 mg tablet Take 0.6 mg by mouth daily.  fluticasone propionate (FLOVENT DISKUS) 50 mcg/actuation inhaler Take  by inhalation.  metoprolol tartrate (LOPRESSOR) 25 mg tablet Take  by mouth two (2) times a day.  donepeziL (ARICEPT) 10 mg tablet Take 10 mg by mouth nightly.  triamcinolone acetonide (KENALOG) 0.1 % topical cream Apply  to affected area two (2) times a day. use thin layer      plecanatide (TRULANCE PO) Take  by mouth.  losartan (COZAAR) 50 mg tablet Take 1 Tablet by mouth daily. 30 Tablet 0    NovoLIN N NPH U-100 Insulin 100 unit/mL injection INJECT 20 UNITS SUBCUTANEOUSLY IN THE EVENING **STOP  HUMULIN** 10 mL 11    nitroglycerin (NITROSTAT) 0.4 mg SL tablet 0.4 mg by SubLINGual route every five (5) minutes as needed for Chest Pain. Up to 3 doses.  apixaban (ELIQUIS) 2.5 mg tablet Take 2.5 mg by mouth two (2) times a day.  isosorbide mononitrate ER (IMDUR) 60 mg CR tablet Take 60 mg by mouth two (2) times a day.  hydrALAZINE (APRESOLINE) 25 mg tablet Take 25 mg by mouth two (2) times a day.  gabapentin (NEURONTIN) 300 mg capsule Take 300 mg by mouth two (2) times a day.  furosemide (LASIX) 40 mg tablet Take 40 mg by mouth daily.  venlafaxine (EFFEXOR) 75 mg tablet Take 75 mg by mouth daily.  latanoprost (XALATAN) 0.005 % ophthalmic solution Administer 1 Drop to both eyes nightly.  pravastatin (PRAVACHOL) 80 mg tablet Take 80 mg by mouth nightly.  aspirin 81 mg chewable tablet Take 81 mg by mouth daily.  allopurinol (ZYLOPRIM) 100 mg tablet Take 200 mg by mouth daily.        No current facility-administered medications on file prior to encounter. REVIEW OF SYSTEMS    A comprehensive review of systems was negative except for that written in the HPI. Objective:     Visit Vitals  /68 (BP 1 Location: Right upper arm, BP Patient Position: At rest;Sitting)   Pulse 67   Temp 98.1 °F (36.7 °C)   Resp 18       Wt Readings from Last 3 Encounters:   01/13/22 92.1 kg (203 lb)   10/24/21 91.3 kg (201 lb 4.5 oz)   04/02/21 91.6 kg (202 lb)       PHYSICAL EXAM    Constitutional: Patient is awake, ambulating with no devices , no acute distress  Head: normocephalic, atraumatic. Behavioral/Psych: patient is pleasant, cooperative, awake and alert    Debridement Wound Care       Procedure Note  Indications:  Based on my examination of this patient's wound(s)/ulcer(s) today, debridement is not required to promote healing and evaluate the wound base.           Wound Leg lower Left;Medial #1 (Active)   Wound Image   01/20/22 0949   Wound Etiology Venous 01/20/22 0949   Dressing Status Other (Comment) 01/20/22 0949   Cleansed Cleansed with saline 01/20/22 0949   Wound Length (cm) 1.4 cm 01/20/22 0949   Wound Width (cm) 0.4 cm 01/20/22 0949   Wound Depth (cm) 0.1 cm 01/20/22 0949   Wound Surface Area (cm^2) 0.56 cm^2 01/20/22 0949   Change in Wound Size % 84.44 01/20/22 0949   Wound Volume (cm^3) 0.056 cm^3 01/20/22 0949   Wound Healing % 84 01/20/22 0949   Post-Procedure Length (cm) 1.7 cm 01/13/22 0915   Post-Procedure Width (cm) 2.6 cm 01/13/22 0915   Post-Procedure Depth (cm) 0.3 cm 01/13/22 0915   Post-Procedure Surface Area (cm^2) 4.42 cm^2 01/13/22 0915   Post-Procedure Volume (cm^3) 1.326 cm^3 01/13/22 0915   Wound Assessment Dry;Granulation tissue 01/20/22 0949   Drainage Amount Scant 01/20/22 0949   Wound Odor None 01/20/22 0949   Radha-Wound/Incision Assessment Hyperpigmented;Dry/flaky 01/20/22 0949   Edges Attached edges 01/20/22 0949   Wound Thickness Description Full thickness 01/20/22 0949   Number of days: 7       Wound Leg lower Left;Lateral #2 (Active)   Wound Image   01/20/22 0950   Wound Etiology Venous 01/20/22 0950   Dressing Status Other (Comment) 01/20/22 0950   Cleansed Cleansed with saline 01/20/22 0950   Wound Length (cm) 0.2 cm 01/20/22 0950   Wound Width (cm) 0.2 cm 01/20/22 0950   Wound Depth (cm) 0.1 cm 01/20/22 0950   Wound Surface Area (cm^2) 0.04 cm^2 01/20/22 0950   Change in Wound Size % 93.33 01/20/22 0950   Wound Volume (cm^3) 0.004 cm^3 01/20/22 0950   Wound Healing % 93 01/20/22 0950   Post-Procedure Length (cm) 1.4 cm 01/13/22 0915   Post-Procedure Width (cm) 0.7 cm 01/13/22 0915   Post-Procedure Depth (cm) 0.3 cm 01/13/22 0915   Post-Procedure Surface Area (cm^2) 0.98 cm^2 01/13/22 0915   Post-Procedure Volume (cm^3) 0.294 cm^3 01/13/22 0915   Wound Assessment Dry 01/20/22 0950   Drainage Amount None 01/20/22 0950   Wound Odor None 01/20/22 0950   Radha-Wound/Incision Assessment Hyperpigmented;Dry/flaky 01/20/22 0950   Edges Attached edges 01/20/22 0950   Wound Thickness Description Partial thickness 01/20/22 0950   Number of days: 7            Assessment:      Ulcers left leg  Venous insufficiency left leg  Peripheral vascular disease  Diabetes mellitus  Chronic kidney disease    Plan:     Treatment Note please see attached Discharge Instructions  Return Appointment:  []? Dressing supply provider: [facility]  []? Home Healthcare:  [x]? Return Appointment:  3 Week(s)  []? Nurse Visit:   Week(s)     []? Discharge from [facility]  [x]? Ordered tests: PVR AND VENOUS REFLUX - KEEP APPOINTMENT  ON 2/3/2022  []? Referrals:   []? Rx:      Wound Cleansing:   Do not scrub or use excessive force. Cleanse wound prior to applying a clean dressing with:  [x]? Normal Saline          [x]? Mild Soap & Water    []? Keep Wound Dry in Shower  []? Other:       Topical Treatments:  Do not apply lotions, creams, or ointments to wound bed unless directed. []? Apply moisturizing lotion to skin surrounding the wound prior to dressing change.   []? Apply antifungal ointment to skin surrounding the wound prior to dressing change. []? Apply thin film of moisture barrier ointment to skin immediately around wound. []? Other:                  Dressings:                  Wound Location LEFT LEG              [x]? Apply Primary Dressing:                                          []? MediHoney Gel         []? MediHoney Alginate                     []? Calcium Alginate      [x]? Calcium Alginate with Silver              []? Collagen                   []? Collagen with Silver                []? Santyl with Moistened saline gauze              []? Hydrofera Blue (cut to size and moistened)  []? Hydrofera Blue Ready (Cut to size)              []? NS wet to dry            []? Betadine wet to dry              []? Hydrogel                   []? Mepitel                   []? Bactroban/Mupirocin                       []? Other:      [x]? Cover and Secure with:                   [x]? Gauze        [x]? Kathlen [place]           []? Kerlix              []? Foam          []? Super Absorbant    []? ABD                                      []? ACE Wrap            []? Other:               Limit contact of tape with skin.     [x]? Change dressing:  []? Daily           [x]? Every Other Day    []? Once per week   []? Twice per week              []? Three times per week        []? Do Not Change Dressing    []? Other:                Negative Pressure:           Wound Location:   []? Pressure @  mm/Hg                      []?Continuous  []? Intermittent              []? Black          []? White Foam              []? Bridge:               []? Change vac dressing three times per week     Pressure Relief:  []? When sitting, shift position or do seat lifts every 15 minutes. []? Wheelchair cushion           []? Specialty Bed/Mattress  []? Turn every 2 hours when in bed. Avoid direct pressure on wound site. Limit side lying to 30 degree tilt. Limit HOB elevation to 30 degrees.                 Edema Control:  Apply:  []? Compression Stocking:    mmHg   []? Right Leg     []? Left Leg              [x]? Tubigrip      []? Right Leg Double Layer      []? Left Leg Double Layer                                      []?Right Leg Single Layer       [x]? Left Leg Single Layer                 [x]? Elevate leg(s) above the level of the heart when sitting. [x]? Avoid prolonged standing in one place.         Compression:  Apply:  []? Multilayer Compression Wrap:      []? RightLeg      []? Left Leg                                 []?Coflex              []?Coflex Lite             []?Unna                 []? Do not get leg(s) with wrap wet. If wraps become too tight call the center or completely remove the wrap. []? Elevate leg(s) above the level of the heart when sitting. []? Avoid prolonged standing in one place.     Off-Loading:   []? Off-loading when   []? walking       []? in bed         []? sitting  []? Total non-weight bearing  []? Right Leg  []? Left Leg         []? Assistive Device    []? Tucker Ra        []? Cane      []? Wheelchair      []? Crutches              []? Surgical shoe    []? Podus Boot(s)   []? Foam Boot(s)  []? Roll About              []? Cast Boot   []? CROW Boot  []? Wedge Shoe  []? Other:     Contact Cast:  Apply:  []? Total Contact Cast Applied in Clinic          []? RightLeg      []? Left Leg              []? Do not get cast wet. Contact center or go to emergency room if there is a foul odor or becomes uncomfortable due to feeling tight or swelling. Do not use objects inside of cast to scratch.                  Dietary:  []? Diet as tolerated:   [x]? Calorie Diabetic Diet:         []? No Added Salt:  [x]? Increase Protein:   []? Other:              Activity:  [x]? Activity as tolerated:    [x]? Patient has no activity restrictions       []? Strict Bedrest:          []? Remain off Work:       []?  May return to full duty work: []? Return to work with restrictions:                Written patient dismissal instructions given to patient or POA.          Electronically signed by Kathleen Ness MD on 1/20/2022 at 10:36 AM

## 2022-01-20 NOTE — DISCHARGE INSTRUCTIONS
Discharge Instructions for  [address] [place] [address], [address] [place] [address]  Telephone: [phone]     NAME:  Destin Potter OF BIRTH:  1945  MEDICAL RECORD NUMBER:  [id number]  DATE:  1/20/2022        Return Appointment:  []? Dressing supply provider: [facility]  []? Home Healthcare:  [x]? Return Appointment:  3 Week(s)  []? Nurse Visit:   Week(s)     []? Discharge from [facility]  [x]? Ordered tests: PVR AND VENOUS REFLUX - KEEP APPOINTMENT  ON 2/3/2022  []? Referrals:   []? Rx:      Wound Cleansing:   Do not scrub or use excessive force. Cleanse wound prior to applying a clean dressing with:  [x]? Normal Saline          [x]? Mild Soap & Water    []? Keep Wound Dry in Shower  []? Other:       Topical Treatments:  Do not apply lotions, creams, or ointments to wound bed unless directed. []? Apply moisturizing lotion to skin surrounding the wound prior to dressing change. []? Apply antifungal ointment to skin surrounding the wound prior to dressing change. []? Apply thin film of moisture barrier ointment to skin immediately around wound. []? Other:                  Dressings:                  Wound Location LEFT LEG              [x]? Apply Primary Dressing:                                          []? MediHoney Gel         []? MediHoney Alginate                     []? Calcium Alginate      [x]? Calcium Alginate with Silver              []? Collagen                   []? Collagen with Silver                []? Santyl with Moistened saline gauze              []? Hydrofera Blue (cut to size and moistened)  []? Hydrofera Blue Ready (Cut to size)              []? NS wet to dry            []? Betadine wet to dry              []? Hydrogel                   []? Mepitel                   []? Bactroban/Mupirocin                       []? Other:      [x]? Cover and Secure with:                   [x]? Gauze        [x]? Kayren Blood           []?  Kerlix []? Foam          []? Super Absorbant    []? ABD                                      []? ACE Wrap            []? Other:               Limit contact of tape with skin.     [x]? Change dressing:  []? Daily           [x]? Every Other Day    []? Once per week   []? Twice per week              []? Three times per week        []? Do Not Change Dressing    []? Other:                Negative Pressure:           Wound Location:   []? Pressure @  mm/Hg                      []?Continuous  []? Intermittent              []? Black          []? White Foam              []? Bridge:               []? Change vac dressing three times per week     Pressure Relief:  []? When sitting, shift position or do seat lifts every 15 minutes. []? Wheelchair cushion           []? Specialty Bed/Mattress  []? Turn every 2 hours when in bed. Avoid direct pressure on wound site. Limit side lying to 30 degree tilt. Limit HOB elevation to 30 degrees. Edema Control:  Apply:  []? Compression Stocking:    mmHg   []? Right Leg     []? Left Leg              [x]? Tubigrip      []? Right Leg Double Layer      []? Left Leg Double Layer                                      []?Right Leg Single Layer       [x]? Left Leg Single Layer                 [x]? Elevate leg(s) above the level of the heart when sitting. [x]? Avoid prolonged standing in one place.         Compression:  Apply:  []? Multilayer Compression Wrap:      []? RightLeg      []? Left Leg                                 []?Coflex              []?Coflex Lite             []?Unna                 []? Do not get leg(s) with wrap wet. If wraps become too tight call the center or completely remove the wrap. []? Elevate leg(s) above the level of the heart when sitting. []? Avoid prolonged standing in one place.     Off-Loading:   []? Off-loading when   []? walking       []? in bed         []? sitting  []? Total non-weight bearing  []? Right Leg  []? Left Leg         []? Assistive Device    []? Will Klmacieel        []? Cane      []? Wheelchair      []? Crutches              []? Surgical shoe    []? Podus Boot(s)   []? Foam Boot(s)  []? Roll About              []? Cast Boot   []? CROW Boot  []? Wedge Shoe  []? Other:     Contact Cast:  Apply:  []? Total Contact Cast Applied in Clinic          []? RightLeg      []? Left Leg              []? Do not get cast wet. Contact center or go to emergency room if there is a foul odor or becomes uncomfortable due to feeling tight or swelling. Do not use objects inside of cast to scratch.                  Dietary:  []? Diet as tolerated:   [x]? Calorie Diabetic Diet:         []? No Added Salt:  [x]? Increase Protein:   []? Other:              Activity:  [x]? Activity as tolerated:    [x]? Patient has no activity restrictions       []? Strict Bedrest:          []? Remain off Work:       []? May return to full duty work:                                               []? Return to work with restrictions:      [address] [place] [address] Avenue: Should you experience any significant changes in your wound(s) or have questions about your wound care, please contact Jaimee Oneil 26 at [place] [id number] 8:00 am - 4:30. If you need help with your wound outside of these hours and cannot wait until we are again available, contact your PCP or go to the hospital emergency room.      PLEASE NOTE: IF YOU ARE UNABLE TO [place] [address], CONTINUE TO USE THE SUPPLIES YOU HAVE AVAILABLE UNTIL YOU ARE ABLE TO [facility]. IT IS MOST IMPORTANT TO KEEP THE WOUND COVERED AT ALL TIMES.     []? Dr. Melva Nuñez   [x]? Dr. Adalberto Lee  []?  Dr. Lara Sinclair

## 2022-01-20 NOTE — WOUND CARE
Discharge Instructions for  [address] [place] [address], [address] [place] [address]  Telephone: [phone]    NAME:  Nikki Marques OF BIRTH:  1945  MEDICAL RECORD NUMBER:  [id number]  DATE:  1/20/2022      Return Appointment:  [] Dressing supply provider: [facility]  [] Home Healthcare:  [x] Return Appointment:  3 Week(s)  [] Nurse Visit:   Week(s)    [] Discharge from [facility]  [x] Ordered tests: PVR AND VENOUS REFLUX - KEEP APPOINTMENT  ON 2/3/2022  [] Referrals:   [] Rx:     Wound Cleansing:   Do not scrub or use excessive force. Cleanse wound prior to applying a clean dressing with:  [x] Normal Saline   [x] Mild Soap & Water    [] Keep Wound Dry in Shower  [] Other:      Topical Treatments:  Do not apply lotions, creams, or ointments to wound bed unless directed. [] Apply moisturizing lotion to skin surrounding the wound prior to dressing change.  [] Apply antifungal ointment to skin surrounding the wound prior to dressing change.  [] Apply thin film of moisture barrier ointment to skin immediately around wound. [] Other:       Dressings:           Wound Location LEFT LEG   [x] Apply Primary Dressing:       [] MediHoney Gel    [] MediHoney Alginate               [] Calcium Alginate      [x] Calcium Alginate with Silver   [] Collagen                   [] Collagen with Silver                [] Santyl with Moistened saline gauze              [] Hydrofera Blue (cut to size and moistened)  [] Hydrofera Blue Ready (Cut to size)   [] NS wet to dry    [] Betadine wet to dry              [] Hydrogel                [] Mepitel     [] Bactroban/Mupirocin               [] Other:     [x] Cover and Secure with:     [x] Gauze [x] Jerri [] Kerlix   [] Foam [] Super Absorbant [] ABD     [] ACE Wrap            [] Other:    Limit contact of tape with skin.     [x] Change dressing: [] Daily    [x] Every Other Day [] Once per week   [] Twice per week   [] Three times per week [] Do Not Change Dressing   [] Other:     Negative Pressure:           Wound Location:   [] Pressure @  mm/Hg  []Continuous []Intermittent   [] Black  [] White Foam              [] Bridge:               [] Change vac dressing three times per week    Pressure Relief:  [] When sitting, shift position or do seat lifts every 15 minutes.  [] Wheelchair cushion [] Specialty Bed/Mattress  [] Turn every 2 hours when in bed. Avoid direct pressure on wound site. Limit side lying to 30 degree tilt. Limit HOB elevation to 30 degrees. Edema Control:  Apply: [] Compression Stocking:    mmHg  []Right Leg []Left Leg   [x] Tubigrip []Right Leg Double Layer []Left Leg Double Layer      []Right Leg Single Layer [x]Left Leg Single Layer     [x] Elevate leg(s) above the level of the heart when sitting. [x] Avoid prolonged standing in one place. Compression:  Apply: [] Multilayer Compression Wrap: []RightLeg []Left Leg                                 []Coflex   []Coflex Lite             []Unna     [] Do not get leg(s) with wrap wet. If wraps become too tight call the center or completely remove the wrap. [] Elevate leg(s) above the level of the heart when sitting. [] Avoid prolonged standing in one place. Off-Loading:   [] Off-loading when [] walking  [] in bed [] sitting  [] Total non-weight bearing  [] Right Leg  [] Left Leg   [] Assistive Device [] Walker [] Cane      [] Wheelchair  [] Crutches   [] Surgical shoe    [] Podus Boot(s)   [] Foam Boot(s)  [] Roll About    [] Cast Boot [] CROW Boot  [] Wedge Shoe  [] Other:    Contact Cast:  Apply: [] Total Contact Cast Applied in Clinic []RightLeg []Left Leg   [] Do not get cast wet. Contact center or go to emergency room if there is a foul odor or becomes uncomfortable due to feeling tight or swelling. Do not use objects inside of cast to scratch.       Dietary:  [] Diet as tolerated: [x] Calorie Diabetic Diet: [] No Added Salt:  [x] Increase Protein: [] Other:     Activity:  [x] Activity as tolerated:    [x] Patient has no activity restrictions       [] Strict Bedrest:   [] Remain off Work:       [] May return to full duty work:                                     [] Return to work with restrictions:               [address] Information: Should you experience any significant changes in your wound(s) or have questions about your wound care, please contact Jaimee Oneil 26 at [place] [id number] 8:00 am - 4:30. If you need help with your wound outside of these hours and cannot wait until we are again available, contact your PCP or go to the hospital emergency room. PLEASE NOTE: IF YOU ARE UNABLE TO OBTAIN WOUND SUPPLIES, CONTINUE TO USE THE SUPPLIES YOU HAVE AVAILABLE UNTIL YOU ARE ABLE TO REACH US. IT IS MOST IMPORTANT TO KEEP THE WOUND COVERED AT ALL TIMES.     [] Dr. Татьяна Pina   [x] Dr. Aldona Councilman  [] Dr. Jonatan Nicholas

## 2022-01-24 ENCOUNTER — HOSPITAL ENCOUNTER (OUTPATIENT)
Dept: CT IMAGING | Age: 77
Discharge: HOME | End: 2022-01-24
Attending: INTERNAL MEDICINE
Payer: MEDICARE

## 2022-01-24 DIAGNOSIS — I26.99 PULMONARY EMBOLISM AND INFARCTION (HCC): ICD-10-CM

## 2022-01-24 DIAGNOSIS — C64.2 MALIGNANT NEOPLASM OF KIDNEY, LEFT (HCC): ICD-10-CM

## 2022-01-24 PROCEDURE — 74176 CT ABD & PELVIS W/O CONTRAST: CPT

## 2022-01-24 PROCEDURE — 71250 CT THORAX DX C-: CPT

## 2022-02-03 ENCOUNTER — HOSPITAL ENCOUNTER (OUTPATIENT)
Dept: NON INVASIVE DIAGNOSTICS | Age: 77
Discharge: HOME | End: 2022-02-03
Attending: ORTHOPAEDIC SURGERY
Payer: MEDICARE

## 2022-02-03 DIAGNOSIS — I73.9 PERIPHERAL VASCULAR DISEASE (HCC): ICD-10-CM

## 2022-02-03 DIAGNOSIS — S81.802A WOUND OF LEFT LEG, INITIAL ENCOUNTER: ICD-10-CM

## 2022-02-03 PROCEDURE — 93970 EXTREMITY STUDY: CPT

## 2022-02-03 PROCEDURE — 93923 UPR/LXTR ART STDY 3+ LVLS: CPT

## 2022-02-04 LAB
LEFT ABI: 0.81
LEFT GSV BK DIST RFX: 5.4 S
LEFT POSTERIOR TIBIAL: 109 MMHG
LEFT TBI: 0.85
LEFT TOE PRESSURE: 114 MMHG
RIGHT ABI: 1.31
RIGHT ARM BP: 134 MMHG
RIGHT GSV BK DIST RFX: 0.6 S
RIGHT GSV BK MID RFX: 0.7 S
RIGHT POSTERIOR TIBIAL: 138 MMHG
RIGHT SSV MID RFX: 2.6 S
RIGHT TBI: 0.69
RIGHT TOE PRESSURE: 93 MMHG
VAS RIGHT DORSALIS PEDIS BP: 176 MMHG

## 2022-02-04 PROCEDURE — 93970 EXTREMITY STUDY: CPT | Performed by: SURGERY

## 2022-02-04 PROCEDURE — 93923 UPR/LXTR ART STDY 3+ LVLS: CPT | Performed by: SURGERY

## 2022-02-10 ENCOUNTER — HOSPITAL ENCOUNTER (OUTPATIENT)
Dept: WOUND CARE | Age: 77
Discharge: HOME | End: 2022-02-10
Payer: MEDICARE

## 2022-02-10 VITALS
HEART RATE: 62 BPM | SYSTOLIC BLOOD PRESSURE: 154 MMHG | DIASTOLIC BLOOD PRESSURE: 55 MMHG | RESPIRATION RATE: 18 BRPM | TEMPERATURE: 97.6 F

## 2022-02-10 PROBLEM — I87.2 VENOUS INSUFFICIENCY OF LEFT LOWER EXTREMITY: Status: ACTIVE | Noted: 2022-02-10

## 2022-02-10 PROBLEM — S81.802D WOUND OF LEFT LEG, SUBSEQUENT ENCOUNTER: Status: ACTIVE | Noted: 2022-01-13

## 2022-02-10 PROCEDURE — 74011000250 HC RX REV CODE- 250: Performed by: ORTHOPAEDIC SURGERY

## 2022-02-10 PROCEDURE — 11042 DBRDMT SUBQ TIS 1ST 20SQCM/<: CPT

## 2022-02-10 RX ORDER — LIDOCAINE HYDROCHLORIDE 20 MG/ML
15 SOLUTION OROPHARYNGEAL AS NEEDED
Status: DISCONTINUED | OUTPATIENT
Start: 2022-02-10 | End: 2022-02-12 | Stop reason: HOSPADM

## 2022-02-10 NOTE — WOUND CARE
[address]   Progress Note and Procedure Note     Charlene York  MEDICAL RECORD NUMBER:  [id number]  AGE: 67 y.o. RACE BLACK/  GENDER: female  : 1945  EPISODE DATE:  2/10/2022      Subjective:     Chief Complaint   Patient presents with    Wound Check     LEFT LEG         HISTORY of PRESENT ILLNESS HPI    Charlene Yokr is a 67 y.o. female who presents today for wound/ulcer evaluation.    History of Wound Context: Patient is having wounds on the left leg, swelling noted, she has a history of diabetes and chronic renal failure, small wounds are present over the left leg  Wound/Ulcer Pain Timing/Severity: None   Quality of pain: none  Severity:  0/ 10   Modifying Factors: None  Associated Signs/Symptoms: edema          PAST MEDICAL HISTORY    Past Medical History:   Diagnosis Date    Adenocarcinoma, renal cell ([facility] 2020    Arthritis     CAD (coronary artery disease)     Chronic kidney disease     Diabetes ([facility]     Gout     Hypercholesterolemia     Hypertension     Mental depression     Pulmonary embolism (HCC)     Seizures (HCC)     Stroke ([facility]     Thyroid disease         PAST SURGICAL HISTORY    Past Surgical History:   Procedure Laterality Date    HX GYN      HX HYSTERECTOMY      HX NEPHRECTOMY Left 2015    HX ORTHOPAEDIC      HX UROLOGICAL  2015    PARTIAL URETERECTOMY     SC CARDIAC SURG PROCEDURE UNLIST      stents placed        FAMILY HISTORY    Family History   Problem Relation Age of Onset    Heart Disease Mother     Heart Disease Father     Heart Disease Sister     Cancer Sister     Heart Disease Brother     Cancer Maternal Grandmother     Stroke Son     Cancer Sister        SOCIAL HISTORY    Social History     Tobacco Use    Smoking status: Former Smoker     Years: 15.00    Smokeless tobacco: Never Used   Vaping Use    Vaping Use: Never used   Substance Use Topics    Alcohol use: Not Currently    Drug use: Never       ALLERGIES    No Known Allergies    MEDICATIONS    Current Outpatient Medications on File Prior to Encounter   Medication Sig Dispense Refill    calcitRIOL (ROCALTROL) 0.25 mcg capsule Take 0.25 mcg by mouth daily.  cetirizine (ZYRTEC) 10 mg tablet Take  by mouth.  colchicine 0.6 mg tablet Take 0.6 mg by mouth daily.  fluticasone propionate (FLOVENT DISKUS) 50 mcg/actuation inhaler Take  by inhalation.  metoprolol tartrate (LOPRESSOR) 25 mg tablet Take  by mouth two (2) times a day.  donepeziL (ARICEPT) 10 mg tablet Take 10 mg by mouth nightly.  triamcinolone acetonide (KENALOG) 0.1 % topical cream Apply  to affected area two (2) times a day. use thin layer      plecanatide (TRULANCE PO) Take  by mouth.  losartan (COZAAR) 50 mg tablet Take 1 Tablet by mouth daily. 30 Tablet 0    NovoLIN N NPH U-100 Insulin 100 unit/mL injection INJECT 20 UNITS SUBCUTANEOUSLY IN THE EVENING **STOP  HUMULIN** 10 mL 11    nitroglycerin (NITROSTAT) 0.4 mg SL tablet 0.4 mg by SubLINGual route every five (5) minutes as needed for Chest Pain. Up to 3 doses.  apixaban (ELIQUIS) 2.5 mg tablet Take 2.5 mg by mouth two (2) times a day.  isosorbide mononitrate ER (IMDUR) 60 mg CR tablet Take 60 mg by mouth two (2) times a day.  hydrALAZINE (APRESOLINE) 25 mg tablet Take 25 mg by mouth two (2) times a day.  gabapentin (NEURONTIN) 300 mg capsule Take 300 mg by mouth two (2) times a day.  furosemide (LASIX) 40 mg tablet Take 40 mg by mouth daily.  venlafaxine (EFFEXOR) 75 mg tablet Take 75 mg by mouth daily.  latanoprost (XALATAN) 0.005 % ophthalmic solution Administer 1 Drop to both eyes nightly.  pravastatin (PRAVACHOL) 80 mg tablet Take 80 mg by mouth nightly.  aspirin 81 mg chewable tablet Take 81 mg by mouth daily.  allopurinol (ZYLOPRIM) 100 mg tablet Take 200 mg by mouth daily.        No current facility-administered medications on file prior to encounter. REVIEW OF SYSTEMS    A comprehensive review of systems was negative except for that written in the HPI. Objective:     Visit Vitals  BP (!) 154/55 (BP 1 Location: Left arm, BP Patient Position: At rest;Sitting)   Pulse 62   Temp 97.6 °F (36.4 °C)   Resp 18       Wt Readings from Last 3 Encounters:   01/13/22 92.1 kg (203 lb)   10/24/21 91.3 kg (201 lb 4.5 oz)   04/02/21 91.6 kg (202 lb)       PHYSICAL EXAM    Constitutional: Patient is awake, ambulating with no devices , no acute distress  Head: normocephalic, atraumatic. Behavioral/Psych: patient is pleasant, cooperative, awake and alert  Debridement Wound Care       Procedure Note  Indications:  Based on my examination of this patient's wound(s)/ulcer(s) today, debridement is required to promote healing and evaluate the wound base.     Performed by: Marlen Humphries MD    Consent obtained: yes  Time out taken: yes  Debridement: excisional    Using curette/scalpel the wound(s)/ulcer(s) was/were sharply debrided down through and including the removal of    subcutaneous tissue    Devitalized Tissue Debrided: slough    Pre Debridement Measurements:  Are located in the [facility]  Documentation Flow Sheet      Wound/Ulcer #: 2    Post Debridement Measurements:  Wound/Ulcer Descriptions are Pre Debridement except measurements:    Wound Leg lower Left;Medial #1 (Active)   Wound Image   02/10/22 1009   Wound Etiology Venous 02/10/22 1004   Dressing Status Other (Comment) 02/10/22 1004   Cleansed Cleansed with saline 02/10/22 1004   Wound Length (cm) 1.1 cm 02/10/22 1004   Wound Width (cm) 1.5 cm 02/10/22 1004   Wound Depth (cm) 0.1 cm 02/10/22 1004   Wound Surface Area (cm^2) 1.65 cm^2 02/10/22 1004   Change in Wound Size % 54.17 02/10/22 1004   Wound Volume (cm^3) 0.165 cm^3 02/10/22 1004   Wound Healing % 54 02/10/22 1004   Post-Procedure Length (cm) 1.3 cm 02/10/22 1016   Post-Procedure Width (cm) 1.7 cm 02/10/22 1016   Post-Procedure Depth (cm) 0.3 cm 02/10/22 1016   Post-Procedure Surface Area (cm^2) 2.21 cm^2 02/10/22 1016   Post-Procedure Volume (cm^3) 0.663 cm^3 02/10/22 1016   Wound Assessment Dry;Granulation tissue;Slough 02/10/22 1004   Drainage Amount None 02/10/22 1004   Wound Odor None 02/10/22 1004   Radha-Wound/Incision Assessment Hyperpigmented;Dry/flaky 02/10/22 1004   Edges Epibole (rolled edges) 02/10/22 1004   Wound Thickness Description Full thickness 02/10/22 1004   Number of days: 28       Wound Leg lower Left;Lateral #2 (Active)   Wound Image   02/10/22 1005   Wound Etiology Venous 02/10/22 1005   Dressing Status Other (Comment) 02/10/22 1005   Cleansed Cleansed with saline 02/10/22 1005   Wound Length (cm) 4.8 cm 02/10/22 1005   Wound Width (cm) 2.5 cm 02/10/22 1005   Wound Depth (cm) 0.1 cm 02/10/22 1005   Wound Surface Area (cm^2) 12 cm^2 02/10/22 1005   Change in Wound Size % -1900 02/10/22 1005   Wound Volume (cm^3) 1.2 cm^3 02/10/22 1005   Wound Healing % -1900 02/10/22 1005   Post-Procedure Length (cm) 1.4 cm 01/13/22 0915   Post-Procedure Width (cm) 0.7 cm 01/13/22 0915   Post-Procedure Depth (cm) 0.3 cm 01/13/22 0915   Post-Procedure Surface Area (cm^2) 0.98 cm^2 01/13/22 0915   Post-Procedure Volume (cm^3) 0.294 cm^3 01/13/22 0915   Wound Assessment Dry;Eschar dry;Granulation tissue;Slough 02/10/22 1005   Drainage Amount None 02/10/22 1005   Wound Odor None 02/10/22 1005   Radha-Wound/Incision Assessment Hyperpigmented;Dry/flaky 02/10/22 1005   Edges Attached edges 02/10/22 1005   Wound Thickness Description Full thickness 02/10/22 1005   Number of days: 28        Total Surface Area Debrided:  0.2 sq cm     Estimated Blood Loss:  None    Hemostasis Achieved: with pressure    Procedural Pain: 0/ 10     Post Procedural Pain: 0/ 10     Response to treatment: Well-tolerated by the patient        Assessment:      Hospital Problems  Date Reviewed: 4/2/2021          Codes Class Noted POA    Venous insufficiency of left lower extremity ICD-10-CM: I87.2  ICD-9-CM: 459.81  2/10/2022 Yes        Wound of left leg, subsequent encounter ICD-10-CM: S81.802D  ICD-9-CM: V58.89, 894.0  1/13/2022 Yes        Secondary hyperparathyroidism ([facility] ICD-10-CM: N25.81  ICD-9-CM: 588.81  10/22/2021 Yes        Chronic kidney disease (CKD), stage IV (severe) ([facility] ICD-10-CM: N18.4  ICD-9-CM: 585.4  9/16/2020 Yes    Overview Signed 12/22/2020 10:52 AM by Greg Mitchell MD     Cr in 2.6 range. She sees Dr. Ofelia Mejia             Type 2 diabetes mellitus [facility]) ICD-10-CM: E11.9  ICD-9-CM: 250.00  9/16/2020 Yes               POP IN PROCEDURE TYPE (DEBRIDEMENT, BIOPSY, I&D, SKIN SUB, CHEMICAL CAUERTY)     Plan:     Treatment Note please see attached Discharge Instructions patient has venous insufficiency, I recommended Profore dressings, patient will get consult for venous occlusions  Return Appointment:  []? Dressing supply provider: [facility]  []? Home Healthcare:  [x]? Return Appointment:  2 Week(s)  [x]? Nurse Visit:  1 Week(s)     []? Discharge from [facility]  []? Ordered tests:  [x]? Referrals: DR MONZON- VASCULAR- PATIENT TO SCHEDULE APPOINTMENT  []? Rx:      Wound Cleansing:   Do not scrub or use excessive force. Cleanse wound prior to applying a clean dressing with:  [x]? Normal Saline          [x]? Mild Soap & Water    []? Keep Wound Dry in Shower  []? Other:       Topical Treatments:  Do not apply lotions, creams, or ointments to wound bed unless directed. []? Apply moisturizing lotion to skin surrounding the wound prior to dressing change. []? Apply antifungal ointment to skin surrounding the wound prior to dressing change. []? Apply thin film of moisture barrier ointment to skin immediately around wound. []? Other:                  Dressings:                  Wound Location LEFT LEG              [x]? Apply Primary Dressing:                                          []? MediHoney Gel         []?  MediHoney Alginate                     []? Calcium Alginate      [x]? Calcium Alginate with Silver              []? Collagen                   []? Collagen with Silver                []? Santyl with Moistened saline gauze              []? Hydrofera Blue (cut to size and moistened)  []? Hydrofera Blue Ready (Cut to size)              []? NS wet to dry            []? Betadine wet to dry              []? Hydrogel                   []? Mepitel                   []? Bactroban/Mupirocin                       []? Other:      [x]? Cover and Secure with:                   [x]? Gauze        [x]? Chalo Muta           []? Kerlix              []? Foam          []? Super Absorbant    []? ABD                                      []? ACE Wrap            []? Other:               Limit contact of tape with skin.     [x]? Change dressing:  []? Daily           [x]? Every Other Day    []? Once per week   []? Twice per week              []? Three times per week        []? Do Not Change Dressing    []? Other:                Negative Pressure:           Wound Location:   []? Pressure @  mm/Hg                      []?Continuous  []? Intermittent              []? Black          []? White Foam              []? Bridge:               []? Change vac dressing three times per week     Pressure Relief:  []? When sitting, shift position or do seat lifts every 15 minutes. []? Wheelchair cushion           []? Specialty Bed/Mattress  []? Turn every 2 hours when in bed. Avoid direct pressure on wound site. Limit side lying to 30 degree tilt. Limit HOB elevation to 30 degrees. Edema Control:  Apply:  []? Compression Stocking:    mmHg   []? Right Leg     []? Left Leg              []? Tubigrip      []? Right Leg Double Layer      []? Left Leg Double Layer                                      []?Right Leg Single Layer       []? Left Leg Single Layer                 []? Elevate leg(s) above the level of the heart when sitting. [x]? Avoid prolonged standing in one place.    Compression:  Apply:  [x]? Multilayer Compression Wrap:      []? RightLeg      []? Left Leg                                 []?Coflex              [x]? Coflex Lite             []?Unna                 [x]? Do not get leg(s) with wrap wet. If wraps become too tight call the center or completely remove the wrap. [x]? Elevate leg(s) above the level of the heart when sitting. [x]? Avoid prolonged standing in one place.     Off-Loading:   []? Off-loading when   []? walking       []? in bed         []? sitting  []? Total non-weight bearing  []? Right Leg  []? Left Leg         []? Assistive Device    []? Isaias Lul        []? Cane      []? Wheelchair      []? Crutches              []? Surgical shoe    []? Podus Boot(s)   []? Foam Boot(s)  []? Roll About              []? Cast Boot   []? CROW Boot  []? Wedge Shoe  []? Other:     Contact Cast:  Apply:  []? Total Contact Cast Applied in Clinic          []? RightLeg      []? Left Leg              []? Do not get cast wet. Contact center or go to emergency room if there is a foul odor or becomes uncomfortable due to feeling tight or swelling. Do not use objects inside of cast to scratch.                  Dietary:  []? Diet as tolerated:   [x]? Calorie Diabetic Diet:         []? No Added Salt:  [x]? Increase Protein:   []? Other:              Activity:  [x]? Activity as tolerated:    [x]? Patient has no activity restrictions       []? Strict Bedrest:          []? Remain off Work:       []? May return to full duty work:                                               []? Return to work with restrictions:      Written patient dismissal instructions given to patient or POA.          Electronically signed by Tripp Cruz MD on 2/10/2022 at 10:43 AM

## 2022-02-10 NOTE — DISCHARGE INSTRUCTIONS
Discharge Instructions for  [address] [place] [address]  [address], [address] [facility]  Telephone: [phone]    NAME:  Renzo Kenyon OF BIRTH:  1945  MEDICAL RECORD NUMBER:  [id number]  DATE:  2/10/2022      Return Appointment:  [] Dressing supply provider: [facility]  [] Home Healthcare:  [x] Return Appointment:  2 Week(s)  [x] Nurse Visit:  1 Week(s)    [] Discharge from [facility]  [] Ordered tests:  [x] Referrals: DR MONZON- VASCULAR- PATIENT TO SCHEDULE APPOINTMENT  [] Rx:     Wound Cleansing:   Do not scrub or use excessive force. Cleanse wound prior to applying a clean dressing with:  [x] Normal Saline   [x] Mild Soap & Water    [] Keep Wound Dry in Shower  [] Other:      Topical Treatments:  Do not apply lotions, creams, or ointments to wound bed unless directed. [] Apply moisturizing lotion to skin surrounding the wound prior to dressing change.  [] Apply antifungal ointment to skin surrounding the wound prior to dressing change.  [] Apply thin film of moisture barrier ointment to skin immediately around wound. [] Other:       Dressings:           Wound Location LEFT LEG   [x] Apply Primary Dressing:       [] MediHoney Gel    [] MediHoney Alginate               [] Calcium Alginate      [x] Calcium Alginate with Silver   [] Collagen                   [] Collagen with Silver                [] Santyl with Moistened saline gauze              [] Hydrofera Blue (cut to size and moistened)  [] Hydrofera Blue Ready (Cut to size)   [] NS wet to dry    [] Betadine wet to dry              [] Hydrogel                [] Mepitel     [] Bactroban/Mupirocin               [] Other:     [x] Cover and Secure with:     [x] Gauze [x] Jerri [] Kerlix   [] Foam [] Super Absorbant [] ABD     [] ACE Wrap            [] Other:    Limit contact of tape with skin.     [x] Change dressing: [] Daily    [x] Every Other Day [] Once per week   [] Twice per week   [] Three times per week [] Do Not Change Dressing   [] Other:     Negative Pressure:           Wound Location:   [] Pressure @  mm/Hg  []Continuous []Intermittent   [] Black  [] White Foam              [] Bridge:               [] Change vac dressing three times per week    Pressure Relief:  [] When sitting, shift position or do seat lifts every 15 minutes.  [] Wheelchair cushion [] Specialty Bed/Mattress  [] Turn every 2 hours when in bed. Avoid direct pressure on wound site. Limit side lying to 30 degree tilt. Limit HOB elevation to 30 degrees. Edema Control:  Apply: [] Compression Stocking:    mmHg  []Right Leg []Left Leg   [] Tubigrip []Right Leg Double Layer []Left Leg Double Layer      []Right Leg Single Layer []Left Leg Single Layer     [] Elevate leg(s) above the level of the heart when sitting. [x] Avoid prolonged standing in one place. Compression:  Apply: [x] Multilayer Compression Wrap: []RightLeg []Left Leg                                 []Coflex   [x]Coflex Lite             []Unna     [x] Do not get leg(s) with wrap wet. If wraps become too tight call the center or completely remove the wrap. [x] Elevate leg(s) above the level of the heart when sitting. [x] Avoid prolonged standing in one place. Off-Loading:   [] Off-loading when [] walking  [] in bed [] sitting  [] Total non-weight bearing  [] Right Leg  [] Left Leg   [] Assistive Device [] Walker [] Cane      [] Wheelchair  [] Crutches   [] Surgical shoe    [] Podus Boot(s)   [] Foam Boot(s)  [] Roll About    [] Cast Boot [] CROW Boot  [] Wedge Shoe  [] Other:    Contact Cast:  Apply: [] Total Contact Cast Applied in Clinic []RightLeg []Left Leg   [] Do not get cast wet. Contact center or go to emergency room if there is a foul odor or becomes uncomfortable due to feeling tight or swelling. Do not use objects inside of cast to scratch.       Dietary:  [] Diet as tolerated: [x] Calorie Diabetic Diet: [] No Added Salt:  [x] Increase Protein: [] Other:     Activity:  [x] Activity as tolerated:    [x] Patient has no activity restrictions       [] Strict Bedrest:   [] Remain off Work:       [] May return to full duty work:                                     [] Return to work with restrictions:               [address] [facility] [address] [address] Information: Should you experience any significant changes in your wound(s) or have questions about your wound care, please contact Jaimee Oropeza at [place] [address] 8:00 am - 4:30. If you need help with your wound outside of these hours and cannot wait until we are again available, contact your PCP or go to the hospital emergency room. PLEASE NOTE: IF YOU ARE UNABLE TO OBTAIN WOUND SUPPLIES, CONTINUE TO USE THE SUPPLIES YOU HAVE AVAILABLE UNTIL YOU ARE ABLE TO REACH US. IT IS MOST IMPORTANT TO KEEP THE WOUND COVERED AT ALL TIMES.     [] Dr. Arin Chaudhari   [x] Dr. Crissie Soulier  [] Dr. Jina Dumont

## 2022-02-10 NOTE — WOUND CARE
Discharge Instructions for  [address] [place] [address], [address] [place] [address]  Telephone: [phone]    NAME:  Susana Cates OF BIRTH:  1945  MEDICAL RECORD NUMBER:  [id number]  DATE:  2/10/2022      Return Appointment:  [] Dressing supply provider: [facility]  [] Home Healthcare:  [x] Return Appointment:  2 Week(s)  [x] Nurse Visit:  1 Week(s)    [] Discharge from [facility]  [] Ordered tests:  [x] Referrals: DR MONZON- VASCULAR- PATIENT TO SCHEDULE APPOINTMENT  [] Rx:     Wound Cleansing:   Do not scrub or use excessive force. Cleanse wound prior to applying a clean dressing with:  [x] Normal Saline   [x] Mild Soap & Water    [] Keep Wound Dry in Shower  [] Other:      Topical Treatments:  Do not apply lotions, creams, or ointments to wound bed unless directed. [] Apply moisturizing lotion to skin surrounding the wound prior to dressing change.  [] Apply antifungal ointment to skin surrounding the wound prior to dressing change.  [] Apply thin film of moisture barrier ointment to skin immediately around wound. [] Other:       Dressings:           Wound Location LEFT LEG   [x] Apply Primary Dressing:       [] MediHoney Gel    [] MediHoney Alginate               [] Calcium Alginate      [x] Calcium Alginate with Silver   [] Collagen                   [] Collagen with Silver                [] Santyl with Moistened saline gauze              [] Hydrofera Blue (cut to size and moistened)  [] Hydrofera Blue Ready (Cut to size)   [] NS wet to dry    [] Betadine wet to dry              [] Hydrogel                [] Mepitel     [] Bactroban/Mupirocin               [] Other:     [x] Cover and Secure with:     [x] Gauze [x] Jerri [] Kerlix   [] Foam [] Super Absorbant [] ABD     [] ACE Wrap            [] Other:    Limit contact of tape with skin.     [x] Change dressing: [] Daily    [x] Every Other Day [] Once per week   [] Twice per week   [] Three times per week [] Do Not Change Dressing   [] Other:     Negative Pressure:           Wound Location:   [] Pressure @  mm/Hg  []Continuous []Intermittent   [] Black  [] White Foam              [] Bridge:               [] Change vac dressing three times per week    Pressure Relief:  [] When sitting, shift position or do seat lifts every 15 minutes.  [] Wheelchair cushion [] Specialty Bed/Mattress  [] Turn every 2 hours when in bed. Avoid direct pressure on wound site. Limit side lying to 30 degree tilt. Limit HOB elevation to 30 degrees. Edema Control:  Apply: [] Compression Stocking:    mmHg  []Right Leg []Left Leg   [] Tubigrip []Right Leg Double Layer []Left Leg Double Layer      []Right Leg Single Layer []Left Leg Single Layer     [] Elevate leg(s) above the level of the heart when sitting. [x] Avoid prolonged standing in one place. Compression:  Apply: [x] Multilayer Compression Wrap: []RightLeg []Left Leg                                 []Coflex   [x]Coflex Lite             []Unna     [x] Do not get leg(s) with wrap wet. If wraps become too tight call the center or completely remove the wrap. [x] Elevate leg(s) above the level of the heart when sitting. [x] Avoid prolonged standing in one place. Off-Loading:   [] Off-loading when [] walking  [] in bed [] sitting  [] Total non-weight bearing  [] Right Leg  [] Left Leg   [] Assistive Device [] Walker [] Cane      [] Wheelchair  [] Crutches   [] Surgical shoe    [] Podus Boot(s)   [] Foam Boot(s)  [] Roll About    [] Cast Boot [] CROW Boot  [] Wedge Shoe  [] Other:    Contact Cast:  Apply: [] Total Contact Cast Applied in Clinic []RightLeg []Left Leg   [] Do not get cast wet. Contact center or go to emergency room if there is a foul odor or becomes uncomfortable due to feeling tight or swelling. Do not use objects inside of cast to scratch.       Dietary:  [] Diet as tolerated: [x] Calorie Diabetic Diet: [] No Added Salt:  [x] Increase Protein: [] Other:     Activity:  [x] Activity as tolerated:    [x] Patient has no activity restrictions       [] Strict Bedrest:   [] Remain off Work:       [] May return to full duty work:                                     [] Return to work with restrictions:               [address] Information: Should you experience any significant changes in your wound(s) or have questions about your wound care, please contact Jaimee Oneil 26 at [place] [address] 8:00 am - 4:30. If you need help with your wound outside of these hours and cannot wait until we are again available, contact your PCP or go to the hospital emergency room. PLEASE NOTE: IF YOU ARE UNABLE TO OBTAIN WOUND SUPPLIES, CONTINUE TO USE THE SUPPLIES YOU HAVE AVAILABLE UNTIL YOU ARE ABLE TO REACH US. IT IS MOST IMPORTANT TO KEEP THE WOUND COVERED AT ALL TIMES.     [] Dr. Cherylene Doctor   [x] Dr. Kelby Spurling  [] Dr. Maycol Peña

## 2022-02-15 DIAGNOSIS — Z79.4 TYPE 2 DIABETES MELLITUS WITH HYPERGLYCEMIA, WITH LONG-TERM CURRENT USE OF INSULIN (HCC): Primary | ICD-10-CM

## 2022-02-15 DIAGNOSIS — E11.65 TYPE 2 DIABETES MELLITUS WITH HYPERGLYCEMIA, WITH LONG-TERM CURRENT USE OF INSULIN (HCC): Primary | ICD-10-CM

## 2022-02-15 RX ORDER — BLOOD SUGAR DIAGNOSTIC
STRIP MISCELLANEOUS
Qty: 300 STRIP | Refills: 3 | Status: SHIPPED | OUTPATIENT
Start: 2022-02-15 | End: 2022-08-01

## 2022-02-15 RX ORDER — LANCETS 33 GAUGE
EACH MISCELLANEOUS
Qty: 300 LANCET | Refills: 3 | Status: SHIPPED | OUTPATIENT
Start: 2022-02-15 | End: 2022-08-01

## 2022-02-15 RX ORDER — BLOOD-GLUCOSE METER
EACH MISCELLANEOUS
Qty: 1 EACH | Refills: 0 | Status: SHIPPED | OUTPATIENT
Start: 2022-02-15 | End: 2022-08-01

## 2022-02-16 ENCOUNTER — HOSPITAL ENCOUNTER (OUTPATIENT)
Dept: WOUND CARE | Age: 77
Discharge: HOME | End: 2022-02-16
Payer: MEDICARE

## 2022-02-16 VITALS
SYSTOLIC BLOOD PRESSURE: 153 MMHG | RESPIRATION RATE: 18 BRPM | DIASTOLIC BLOOD PRESSURE: 73 MMHG | HEART RATE: 65 BPM | TEMPERATURE: 98.8 F

## 2022-02-16 PROCEDURE — 29581 APPL MULTLAYER CMPRN SYS LEG: CPT

## 2022-02-20 ENCOUNTER — APPOINTMENT (OUTPATIENT)
Dept: CT IMAGING | Age: 77
End: 2022-02-20
Attending: EMERGENCY MEDICINE
Payer: MEDICARE

## 2022-02-20 ENCOUNTER — HOSPITAL ENCOUNTER (EMERGENCY)
Age: 77
Discharge: HOME | End: 2022-02-20
Attending: EMERGENCY MEDICINE | Admitting: EMERGENCY MEDICINE
Payer: MEDICARE

## 2022-02-20 ENCOUNTER — APPOINTMENT (OUTPATIENT)
Dept: GENERAL RADIOLOGY | Age: 77
End: 2022-02-20
Attending: EMERGENCY MEDICINE
Payer: MEDICARE

## 2022-02-20 VITALS
WEIGHT: 202 LBS | BODY MASS INDEX: 34.49 KG/M2 | SYSTOLIC BLOOD PRESSURE: 134 MMHG | HEART RATE: 59 BPM | RESPIRATION RATE: 17 BRPM | DIASTOLIC BLOOD PRESSURE: 68 MMHG | HEIGHT: 64 IN | OXYGEN SATURATION: 100 % | TEMPERATURE: 97.5 F

## 2022-02-20 DIAGNOSIS — R55 SYNCOPE, UNSPECIFIED SYNCOPE TYPE: Primary | ICD-10-CM

## 2022-02-20 DIAGNOSIS — N18.9 CHRONIC RENAL IMPAIRMENT, UNSPECIFIED CKD STAGE: ICD-10-CM

## 2022-02-20 LAB
ALBUMIN SERPL-MCNC: 2.9 G/DL (ref 3.5–5)
ALBUMIN/GLOB SERPL: 0.6 {RATIO} (ref 1.1–2.2)
ALP SERPL-CCNC: 105 U/L (ref 45–117)
ALT SERPL-CCNC: 23 U/L (ref 12–78)
ANION GAP SERPL CALC-SCNC: 7 MMOL/L (ref 5–15)
AST SERPL W P-5'-P-CCNC: 30 U/L (ref 15–37)
ATRIAL RATE: 57 BPM
BASOPHILS # BLD: 0 K/UL (ref 0–0.1)
BASOPHILS NFR BLD: 0 % (ref 0–1)
BILIRUB SERPL-MCNC: 0.3 MG/DL (ref 0.2–1)
BNP SERPL-MCNC: 1152 PG/ML
BUN SERPL-MCNC: 60 MG/DL (ref 6–20)
BUN/CREAT SERPL: 25 (ref 12–20)
CA-I BLD-MCNC: 10.3 MG/DL (ref 8.5–10.1)
CALCULATED P AXIS, ECG09: 56 DEGREES
CALCULATED R AXIS, ECG10: 17 DEGREES
CALCULATED T AXIS, ECG11: 70 DEGREES
CHLORIDE SERPL-SCNC: 102 MMOL/L (ref 97–108)
CO2 SERPL-SCNC: 28 MMOL/L (ref 21–32)
CREAT SERPL-MCNC: 2.42 MG/DL (ref 0.55–1.02)
DIAGNOSIS, 93000: NORMAL
DIFFERENTIAL METHOD BLD: ABNORMAL
EOSINOPHIL # BLD: 0.1 K/UL (ref 0–0.4)
EOSINOPHIL NFR BLD: 1 % (ref 0–7)
ERYTHROCYTE [DISTWIDTH] IN BLOOD BY AUTOMATED COUNT: 15.1 % (ref 11.5–14.5)
GLOBULIN SER CALC-MCNC: 4.9 G/DL (ref 2–4)
GLUCOSE SERPL-MCNC: 123 MG/DL (ref 65–100)
HCT VFR BLD AUTO: 35.4 % (ref 35–47)
HGB BLD-MCNC: 11.7 G/DL (ref 11.5–16)
IMM GRANULOCYTES # BLD AUTO: 0 K/UL (ref 0–0.04)
IMM GRANULOCYTES NFR BLD AUTO: 1 % (ref 0–0.5)
LYMPHOCYTES # BLD: 1.1 K/UL (ref 0.8–3.5)
LYMPHOCYTES NFR BLD: 15 % (ref 12–49)
MCH RBC QN AUTO: 30.5 PG (ref 26–34)
MCHC RBC AUTO-ENTMCNC: 33.1 G/DL (ref 30–36.5)
MCV RBC AUTO: 92.4 FL (ref 80–99)
MONOCYTES # BLD: 0.7 K/UL (ref 0–1)
MONOCYTES NFR BLD: 10 % (ref 5–13)
NEUTS SEG # BLD: 5.2 K/UL (ref 1.8–8)
NEUTS SEG NFR BLD: 73 % (ref 32–75)
NRBC # BLD: 0 K/UL (ref 0–0.01)
NRBC BLD-RTO: 0 PER 100 WBC
P-R INTERVAL, ECG05: 148 MS
PLATELET # BLD AUTO: 182 K/UL (ref 150–400)
PMV BLD AUTO: 10.6 FL (ref 8.9–12.9)
POTASSIUM SERPL-SCNC: 3.8 MMOL/L (ref 3.5–5.1)
PROT SERPL-MCNC: 7.8 G/DL (ref 6.4–8.2)
Q-T INTERVAL, ECG07: 462 MS
QRS DURATION, ECG06: 96 MS
QTC CALCULATION (BEZET), ECG08: 449 MS
RBC # BLD AUTO: 3.83 M/UL (ref 3.8–5.2)
SODIUM SERPL-SCNC: 137 MMOL/L (ref 136–145)
TROPONIN-HIGH SENSITIVITY: 51 NG/L (ref 0–51)
VENTRICULAR RATE, ECG03: 57 BPM
WBC # BLD AUTO: 7.1 K/UL (ref 3.6–11)

## 2022-02-20 PROCEDURE — 71045 X-RAY EXAM CHEST 1 VIEW: CPT

## 2022-02-20 PROCEDURE — 93005 ELECTROCARDIOGRAM TRACING: CPT

## 2022-02-20 PROCEDURE — 80053 COMPREHEN METABOLIC PANEL: CPT

## 2022-02-20 PROCEDURE — 70450 CT HEAD/BRAIN W/O DYE: CPT

## 2022-02-20 PROCEDURE — 85025 COMPLETE CBC W/AUTO DIFF WBC: CPT

## 2022-02-20 PROCEDURE — 36415 COLL VENOUS BLD VENIPUNCTURE: CPT

## 2022-02-20 PROCEDURE — 83880 ASSAY OF NATRIURETIC PEPTIDE: CPT

## 2022-02-20 PROCEDURE — 84484 ASSAY OF TROPONIN QUANT: CPT

## 2022-02-20 PROCEDURE — 99285 EMERGENCY DEPT VISIT HI MDM: CPT

## 2022-02-20 RX ORDER — PLECANATIDE 3 MG/1
1 TABLET ORAL
COMMUNITY
End: 2022-08-01

## 2022-02-20 RX ORDER — PERMETHRIN 50 MG/G
CREAM TOPICAL
COMMUNITY
Start: 2021-07-26 | End: 2022-08-02

## 2022-02-20 RX ORDER — DOCUSATE SODIUM 100 MG/1
CAPSULE, LIQUID FILLED ORAL
COMMUNITY
End: 2022-08-10

## 2022-02-20 RX ORDER — MUPIROCIN 20 MG/G
OINTMENT TOPICAL
COMMUNITY
End: 2022-08-01

## 2022-02-20 RX ORDER — CETIRIZINE HCL 10 MG
1 TABLET ORAL DAILY
COMMUNITY
End: 2022-08-02

## 2022-02-20 RX ORDER — ACETAMINOPHEN 325 MG/1
325 TABLET ORAL
COMMUNITY
End: 2022-08-10

## 2022-02-20 RX ORDER — METOPROLOL SUCCINATE 25 MG/1
25 TABLET, EXTENDED RELEASE ORAL DAILY
COMMUNITY
End: 2022-08-01

## 2022-02-20 RX ORDER — HYDRALAZINE HYDROCHLORIDE 25 MG/1
12.5 TABLET, FILM COATED ORAL 3 TIMES DAILY
COMMUNITY
End: 2022-08-01

## 2022-02-20 RX ORDER — FUROSEMIDE 40 MG/1
60 TABLET ORAL DAILY
COMMUNITY
End: 2022-08-01

## 2022-02-20 RX ORDER — LUBIPROSTONE 24 UG/1
CAPSULE, GELATIN COATED ORAL
COMMUNITY
End: 2022-08-02

## 2022-02-20 RX ORDER — POLYETHYLENE GLYCOL 3350, SODIUM SULFATE ANHYDROUS, SODIUM BICARBONATE, SODIUM CHLORIDE, POTASSIUM CHLORIDE 236; 22.74; 6.74; 5.86; 2.97 G/4L; G/4L; G/4L; G/4L; G/4L
POWDER, FOR SOLUTION ORAL
COMMUNITY
End: 2022-08-01

## 2022-02-20 NOTE — ED TRIAGE NOTES
Syncopal episode while in restroom at home, denies fall. Hx of same. A&O at triage, denies complaints.

## 2022-02-20 NOTE — ED PROVIDER NOTES
EMERGENCY DEPARTMENT HISTORY AND PHYSICAL EXAM      Date: 2/20/2022  Patient Name: Belen Balwdin    History of Presenting Illness     Chief Complaint   Patient presents with    Syncope       History Provided By: Patient and Patient's Daughter    HPI: Belen Baldwin, 67 y.o. female with a past medical history significant diabetes, hypertension, stroke and seizure presents to the ED with cc of syncopal episode at home earlier today. Per patient's daughter, she found the patient unresponsive in the bathroom. History somewhat limited due to patient being a poor historian, but the patient denies any complaints at this time other than generalized weakness. There are no other complaints, changes, or physical findings at this time. PCP: Jeff Vargas, NP    No current facility-administered medications on file prior to encounter. Current Outpatient Medications on File Prior to Encounter   Medication Sig Dispense Refill    permethrin (ACTICIN) 5 % topical cream 1 application      metoprolol succinate (TOPROL-XL) 25 mg XL tablet Take 25 mg by mouth daily.  furosemide (LASIX) 40 mg tablet Take 60 mg by mouth daily.  plecanatide (Trulance) 3 mg tab Take 1 Tablet by mouth.  hydrALAZINE (APRESOLINE) 25 mg tablet Take 12.5 Tablets by mouth three (3) times daily.  cetirizine (ZYRTEC) 10 mg tablet Take 1 Tablet by mouth daily.       PEG 3350-Electrolytes (GaviLyte-G) 236-22.74-6.74 -5.86 gram suspension GaviLyte-G 236 gram-22.74 gram-6.74 gram-5.86 gram oral solution   TAKE AS DIRECTED      mupirocin (BACTROBAN) 2 % ointment 1 application      lubiPROStone (Amitiza) 24 mcg capsule Amitiza 24 mcg capsule   TAKE 1 CAPSULE BY MOUTH TWICE DAILY      docusate sodium (Stool Softener) 100 mg capsule 1 capsule as needed      acetaminophen (Tylenol Extra Strength) 500 mg tablet 2 tablets as needed      lancets (One Touch Delica) 33 gauge misc Test TID Dx Code: E11.65 300 Lancet 3    Blood-Glucose Meter (OneTouch Verio Flex meter) misc Test TID Dx Code: E11.65 1 Each 0    glucose blood VI test strips (OneTouch Verio test strips) strip Test TID Dx Code: E11.65 300 Strip 3    calcitRIOL (ROCALTROL) 0.25 mcg capsule Take 0.25 mcg by mouth daily.  colchicine 0.6 mg tablet Take 0.6 mg by mouth daily.  fluticasone propionate (FLOVENT DISKUS) 50 mcg/actuation inhaler Take  by inhalation.  donepeziL (ARICEPT) 10 mg tablet Take 10 mg by mouth nightly.  triamcinolone acetonide (KENALOG) 0.1 % topical cream Apply  to affected area two (2) times a day. use thin layer      [DISCONTINUED] cetirizine (ZYRTEC) 10 mg tablet Take  by mouth.  [DISCONTINUED] metoprolol tartrate (LOPRESSOR) 25 mg tablet Take  by mouth two (2) times a day.  [DISCONTINUED] plecanatide (TRULANCE PO) Take  by mouth.  [DISCONTINUED] losartan (COZAAR) 50 mg tablet Take 1 Tablet by mouth daily. 30 Tablet 0    NovoLIN N NPH U-100 Insulin 100 unit/mL injection INJECT 20 UNITS SUBCUTANEOUSLY IN THE EVENING **STOP  HUMULIN** 10 mL 11    nitroglycerin (NITROSTAT) 0.4 mg SL tablet 0.4 mg by SubLINGual route every five (5) minutes as needed for Chest Pain. Up to 3 doses.  apixaban (ELIQUIS) 2.5 mg tablet Take 2.5 mg by mouth two (2) times a day.  isosorbide mononitrate ER (IMDUR) 60 mg CR tablet Take 60 mg by mouth two (2) times a day.  gabapentin (NEURONTIN) 300 mg capsule Take 300 mg by mouth two (2) times a day.  [DISCONTINUED] hydrALAZINE (APRESOLINE) 25 mg tablet Take 25 mg by mouth two (2) times a day.  [DISCONTINUED] furosemide (LASIX) 40 mg tablet Take 40 mg by mouth daily.  venlafaxine (EFFEXOR) 75 mg tablet Take 75 mg by mouth daily.  latanoprost (XALATAN) 0.005 % ophthalmic solution Administer 1 Drop to both eyes nightly.  pravastatin (PRAVACHOL) 80 mg tablet Take 80 mg by mouth nightly.  aspirin 81 mg chewable tablet Take 81 mg by mouth daily.       allopurinol (ZYLOPRIM) 100 mg tablet Take 200 mg by mouth daily. Past History     Past Medical History:  Past Medical History:   Diagnosis Date    Adenocarcinoma, renal cell ([facility] [address] 9/2/2020    Arthritis     CAD (coronary artery disease)     Chronic kidney disease     Diabetes ([facility] [address]     Gout     Hypercholesterolemia     Hypertension     Mental depression     Pulmonary embolism (HCC)     Seizures (HCC)     Stroke ([facility] [address]     Thyroid disease        Past Surgical History:  Past Surgical History:   Procedure Laterality Date    HX GYN      HX HYSTERECTOMY      HX NEPHRECTOMY Left 02/12/2015    HX ORTHOPAEDIC      HX UROLOGICAL  02/12/2015    PARTIAL URETERECTOMY     [place] CARDIAC SURG PROCEDURE UNLIST      stents placed        Family History:  Family History   Problem Relation Age of Onset    Heart Disease Mother     Heart Disease Father     Heart Disease Sister     Cancer Sister     Heart Disease Brother     Cancer Maternal Grandmother     Stroke Son     Cancer Sister        Social History:  Social History     Tobacco Use    Smoking status: Former Smoker     Years: 15.00    Smokeless tobacco: Never Used   Vaping Use    Vaping Use: Never used   Substance Use Topics    Alcohol use: Not Currently    Drug use: Never       Allergies:  No Known Allergies      Review of Systems   Unable to obtain due to patient being a poor historian  Review of Systems    Physical Exam   Physical Exam  Constitutional:       General: No acute distress. Appearance: Normal appearance. Not toxic-appearing. HENT:      Head: Normocephalic and atraumatic. Nose: Nose normal.      Mouth/Throat:      Mouth: Mucous membranes are moist.   Eyes:      Extraocular Movements: Extraocular movements intact. Pupils: Pupils are equal, round, and reactive to light. Cardiovascular:      Rate and Rhythm: Normal rate. Pulses: Normal pulses. Pulmonary:      Effort: Pulmonary effort is normal.      Breath sounds:  No stridor, clear to auscultation bilaterally  Abdominal:      General: Abdomen is flat. There is no distension. Musculoskeletal:         General: Normal range of motion. Cervical back: Normal range of motion and neck supple. Skin:     General: Skin is warm and dry. Capillary Refill: Capillary refill takes less than 2 seconds. Neurological:      General: No focal deficit present. Mental Status: Alert and oriented to person, situation  Psychiatric:         Mood and Affect: Mood normal.         Behavior: Behavior normal.       Physical Exam    Lab and Diagnostic Study Results     Labs -     Recent Results (from the past 12 hour(s))   CBC WITH AUTOMATED DIFF    Collection Time: 02/20/22 10:37 AM   Result Value Ref Range    WBC 7.1 3.6 - 11.0 K/uL    RBC 3.83 3.80 - 5.20 M/uL    HGB 11.7 11.5 - 16.0 g/dL    HCT 35.4 35.0 - 47.0 %    MCV 92.4 80.0 - 99.0 FL    MCH 30.5 26.0 - 34.0 PG    MCHC 33.1 30.0 - 36.5 g/dL    RDW 15.1 (H) 11.5 - 14.5 %    PLATELET 765 777 - 899 K/uL    MPV 10.6 8.9 - 12.9 FL    NRBC 0.0 0.0  WBC    ABSOLUTE NRBC 0.00 0.00 - 0.01 K/uL    NEUTROPHILS 73 32 - 75 %    LYMPHOCYTES 15 12 - 49 %    MONOCYTES 10 5 - 13 %    EOSINOPHILS 1 0 - 7 %    BASOPHILS 0 0 - 1 %    IMMATURE GRANULOCYTES 1 (H) 0 - 0.5 %    ABS. NEUTROPHILS 5.2 1.8 - 8.0 K/UL    ABS. LYMPHOCYTES 1.1 0.8 - 3.5 K/UL    ABS. MONOCYTES 0.7 0.0 - 1.0 K/UL    ABS. EOSINOPHILS 0.1 0.0 - 0.4 K/UL    ABS. BASOPHILS 0.0 0.0 - 0.1 K/UL    ABS. IMM.  GRANS. 0.0 0.00 - 0.04 K/UL    DF AUTOMATED     METABOLIC PANEL, COMPREHENSIVE    Collection Time: 02/20/22 10:37 AM   Result Value Ref Range    Sodium 137 136 - 145 mmol/L    Potassium 3.8 3.5 - 5.1 mmol/L    Chloride 102 97 - 108 mmol/L    CO2 28 21 - 32 mmol/L    Anion gap 7 5 - 15 mmol/L    Glucose 123 (H) 65 - 100 mg/dL    BUN 60 (H) 6 - 20 mg/dL    Creatinine 2.42 (H) 0.55 - 1.02 mg/dL    BUN/Creatinine ratio 25 (H) 12 - 20      GFR est AA 24 (L) >60 ml/min/1.73m2    GFR est non-AA 19 (L) >60 ml/min/1.73m2    Calcium 10.3 (H) 8.5 - 10.1 mg/dL    Bilirubin, total 0.3 0.2 - 1.0 mg/dL    AST (SGOT) 30 15 - 37 U/L    ALT (SGPT) 23 12 - 78 U/L    Alk. phosphatase 105 45 - 117 U/L    Protein, total 7.8 6.4 - 8.2 g/dL    Albumin 2.9 (L) 3.5 - 5.0 g/dL    Globulin 4.9 (H) 2.0 - 4.0 g/dL    A-G Ratio 0.6 (L) 1.1 - 2.2     TROPONIN-HIGH SENSITIVITY    Collection Time: 02/20/22 10:37 AM   Result Value Ref Range    Troponin-High Sensitivity 51 0 - 51 ng/L   NT-PRO BNP    Collection Time: 02/20/22 10:37 AM   Result Value Ref Range    NT pro-BNP 1,152 (H) <450 pg/mL       Radiologic Studies -   @lastxrresult@  CT Results  (Last 48 hours)    None        CXR Results  (Last 48 hours)               02/20/22 1109  XR CHEST PORT Final result    Impression:  Stable mild enlargement of cardiopericardial silhouette. Calcification of thoracic aorta. Prominent main pulmonary artery, suspicious for   pulmonary artery hypertension. No evidence of pulmonary edema, air space   pneumonia, or pleural effusion. No evidence of pneumothorax. Degenerative   changes of bilateral shoulders. Degenerative changes of thoracic spine. Narrative:  Portable chest, 1059 hours. Comparison with 10/21/2021. Medical Decision Making   - I am the first provider for this patient. - I reviewed the vital signs, available nursing notes, past medical history, past surgical history, family history and social history. - Initial assessment performed. The patients presenting problems have been discussed, and they are in agreement with the care plan formulated and outlined with them. I have encouraged them to ask questions as they arise throughout their visit. Vital Signs-Reviewed the patient's vital signs.   Patient Vitals for the past 12 hrs:   Temp Pulse Resp BP SpO2   02/20/22 1113 -- (!) 59 17 134/68 100 %   02/20/22 1038 -- 67 19 117/61 100 %   02/20/22 1020 97.5 °F (36.4 °C) (!) 59 18 (!) 115/58 98 % Records Reviewed: Nursing Notes and Old Medical Records    ED Course:     ED Course as of 02/20/22 1521   Sun Feb 20, 2022   1519 Patient and daughter now present for discussion regarding unremarkable work-up. Discussion regarding prior visits and work-ups for the same, typically always involving some type of pressure or defecation, likely these are all related to vagal related syncopal episodes. Patient understands the desire by cardiology in the to get a loop recorder by her cardiologist for the final certainty that this is not based on arrhythmia. Discussed return precautions at length. Patient and family member state understanding of the plan. [CS]      ED Course User Index  [CS] Roberta Botello MD         Disposition   Disposition: DC- Adult Discharges: All of the diagnostic tests were reviewed and questions answered. Diagnosis, care plan and treatment options were discussed. The patient understands the instructions and will follow up as directed. The patients results have been reviewed with them. They have been counseled regarding their diagnosis. The patient verbally convey understanding and agreement of the signs, symptoms, diagnosis, treatment and prognosis and additionally agrees to follow up as recommended with their PCP in 24 - 48 hours. They also agree with the care-plan and convey that all of their questions have been answered. I have also put together some discharge instructions for them that include: 1) educational information regarding their diagnosis, 2) how to care for their diagnosis at home, as well a 3) list of reasons why they would want to return to the ED prior to their follow-up appointment, should their condition change. DISCHARGE PLAN:  1.    Current Discharge Medication List      CONTINUE these medications which have NOT CHANGED    Details   permethrin (ACTICIN) 5 % topical cream 1 application      metoprolol succinate (TOPROL-XL) 25 mg XL tablet Take 25 mg by mouth daily. furosemide (LASIX) 40 mg tablet Take 60 mg by mouth daily. plecanatide (Trulance) 3 mg tab Take 1 Tablet by mouth. hydrALAZINE (APRESOLINE) 25 mg tablet Take 12.5 Tablets by mouth three (3) times daily. cetirizine (ZYRTEC) 10 mg tablet Take 1 Tablet by mouth daily. PEG 3350-Electrolytes (GaviLyte-G) 236-22.74-6.74 -5.86 gram suspension GaviLyte-G 236 gram-22.74 gram-6.74 gram-5.86 gram oral solution   TAKE AS DIRECTED      mupirocin (BACTROBAN) 2 % ointment 1 application      lubiPROStone (Amitiza) 24 mcg capsule Amitiza 24 mcg capsule   TAKE 1 CAPSULE BY MOUTH TWICE DAILY      docusate sodium (Stool Softener) 100 mg capsule 1 capsule as needed      acetaminophen (Tylenol Extra Strength) 500 mg tablet 2 tablets as needed      lancets (One Touch Delica) 33 gauge misc Test TID Dx Code: E11.65  Qty: 300 Lancet, Refills: 3    Associated Diagnoses: Type 2 diabetes mellitus with hyperglycemia, with long-term current use of insulin ([facility])      Blood-Glucose Meter (OneTouch Verio Flex meter) misc Test TID Dx Code: E11.65  Qty: 1 Each, Refills: 0    Associated Diagnoses: Type 2 diabetes mellitus with hyperglycemia, with long-term current use of insulin ([facility])      glucose blood VI test strips (OneTouch Verio test strips) strip Test TID Dx Code: E11.65  Qty: 300 Strip, Refills: 3    Associated Diagnoses: Type 2 diabetes mellitus with hyperglycemia, with long-term current use of insulin ([facility])      calcitRIOL (ROCALTROL) 0.25 mcg capsule Take 0.25 mcg by mouth daily. colchicine 0.6 mg tablet Take 0.6 mg by mouth daily. fluticasone propionate (FLOVENT DISKUS) 50 mcg/actuation inhaler Take  by inhalation. donepeziL (ARICEPT) 10 mg tablet Take 10 mg by mouth nightly. triamcinolone acetonide (KENALOG) 0.1 % topical cream Apply  to affected area two (2) times a day.  use thin layer      NovoLIN N NPH U-100 Insulin 100 unit/mL injection INJECT 20 UNITS SUBCUTANEOUSLY IN THE EVENING **STOP  HUMULIN**  Qty: 10 mL, Refills: 11      nitroglycerin (NITROSTAT) 0.4 mg SL tablet 0.4 mg by SubLINGual route every five (5) minutes as needed for Chest Pain. Up to 3 doses. apixaban (ELIQUIS) 2.5 mg tablet Take 2.5 mg by mouth two (2) times a day. isosorbide mononitrate ER (IMDUR) 60 mg CR tablet Take 60 mg by mouth two (2) times a day.      gabapentin (NEURONTIN) 300 mg capsule Take 300 mg by mouth two (2) times a day. venlafaxine (EFFEXOR) 75 mg tablet Take 75 mg by mouth daily. latanoprost (XALATAN) 0.005 % ophthalmic solution Administer 1 Drop to both eyes nightly. pravastatin (PRAVACHOL) 80 mg tablet Take 80 mg by mouth nightly. aspirin 81 mg chewable tablet Take 81 mg by mouth daily. allopurinol (ZYLOPRIM) 100 mg tablet Take 200 mg by mouth daily. 2.   Follow-up Information    None       3. Return to ED if worse   4. Current Discharge Medication List            Diagnosis     Clinical Impression:   1. Syncope, unspecified syncope type        Attestations:    Junie Olvera MD    Please note that this dictation was completed with [employer], the computer voice recognition software. Quite often unanticipated grammatical, syntax, homophones, and other interpretive errors are inadvertently transcribed by the computer software. Please disregard these errors. Please excuse any errors that have escaped final proofreading. Thank you.

## 2022-02-20 NOTE — DISCHARGE INSTRUCTIONS
Thank you! Thank you for allowing me to care for you in the emergency department. I sincerely hope that you are satisfied with your visit today. It is my goal to provide you with excellent care. Below you will find a list of your labs and imaging from your visit today. Should you have any questions regarding these results please do not hesitate to call the emergency department. Labs -     Recent Results (from the past 12 hour(s))   CBC WITH AUTOMATED DIFF    Collection Time: 02/20/22 10:37 AM   Result Value Ref Range    WBC 7.1 3.6 - 11.0 K/uL    RBC 3.83 3.80 - 5.20 M/uL    HGB 11.7 11.5 - 16.0 g/dL    HCT 35.4 35.0 - 47.0 %    MCV 92.4 80.0 - 99.0 FL    MCH 30.5 26.0 - 34.0 PG    MCHC 33.1 30.0 - 36.5 g/dL    RDW 15.1 (H) 11.5 - 14.5 %    PLATELET 860 576 - 503 K/uL    MPV 10.6 8.9 - 12.9 FL    NRBC 0.0 0.0  WBC    ABSOLUTE NRBC 0.00 0.00 - 0.01 K/uL    NEUTROPHILS 73 32 - 75 %    LYMPHOCYTES 15 12 - 49 %    MONOCYTES 10 5 - 13 %    EOSINOPHILS 1 0 - 7 %    BASOPHILS 0 0 - 1 %    IMMATURE GRANULOCYTES 1 (H) 0 - 0.5 %    ABS. NEUTROPHILS 5.2 1.8 - 8.0 K/UL    ABS. LYMPHOCYTES 1.1 0.8 - 3.5 K/UL    ABS. MONOCYTES 0.7 0.0 - 1.0 K/UL    ABS. EOSINOPHILS 0.1 0.0 - 0.4 K/UL    ABS. BASOPHILS 0.0 0.0 - 0.1 K/UL    ABS. IMM. GRANS. 0.0 0.00 - 0.04 K/UL    DF AUTOMATED     METABOLIC PANEL, COMPREHENSIVE    Collection Time: 02/20/22 10:37 AM   Result Value Ref Range    Sodium 137 136 - 145 mmol/L    Potassium 3.8 3.5 - 5.1 mmol/L    Chloride 102 97 - 108 mmol/L    CO2 28 21 - 32 mmol/L    Anion gap 7 5 - 15 mmol/L    Glucose 123 (H) 65 - 100 mg/dL    BUN 60 (H) 6 - 20 mg/dL    Creatinine 2.42 (H) 0.55 - 1.02 mg/dL    BUN/Creatinine ratio 25 (H) 12 - 20      GFR est AA 24 (L) >60 ml/min/1.73m2    GFR est non-AA 19 (L) >60 ml/min/1.73m2    Calcium 10.3 (H) 8.5 - 10.1 mg/dL    Bilirubin, total 0.3 0.2 - 1.0 mg/dL    AST (SGOT) 30 15 - 37 U/L    ALT (SGPT) 23 12 - 78 U/L    Alk.  phosphatase 105 45 - 117 U/L Protein, total 7.8 6.4 - 8.2 g/dL    Albumin 2.9 (L) 3.5 - 5.0 g/dL    Globulin 4.9 (H) 2.0 - 4.0 g/dL    A-G Ratio 0.6 (L) 1.1 - 2.2     TROPONIN-HIGH SENSITIVITY    Collection Time: 02/20/22 10:37 AM   Result Value Ref Range    Troponin-High Sensitivity 51 0 - 51 ng/L   NT-PRO BNP    Collection Time: 02/20/22 10:37 AM   Result Value Ref Range    NT pro-BNP 1,152 (H) <450 pg/mL       Radiologic Studies -   CT HEAD WO CONT   Final Result   1. No evidence of acute intracranial hemorrhage or mass effect. 2.  Moderate to severe chronic small vessel ischemic changes. XR CHEST PORT   Final Result   Stable mild enlargement of cardiopericardial silhouette. Calcification of thoracic aorta. Prominent main pulmonary artery, suspicious for   pulmonary artery hypertension. No evidence of pulmonary edema, air space   pneumonia, or pleural effusion. No evidence of pneumothorax. Degenerative   changes of bilateral shoulders. Degenerative changes of thoracic spine. CT Results  (Last 48 hours)                 02/20/22 1422  CT HEAD WO CONT Final result    Impression:  1. No evidence of acute intracranial hemorrhage or mass effect. 2.  Moderate to severe chronic small vessel ischemic changes. Narrative:  CT HEAD WITHOUT IV CONTRAST       CLINICAL INDICATION: Syncopal episode       TECHNIQUE: Routine axial images were obtained through the brain without the use   of IV contrast. Sagittal and coronal reformatted images were performed at the CT   console. Dose reduction: Per department policy, all CT scans at this facility   are performed using dose reduction optimization techniques as appropriate to a   performed examination including the following: Automated exposure control,   adjustments of the mA and/or KV according to patient size, or use of iterative   reconstruction technique.        COMPARISON: Noncontrast head CT from 10/21/2021       FINDINGS:        No evidence of acute intracranial hemorrhage or mass effect. Confluent periventricular and deep white matter areas of hypoattenuation are   present, nonspecific, but likely sequela of chronic small vessel ischemic   changes in a patient this age. Moderate prominence of the extra-axial spaces, with commensurate ventricular   enlargement. No hydrocephalus. Remote lacunar infarctions of bilateral basal   ganglia. Portions of the posterior fossa not obscured by streak artifact are   unremarkable. Absent bilateral native lenses. Paranasal sinuses are predominantly clear. Tympanomastoid cavities are clear. No acute calvarial abnormality. Atherosclerotic calcification of the internal carotid arteries. CXR Results  (Last 48 hours)                 02/20/22 1109  XR CHEST PORT Final result    Impression:  Stable mild enlargement of cardiopericardial silhouette. Calcification of thoracic aorta. Prominent main pulmonary artery, suspicious for   pulmonary artery hypertension. No evidence of pulmonary edema, air space   pneumonia, or pleural effusion. No evidence of pneumothorax. Degenerative   changes of bilateral shoulders. Degenerative changes of thoracic spine. Narrative:  Portable chest, 1059 hours. Comparison with 10/21/2021. If you feel that you have not received excellent quality care or timely care, please ask to speak to the nurse manager. Please choose us in the future for your continued health care needs. ------------------------------------------------------------------------------------------------------------  The exam and treatment you received in the Emergency Department were for an urgent problem and are not intended as complete care. It is important that you follow-up with a doctor, nurse practitioner, or physician assistant to:  (1) confirm your diagnosis,  (2) re-evaluation of changes in your illness and treatment, and  (3) for ongoing care.   If your symptoms become worse or you do not improve as expected and you are unable to reach your usual health care provider, you should return to the Emergency Department. We are available 24 hours a day. Please take your discharge instructions with you when you go to your follow-up appointment. If you have any problem arranging a follow-up appointment, contact the Emergency Department immediately. If a prescription has been provided, please have it filled as soon as possible to prevent a delay in treatment. Read the entire medication instruction sheet provided to you by the pharmacy. If you have any questions or reservations about taking the medication due to side effects or interactions with other medications, please call your primary care physician or contact the ER to speak with the charge nurse. Make an appointment with your family doctor or the physician you were referred to for follow-up of this visit as instructed on your discharge paperwork, as this is a mandatory follow-up. Return to the ER if you are unable to be seen or if you are unable to be seen in a timely manner. If you have any problem arranging the follow-up visit, contact the Emergency Department immediately.

## 2022-02-24 ENCOUNTER — HOSPITAL ENCOUNTER (OUTPATIENT)
Dept: WOUND CARE | Age: 77
Discharge: HOME | End: 2022-02-24
Payer: MEDICARE

## 2022-02-24 VITALS
DIASTOLIC BLOOD PRESSURE: 72 MMHG | SYSTOLIC BLOOD PRESSURE: 141 MMHG | TEMPERATURE: 97.5 F | HEART RATE: 65 BPM | RESPIRATION RATE: 20 BRPM

## 2022-02-24 PROCEDURE — 99212 OFFICE O/P EST SF 10 MIN: CPT

## 2022-02-24 NOTE — WOUND CARE
Discharge Instructions for  [address] [place] [address], [address] [place] [address]  Telephone: [phone]    NAME:  Anais Mackey OF BIRTH:  1945  MEDICAL RECORD NUMBER:  [id number]  DATE:  2/24/2022      Return Appointment:  [] Dressing supply provider: OLYA  [] Home Healthcare:  [] Return Appointment:   [facility])  [] Nurse Visit:   Week(s)    [x] Discharge from [facility]  [] Ordered tests:  [x] Referrals: DR MONZON- VASCULAR- PATIENT TO SCHEDULE APPOINTMENT  [] Rx:     Wound Cleansing:   Do not scrub or use excessive force. Cleanse wound prior to applying a clean dressing with:  [] Normal Saline   [] Mild Soap & Water    [] Keep Wound Dry in Shower  [] Other:      Topical Treatments:  Do not apply lotions, creams, or ointments to wound bed unless directed. [] Apply moisturizing lotion to skin surrounding the wound prior to dressing change.  [] Apply antifungal ointment to skin surrounding the wound prior to dressing change.  [] Apply thin film of moisture barrier ointment to skin immediately around wound. [] Other:       Dressings:           Wound Location LEFT LEG   [] Apply Primary Dressing:       [] MediHoney Gel    [] MediHoney Alginate               [] Calcium Alginate      [] Calcium Alginate with Silver   [] Collagen                   [] Collagen with Silver                [] Santyl with Moistened saline gauze              [] Hydrofera Blue (cut to size and moistened)  [] Hydrofera Blue Ready (Cut to size)   [] NS wet to dry    [] Betadine wet to dry              [] Hydrogel                [] Mepitel     [] Bactroban/Mupirocin               [] Other:     [] Cover and Secure with:     [] Gauze [] Tino Golas [] Kerlix   [] Foam [] Super Absorbant [] ABD     [] ACE Wrap            [] Other:    Limit contact of tape with skin.     [] Change dressing: [] Daily    [] Every Other Day [] Once per week   [] Twice per week   [] Three times per week [] Do Not Change Dressing   [] Other:     Negative Pressure:           Wound Location:   [] Pressure @  mm/Hg  []Continuous []Intermittent   [] Black  [] White Foam              [] Bridge:               [] Change vac dressing three times per week    Pressure Relief:  [] When sitting, shift position or do seat lifts every 15 minutes.  [] Wheelchair cushion [] Specialty Bed/Mattress  [] Turn every 2 hours when in bed. Avoid direct pressure on wound site. Limit side lying to 30 degree tilt. Limit HOB elevation to 30 degrees. Edema Control:  Apply: [x] Compression Stocking:    mmHg  [x]Right Leg [x]Left Leg   [] Tubigrip []Right Leg Double Layer []Left Leg Double Layer      []Right Leg Single Layer []Left Leg Single Layer     [x] Elevate leg(s) above the level of the heart when sitting. [x] Avoid prolonged standing in one place. Compression:  Apply: [] Multilayer Compression Wrap: []RightLeg []Left Leg                                 []Coflex   []Coflex Lite             []Unna     [] Do not get leg(s) with wrap wet. If wraps become too tight call the center or completely remove the wrap. [] Elevate leg(s) above the level of the heart when sitting. [] Avoid prolonged standing in one place. Off-Loading:   [] Off-loading when [] walking  [] in bed [] sitting  [] Total non-weight bearing  [] Right Leg  [] Left Leg   [] Assistive Device [] Walker [] Cane      [] Wheelchair  [] Crutches   [] Surgical shoe    [] Podus Boot(s)   [] Foam Boot(s)  [] Roll About    [] Cast Boot [] CROW Boot  [] Wedge Shoe  [] Other:    Contact Cast:  Apply: [] Total Contact Cast Applied in Clinic []RightLeg []Left Leg   [] Do not get cast wet. Contact center or go to emergency room if there is a foul odor or becomes uncomfortable due to feeling tight or swelling. Do not use objects inside of cast to scratch.       Dietary:  [] Diet as tolerated: [x] Calorie Diabetic Diet: [] No Added Salt:  [x] Increase Protein: [] Other:     Activity:  [x] Activity as tolerated:    [x] Patient has no activity restrictions       [] Strict Bedrest:   [] Remain off Work:       [] May return to full duty work:                                     [] Return to work with restrictions:               [address] Information: Should you experience any significant changes in your wound(s) or have questions about your wound care, please contact Jaimee Oneil 26 at [place] [phone] 8:00 am - 4:30. If you need help with your wound outside of these hours and cannot wait until we are again available, contact your PCP or go to the hospital emergency room. PLEASE NOTE: IF YOU ARE UNABLE TO OBTAIN WOUND SUPPLIES, CONTINUE TO USE THE SUPPLIES YOU HAVE AVAILABLE UNTIL YOU ARE ABLE TO REACH US. IT IS MOST IMPORTANT TO KEEP THE WOUND COVERED AT ALL TIMES.     [] Dr. David Horne   [x] Dr. Karen Avendaño  [] Dr. Dennis Cheema

## 2022-02-24 NOTE — WOUND CARE
[address]   Progress Note and Procedure Note     Blaine Rollins  MEDICAL RECORD NUMBER:  [id number]  AGE: 67 y.o. RACE BLACK/  GENDER: female  : 1945  EPISODE DATE:  2022      Subjective:     Chief Complaint   Patient presents with    Wound Check     L leg         HISTORY of PRESENT ILLNESS HPI    Blaine Rollins is a 67 y.o. female who presents today for wound/ulcer evaluation.    History of Wound Context:  Patient is having wounds on the left leg, swelling noted, she has a history of diabetes and chronic renal failure, small wounds are present over the left leg  Wound/Ulcer Pain Timing/Severity: None   Quality of pain: none  Severity:  0/ 10   Modifying Factors: None  Associated Signs/Symptoms: edema          PAST MEDICAL HISTORY    Past Medical History:   Diagnosis Date    Aden[facility] renal cell [facility] [facility] 2020    Arthritis     CAD (coronary artery disease)     Chronic kidney disease     Diabetes ([facility] [address] [facility]     Gout     Hypercholesterolemia     Hypertension     Mental depression     Pulmonary embolism (HCC)     Seizures (HCC)     Stroke ([facility] [address]     Thyroid disease         PAST SURGICAL HISTORY    Past Surgical History:   Procedure Laterality Date    HX GYN      HX HYSTERECTOMY      HX NEPHRECTOMY Left 2015    HX ORTHOPAEDIC      HX UROLOGICAL  2015    PARTIAL URETERECTOMY     ND CARDIAC SURG PROCEDURE UNLIST      stents placed        FAMILY HISTORY    Family History   Problem Relation Age of Onset    Heart Disease Mother     Heart Disease Father     Heart Disease Sister     Cancer Sister     Heart Disease Brother     Cancer Maternal Grandmother     Stroke Son     Cancer Sister        SOCIAL HISTORY    Social History     Tobacco Use    Smoking status: Former Smoker     Years: 15.00    Smokeless tobacco: Never Used   Vaping Use    Vaping Use: Never used   Substance Use Topics    Alcohol use: Not Currently    Drug use: Never       ALLERGIES    No Known Allergies    MEDICATIONS    Current Outpatient Medications on File Prior to Encounter   Medication Sig Dispense Refill    plecanatide (Trulance) 3 mg tab Take 1 Tablet by mouth.  hydrALAZINE (APRESOLINE) 25 mg tablet Take 12.5 Tablets by mouth three (3) times daily.  cetirizine (ZYRTEC) 10 mg tablet Take 1 Tablet by mouth daily.  permethrin (ACTICIN) 5 % topical cream 1 application      PEG 4864-KGAZIZUCEVWM (GaviLyte-G) 236-22.74-6.74 -5.86 gram suspension GaviLyte-G 236 gram-22.74 gram-6.74 gram-5.86 gram oral solution   TAKE AS DIRECTED      mupirocin (BACTROBAN) 2 % ointment 1 application      lubiPROStone (Amitiza) 24 mcg capsule Amitiza 24 mcg capsule   TAKE 1 CAPSULE BY MOUTH TWICE DAILY      docusate sodium (Stool Softener) 100 mg capsule 1 capsule as needed      acetaminophen (Tylenol Extra Strength) 500 mg tablet 2 tablets as needed      metoprolol succinate (TOPROL-XL) 25 mg XL tablet Take 25 mg by mouth daily.  furosemide (LASIX) 40 mg tablet Take 60 mg by mouth daily.  lancets (One Touch Delica) 33 gauge misc Test TID Dx Code: E11.65 300 Lancet 3    Blood-Glucose Meter (OneTouch Verio Flex meter) misc Test TID Dx Code: E11.65 1 Each 0    glucose blood VI test strips (OneTouch Verio test strips) strip Test TID Dx Code: E11.65 300 Strip 3    calcitRIOL (ROCALTROL) 0.25 mcg capsule Take 0.25 mcg by mouth daily.  colchicine 0.6 mg tablet Take 0.6 mg by mouth daily.  fluticasone propionate (FLOVENT DISKUS) 50 mcg/actuation inhaler Take  by inhalation.  donepeziL (ARICEPT) 10 mg tablet Take 10 mg by mouth nightly.  triamcinolone acetonide (KENALOG) 0.1 % topical cream Apply  to affected area two (2) times a day.  use thin layer      NovoLIN N NPH U-100 Insulin 100 unit/mL injection INJECT 20 UNITS SUBCUTANEOUSLY IN THE EVENING **STOP  HUMULIN** 10 mL 11    nitroglycerin (NITROSTAT) 0.4 mg SL tablet 0.4 mg by SubLINGual route every five (5) minutes as needed for Chest Pain. Up to 3 doses.  apixaban (ELIQUIS) 2.5 mg tablet Take 2.5 mg by mouth two (2) times a day.  isosorbide mononitrate ER (IMDUR) 60 mg CR tablet Take 60 mg by mouth two (2) times a day.  gabapentin (NEURONTIN) 300 mg capsule Take 300 mg by mouth two (2) times a day.  venlafaxine (EFFEXOR) 75 mg tablet Take 75 mg by mouth daily.  latanoprost (XALATAN) 0.005 % ophthalmic solution Administer 1 Drop to both eyes nightly.  pravastatin (PRAVACHOL) 80 mg tablet Take 80 mg by mouth nightly.  aspirin 81 mg chewable tablet Take 81 mg by mouth daily.  allopurinol (ZYLOPRIM) 100 mg tablet Take 200 mg by mouth daily. No current facility-administered medications on file prior to encounter. REVIEW OF SYSTEMS    A comprehensive review of systems was negative except for that written in the HPI. Objective:     Visit Vitals  BP (!) 141/72 (BP 1 Location: Right upper arm, BP Patient Position: At rest;Sitting)   Pulse 65   Temp 97.5 °F (36.4 °C)   Resp 20       Wt Readings from Last 3 Encounters:   02/20/22 91.6 kg (202 lb)   01/13/22 92.1 kg (203 lb)   10/24/21 91.3 kg (201 lb 4.5 oz)       PHYSICAL EXAM    Constitutional: Patient is awake, ambulating with no devices , no acute distress  Head: normocephalic, atraumatic. Behavioral/Psych: patient is pleasant, cooperative, awake and alert    Assessment:      Hospital Problems  Date Reviewed: 4/2/2021          Codes Class Noted POA    Venous insufficiency of left lower extremity ICD-10-CM: I87.2  ICD-9-CM: 459.81  2/10/2022 Yes        Wound of left leg, subsequent encounter ICD-10-CM: S81.802D  ICD-9-CM: V58.89, 894.0  1/13/2022 Yes        Chronic kidney disease (CKD), stage IV (severe) ([facility] [address] ICD-10-CM: N18.4  ICD-9-CM: 585.4  9/16/2020 Yes    Overview Signed 12/22/2020 10:52 AM by Sharee Jean-Baptiste MD     Cr in 2.6 range.   She sees Dr. Elisa Barros             Diabetes mellitus type 2, controlled ([facility] ICD-10-CM: E11.9  ICD-9-CM: 250.00  9/16/2020 Yes               POP IN PROCEDURE TYPE (DEBRIDEMENT, BIOPSY, I&D, SKIN SUB, CHEMICAL CAUERTY)     Plan:     Treatment Note please see attached Discharge Instructions  Patient has recurrent ulcerations, recommended venous ablation to prevent it from happening, the present ulcers have healed up with compression, patient was prescribed STEFANIE stockings  Written patient dismissal instructions given to patient or POA. Return Appointment:  []? Dressing supply provider: OLYA  []? Home Healthcare:  []? Return Appointment:   [facility])  []? Nurse Visit:   Week(s)     [x]? Discharge from [facility]  []? Ordered tests:  [x]? Referrals: DR MONZON- VASCULAR- PATIENT TO SCHEDULE APPOINTMENT  []? Rx:      Wound Cleansing:   Do not scrub or use excessive force. Cleanse wound prior to applying a clean dressing with:  []? Normal Saline          []? Mild Soap & Water    []? Keep Wound Dry in Shower  []? Other:       Topical Treatments:  Do not apply lotions, creams, or ointments to wound bed unless directed. []? Apply moisturizing lotion to skin surrounding the wound prior to dressing change. []? Apply antifungal ointment to skin surrounding the wound prior to dressing change. []? Apply thin film of moisture barrier ointment to skin immediately around wound. []? Other:                  Dressings:                  Wound Location LEFT LEG              []? Apply Primary Dressing:                                          []? MediHoney Gel         []? MediHoney Alginate                     []? Calcium Alginate      []? Calcium Alginate with Silver              []? Collagen                   []? Collagen with Silver                []? Santyl with Moistened saline gauze              []? Hydrofera Blue (cut to size and moistened)  []? Hydrofera Blue Ready (Cut to size)              []? NS wet to dry            []?  Betadine wet to dry              []? Hydrogel []? Mepitel                   []? Bactroban/Mupirocin                       []? Other:      []? Cover and Secure with:                   []? Gauze        []? Ricka Meigs           []? Kerlix              []? Foam          []? Super Absorbant    []? ABD                                      []? ACE Wrap            []? Other:               Limit contact of tape with skin.     []? Change dressing:  []? Daily           []? Every Other Day    []? Once per week   []? Twice per week              []? Three times per week        []? Do Not Change Dressing    []? Other:                Negative Pressure:           Wound Location:   []? Pressure @  mm/Hg                      []?Continuous  []? Intermittent              []? Black          []? White Foam              []? Bridge:               []? Change vac dressing three times per week     Pressure Relief:  []? When sitting, shift position or do seat lifts every 15 minutes. []? Wheelchair cushion           []? Specialty Bed/Mattress  []? Turn every 2 hours when in bed. Avoid direct pressure on wound site. Limit side lying to 30 degree tilt. Limit HOB elevation to 30 degrees. Edema Control:  Apply:  [x]? Compression Stocking:    mmHg   [x]? Right Leg     [x]? Left Leg              []? Tubigrip      []? Right Leg Double Layer      []? Left Leg Double Layer                                      []?Right Leg Single Layer       []? Left Leg Single Layer                 [x]? Elevate leg(s) above the level of the heart when sitting. [x]? Avoid prolonged standing in one place.         Compression:  Apply:  []? Multilayer Compression Wrap:      []? RightLeg      []? Left Leg                                 []?Coflex              []?Coflex Lite             []?Unna                 []? Do not get leg(s) with wrap wet. If wraps become too tight call the center or completely remove the wrap.                     []? Elevate leg(s) above the level of the heart when sitting. []? Avoid prolonged standing in one place.     Off-Loading:   []? Off-loading when   []? walking       []? in bed         []? sitting  []? Total non-weight bearing  []? Right Leg  []? Left Leg         []? Assistive Device    []? Kaelyn Jaimes        []? Cane      []? Wheelchair      []? Crutches              []? Surgical shoe    []? Podus Boot(s)   []? Foam Boot(s)  []? Roll About              []? Cast Boot   []? CROW Boot  []? Wedge Shoe  []? Other:     Contact Cast:  Apply:  []? Total Contact Cast Applied in Clinic          []? RightLeg      []? Left Leg              []? Do not get cast wet. Contact center or go to emergency room if there is a foul odor or becomes uncomfortable due to feeling tight or swelling. Do not use objects inside of cast to scratch.                  Dietary:  []? Diet as tolerated:   [x]? Calorie Diabetic Diet:         []? No Added Salt:  [x]? Increase Protein:   []? Other:              Activity:  [x]? Activity as tolerated:    [x]? Patient has no activity restrictions       []? Strict Bedrest:          []? Remain off Work:       []?  May return to full duty work:                                               []? Return to work with restrictions:                Electronically signed by Daksha Chilel MD on 2/24/2022 at 11:38 AM

## 2022-03-03 ENCOUNTER — TRANSCRIBE ORDER (OUTPATIENT)
Dept: SCHEDULING | Age: 77
End: 2022-03-03

## 2022-03-03 DIAGNOSIS — R10.30 LOWER ABDOMINAL PAIN: Primary | ICD-10-CM

## 2022-03-03 DIAGNOSIS — Z09 HOSPITAL DISCHARGE FOLLOW-UP: ICD-10-CM

## 2022-03-18 PROBLEM — E66.01 MORBID OBESITY (HCC): Status: ACTIVE | Noted: 2020-09-02

## 2022-03-18 PROBLEM — R40.4 EPISODE OF UNRESPONSIVENESS: Status: ACTIVE | Noted: 2020-09-24

## 2022-03-18 PROBLEM — I87.2 VENOUS INSUFFICIENCY OF LEFT LOWER EXTREMITY: Status: ACTIVE | Noted: 2022-02-10

## 2022-03-18 PROBLEM — S81.802D WOUND OF LEFT LEG, SUBSEQUENT ENCOUNTER: Status: ACTIVE | Noted: 2022-01-13

## 2022-03-18 PROBLEM — C64.9 ADENOCARCINOMA, RENAL CELL (HCC): Status: ACTIVE | Noted: 2020-09-02

## 2022-03-18 PROBLEM — R41.89 EPISODE OF UNRESPONSIVENESS: Status: ACTIVE | Noted: 2020-09-24

## 2022-03-19 PROBLEM — E11.9 DIABETES MELLITUS TYPE 2, CONTROLLED (HCC): Status: ACTIVE | Noted: 2020-09-16

## 2022-03-19 PROBLEM — I73.9 PERIPHERAL VASCULAR DISEASE (HCC): Status: ACTIVE | Noted: 2020-09-02

## 2022-03-19 PROBLEM — R55 SYNCOPE: Status: ACTIVE | Noted: 2020-11-21

## 2022-03-19 PROBLEM — G45.9 TIA (TRANSIENT ISCHEMIC ATTACK): Status: ACTIVE | Noted: 2020-09-16

## 2022-03-19 PROBLEM — N18.4 CHRONIC KIDNEY DISEASE (CKD), STAGE IV (SEVERE) (HCC): Status: ACTIVE | Noted: 2020-09-16

## 2022-03-19 PROBLEM — I10 HYPERTENSION: Status: ACTIVE | Noted: 2020-09-16

## 2022-03-19 PROBLEM — M19.90 ARTHRITIS: Status: ACTIVE | Noted: 2019-08-21

## 2022-03-19 PROBLEM — N25.81 SECONDARY HYPERPARATHYROIDISM (HCC): Status: ACTIVE | Noted: 2021-10-22

## 2022-03-19 PROBLEM — I25.10 ARTERIOSCLEROSIS OF CORONARY ARTERY: Status: ACTIVE | Noted: 2020-09-16

## 2022-03-20 PROBLEM — I10 ESSENTIAL HYPERTENSION: Status: ACTIVE | Noted: 2019-08-21

## 2022-03-20 PROBLEM — E78.5 DYSLIPIDEMIA: Status: ACTIVE | Noted: 2020-09-16

## 2022-03-20 PROBLEM — R55 SYNCOPAL EPISODES: Status: ACTIVE | Noted: 2021-10-21

## 2022-03-20 PROBLEM — M75.40 IMPINGEMENT SYNDROME OF SHOULDER REGION: Status: ACTIVE | Noted: 2019-08-21

## 2022-03-23 ENCOUNTER — HOSPITAL ENCOUNTER (OUTPATIENT)
Dept: ULTRASOUND IMAGING | Age: 77
Discharge: HOME | End: 2022-03-23
Payer: MEDICARE

## 2022-03-23 DIAGNOSIS — R10.30 LOWER ABDOMINAL PAIN: ICD-10-CM

## 2022-03-23 DIAGNOSIS — Z09 HOSPITAL DISCHARGE FOLLOW-UP: ICD-10-CM

## 2022-03-23 PROCEDURE — 76700 US EXAM ABDOM COMPLETE: CPT

## 2022-05-21 RX ORDER — HUMAN INSULIN 100 [IU]/ML
INJECTION, SUSPENSION SUBCUTANEOUS
Qty: 10 ML | Refills: 0 | Status: SHIPPED | OUTPATIENT
Start: 2022-05-21 | End: 2022-06-20 | Stop reason: SDUPTHER

## 2022-06-09 PROBLEM — M1A.00X0 CHRONIC GOUTY ARTHRITIS: Status: ACTIVE | Noted: 2022-03-04

## 2022-06-09 PROBLEM — I12.9 BENIGN HYPERTENSIVE RENAL DISEASE: Status: ACTIVE | Noted: 2022-03-04

## 2022-06-09 PROBLEM — F41.9 ANXIETY: Status: ACTIVE | Noted: 2022-03-04

## 2022-06-09 PROBLEM — I13.11 HYPERTENSIVE HEART AND CHRONIC KIDNEY DISEASE WITHOUT HEART FAILURE, WITH STAGE 5 CHRONIC KIDNEY DISEASE, OR END STAGE RENAL DISEASE (HCC): Status: ACTIVE | Noted: 2022-06-09

## 2022-06-09 PROBLEM — E11.65 HYPERGLYCEMIA DUE TO TYPE 2 DIABETES MELLITUS (HCC): Status: ACTIVE | Noted: 2022-06-09

## 2022-06-09 PROBLEM — E11.42 DIABETIC PERIPHERAL NEUROPATHY (HCC): Status: ACTIVE | Noted: 2022-03-04

## 2022-06-09 PROBLEM — M1A.9XX0 CHRONIC GOUTY ARTHRITIS: Status: ACTIVE | Noted: 2022-03-04

## 2022-06-20 ENCOUNTER — OFFICE VISIT (OUTPATIENT)
Dept: ENDOCRINOLOGY | Age: 77
End: 2022-06-20
Payer: MEDICARE

## 2022-06-20 VITALS
RESPIRATION RATE: 16 BRPM | DIASTOLIC BLOOD PRESSURE: 66 MMHG | TEMPERATURE: 96.9 F | BODY MASS INDEX: 35.24 KG/M2 | SYSTOLIC BLOOD PRESSURE: 151 MMHG | HEIGHT: 64 IN | OXYGEN SATURATION: 98 % | WEIGHT: 206.4 LBS | HEART RATE: 61 BPM

## 2022-06-20 DIAGNOSIS — Z79.4 TYPE 2 DIABETES MELLITUS WITH HYPERGLYCEMIA, WITH LONG-TERM CURRENT USE OF INSULIN (HCC): Primary | ICD-10-CM

## 2022-06-20 DIAGNOSIS — E11.65 TYPE 2 DIABETES MELLITUS WITH HYPERGLYCEMIA, WITH LONG-TERM CURRENT USE OF INSULIN (HCC): Primary | ICD-10-CM

## 2022-06-20 DIAGNOSIS — E11.65 CONTROLLED TYPE 2 DIABETES MELLITUS WITH HYPERGLYCEMIA, WITH LONG-TERM CURRENT USE OF INSULIN (HCC): ICD-10-CM

## 2022-06-20 DIAGNOSIS — N18.4 CHRONIC KIDNEY DISEASE (CKD), STAGE IV (SEVERE) (HCC): ICD-10-CM

## 2022-06-20 DIAGNOSIS — Z79.4 CONTROLLED TYPE 2 DIABETES MELLITUS WITH HYPERGLYCEMIA, WITH LONG-TERM CURRENT USE OF INSULIN (HCC): ICD-10-CM

## 2022-06-20 DIAGNOSIS — I10 ESSENTIAL HYPERTENSION: ICD-10-CM

## 2022-06-20 DIAGNOSIS — E78.5 DYSLIPIDEMIA: ICD-10-CM

## 2022-06-20 LAB
EST. AVERAGE GLUCOSE BLD GHB EST-MCNC: 120 MG/DL
HBA1C MFR BLD: 5.8 % (ref 4–5.6)
LDLC SERPL DIRECT ASSAY-MCNC: 61 MG/DL (ref 0–100)

## 2022-06-20 PROCEDURE — G8427 DOCREV CUR MEDS BY ELIG CLIN: HCPCS | Performed by: INTERNAL MEDICINE

## 2022-06-20 PROCEDURE — G9231 DOC ESRD DIA TRANS PREG: HCPCS | Performed by: INTERNAL MEDICINE

## 2022-06-20 PROCEDURE — G8400 PT W/DXA NO RESULTS DOC: HCPCS | Performed by: INTERNAL MEDICINE

## 2022-06-20 PROCEDURE — G8417 CALC BMI ABV UP PARAM F/U: HCPCS | Performed by: INTERNAL MEDICINE

## 2022-06-20 PROCEDURE — 1101F PT FALLS ASSESS-DOCD LE1/YR: CPT | Performed by: INTERNAL MEDICINE

## 2022-06-20 PROCEDURE — G8432 DEP SCR NOT DOC, RNG: HCPCS | Performed by: INTERNAL MEDICINE

## 2022-06-20 PROCEDURE — 1090F PRES/ABSN URINE INCON ASSESS: CPT | Performed by: INTERNAL MEDICINE

## 2022-06-20 PROCEDURE — 1123F ACP DISCUSS/DSCN MKR DOCD: CPT | Performed by: INTERNAL MEDICINE

## 2022-06-20 PROCEDURE — 99214 OFFICE O/P EST MOD 30 MIN: CPT | Performed by: INTERNAL MEDICINE

## 2022-06-20 PROCEDURE — G8536 NO DOC ELDER MAL SCRN: HCPCS | Performed by: INTERNAL MEDICINE

## 2022-06-20 RX ORDER — FLASH GLUCOSE SENSOR
KIT MISCELLANEOUS
Qty: 2 KIT | Refills: 5 | Status: SHIPPED | OUTPATIENT
Start: 2022-06-20 | End: 2022-08-01

## 2022-06-20 RX ORDER — HUMAN INSULIN 100 [IU]/ML
INJECTION, SUSPENSION SUBCUTANEOUS
Qty: 10 ML | Refills: 5 | Status: SHIPPED | OUTPATIENT
Start: 2022-06-20 | End: 2022-08-02

## 2022-06-20 RX ORDER — FLASH GLUCOSE SCANNING READER
EACH MISCELLANEOUS
Qty: 1 EACH | Refills: 1 | Status: SHIPPED | OUTPATIENT
Start: 2022-06-20 | End: 2022-08-01

## 2022-06-20 NOTE — LETTER
6/20/2022    Patient: Ronna Reinoso   YOB: 1945   Date of Visit: 6/20/2022     Cristiano Schuler NP  [place] [address]  Via Fax: [phone]    Dear Cristiano Schuler NP,      Thank you for referring Ms. Clinton Kumar to [address] [place] [address] for evaluation. My notes for this consultation are attached. If you have questions, please do not hesitate to call me. I look forward to following your patient along with you.       Sincerely,    Malena Flower MD

## 2022-06-20 NOTE — PROGRESS NOTES
Naida Pena MD        Patient Information  Date:6/20/2022  Name : Gregg Jimenez 65 y.o.     YOB: 1945         Referred by: Adrien Borrero NP         Chief Complaint   Patient presents with    Diabetes       History of Present Illness: Gregg Jimenez is a 65 y.o. female here for fu of  Type 2 Diabetes Mellitus. She was initially referred by ROM Chen for evaluation and management of type 2 diabetes mellitus. She has diabetes since age 60  complicated by nephropathy as well as neuropathy. Last visit was a year ago  She had TIA  No meter or the logbook  Reported low blood glucose, and she thought 110 is a low blood glucose      Fasting < 140  Bedtime no readings available  Used to have  freestyle elgin, not anymore, requesting new prescription    Analog insulins are expensive            Wt Readings from Last 3 Encounters:   06/20/22 206 lb 6.4 oz (93.6 kg)   02/20/22 202 lb (91.6 kg)   01/13/22 203 lb (92.1 kg)       BP Readings from Last 3 Encounters:   06/20/22 (!) 151/66   02/24/22 (!) 141/72   02/20/22 134/68           Past Medical History:   Diagnosis Date    Adenocarcinoma, renal cell ([facility] [address] 9/2/2020    Arthritis     CAD (coronary artery disease)     Chronic kidney disease     Diabetes ([facility] [address]     Gout     Hypercholesterolemia     Hypertension     Mental depression     Pulmonary embolism (HCC)     Seizures (HCC)     Stroke ([facility] [address]     Thyroid disease      Current Outpatient Medications   Medication Sig    NovoLIN N NPH U-100 Insulin 100 unit/mL injection INJECT 20 UNITS UNDER THE SKIN EVERY EVENING    flash glucose sensor (FreeStyle [place] 2 Sensor) kit Check sugars before each meal    flash glucose scanning reader (FreeStyle [place] 2 [place]) misc To check sugars before meals    plecanatide (Trulance) 3 mg tab Take 1 Tablet by mouth.     hydrALAZINE (APRESOLINE) 25 mg tablet Take 12.5 Tablets by mouth three (3) times daily.    cetirizine (ZYRTEC) 10 mg tablet Take 1 Tablet by mouth daily.  permethrin (ACTICIN) 5 % topical cream 1 application    PEG 2490-OYAKJTTQNXZX (GaviLyte-G) 236-22.74-6.74 -5.86 gram suspension GaviLyte-G 236 gram-22.74 gram-6.74 gram-5.86 gram oral solution   TAKE AS DIRECTED    mupirocin (BACTROBAN) 2 % ointment 1 application    lubiPROStone (Amitiza) 24 mcg capsule Amitiza 24 mcg capsule   TAKE 1 CAPSULE BY MOUTH TWICE DAILY    docusate sodium (Stool Softener) 100 mg capsule 1 capsule as needed    acetaminophen (Tylenol Extra Strength) 500 mg tablet 2 tablets as needed    metoprolol succinate (TOPROL-XL) 25 mg XL tablet Take 25 mg by mouth daily.  furosemide (LASIX) 40 mg tablet Take 60 mg by mouth daily.  Blood-Glucose Meter (OneTouch Verio Flex meter) misc Test TID Dx Code: E11.65    calcitRIOL (ROCALTROL) 0.25 mcg capsule Take 0.25 mcg by mouth daily.  colchicine 0.6 mg tablet Take 0.6 mg by mouth daily.  fluticasone propionate (FLOVENT DISKUS) 50 mcg/actuation inhaler Take  by inhalation.  donepeziL (ARICEPT) 10 mg tablet Take 10 mg by mouth nightly.  triamcinolone acetonide (KENALOG) 0.1 % topical cream Apply  to affected area two (2) times a day. use thin layer    nitroglycerin (NITROSTAT) 0.4 mg SL tablet 0.4 mg by SubLINGual route every five (5) minutes as needed for Chest Pain. Up to 3 doses.  apixaban (ELIQUIS) 2.5 mg tablet Take 2.5 mg by mouth two (2) times a day.  isosorbide mononitrate ER (IMDUR) 60 mg CR tablet Take 60 mg by mouth two (2) times a day.  gabapentin (NEURONTIN) 300 mg capsule Take 300 mg by mouth two (2) times a day.  venlafaxine (EFFEXOR) 75 mg tablet Take 75 mg by mouth daily.  latanoprost (XALATAN) 0.005 % ophthalmic solution Administer 1 Drop to both eyes nightly.  pravastatin (PRAVACHOL) 80 mg tablet Take 80 mg by mouth nightly.  aspirin 81 mg chewable tablet Take 81 mg by mouth daily.     allopurinol (ZYLOPRIM) 100 mg tablet Take 200 mg by mouth daily.  Comp Stocking,Knee,Regular,Sml misc 1 Device by Does Not Apply route daily.  lancets (One Touch Delica) 33 gauge misc Test TID Dx Code: E11.65    glucose blood VI test strips (OneTouch Verio test strips) strip Test TID Dx Code: E11.65     No current facility-administered medications for this visit. No Known Allergies      Review of Systems:  -   - Per HPI    Physical Examination:   Blood pressure (!) 151/66, pulse 61, temperature 96.9 °F (36.1 °C), temperature source Temporal, resp. rate 16, height 5' 4\" (1.626 m), weight 206 lb 6.4 oz (93.6 kg), SpO2 98 %. Estimated body mass index is 35.43 kg/m² as calculated from the following:    Height as of this encounter: 5' 4\" (1.626 m). -   Weight as of this encounter: 206 lb 6.4 oz (93.6 kg). - General: pleasant, no distress, good eye contact  - HEENT: no exophthalmos, no periorbital edema, EOMI  - Neck: No visible thyromegaly  - RS: Normal respiratory effort  - Musculoskeletal: no tremors  - Neurological: alert and oriented  - Psychiatric: normal mood and affect  - Skin: Normal color            Data Reviewed:   Lab Results   Component Value Date/Time    Hemoglobin A1c 6.5 (H) 10/22/2021 02:09 AM    Hemoglobin A1c 7.4 (H) 07/27/2021 12:00 AM    Hemoglobin A1c 6.2 (H) 11/21/2020 01:20 AM    Hemoglobin A1c, External 6.5 03/14/2021 12:00 AM    Hemoglobin A1c, External 6.1 07/20/2018 12:00 AM    Glucose 123 (H) 02/20/2022 10:37 AM    Glucose (POC) 169 (H) 10/24/2021 12:59 PM    Microalb/Creat ratio (ug/mg creat.) 883 (H) 07/27/2021 12:00 AM    LDL,Direct 86 07/27/2021 12:00 AM    LDL, calculated 73.2 11/22/2020 01:12 AM    Creatinine 2.42 (H) 02/20/2022 10:37 AM            Assessment/Plan:     1. Controlled type 2 diabetes mellitus with hyperglycemia, with long-term current use of insulin ([address]    2. Essential hypertension    3. Dyslipidemia    4. Chronic kidney disease (CKD), stage IV (severe) ([address]        1.  Type 2 Diabetes Mellitus with nephropathy,neuropathy,  Lab Results   Component Value Date/Time    Hemoglobin A1c 6.5 (H) 10/22/2021 02:09 AM    Hemoglobin A1c (POC) 5.8 09/23/2020 01:15 PM    Hemoglobin A1c, External 6.5 03/14/2021 12:00 AM      Due to decrease in clearance of insulin secondary to CKD she is at  high risk for hypoglycemia  Humulin N 20 units at night,  Check sugars TID    Patient is recommended to check the blood glucose 3 times a day. Patient is at risk for hypoglycemia. Annual eye exam, foot care    2. HTN : Continue current therapy     3. Hyperlipidemia : Continue statin. 4.Obesity:Body mass index is 35.43 kg/m². Discussed about the importance of  carbohydrate portion control. 5 CKD /-followed by nephrology, renal cell carcinoma   Not on dialysis    CAD - Dr Zacarias Garcia           There are no Patient Instructions on file for this visit. Follow-up and Dispositions    · Return in about 5 months (around 11/20/2022) for labs before next visit and follow up. Thank you for allowing me to participate in the care of this patient.     Sammi Rowe MD      Patient verbalized understanding

## 2022-06-20 NOTE — PROGRESS NOTES
Tunde Delvalle is a 65 y.o. female here for   Chief Complaint   Patient presents with    Diabetes       1. Have you been to the ER, urgent care clinic since your last visit? Hospitalized since your last visit? - no    2. Have you seen or consulted any other health care providers outside of the [address] [place] [address] since your last visit?   Include any pap smears or colon screening.- had a mild procedure on leg

## 2022-08-01 ENCOUNTER — HOSPITAL ENCOUNTER (INPATIENT)
Age: 77
LOS: 5 days | Discharge: HOME HEALTH | DRG: 377 | End: 2022-08-08
Attending: STUDENT IN AN ORGANIZED HEALTH CARE EDUCATION/TRAINING PROGRAM | Admitting: INTERNAL MEDICINE
Payer: MEDICARE

## 2022-08-01 ENCOUNTER — APPOINTMENT (OUTPATIENT)
Dept: GENERAL RADIOLOGY | Age: 77
DRG: 377 | End: 2022-08-01
Attending: STUDENT IN AN ORGANIZED HEALTH CARE EDUCATION/TRAINING PROGRAM
Payer: MEDICARE

## 2022-08-01 DIAGNOSIS — D50.0 IRON DEFICIENCY ANEMIA DUE TO CHRONIC BLOOD LOSS: ICD-10-CM

## 2022-08-01 DIAGNOSIS — R77.8 ELEVATED TROPONIN: ICD-10-CM

## 2022-08-01 DIAGNOSIS — R55 SYNCOPE AND COLLAPSE: Primary | ICD-10-CM

## 2022-08-01 PROBLEM — I21.4 NON-STEMI (NON-ST ELEVATED MYOCARDIAL INFARCTION) (HCC): Status: ACTIVE | Noted: 2022-08-01

## 2022-08-01 PROBLEM — D64.9 ANEMIA: Status: ACTIVE | Noted: 2022-08-01

## 2022-08-01 LAB
ABO + RH BLD: NORMAL
ALBUMIN SERPL-MCNC: 1.8 G/DL (ref 3.5–5)
ALBUMIN/GLOB SERPL: 0.7 {RATIO} (ref 1.1–2.2)
ALP SERPL-CCNC: 117 U/L (ref 45–117)
ALT SERPL-CCNC: 17 U/L (ref 12–78)
ANION GAP SERPL CALC-SCNC: 4 MMOL/L (ref 5–15)
AST SERPL W P-5'-P-CCNC: 25 U/L (ref 15–37)
BASOPHILS # BLD: 0 K/UL (ref 0–0.1)
BASOPHILS NFR BLD: 0 % (ref 0–1)
BILIRUB SERPL-MCNC: 0.5 MG/DL (ref 0.2–1)
BLOOD GROUP ANTIBODIES SERPL: NEGATIVE
BUN SERPL-MCNC: 51 MG/DL (ref 6–20)
BUN/CREAT SERPL: 24 (ref 12–20)
CA-I BLD-MCNC: 7.4 MG/DL (ref 8.5–10.1)
CHLORIDE SERPL-SCNC: 105 MMOL/L (ref 97–108)
CO2 SERPL-SCNC: 27 MMOL/L (ref 21–32)
COLLECT DATE STL: ABNORMAL
CREAT SERPL-MCNC: 2.1 MG/DL (ref 0.55–1.02)
DIFFERENTIAL METHOD BLD: ABNORMAL
EOSINOPHIL # BLD: 0 K/UL (ref 0–0.4)
EOSINOPHIL NFR BLD: 0 % (ref 0–7)
ERYTHROCYTE [DISTWIDTH] IN BLOOD BY AUTOMATED COUNT: 17.2 % (ref 11.5–14.5)
GLOBULIN SER CALC-MCNC: 2.7 G/DL (ref 2–4)
GLUCOSE SERPL-MCNC: 110 MG/DL (ref 65–100)
HCT VFR BLD AUTO: 24.1 % (ref 35–47)
HEMOCCULT SP1 STL QL: POSITIVE
HGB BLD-MCNC: 7.8 G/DL (ref 11.5–16)
IMM GRANULOCYTES # BLD AUTO: 0 K/UL (ref 0–0.04)
IMM GRANULOCYTES NFR BLD AUTO: 0 % (ref 0–0.5)
INR PPP: 1.9 (ref 0.9–1.1)
LYMPHOCYTES # BLD: 1 K/UL (ref 0.8–3.5)
LYMPHOCYTES NFR BLD: 19 % (ref 12–49)
MCH RBC QN AUTO: 31.7 PG (ref 26–34)
MCHC RBC AUTO-ENTMCNC: 32.4 G/DL (ref 30–36.5)
MCV RBC AUTO: 98 FL (ref 80–99)
MONOCYTES # BLD: 0.7 K/UL (ref 0–1)
MONOCYTES NFR BLD: 14 % (ref 5–13)
NEUTS SEG # BLD: 3.5 K/UL (ref 1.8–8)
NEUTS SEG NFR BLD: 67 % (ref 32–75)
NRBC # BLD: 0 K/UL (ref 0–0.01)
NRBC BLD-RTO: 0 PER 100 WBC
PLATELET # BLD AUTO: 159 K/UL (ref 150–400)
PMV BLD AUTO: 10.9 FL (ref 8.9–12.9)
POTASSIUM SERPL-SCNC: 3.1 MMOL/L (ref 3.5–5.1)
PROT SERPL-MCNC: 4.5 G/DL (ref 6.4–8.2)
PROTHROMBIN TIME: 22 SEC (ref 11.9–14.6)
RBC # BLD AUTO: 2.46 M/UL (ref 3.8–5.2)
SODIUM SERPL-SCNC: 136 MMOL/L (ref 136–145)
SPECIMEN EXP DATE BLD: NORMAL
TROPONIN-HIGH SENSITIVITY: 308 NG/L (ref 0–51)
WBC # BLD AUTO: 5.2 K/UL (ref 3.6–11)

## 2022-08-01 PROCEDURE — 74011250637 HC RX REV CODE- 250/637: Performed by: NURSE PRACTITIONER

## 2022-08-01 PROCEDURE — 80053 COMPREHEN METABOLIC PANEL: CPT

## 2022-08-01 PROCEDURE — 71045 X-RAY EXAM CHEST 1 VIEW: CPT

## 2022-08-01 PROCEDURE — 85025 COMPLETE CBC W/AUTO DIFF WBC: CPT

## 2022-08-01 PROCEDURE — 84484 ASSAY OF TROPONIN QUANT: CPT

## 2022-08-01 PROCEDURE — G0378 HOSPITAL OBSERVATION PER HR: HCPCS

## 2022-08-01 PROCEDURE — 85610 PROTHROMBIN TIME: CPT

## 2022-08-01 PROCEDURE — 86900 BLOOD TYPING SEROLOGIC ABO: CPT

## 2022-08-01 PROCEDURE — 74011250636 HC RX REV CODE- 250/636: Performed by: STUDENT IN AN ORGANIZED HEALTH CARE EDUCATION/TRAINING PROGRAM

## 2022-08-01 PROCEDURE — 36415 COLL VENOUS BLD VENIPUNCTURE: CPT

## 2022-08-01 PROCEDURE — 74011000250 HC RX REV CODE- 250: Performed by: NURSE PRACTITIONER

## 2022-08-01 PROCEDURE — C9113 INJ PANTOPRAZOLE SODIUM, VIA: HCPCS | Performed by: STUDENT IN AN ORGANIZED HEALTH CARE EDUCATION/TRAINING PROGRAM

## 2022-08-01 PROCEDURE — 99285 EMERGENCY DEPT VISIT HI MDM: CPT

## 2022-08-01 PROCEDURE — 82272 OCCULT BLD FECES 1-3 TESTS: CPT

## 2022-08-01 PROCEDURE — 93005 ELECTROCARDIOGRAM TRACING: CPT

## 2022-08-01 PROCEDURE — 96374 THER/PROPH/DIAG INJ IV PUSH: CPT

## 2022-08-01 RX ORDER — CETIRIZINE HCL 10 MG
10 TABLET ORAL DAILY
Status: DISCONTINUED | OUTPATIENT
Start: 2022-08-02 | End: 2022-08-08 | Stop reason: HOSPADM

## 2022-08-01 RX ORDER — DONEPEZIL HYDROCHLORIDE 5 MG/1
10 TABLET, FILM COATED ORAL
Status: DISCONTINUED | OUTPATIENT
Start: 2022-08-01 | End: 2022-08-08 | Stop reason: HOSPADM

## 2022-08-01 RX ORDER — POLYETHYLENE GLYCOL 3350 17 G/17G
17 POWDER, FOR SOLUTION ORAL DAILY PRN
Status: DISCONTINUED | OUTPATIENT
Start: 2022-08-01 | End: 2022-08-08 | Stop reason: HOSPADM

## 2022-08-01 RX ORDER — LANOLIN ALCOHOL/MO/W.PET/CERES
325 CREAM (GRAM) TOPICAL
Status: DISCONTINUED | OUTPATIENT
Start: 2022-08-02 | End: 2022-08-08 | Stop reason: HOSPADM

## 2022-08-01 RX ORDER — SODIUM CHLORIDE 0.9 % (FLUSH) 0.9 %
5-40 SYRINGE (ML) INJECTION EVERY 8 HOURS
Status: DISCONTINUED | OUTPATIENT
Start: 2022-08-01 | End: 2022-08-08 | Stop reason: HOSPADM

## 2022-08-01 RX ORDER — ACETAMINOPHEN 650 MG/1
650 SUPPOSITORY RECTAL
Status: DISCONTINUED | OUTPATIENT
Start: 2022-08-01 | End: 2022-08-08 | Stop reason: HOSPADM

## 2022-08-01 RX ORDER — LANOLIN ALCOHOL/MO/W.PET/CERES
325 CREAM (GRAM) TOPICAL
COMMUNITY
End: 2022-08-10

## 2022-08-01 RX ORDER — CARVEDILOL 3.12 MG/1
6.25 TABLET ORAL 2 TIMES DAILY WITH MEALS
Status: DISCONTINUED | OUTPATIENT
Start: 2022-08-01 | End: 2022-08-08 | Stop reason: HOSPADM

## 2022-08-01 RX ORDER — AMLODIPINE BESYLATE 5 MG/1
10 TABLET ORAL DAILY
Status: DISCONTINUED | OUTPATIENT
Start: 2022-08-02 | End: 2022-08-08 | Stop reason: HOSPADM

## 2022-08-01 RX ORDER — AMLODIPINE BESYLATE 10 MG/1
10 TABLET ORAL DAILY
COMMUNITY
End: 2022-08-13

## 2022-08-01 RX ORDER — POTASSIUM CHLORIDE 750 MG/1
40 TABLET, FILM COATED, EXTENDED RELEASE ORAL
Status: COMPLETED | OUTPATIENT
Start: 2022-08-01 | End: 2022-08-01

## 2022-08-01 RX ORDER — PANTOPRAZOLE SODIUM 40 MG/10ML
40 INJECTION, POWDER, LYOPHILIZED, FOR SOLUTION INTRAVENOUS
Status: COMPLETED | OUTPATIENT
Start: 2022-08-01 | End: 2022-08-01

## 2022-08-01 RX ORDER — SODIUM CHLORIDE 0.9 % (FLUSH) 0.9 %
5-40 SYRINGE (ML) INJECTION AS NEEDED
Status: DISCONTINUED | OUTPATIENT
Start: 2022-08-01 | End: 2022-08-08 | Stop reason: HOSPADM

## 2022-08-01 RX ORDER — TORSEMIDE 20 MG/1
20 TABLET ORAL 2 TIMES DAILY
COMMUNITY

## 2022-08-01 RX ORDER — ONDANSETRON 4 MG/1
4 TABLET, ORALLY DISINTEGRATING ORAL
Status: DISCONTINUED | OUTPATIENT
Start: 2022-08-01 | End: 2022-08-08 | Stop reason: HOSPADM

## 2022-08-01 RX ORDER — ONDANSETRON 2 MG/ML
4 INJECTION INTRAMUSCULAR; INTRAVENOUS
Status: DISCONTINUED | OUTPATIENT
Start: 2022-08-01 | End: 2022-08-08 | Stop reason: HOSPADM

## 2022-08-01 RX ORDER — GUAIFENESIN 100 MG/5ML
81 LIQUID (ML) ORAL DAILY
Status: DISCONTINUED | OUTPATIENT
Start: 2022-08-02 | End: 2022-08-08 | Stop reason: HOSPADM

## 2022-08-01 RX ORDER — ACETAMINOPHEN 325 MG/1
650 TABLET ORAL
Status: DISCONTINUED | OUTPATIENT
Start: 2022-08-01 | End: 2022-08-08 | Stop reason: HOSPADM

## 2022-08-01 RX ORDER — BISACODYL 5 MG
5 TABLET, DELAYED RELEASE (ENTERIC COATED) ORAL DAILY PRN
Status: DISCONTINUED | OUTPATIENT
Start: 2022-08-01 | End: 2022-08-08 | Stop reason: HOSPADM

## 2022-08-01 RX ORDER — CALCITRIOL 0.25 UG/1
0.25 CAPSULE ORAL DAILY
Status: DISCONTINUED | OUTPATIENT
Start: 2022-08-02 | End: 2022-08-08 | Stop reason: HOSPADM

## 2022-08-01 RX ORDER — GABAPENTIN 300 MG/1
300 CAPSULE ORAL 2 TIMES DAILY
Status: DISCONTINUED | OUTPATIENT
Start: 2022-08-01 | End: 2022-08-08 | Stop reason: HOSPADM

## 2022-08-01 RX ORDER — CARVEDILOL 6.25 MG/1
6.25 TABLET ORAL 2 TIMES DAILY WITH MEALS
COMMUNITY

## 2022-08-01 RX ORDER — ATORVASTATIN CALCIUM 10 MG/1
20 TABLET, FILM COATED ORAL
Status: DISCONTINUED | OUTPATIENT
Start: 2022-08-01 | End: 2022-08-08 | Stop reason: HOSPADM

## 2022-08-01 RX ORDER — DOCUSATE SODIUM 100 MG/1
100 CAPSULE, LIQUID FILLED ORAL 2 TIMES DAILY
Status: DISCONTINUED | OUTPATIENT
Start: 2022-08-01 | End: 2022-08-04

## 2022-08-01 RX ORDER — VENLAFAXINE HYDROCHLORIDE 75 MG/1
75 CAPSULE, EXTENDED RELEASE ORAL DAILY
Status: DISCONTINUED | OUTPATIENT
Start: 2022-08-02 | End: 2022-08-08 | Stop reason: HOSPADM

## 2022-08-01 RX ORDER — LATANOPROST 50 UG/ML
1 SOLUTION/ DROPS OPHTHALMIC
Status: DISCONTINUED | OUTPATIENT
Start: 2022-08-01 | End: 2022-08-08 | Stop reason: HOSPADM

## 2022-08-01 RX ADMIN — LATANOPROST 1 DROP: 50 SOLUTION OPHTHALMIC at 22:54

## 2022-08-01 RX ADMIN — PANTOPRAZOLE SODIUM 40 MG: 40 INJECTION, POWDER, FOR SOLUTION INTRAVENOUS at 17:08

## 2022-08-01 RX ADMIN — ATORVASTATIN CALCIUM 20 MG: 10 TABLET, FILM COATED ORAL at 21:39

## 2022-08-01 RX ADMIN — SODIUM CHLORIDE, PRESERVATIVE FREE 5 ML: 5 INJECTION INTRAVENOUS at 22:00

## 2022-08-01 RX ADMIN — DONEPEZIL HYDROCHLORIDE 10 MG: 5 TABLET, FILM COATED ORAL at 22:55

## 2022-08-01 RX ADMIN — DOCUSATE SODIUM 100 MG: 100 CAPSULE, LIQUID FILLED ORAL at 20:41

## 2022-08-01 RX ADMIN — POTASSIUM CHLORIDE 40 MEQ: 750 TABLET, FILM COATED, EXTENDED RELEASE ORAL at 20:40

## 2022-08-01 RX ADMIN — GABAPENTIN 300 MG: 300 CAPSULE ORAL at 20:41

## 2022-08-01 RX ADMIN — POTASSIUM CHLORIDE 40 MEQ: 750 TABLET, FILM COATED, EXTENDED RELEASE ORAL at 21:38

## 2022-08-01 NOTE — ED PROVIDER NOTES
EMERGENCY DEPARTMENT HISTORY AND PHYSICAL EXAM      Date: 8/1/2022  Patient Name: Mercy Stern    History of Presenting Illness     Chief Complaint   Patient presents with    Syncope       History Provided By: Patient    HPI: Mercy Stern, 65 y.o. female with a past medical history significant  CAD, diabetes, hypertension  presents to the ED with cc of syncopal episode. Patient was at a rehab center today, planning to be discharged home when she was on the commode, straining to use restroom, states that she then lost consciousness for several seconds. Denies falling hitting head. Denies any chest pain or palpitations. Patient states this has occurred in the past.  Currently patient awake alert oriented no distress, denies any weakness in any extremity, no note of any dark or tarry stool no fevers chills. There are no other complaints, changes, or physical findings at this time. PCP: Donell Dick NP    Current Outpatient Medications   Medication Sig Dispense Refill    NovoLIN N NPH U-100 Insulin 100 unit/mL injection INJECT 20 UNITS UNDER THE SKIN EVERY EVENING 10 mL 5    flash glucose sensor (FreeStyle [place] 2 Sensor) kit Check sugars before each meal 2 Kit 5    flash glucose scanning reader (FreeStyle [place] 2 Fossil) misc To check sugars before meals 1 Each 1    Comp Stocking,Knee,Regular,Sml misc 1 Device by Does Not Apply route daily. 2 Each 3    plecanatide (Trulance) 3 mg tab Take 1 Tablet by mouth. hydrALAZINE (APRESOLINE) 25 mg tablet Take 12.5 Tablets by mouth three (3) times daily. cetirizine (ZYRTEC) 10 mg tablet Take 1 Tablet by mouth daily.       permethrin (ACTICIN) 5 % topical cream 1 application      PEG 9091-DWPSJIEUUMNQ (GaviLyte-G) 236-22.74-6.74 -5.86 gram suspension GaviLyte-G 236 gram-22.74 gram-6.74 gram-5.86 gram oral solution   TAKE AS DIRECTED      mupirocin (BACTROBAN) 2 % ointment 1 application      lubiPROStone (Amitiza) 24 mcg capsule Amitiza 24 mcg capsule TAKE 1 CAPSULE BY MOUTH TWICE DAILY      docusate sodium (Stool Softener) 100 mg capsule 1 capsule as needed      acetaminophen (Tylenol Extra Strength) 500 mg tablet 2 tablets as needed      metoprolol succinate (TOPROL-XL) 25 mg XL tablet Take 25 mg by mouth daily. furosemide (LASIX) 40 mg tablet Take 60 mg by mouth daily. lancets (One Touch Delica) 33 gauge misc Test TID Dx Code: E11.65 300 Lancet 3    Blood-Glucose Meter (OneTouch Verio Flex meter) misc Test TID Dx Code: E11.65 1 Each 0    glucose blood VI test strips (OneTouch Verio test strips) strip Test TID Dx Code: E11.65 300 Strip 3    calcitRIOL (ROCALTROL) 0.25 mcg capsule Take 0.25 mcg by mouth daily. colchicine 0.6 mg tablet Take 0.6 mg by mouth daily. fluticasone propionate (FLOVENT DISKUS) 50 mcg/actuation inhaler Take  by inhalation. donepeziL (ARICEPT) 10 mg tablet Take 10 mg by mouth nightly. triamcinolone acetonide (KENALOG) 0.1 % topical cream Apply  to affected area two (2) times a day. use thin layer      nitroglycerin (NITROSTAT) 0.4 mg SL tablet 0.4 mg by SubLINGual route every five (5) minutes as needed for Chest Pain. Up to 3 doses. apixaban (ELIQUIS) 2.5 mg tablet Take 2.5 mg by mouth two (2) times a day. isosorbide mononitrate ER (IMDUR) 60 mg CR tablet Take 60 mg by mouth two (2) times a day.      gabapentin (NEURONTIN) 300 mg capsule Take 300 mg by mouth two (2) times a day. venlafaxine (EFFEXOR) 75 mg tablet Take 75 mg by mouth daily. latanoprost (XALATAN) 0.005 % ophthalmic solution Administer 1 Drop to both eyes nightly. pravastatin (PRAVACHOL) 80 mg tablet Take 80 mg by mouth nightly. aspirin 81 mg chewable tablet Take 81 mg by mouth daily. allopurinol (ZYLOPRIM) 100 mg tablet Take 200 mg by mouth daily.          Past History     Past Medical History:  Past Medical History:   Diagnosis Date    Adenocarcinoma, renal cell ([facility] [address] 9/2/2020    Arthritis     CAD (coronary artery disease)     Chronic kidney disease     Diabetes ([facility] [address]     Gout     Hypercholesterolemia     Hypertension     Mental depression     Pulmonary embolism ([facility])     Seizures ([facility])     Stroke ([facility] [address]     Thyroid disease        Past Surgical History:  Past Surgical History:   Procedure Laterality Date    HX GYN      HX HYSTERECTOMY      HX NEPHRECTOMY Left 02/12/2015    HX ORTHOPAEDIC      HX UROLOGICAL  02/12/2015    PARTIAL URETERECTOMY     [place] CARDIAC SURG PROCEDURE UNLIST      stents placed        Family History:  Family History   Problem Relation Age of Onset    Heart Disease Mother     Heart Disease Father     Heart Disease Sister     Cancer Sister     Heart Disease Brother     Cancer Maternal Grandmother     Stroke Son     Cancer Sister        Social History:  Social History     Tobacco Use    Smoking status: Former     Years: 15.00     Types: Cigarettes    Smokeless tobacco: Never   Vaping Use    Vaping Use: Never used   Substance Use Topics    Alcohol use: Not Currently    Drug use: Never       Allergies:  No Known Allergies      Review of Systems     Review of Systems   Constitutional:  Negative for activity change, chills, fatigue and fever. HENT:  Negative for congestion and trouble swallowing. Eyes:  Negative for photophobia and visual disturbance. Respiratory:  Negative for cough, chest tightness and shortness of breath. Cardiovascular:  Negative for chest pain and palpitations. Gastrointestinal:  Negative for abdominal distention, abdominal pain, diarrhea, nausea and vomiting. Genitourinary:  Negative for dysuria, flank pain and frequency. Musculoskeletal:  Negative for arthralgias, back pain and myalgias. Skin:  Negative for color change, pallor and rash. Neurological:  Positive for syncope and light-headedness. Negative for dizziness, tremors, weakness and headaches. Psychiatric/Behavioral:  Negative for confusion.       Physical Exam     Physical Exam  Vitals and nursing note reviewed. Constitutional:       General: She is not in acute distress. Appearance: Normal appearance. She is normal weight. She is not ill-appearing. HENT:      Head: Normocephalic and atraumatic. Nose: Nose normal.      Mouth/Throat:      Mouth: Mucous membranes are moist.   Eyes:      Extraocular Movements: Extraocular movements intact. Pupils: Pupils are equal, round, and reactive to light. Cardiovascular:      Rate and Rhythm: Normal rate and regular rhythm. Pulses: Normal pulses. Heart sounds: Normal heart sounds. Pulmonary:      Effort: Pulmonary effort is normal.      Breath sounds: Normal breath sounds. Abdominal:      General: Abdomen is flat. Bowel sounds are normal.      Palpations: Abdomen is soft. Tenderness: There is no abdominal tenderness. There is no guarding. Musculoskeletal:         General: No tenderness. Normal range of motion. Cervical back: Normal range of motion and neck supple. No muscular tenderness. Skin:     General: Skin is warm and dry. Neurological:      General: No focal deficit present. Mental Status: She is alert and oriented to person, place, and time. Mental status is at baseline. Cranial Nerves: No cranial nerve deficit. Sensory: No sensory deficit. Motor: No weakness.        Diagnostic Study Results     Labs -     Recent Results (from the past 12 hour(s))   CBC WITH AUTOMATED DIFF    Collection Time: 08/01/22  2:56 PM   Result Value Ref Range    WBC 5.2 3.6 - 11.0 K/uL    RBC 2.46 (L) 3.80 - 5.20 M/uL    HGB 7.8 (L) 11.5 - 16.0 g/dL    HCT 24.1 (L) 35.0 - 47.0 %    MCV 98.0 80.0 - 99.0 FL    MCH 31.7 26.0 - 34.0 PG    MCHC 32.4 30.0 - 36.5 g/dL    RDW 17.2 (H) 11.5 - 14.5 %    PLATELET 948 721 - 775 K/uL    MPV 10.9 8.9 - 12.9 FL    NRBC 0.0 0.0  WBC    ABSOLUTE NRBC 0.00 0.00 - 0.01 K/uL    NEUTROPHILS 67 32 - 75 %    LYMPHOCYTES 19 12 - 49 %    MONOCYTES 14 (H) 5 - 13 %    EOSINOPHILS 0 0 - 7 % BASOPHILS 0 0 - 1 %    IMMATURE GRANULOCYTES 0 0 - 0.5 %    ABS. NEUTROPHILS 3.5 1.8 - 8.0 K/UL    ABS. LYMPHOCYTES 1.0 0.8 - 3.5 K/UL    ABS. MONOCYTES 0.7 0.0 - 1.0 K/UL    ABS. EOSINOPHILS 0.0 0.0 - 0.4 K/UL    ABS. BASOPHILS 0.0 0.0 - 0.1 K/UL    ABS. IMM. GRANS. 0.0 0.00 - 0.04 K/UL    DF AUTOMATED     METABOLIC PANEL, COMPREHENSIVE    Collection Time: 08/01/22  2:56 PM   Result Value Ref Range    Sodium 136 136 - 145 mmol/L    Potassium 3.1 (L) 3.5 - 5.1 mmol/L    Chloride 105 97 - 108 mmol/L    CO2 27 21 - 32 mmol/L    Anion gap 4 (L) 5 - 15 mmol/L    Glucose 110 (H) 65 - 100 mg/dL    BUN 51 (H) 6 - 20 mg/dL    Creatinine 2.10 (H) 0.55 - 1.02 mg/dL    BUN/Creatinine ratio 24 (H) 12 - 20      GFR est AA 28 (L) >60 ml/min/1.73m2    GFR est non-AA 23 (L) >60 ml/min/1.73m2    Calcium 7.4 (L) 8.5 - 10.1 mg/dL    Bilirubin, total 0.5 0.2 - 1.0 mg/dL    AST (SGOT) 25 15 - 37 U/L    ALT (SGPT) 17 12 - 78 U/L    Alk. phosphatase 117 45 - 117 U/L    Protein, total 4.5 (L) 6.4 - 8.2 g/dL    Albumin 1.8 (L) 3.5 - 5.0 g/dL    Globulin 2.7 2.0 - 4.0 g/dL    A-G Ratio 0.7 (L) 1.1 - 2.2     TYPE & SCREEN    Collection Time: 08/01/22  2:56 PM   Result Value Ref Range    Crossmatch Expiration 08/04/2022,2359     ABO/Rh(D) A Positive     Antibody screen Negative    OCCULT BLOOD, STOOL    Collection Time: 08/01/22  4:25 PM   Result Value Ref Range    Occult Blood,day 1 Positive (A) Negative      Day 1 date: 8,022,022     TROPONIN-HIGH SENSITIVITY    Collection Time: 08/01/22  4:33 PM   Result Value Ref Range    Troponin-High Sensitivity 308 (HH) 0 - 51 ng/L   PROTHROMBIN TIME + INR    Collection Time: 08/01/22  4:33 PM   Result Value Ref Range    Prothrombin time 22.0 (H) 11.9 - 14.6 sec    INR 1.9 (H) 0.9 - 1.1         Radiologic Studies -   [unfilled]  CT Results  (Last 48 hours)      None          CXR Results  (Last 48 hours)                 08/01/22 1555  XR CHEST PORT Final result    Impression:  No acute process. Narrative: Indication: Syncope       Comparison: 2/20/2022       Portable exam of the chest obtained at 1555 demonstrates normal heart size. There is no acute process in the lung fields. Degenerative changes are seen in   the thoracic spine. Chronic rotator cuff tears are noted bilaterally. Medical Decision Making and ED Course   I am the first provider for this patient. I reviewed the vital signs, available nursing notes, past medical history, past surgical history, family history and social history. Vital Signs-Reviewed the patient's vital signs. Patient Vitals for the past 12 hrs:   Temp Pulse Resp BP SpO2   08/01/22 1356 98.2 °F (36.8 °C) 64 20 (!) 102/46 99 %       EKG interpretation: (Preliminary)  Normal sinus rhythm 64, no ST elevation, normal axis      Records Reviewed: Nursing Notes and Old Medical Records    The patient presents with syncope, differential diagnosis of vasovagal syncope, myocardial infarction, cardiac arrhythmia, hypotension  dehydration      Provider Notes (Medical Decision Making):     MDM  78-year-old female, history of hypertension, CAD, on Eliquis, presents to the emergency department for syncopal episode while on the commode straining today. Physical shows well-appearing female no distress, soft blood pressures 102/46, afebrile, nonfocal neurological exam, normal cardiovascular exam.    We will draw basic lab work, including troponin, obtain fecal occult blood. Continue to monitor. .      ED Course:   Initial assessment performed. The patients presenting problems have been discussed, and they are in agreement with the care plan formulated and outlined with them. I have encouraged them to ask questions as they arise throughout their visit. ED Course as of 08/01/22 1723   Mon Aug 01, 2022   1621 Patient's lab work resulting, CBC shows hemoglobin 10.8 hematocrit 40, no old labs to compare however 6 months ago patient had normal hemoglobin.   Panel shows renal insufficiency BUN 51 creatinine 2.10. Which appears to be at patient's baseline. [PZ]   1625 Bedside rectal exam completed, black stool noted, concern for GI bleed, anemia causing syncopal event. Type and screen has been sent. Troponin pending, anticipate admission to hospital. [PZ]   1717 Troponin markedly elevated 308. EKG shows no signs of acute ischemic change. [PZ]   0416 Discussed case with , will admit patient for syncope, anemia, elevated troponin. Believe that patient is having demand ischemia due to anemia, no chest pain no EKG ischemic changes, as patient has positive fecal occult blood, anemia and is on Eliquis will hold aspirin. [PZ]      ED Course User Index  [PZ] Joe Moe MD         Procedures       Maryann Pinedo MD  Procedures                   Disposition       Admitted      Diagnosis     Clinical Impression:   1. Syncope and collapse    2. Elevated troponin    3. Iron deficiency anemia due to chronic blood loss        Attestations:    Maryann Pinedo MD    Please note that this dictation was completed with [employer], the computer voice recognition software. Quite often unanticipated grammatical, syntax, homophones, and other interpretive errors are inadvertently transcribed by the computer software. Please disregard these errors. Please excuse any errors that have escaped final proofreading. Thank you.

## 2022-08-01 NOTE — H&P
History and Physical        Patient: Kin Shone MRN: [id number]  SSN: [id number]    YOB: 1945  Age: 65 y.o. Sex: female      Subjective:      Kin Shone is a 65 y.o. female who has a PMH significant for renal cell carcinoma status post left nephrectomy, HTN, CKD, CAD, HLD, CVA and diabetes. She comes to the emergency room from rehab center after having a syncopal episode while having a bowel movement on the toilet. Patient states she has had previous episodes similar. She states she lost consciousness for a couple of seconds but denies falling, chest pain or palpitations. Significant labs on admission hemoglobin 7.8, potassium 3.1, BUN 51, creatinine 2.10, FOBT positive, INR 1.9, troponin 380. Will admit for further management and work-up of syncope, anemia, Hemoccult positive, hypokalemia and non-STEMI. Will consult GI and cardiology.     Past Medical History:   Diagnosis Date    Adenocarcinoma, renal cell ([facility] [address] 9/2/2020    Arthritis     CAD (coronary artery disease)     Chronic kidney disease     Diabetes ([facility] [address]     Gout     Hypercholesterolemia     Hypertension     Mental depression     Pulmonary embolism (HCC)     Seizures ([address]     Stroke ([facility] [address]     Thyroid disease      Past Surgical History:   Procedure Laterality Date    HX GYN      HX HYSTERECTOMY      HX NEPHRECTOMY Left 02/12/2015    HX ORTHOPAEDIC      HX UROLOGICAL  02/12/2015    PARTIAL URETERECTOMY     GA CARDIAC SURG PROCEDURE UNLIST      stents placed       Family History   Problem Relation Age of Onset    Heart Disease Mother     Heart Disease Father     Heart Disease Sister     Cancer Sister     Heart Disease Brother     Cancer Maternal Grandmother     Stroke Son     Cancer Sister      Social History     Tobacco Use    Smoking status: Former     Years: 15.00     Types: Cigarettes    Smokeless tobacco: Never   Substance Use Topics    Alcohol use: Not Currently      Prior to Admission medications    Medication Sig Start Date End Date Taking? Authorizing Provider   torsemide (DEMADEX) 10 mg tablet Take 40 mg by mouth two (2) times a day. Yes Provider, Historical   carvediloL (Coreg) 6.25 mg tablet Take 6.25 mg by mouth two (2) times daily (with meals). Yes Provider, Historical   amLODIPine (NORVASC) 10 mg tablet Take 10 mg by mouth in the morning. Yes Provider, Historical   ferrous sulfate 324 mg (65 mg iron) tablet Take  by mouth Daily (before breakfast). Yes Provider, Historical   docusate sodium (COLACE) 100 mg capsule 1 capsule as needed   Yes Other, MD Mariela   calcitRIOL (ROCALTROL) 0.25 mcg capsule Take 0.25 mcg by mouth daily. Yes Provider, Historical   fluticasone propionate (FLOVENT DISKUS) 50 mcg/actuation inhaler Take  by inhalation. Yes Provider, Historical   donepeziL (ARICEPT) 10 mg tablet Take 10 mg by mouth nightly. Yes Provider, Historical   apixaban (ELIQUIS) 2.5 mg tablet Take 2.5 mg by mouth two (2) times a day. 6/18/19  Yes Provider, Historical   gabapentin (NEURONTIN) 300 mg capsule Take 300 mg by mouth two (2) times a day. 6/29/20  Yes Provider, Historical   venlafaxine (EFFEXOR) 75 mg tablet Take 75 mg by mouth daily. Yes Provider, Historical   latanoprost (XALATAN) 0.005 % ophthalmic solution Administer 1 Drop to both eyes nightly. Yes Provider, Historical   pravastatin (PRAVACHOL) 80 mg tablet Take 80 mg by mouth nightly. Yes Provider, Historical   aspirin 81 mg chewable tablet Take 81 mg by mouth daily. Yes Provider, Historical   NovoLIN N NPH U-100 Insulin 100 unit/mL injection INJECT 20 UNITS UNDER THE SKIN EVERY EVENING  Patient not taking: Reported on 8/1/2022 6/20/22   Martin Thao MD   cetirizine (ZYRTEC) 10 mg tablet Take 1 Tablet by mouth daily.   Patient not taking: Reported on 8/1/2022    Other, MD Mariela   permethrin (ACTICIN) 5 % topical cream 1 application  Patient not taking: Reported on 8/1/2022 7/26/21   Other, MD Mariela   lubiPROStone (AMITIZA) 24 mcg capsule Amitiza 24 mcg capsule   TAKE 1 CAPSULE BY MOUTH TWICE DAILY    Other, MD Mariela   acetaminophen (TYLENOL) 500 mg tablet 2 tablets as needed    Other, MD Mariela   triamcinolone acetonide (KENALOG) 0.1 % topical cream Apply  to affected area two (2) times a day. use thin layer  Patient not taking: Reported on 8/1/2022    Provider, Historical        No Known Allergies    Review of Systems   Constitutional:  Positive for malaise/fatigue. Respiratory:  Negative for cough and shortness of breath. Cardiovascular:  Positive for leg swelling. Negative for chest pain. Musculoskeletal:  Positive for myalgias. Neurological:  Positive for weakness. Objective:     Vitals:    08/01/22 1356 08/01/22 1818 08/01/22 1820 08/01/22 1821   BP: (!) 102/46 112/62 (!) 110/50 (!) 116/58   Pulse: 64 68 65 67   Resp: 20      Temp: 98.2 °F (36.8 °C)      SpO2: 99% 98% 98% 98%   Weight: 93.4 kg (206 lb)      Height: 5' 6\" (1.676 m)           Physical Exam  Constitutional:       Appearance: She is obese. She is ill-appearing. Cardiovascular:      Rate and Rhythm: Normal rate and regular rhythm. Pulmonary:      Effort: No respiratory distress. Abdominal:      General: There is distension. Tenderness: There is no abdominal tenderness. Musculoskeletal:      Right lower leg: Edema present. Left lower leg: Edema present. Neurological:      Motor: Weakness present.          Recent Results (from the past 24 hour(s))   CBC WITH AUTOMATED DIFF    Collection Time: 08/01/22  2:56 PM   Result Value Ref Range    WBC 5.2 3.6 - 11.0 K/uL    RBC 2.46 (L) 3.80 - 5.20 M/uL    HGB 7.8 (L) 11.5 - 16.0 g/dL    HCT 24.1 (L) 35.0 - 47.0 %    MCV 98.0 80.0 - 99.0 FL    MCH 31.7 26.0 - 34.0 PG    MCHC 32.4 30.0 - 36.5 g/dL    RDW 17.2 (H) 11.5 - 14.5 %    PLATELET 123 229 - 091 K/uL    MPV 10.9 8.9 - 12.9 FL    NRBC 0.0 0.0  WBC    ABSOLUTE NRBC 0.00 0.00 - 0.01 K/uL    NEUTROPHILS 67 32 - 75 %    LYMPHOCYTES 19 12 - 49 % MONOCYTES 14 (H) 5 - 13 %    EOSINOPHILS 0 0 - 7 %    BASOPHILS 0 0 - 1 %    IMMATURE GRANULOCYTES 0 0 - 0.5 %    ABS. NEUTROPHILS 3.5 1.8 - 8.0 K/UL    ABS. LYMPHOCYTES 1.0 0.8 - 3.5 K/UL    ABS. MONOCYTES 0.7 0.0 - 1.0 K/UL    ABS. EOSINOPHILS 0.0 0.0 - 0.4 K/UL    ABS. BASOPHILS 0.0 0.0 - 0.1 K/UL    ABS. IMM. GRANS. 0.0 0.00 - 0.04 K/UL    DF AUTOMATED     METABOLIC PANEL, COMPREHENSIVE    Collection Time: 08/01/22  2:56 PM   Result Value Ref Range    Sodium 136 136 - 145 mmol/L    Potassium 3.1 (L) 3.5 - 5.1 mmol/L    Chloride 105 97 - 108 mmol/L    CO2 27 21 - 32 mmol/L    Anion gap 4 (L) 5 - 15 mmol/L    Glucose 110 (H) 65 - 100 mg/dL    BUN 51 (H) 6 - 20 mg/dL    Creatinine 2.10 (H) 0.55 - 1.02 mg/dL    BUN/Creatinine ratio 24 (H) 12 - 20      GFR est AA 28 (L) >60 ml/min/1.73m2    GFR est non-AA 23 (L) >60 ml/min/1.73m2    Calcium 7.4 (L) 8.5 - 10.1 mg/dL    Bilirubin, total 0.5 0.2 - 1.0 mg/dL    AST (SGOT) 25 15 - 37 U/L    ALT (SGPT) 17 12 - 78 U/L    Alk. phosphatase 117 45 - 117 U/L    Protein, total 4.5 (L) 6.4 - 8.2 g/dL    Albumin 1.8 (L) 3.5 - 5.0 g/dL    Globulin 2.7 2.0 - 4.0 g/dL    A-G Ratio 0.7 (L) 1.1 - 2.2     TYPE & SCREEN    Collection Time: 08/01/22  2:56 PM   Result Value Ref Range    Crossmatch Expiration 08/04/2022,2359     ABO/Rh(D) A Positive     Antibody screen Negative    OCCULT BLOOD, STOOL    Collection Time: 08/01/22  4:25 PM   Result Value Ref Range    Occult Blood,day 1 Positive (A) Negative      Day 1 date: 8,022,022     TROPONIN-HIGH SENSITIVITY    Collection Time: 08/01/22  4:33 PM   Result Value Ref Range    Troponin-High Sensitivity 308 (HH) 0 - 51 ng/L   PROTHROMBIN TIME + INR    Collection Time: 08/01/22  4:33 PM   Result Value Ref Range    Prothrombin time 22.0 (H) 11.9 - 14.6 sec    INR 1.9 (H) 0.9 - 1.1         XR Results (maximum last 3):   Results from Hospital Encounter encounter on 08/01/22    XR CHEST PORT    Narrative  Indication: Syncope    Comparison: 2/20/2022    Portable exam of the chest obtained at 1555 demonstrates normal heart size. There is no acute process in the lung fields. Degenerative changes are seen in  the thoracic spine. Chronic rotator cuff tears are noted bilaterally. Impression  No acute process. Results from [place] encounter on 02/20/22    XR CHEST PORT    Narrative  Portable chest, 1059 hours. Comparison with 10/21/2021. Impression  Stable mild enlargement of cardiopericardial silhouette. Calcification of thoracic aorta. Prominent main pulmonary artery, suspicious for  pulmonary artery hypertension. No evidence of pulmonary edema, air space  pneumonia, or pleural effusion. No evidence of pneumothorax. Degenerative  changes of bilateral shoulders. Degenerative changes of thoracic spine. Results from [place] encounter on 10/21/21    XR CHEST PORT    Narrative  Chest single view. Comparison single view chest January 13, 2021. Lungs clear; no interstitial or alveolar pulmonary edema. Cardiac and  mediastinal structures unchanged. Thoracic aorta atherosclerosis. No  pneumothorax or sizable pleural effusion. CT Results (maximum last 3): Results from [place] [facility] encounter on 02/20/22    CT HEAD WO CONT    Narrative  CT HEAD WITHOUT IV CONTRAST    CLINICAL INDICATION: Syncopal episode    TECHNIQUE: Routine axial images were obtained through the brain without the use  of IV contrast. Sagittal and coronal reformatted images were performed at the CT  console. Dose reduction: Per department policy, all CT scans at this facility  are performed using dose reduction optimization techniques as appropriate to a  performed examination including the following: Automated exposure control,  adjustments of the mA and/or KV according to patient size, or use of iterative  reconstruction technique.     COMPARISON: Noncontrast head CT from 10/21/2021    FINDINGS:    No evidence of acute intracranial hemorrhage or mass effect. Confluent periventricular and deep white matter areas of hypoattenuation are  present, nonspecific, but likely sequela of chronic small vessel ischemic  changes in a patient this age. Moderate prominence of the extra-axial spaces, with commensurate ventricular  enlargement. No hydrocephalus. Remote lacunar infarctions of bilateral basal  ganglia. Portions of the posterior fossa not obscured by streak artifact are  unremarkable. Absent bilateral native lenses. Paranasal sinuses are predominantly clear. Tympanomastoid cavities are clear. No acute calvarial abnormality. Atherosclerotic calcification of the internal carotid arteries. Impression  1. No evidence of acute intracranial hemorrhage or mass effect. 2.  Moderate to severe chronic small vessel ischemic changes. Results from [place] [facility] encounter on 01/24/22    CT ABD PELV WO CONT    Narrative  Examination: CT Chest, Abdomen, and Pelvis without contrast    Indications: Left renal cancer    Comparison: CT abdomen and pelvis 12/9/2019, 9/13/2019; CT chest 9/13/2019    Technique: CT volume acquisition of the chest, abdomen, and pelvis was obtained  . Axial, sagittal, coronal, and MIP images were reviewed. Dose Reduction: Per departmental policy, all CT scans at this facility are  performed using dose reduction optimization techniques as appropriate to a  performed exam including the following: Automated exposure control, adjustment  of the mA and/or kV according to the patient size, or use of iterative  reconstruction technique. Chest Findings:    Lungs/Pleura: No focal consolidation, pleural effusion, or pneumothorax. Stable  3 mm pulmonary nodule in the right upper lobe (series 3, image 33). Mediastinum/Cardiac: The heart is enlarged. There is coronary disease. The main  pulmonary artery is dilated measuring 4.0 cm. There is atherosclerotic disease  of the aorta. No mediastinal or hilar lymphadenopathy.  Stable rim calcification  in the thyroid. Normal esophagus. Chest wall/Soft tissues: Normal.    Bones: Multilevel degenerative disc disease in the thoracic spine. Abdomen and Pelvis Findings:    Peritoneal cavity: No free fluid or free intraperitoneal air. Liver: Normal.    Gallbladder/biliary tree: Status post cholecystectomy. Spleen: Normal.    Adrenal glands: Normal.    Pancreas: Mild atrophy. Urinary tract: Status post left nephrectomy. No evidence of recurrence in the  surgical bed. Right kidney is normal. Normal bladder. Reproductive: Status post hysterectomy. GI tract/bowel: The distal esophagus and stomach are normal. The small bowel is  normal in caliber. The terminal ileum and appendix are normal. Scattered colonic  diverticula. No evidence of diverticulitis. Vasculature: Atherosclerotic disease of the aorta and branch vessels. No  aneurysm. Lymph nodes: No lymphadenopathy. Bones: Multilevel degenerative disc disease in the lower thoracic and lumbar  spine. No suspicious osseous lesion. Subcutaneous tissues: Supraumbilical hernia with a wide neck measuring 5.8 x 5.0  cm. Impression  1. Status post left nephrectomy. No evidence of recurrence or metastatic  disease. 2.  Stable right upper lobe nodule. 3.  Cardiac megaly with coronary artery disease and atherosclerotic disease. CT CHEST WO CONT    Narrative  Examination: CT Chest, Abdomen, and Pelvis without contrast    Indications: Left renal cancer    Comparison: CT abdomen and pelvis 12/9/2019, 9/13/2019; CT chest 9/13/2019    Technique: CT volume acquisition of the chest, abdomen, and pelvis was obtained  . Axial, sagittal, coronal, and MIP images were reviewed.     Dose Reduction: Per departmental policy, all CT scans at this facility are  performed using dose reduction optimization techniques as appropriate to a  performed exam including the following: Automated exposure control, adjustment  of the mA and/or kV according to the patient size, or use of iterative  reconstruction technique. Chest Findings:    Lungs/Pleura: No focal consolidation, pleural effusion, or pneumothorax. Stable  3 mm pulmonary nodule in the right upper lobe (series 3, image 33). Mediastinum/Cardiac: The heart is enlarged. There is coronary disease. The main  pulmonary artery is dilated measuring 4.0 cm. There is atherosclerotic disease  of the aorta. No mediastinal or hilar lymphadenopathy. Stable rim calcification  in the thyroid. Normal esophagus. Chest wall/Soft tissues: Normal.    Bones: Multilevel degenerative disc disease in the thoracic spine. Abdomen and Pelvis Findings:    Peritoneal cavity: No free fluid or free intraperitoneal air. Liver: Normal.    Gallbladder/biliary tree: Status post cholecystectomy. Spleen: Normal.    Adrenal glands: Normal.    Pancreas: Mild atrophy. Urinary tract: Status post left nephrectomy. No evidence of recurrence in the  surgical bed. Right kidney is normal. Normal bladder. Reproductive: Status post hysterectomy. GI tract/bowel: The distal esophagus and stomach are normal. The small bowel is  normal in caliber. The terminal ileum and appendix are normal. Scattered colonic  diverticula. No evidence of diverticulitis. Vasculature: Atherosclerotic disease of the aorta and branch vessels. No  aneurysm. Lymph nodes: No lymphadenopathy. Bones: Multilevel degenerative disc disease in the lower thoracic and lumbar  spine. No suspicious osseous lesion. Subcutaneous tissues: Supraumbilical hernia with a wide neck measuring 5.8 x 5.0  cm. Impression  1. Status post left nephrectomy. No evidence of recurrence or metastatic  disease. 2.  Stable right upper lobe nodule. 3.  Cardiac megaly with coronary artery disease and atherosclerotic disease. MRI Results (maximum last 3):   Results from [place] encounter on 11/21/20    MRA BRAIN WO CONT    Narrative  MRA INTRACRANIAL WITHOUT INTRAVENOUS CONTRAST:    CLINICAL HISTORY: Arterial occlusion. 3-D time-of-flight images of the intracranial arteries were obtained. Available scans show both the internal carotid arteries and their proximal  branches to be patent with no significant stenoses demonstrated. Both vertebral arteries, the basilar artery and both posterior cerebral are  patent with no significant stenoses demonstrated. No aneurysm was demonstrated. Included diffusion-weighted images show no acute infarction. Impression  IMPRESSION: Essentially negative study. Nuclear Medicine Results (maximum last 3): No results found for this or any previous visit. US Results (maximum last 3): Results from [place] encounter on 03/23/22    US ABD COMP    Narrative  Abdominal ultrasound, 3/23/2022    History: Lower abdominal pain. Comparison: Including CT abdomen pelvis 1/24/2022. Findings: The liver measures 14.6 cm in its craniocaudal extent. The liver  demonstrates normal echotexture. No focal lesion is identified. The main  portal vein is patent. The gallbladder is surgically absent. The common bile duct measures 0.83 cm. No intraductal stone is definitively identified. The right kidney measures 11.1 cm and has normal echotexture. No hydronephrosis  is identified. The left kidney is surgically absent. The spleen measures 7.8 cm in craniocaudal extent which is within normal limits. The pancreas is obscured. The visualized inferior vena cava is within normal limits. The visualized  abdominal aorta is 2.1 cm proximally, 1.8 cm midcourse, 1.6 cm distally. No free fluid is present in the abdomen. Impression  Cholecystectomy. Left nephrectomy. No acute process definitively  identified.       Active Problems:    Syncope (11/21/2020)        Assessment/Plan:     Syncopal episode while straining on the toilet  -Cardiology consulted    Acute anemia  -On Eliquis and aspirin which we will hold  -Hemoglobin 7.8 on admission unknown baseline  -Patient takes iron therapy which we will continue  -Consult GI    Non-STEMI  -Troponin elevated on admission at 380  -Likely demand ischemia from anemia  -Continue to trend  -Cardiology consulted    CAD  Holding aspirin, continue statin and beta-blocker and torsemide    HTN  HLD  -Continue Coreg, amlodipine, statin    History of depression  -Continue home medication venlafaxine    Hypokalemia  -Replete and monitor    CKD stage III  History of left nephrectomy  -BUN 51, creatinine 210 on admission, unknown baseline    History of dementia  -Continue Aricept    DVT Prophylaxis: SCDs  Code Status: Full code     POA/NOK:  Total Time spent in direct and indirect care including assessment review of labs and coordination of services and consultations: Greater than 75 minutes      Signed By: Sandy Hough NP     August 1, 2022

## 2022-08-02 LAB
ANION GAP SERPL CALC-SCNC: 1 MMOL/L (ref 5–15)
ATRIAL RATE: 58 BPM
ATRIAL RATE: 61 BPM
ATRIAL RATE: 64 BPM
BUN SERPL-MCNC: 63 MG/DL (ref 6–20)
BUN/CREAT SERPL: 22 (ref 12–20)
CA-I BLD-MCNC: 9.4 MG/DL (ref 8.5–10.1)
CALCULATED P AXIS, ECG09: 49 DEGREES
CALCULATED P AXIS, ECG09: 50 DEGREES
CALCULATED P AXIS, ECG09: 76 DEGREES
CALCULATED R AXIS, ECG10: 16 DEGREES
CALCULATED R AXIS, ECG10: 31 DEGREES
CALCULATED R AXIS, ECG10: 45 DEGREES
CALCULATED T AXIS, ECG11: 77 DEGREES
CALCULATED T AXIS, ECG11: 78 DEGREES
CALCULATED T AXIS, ECG11: 84 DEGREES
CHLORIDE SERPL-SCNC: 97 MMOL/L (ref 97–108)
CO2 SERPL-SCNC: 34 MMOL/L (ref 21–32)
CREAT SERPL-MCNC: 2.83 MG/DL (ref 0.55–1.02)
DIAGNOSIS, 93000: NORMAL
ERYTHROCYTE [DISTWIDTH] IN BLOOD BY AUTOMATED COUNT: 17.3 % (ref 11.5–14.5)
FERRITIN SERPL-MCNC: 181 NG/ML (ref 8–252)
GLUCOSE BLD STRIP.AUTO-MCNC: 190 MG/DL (ref 65–117)
GLUCOSE SERPL-MCNC: 129 MG/DL (ref 65–100)
HCT VFR BLD AUTO: 24.3 % (ref 35–47)
HGB BLD-MCNC: 7.7 G/DL (ref 11.5–16)
IRON SATN MFR SERPL: 14 % (ref 20–50)
IRON SERPL-MCNC: 45 UG/DL (ref 35–150)
MCH RBC QN AUTO: 31.4 PG (ref 26–34)
MCHC RBC AUTO-ENTMCNC: 31.7 G/DL (ref 30–36.5)
MCV RBC AUTO: 99.2 FL (ref 80–99)
NRBC # BLD: 0.02 K/UL (ref 0–0.01)
NRBC BLD-RTO: 0.4 PER 100 WBC
P-R INTERVAL, ECG05: 148 MS
PERFORMED BY, TECHID: ABNORMAL
PLATELET # BLD AUTO: 164 K/UL (ref 150–400)
PMV BLD AUTO: 10.6 FL (ref 8.9–12.9)
POTASSIUM SERPL-SCNC: 5.4 MMOL/L (ref 3.5–5.1)
Q-T INTERVAL, ECG07: 438 MS
Q-T INTERVAL, ECG07: 444 MS
Q-T INTERVAL, ECG07: 444 MS
QRS DURATION, ECG06: 90 MS
QRS DURATION, ECG06: 92 MS
QRS DURATION, ECG06: 92 MS
QTC CALCULATION (BEZET), ECG08: 435 MS
QTC CALCULATION (BEZET), ECG08: 446 MS
QTC CALCULATION (BEZET), ECG08: 451 MS
RBC # BLD AUTO: 2.45 M/UL (ref 3.8–5.2)
SODIUM SERPL-SCNC: 132 MMOL/L (ref 136–145)
TIBC SERPL-MCNC: 332 UG/DL (ref 250–450)
TROPONIN-HIGH SENSITIVITY: 267 NG/L (ref 0–51)
VENTRICULAR RATE, ECG03: 58 BPM
VENTRICULAR RATE, ECG03: 61 BPM
VENTRICULAR RATE, ECG03: 64 BPM
VIT B12 SERPL-MCNC: 1343 PG/ML (ref 193–986)
WBC # BLD AUTO: 4.5 K/UL (ref 3.6–11)

## 2022-08-02 PROCEDURE — 74011000250 HC RX REV CODE- 250: Performed by: NURSE PRACTITIONER

## 2022-08-02 PROCEDURE — 85027 COMPLETE CBC AUTOMATED: CPT

## 2022-08-02 PROCEDURE — G0378 HOSPITAL OBSERVATION PER HR: HCPCS

## 2022-08-02 PROCEDURE — 93005 ELECTROCARDIOGRAM TRACING: CPT

## 2022-08-02 PROCEDURE — 97161 PT EVAL LOW COMPLEX 20 MIN: CPT

## 2022-08-02 PROCEDURE — 83540 ASSAY OF IRON: CPT

## 2022-08-02 PROCEDURE — 97530 THERAPEUTIC ACTIVITIES: CPT

## 2022-08-02 PROCEDURE — 36415 COLL VENOUS BLD VENIPUNCTURE: CPT

## 2022-08-02 PROCEDURE — 80048 BASIC METABOLIC PNL TOTAL CA: CPT

## 2022-08-02 PROCEDURE — 84484 ASSAY OF TROPONIN QUANT: CPT

## 2022-08-02 PROCEDURE — 74011250637 HC RX REV CODE- 250/637: Performed by: INTERNAL MEDICINE

## 2022-08-02 PROCEDURE — 82962 GLUCOSE BLOOD TEST: CPT

## 2022-08-02 PROCEDURE — 82728 ASSAY OF FERRITIN: CPT

## 2022-08-02 PROCEDURE — 74011250637 HC RX REV CODE- 250/637: Performed by: NURSE PRACTITIONER

## 2022-08-02 PROCEDURE — 97165 OT EVAL LOW COMPLEX 30 MIN: CPT

## 2022-08-02 PROCEDURE — 82607 VITAMIN B-12: CPT

## 2022-08-02 RX ORDER — ALUMINA, MAGNESIA, AND SIMETHICONE 2400; 2400; 240 MG/30ML; MG/30ML; MG/30ML
30 SUSPENSION ORAL
COMMUNITY
End: 2022-08-10

## 2022-08-02 RX ORDER — ALBUTEROL SULFATE 0.83 MG/ML
2.5 SOLUTION RESPIRATORY (INHALATION)
COMMUNITY

## 2022-08-02 RX ORDER — LORATADINE 10 MG/1
10 TABLET ORAL DAILY
COMMUNITY
End: 2022-08-10

## 2022-08-02 RX ORDER — INSULIN LISPRO 100 [IU]/ML
INJECTION, SOLUTION INTRAVENOUS; SUBCUTANEOUS
COMMUNITY
End: 2022-08-10

## 2022-08-02 RX ORDER — INSULIN GLARGINE 100 [IU]/ML
10 INJECTION, SOLUTION SUBCUTANEOUS
COMMUNITY
End: 2022-08-10

## 2022-08-02 RX ORDER — PANTOPRAZOLE SODIUM 40 MG/1
40 TABLET, DELAYED RELEASE ORAL DAILY
Status: ON HOLD | COMMUNITY
End: 2022-08-08 | Stop reason: SDUPTHER

## 2022-08-02 RX ORDER — LIDOCAINE 50 MG/G
1 PATCH TOPICAL EVERY 24 HOURS
COMMUNITY
End: 2022-08-10

## 2022-08-02 RX ORDER — MAGNESIUM SULFATE 100 %
4 CRYSTALS MISCELLANEOUS AS NEEDED
Status: DISCONTINUED | OUTPATIENT
Start: 2022-08-02 | End: 2022-08-07 | Stop reason: SDUPTHER

## 2022-08-02 RX ORDER — DEXTROSE MONOHYDRATE 100 MG/ML
0-250 INJECTION, SOLUTION INTRAVENOUS AS NEEDED
Status: DISCONTINUED | OUTPATIENT
Start: 2022-08-02 | End: 2022-08-08 | Stop reason: HOSPADM

## 2022-08-02 RX ORDER — FLUTICASONE PROPIONATE 50 MCG
2 SPRAY, SUSPENSION (ML) NASAL DAILY
COMMUNITY
End: 2022-10-10

## 2022-08-02 RX ORDER — SENNOSIDES 8.6 MG/1
2 TABLET ORAL
COMMUNITY
End: 2022-08-10

## 2022-08-02 RX ORDER — TORSEMIDE 10 MG/1
40 TABLET ORAL 2 TIMES DAILY
Status: DISCONTINUED | OUTPATIENT
Start: 2022-08-02 | End: 2022-08-04

## 2022-08-02 RX ADMIN — AMLODIPINE BESYLATE 10 MG: 5 TABLET ORAL at 07:51

## 2022-08-02 RX ADMIN — GABAPENTIN 300 MG: 300 CAPSULE ORAL at 07:52

## 2022-08-02 RX ADMIN — ATORVASTATIN CALCIUM 20 MG: 10 TABLET, FILM COATED ORAL at 21:52

## 2022-08-02 RX ADMIN — GABAPENTIN 300 MG: 300 CAPSULE ORAL at 21:52

## 2022-08-02 RX ADMIN — CARVEDILOL 6.25 MG: 3.12 TABLET, FILM COATED ORAL at 17:26

## 2022-08-02 RX ADMIN — CETIRIZINE HYDROCHLORIDE 10 MG: 10 TABLET, FILM COATED ORAL at 07:52

## 2022-08-02 RX ADMIN — DONEPEZIL HYDROCHLORIDE 10 MG: 5 TABLET, FILM COATED ORAL at 21:52

## 2022-08-02 RX ADMIN — CARVEDILOL 6.25 MG: 3.12 TABLET, FILM COATED ORAL at 07:51

## 2022-08-02 RX ADMIN — VENLAFAXINE HYDROCHLORIDE 75 MG: 75 CAPSULE, EXTENDED RELEASE ORAL at 10:12

## 2022-08-02 RX ADMIN — LATANOPROST 1 DROP: 50 SOLUTION OPHTHALMIC at 21:54

## 2022-08-02 RX ADMIN — CALCITRIOL CAPSULES 0.25 MCG 0.25 MCG: 0.25 CAPSULE ORAL at 10:12

## 2022-08-02 RX ADMIN — FERROUS SULFATE TAB 325 MG (65 MG ELEMENTAL FE) 325 MG: 325 (65 FE) TAB at 07:52

## 2022-08-02 RX ADMIN — DOCUSATE SODIUM 100 MG: 100 CAPSULE, LIQUID FILLED ORAL at 07:52

## 2022-08-02 RX ADMIN — SODIUM CHLORIDE, PRESERVATIVE FREE 10 ML: 5 INJECTION INTRAVENOUS at 21:53

## 2022-08-02 RX ADMIN — TORSEMIDE 40 MG: 10 TABLET ORAL at 21:52

## 2022-08-02 NOTE — CONSULTS
Consult    Patient: Dirk Louie MRN: [id number]  SSN: [id number]    YOB: 1945  Age: 65 y.o. Sex: female      Subjective:      Dirk Louie is a 65 y.o. female who is being seen for GI bleed anemia. She was transferred from rehab or to here to emergency room due to the following this a syncope episode,  having a syncopal episode while having a bowel movement,   She states she lost consciousness for a couple of seconds but denies falling, chest pain or palpitations. S  In er her  hemoglobin  was 7.8, BUN 51,  FOBT positive, INR 1.9, troponin 380, patient has been admitted hospital, but still emergency room, GI consultation placed for anemia, Hemoccult positive  It appeared to be patient had the troponin elevation, with acute MI, cardiology consultation place as well.   No document nausea vomiting hematemesis no red blood per rectum and no melena  Past Medical History:   Diagnosis Date    Adenocarcinoma, renal cell ([address] 9/2/2020    Arthritis     CAD (coronary artery disease)     Chronic kidney disease     Diabetes ([address]     Gout     Hypercholesterolemia     Hypertension     Mental depression     Pulmonary embolism (HCC)     Seizures ([address]     [address]     Thyroid disease      Past Surgical History:   Procedure Laterality Date    HX GYN      HX HYSTERECTOMY      HX NEPHRECTOMY Left 02/12/2015    HX ORTHOPAEDIC      HX UROLOGICAL  02/12/2015    PARTIAL URETERECTOMY     [place] CARDIAC SURG PROCEDURE UNLIST      stents placed       Family History   Problem Relation Age of Onset    Heart Disease Mother     Heart Disease Father     Heart Disease Sister     Cancer Sister     Heart Disease Brother     Cancer Maternal Grandmother     Stroke Son     Cancer Sister      Social History     Tobacco Use    Smoking status: Former     Years: 15.00     Types: Cigarettes    Smokeless tobacco: Never   Substance Use Topics    Alcohol use: Not Currently      Current Facility-Administered Medications Medication Dose Route Frequency Provider Last Rate Last Admin    torsemide (DEMADEX) tablet 40 mg  40 mg Oral BID Alex Julian Jauregui MD        latanoprost (XALATAN) 0.005 % ophthalmic solution 1 Drop  1 Drop Both Eyes QHS Maxwell Stagers, NP   1 Drop at 08/01/22 2254    atorvastatin (LIPITOR) tablet 20 mg  20 mg Oral QHS Maxwell Stagers, NP   20 mg at 08/01/22 2139    [Held by provider] aspirin chewable tablet 81 mg  81 mg Oral DAILY Maxwell Stagers, NP        venlafaxine-SR [facility] capsule 75 mg  75 mg Oral DAILY Maxwell Stagers, NP   75 mg at 08/02/22 1012    [Held by provider] apixaban (ELIQUIS) tablet 2.5 mg  2.5 mg Oral BID Maxwell Stagers, NP        gabapentin (NEURONTIN) capsule 300 mg  300 mg Oral BID Maxwell Stagers, NP   300 mg at 08/02/22 6592    calcitRIOL (ROCALTROL) capsule 0.25 mcg  0.25 mcg Oral DAILY Maxwell Stagers, NP   0.25 mcg at 08/02/22 1012    donepeziL (ARICEPT) tablet 10 mg  10 mg Oral QHS Maxwell Stagers, NP   10 mg at 08/01/22 2255    cetirizine (ZYRTEC) tablet 10 mg  10 mg Oral DAILY Maxwell Stagers, NP   10 mg at 08/02/22 6585    docusate sodium (COLACE) capsule 100 mg  100 mg Oral BID Maxwell Stagers, NP   100 mg at 08/02/22 7026    carvediloL (COREG) tablet 6.25 mg  6.25 mg Oral BID WITH MEALS Maxwell Stagers, NP   6.25 mg at 08/02/22 0751    amLODIPine (NORVASC) tablet 10 mg  10 mg Oral DAILY Maxwell Stagers, NP   10 mg at 08/02/22 0751    ferrous sulfate tablet 325 mg  325 mg Oral ACB Maxwell Stagers, NP   325 mg at 08/02/22 1275    sodium chloride (NS) flush 5-40 mL  5-40 mL IntraVENous Q8H Maxwell Stagers, NP   5 mL at 08/01/22 2200    sodium chloride (NS) flush 5-40 mL  5-40 mL IntraVENous PRN Maxwell Stagers, NP        acetaminophen (TYLENOL) tablet 650 mg  650 mg Oral Q6H PRN Maxwell Mors, NP        Or    acetaminophen (TYLENOL) suppository 650 mg  650 mg Rectal Q6H PRN Maxwell Mors, NP        polyethylene glycol (MIRALAX) packet 17 g  17 g Oral DAILY PRN Goldie Alcantar NP        bisacodyL (DULCOLAX) tablet 5 mg  5 mg Oral DAILY PRN Goldie Alcantar NP        ondansetron (ZOFRAN ODT) tablet 4 mg  4 mg Oral Q6H PRN Goldie Alcantar NP        Or    ondansetron [facility]) injection 4 mg  4 mg IntraVENous Q6H PRN Goldie Alcantar NP         Current Outpatient Medications   Medication Sig Dispense Refill    albuterol (PROVENTIL VENTOLIN) 2.5 mg /3 mL (0.083 %) nebu 2.5 mg by Nebulization route every six (6) hours as needed for Shortness of Breath. fluticasone propionate (FLONASE) 50 mcg/actuation nasal spray 2 Sprays by Both Nostrils route daily. insulin lispro (HUMALOG) 100 unit/mL injection by SubCUTAneous route Before breakfast, lunch, dinner and at bedtime. SS: 150-200=2 units 201-250=4 units 251-300=6 units 301-350=8 units 351-400=10 units      insulin glargine (LANTUS) 100 unit/mL injection 10 Units by SubCUTAneous route nightly. lidocaine (LIDODERM) 5 % 1 Patch by TransDERmal route every twenty-four (24) hours. Apply to Right Hip Remove at bedtime      loratadine (CLARITIN) 10 mg tablet Take 10 mg by mouth in the morning. aluminum & magnesium hydroxide-simethicone (MYLANTA II) 400-400-40 mg/5 mL suspension Take 30 mL by mouth every six (6) hours as needed for Indigestion. pantoprazole (PROTONIX) 40 mg tablet Take 40 mg by mouth in the morning. senna (SENOKOT) 8.6 mg tablet Take 2 Tablets by mouth nightly. torsemide (DEMADEX) 20 mg tablet Take 20 mg by mouth two (2) times a day. carvediloL (COREG) 6.25 mg tablet Take 6.25 mg by mouth two (2) times daily (with meals). amLODIPine (NORVASC) 10 mg tablet Take 10 mg by mouth in the morning. ferrous sulfate 325 mg (65 mg iron) tablet Take 325 mg by mouth Daily (before breakfast). docusate sodium (COLACE) 100 mg capsule 1 capsule as needed      acetaminophen (TYLENOL) 325 mg tablet Take 325 mg by mouth every six (6) hours as needed for Pain.       calcitRIOL (ROCALTROL) 0.25 mcg capsule Take 0.25 mcg by mouth BID Mon Wed & Fri.      donepeziL (ARICEPT) 10 mg tablet Take 10 mg by mouth nightly. apixaban (ELIQUIS) 2.5 mg tablet Take 2.5 mg by mouth two (2) times a day.      gabapentin (NEURONTIN) 300 mg capsule Take 300 mg by mouth nightly. venlafaxine (EFFEXOR) 75 mg tablet Take 75 mg by mouth daily. latanoprost (XALATAN) 0.005 % ophthalmic solution Administer 1 Drop to both eyes nightly. pravastatin (PRAVACHOL) 80 mg tablet Take 80 mg by mouth nightly. aspirin 81 mg chewable tablet Take 81 mg by mouth daily. No Known Allergies    Review of Systems:  Review of Systems   Constitutional:  Positive for malaise/fatigue. HENT: Negative. Eyes: Negative. Respiratory:  Negative for hemoptysis. Cardiovascular: Negative. Gastrointestinal:  Positive for nausea. Musculoskeletal:  Positive for myalgias. Neurological:  Positive for dizziness. Psychiatric/Behavioral: Negative. Objective:     Vitals:    08/02/22 1400 08/02/22 1427 08/02/22 1514 08/02/22 1715   BP: (!) 112/50 (!) 120/45 119/81 136/62   Pulse: 63 63 (!) 58 72   Resp: 24 20 10 19   Temp:       SpO2:       Weight:       Height:            Physical Exam:  Physical Exam  Constitutional:       Appearance: She is ill-appearing. HENT:      Head: Atraumatic. Mouth/Throat:      Mouth: Mucous membranes are dry. Cardiovascular:      Rate and Rhythm: Regular rhythm. Pulmonary:      Effort: Pulmonary effort is normal.   Abdominal:      Palpations: There is no mass. Tenderness: There is no left CVA tenderness. Musculoskeletal:      Cervical back: Neck supple. Skin:     General: Skin is warm. Neurological:      General: No focal deficit present. Mental Status: Mental status is at baseline. Motor: Weakness present.    Psychiatric:         Mood and Affect: Mood normal.        Recent Results (from the past 24 hour(s))   METABOLIC PANEL, BASIC Collection Time: 08/02/22  3:13 AM   Result Value Ref Range    Sodium 132 (L) 136 - 145 mmol/L    Potassium 5.4 (H) 3.5 - 5.1 mmol/L    Chloride 97 97 - 108 mmol/L    CO2 34 (H) 21 - 32 mmol/L    Anion gap 1 (L) 5 - 15 mmol/L    Glucose 129 (H) 65 - 100 mg/dL    BUN 63 (H) 6 - 20 mg/dL    Creatinine 2.83 (H) 0.55 - 1.02 mg/dL    BUN/Creatinine ratio 22 (H) 12 - 20      GFR est AA 20 (L) >60 ml/min/1.73m2    GFR est non-AA 16 (L) >60 ml/min/1.73m2    Calcium 9.4 8.5 - 10.1 mg/dL   CBC W/O DIFF    Collection Time: 08/02/22  3:13 AM   Result Value Ref Range    WBC 4.5 3.6 - 11.0 K/uL    RBC 2.45 (L) 3.80 - 5.20 M/uL    HGB 7.7 (L) 11.5 - 16.0 g/dL    HCT 24.3 (L) 35.0 - 47.0 %    MCV 99.2 (H) 80.0 - 99.0 FL    MCH 31.4 26.0 - 34.0 PG    MCHC 31.7 30.0 - 36.5 g/dL    RDW 17.3 (H) 11.5 - 14.5 %    PLATELET 716 605 - 466 K/uL    MPV 10.6 8.9 - 12.9 FL    NRBC 0.4 (H) 0.0  WBC    ABSOLUTE NRBC 0.02 (H) 0.00 - 0.01 K/uL   FERRITIN    Collection Time: 08/02/22  3:13 AM   Result Value Ref Range    Ferritin 181 8 - 252 ng/mL   IRON PROFILE    Collection Time: 08/02/22  3:13 AM   Result Value Ref Range    Iron 45 35 - 150 ug/dL    TIBC 332 250 - 450 ug/dL    Iron % saturation 14 (L) 20 - 50 %   VITAMIN B12    Collection Time: 08/02/22  3:13 AM   Result Value Ref Range    Vitamin B12 1,343 (H) 193 - 986 pg/mL   TROPONIN-HIGH SENSITIVITY    Collection Time: 08/02/22  3:13 AM   Result Value Ref Range    Troponin-High Sensitivity 267 (HH) 0 - 51 ng/L   EKG, 12 LEAD, INITIAL    Collection Time: 08/02/22 11:57 AM   Result Value Ref Range    Ventricular Rate 58 BPM    Atrial Rate 58 BPM    P-R Interval 148 ms    QRS Duration 90 ms    Q-T Interval 444 ms    QTC Calculation (Bezet) 435 ms    Calculated P Axis 49 degrees    Calculated R Axis 45 degrees    Calculated T Axis 78 degrees    Diagnosis       Sinus bradycardia  Otherwise normal ECG  When compared with ECG of 01-AUG-2022 14:07, (Unconfirmed)  No significant change was found  Confirmed by Devorah Keys MD, Win Bell [phone]) on 8/2/2022 12:21:54 PM     EKG, 12 LEAD, SUBSEQUENT    Collection Time: 08/02/22  4:33 PM   Result Value Ref Range    Ventricular Rate 61 BPM    Atrial Rate 61 BPM    P-R Interval 148 ms    QRS Duration 92 ms    Q-T Interval 444 ms    QTC Calculation (Bezet) 446 ms    Calculated P Axis 50 degrees    Calculated R Axis 16 degrees    Calculated T Axis 77 degrees    Diagnosis       Normal sinus rhythm  Normal ECG  When compared with ECG of 02-AUG-2022 11:57,  No significant change was found  Confirmed by NICOL CUMMINS, Win Bell ([id number]) on 8/2/2022 5:08:25 PM          XR CHEST PORT   Final Result   No acute process.             Assessment:     Hospital Problems  Date Reviewed: 6/20/2022            Codes Class Noted POA    Non-STEMI (non-ST elevated myocardial infarction) [facility]) ICD-10-CM: I21.4  ICD-9-CM: 410.70  8/1/2022 Yes        Anemia ICD-10-CM: D64.9  ICD-9-CM: 285.9  8/1/2022 Yes        Syncope ICD-10-CM: R55  ICD-9-CM: 780.2  11/21/2020 Yes       Obscure GI bleeding, anemia,  No active or passive GI bleeding,  Hemoglobin 7.8 on admission unknown baseline      -Non-STEMI  -Troponin elevated on admission at 380  -Likely demand ischemia from anemia      Plan:   On Eliquis and aspirin which we will hold  Continue on the iron replacement,  PPI by mouth  Cardiology evaluation  Patient cardiovascular stable, may need a limited GI evaluation, such upper endoscopy , since patient may need back to the anticoagulation treatment antiplatelet treatment soon  -  Signed By: Mae Morgan MD     August 2, 2022         Thank you for allowing me to participate in this patients care  Cc Referring Physician   Sigifredo Randall NP

## 2022-08-02 NOTE — CONSULTS
Consult Date: 8/2/2022    IP CONSULT TO NEPHROLOGY  Consult performed by: Mook Pena MD  Consult ordered by: Kelvin Escobedo MD          Subjective   HISTORY OF PRESENTING ILLNESS :  Richard Ortega is a 65 y.o.,female ,BLACK/ with history of left nephrectomy due to renal cell carcinoma, hypertension, CKD stage IV, coronary artery disease, CVA and diabetes mellitus type 2 brought to the emergency room after having passed out. She was lightheaded and weak prior to this. No complaints of dysuria, fever or urinary symptoms  No complaints of chest pain or shortness of breath.   Initial admission creatinine was 2.10 and potassium was 3.1  Past Medical History:   Diagnosis Date    Adenocarcinoma, renal cell ([facility] 9/2/2020    Arthritis     CAD (coronary artery disease)     Chronic kidney disease     Diabetes ([facility]     Gout     Hypercholesterolemia     Hypertension     Mental depression     Pulmonary embolism (HCC)     Seizures (HCC)     Stroke ([facility]     Thyroid disease       Past Surgical History:   Procedure Laterality Date    HX GYN      HX HYSTERECTOMY      HX NEPHRECTOMY Left 02/12/2015    HX ORTHOPAEDIC      HX UROLOGICAL  02/12/2015    PARTIAL URETERECTOMY     [place] CARDIAC SURG PROCEDURE UNLIST      stents placed      Family History   Problem Relation Age of Onset    Heart Disease Mother     Heart Disease Father     Heart Disease Sister     Cancer Sister     Heart Disease Brother     Cancer Maternal Grandmother     Stroke Son     Cancer Sister       Social History     Tobacco Use    Smoking status: Former     Years: 15.00     Types: Cigarettes    Smokeless tobacco: Never   Substance Use Topics    Alcohol use: Not Currently       Current Facility-Administered Medications   Medication Dose Route Frequency Provider Last Rate Last Admin    torsemide (DEMADEX) tablet 40 mg  40 mg Oral BID Julian Benavides MD        latanoprost (XALATAN) 0.005 % ophthalmic solution 1 Drop  1 Drop Both Eyes QHS Brie Rump, NP   1 Drop at 08/01/22 2254    atorvastatin (LIPITOR) tablet 20 mg  20 mg Oral QHS Brie Horne NP   20 mg at 08/01/22 2139    [Held by provider] aspirin chewable tablet 81 mg  81 mg Oral DAILY Brie Horne NP        venlafaxine-SR [facility] capsule 75 mg  75 mg Oral DAILY Brie Horne NP   75 mg at 08/02/22 1012    [Held by provider] apixaban (ELIQUIS) tablet 2.5 mg  2.5 mg Oral BID Brie Horne NP        gabapentin (NEURONTIN) capsule 300 mg  300 mg Oral BID Brie Horne NP   300 mg at 08/02/22 2961    calcitRIOL (ROCALTROL) capsule 0.25 mcg  0.25 mcg Oral DAILY Brie Horne NP   0.25 mcg at 08/02/22 1012    donepeziL (ARICEPT) tablet 10 mg  10 mg Oral QHS Brie Horne NP   10 mg at 08/01/22 2255    cetirizine (ZYRTEC) tablet 10 mg  10 mg Oral DAILY Brie Horne NP   10 mg at 08/02/22 3442    docusate sodium (COLACE) capsule 100 mg  100 mg Oral BID Brie Horne NP   100 mg at 08/02/22 0752    carvediloL (COREG) tablet 6.25 mg  6.25 mg Oral BID WITH MEALS Brie Horne NP   6.25 mg at 08/02/22 0751    amLODIPine (NORVASC) tablet 10 mg  10 mg Oral DAILY Brie Horne NP   10 mg at 08/02/22 0751    . PHARMACY TO SUBSTITUTE PER PROTOCOL (Reordered from: fluticasone propionate (FLOVENT DISKUS) 50 mcg/actuation inhaler)    Per Protocol Brie Horne NP        ferrous sulfate tablet 325 mg  325 mg Oral ACB Brie Horne NP   325 mg at 08/02/22 0752    sodium chloride (NS) flush 5-40 mL  5-40 mL IntraVENous Q8H Brie Horne NP   5 mL at 08/01/22 2200    sodium chloride (NS) flush 5-40 mL  5-40 mL IntraVENous PRN Brie Horne NP        acetaminophen (TYLENOL) tablet 650 mg  650 mg Oral Q6H PRN Brie Rump, NP        Or    acetaminophen (TYLENOL) suppository 650 mg  650 mg Rectal Q6H PRN Brie Rump, NP        polyethylene glycol (MIRALAX) packet 17 g  17 g Oral DAILY PRN Brie Rump, NP        bisacodyL (DULCOLAX) tablet 5 mg  5 mg Oral DAILY PRN Vijaya Toledo, NP        ondansetron (ZOFRAN ODT) tablet 4 mg  4 mg Oral Q6H PRN Vijaya Toledo NP        Or    ondansetron [facility]) injection 4 mg  4 mg IntraVENous Q6H PRN Vijaya Toledo, ROM         Current Outpatient Medications   Medication Sig Dispense Refill    albuterol (PROVENTIL VENTOLIN) 2.5 mg /3 mL (0.083 %) nebu 2.5 mg by Nebulization route every six (6) hours as needed for Shortness of Breath. fluticasone propionate (FLONASE) 50 mcg/actuation nasal spray 2 Sprays by Both Nostrils route daily. insulin lispro (HUMALOG) 100 unit/mL injection by SubCUTAneous route Before breakfast, lunch, dinner and at bedtime. SS: 150-200=2 units 201-250=4 units 251-300=6 units 301-350=8 units 351-400=10 units      insulin glargine (LANTUS) 100 unit/mL injection 10 Units by SubCUTAneous route nightly. lidocaine (LIDODERM) 5 % 1 Patch by TransDERmal route every twenty-four (24) hours. Apply to Right Hip Remove at bedtime      loratadine (CLARITIN) 10 mg tablet Take 10 mg by mouth in the morning. aluminum & magnesium hydroxide-simethicone (MYLANTA II) 400-400-40 mg/5 mL suspension Take 30 mL by mouth every six (6) hours as needed for Indigestion. pantoprazole (PROTONIX) 40 mg tablet Take 40 mg by mouth in the morning. senna (SENOKOT) 8.6 mg tablet Take 2 Tablets by mouth nightly. torsemide (DEMADEX) 20 mg tablet Take 20 mg by mouth two (2) times a day. carvediloL (COREG) 6.25 mg tablet Take 6.25 mg by mouth two (2) times daily (with meals). amLODIPine (NORVASC) 10 mg tablet Take 10 mg by mouth in the morning. ferrous sulfate 325 mg (65 mg iron) tablet Take 325 mg by mouth Daily (before breakfast). docusate sodium (COLACE) 100 mg capsule 1 capsule as needed      acetaminophen (TYLENOL) 325 mg tablet Take 325 mg by mouth every six (6) hours as needed for Pain.       calcitRIOL (ROCALTROL) 0.25 mcg capsule Take 0.25 mcg by mouth BID Mon Wed & Fri.      donepeziL (ARICEPT) 10 mg tablet Take 10 mg by mouth nightly. apixaban (ELIQUIS) 2.5 mg tablet Take 2.5 mg by mouth two (2) times a day.      gabapentin (NEURONTIN) 300 mg capsule Take 300 mg by mouth nightly. venlafaxine (EFFEXOR) 75 mg tablet Take 75 mg by mouth daily. latanoprost (XALATAN) 0.005 % ophthalmic solution Administer 1 Drop to both eyes nightly. pravastatin (PRAVACHOL) 80 mg tablet Take 80 mg by mouth nightly. aspirin 81 mg chewable tablet Take 81 mg by mouth daily. Review of Systems   Constitutional:  Positive for activity change, appetite change and fatigue. Cardiovascular:  Positive for chest pain. Negative for leg swelling. Gastrointestinal: Negative. Genitourinary: Negative. Neurological:  Positive for syncope, weakness and light-headedness. Negative for seizures. All other systems reviewed and are negative.     Objective     Vital signs for last 24 hours:  Visit Vitals  BP (!) 125/46   Pulse (!) 59   Temp 98 °F (36.7 °C)   Resp 20   Ht 5' 6\" (1.676 m)   Wt 93.4 kg (206 lb)   SpO2 100%   BMI 33.25 kg/m²           Recent Results (from the past 24 hour(s))   EKG, 12 LEAD, INITIAL    Collection Time: 08/01/22  2:07 PM   Result Value Ref Range    Ventricular Rate 64 BPM    Atrial Rate 64 BPM    P-R Interval 148 ms    QRS Duration 92 ms    Q-T Interval 438 ms    QTC Calculation (Bezet) 451 ms    Calculated P Axis 76 degrees    Calculated R Axis 31 degrees    Calculated T Axis 84 degrees    Diagnosis       Normal sinus rhythm  Normal ECG  When compared with ECG of 20-FEB-2022 10:33,  No significant change was found  Confirmed by ARLINE MIKE ([id number]) on 8/2/2022 1:23:23 PM     CBC WITH AUTOMATED DIFF    Collection Time: 08/01/22  2:56 PM   Result Value Ref Range    WBC 5.2 3.6 - 11.0 K/uL    RBC 2.46 (L) 3.80 - 5.20 M/uL    HGB 7.8 (L) 11.5 - 16.0 g/dL    HCT 24.1 (L) 35.0 - 47.0 %    MCV 98.0 80.0 - 99.0 FL    MCH 31.7 26.0 - 34.0 PG MCHC 32.4 30.0 - 36.5 g/dL    RDW 17.2 (H) 11.5 - 14.5 %    PLATELET 645 700 - 153 K/uL    MPV 10.9 8.9 - 12.9 FL    NRBC 0.0 0.0  WBC    ABSOLUTE NRBC 0.00 0.00 - 0.01 K/uL    NEUTROPHILS 67 32 - 75 %    LYMPHOCYTES 19 12 - 49 %    MONOCYTES 14 (H) 5 - 13 %    EOSINOPHILS 0 0 - 7 %    BASOPHILS 0 0 - 1 %    IMMATURE GRANULOCYTES 0 0 - 0.5 %    ABS. NEUTROPHILS 3.5 1.8 - 8.0 K/UL    ABS. LYMPHOCYTES 1.0 0.8 - 3.5 K/UL    ABS. MONOCYTES 0.7 0.0 - 1.0 K/UL    ABS. EOSINOPHILS 0.0 0.0 - 0.4 K/UL    ABS. BASOPHILS 0.0 0.0 - 0.1 K/UL    ABS. IMM. GRANS. 0.0 0.00 - 0.04 K/UL    DF AUTOMATED     METABOLIC PANEL, COMPREHENSIVE    Collection Time: 08/01/22  2:56 PM   Result Value Ref Range    Sodium 136 136 - 145 mmol/L    Potassium 3.1 (L) 3.5 - 5.1 mmol/L    Chloride 105 97 - 108 mmol/L    CO2 27 21 - 32 mmol/L    Anion gap 4 (L) 5 - 15 mmol/L    Glucose 110 (H) 65 - 100 mg/dL    BUN 51 (H) 6 - 20 mg/dL    Creatinine 2.10 (H) 0.55 - 1.02 mg/dL    BUN/Creatinine ratio 24 (H) 12 - 20      GFR est AA 28 (L) >60 ml/min/1.73m2    GFR est non-AA 23 (L) >60 ml/min/1.73m2    Calcium 7.4 (L) 8.5 - 10.1 mg/dL    Bilirubin, total 0.5 0.2 - 1.0 mg/dL    AST (SGOT) 25 15 - 37 U/L    ALT (SGPT) 17 12 - 78 U/L    Alk.  phosphatase 117 45 - 117 U/L    Protein, total 4.5 (L) 6.4 - 8.2 g/dL    Albumin 1.8 (L) 3.5 - 5.0 g/dL    Globulin 2.7 2.0 - 4.0 g/dL    A-G Ratio 0.7 (L) 1.1 - 2.2     TYPE & SCREEN    Collection Time: 08/01/22  2:56 PM   Result Value Ref Range    Crossmatch Expiration 08/04/2022,2359     ABO/Rh(D) A Positive     Antibody screen Negative    OCCULT BLOOD, STOOL    Collection Time: 08/01/22  4:25 PM   Result Value Ref Range    Occult Blood,day 1 Positive (A) Negative      Day 1 date: 8,022,022     TROPONIN-HIGH SENSITIVITY    Collection Time: 08/01/22  4:33 PM   Result Value Ref Range    Troponin-High Sensitivity 308 (HH) 0 - 51 ng/L   PROTHROMBIN TIME + INR    Collection Time: 08/01/22  4:33 PM   Result Value Ref Range    Prothrombin time 22.0 (H) 11.9 - 14.6 sec    INR 1.9 (H) 0.9 - 1.1     METABOLIC PANEL, BASIC    Collection Time: 08/02/22  3:13 AM   Result Value Ref Range    Sodium 132 (L) 136 - 145 mmol/L    Potassium 5.4 (H) 3.5 - 5.1 mmol/L    Chloride 97 97 - 108 mmol/L    CO2 34 (H) 21 - 32 mmol/L    Anion gap 1 (L) 5 - 15 mmol/L    Glucose 129 (H) 65 - 100 mg/dL    BUN 63 (H) 6 - 20 mg/dL    Creatinine 2.83 (H) 0.55 - 1.02 mg/dL    BUN/Creatinine ratio 22 (H) 12 - 20      GFR est AA 20 (L) >60 ml/min/1.73m2    GFR est non-AA 16 (L) >60 ml/min/1.73m2    Calcium 9.4 8.5 - 10.1 mg/dL   CBC W/O DIFF    Collection Time: 08/02/22  3:13 AM   Result Value Ref Range    WBC 4.5 3.6 - 11.0 K/uL    RBC 2.45 (L) 3.80 - 5.20 M/uL    HGB 7.7 (L) 11.5 - 16.0 g/dL    HCT 24.3 (L) 35.0 - 47.0 %    MCV 99.2 (H) 80.0 - 99.0 FL    MCH 31.4 26.0 - 34.0 PG    MCHC 31.7 30.0 - 36.5 g/dL    RDW 17.3 (H) 11.5 - 14.5 %    PLATELET 275 569 - 927 K/uL    MPV 10.6 8.9 - 12.9 FL    NRBC 0.4 (H) 0.0  WBC    ABSOLUTE NRBC 0.02 (H) 0.00 - 0.01 K/uL   FERRITIN    Collection Time: 08/02/22  3:13 AM   Result Value Ref Range    Ferritin 181 8 - 252 ng/mL   IRON PROFILE    Collection Time: 08/02/22  3:13 AM   Result Value Ref Range    Iron 45 35 - 150 ug/dL    TIBC 332 250 - 450 ug/dL    Iron % saturation 14 (L) 20 - 50 %   VITAMIN B12    Collection Time: 08/02/22  3:13 AM   Result Value Ref Range    Vitamin B12 1,343 (H) 193 - 986 pg/mL   TROPONIN-HIGH SENSITIVITY    Collection Time: 08/02/22  3:13 AM   Result Value Ref Range    Troponin-High Sensitivity 267 (HH) 0 - 51 ng/L   EKG, 12 LEAD, INITIAL    Collection Time: 08/02/22 11:57 AM   Result Value Ref Range    Ventricular Rate 58 BPM    Atrial Rate 58 BPM    P-R Interval 148 ms    QRS Duration 90 ms    Q-T Interval 444 ms    QTC Calculation (Bezet) 435 ms    Calculated P Axis 49 degrees    Calculated R Axis 45 degrees    Calculated T Axis 78 degrees    Diagnosis       Sinus bradycardia  Otherwise normal ECG  When compared with ECG of 01-AUG-2022 14:07, (Unconfirmed)  No significant change was found  Confirmed by CHRISTINA CAMARENA MD ([id number]) on 8/2/2022 12:21:54 PM          No intake or output data in the 24 hours ending 08/02/22 1353   Current Shift: No intake/output data recorded. Last 3 Shifts: No intake/output data recorded. Physical Exam  Vitals and nursing note reviewed. Constitutional:       Appearance: Normal appearance. She is normal weight. HENT:      Head: Normocephalic and atraumatic. Nose: Nose normal.      Mouth/Throat:      Mouth: Mucous membranes are moist.      Pharynx: Oropharynx is clear. Eyes:      Conjunctiva/sclera: Conjunctivae normal.   Cardiovascular:      Rate and Rhythm: Regular rhythm. Tachycardia present. Pulses: Normal pulses. Heart sounds: Normal heart sounds. Pulmonary:      Effort: Pulmonary effort is normal.      Breath sounds: Normal breath sounds. Abdominal:      General: Abdomen is flat. Palpations: Abdomen is soft. Skin:     General: Skin is warm and dry. Coloration: Skin is not pale. Findings: No erythema. Neurological:      General: No focal deficit present. Mental Status: She is alert and oriented to person, place, and time.    Psychiatric:         Mood and Affect: Mood normal.         Behavior: Behavior normal.        Data Review:   Recent Results (from the past 24 hour(s))   EKG, 12 LEAD, INITIAL    Collection Time: 08/01/22  2:07 PM   Result Value Ref Range    Ventricular Rate 64 BPM    Atrial Rate 64 BPM    P-R Interval 148 ms    QRS Duration 92 ms    Q-T Interval 438 ms    QTC Calculation (Bezet) 451 ms    Calculated P Axis 76 degrees    Calculated R Axis 31 degrees    Calculated T Axis 84 degrees    Diagnosis       Normal sinus rhythm  Normal ECG  When compared with ECG of 20-FEB-2022 10:33,  No significant change was found  Confirmed by ARLINE MIKE ([id number]) on 8/2/2022 1:23:23 PM     CBC WITH AUTOMATED DIFF    Collection Time: 08/01/22  2:56 PM   Result Value Ref Range    WBC 5.2 3.6 - 11.0 K/uL    RBC 2.46 (L) 3.80 - 5.20 M/uL    HGB 7.8 (L) 11.5 - 16.0 g/dL    HCT 24.1 (L) 35.0 - 47.0 %    MCV 98.0 80.0 - 99.0 FL    MCH 31.7 26.0 - 34.0 PG    MCHC 32.4 30.0 - 36.5 g/dL    RDW 17.2 (H) 11.5 - 14.5 %    PLATELET 778 259 - 534 K/uL    MPV 10.9 8.9 - 12.9 FL    NRBC 0.0 0.0  WBC    ABSOLUTE NRBC 0.00 0.00 - 0.01 K/uL    NEUTROPHILS 67 32 - 75 %    LYMPHOCYTES 19 12 - 49 %    MONOCYTES 14 (H) 5 - 13 %    EOSINOPHILS 0 0 - 7 %    BASOPHILS 0 0 - 1 %    IMMATURE GRANULOCYTES 0 0 - 0.5 %    ABS. NEUTROPHILS 3.5 1.8 - 8.0 K/UL    ABS. LYMPHOCYTES 1.0 0.8 - 3.5 K/UL    ABS. MONOCYTES 0.7 0.0 - 1.0 K/UL    ABS. EOSINOPHILS 0.0 0.0 - 0.4 K/UL    ABS. BASOPHILS 0.0 0.0 - 0.1 K/UL    ABS. IMM. GRANS. 0.0 0.00 - 0.04 K/UL    DF AUTOMATED     METABOLIC PANEL, COMPREHENSIVE    Collection Time: 08/01/22  2:56 PM   Result Value Ref Range    Sodium 136 136 - 145 mmol/L    Potassium 3.1 (L) 3.5 - 5.1 mmol/L    Chloride 105 97 - 108 mmol/L    CO2 27 21 - 32 mmol/L    Anion gap 4 (L) 5 - 15 mmol/L    Glucose 110 (H) 65 - 100 mg/dL    BUN 51 (H) 6 - 20 mg/dL    Creatinine 2.10 (H) 0.55 - 1.02 mg/dL    BUN/Creatinine ratio 24 (H) 12 - 20      GFR est AA 28 (L) >60 ml/min/1.73m2    GFR est non-AA 23 (L) >60 ml/min/1.73m2    Calcium 7.4 (L) 8.5 - 10.1 mg/dL    Bilirubin, total 0.5 0.2 - 1.0 mg/dL    AST (SGOT) 25 15 - 37 U/L    ALT (SGPT) 17 12 - 78 U/L    Alk.  phosphatase 117 45 - 117 U/L    Protein, total 4.5 (L) 6.4 - 8.2 g/dL    Albumin 1.8 (L) 3.5 - 5.0 g/dL    Globulin 2.7 2.0 - 4.0 g/dL    A-G Ratio 0.7 (L) 1.1 - 2.2     TYPE & SCREEN    Collection Time: 08/01/22  2:56 PM   Result Value Ref Range    Crossmatch Expiration 08/04/2022,2359     ABO/Rh(D) A Positive     Antibody screen Negative    OCCULT BLOOD, STOOL    Collection Time: 08/01/22  4:25 PM   Result Value Ref Range    Occult Blood,day 1 Positive (A) Negative      Day 1 date: 8,022,022     TROPONIN-HIGH SENSITIVITY    Collection Time: 08/01/22  4:33 PM   Result Value Ref Range    Troponin-High Sensitivity 308 (HH) 0 - 51 ng/L   PROTHROMBIN TIME + INR    Collection Time: 08/01/22  4:33 PM   Result Value Ref Range    Prothrombin time 22.0 (H) 11.9 - 14.6 sec    INR 1.9 (H) 0.9 - 1.1     METABOLIC PANEL, BASIC    Collection Time: 08/02/22  3:13 AM   Result Value Ref Range    Sodium 132 (L) 136 - 145 mmol/L    Potassium 5.4 (H) 3.5 - 5.1 mmol/L    Chloride 97 97 - 108 mmol/L    CO2 34 (H) 21 - 32 mmol/L    Anion gap 1 (L) 5 - 15 mmol/L    Glucose 129 (H) 65 - 100 mg/dL    BUN 63 (H) 6 - 20 mg/dL    Creatinine 2.83 (H) 0.55 - 1.02 mg/dL    BUN/Creatinine ratio 22 (H) 12 - 20      GFR est AA 20 (L) >60 ml/min/1.73m2    GFR est non-AA 16 (L) >60 ml/min/1.73m2    Calcium 9.4 8.5 - 10.1 mg/dL   CBC W/O DIFF    Collection Time: 08/02/22  3:13 AM   Result Value Ref Range    WBC 4.5 3.6 - 11.0 K/uL    RBC 2.45 (L) 3.80 - 5.20 M/uL    HGB 7.7 (L) 11.5 - 16.0 g/dL    HCT 24.3 (L) 35.0 - 47.0 %    MCV 99.2 (H) 80.0 - 99.0 FL    MCH 31.4 26.0 - 34.0 PG    MCHC 31.7 30.0 - 36.5 g/dL    RDW 17.3 (H) 11.5 - 14.5 %    PLATELET 758 506 - 121 K/uL    MPV 10.6 8.9 - 12.9 FL    NRBC 0.4 (H) 0.0  WBC    ABSOLUTE NRBC 0.02 (H) 0.00 - 0.01 K/uL   FERRITIN    Collection Time: 08/02/22  3:13 AM   Result Value Ref Range    Ferritin 181 8 - 252 ng/mL   IRON PROFILE    Collection Time: 08/02/22  3:13 AM   Result Value Ref Range    Iron 45 35 - 150 ug/dL    TIBC 332 250 - 450 ug/dL    Iron % saturation 14 (L) 20 - 50 %   VITAMIN B12    Collection Time: 08/02/22  3:13 AM   Result Value Ref Range    Vitamin B12 1,343 (H) 193 - 986 pg/mL   TROPONIN-HIGH SENSITIVITY    Collection Time: 08/02/22  3:13 AM   Result Value Ref Range    Troponin-High Sensitivity 267 (HH) 0 - 51 ng/L   EKG, 12 LEAD, INITIAL    Collection Time: 08/02/22 11:57 AM   Result Value Ref Range    Ventricular Rate 58 BPM Atrial Rate 58 BPM    P-R Interval 148 ms    QRS Duration 90 ms    Q-T Interval 444 ms    QTC Calculation (Bezet) 435 ms    Calculated P Axis 49 degrees    Calculated R Axis 45 degrees    Calculated T Axis 78 degrees    Diagnosis       Sinus bradycardia  Otherwise normal ECG  When compared with ECG of 01-AUG-2022 14:07, (Unconfirmed)  No significant change was found  Confirmed by NICOL CUMMINS, Shital Ortiz ([id number]) on 8/2/2022 12:21:54 PM           XR CHEST PORT   Final Result   No acute process. Patient Active Problem List   Diagnosis Code    Arthritis M19.90    Essential hypertension I10    Impingement syndrome of shoulder region M75.40    Adenocarcinoma, renal cell (HCC) C64.9    Morbid obesity ([facility]) E66.01    Peripheral vascular disease ([facility]) I73.9    Arteriosclerosis of coronary artery I25.10    Dyslipidemia E78.5    Pulmonary embolism ([facility]) I26.99    Chronic kidney disease (CKD), stage IV (severe) ([facility]) N18.4    Hypertension I10    Cerebrovascular accident (CVA) ([address] I63.9    Diabetes mellitus type 2, controlled ([address] E11.9    TIA (transient ischemic attack) G45.9    Episode of unresponsiveness R41.89    Syncope R55    Syncopal episodes R55    Secondary hyperparathyroidism ([address] N25.81    Wound of left leg, subsequent encounter S81.802D    Venous insufficiency of left lower extremity I87.2    Anxiety F41. 9    Benign hypertensive renal disease I12.9    Chronic gouty arthritis M1A. 00X0    Diabetic peripheral neuropathy ([facility]) E11.42    Hypertensive heart and chronic kidney disease without heart failure, with stage 5 chronic kidney disease, or end stage renal disease ([facility]) I13.11    Hyperglycemia due to type 2 diabetes mellitus ([facility]) E11.65    Non-STEMI (non-ST elevated myocardial infarction) ([facility]) I21.4    Anemia D64.9        DIAGNOSES:  Acute kidney injury on CKD 4 acute kidney injury grade 2  Hyperkalemia  Metabolic alkalosis  Troponinemia  Acute on chronic anemia  DISCUSSION:  Would agree with resection of a GI bleed a cardiac work-up    PLAN:  Renal function is close to baseline. Avoid hypotension  avoid nephrotoxic medications  If dropped urine output or any further worsening, will do a renal ultrasound        Thanks for consulting me. Please don't hesitate to contact me if any questions arise of if I can assist in any manner. This dictation was done by dragon, computer voice recognition software. Often unanticipated grammatical, syntax, phones and other interpretive errors are inadvertently transcribed. Please excuse errors that have escaped final proofreading. Please contact me if you suspect dictation or transcription errors.   Dr Chance Hough  [address], [address] [facility], [facility]  Cell Phone: [phone]  Office phone: [phone]  Fax: [phone]

## 2022-08-02 NOTE — PROGRESS NOTES
8/2/22 Therapy tesha completed - recommending SNF. Pt discharged from [address] [place] [address] @ [phone] d/t insurance denial per Jj Salazar)  with admissions. Daughter [place] called - [phone]) & informed. Per daughter send referral to [address] [place] [address] & if insurance denies she will take pt home with home health. Pt aware & signed Choice Letter for [facility]. Referral sent via [facility].

## 2022-08-02 NOTE — PROGRESS NOTES
Pharmacy to Dose Consult  ONE TIME     Complete     Comments: Pharmacy will order the necessary lab work, if needed, to complete the dosing and ongoing monitoring of requested drug therapy. Details will be updated within the patients Progress Notes. Ordering Provider: Maxwell Barnett NP   Authorizing Provider: Maxwell Barnett NP   Question: Reason for Consult: Answer: Renal Dosing        Pharmacy has reviewed current medications. All are dosed appropriately for current renal clearance of 25 ml/min.

## 2022-08-02 NOTE — PROGRESS NOTES
Admission Medication Reconciliation:    Information obtained from:  Transfer Papers: Alma     Comments/Recommendations: Reviewed PTA medications and patient's allergies. Removed cetirizine 10 mg  Removed amitiza 24 mcg  Removed novolin n  Removed permethrin cream  Removed triamcinolone 0.1% cream        Allergies:  Patient has no known allergies. Significant PMH/Disease States:   Past Medical History:   Diagnosis Date    Adenocarcinoma, renal cell ([facility] 2020    Arthritis     CAD (coronary artery disease)     Chronic kidney disease     Diabetes ([facility]     Gout     Hypercholesterolemia     Hypertension     Mental depression     P[facility] embolism ([facility]     Seizures ([facility]     Stroke [facility])     Thyroid disease      Chief Complaint for this Admission:    Chief Complaint   Patient presents with    Syncope     Prior to Admission Medications:   Prior to Admission Medications   Prescriptions Last Dose Informant Patient Reported? Taking?   acetaminophen (TYLENOL) 325 mg tablet  Transfer Papers Yes Yes   Sig: Take 325 mg by mouth every six (6) hours as needed for Pain. albuterol (PROVENTIL VENTOLIN) 2.5 mg /3 mL (0.083 %) nebu  Transfer Papers Yes Yes   Si.5 mg by Nebulization route every six (6) hours as needed for Shortness of Breath. aluminum & magnesium hydroxide-simethicone (MYLANTA II) 400-400-40 mg/5 mL suspension  Transfer Papers Yes Yes   Sig: Take 30 mL by mouth every six (6) hours as needed for Indigestion. amLODIPine (NORVASC) 10 mg tablet 2022 Transfer Papers Yes Yes   Sig: Take 10 mg by mouth in the morning. apixaban (ELIQUIS) 2.5 mg tablet 2022 Transfer Papers Yes Yes   Sig: Take 2.5 mg by mouth two (2) times a day. aspirin 81 mg chewable tablet 2022 Transfer Papers Yes Yes   Sig: Take 81 mg by mouth daily.    calcitRIOL (ROCALTROL) 0.25 mcg capsule 2022 Transfer Papers Yes Yes   Sig: Take 0.25 mcg by mouth BID Mon Wed & Fri.   carvediloL (COREG) 6.25 mg tablet 2022 Transfer Papers Yes Yes   Sig: Take 6.25 mg by mouth two (2) times daily (with meals). docusate sodium (COLACE) 100 mg capsule 2022 Transfer Papers Yes Yes   Si capsule as needed   donepeziL (ARICEPT) 10 mg tablet 2022 Transfer Papers Yes Yes   Sig: Take 10 mg by mouth nightly. ferrous sulfate 325 mg (65 mg iron) tablet 2022 Transfer Papers Yes Yes   Sig: Take 325 mg by mouth Daily (before breakfast). fluticasone propionate (FLONASE) 50 mcg/actuation nasal spray  Transfer Papers Yes Yes   Si Sprays by Both Nostrils route daily. gabapentin (NEURONTIN) 300 mg capsule 2022 Transfer Papers Yes Yes   Sig: Take 300 mg by mouth nightly. insulin glargine (LANTUS) 100 unit/mL injection  Transfer Papers Yes Yes   Sig: 10 Units by SubCUTAneous route nightly. insulin lispro (HUMALOG) 100 unit/mL injection  Transfer Papers Yes Yes   Sig: by SubCUTAneous route Before breakfast, lunch, dinner and at bedtime. SS: 150-200=2 units 201-250=4 units 251-300=6 units 301-350=8 units 351-400=10 units   latanoprost (XALATAN) 0.005 % ophthalmic solution 2022 Transfer Papers Yes Yes   Sig: Administer 1 Drop to both eyes nightly. lidocaine (LIDODERM) 5 %  Transfer Papers Yes Yes   Si Patch by TransDERmal route every twenty-four (24) hours. Apply to Right Hip Remove at bedtime   loratadine (CLARITIN) 10 mg tablet  Transfer Papers Yes Yes   Sig: Take 10 mg by mouth in the morning. pantoprazole (PROTONIX) 40 mg tablet  Transfer Papers Yes Yes   Sig: Take 40 mg by mouth in the morning. pravastatin (PRAVACHOL) 80 mg tablet 2022 Transfer Papers Yes Yes   Sig: Take 80 mg by mouth nightly. senna (SENOKOT) 8.6 mg tablet  Transfer Papers Yes Yes   Sig: Take 2 Tablets by mouth nightly. torsemide (DEMADEX) 20 mg tablet 2022 Transfer Papers Yes Yes   Sig: Take 20 mg by mouth two (2) times a day.    venlafaxine (EFFEXOR) 75 mg tablet 2022 Transfer Papers Yes Yes Sig: Take 75 mg by mouth daily.       Facility-Administered Medications: None       Daniela Wood

## 2022-08-02 NOTE — PROGRESS NOTES
Problem: Mobility Impaired (Adult and Pediatric)  Goal: *Acute Goals and Plan of Care (Insert Text)  Description: Pt stated goal: To walk safely  Pt will be I with LE HEP in 7 days. Pt will perform bed mobility with mod I in 7 days. Pt will perform transfers with SBA in 7 days. Pt will amb -75 feet with LRAD safely with CGA in 7 days. Outcome: Not Met     PHYSICAL THERAPY EVALUATION  Patient: Porter Gallo (4 y.o. female)  Date: 8/2/2022  Primary Diagnosis: Syncope [R55]       Precautions: falls       ASSESSMENT  This is a 64 y/o female with PMH of   renal cell carcinoma status post left nephrectomy, HTN, CKD, CAD, HLD, CVA and diabeteswho came from [place] to  [facility]  ED after having a syncopal episode while having a bowel movement on the toilet, placed under observation on 8/1/22. Pt received semi-supine in bed , agreeable for PT/OT eval/tx . Pt is A& O x 4,  per pt report, prior to going to rehab, she lives with daughter in a mobile home with 5 steps to enter , HR on bilateral sides ,  was IND with ADL's and mod I for functional transfers/mobility with rollator prior to admission. Other DME owned: [facility] and w/c    Based on the objective data described below, the patient presents with c/o pain at stomach - 6/10,  decreased strength, 3/5 grossly in  b/l LE, balance deficits, generalized weakness, decreased safety awareness, decreased activity tolerance and increased need for assist with functional mobility ( needs Elisa for supine <>sit transfers , good static sitting balance , minAx 2 for sit <>stand, fair  static  standing balance with support, is able to ambulate - 8' (4' +4') with RW, minAx 2  with slow rossana, requires tactile and verbal cues for sequencing and navigating the RW. Pt buckling at her knees x 3 requiring assist to maintain her balance during ambulation, is at risk of falls. Pt with BP of 103/43 post ambulation, recovered back to 122/70 on rest in semi-supine. Nsg made aware.  Pt would benefit from continued skilled PT services to address current impairments and improve overall IND and safety with  functional transfers/mobility. Recommend d/c to SNF when medically appropriate. Current Level of Function Impacting Discharge (mobility/balance): Pt requires minAx 2 for transfers/mobility    Functional Outcome Measure: The patient scored 14 on the AM PAC basic mobility outcome measure which is indicative of medium complexity. Other factors to consider for discharge: decline from functional baseline, severity of deficits        PLAN :  Recommendations and Planned Interventions: bed mobility training, transfer training, gait training, therapeutic exercises, neuromuscular re-education, patient and family training/education, and therapeutic activities      Frequency/Duration: Patient will be followed by physical therapy:  3-5x/week to address goals.     Recommendation for discharge: (in order for the patient to meet his/her long term goals)  Gilbert Knapp    This discharge recommendation:  Has been made in collaboration with the attending provider and/or case management    IF patient discharges home will need the following DME: Rolling walker         SUBJECTIVE:   Patient stated I was discharged from [facility] because my days were all used      OBJECTIVE DATA SUMMARY:   HISTORY:    Past Medical History:   Diagnosis Date    Adenocarcinoma, renal cell ([address] 9/2/2020    Arthritis     CAD (coronary artery disease)     Chronic kidney disease     Diabetes ([address]     Gout     Hypercholesterolemia     Hypertension     Mental depression     Pulmonary embolism ([address]     Seizures ([address]     Stroke ([address]     Thyroid disease      Past Surgical History:   Procedure Laterality Date    HX GYN      HX HYSTERECTOMY      HX NEPHRECTOMY Left 02/12/2015    HX ORTHOPAEDIC      HX UROLOGICAL  02/12/2015    PARTIAL URETERECTOMY     ND CARDIAC SURG PROCEDURE UNLIST      stents placed        Personal factors and/or comorbidities impacting plan of care:     Home Situation  Home Environment: Trailer/mobile home  # Steps to Enter: 5  Rails to Enter: Yes  Hand Rails : Bilateral  One/Two Story Residence: One story  Living Alone: No  Support Systems: Child(connie)  Patient Expects to be Discharged to[de-identified] Home with home health (Will live with daughter. Call Rxs to [facility] in [place]. )  Current DME Used/Available at Home: Wheelchair, Walker, rollator, [place], straight  Tub or Shower Type: Shower    PLOF: Pt IND for ADLS/IADLS, mod I for mobility with prior to admission. EXAMINATION/PRESENTATION/DECISION MAKING:   Critical Behavior:  Neurologic State: Alert  Orientation Level: Oriented X4  Cognition: Follows commands, Appropriate safety awareness, Appropriate decision making     Skin:  intact where exposed    Range Of Motion:  AROM: Generally decreased, functional    Strength:    Strength: Generally decreased, functional (3/5 grossly for  /l LE)    Tone & Sensation:   Tone: Normal    Sensation: Impaired (decreased L Le)    Functional Mobility:  Bed Mobility:     Supine to Sit: Minimum assistance; Additional time  Sit to Supine: Minimum assistance; Additional time  Scooting: Stand-by assistance; Additional time  Transfers:  Sit to Stand: Minimum assistance;Assist x2 (for safety)  Stand to Sit: Minimum assistance;Assist x2; Additional time        Bed to Chair: Minimum assistance;Assist x2    Balance:   Sitting: High guard; Impaired; Without support  Sitting - Static: Good (unsupported)  Sitting - Dynamic: Fair (occasional)  Standing: Impaired; With support  Standing - Static: Fair;Constant support  Standing - Dynamic : Poor;Constant support  Ambulation/Gait Training:  Distance (ft): 8 Feet (ft)  Assistive Device: Walker, rolling;Gait belt  Ambulation - Level of Assistance: Minimal assistance;Assist x2    Interventions: Tactile cues; Verbal cues      Functional Measure:    [facility]         Basic Mobility Inpatient Short Form  How much difficulty does the patient currently have. .. Unable A Lot A Little None   1. Turning over in bed (including adjusting bedclothes, sheets and blankets)? [] 1   [] 2   [x] 3   [] 4   2. Sitting down on and standing up from a chair with arms ( e.g., wheelchair, bedside commode, etc.)   [] 1   [x] 2   [] 3   [] 4   3. Moving from lying on back to sitting on the side of the bed? [] 1   [] 2   [x] 3   [] 4          How much help from another person does the patient currently need. .. Total A Lot A Little None   4. Moving to and from a bed to a chair (including a wheelchair)? [] 1   [x] 2   [] 3   [] 4   5. Need to walk in hospital room? [] 1   [x] 2   [] 3   [] 4   6. Climbing 3-5 steps with a railing? [] 1   [x] 2   [] 3   [] 4   © , Trustees of [facility] [employer], under license to [employer]. All rights reserved     Score:  Initial: 14 Most Recent: 22X (Date: -- )   Interpretation of Tool:  Represents activities that are increasingly more difficult (i.e. Bed mobility, Transfers, Gait). Score 24 23 22-20 19-15 14-10 9-7 6   Modifier CH CI CJ CK CL CM CN          Physical Therapy Evaluation Charge Determination   History Examination Presentation Decision-Making   MEDIUM  Complexity : 1-2 comorbidities / personal factors will impact the outcome/ POC  MEDIUM Complexity : 3 Standardized tests and measures addressing body structure, function, activity limitation and / or participation in recreation  LOW Complexity : Stable, uncomplicated  Other Functional Measure [facility] 6 medium complexity      Based on the above components, the patient evaluation is determined to be of the following complexity level: LOW     Pain Ratin/10    Activity Tolerance:   Fair  Please refer to the flowsheet for vital signs taken during this treatment.     After treatment patient left in no apparent distress:   Supine in bed and Call bell within reach    COMMUNICATION/EDUCATION:   The patients plan of care was discussed with: Occupational therapist, Registered nurse, and Case management. Fall prevention education was provided and the patient/caregiver indicated understanding. and Patient/family agree to work toward stated goals and plan of care. PT/OT session occurred together for increased pt's safety and maximum functional outcome.   Thank you for this referral.  Misty Harrison   Time Calculation: 40 mins

## 2022-08-02 NOTE — PROGRESS NOTES
Reason for Admission:  Syncope                     RUR Score:   N/A                  Plan for utilizing home health:   Pt signed Choice Letter to to continue with [facility]. Referral sent via Jose M. Daughter Maddy Duenas) informed via phone. Pt uses cane/walker/w/c. PCP: First and Last name:  Beau Quijano      Name of Practice:    Are you a current patient: Yes/No: Yes   Approximate date of last visit: Has to schedule appt. Can you participate in a virtual visit with your PCP: Yes/call/Pt talks on the phone. Current Advanced Directive/Advance Care Plan: Full Code      Healthcare Decision Maker:              Primary Decision Maker: Simba Gamboa - Daughter - [phone]                  Transition of Care Plan:     Per daughter pt was to d/c from [address] on 8/1/22 & was sent to the ED. D/C Plan is home with daughter & [facility] ( set up by the facility ) upon discharge. Family member to transport pt home. Call Rxs to [facility].

## 2022-08-02 NOTE — ACP (ADVANCE CARE PLANNING)
Advance Care Planning   Healthcare Decision Maker:       Primary Decision Maker: [facility] - [phone]

## 2022-08-02 NOTE — ED NOTES
TRANSFER - OUT REPORT:    Verbal report given to Anju(name) on Mellody Games  being transferred to [facility](unit) for routine progression of care       Report consisted of patients Situation, Background, Assessment and   Recommendations(SBAR). Information from the following report(s) SBAR, ED Summary, MAR, and Recent Results was reviewed with the receiving nurse. Lines:   Venous Access Device Fistula Arm, left (Active)       Peripheral IV 08/01/22 Right Antecubital (Active)        Opportunity for questions and clarification was provided.       Patient transported with:   [employer]

## 2022-08-02 NOTE — CONSULTS
Cardiology Consult    NAME: Amelia Davis   :  1945   MRN:  [id number]     Date/Time:  2022 12:06 PM    Patient PCP: Blake Iqbal NP    Primary cardiologist: Dr. Mandeep Krueger  ________________________________________________________________________     Assessment:   Syncope/presyncope post BM  Type II acute NSTEMI  Vasovagal syncope  Symptomatic anemia with hemoglobin 7.7  CVA  History of renal cell carcinoma status post left nephrectomy  CKD  Type 2 diabetes      Plan:   Agree with holding apixaban  GI evaluation for the source of anemia  Echocardiogram to assess EF and filling pressures  Plan on noninvasive cardiac management of NSTEMI; avoid invasive evaluation given anemia and CKD      []        High complexity decision making was performed        Subjective:   CHIEF COMPLAINT: syncope      REASON FOR CONSULT: trop leak      HISTORY OF PRESENT ILLNESS:     Amelia Davis is a 65 y.o. BLACK/ female who has a h/o renal cell carcinoma s/p left nephrectomy, HTN, CKD, CAD, HLD, CVA and diabetes. Was brought to the ER from a rehab center after she had a syncopal episode soon after a BM. Patient says she had a large BM and started to feel dizzy and lightheaded. She walked to her bed and started to sweat and the next thing she knew she lost consciousness for a few seconds. No fall. No head trauma. No palpitations. No chest pain. Admission labs-->Hg 7.8, K+3.1, BUN 51, creatinine 2.10, FOBT positive, INR 1.9, troponin 380. EKG shows sinus rhythm.   No acute ischemia            Past Medical History:   Diagnosis Date    Adenocarcinoma, renal cell ([facility] 2020    Arthritis     CAD (coronary artery disease)     Chronic kidney disease     Diabetes ([facility] [facility]     Gout     Hypercholesterolemia     Hypertension     Mental depression     Pulmonary embolism ([facility] [facility]     [facility]     Stroke [facility])     Thyroid disease       Past Surgical History:   Procedure Laterality Date    HX GYN HX HYSTERECTOMY      HX NEPHRECTOMY Left 02/12/2015    HX ORTHOPAEDIC      HX UROLOGICAL  02/12/2015    PARTIAL URETERECTOMY     [place] CARDIAC SURG PROCEDURE UNLIST      stents placed      No Known Allergies   Meds:  See below  Social History     Tobacco Use    Smoking status: Former     Years: 15.00     Types: Cigarettes    Smokeless tobacco: Never   Substance Use Topics    Alcohol use: Not Currently      Family History   Problem Relation Age of Onset    Heart Disease Mother     Heart Disease Father     Heart Disease Sister     Cancer Sister     Heart Disease Brother     Cancer Maternal Grandmother     Stroke Son     Cancer Sister        REVIEW OF SYSTEMS:     []         Unable to obtain  ROS due to ---   [x]         Total of 12 systems reviewed as follows:    Constitutional: negative fever, negative chills, negative weight loss  Eyes:   negative visual changes  ENT:   negative sore throat, tongue or lip swelling  Respiratory:  negative cough, negative dyspnea  Cards:  negative for chest pain, palpitations, lower extremity edema  GI:   negative for nausea, vomiting, diarrhea, and abdominal pain  Genitourinary: negative for frequency, dysuria  Integument:  negative for rash   Hematologic:  negative for easy bruising and gum/nose bleeding  Musculoskel: negative for myalgias,  back pain  Neurological:  negative for headaches, dizziness, vertigo, weakness  Behavl/Psych: negative for feelings of anxiety, depression     Pertinent Positives include :    Objective:      Physical Exam:    Last 24hrs VS reviewed since prior progress note. Most recent are:    Visit Vitals  /74   Pulse (!) 56   Temp 98 °F (36.7 °C)   Resp 22   Ht 5' 6\" (1.676 m)   Wt 93.4 kg (206 lb)   SpO2 100%   BMI 33.25 kg/m²     No intake or output data in the 24 hours ending 08/02/22 1206     General Appearance: Well developed, alert, no acute distress. Ears/Nose/Mouth/Throat: Moist mucosa  Neck: Supple. JVP within normal limits.  Carotids good upstrokes  Chest: Lungs clear to auscultation bilaterally. Cardiovascular: Regular rate and rhythm, S1S2 normal, soft systolic murmur  Abdomen: Soft, non-tender, bowel sounds are active. Extremities: No edema bilaterally. distal pulses +1. Skin: Warm and dry. Neuro: Alert and oriented x3, normal speech; follows simple commands  Psychiatric: Cooperative; appropriate    []         Post-cath site without hematoma, bruit, tenderness, or thrill. Distal pulses intact. Data:      Telemetry: NSR    EKG: Sinus rhythm, no ischemia    []  No new EKG for review. 10/21/21    ECHO ADULT COMPLETE 10/24/2021 10/24/2021    Interpretation Summary  · LV: Estimated LVEF is 55 - 60%. Normal cavity size and systolic function (ejection fraction normal). Mild concentric hypertrophy. Left ventricular diastolic dysfunction due to impaired relaxation. · LA: Mildly dilated left atrium. · RA: Mildly dilated right atrium. · IAS: No atrial septal defect present. · MV: Mitral valve thickening. · TV: Mild tricuspid valve regurgitation is present. Right Ventricular Arterial Pressure (RVSP) is 52 mmHg. Signed by: Luiza Maguire MD on 10/24/2021  2:12 PM      Prior to Admission medications    Medication Sig Start Date End Date Taking? Authorizing Provider   albuterol (PROVENTIL VENTOLIN) 2.5 mg /3 mL (0.083 %) nebu 2.5 mg by Nebulization route every six (6) hours as needed for Shortness of Breath. Yes Provider, Historical   fluticasone propionate (FLONASE) 50 mcg/actuation nasal spray 2 Sprays by Both Nostrils route daily. Yes Provider, Historical   insulin lispro (HUMALOG) 100 unit/mL injection by SubCUTAneous route Before breakfast, lunch, dinner and at bedtime. SS: 150-200=2 units 201-250=4 units 251-300=6 units 301-350=8 units 351-400=10 units   Yes Provider, Historical   insulin glargine (LANTUS) 100 unit/mL injection 10 Units by SubCUTAneous route nightly.    Yes Provider, Historical   lidocaine (LIDODERM) 5 % 1 Patch by TransDERmal route every twenty-four (24) hours. Apply to Right Hip Remove at bedtime   Yes Provider, Historical   loratadine (CLARITIN) 10 mg tablet Take 10 mg by mouth in the morning. Yes Provider, Historical   aluminum & magnesium hydroxide-simethicone (MYLANTA II) 400-400-40 mg/5 mL suspension Take 30 mL by mouth every six (6) hours as needed for Indigestion. Yes Provider, Historical   pantoprazole (PROTONIX) 40 mg tablet Take 40 mg by mouth in the morning. Yes Provider, Historical   senna (SENOKOT) 8.6 mg tablet Take 2 Tablets by mouth nightly. Yes Provider, Historical   torsemide (DEMADEX) 20 mg tablet Take 20 mg by mouth two (2) times a day. Yes Provider, Historical   carvediloL (COREG) 6.25 mg tablet Take 6.25 mg by mouth two (2) times daily (with meals). Yes Provider, Historical   amLODIPine (NORVASC) 10 mg tablet Take 10 mg by mouth in the morning. Yes Provider, Historical   ferrous sulfate 325 mg (65 mg iron) tablet Take 325 mg by mouth Daily (before breakfast). Yes Provider, Historical   docusate sodium (COLACE) 100 mg capsule 1 capsule as needed   Yes Other, MD Mariela   acetaminophen (TYLENOL) 325 mg tablet Take 325 mg by mouth every six (6) hours as needed for Pain. Yes Other, MD Mariela   calcitRIOL (ROCALTROL) 0.25 mcg capsule Take 0.25 mcg by mouth BID Mon Wed & Fri. Yes Provider, Historical   donepeziL (ARICEPT) 10 mg tablet Take 10 mg by mouth nightly. Yes Provider, Historical   apixaban (ELIQUIS) 2.5 mg tablet Take 2.5 mg by mouth two (2) times a day. 6/18/19  Yes Provider, Historical   gabapentin (NEURONTIN) 300 mg capsule Take 300 mg by mouth nightly. 6/29/20  Yes Provider, Historical   venlafaxine (EFFEXOR) 75 mg tablet Take 75 mg by mouth daily. Yes Provider, Historical   latanoprost (XALATAN) 0.005 % ophthalmic solution Administer 1 Drop to both eyes nightly. Yes Provider, Historical   pravastatin (PRAVACHOL) 80 mg tablet Take 80 mg by mouth nightly.    Yes Provider, Historical   aspirin 81 mg chewable tablet Take 81 mg by mouth daily. Yes Provider, Historical       Recent Results (from the past 24 hour(s))   CBC WITH AUTOMATED DIFF    Collection Time: 08/01/22  2:56 PM   Result Value Ref Range    WBC 5.2 3.6 - 11.0 K/uL    RBC 2.46 (L) 3.80 - 5.20 M/uL    HGB 7.8 (L) 11.5 - 16.0 g/dL    HCT 24.1 (L) 35.0 - 47.0 %    MCV 98.0 80.0 - 99.0 FL    MCH 31.7 26.0 - 34.0 PG    MCHC 32.4 30.0 - 36.5 g/dL    RDW 17.2 (H) 11.5 - 14.5 %    PLATELET 832 399 - 756 K/uL    MPV 10.9 8.9 - 12.9 FL    NRBC 0.0 0.0  WBC    ABSOLUTE NRBC 0.00 0.00 - 0.01 K/uL    NEUTROPHILS 67 32 - 75 %    LYMPHOCYTES 19 12 - 49 %    MONOCYTES 14 (H) 5 - 13 %    EOSINOPHILS 0 0 - 7 %    BASOPHILS 0 0 - 1 %    IMMATURE GRANULOCYTES 0 0 - 0.5 %    ABS. NEUTROPHILS 3.5 1.8 - 8.0 K/UL    ABS. LYMPHOCYTES 1.0 0.8 - 3.5 K/UL    ABS. MONOCYTES 0.7 0.0 - 1.0 K/UL    ABS. EOSINOPHILS 0.0 0.0 - 0.4 K/UL    ABS. BASOPHILS 0.0 0.0 - 0.1 K/UL    ABS. IMM. GRANS. 0.0 0.00 - 0.04 K/UL    DF AUTOMATED     METABOLIC PANEL, COMPREHENSIVE    Collection Time: 08/01/22  2:56 PM   Result Value Ref Range    Sodium 136 136 - 145 mmol/L    Potassium 3.1 (L) 3.5 - 5.1 mmol/L    Chloride 105 97 - 108 mmol/L    CO2 27 21 - 32 mmol/L    Anion gap 4 (L) 5 - 15 mmol/L    Glucose 110 (H) 65 - 100 mg/dL    BUN 51 (H) 6 - 20 mg/dL    Creatinine 2.10 (H) 0.55 - 1.02 mg/dL    BUN/Creatinine ratio 24 (H) 12 - 20      GFR est AA 28 (L) >60 ml/min/1.73m2    GFR est non-AA 23 (L) >60 ml/min/1.73m2    Calcium 7.4 (L) 8.5 - 10.1 mg/dL    Bilirubin, total 0.5 0.2 - 1.0 mg/dL    AST (SGOT) 25 15 - 37 U/L    ALT (SGPT) 17 12 - 78 U/L    Alk.  phosphatase 117 45 - 117 U/L    Protein, total 4.5 (L) 6.4 - 8.2 g/dL    Albumin 1.8 (L) 3.5 - 5.0 g/dL    Globulin 2.7 2.0 - 4.0 g/dL    A-G Ratio 0.7 (L) 1.1 - 2.2     TYPE & SCREEN    Collection Time: 08/01/22  2:56 PM   Result Value Ref Range    Crossmatch Expiration 08/04/2022,2359     ABO/Rh(D) A Positive Antibody screen Negative    OCCULT BLOOD, STOOL    Collection Time: 08/01/22  4:25 PM   Result Value Ref Range    Occult Blood,day 1 Positive (A) Negative      Day 1 date: 8,022,022     TROPONIN-HIGH SENSITIVITY    Collection Time: 08/01/22  4:33 PM   Result Value Ref Range    Troponin-High Sensitivity 308 (HH) 0 - 51 ng/L   PROTHROMBIN TIME + INR    Collection Time: 08/01/22  4:33 PM   Result Value Ref Range    Prothrombin time 22.0 (H) 11.9 - 14.6 sec    INR 1.9 (H) 0.9 - 1.1     METABOLIC PANEL, BASIC    Collection Time: 08/02/22  3:13 AM   Result Value Ref Range    Sodium 132 (L) 136 - 145 mmol/L    Potassium 5.4 (H) 3.5 - 5.1 mmol/L    Chloride 97 97 - 108 mmol/L    CO2 34 (H) 21 - 32 mmol/L    Anion gap 1 (L) 5 - 15 mmol/L    Glucose 129 (H) 65 - 100 mg/dL    BUN 63 (H) 6 - 20 mg/dL    Creatinine 2.83 (H) 0.55 - 1.02 mg/dL    BUN/Creatinine ratio 22 (H) 12 - 20      GFR est AA 20 (L) >60 ml/min/1.73m2    GFR est non-AA 16 (L) >60 ml/min/1.73m2    Calcium 9.4 8.5 - 10.1 mg/dL   CBC W/O DIFF    Collection Time: 08/02/22  3:13 AM   Result Value Ref Range    WBC 4.5 3.6 - 11.0 K/uL    RBC 2.45 (L) 3.80 - 5.20 M/uL    HGB 7.7 (L) 11.5 - 16.0 g/dL    HCT 24.3 (L) 35.0 - 47.0 %    MCV 99.2 (H) 80.0 - 99.0 FL    MCH 31.4 26.0 - 34.0 PG    MCHC 31.7 30.0 - 36.5 g/dL    RDW 17.3 (H) 11.5 - 14.5 %    PLATELET 064 328 - 962 K/uL    MPV 10.6 8.9 - 12.9 FL    NRBC 0.4 (H) 0.0  WBC    ABSOLUTE NRBC 0.02 (H) 0.00 - 0.01 K/uL   FERRITIN    Collection Time: 08/02/22  3:13 AM   Result Value Ref Range    Ferritin 181 8 - 252 ng/mL   TROPONIN-HIGH SENSITIVITY    Collection Time: 08/02/22  3:13 AM   Result Value Ref Range    Troponin-High Sensitivity 267 (HH) 0 - 51 ng/L        Wai Mcelroy MD

## 2022-08-02 NOTE — PROGRESS NOTES
[facility] Hospitalist Progress Note  Julian Garnica MD  Date:2022       Room:ER14/14  Patient Name:Tamy Moore     YOB: 1945     Age:77 y.o.    22 admission course  Nathalie Sahni is a 65 y.o. female who has a PMH significant for renal cell carcinoma status post left nephrectomy, HTN, CKD, CAD, HLD, CVA and diabetes. She comes to the emergency room from rehab center after having a syncopal episode while having a bowel movement on the toilet. Patient states she has had previous episodes similar. She states she lost consciousness for a couple of seconds but denies falling, chest pain or palpitations. Significant labs on admission hemoglobin 7.8, potassium 3.1, BUN 51, creatinine 2.10, FOBT positive, INR 1.9, troponin 380. Will admit for further management and work-up of syncope, anemia, Hemoccult positive, hypokalemia and non-STEMI. Will consult GI and cardiology. 22 no new complaints, tolerating medications well  Gastro and cardiology to see  Echocardiogram anticipated    Subjective    Subjective:  Symptoms:  Stable. Review of Systems   All other systems reviewed and are negative. Objective         Vitals Last 24 Hours:  TEMPERATURE:  Temp  Av.2 °F (36.8 °C)  Min: 98.2 °F (36.8 °C)  Max: 98.2 °F (36.8 °C)  RESPIRATIONS RANGE: Resp  Av.9  Min: 17  Max: 22  PULSE OXIMETRY RANGE: SpO2  Av.3 %  Min: 93 %  Max: 100 %  PULSE RANGE: Pulse  Av  Min: 64  Max: 71  BLOOD PRESSURE RANGE: Systolic (13FMU), XMW:393 , Min:95 , GPO:275   ; Diastolic (34AJJ), KV, Min:38, Max:62    I/O (24Hr): No intake or output data in the 24 hours ending 22 0800  Objective:  General Appearance:  Comfortable. Vital signs: (most recent): Blood pressure (!) 111/38, pulse 65, temperature 98.2 °F (36.8 °C), resp. rate 18, height 5' 6\" (1.676 m), weight 93.4 kg (206 lb), SpO2 94 %. HEENT: Normal HEENT exam.    Lungs:  Normal effort and normal respiratory rate.     Heart: Normal rate.    Abdomen: Abdomen is soft. Bowel sounds are normal.     Neurological: Patient is alert. Pupils:  Pupils are equal, round, and reactive to light. Skin:  Warm and dry. Labs/Imaging/Diagnostics    Labs:  CBC:  Recent Labs     08/02/22  0313 08/01/22  1456   WBC 4.5 5.2   RBC 2.45* 2.46*   HGB 7.7* 7.8*   HCT 24.3* 24.1*   MCV 99.2* 98.0   RDW 17.3* 17.2*    159     CHEMISTRIES:  Recent Labs     08/02/22  0313 08/01/22  1456   * 136   K 5.4* 3.1*   CL 97 105   CO2 34* 27   BUN 63* 51*   CA 9.4 7.4*   PT/INR:  Recent Labs     08/01/22  1633   INR 1.9*     APTT:No results for input(s): APTT in the last 72 hours. LIVER PROFILE:  Recent Labs     08/01/22  1456   AST 25   ALT 17     Lab Results   Component Value Date/Time    ALT (SGPT) 17 08/01/2022 02:56 PM    AST (SGOT) 25 08/01/2022 02:56 PM    Alk. phosphatase 117 08/01/2022 02:56 PM    Bilirubin, total 0.5 08/01/2022 02:56 PM       Imaging Last 24 Hours:  XR CHEST PORT    Result Date: 8/1/2022  Indication: Syncope Comparison: 2/20/2022 Portable exam of the chest obtained at 1555 demonstrates normal heart size. There is no acute process in the lung fields. Degenerative changes are seen in the thoracic spine. Chronic rotator cuff tears are noted bilaterally. No acute process. Assessment//Plan   Active Problems:    Syncope (11/21/2020)      Non-STEMI (non-ST elevated myocardial infarction) ([facility] (8/1/2022)      Anemia (8/1/2022)      Assessment & Plan  8/1/22 admission course  Ortega Solano is a 65 y.o. female who has a PMH significant for renal cell carcinoma status post left nephrectomy, HTN, CKD, CAD, HLD, CVA and diabetes. She comes to the emergency room from rehab center after having a syncopal episode while having a bowel movement on the toilet. Patient states she has had previous episodes similar. She states she lost consciousness for a couple of seconds but denies falling, chest pain or palpitations.   Significant labs on admission hemoglobin 7.8, potassium 3.1, BUN 51, creatinine 2.10, FOBT positive, INR 1.9, troponin 380. Will admit for further management and work-up of syncope, anemia, Hemoccult positive, hypokalemia and non-STEMI. Will consult GI and cardiology. 8/2/22 no new complaints, tolerating medications well  Gastro and cardiology to see  Echocardiogram anticipated    ASSESSMENT AND PLAN    1) Syncopal episode while straining on the toilet     Telemetry monitoring   Echocardiogram   Cardiology    2) Acute anemia. Holding apixaban and aspirin   Gastro to see    3) Cardiovascular issues including coronary disease, hypertension. Some elevation in troponin at admission.      Telemetry monitoring   Echocardiogram   Cardiology   Apixaban and aspirin on hold for anemia   Continue medical regimen    Amlodipine    Atorvastatin    Carvedilol    4) Chronic renal failure     Continue torsemide    5) DVT prophylaxis     Apixaban on hold for anemia      Electronically signed by Que Delong MD on 8/2/2022 at 8:00 AM

## 2022-08-02 NOTE — PROGRESS NOTES
Medicare Outpatient Observation Notice (MOON)/ [place] Outpatient Observation Notice (Gerardo Pablo) provided to patient/representative with verbal explanation of the notice. Time allotted for questions regarding the notice. Patient /representative provided a completed copy of the MOON/VOON notice. Copy placed on bedside chart. Pt signed & daughter William Infante) informed.

## 2022-08-02 NOTE — PROGRESS NOTES
OCCUPATIONAL THERAPY EVALUATION  Patient: Richard Buitrago (89 y.o. female)  Date: 8/2/2022  Primary Diagnosis: Syncope [R55]       Precautions: fall risk       ASSESSMENT  Pt is a 64 y/o F with PMH of renal cell carcinoma s/p L nephrectomy, HTN, CKD, CAD, HLD, DM, and CVA presenting to [facility] from rehab after having syncopal episode while having a BM on a toilet, admitted 8/1 and being treated for syncope and further work-up. Pt received semi-supine in bed upon arrival, AXO x4, and agreeable to OT/PT evaluations at this time. Per pt report, pt lives with daughter in a one-story home with 5 TERESA and B HR, was IND with ADLs and Mod I using SPC for mobility at [facility]. Pt reports she has been at [facility] for at least 2 wks and prior to that she was at [address],[address] for about a month. Pt reports she was ambulating with a rollator and completing therapy. At home, she has a walk in shower and her daughter is home 24/7. Other DME owned includes: w/c, rollator     Based on current observations, pt presents with deficits in generalized strength/AROM, bed mobility, static/dynamic sitting balance, static/dynamic standing balance, functional activity tolerance, decreased safety awareness, and pain impacting overall performance of ADLs and functional transfers/mobility. Pt currently requires min A for sup>sit transfer. Pt demo'd posterior lean at EOB and initially req'd constant support for sitting balance, however, w/ verbal and physical cues able to maintain sitting balance w/ high guard. She req'd min A x2 for STS using RW and pt took 2-3 steps forward and 2-3 steps toward the chair w/ min A x2 for safety; multiple L knee mckenna noted throughout ambulation and req'd physical support to maintain balance (see PT note for further gait details). Pt then stood and returned to EOB. She req'd set-up to [facility] hospital go. She returned to supine w/ min A and was left w/ all needs/call bell in reach.  Overall, pt tolerates session fair with c/o 6/10 abdominal pain and lightheadedness (BP was assessed throughout at session: 116/77 - supine, 122/77- EOB, 92/54 - EOB following ambulation, and 103/42 - supine). Pt would benefit from continued skilled OT services to address current impairments and improve IND and safety with self cares and functional transfers/mobility. Current OT d/c recommendation return to SNF once medically appropriate. If pt is unable to return to SNF; pt would need to home w/ 24/7 care and [address] [place] [address]. She would benefit from 3-in-1 commode and RW to increase safety w/ functional mobility/ADLs. Pt would also need to utilize w/c for ambulation to reduce falls risk; pt verbalized understanding. Other factors to consider for discharge: family/social support, DME, time since onset, severity of deficits, decline from functional baseline     Patient will benefit from skilled therapy intervention to address the above noted impairments. PLAN :  Recommendations and Planned Interventions: self care training, functional mobility training, therapeutic exercise, balance training, therapeutic activities, endurance activities, patient education, and home safety training    Frequency/Duration: Patient will be followed by occupational therapy:  3-5x/week to address goals. Recommendation for discharge: (in order for the patient to meet his/her long term goals)  Gilbert Knapp    This discharge recommendation:  Has been made in collaboration with the attending provider and/or case management    IF patient discharges home will need the following DME: If pt is unable to return to SNF, she would need 3-in-1 commode and RW. SUBJECTIVE:   Patient stated My knee still gives out.     OBJECTIVE DATA SUMMARY:   HISTORY:   Past Medical History:   Diagnosis Date    Adenocarcinoma, renal cell ([facility] [address] 9/2/2020    Arthritis     CAD (coronary artery disease)     Chronic kidney disease     Diabetes (HCC)     Gout Hypercholesterolemia     Hypertension     Mental depression     Pulmonary embolism ([facility] [address]     Seizures ([facility] [address]     Stroke ([facility] [address]     Thyroid disease      Past Surgical History:   Procedure Laterality Date    HX GYN      HX HYSTERECTOMY      HX NEPHRECTOMY Left 02/12/2015    HX ORTHOPAEDIC      HX UROLOGICAL  02/12/2015    PARTIAL URETERECTOMY     HI CARDIAC SURG PROCEDURE UNLIST      stents placed        Expanded or extensive additional review of patient history:     Home Situation  Home Environment: Trailer/mobile home  # Steps to Enter: 5  Rails to Enter: Yes  Hand Rails : Bilateral  One/Two Story Residence: One story  Living Alone: No  Support Systems: Child(connie)  Patient Expects to be Discharged to[de-identified] Home with home health (Will live with daughter. Call Rxs to [employer] in [facility]. )  Current DME Used/Available at Home: Wheelchair, Walker, rollator, [employer], straight  Tub or Shower Type: Shower    Hand dominance: Right    EXAMINATION OF PERFORMANCE DEFICITS:  Cognitive/Behavioral Status:  Neurologic State: Alert  Orientation Level: Oriented X4  Cognition: Follows commands; Appropriate safety awareness; Appropriate decision making             Range of Motion:  AROM: Generally decreased, functional                         Strength:  Strength: Generally decreased, functional                Coordination:     Fine Motor Skills-Upper: Left Intact; Right Intact    Gross Motor Skills-Upper: Left Intact; Right Intact    Tone & Sensation:     Sensation: Intact (ocassional numnbess in L hand)                      Balance:  Sitting: High guard; Impaired; Without support  Sitting - Static: Good (unsupported)  Sitting - Dynamic: Fair (occasional)  Standing: Impaired; With support  Standing - Static: Fair;Constant support  Standing - Dynamic : Poor;Constant support    Functional Mobility and Transfers for ADLs:  Bed Mobility:  Supine to Sit: Minimum assistance; Additional time  Sit to Supine: Minimum assistance; Additional time  Scooting: Stand-by assistance; Additional time    Transfers:  Sit to Stand: Minimum assistance;Assist x2 (for safety)  Stand to Sit: Minimum assistance;Assist x2; Additional time  Bed to Chair: Minimum assistance;Assist x2  Assistive Device : Other (comment)      ADL Intervention and task modifications:                      Upper Body [address]: Minimum  assistance                   Therapeutic Exercise:  Pt will benefit from BUE HEP to improve participation in ADLs and mobility. Plan will be initiated at next session. Functional Measure:    [facility] AM-PACTM \"6 Clicks\"                                                       Daily Activity Inpatient Short Form  How much help from another person does the patient currently need. .. Total; A Lot A Little None   1. Putting on and taking off regular lower body clothing? [x]  1 []  2 []  3 []  4   2. Bathing (including washing, rinsing, drying)? []  1 [x]  2 []  3 []  4   3. Toileting, which includes using toilet, bedpan or urinal? [] 1 [x]  2 []  3 []  4   4. Putting on and taking off regular upper body clothing? []  1 []  2 [x]  3 []  4   5. Taking care of personal grooming such as brushing teeth? []  1 []  2 [x]  3 []  4   6. Eating meals? []  1 []  2 []  3 [x]  4   © 2007, Trustees of [facility], under license to [employer]. All rights reserved     Score: 15/24     Interpretation of Tool:  Represents clinically-significant functional categories (i.e. Activities of daily living).   Percentage of Impairment CH    0%   CI    1-19% CJ    20-39% CK    40-59% CL    60-79% CM    80-99% CN     100%   [facility]  Score 6-24 24 23 20-22 15-19 10-14 7-9 6         Occupational Therapy Evaluation Charge Determination   History Examination Decision-Making   LOW Complexity : Brief history review  LOW Complexity : 1-3 performance deficits relating to physical, cognitive , or psychosocial skils that result in activity limitations and / or participation restrictions  MEDIUM Complexity : Patient may present with comorbidities that affect occupational performnce. Miniml to moderate modification of tasks or assistance (eg, physical or verbal ) with assesment(s) is necessary to enable patient to complete evaluation       Based on the above components, the patient evaluation is determined to be of the following complexity level: LOW   Pain Ratin/10 abdominal pain; RN in room when pt reported pain    Activity Tolerance:   Fair and requires rest breaks    After treatment patient left in no apparent distress:    Supine in bed and Call bell within reach    COMMUNICATION/EDUCATION:   The patients plan of care was discussed with: Physical therapist and Registered nurse. Patient/family have participated as able in goal setting and plan of care. and Patient/family agree to work toward stated goals and plan of care. This patients plan of care is appropriate for delegation to [facility] [place]. OT/PT sessions occurred together for increased patient and clinician safety as pt with decreased activity tolerance and requires A of 2 for mobility at this time. Thank you for this referral.  Evangelina Pozo OT  Time Calculation: 40 mins   Problem: Self Care Deficits Care Plan (Adult)  Goal: *Acute Goals and Plan of Care (Insert Text)  Description: Pt stated goal \"I want to get better\".      Pt will be IND sup <>sit in prep for EOB ADLs  Pt will be Mod I grooming standing at sinktop LRAD  Pt will be IND LE dressing sitting EOB/long sit  Pt will be Mod I sit <>  prep for toileting LRAD  Pt will be Mod I toileting/toilet transfer/cloth mgmt LRAD  Pt will be IND following UE HEP in prep for self care tasks   Outcome: Not Met

## 2022-08-03 ENCOUNTER — APPOINTMENT (OUTPATIENT)
Dept: NON INVASIVE DIAGNOSTICS | Age: 77
DRG: 377 | End: 2022-08-03
Attending: NURSE PRACTITIONER
Payer: MEDICARE

## 2022-08-03 LAB
ANION GAP SERPL CALC-SCNC: 3 MMOL/L (ref 5–15)
BASOPHILS # BLD: 0 K/UL (ref 0–0.1)
BASOPHILS NFR BLD: 0 % (ref 0–1)
BUN SERPL-MCNC: 60 MG/DL (ref 6–20)
BUN/CREAT SERPL: 23 (ref 12–20)
CA-I BLD-MCNC: 9.2 MG/DL (ref 8.5–10.1)
CHLORIDE SERPL-SCNC: 97 MMOL/L (ref 97–108)
CO2 SERPL-SCNC: 36 MMOL/L (ref 21–32)
CREAT SERPL-MCNC: 2.62 MG/DL (ref 0.55–1.02)
DIFFERENTIAL METHOD BLD: ABNORMAL
ECHO AO ASC DIAM: 3.9 CM
ECHO AO ASCENDING AORTA INDEX: 2.01 CM/M2
ECHO AV MEAN GRADIENT: 13 MMHG
ECHO AV MEAN VELOCITY: 1.6 M/S
ECHO AV PEAK GRADIENT: 29 MMHG
ECHO AV PEAK VELOCITY: 2.7 M/S
ECHO AV VTI: 57.7 CM
ECHO LA AREA 4C: 26.1 CM2
ECHO LA DIAMETER INDEX: 1.86 CM/M2
ECHO LA DIAMETER: 3.6 CM
ECHO LA MAJOR AXIS: 6.5 CM
ECHO LV E' LATERAL VELOCITY: 10 CM/S
ECHO LV E' SEPTAL VELOCITY: 7 CM/S
ECHO LV EDV A2C: 87 ML
ECHO LV EDV A4C: 80 ML
ECHO LV EDV INDEX A4C: 41 ML/M2
ECHO LV EDV NDEX A2C: 45 ML/M2
ECHO LV EJECTION FRACTION A2C: 58 %
ECHO LV EJECTION FRACTION A4C: 57 %
ECHO LV EJECTION FRACTION BIPLANE: 59 % (ref 55–100)
ECHO LV ESV A2C: 37 ML
ECHO LV ESV A4C: 34 ML
ECHO LV ESV INDEX A2C: 19 ML/M2
ECHO LV ESV INDEX A4C: 18 ML/M2
ECHO LV FRACTIONAL SHORTENING: 35 % (ref 28–44)
ECHO LV INTERNAL DIMENSION DIASTOLE INDEX: 2.37 CM/M2
ECHO LV INTERNAL DIMENSION DIASTOLIC: 4.6 CM (ref 3.9–5.3)
ECHO LV INTERNAL DIMENSION SYSTOLIC INDEX: 1.55 CM/M2
ECHO LV INTERNAL DIMENSION SYSTOLIC: 3 CM
ECHO LV IVSD: 1.2 CM (ref 0.6–0.9)
ECHO LV MASS 2D: 169.9 G (ref 67–162)
ECHO LV MASS INDEX 2D: 87.6 G/M2 (ref 43–95)
ECHO LV POSTERIOR WALL DIASTOLIC: 0.9 CM (ref 0.6–0.9)
ECHO LV RELATIVE WALL THICKNESS RATIO: 0.39
ECHO LVOT AREA: 2.5 CM2
ECHO LVOT DIAM: 1.8 CM
ECHO MV A VELOCITY: 1.19 M/S
ECHO MV E VELOCITY: 1.04 M/S
ECHO MV E/A RATIO: 0.87
ECHO MV E/E' LATERAL: 10.4
ECHO MV E/E' RATIO (AVERAGED): 12.63
ECHO MV E/E' SEPTAL: 14.86
ECHO MV EROA PISA: 26.7 CM2
ECHO MV MAX VELOCITY: 1.5 M/S
ECHO MV MEAN GRADIENT: 2 MMHG
ECHO MV MEAN VELOCITY: 0.7 M/S
ECHO MV PEAK GRADIENT: 9 MMHG
ECHO MV REGURGITANT ALIASING (NYQUIST) VELOCITY: 85 CM/S
ECHO MV REGURGITANT PEAK GRADIENT: 100 MMHG
ECHO MV REGURGITANT PEAK VELOCITY: 5 M/S
ECHO MV REGURGITANT RADIUS PISA: 0.5 CM
ECHO MV REGURGITANT VOLUME PISA: 4403.85 ML
ECHO MV REGURGITANT VTIA: 165 CM
ECHO MV VTI: 41.6 CM
ECHO PULMONARY ARTERY END DIASTOLIC PRESSURE: 6 MMHG
ECHO PV MAX VELOCITY: 1.6 M/S
ECHO PV PEAK GRADIENT: 10 MMHG
ECHO PV REGURGITANT MAX VELOCITY: 1.3 M/S
ECHO RA AREA 4C: 22 CM2
ECHO RA END SYSTOLIC VOLUME APICAL 4 CHAMBER INDEX BSA: 33 ML/M2
ECHO RA VOLUME: 64 ML
ECHO RV BASAL DIMENSION: 4.5 CM
ECHO RV MID DIMENSION: 4.3 CM
ECHO RV TAPSE: 3 CM (ref 1.7–?)
ECHO TV REGURGITANT MAX VELOCITY: 3.17 M/S
ECHO TV REGURGITANT PEAK GRADIENT: 40 MMHG
EOSINOPHIL # BLD: 0.1 K/UL (ref 0–0.4)
EOSINOPHIL NFR BLD: 1 % (ref 0–7)
ERYTHROCYTE [DISTWIDTH] IN BLOOD BY AUTOMATED COUNT: 17.1 % (ref 11.5–14.5)
GLUCOSE BLD STRIP.AUTO-MCNC: 129 MG/DL (ref 65–117)
GLUCOSE SERPL-MCNC: 125 MG/DL (ref 65–100)
HCT VFR BLD AUTO: 26 % (ref 35–47)
HGB BLD-MCNC: 8.3 G/DL (ref 11.5–16)
IMM GRANULOCYTES # BLD AUTO: 0.1 K/UL (ref 0–0.04)
IMM GRANULOCYTES NFR BLD AUTO: 1 % (ref 0–0.5)
LYMPHOCYTES # BLD: 1.4 K/UL (ref 0.8–3.5)
LYMPHOCYTES NFR BLD: 29 % (ref 12–49)
MCH RBC QN AUTO: 32 PG (ref 26–34)
MCHC RBC AUTO-ENTMCNC: 31.9 G/DL (ref 30–36.5)
MCV RBC AUTO: 100.4 FL (ref 80–99)
MONOCYTES # BLD: 0.8 K/UL (ref 0–1)
MONOCYTES NFR BLD: 17 % (ref 5–13)
NEUTS SEG # BLD: 2.4 K/UL (ref 1.8–8)
NEUTS SEG NFR BLD: 52 % (ref 32–75)
NRBC # BLD: 0 K/UL (ref 0–0.01)
NRBC BLD-RTO: 0 PER 100 WBC
PERFORMED BY, TECHID: ABNORMAL
PLATELET # BLD AUTO: 174 K/UL (ref 150–400)
PMV BLD AUTO: 10.5 FL (ref 8.9–12.9)
POTASSIUM SERPL-SCNC: 4.5 MMOL/L (ref 3.5–5.1)
RBC # BLD AUTO: 2.59 M/UL (ref 3.8–5.2)
SODIUM SERPL-SCNC: 136 MMOL/L (ref 136–145)
WBC # BLD AUTO: 4.7 K/UL (ref 3.6–11)

## 2022-08-03 PROCEDURE — G0378 HOSPITAL OBSERVATION PER HR: HCPCS

## 2022-08-03 PROCEDURE — 74011250637 HC RX REV CODE- 250/637: Performed by: NURSE PRACTITIONER

## 2022-08-03 PROCEDURE — 74011250637 HC RX REV CODE- 250/637: Performed by: INTERNAL MEDICINE

## 2022-08-03 PROCEDURE — 82962 GLUCOSE BLOOD TEST: CPT

## 2022-08-03 PROCEDURE — 93308 TTE F-UP OR LMTD: CPT

## 2022-08-03 PROCEDURE — 74011000250 HC RX REV CODE- 250: Performed by: NURSE PRACTITIONER

## 2022-08-03 PROCEDURE — 85025 COMPLETE CBC W/AUTO DIFF WBC: CPT

## 2022-08-03 PROCEDURE — 80048 BASIC METABOLIC PNL TOTAL CA: CPT

## 2022-08-03 PROCEDURE — 65270000029 HC RM PRIVATE

## 2022-08-03 PROCEDURE — 36415 COLL VENOUS BLD VENIPUNCTURE: CPT

## 2022-08-03 RX ADMIN — SODIUM CHLORIDE, PRESERVATIVE FREE 10 ML: 5 INJECTION INTRAVENOUS at 05:41

## 2022-08-03 RX ADMIN — CETIRIZINE HYDROCHLORIDE 10 MG: 10 TABLET, FILM COATED ORAL at 13:26

## 2022-08-03 RX ADMIN — ATORVASTATIN CALCIUM 20 MG: 10 TABLET, FILM COATED ORAL at 21:22

## 2022-08-03 RX ADMIN — TORSEMIDE 40 MG: 10 TABLET ORAL at 10:01

## 2022-08-03 RX ADMIN — DOCUSATE SODIUM 100 MG: 100 CAPSULE, LIQUID FILLED ORAL at 21:23

## 2022-08-03 RX ADMIN — DOCUSATE SODIUM 100 MG: 100 CAPSULE, LIQUID FILLED ORAL at 10:03

## 2022-08-03 RX ADMIN — FERROUS SULFATE TAB 325 MG (65 MG ELEMENTAL FE) 325 MG: 325 (65 FE) TAB at 13:19

## 2022-08-03 RX ADMIN — POLYETHYLENE GLYCOL 3350 17 G: 17 POWDER, FOR SOLUTION ORAL at 21:31

## 2022-08-03 RX ADMIN — CALCITRIOL CAPSULES 0.25 MCG 0.25 MCG: 0.25 CAPSULE ORAL at 10:02

## 2022-08-03 RX ADMIN — GABAPENTIN 300 MG: 300 CAPSULE ORAL at 10:02

## 2022-08-03 RX ADMIN — DONEPEZIL HYDROCHLORIDE 10 MG: 5 TABLET, FILM COATED ORAL at 21:23

## 2022-08-03 RX ADMIN — CARVEDILOL 6.25 MG: 3.12 TABLET, FILM COATED ORAL at 19:11

## 2022-08-03 RX ADMIN — GABAPENTIN 300 MG: 300 CAPSULE ORAL at 21:22

## 2022-08-03 RX ADMIN — TORSEMIDE 40 MG: 10 TABLET ORAL at 21:22

## 2022-08-03 RX ADMIN — VENLAFAXINE HYDROCHLORIDE 75 MG: 75 CAPSULE, EXTENDED RELEASE ORAL at 10:03

## 2022-08-03 RX ADMIN — AMLODIPINE BESYLATE 10 MG: 5 TABLET ORAL at 10:01

## 2022-08-03 RX ADMIN — SODIUM CHLORIDE, PRESERVATIVE FREE 10 ML: 5 INJECTION INTRAVENOUS at 13:27

## 2022-08-03 RX ADMIN — CARVEDILOL 6.25 MG: 3.12 TABLET, FILM COATED ORAL at 13:26

## 2022-08-03 RX ADMIN — LATANOPROST 1 DROP: 50 SOLUTION OPHTHALMIC at 21:24

## 2022-08-03 RX ADMIN — SODIUM CHLORIDE, PRESERVATIVE FREE 10 ML: 5 INJECTION INTRAVENOUS at 21:26

## 2022-08-03 NOTE — CONSULTS
Progress Note      8/3/2022 11:41 AM  NAME: Germán Sun   MRN:  [id number]   Admit Diagnosis: Syncope [R55]      Problem List:   Syncope/presyncope post BM  Type II acute NSTEMI  Vasovagal syncope  Symptomatic anemia with hemoglobin 7.7  CVA  History of renal cell carcinoma status post left nephrectomy  CKD  Type 2 diabetes     Assessment/Plan:   Apixaban on hold pending GI evaluation for the source of anemia  Echocardiogram to assess EF and filling pressures  Plan on noninvasive cardiac management of NSTEMI; avoid invasive evaluation given anemia and CKD         []       High complexity decision making was performed in this patient at high risk for decompensation with multiple organ involvement. Subjective:     Germán Sun denies chest pain, dyspnea. Discussed with RN events overnight. Review of Systems:   Negative except for as noted above. Objective:      Physical Exam:    Last 24hrs VS reviewed since prior progress note. Most recent are:    Visit Vitals  /64 (BP 1 Location: Right upper arm, BP Patient Position: At rest)   Pulse 70   Temp 98.2 °F (36.8 °C)   Resp 18   Ht 5' 6\" (1.676 m)   Wt 84 kg (185 lb 3 oz)   SpO2 100%   Breastfeeding No   BMI 29.89 kg/m²       Intake/Output Summary (Last 24 hours) at 8/3/2022 1141  Last data filed at 8/3/2022 0548  Gross per 24 hour   Intake 440 ml   Output 700 ml   Net -260 ml        General Appearance: Alert; no acute distress. Ears/Nose/Mouth/Throat: moist mucous membranes  Neck: Supple. Chest: Lungs clear to auscultation bilaterally. Cardiovascular: Regular rate and rhythm, S1S2 normal  Abdomen: Soft, non-tender, bowel sounds are active. Extremities: No edema bilaterally. Skin: Warm and dry. []         Post-cath site without hematoma, bruit, tenderness, or thrill. Distal pulses intact.     PMH/SH reviewed - no change compared to H&P    Telemetry:     EKG:     10/21/21    ECHO ADULT COMPLETE 10/24/2021 10/24/2021    Interpretation Summary  · LV: Estimated LVEF is 55 - 60%. Normal cavity size and systolic function (ejection fraction normal). Mild concentric hypertrophy. Left ventricular diastolic dysfunction due to impaired relaxation. · LA: Mildly dilated left atrium. · RA: Mildly dilated right atrium. · IAS: No atrial septal defect present. · MV: Mitral valve thickening. · TV: Mild tricuspid valve regurgitation is present. Right Ventricular Arterial Pressure (RVSP) is 52 mmHg. Signed by: Sobeida Gray MD on 10/24/2021  2:12 PM      []  No new EKG for review    Lab Data Personally Reviewed:    Recent Labs     08/03/22  0739 08/02/22  0313   WBC 4.7 4.5   HGB 8.3* 7.7*   HCT 26.0* 24.3*    164     Recent Labs     08/01/22  1633   INR 1.9*   PTP 22.0*      Recent Labs     08/03/22  0739 08/02/22  0313 08/01/22  1456    132* 136   K 4.5 5.4* 3.1*   CL 97 97 105   CO2 36* 34* 27   BUN 60* 63* 51*   CREA 2.62* 2.83* 2.10*   * 129* 110*   CA 9.2 9.4 7.4*     No results for input(s): CPK, CKNDX, TROIQ in the last 72 hours. No lab exists for component: CPB  Lab Results   Component Value Date/Time    Cholesterol, total 164 11/22/2020 01:12 AM    HDL Cholesterol 64 11/22/2020 01:12 AM    LDL,Direct 61 06/20/2022 09:55 AM    LDL, calculated 73.2 11/22/2020 01:12 AM    Triglyceride 134 11/22/2020 01:12 AM    CHOL/HDL Ratio 2.6 11/22/2020 01:12 AM       Recent Labs     08/01/22  1456      TP 4.5*   ALB 1.8*   GLOB 2.7     No results for input(s): PH, PCO2, PO2 in the last 72 hours.     Medications Personally Reviewed:    Current Facility-Administered Medications   Medication Dose Route Frequency    torsemide (DEMADEX) tablet 40 mg  40 mg Oral BID    glucose chewable tablet 16 g  4 Tablet Oral PRN    glucagon (GLUCAGEN) injection 1 mg  1 mg IntraMUSCular PRN    dextrose 10% infusion 0-250 mL  0-250 mL IntraVENous PRN    latanoprost (XALATAN) 0.005 % ophthalmic solution 1 Drop  1 Drop Both Eyes QHS    atorvastatin (LIPITOR) tablet 20 mg  20 mg Oral QHS    [Held by provider] aspirin chewable tablet 81 mg  81 mg Oral DAILY    venlafaxine-SR (EFFEXOR-XR) capsule 75 mg  75 mg Oral DAILY    [Held by provider] apixaban (ELIQUIS) tablet 2.5 mg  2.5 mg Oral BID    gabapentin (NEURONTIN) capsule 300 mg  300 mg Oral BID    calcitRIOL (ROCALTROL) capsule 0.25 mcg  0.25 mcg Oral DAILY    donepeziL (ARICEPT) tablet 10 mg  10 mg Oral QHS    cetirizine (ZYRTEC) tablet 10 mg  10 mg Oral DAILY    docusate sodium (COLACE) capsule 100 mg  100 mg Oral BID    carvediloL (COREG) tablet 6.25 mg  6.25 mg Oral BID WITH MEALS    amLODIPine (NORVASC) tablet 10 mg  10 mg Oral DAILY    ferrous sulfate tablet 325 mg  325 mg Oral ACB    sodium chloride (NS) flush 5-40 mL  5-40 mL IntraVENous Q8H    sodium chloride (NS) flush 5-40 mL  5-40 mL IntraVENous PRN    acetaminophen (TYLENOL) tablet 650 mg  650 mg Oral Q6H PRN    Or    acetaminophen (TYLENOL) suppository 650 mg  650 mg Rectal Q6H PRN    polyethylene glycol (MIRALAX) packet 17 g  17 g Oral DAILY PRN    bisacodyL (DULCOLAX) tablet 5 mg  5 mg Oral DAILY PRN    ondansetron (ZOFRAN ODT) tablet 4 mg  4 mg Oral Q6H PRN    Or    ondansetron (ZOFRAN) injection 4 mg  4 mg IntraVENous Q6H PRN         Jose Angel Huggins MD

## 2022-08-03 NOTE — PROGRESS NOTES
Primary Nurse Mara Sethi RN and Sridhar RN performed a dual skin assessment on this patient Impairment noted- see wound doc flow sheet.

## 2022-08-03 NOTE — PROGRESS NOTES
Consult Date: 8/3/2022    Consults      Subjective   HISTORY OF PRESENTING ILLNESS :  Jolie Yuen is a 65 y.o.,female ,BLACK/ with history of left nephrectomy due to renal cell carcinoma, hypertension, CKD stage IV, coronary artery disease, CVA and diabetes mellitus type 2 brought to the emergency room after having passed out. She was lightheaded and weak prior to this. Today is hospital day 1. No new complaints. Feels much better. Not short of breath  No pain.   No urinary difficulties  Past Medical History:   Diagnosis Date    Adenocarcinoma, renal cell ([address] 9/2/2020    Arthritis     CAD (coronary artery disease)     Chronic kidney disease     Diabetes ([address]     Gout     Hypercholesterolemia     Hypertension     Mental depression     Pulmonary embolism (HCC)     Seizures ([address]     Stroke ([facility]     Thyroid disease       Past Surgical History:   Procedure Laterality Date    HX GYN      HX HYSTERECTOMY      HX NEPHRECTOMY Left 02/12/2015    HX ORTHOPAEDIC      HX UROLOGICAL  02/12/2015    PARTIAL URETERECTOMY     LA CARDIAC SURG PROCEDURE UNLIST      stents placed      Family History   Problem Relation Age of Onset    Heart Disease Mother     Heart Disease Father     Heart Disease Sister     Cancer Sister     Heart Disease Brother     Cancer Maternal Grandmother     Stroke Son     Cancer Sister       Social History     Tobacco Use    Smoking status: Former     Years: 15.00     Types: Cigarettes    Smokeless tobacco: Never   Substance Use Topics    Alcohol use: Not Currently       Current Facility-Administered Medications   Medication Dose Route Frequency Provider Last Rate Last Admin    torsemide (DEMADEX) tablet 40 mg  40 mg Oral BID Con MD Kobi   40 mg at 08/03/22 1001    glucose chewable tablet 16 g  4 Tablet Oral PRN Chau Hernandez MD        glucagon (GLUCAGEN) injection 1 mg  1 mg IntraMUSCular PRN Chau Hernanedz MD        dextrose 10% infusion 0-250 mL  0-250 mL IntraVENous PRN Monico Shay MD        latanoprost (XALATAN) 0.005 % ophthalmic solution 1 Drop  1 Drop Both Eyes QHS Eliza Course, NP   1 Drop at 08/02/22 2154    atorvastatin (LIPITOR) tablet 20 mg  20 mg Oral QHS Eliza Course, NP   20 mg at 08/02/22 2152    [Held by provider] aspirin chewable tablet 81 mg  81 mg Oral DAILY Eliza Course, NP        venlafaxine-SR [facility] capsule 75 mg  75 mg Oral DAILY Eliza Course, NP   75 mg at 08/03/22 1003    [Held by provider] apixaban (ELIQUIS) tablet 2.5 mg  2.5 mg Oral BID Eliza Course, NP        gabapentin (NEURONTIN) capsule 300 mg  300 mg Oral BID Eliza Course, NP   300 mg at 08/03/22 1002    calcitRIOL (ROCALTROL) capsule 0.25 mcg  0.25 mcg Oral DAILY Eliza Course, NP   0.25 mcg at 08/03/22 1002    donepeziL (ARICEPT) tablet 10 mg  10 mg Oral QHS Eliza Course, NP   10 mg at 08/02/22 2152    cetirizine (ZYRTEC) tablet 10 mg  10 mg Oral DAILY Eliza Course, NP   10 mg at 08/03/22 1326    docusate sodium (COLACE) capsule 100 mg  100 mg Oral BID Eliza Course, NP   100 mg at 08/03/22 1003    carvediloL (COREG) tablet 6.25 mg  6.25 mg Oral BID WITH MEALS Eliza Course, NP   6.25 mg at 08/03/22 1326    amLODIPine (NORVASC) tablet 10 mg  10 mg Oral DAILY Eliza Course, NP   10 mg at 08/03/22 1001    ferrous sulfate tablet 325 mg  325 mg Oral ACB Eliza Course, NP   325 mg at 08/03/22 1319    sodium chloride (NS) flush 5-40 mL  5-40 mL IntraVENous Q8H Eliza Course, NP   10 mL at 08/03/22 1327    sodium chloride (NS) flush 5-40 mL  5-40 mL IntraVENous PRN Eliza Course, NP        acetaminophen (TYLENOL) tablet 650 mg  650 mg Oral Q6H PRN Eliza Course, NP        Or    acetaminophen (TYLENOL) suppository 650 mg  650 mg Rectal Q6H PRN Eliza Course, NP        polyethylene glycol (MIRALAX) packet 17 g  17 g Oral DAILY PRN Eliza Course, NP        bisacodyL (DULCOLAX) tablet 5 mg  5 mg Oral DAILY PRN Kanu Edward NP        ondansetron (ZOFRAN ODT) tablet 4 mg  4 mg Oral Q6H PRN Kanu Edward NP        Or    ondansetron [facility]) injection 4 mg  4 mg IntraVENous Q6H PRN Kanu Edward NP            Review of Systems   Constitutional:  Negative for activity change, appetite change and fatigue. Cardiovascular:  Negative for chest pain and leg swelling. Gastrointestinal: Negative. Genitourinary: Negative. Neurological:  Negative for seizures, syncope, weakness and light-headedness. All other systems reviewed and are negative.     Objective     Vital signs for last 24 hours:  Visit Vitals  BP (!) 104/46 (BP 1 Location: Right upper arm, BP Patient Position: At rest)   Pulse 60   Temp 98 °F (36.7 °C)   Resp 19   Ht 5' 6\" (1.676 m)   Wt 84 kg (185 lb 3 oz)   SpO2 97%   Breastfeeding No   BMI 29.89 kg/m²           Recent Results (from the past 24 hour(s))   EKG, 12 LEAD, SUBSEQUENT    Collection Time: 08/02/22  4:33 PM   Result Value Ref Range    Ventricular Rate 61 BPM    Atrial Rate 61 BPM    P-R Interval 148 ms    QRS Duration 92 ms    Q-T Interval 444 ms    QTC Calculation (Bezet) 446 ms    Calculated P Axis 50 degrees    Calculated R Axis 16 degrees    Calculated T Axis 77 degrees    Diagnosis       Normal sinus rhythm  Normal ECG  When compared with ECG of 02-AUG-2022 11:57,  No significant change was found  Confirmed by NICOL CUMMINS [place] [id number]) on 8/2/2022 5:08:25 PM     GLUCOSE, POC    Collection Time: 08/02/22  7:49 PM   Result Value Ref Range    Glucose (POC) 190 (H) 65 - 117 mg/dL    Performed by Natalie Gómez    CBC WITH AUTOMATED DIFF    Collection Time: 08/03/22  7:39 AM   Result Value Ref Range    WBC 4.7 3.6 - 11.0 K/uL    RBC 2.59 (L) 3.80 - 5.20 M/uL    HGB 8.3 (L) 11.5 - 16.0 g/dL    HCT 26.0 (L) 35.0 - 47.0 %    .4 (H) 80.0 - 99.0 FL    MCH 32.0 26.0 - 34.0 PG    MCHC 31.9 30.0 - 36.5 g/dL    RDW 17.1 (H) 11.5 - 14.5 %    PLATELET 302 400 - 160 K/uL    MPV 10.5 8.9 - 12.9 FL    NRBC 0.0 0.0  WBC    ABSOLUTE NRBC 0.00 0.00 - 0.01 K/uL    NEUTROPHILS 52 32 - 75 %    LYMPHOCYTES 29 12 - 49 %    MONOCYTES 17 (H) 5 - 13 %    EOSINOPHILS 1 0 - 7 %    BASOPHILS 0 0 - 1 %    IMMATURE GRANULOCYTES 1 (H) 0 - 0.5 %    ABS. NEUTROPHILS 2.4 1.8 - 8.0 K/UL    ABS. LYMPHOCYTES 1.4 0.8 - 3.5 K/UL    ABS. MONOCYTES 0.8 0.0 - 1.0 K/UL    ABS. EOSINOPHILS 0.1 0.0 - 0.4 K/UL    ABS. BASOPHILS 0.0 0.0 - 0.1 K/UL    ABS. IMM.  GRANS. 0.1 (H) 0.00 - 0.04 K/UL    DF AUTOMATED     METABOLIC PANEL, BASIC    Collection Time: 08/03/22  7:39 AM   Result Value Ref Range    Sodium 136 136 - 145 mmol/L    Potassium 4.5 3.5 - 5.1 mmol/L    Chloride 97 97 - 108 mmol/L    CO2 36 (H) 21 - 32 mmol/L    Anion gap 3 (L) 5 - 15 mmol/L    Glucose 125 (H) 65 - 100 mg/dL    BUN 60 (H) 6 - 20 mg/dL    Creatinine 2.62 (H) 0.55 - 1.02 mg/dL    BUN/Creatinine ratio 23 (H) 12 - 20      GFR est AA 21 (L) >60 ml/min/1.73m2    GFR est non-AA 18 (L) >60 ml/min/1.73m2    Calcium 9.2 8.5 - 10.1 mg/dL   GLUCOSE, POC    Collection Time: 08/03/22  8:16 AM   Result Value Ref Range    Glucose (POC) 129 (H) 65 - 117 mg/dL    Performed by Carolina Ugalde    ECHO ADULT FOLLOW-UP OR LIMITED    Collection Time: 08/03/22 11:32 AM   Result Value Ref Range    LV EDV A2C 87 mL    LV EDV A4C 80 mL    LV ESV A2C 37 mL    LV ESV A4C 34 mL    IVSd 1.2 0.6 - 0.9 cm    LVIDd 4.6 3.9 - 5.3 cm    LVIDs 3.0 cm    LVOT Diameter 1.8 cm    LVPWd 0.9 0.6 - 0.9 cm    LV E' Lateral Velocity 10 cm/s    LV E' Septal Velocity 7 cm/s    LV Ejection Fraction A2C 58 %    LV Ejection Fraction A4C 57 %    EF BP 59 55 - 100 %    LVOT Area 2.5 cm2    LA Major [place] 6.5 cm    LA Area 4C 26.1 cm2    LA Diameter 3.6 cm    RA Area 4C 22.0 cm2    RA Volume 64 ml    AV Mean Gradient 13 mmHg    AV VTI 57.7 cm    AV Mean Velocity 1.6 m/s    AV Peak Velocity 2.7 m/s    AV Peak Gradient 29 mmHg    Ascending Aorta 3.9 cm    MV Nyquist Velocity 85 cm/s    MR Radius PISA 0.50 cm    MR .0 cm    MV Mean Gradient 2 mmHg    MV VTI 41.6 cm    MV Mean Velocity 0.7 m/s    MR Peak Velocity 5.0 m/s    MR Peak Gradient 100 mmHg    MV Max Velocity 1.5 m/s    MV Peak Gradient 9 mmHg    MV A Velocity 1.19 m/s    MV E Velocity 1.04 m/s    MV EROA PISA 26.7 cm2    NH Max Velocity 1.3 m/s    Pulmonary Artery EDP 6 mmHg    PV Max Velocity 1.6 m/s    PV Peak Gradient 10 mmHg    RV Basal Dimension 4.5 cm    RV Mid Dimension 4.3 cm    TAPSE 3.0 1.7 cm    TR Max Velocity 3.17 m/s    TR Peak Gradient 40 mmHg    Fractional Shortening 2D 35 28 - 44 %    LV ESV Index A4C 18 mL/m2    LV EDV Index A4C 41 mL/m2    LV ESV Index A2C 19 mL/m2    LV EDV Index A2C 45 mL/m2    LVIDd Index 2.37 cm/m2    LVIDs Index 1.55 cm/m2    LV RWT Ratio 0.39     LV Mass 2D 169.9 (A) 67 - 162 g    LV Mass 2D Index 87.6 43 - 95 g/m2    MV E/A 0.87     E/E' Ratio (Averaged) 12.63     E/E' Lateral 10.40     E/E' Septal 14.86     LA Size Index 1.86 cm/m2    RA Volume Index A4C 33 mL/m2    Ascending Aorta Index 2.01 cm/m2    MR Regurg Volume PISA 4,403.85 mL          Intake/Output Summary (Last 24 hours) at 8/3/2022 1613  Last data filed at 8/3/2022 0548  Gross per 24 hour   Intake 440 ml   Output 700 ml   Net -260 ml        Current Shift: No intake/output data recorded. Last 3 Shifts: 08/01 1901 - 08/03 0700  In: 26 [P.O.:440]  Out: 700 [Urine:700]  Physical Exam  Vitals and nursing note reviewed. Constitutional:       Appearance: Normal appearance. She is normal weight. HENT:      Head: Normocephalic and atraumatic. Nose: Nose normal.      Mouth/Throat:      Mouth: Mucous membranes are moist.      Pharynx: Oropharynx is clear. Eyes:      Conjunctiva/sclera: Conjunctivae normal.   Cardiovascular:      Rate and Rhythm: Regular rhythm. Tachycardia present. Pulses: Normal pulses. Heart sounds: Normal heart sounds. Pulmonary:      Effort: Pulmonary effort is normal.      Breath sounds: Normal breath sounds. Abdominal:      General: Abdomen is flat. Palpations: Abdomen is soft. Skin:     General: Skin is warm and dry. Coloration: Skin is not pale. Findings: No erythema. Neurological:      General: No focal deficit present. Mental Status: She is alert and oriented to person, place, and time. Psychiatric:         Mood and Affect: Mood normal.         Behavior: Behavior normal.        Data Review:   Recent Results (from the past 24 hour(s))   EKG, 12 LEAD, SUBSEQUENT    Collection Time: 08/02/22  4:33 PM   Result Value Ref Range    Ventricular Rate 61 BPM    Atrial Rate 61 BPM    P-R Interval 148 ms    QRS Duration 92 ms    Q-T Interval 444 ms    QTC Calculation (Bezet) 446 ms    Calculated P Axis 50 degrees    Calculated R Axis 16 degrees    Calculated T Axis 77 degrees    Diagnosis       Normal sinus rhythm  Normal ECG  When compared with ECG of 02-AUG-2022 11:57,  No significant change was found  Confirmed by NICOL CUMMINS CHRISTINA [id number]) on 8/2/2022 5:08:25 PM     GLUCOSE, POC    Collection Time: 08/02/22  7:49 PM   Result Value Ref Range    Glucose (POC) 190 (H) 65 - 117 mg/dL    Performed by [place]    CBC WITH AUTOMATED DIFF    Collection Time: 08/03/22  7:39 AM   Result Value Ref Range    WBC 4.7 3.6 - 11.0 K/uL    RBC 2.59 (L) 3.80 - 5.20 M/uL    HGB 8.3 (L) 11.5 - 16.0 g/dL    HCT 26.0 (L) 35.0 - 47.0 %    .4 (H) 80.0 - 99.0 FL    MCH 32.0 26.0 - 34.0 PG    MCHC 31.9 30.0 - 36.5 g/dL    RDW 17.1 (H) 11.5 - 14.5 %    PLATELET 716 927 - 684 K/uL    MPV 10.5 8.9 - 12.9 FL    NRBC 0.0 0.0  WBC    ABSOLUTE NRBC 0.00 0.00 - 0.01 K/uL    NEUTROPHILS 52 32 - 75 %    LYMPHOCYTES 29 12 - 49 %    MONOCYTES 17 (H) 5 - 13 %    EOSINOPHILS 1 0 - 7 %    BASOPHILS 0 0 - 1 %    IMMATURE GRANULOCYTES 1 (H) 0 - 0.5 %    ABS. NEUTROPHILS 2.4 1.8 - 8.0 K/UL    ABS. LYMPHOCYTES 1.4 0.8 - 3.5 K/UL    ABS. MONOCYTES 0.8 0.0 - 1.0 K/UL    ABS. EOSINOPHILS 0.1 0.0 - 0.4 K/UL    ABS.  BASOPHILS 0.0 0.0 - 0.1 K/UL    ABS. IMM.  GRANS. 0.1 (H) 0.00 - 0.04 K/UL    DF AUTOMATED     METABOLIC PANEL, BASIC    Collection Time: 08/03/22  7:39 AM   Result Value Ref Range    Sodium 136 136 - 145 mmol/L    Potassium 4.5 3.5 - 5.1 mmol/L    Chloride 97 97 - 108 mmol/L    CO2 36 (H) 21 - 32 mmol/L    Anion gap 3 (L) 5 - 15 mmol/L    Glucose 125 (H) 65 - 100 mg/dL    BUN 60 (H) 6 - 20 mg/dL    Creatinine 2.62 (H) 0.55 - 1.02 mg/dL    BUN/Creatinine ratio 23 (H) 12 - 20      GFR est AA 21 (L) >60 ml/min/1.73m2    GFR est non-AA 18 (L) >60 ml/min/1.73m2    Calcium 9.2 8.5 - 10.1 mg/dL   GLUCOSE, POC    Collection Time: 08/03/22  8:16 AM   Result Value Ref Range    Glucose (POC) 129 (H) 65 - 117 mg/dL    Performed by [employer]    ECHO ADULT FOLLOW-UP OR LIMITED    Collection Time: 08/03/22 11:32 AM   Result Value Ref Range    LV EDV A2C 87 mL    LV EDV A4C 80 mL    LV ESV A2C 37 mL    LV ESV A4C 34 mL    IVSd 1.2 0.6 - 0.9 cm    LVIDd 4.6 3.9 - 5.3 cm    LVIDs 3.0 cm    LVOT Diameter 1.8 cm    LVPWd 0.9 0.6 - 0.9 cm    LV E' Lateral Velocity 10 cm/s    LV E' Septal Velocity 7 cm/s    LV Ejection Fraction A2C 58 %    LV Ejection Fraction A4C 57 %    EF BP 59 55 - 100 %    LVOT Area 2.5 cm2    LA Major [place] 6.5 cm    LA Area 4C 26.1 cm2    LA Diameter 3.6 cm    RA Area 4C 22.0 cm2    RA Volume 64 ml    AV Mean Gradient 13 mmHg    AV VTI 57.7 cm    AV Mean Velocity 1.6 m/s    AV Peak Velocity 2.7 m/s    AV Peak Gradient 29 mmHg    Ascending Aorta 3.9 cm    MV Nyquist Velocity 85 cm/s    MR Radius PISA 0.50 cm    MR .0 cm    MV Mean Gradient 2 mmHg    MV VTI 41.6 cm    MV Mean Velocity 0.7 m/s    MR Peak Velocity 5.0 m/s    MR Peak Gradient 100 mmHg    MV Max Velocity 1.5 m/s    MV Peak Gradient 9 mmHg    MV A Velocity 1.19 m/s    MV E Velocity 1.04 m/s    MV EROA PISA 26.7 cm2    MA Max Velocity 1.3 m/s    Pulmonary Artery EDP 6 mmHg    PV Max Velocity 1.6 m/s    PV Peak Gradient 10 mmHg    RV Basal Dimension 4.5 cm    RV Mid Dimension 4.3 cm    TAPSE 3.0 1.7 cm    TR Max Velocity 3.17 m/s    TR Peak Gradient 40 mmHg    Fractional Shortening 2D 35 28 - 44 %    LV ESV Index A4C 18 mL/m2    LV EDV Index A4C 41 mL/m2    LV ESV Index A2C 19 mL/m2    LV EDV Index A2C 45 mL/m2    LVIDd Index 2.37 cm/m2    LVIDs Index 1.55 cm/m2    LV RWT Ratio 0.39     LV Mass 2D 169.9 (A) 67 - 162 g    LV Mass 2D Index 87.6 43 - 95 g/m2    MV E/A 0.87     E/E' Ratio (Averaged) 12.63     E/E' Lateral 10.40     E/E' Septal 14.86     LA Size Index 1.86 cm/m2    RA Volume Index A4C 33 mL/m2    Ascending Aorta Index 2.01 cm/m2    MR Regurg Volume PISA 4,403.85 mL         XR CHEST PORT   Final Result   No acute process. Patient Active Problem List   Diagnosis Code    Arthritis M19.90    Essential hypertension I10    Impingement syndrome of shoulder region M75.40    Adenocarcinoma, renal cell (HCC) C64.9    Morbid obesity ([facility]) E66.01    Peripheral vascular disease ([facility]) I73.9    Arteriosclerosis of coronary artery I25.10    Dyslipidemia E78.5    Pulmonary embolism ([facility]) I26.99    Chronic kidney disease (CKD), stage IV (severe) ([facility]) N18.4    Hypertension I10    Cerebrovascular accident (CVA) ([facility] I63.9    Diabetes mellitus type 2, controlled ([address] E11.9    TIA (transient ischemic attack) G45.9    Episode of unresponsiveness R41.89    Syncope R55    Syncopal episodes R55    Secondary hyperparathyroidism ([facility] N25.81    Wound of left leg, subsequent encounter S81.802D    Venous insufficiency of left lower extremity I87.2    Anxiety F41. 9    Benign hypertensive renal disease I12.9    Chronic gouty arthritis M1A. 00X0    Diabetic peripheral neuropathy ([facility]) E11.42    Hypertensive heart and chronic kidney disease without heart failure, with stage 5 chronic kidney disease, or end stage renal disease ([facility]) I13.11    Hyperglycemia due to type 2 diabetes mellitus ([facility]) E11.65    Non-STEMI (non-ST elevated myocardial infarction) ([address] I21.4 Anemia D64.9        DIAGNOSES:  Acute kidney injury on CKD 4 acute kidney injury grade 2  Hyperkalemia  Metabolic alkalosis  Troponinemia  Acute on chronic anemia      PLAN:  Renal function improved. Still with elevated CO2 which we need to monitor. Hemoglobin 8.3. Continue with current diuretic dose which is  effective. Thanks for consulting me. Please don't hesitate to contact me if any questions arise of if I can assist in any manner. This dictation was done by dragon, computer voice recognition software. Often unanticipated grammatical, syntax, phones and other interpretive errors are inadvertently transcribed. Please excuse errors that have escaped final proofreading. Please contact me if you suspect dictation or transcription errors.   Dr Tanesha Mendoza  [address], [address] [facility], [address] [place] [address]  Cell Phone: [phone]  Office phone: [phone]  Fax: [phone]

## 2022-08-03 NOTE — PROGRESS NOTES
Problem: Patient Education: Go to Patient Education Activity  Goal: Patient/Family Education  Outcome: Progressing Towards Goal     Problem: Patient Education: Go to Patient Education Activity  Goal: Patient/Family Education  Outcome: Progressing Towards Goal     Problem: Falls - Risk of  Goal: *Absence of Falls  Description: Document Guido Wiley Fall Risk and appropriate interventions in the flowsheet.   Outcome: Progressing Towards Goal  Note: Fall Risk Interventions:  Mobility Interventions: Bed/chair exit alarm, Patient to call before getting OOB, Utilize walker, cane, or other assistive device         Medication Interventions: Bed/chair exit alarm, Patient to call before getting OOB, Teach patient to arise slowly    Elimination Interventions: Bed/chair exit alarm, Call light in reach, Patient to call for help with toileting needs, Toileting schedule/hourly rounds              Problem: Patient Education: Go to Patient Education Activity  Goal: Patient/Family Education  Outcome: Progressing Towards Goal

## 2022-08-03 NOTE — PROGRESS NOTES
[facility] Hospitalist Progress Note  Julian Brito MD  FAYT:       [facility]  Patient Name:Tamy Moore     YOB: 1945     Age:77 y.o.    22 admission course  Kin Shone is a 65 y.o. female who has a PMH significant for renal cell carcinoma status post left nephrectomy, HTN, CKD, CAD, HLD, CVA and diabetes. She comes to the emergency room from rehab center after having a syncopal episode while having a bowel movement on the toilet. Patient states she has had previous episodes similar. She states she lost consciousness for a couple of seconds but denies falling, chest pain or palpitations. Significant labs on admission hemoglobin 7.8, potassium 3.1, BUN 51, creatinine 2.10, FOBT positive, INR 1.9, troponin 380. Patient was admitted for syncope, anemia, Hemoccult positive, hypokalemia and non-STEMI. GI and cardiology was consulted. ECHO pending. Subjective    Patient denies any new complaints. Has involuntary movement of hands and feet which as per patient is chronic       Objective         Vitals Last 24 Hours:  TEMPERATURE:  Temp  Av.2 °F (36.8 °C)  Min: 97.4 °F (36.3 °C)  Max: 99.1 °F (37.3 °C)  RESPIRATIONS RANGE: Resp  Av.6  Min: 10  Max: 21  PULSE OXIMETRY RANGE: SpO2  Av.5 %  Min: 98 %  Max: 100 %  PULSE RANGE: Pulse  Av.7  Min: 58  Max: 73  BLOOD PRESSURE RANGE: Systolic (57KDV), XXI:479 , Min:112 , BOD:660   ; Diastolic (83HDG), DONOVAN:83, Min:45, Max:81    I/O (24Hr):     Intake/Output Summary (Last 24 hours) at 8/3/2022 1418  Last data filed at 8/3/2022 0548  Gross per 24 hour   Intake 440 ml   Output 700 ml   Net -260 ml     Constitutional: No fevers, No chills, No sweats, No fatigue, No Weakness  Eyes: No redness  Ears, nose, mouth, throat, and face: No nasal congestion, No sore throat, No voice change  Respiratory: No Shortness of Breath, No cough, No wheezing  Cardiovascular: No chest pain, No palpitations, No extremity edema  Gastrointestinal: No nausea, No vomiting, No diarrhea, No abdominal pain  Genitourinary: No frequency, No dysuria, No hematuria  Integument/breast: No skin lesion(s)  Neurological: No Confusion, No headaches, No dizziness    PHYSICAL EXAM:  Constitutional: No acute distress  Skin: Extremities and face reveal no rashes. HEENT: Sclerae anicteric. Extra-occular muscles are intact. No oral ulcers. The neck is supple and no masses. Cardiovascular: Regular rate and rhythm. Respiratory:  Clear breath sounds bilaterally with no wheezes, rales, or rhonchi. GI: Abdomen nondistended, soft, and nontender. Normal active bowel sounds. Musculoskeletal: No pitting edema of the lower legs. Able to move all ext. Has involuntary movements of hands and feet. Neurological:  Patient is alert and oriented. Cranial nerves II-XII grossly intact  Psychiatric: Mood appears appropriate       Labs/Imaging/Diagnostics    Labs:  CBC:  Recent Labs     08/03/22  0739 08/02/22  0313 08/01/22  1456   WBC 4.7 4.5 5.2   RBC 2.59* 2.45* 2.46*   HGB 8.3* 7.7* 7.8*   HCT 26.0* 24.3* 24.1*   .4* 99.2* 98.0   RDW 17.1* 17.3* 17.2*    164 159       CHEMISTRIES:  Recent Labs     08/03/22  0739 08/02/22  0313 08/01/22  1456    132* 136   K 4.5 5.4* 3.1*   CL 97 97 105   CO2 36* 34* 27   BUN 60* 63* 51*   CA 9.2 9.4 7.4*     PT/INR:  Recent Labs     08/01/22  1633   INR 1.9*       APTT:No results for input(s): APTT in the last 72 hours. LIVER PROFILE:  Recent Labs     08/01/22  1456   AST 25   ALT 17       Lab Results   Component Value Date/Time    ALT (SGPT) 17 08/01/2022 02:56 PM    AST (SGOT) 25 08/01/2022 02:56 PM    Alk. phosphatase 117 08/01/2022 02:56 PM    Bilirubin, total 0.5 08/01/2022 02:56 PM       Imaging Last 24 Hours:  No results found.   Assessment//Plan   Active Problems:    Syncope (11/21/2020)      Non-STEMI (non-ST elevated myocardial infarction) ([facility] (8/1/2022)      Anemia (8/1/2022)    Assessment & Plan      1) Syncopal episode while straining on the toilet  - likely vasovagal syncope  -ECHO pending  - Appreciate cardiology consult    2) Acute anemia. Holding apixaban and aspirin  Appreciate GI consult. GI planning for EGD      3) Cardiovascular issues including coronary disease, hypertension. Some elevation in troponin at admission.    Apixaban and aspirin on hold for anemia   Continue medical regimen    Amlodipine    Atorvastatin    Carvedilol    4) Chronic renal failure   Continue torsemide    5) DVT prophylaxis  SCDs  Apixaban on hold due to  anemia      Electronically signed by Malka Bell MD on 8/3/2022 at 8:00 AM

## 2022-08-03 NOTE — PROGRESS NOTES
Patient request that [facility] cardiology physician be notified of patient's admission.

## 2022-08-04 ENCOUNTER — ANESTHESIA EVENT (OUTPATIENT)
Dept: ENDOSCOPY | Age: 77
DRG: 377 | End: 2022-08-04
Payer: MEDICARE

## 2022-08-04 LAB
ANION GAP SERPL CALC-SCNC: 4 MMOL/L (ref 5–15)
BUN SERPL-MCNC: 57 MG/DL (ref 6–20)
BUN/CREAT SERPL: 23 (ref 12–20)
CA-I BLD-MCNC: 9.2 MG/DL (ref 8.5–10.1)
CHLORIDE SERPL-SCNC: 94 MMOL/L (ref 97–108)
CO2 SERPL-SCNC: 35 MMOL/L (ref 21–32)
CREAT SERPL-MCNC: 2.49 MG/DL (ref 0.55–1.02)
ERYTHROCYTE [DISTWIDTH] IN BLOOD BY AUTOMATED COUNT: 16.4 % (ref 11.5–14.5)
GLUCOSE BLD STRIP.AUTO-MCNC: 103 MG/DL (ref 65–117)
GLUCOSE BLD STRIP.AUTO-MCNC: 155 MG/DL (ref 65–117)
GLUCOSE BLD STRIP.AUTO-MCNC: 161 MG/DL (ref 65–117)
GLUCOSE BLD STRIP.AUTO-MCNC: 174 MG/DL (ref 65–117)
GLUCOSE SERPL-MCNC: 117 MG/DL (ref 65–100)
HCT VFR BLD AUTO: 25.2 % (ref 35–47)
HCT VFR BLD AUTO: 26 % (ref 35–47)
HGB BLD-MCNC: 8 G/DL (ref 11.5–16)
HGB BLD-MCNC: 8.2 G/DL (ref 11.5–16)
MCH RBC QN AUTO: 31.7 PG (ref 26–34)
MCHC RBC AUTO-ENTMCNC: 31.7 G/DL (ref 30–36.5)
MCV RBC AUTO: 100 FL (ref 80–99)
NRBC # BLD: 0 K/UL (ref 0–0.01)
NRBC BLD-RTO: 0 PER 100 WBC
PERFORMED BY, TECHID: ABNORMAL
PERFORMED BY, TECHID: NORMAL
PLATELET # BLD AUTO: 175 K/UL (ref 150–400)
PMV BLD AUTO: 10.7 FL (ref 8.9–12.9)
POTASSIUM SERPL-SCNC: 4.3 MMOL/L (ref 3.5–5.1)
RBC # BLD AUTO: 2.52 M/UL (ref 3.8–5.2)
SODIUM SERPL-SCNC: 133 MMOL/L (ref 136–145)
WBC # BLD AUTO: 4.7 K/UL (ref 3.6–11)

## 2022-08-04 PROCEDURE — 85018 HEMOGLOBIN: CPT

## 2022-08-04 PROCEDURE — 82962 GLUCOSE BLOOD TEST: CPT

## 2022-08-04 PROCEDURE — 85027 COMPLETE CBC AUTOMATED: CPT

## 2022-08-04 PROCEDURE — 74011000250 HC RX REV CODE- 250: Performed by: NURSE PRACTITIONER

## 2022-08-04 PROCEDURE — 36415 COLL VENOUS BLD VENIPUNCTURE: CPT

## 2022-08-04 PROCEDURE — 74011250637 HC RX REV CODE- 250/637: Performed by: INTERNAL MEDICINE

## 2022-08-04 PROCEDURE — 65270000029 HC RM PRIVATE

## 2022-08-04 PROCEDURE — 80048 BASIC METABOLIC PNL TOTAL CA: CPT

## 2022-08-04 PROCEDURE — 74011250637 HC RX REV CODE- 250/637: Performed by: NURSE PRACTITIONER

## 2022-08-04 RX ORDER — AMOXICILLIN 250 MG
1 CAPSULE ORAL DAILY
Status: DISCONTINUED | OUTPATIENT
Start: 2022-08-05 | End: 2022-08-08 | Stop reason: HOSPADM

## 2022-08-04 RX ORDER — TORSEMIDE 10 MG/1
20 TABLET ORAL 2 TIMES DAILY
Status: DISCONTINUED | OUTPATIENT
Start: 2022-08-04 | End: 2022-08-08 | Stop reason: HOSPADM

## 2022-08-04 RX ADMIN — ATORVASTATIN CALCIUM 20 MG: 10 TABLET, FILM COATED ORAL at 21:22

## 2022-08-04 RX ADMIN — LATANOPROST 1 DROP: 50 SOLUTION OPHTHALMIC at 21:21

## 2022-08-04 RX ADMIN — CARVEDILOL 6.25 MG: 3.12 TABLET, FILM COATED ORAL at 08:37

## 2022-08-04 RX ADMIN — DOCUSATE SODIUM 100 MG: 100 CAPSULE, LIQUID FILLED ORAL at 08:36

## 2022-08-04 RX ADMIN — TORSEMIDE 20 MG: 10 TABLET ORAL at 21:22

## 2022-08-04 RX ADMIN — CALCITRIOL CAPSULES 0.25 MCG 0.25 MCG: 0.25 CAPSULE ORAL at 08:37

## 2022-08-04 RX ADMIN — SODIUM CHLORIDE, PRESERVATIVE FREE 10 ML: 5 INJECTION INTRAVENOUS at 17:09

## 2022-08-04 RX ADMIN — SODIUM CHLORIDE, PRESERVATIVE FREE 10 ML: 5 INJECTION INTRAVENOUS at 21:26

## 2022-08-04 RX ADMIN — DONEPEZIL HYDROCHLORIDE 10 MG: 5 TABLET, FILM COATED ORAL at 21:22

## 2022-08-04 RX ADMIN — GABAPENTIN 300 MG: 300 CAPSULE ORAL at 21:22

## 2022-08-04 RX ADMIN — SODIUM CHLORIDE, PRESERVATIVE FREE 10 ML: 5 INJECTION INTRAVENOUS at 05:19

## 2022-08-04 RX ADMIN — VENLAFAXINE HYDROCHLORIDE 75 MG: 75 CAPSULE, EXTENDED RELEASE ORAL at 08:37

## 2022-08-04 RX ADMIN — CETIRIZINE HYDROCHLORIDE 10 MG: 10 TABLET, FILM COATED ORAL at 08:36

## 2022-08-04 RX ADMIN — TORSEMIDE 40 MG: 10 TABLET ORAL at 08:37

## 2022-08-04 RX ADMIN — BISACODYL 5 MG: 5 TABLET, COATED ORAL at 21:37

## 2022-08-04 RX ADMIN — AMLODIPINE BESYLATE 10 MG: 5 TABLET ORAL at 08:36

## 2022-08-04 RX ADMIN — FERROUS SULFATE TAB 325 MG (65 MG ELEMENTAL FE) 325 MG: 325 (65 FE) TAB at 08:36

## 2022-08-04 RX ADMIN — GABAPENTIN 300 MG: 300 CAPSULE ORAL at 08:36

## 2022-08-04 NOTE — PROGRESS NOTES
Progress Note  Date:2022       Room:[facility]  Patient Name:Tamy Moore     YOB: 1945     Age:77 y.o. Subjective    Subjective Reports much improved swelling and breathing status  Patient remains hemodynamically stable. Continued on medications including amlodipine 10 mg daily, carvedilol 6.25 mg twice daily, torsemide 40 mg twice daily    Other medications include calcitriol 0.25 mcg daily and ferrous sulfate 325 mg daily. Labs significant for persistent anemia with hemoglobin at 8.0. Other labs notable for hyponatremia 133. Serum creatinine remains stable with current value of 2.49. Patient follows with Dr. Alfredo Leary on outpatient clinic for nephrology follow-up. Review of Systems  Objective         Vitals Last 24 Hours:  TEMPERATURE:  Temp  Av.9 °F (36.6 °C)  Min: 97.7 °F (36.5 °C)  Max: 98.2 °F (36.8 °C)  RESPIRATIONS RANGE: Resp  Av.2  Min: 19  Max: 20  PULSE OXIMETRY RANGE: SpO2  Av.2 %  Min: 96 %  Max: 100 %  PULSE RANGE: Pulse  Av.4  Min: 58  Max: 81  BLOOD PRESSURE RANGE: Systolic (70RBN), ONC:977 , Min:104 , JONO:770   ; Diastolic (56NVU), ZMV:01, Min:46, Max:64    I/O (24Hr): No intake or output data in the 24 hours ending 22 1411  Objective:  General Appearance:  Comfortable. Vital signs: (most recent): Blood pressure (!) 113/56, pulse 60, temperature 97.8 °F (36.6 °C), resp. rate 19, height 5' 6\" (1.676 m), weight 83 kg (182 lb 15.7 oz), SpO2 99 %, not currently breastfeeding.     Breathing RA comfortably   No increased work of breathing  Alert, conversant, without distress    Labs/Imaging/Diagnostics    Labs:  CBC:  Recent Labs     22  0634 22  0739 22  0313   WBC 4.7 4.7 4.5   RBC 2.52* 2.59* 2.45*   HGB 8.0* 8.3* 7.7*   HCT 25.2* 26.0* 24.3*   .0* 100.4* 99.2*   RDW 16.4* 17.1* 17.3*    174 164     CHEMISTRIES:  Recent Labs     22  0634 22  0739 22  0313   NA 133* 136 132*   K 4.3 4.5 5.4*   CL 94* 97 97   CO2 35* 36* 34*   BUN 57* 60* 63*   CA 9.2 9.2 9.4   PT/INR:  Recent Labs     08/01/22  1633   INR 1.9*     APTT:No results for input(s): APTT in the last 72 hours. LIVER PROFILE:  Recent Labs     08/01/22  1456   AST 25   ALT 17     Lab Results   Component Value Date/Time    ALT (SGPT) 17 08/01/2022 02:56 PM    AST (SGOT) 25 08/01/2022 02:56 PM    Alk. phosphatase 117 08/01/2022 02:56 PM    Bilirubin, total 0.5 08/01/2022 02:56 PM       Imaging Last 24 Hours:  No results found. Assessment//Plan   Active Problems:    Syncope (11/21/2020)      Non-STEMI (non-ST elevated myocardial infarction) ([address] (8/1/2022)      Anemia (8/1/2022)      Assessment:   (Chronic kidney disease stage IV secondary to diabetic nephropathy  Hypertension  Secondary hyperparathyroidism    Plan  Can stop calcitriol at this time  Appreciate cardiology recommendations  Continuing with torsemide 40 mg twice daily  Can resume home dose of losartan at discharge  Can switch to torsemide at dose 20mg bid   ).      Electronically signed by Jeri Colmenares MD on 8/4/2022 at 2:11 PM

## 2022-08-04 NOTE — PROGRESS NOTES
[facility] Hospitalist Progress Note  Julian Strauss MD  UYES:       [facility]  Patient Name:Tamy Moore     YOB: 1945     Age:77 y.o.    22 admission course  James Rothman is a 65 y.o. female who has a PMH significant for renal cell carcinoma status post left nephrectomy, HTN, CKD, CAD, HLD, CVA and diabetes. She comes to the emergency room from rehab center after having a syncopal episode while having a bowel movement on the toilet. Patient states she has had previous episodes similar. She states she lost consciousness for a couple of seconds but denies falling, chest pain or palpitations. Significant labs on admission hemoglobin 7.8, potassium 3.1, BUN 51, creatinine 2.10, FOBT positive, INR 1.9, troponin 380. Patient was admitted for syncope, anemia, Hemoccult positive, hypokalemia and non-STEMI. GI and cardiology was consulted. ECHO pending. Subjective      No new change. Spoke with patient's daughter. Spoke with Dr. Esteban Lozano --> EGD tomorrow      Objective         Vitals Last 24 Hours:  TEMPERATURE:  Temp  Av.9 °F (36.6 °C)  Min: 97.7 °F (36.5 °C)  Max: 98.2 °F (36.8 °C)  RESPIRATIONS RANGE: Resp  Av  Min: 18  Max: 20  PULSE OXIMETRY RANGE: SpO2  Av.7 %  Min: 96 %  Max: 100 %  PULSE RANGE: Pulse  Av.5  Min: 58  Max: 81  BLOOD PRESSURE RANGE: Systolic (19XNC), GSI:193 , Min:82 , SNV:106   ; Diastolic (63WPT), ZGA:65, Min:52, Max:69    I/O (24Hr):   No intake or output data in the 24 hours ending 22    Constitutional: No fevers, No chills, No sweats, No fatigue, No Weakness  Eyes: No redness  Ears, nose, mouth, throat, and face: No nasal congestion, No sore throat, No voice change  Respiratory: No Shortness of Breath, No cough, No wheezing  Cardiovascular: No chest pain, No palpitations, No extremity edema  Gastrointestinal: No nausea, No vomiting, No diarrhea, No abdominal pain  Genitourinary: No frequency, No dysuria, No hematuria  Integument/breast: No skin lesion(s)  Neurological: No Confusion, No headaches, No dizziness    PHYSICAL EXAM:  Constitutional: No acute distress  Skin: Extremities and face reveal no rashes. HEENT: Sclerae anicteric. Extra-occular muscles are intact. No oral ulcers. The neck is supple and no masses. Cardiovascular: Regular rate and rhythm. Respiratory:  Clear breath sounds bilaterally with no wheezes, rales, or rhonchi. GI: Abdomen nondistended, soft, and nontender. Normal active bowel sounds. Musculoskeletal: No pitting edema of the lower legs. Able to move all ext. Has involuntary movements of hands and feet. Neurological:  Patient is alert and oriented. Cranial nerves II-XII grossly intact  Psychiatric: Mood appears appropriate       Labs/Imaging/Diagnostics    Labs:  CBC:  Recent Labs     08/04/22  1523 08/04/22  0634 08/03/22  0739 08/02/22  0313   WBC  --  4.7 4.7 4.5   RBC  --  2.52* 2.59* 2.45*   HGB 8.2* 8.0* 8.3* 7.7*   HCT 26.0* 25.2* 26.0* 24.3*   MCV  --  100.0* 100.4* 99.2*   RDW  --  16.4* 17.1* 17.3*   PLT  --  175 174 164       CHEMISTRIES:  Recent Labs     08/04/22  0634 08/03/22  0739 08/02/22  0313   * 136 132*   K 4.3 4.5 5.4*   CL 94* 97 97   CO2 35* 36* 34*   BUN 57* 60* 63*   CA 9.2 9.2 9.4     PT/INR:  No results for input(s): INR, INREXT, INREXT in the last 72 hours. No lab exists for component: PROTIME    APTT:No results for input(s): APTT in the last 72 hours. LIVER PROFILE:  No results for input(s): AST, ALT in the last 72 hours. No lab exists for component: Mando Perez ALKPHOS    Lab Results   Component Value Date/Time    ALT (SGPT) 17 08/01/2022 02:56 PM    AST (SGOT) 25 08/01/2022 02:56 PM    Alk.  phosphatase 117 08/01/2022 02:56 PM    Bilirubin, total 0.5 08/01/2022 02:56 PM         Assessment & Plan      1) Syncopal episode while straining on the toilet  - likely vasovagal syncope  -ECHO pending  - Appreciate cardiology consult    2) Acute anemia. Holding apixaban and aspirin  Appreciate GI consult. GI planning for EGD      3) Cardiovascular issues including coronary disease, hypertension. Some elevation in troponin at admission.    Apixaban and aspirin on hold for anemia   Continue medical regimen    Amlodipine    Atorvastatin    Carvedilol    4) Chronic renal failure   Continue torsemide    5) DVT prophylaxis: SCDs  Apixaban on hold due to  anemia      Electronically signed by Yovani Ngo MD on 8/4/2022 at 8:00 AM

## 2022-08-04 NOTE — PROGRESS NOTES
Problem: Patient Education: Go to Patient Education Activity  Goal: Patient/Family Education  Outcome: Progressing Towards Goal     Problem: Patient Education: Go to Patient Education Activity  Goal: Patient/Family Education  Outcome: Progressing Towards Goal     Problem: Falls - Risk of  Goal: *Absence of Falls  Description: Document Dilan Nuno Fall Risk and appropriate interventions in the flowsheet.   Outcome: Progressing Towards Goal  Note: Fall Risk Interventions:  Mobility Interventions: Bed/chair exit alarm, Patient to call before getting OOB, Utilize walker, cane, or other assistive device         Medication Interventions: Bed/chair exit alarm, Patient to call before getting OOB, Teach patient to arise slowly    Elimination Interventions: Bed/chair exit alarm, Call light in reach, Patient to call for help with toileting needs, Toileting schedule/hourly rounds

## 2022-08-04 NOTE — PROGRESS NOTES
Progress Note    Patient: Beka Crews MRN: [id number]  SSN: [id number]    YOB: 1945  Age: 65 y.o.   Sex: female      Admit Date: 8/1/2022    LOS: 0 days     Subjective:   No GI bleeding, no chest pain,  Had cardiac evaluation, echocardiogram pending   Past Medical History:   Diagnosis Date    Adenocarcinoma, renal cell ([facility] 9/2/2020    Arthritis     CAD (coronary artery disease)     Chronic kidney disease     Diabetes ([facility]     Gout     Hypercholesterolemia     Hypertension     Mental depression     Pulmonary embolism (HCC)     Seizures ([facility]     Stroke ([facility]     Thyroid disease         Current Facility-Administered Medications:     torsemide (DEMADEX) tablet 40 mg, 40 mg, Oral, BID, Julian Morrison MD, 40 mg at 08/03/22 2122    glucose chewable tablet 16 g, 4 Tablet, Oral, PRN, Chapin Humphreys MD    glucagon (GLUCAGEN) injection 1 mg, 1 mg, IntraMUSCular, PRN, Jacqui Joseph MD    dextrose 10% infusion 0-250 mL, 0-250 mL, IntraVENous, PRN, Jacqui Joseph MD    latanoprost (XALATAN) 0.005 % ophthalmic solution 1 Drop, 1 Drop, Both Eyes, QHS, Karla Irwine, NP, 1 Drop at 08/03/22 2124    atorvastatin (LIPITOR) tablet 20 mg, 20 mg, Oral, QHS, [place] Ode, NP, 20 mg at 08/03/22 2122    [Held by provider] aspirin chewable tablet 81 mg, 81 mg, Oral, DAILY, [place] Ode, NP    venlafaxine-SR [facility] capsule 75 mg, 75 mg, Oral, DAILY, [place] Ode, NP, 75 mg at 08/03/22 1003    [Held by provider] apixaban (ELIQUIS) tablet 2.5 mg, 2.5 mg, Oral, BID, [place] Ode, NP    gabapentin (NEURONTIN) capsule 300 mg, 300 mg, Oral, BID, [place] Ode, NP, 300 mg at 08/03/22 2122    calcitRIOL (ROCALTROL) capsule 0.25 mcg, 0.25 mcg, Oral, DAILY, Karla Trejo NP, 0.25 mcg at 08/03/22 1002    donepeziL (ARICEPT) tablet 10 mg, 10 mg, Oral, QHS, Karla Irwine, NP, 10 mg at 08/03/22 2123    cetirizine (ZYRTEC) tablet 10 mg, 10 mg, Oral, DAILY, Karla Trejo, NP, 10 mg at 08/03/22 1326    docusate sodium (COLACE) capsule 100 mg, 100 mg, Oral, BID, [place] Ode, NP, 100 mg at 08/03/22 2123    carvediloL (COREG) tablet 6.25 mg, 6.25 mg, Oral, BID WITH MEALS, [place] Ode, NP, 6.25 mg at 08/03/22 1911    amLODIPine (NORVASC) tablet 10 mg, 10 mg, Oral, DAILY, [place] Ode, NP, 10 mg at 08/03/22 1001    ferrous sulfate tablet 325 mg, 325 mg, Oral, ACB, [place] Ode, NP, 325 mg at 08/03/22 1319    sodium chloride (NS) flush 5-40 mL, 5-40 mL, IntraVENous, Q8H, [place] Ode, NP, 10 mL at 08/03/22 2126    sodium chloride (NS) flush 5-40 mL, 5-40 mL, IntraVENous, PRN, [place] Ode, NP    acetaminophen (TYLENOL) tablet 650 mg, 650 mg, Oral, Q6H PRN **OR** acetaminophen (TYLENOL) suppository 650 mg, 650 mg, Rectal, Q6H PRN, [place] Ode, NP    polyethylene glycol (MIRALAX) packet 17 g, 17 g, Oral, DAILY PRN, [place] Ode, NP, 17 g at 08/03/22 2131    bisacodyL (DULCOLAX) tablet 5 mg, 5 mg, Oral, DAILY PRN, [place] Ode, NP    ondansetron (ZOFRAN ODT) tablet 4 mg, 4 mg, Oral, Q6H PRN **OR** ondansetron (ZOFRAN) injection 4 mg, 4 mg, IntraVENous, Q6H PRN, [place] Ode, NP    Objective:     Vitals:    08/03/22 1147 08/03/22 1447 08/03/22 1958 08/03/22 2000   BP: 132/79 (!) 104/46 127/61    Pulse: 70 60 64 81   Resp: 18 19 19    Temp: 99.1 °F (37.3 °C) 98 °F (36.7 °C) 98 °F (36.7 °C)    SpO2: 100% 97% 98%    Weight:       Height:            Intake and Output:  Current Shift: No intake/output data recorded. Last three shifts: 08/02 0701 - 08/03 1900  In: 440 [P.O.:440]  Out: 700 [Urine:700]    Physical Exam:   Physical Exam  Constitutional:       Appearance: She is ill-appearing. HENT:      Mouth/Throat:      Mouth: Mucous membranes are dry. Eyes:      General: No scleral icterus. Cardiovascular:      Rate and Rhythm: Normal rate. Pulmonary:      Breath sounds: Normal breath sounds. Abdominal:      General: Abdomen is flat.       Tenderness: There is no rebound. Skin:     Findings: No bruising. Neurological:      Mental Status: Mental status is at baseline. Psychiatric:         Mood and Affect: Mood normal.        Lab/Data Review:  Recent Results (from the past 24 hour(s))   CBC WITH AUTOMATED DIFF    Collection Time: 08/03/22  7:39 AM   Result Value Ref Range    WBC 4.7 3.6 - 11.0 K/uL    RBC 2.59 (L) 3.80 - 5.20 M/uL    HGB 8.3 (L) 11.5 - 16.0 g/dL    HCT 26.0 (L) 35.0 - 47.0 %    .4 (H) 80.0 - 99.0 FL    MCH 32.0 26.0 - 34.0 PG    MCHC 31.9 30.0 - 36.5 g/dL    RDW 17.1 (H) 11.5 - 14.5 %    PLATELET 125 412 - 234 K/uL    MPV 10.5 8.9 - 12.9 FL    NRBC 0.0 0.0  WBC    ABSOLUTE NRBC 0.00 0.00 - 0.01 K/uL    NEUTROPHILS 52 32 - 75 %    LYMPHOCYTES 29 12 - 49 %    MONOCYTES 17 (H) 5 - 13 %    EOSINOPHILS 1 0 - 7 %    BASOPHILS 0 0 - 1 %    IMMATURE GRANULOCYTES 1 (H) 0 - 0.5 %    ABS. NEUTROPHILS 2.4 1.8 - 8.0 K/UL    ABS. LYMPHOCYTES 1.4 0.8 - 3.5 K/UL    ABS. MONOCYTES 0.8 0.0 - 1.0 K/UL    ABS. EOSINOPHILS 0.1 0.0 - 0.4 K/UL    ABS. BASOPHILS 0.0 0.0 - 0.1 K/UL    ABS. IMM.  GRANS. 0.1 (H) 0.00 - 0.04 K/UL    DF AUTOMATED     METABOLIC PANEL, BASIC    Collection Time: 08/03/22  7:39 AM   Result Value Ref Range    Sodium 136 136 - 145 mmol/L    Potassium 4.5 3.5 - 5.1 mmol/L    Chloride 97 97 - 108 mmol/L    CO2 36 (H) 21 - 32 mmol/L    Anion gap 3 (L) 5 - 15 mmol/L    Glucose 125 (H) 65 - 100 mg/dL    BUN 60 (H) 6 - 20 mg/dL    Creatinine 2.62 (H) 0.55 - 1.02 mg/dL    BUN/Creatinine ratio 23 (H) 12 - 20      GFR est AA 21 (L) >60 ml/min/1.73m2    GFR est non-AA 18 (L) >60 ml/min/1.73m2    Calcium 9.2 8.5 - 10.1 mg/dL   GLUCOSE, POC    Collection Time: 08/03/22  8:16 AM   Result Value Ref Range    Glucose (POC) 129 (H) 65 - 117 mg/dL    Performed by Carolina Ugalde    ECHO ADULT FOLLOW-UP OR LIMITED    Collection Time: 08/03/22 11:32 AM   Result Value Ref Range    LV EDV A2C 87 mL    LV EDV A4C 80 mL    LV ESV A2C 37 mL    LV ESV A4C 34 mL    IVSd 1.2 0.6 - 0.9 cm    LVIDd 4.6 3.9 - 5.3 cm    LVIDs 3.0 cm    LVOT Diameter 1.8 cm    LVPWd 0.9 0.6 - 0.9 cm    LV E' Lateral Velocity 10 cm/s    LV E' Septal Velocity 7 cm/s    LV Ejection Fraction A2C 58 %    LV Ejection Fraction A4C 57 %    EF BP 59 55 - 100 %    LVOT Area 2.5 cm2    LA Major Holloway 6.5 cm    LA Area 4C 26.1 cm2    LA Diameter 3.6 cm    RA Area 4C 22.0 cm2    RA Volume 64 ml    AV Mean Gradient 13 mmHg    AV VTI 57.7 cm    AV Mean Velocity 1.6 m/s    AV Peak Velocity 2.7 m/s    AV Peak Gradient 29 mmHg    Ascending Aorta 3.9 cm    MV Nyquist Velocity 85 cm/s    MR Radius PISA 0.50 cm    MR .0 cm    MV Mean Gradient 2 mmHg    MV VTI 41.6 cm    MV Mean Velocity 0.7 m/s    MR Peak Velocity 5.0 m/s    MR Peak Gradient 100 mmHg    MV Max Velocity 1.5 m/s    MV Peak Gradient 9 mmHg    MV A Velocity 1.19 m/s    MV E Velocity 1.04 m/s    MV EROA PISA 26.7 cm2    NC Max Velocity 1.3 m/s    Pulmonary Artery EDP 6 mmHg    PV Max Velocity 1.6 m/s    PV Peak Gradient 10 mmHg    RV Basal Dimension 4.5 cm    RV Mid Dimension 4.3 cm    TAPSE 3.0 1.7 cm    TR Max Velocity 3.17 m/s    TR Peak Gradient 40 mmHg    Fractional Shortening 2D 35 28 - 44 %    LV ESV Index A4C 18 mL/m2    LV EDV Index A4C 41 mL/m2    LV ESV Index A2C 19 mL/m2    LV EDV Index A2C 45 mL/m2    LVIDd Index 2.37 cm/m2    LVIDs Index 1.55 cm/m2    LV RWT Ratio 0.39     LV Mass 2D 169.9 (A) 67 - 162 g    LV Mass 2D Index 87.6 43 - 95 g/m2    MV E/A 0.87     E/E' Ratio (Averaged) 12.63     E/E' Lateral 10.40     E/E' Septal 14.86     LA Size Index 1.86 cm/m2    RA Volume Index A4C 33 mL/m2    Ascending Aorta Index 2.01 cm/m2    MR Regurg Volume PISA 4,403.85 mL        XR CHEST PORT   Final Result   No acute process.               Assessment:     Active Problems:    Syncope (11/21/2020)      Non-STEMI (non-ST elevated myocardial infarction) ([facility] (8/1/2022)      Anemia (8/1/2022)    Obscure GI bleeding, anemia,  No active or passive GI bleeding,  Hemoglobin 7.8 on admission unknown baseline        -Non-STEMI  -Troponin elevated on admission at 380  -Likely demand ischemia from anemia        Plan:   On Eliquis and aspirin which we will hold  Continue on the iron replacement,  PPI by mouth  Cardiology evaluation, echocardiogram pending, needed cardiac clearance     Will do EGD, once patient is stabilized cardiovascular wise  Plan discussed with patient         Signed By: Samara Ball MD     August 3, 2022        Thank you for allowing me to participate in this patients care  Cc Referring Physician   James Jernigan NP

## 2022-08-04 NOTE — PROGRESS NOTES
Progress Note    Patient: Kin Shone MRN: [id number]  SSN: [id number]    YOB: 1945  Age: 65 y.o.   Sex: female      Admit Date: 8/1/2022    LOS: 1 day     Subjective:   No GI bleeding, no chest pain,  Had cardiac evaluation, echocardiogram pending   Past Medical History:   Diagnosis Date    Adenocarcinoma, renal cell ([facility] 9/2/2020    Arthritis     CAD (coronary artery disease)     Chronic kidney disease     Diabetes ([facility]     Gout     Hypercholesterolemia     Hypertension     Mental depression     Pulmonary embolism (HCC)     Seizures ([facility]     Stroke ([facility]     Thyroid disease         Current Facility-Administered Medications:     [START ON 8/5/2022] senna-docusate (PERICOLACE) 8.6-50 mg per tablet 1 Tablet, 1 Tablet, Oral, DAILY, Osiel Cohn MD    torsemide (DEMADEX) tablet 20 mg, 20 mg, Oral, BID, Ana María Frederick MD    glucose chewable tablet 16 g, 4 Tablet, Oral, PRN, Berenice Joseph MD    glucagon (GLUCAGEN) injection 1 mg, 1 mg, IntraMUSCular, PRN, Francisca Jones MD    dextrose 10% infusion 0-250 mL, 0-250 mL, IntraVENous, PRN, Berenice Joseph MD    latanoprost (XALATAN) 0.005 % ophthalmic solution 1 Drop, 1 Drop, Both Eyes, QHS, Brie Horne, NP, 1 Drop at 08/03/22 2124    atorvastatin (LIPITOR) tablet 20 mg, 20 mg, Oral, QHS, Brie Horne, NP, 20 mg at 08/03/22 2122    [Held by provider] aspirin chewable tablet 81 mg, 81 mg, Oral, DAILY, Brie Horne, NP    venlafaxine-SR [facility] capsule 75 mg, 75 mg, Oral, DAILY, Brie Horne, NP, 75 mg at 08/04/22 4323    [Held by provider] apixaban (ELIQUIS) tablet 2.5 mg, 2.5 mg, Oral, BID, Brie Horne, NP    gabapentin (NEURONTIN) capsule 300 mg, 300 mg, Oral, BID, Brie Horne NP, 300 mg at 08/04/22 3843    calcitRIOL (ROCALTROL) capsule 0.25 mcg, 0.25 mcg, Oral, DAILY, Brie Horne NP, 0.25 mcg at 08/04/22 0837    donepeziL (ARICEPT) tablet 10 mg, 10 mg, Oral, QHS, Brie Horne NP, 10 mg at 08/03/22 2123    cetirizine (ZYRTEC) tablet 10 mg, 10 mg, Oral, DAILY, Baylee Herrmann NP, 10 mg at 08/04/22 0836    carvediloL (COREG) tablet 6.25 mg, 6.25 mg, Oral, BID WITH MEALS, Silvino Duran MD, 6.25 mg at 08/04/22 2788    [Held by provider] amLODIPine (NORVASC) tablet 10 mg, 10 mg, Oral, DAILY, Baylee Herrmann NP, 10 mg at 08/04/22 0836    ferrous sulfate tablet 325 mg, 325 mg, Oral, ACB, Baylee Herrmann NP, 325 mg at 08/04/22 0836    sodium chloride (NS) flush 5-40 mL, 5-40 mL, IntraVENous, Q8H, Baylee Herrmann NP, 10 mL at 08/04/22 1709    sodium chloride (NS) flush 5-40 mL, 5-40 mL, IntraVENous, PRN, Baylee Herrmann NP    acetaminophen (TYLENOL) tablet 650 mg, 650 mg, Oral, Q6H PRN **OR** acetaminophen (TYLENOL) suppository 650 mg, 650 mg, Rectal, Q6H PRN, Baylee Herrmann NP    polyethylene glycol (MIRALAX) packet 17 g, 17 g, Oral, DAILY PRN, Baylee Herrmann NP, 17 g at 08/03/22 2131    bisacodyL (DULCOLAX) tablet 5 mg, 5 mg, Oral, DAILY PRN, Baylee Herrmann NP    ondansetron (ZOFRAN ODT) tablet 4 mg, 4 mg, Oral, Q6H PRN **OR** ondansetron (ZOFRAN) injection 4 mg, 4 mg, IntraVENous, Q6H PRN, Baylee Herrmann NP    Objective:     Vitals:    08/04/22 0757 08/04/22 1207 08/04/22 1439 08/04/22 1513   BP: 108/63 (!) 113/56 (!) 82/69 (!) 109/52   Pulse: 64 60 71 62   Resp: 19 19 18    Temp: 98.2 °F (36.8 °C) 97.8 °F (36.6 °C) 98.1 °F (36.7 °C)    SpO2: 96% 99% 100%    Weight:       Height:            Intake and Output:  Current Shift: No intake/output data recorded. Last three shifts: 08/02 1901 - 08/04 0700  In: 26 [P.O.:440]  Out: 700 [Urine:700]    Physical Exam:   Physical Exam  Constitutional:       Appearance: She is ill-appearing. HENT:      Mouth/Throat:      Mouth: Mucous membranes are dry. Eyes:      General: No scleral icterus. Cardiovascular:      Rate and Rhythm: Normal rate. Pulmonary:      Breath sounds: Normal breath sounds.    Abdominal:      General: Abdomen is flat.      Tenderness: There is no rebound. Skin:     Findings: No bruising. Neurological:      Mental Status: Mental status is at baseline. Psychiatric:         Mood and Affect: Mood normal.        Lab/Data Review:  Recent Results (from the past 24 hour(s))   CBC W/O DIFF    Collection Time: 08/04/22  6:34 AM   Result Value Ref Range    WBC 4.7 3.6 - 11.0 K/uL    RBC 2.52 (L) 3.80 - 5.20 M/uL    HGB 8.0 (L) 11.5 - 16.0 g/dL    HCT 25.2 (L) 35.0 - 47.0 %    .0 (H) 80.0 - 99.0 FL    MCH 31.7 26.0 - 34.0 PG    MCHC 31.7 30.0 - 36.5 g/dL    RDW 16.4 (H) 11.5 - 14.5 %    PLATELET 067 856 - 327 K/uL    MPV 10.7 8.9 - 12.9 FL    NRBC 0.0 0.0  WBC    ABSOLUTE NRBC 0.00 0.00 - 6.23 K/uL   METABOLIC PANEL, BASIC    Collection Time: 08/04/22  6:34 AM   Result Value Ref Range    Sodium 133 (L) 136 - 145 mmol/L    Potassium 4.3 3.5 - 5.1 mmol/L    Chloride 94 (L) 97 - 108 mmol/L    CO2 35 (H) 21 - 32 mmol/L    Anion gap 4 (L) 5 - 15 mmol/L    Glucose 117 (H) 65 - 100 mg/dL    BUN 57 (H) 6 - 20 mg/dL    Creatinine 2.49 (H) 0.55 - 1.02 mg/dL    BUN/Creatinine ratio 23 (H) 12 - 20      GFR est AA 23 (L) >60 ml/min/1.73m2    GFR est non-AA 19 (L) >60 ml/min/1.73m2    Calcium 9.2 8.5 - 10.1 mg/dL   GLUCOSE, POC    Collection Time: 08/04/22  9:15 AM   Result Value Ref Range    Glucose (POC) 174 (H) 65 - 117 mg/dL    Performed by Stefano Tao 55, POC    Collection Time: 08/04/22  2:30 PM   Result Value Ref Range    Glucose (POC) 161 (H) 65 - 117 mg/dL    Performed by Tammy Newton    HGB & HCT    Collection Time: 08/04/22  3:23 PM   Result Value Ref Range    HGB 8.2 (L) 11.5 - 16.0 g/dL    HCT 26.0 (L) 35.0 - 47.0 %        XR CHEST PORT   Final Result   No acute process.               Assessment:     Active Problems:    Syncope (11/21/2020)      Non-STEMI (non-ST elevated myocardial infarction) ([facility] (8/1/2022)      Anemia (8/1/2022)  Obscure GI bleeding, anemia,  No active or passive GI bleeding,  Hemoglobin 7.8 on admission unknown baseline        -Non-STEMI  -Troponin elevated on admission at 380  -Likely demand ischemia from anemia   cardiology to follow may need to restart antiplatelet /anticoagulation agent soon      Plan:   On Eliquis and aspirin which we will hold  Continue on the iron replacement,  PPI by mouth  EGD is scheduled for tomorrow  Plan discussed with patient         Signed By: Pasha Etienne MD     August 4, 2022        Thank you for allowing me to participate in this patients care  Cc Referring Physician   Jeimy Glaser, NP

## 2022-08-05 ENCOUNTER — APPOINTMENT (OUTPATIENT)
Dept: ENDOSCOPY | Age: 77
DRG: 377 | End: 2022-08-05
Attending: INTERNAL MEDICINE
Payer: MEDICARE

## 2022-08-05 ENCOUNTER — ANESTHESIA (OUTPATIENT)
Dept: ENDOSCOPY | Age: 77
DRG: 377 | End: 2022-08-05
Payer: MEDICARE

## 2022-08-05 LAB
ATRIAL RATE: 64 BPM
CALCULATED P AXIS, ECG09: 48 DEGREES
CALCULATED R AXIS, ECG10: 41 DEGREES
CALCULATED T AXIS, ECG11: 53 DEGREES
DIAGNOSIS, 93000: NORMAL
GLUCOSE BLD STRIP.AUTO-MCNC: 104 MG/DL (ref 65–117)
GLUCOSE BLD STRIP.AUTO-MCNC: 113 MG/DL (ref 65–117)
GLUCOSE BLD STRIP.AUTO-MCNC: 94 MG/DL (ref 65–117)
P-R INTERVAL, ECG05: 142 MS
PERFORMED BY, TECHID: NORMAL
Q-T INTERVAL, ECG07: 424 MS
QRS DURATION, ECG06: 94 MS
QTC CALCULATION (BEZET), ECG08: 437 MS
VENTRICULAR RATE, ECG03: 64 BPM

## 2022-08-05 PROCEDURE — 93005 ELECTROCARDIOGRAM TRACING: CPT

## 2022-08-05 PROCEDURE — 74011000250 HC RX REV CODE- 250: Performed by: INTERNAL MEDICINE

## 2022-08-05 PROCEDURE — 74011250636 HC RX REV CODE- 250/636: Performed by: NURSE ANESTHETIST, CERTIFIED REGISTERED

## 2022-08-05 PROCEDURE — 97530 THERAPEUTIC ACTIVITIES: CPT

## 2022-08-05 PROCEDURE — 82962 GLUCOSE BLOOD TEST: CPT

## 2022-08-05 PROCEDURE — 74011250637 HC RX REV CODE- 250/637: Performed by: INTERNAL MEDICINE

## 2022-08-05 PROCEDURE — 2709999900 HC NON-CHARGEABLE SUPPLY: Performed by: INTERNAL MEDICINE

## 2022-08-05 PROCEDURE — C9113 INJ PANTOPRAZOLE SODIUM, VIA: HCPCS | Performed by: INTERNAL MEDICINE

## 2022-08-05 PROCEDURE — 74011000250 HC RX REV CODE- 250: Performed by: NURSE ANESTHETIST, CERTIFIED REGISTERED

## 2022-08-05 PROCEDURE — 74011250636 HC RX REV CODE- 250/636: Performed by: INTERNAL MEDICINE

## 2022-08-05 PROCEDURE — 74011250637 HC RX REV CODE- 250/637: Performed by: NURSE PRACTITIONER

## 2022-08-05 PROCEDURE — 0DJ08ZZ INSPECTION OF UPPER INTESTINAL TRACT, VIA NATURAL OR ARTIFICIAL OPENING ENDOSCOPIC: ICD-10-PCS | Performed by: INTERNAL MEDICINE

## 2022-08-05 PROCEDURE — 65270000029 HC RM PRIVATE

## 2022-08-05 PROCEDURE — 76040000007: Performed by: INTERNAL MEDICINE

## 2022-08-05 PROCEDURE — 74011000250 HC RX REV CODE- 250: Performed by: NURSE PRACTITIONER

## 2022-08-05 PROCEDURE — 76060000032 HC ANESTHESIA 0.5 TO 1 HR: Performed by: INTERNAL MEDICINE

## 2022-08-05 RX ORDER — GLYCOPYRROLATE 0.2 MG/ML
INJECTION INTRAMUSCULAR; INTRAVENOUS AS NEEDED
Status: DISCONTINUED | OUTPATIENT
Start: 2022-08-05 | End: 2022-08-05 | Stop reason: HOSPADM

## 2022-08-05 RX ORDER — SODIUM CHLORIDE, SODIUM LACTATE, POTASSIUM CHLORIDE, CALCIUM CHLORIDE 600; 310; 30; 20 MG/100ML; MG/100ML; MG/100ML; MG/100ML
INJECTION, SOLUTION INTRAVENOUS
Status: DISCONTINUED | OUTPATIENT
Start: 2022-08-05 | End: 2022-08-05 | Stop reason: HOSPADM

## 2022-08-05 RX ORDER — PROPOFOL 10 MG/ML
INJECTION, EMULSION INTRAVENOUS AS NEEDED
Status: DISCONTINUED | OUTPATIENT
Start: 2022-08-05 | End: 2022-08-05 | Stop reason: HOSPADM

## 2022-08-05 RX ADMIN — SODIUM CHLORIDE, PRESERVATIVE FREE 40 MG: 5 INJECTION INTRAVENOUS at 17:39

## 2022-08-05 RX ADMIN — CALCITRIOL CAPSULES 0.25 MCG 0.25 MCG: 0.25 CAPSULE ORAL at 08:55

## 2022-08-05 RX ADMIN — FERROUS SULFATE TAB 325 MG (65 MG ELEMENTAL FE) 325 MG: 325 (65 FE) TAB at 09:09

## 2022-08-05 RX ADMIN — SODIUM CHLORIDE, POTASSIUM CHLORIDE, SODIUM LACTATE AND CALCIUM CHLORIDE: 600; 310; 30; 20 INJECTION, SOLUTION INTRAVENOUS at 15:05

## 2022-08-05 RX ADMIN — DONEPEZIL HYDROCHLORIDE 10 MG: 5 TABLET, FILM COATED ORAL at 21:29

## 2022-08-05 RX ADMIN — CARVEDILOL 6.25 MG: 3.12 TABLET, FILM COATED ORAL at 17:40

## 2022-08-05 RX ADMIN — GABAPENTIN 300 MG: 300 CAPSULE ORAL at 21:31

## 2022-08-05 RX ADMIN — SODIUM CHLORIDE, PRESERVATIVE FREE 10 ML: 5 INJECTION INTRAVENOUS at 21:32

## 2022-08-05 RX ADMIN — PHENYLEPHRINE HYDROCHLORIDE 300 MCG: 10 INJECTION INTRAVENOUS at 15:19

## 2022-08-05 RX ADMIN — TORSEMIDE 20 MG: 10 TABLET ORAL at 08:58

## 2022-08-05 RX ADMIN — GABAPENTIN 300 MG: 300 CAPSULE ORAL at 08:54

## 2022-08-05 RX ADMIN — SENNOSIDES AND DOCUSATE SODIUM 1 TABLET: 50; 8.6 TABLET ORAL at 08:54

## 2022-08-05 RX ADMIN — TORSEMIDE 20 MG: 10 TABLET ORAL at 21:28

## 2022-08-05 RX ADMIN — GLYCOPYRROLATE 0.2 MG: 0.2 INJECTION INTRAMUSCULAR; INTRAVENOUS at 15:13

## 2022-08-05 RX ADMIN — PROPOFOL 100 MG: 10 INJECTION, EMULSION INTRAVENOUS at 15:15

## 2022-08-05 RX ADMIN — CETIRIZINE HYDROCHLORIDE 10 MG: 10 TABLET, FILM COATED ORAL at 08:54

## 2022-08-05 RX ADMIN — VENLAFAXINE HYDROCHLORIDE 75 MG: 75 CAPSULE, EXTENDED RELEASE ORAL at 08:54

## 2022-08-05 RX ADMIN — SODIUM CHLORIDE, PRESERVATIVE FREE 10 ML: 5 INJECTION INTRAVENOUS at 13:11

## 2022-08-05 RX ADMIN — GLYCOPYRROLATE 0.2 MG: 0.2 INJECTION INTRAMUSCULAR; INTRAVENOUS at 15:18

## 2022-08-05 RX ADMIN — CARVEDILOL 6.25 MG: 3.12 TABLET, FILM COATED ORAL at 08:58

## 2022-08-05 RX ADMIN — ATORVASTATIN CALCIUM 20 MG: 10 TABLET, FILM COATED ORAL at 21:31

## 2022-08-05 RX ADMIN — SODIUM CHLORIDE, PRESERVATIVE FREE 10 ML: 5 INJECTION INTRAVENOUS at 05:13

## 2022-08-05 RX ADMIN — BISACODYL 5 MG: 5 TABLET, COATED ORAL at 21:39

## 2022-08-05 RX ADMIN — LATANOPROST 1 DROP: 50 SOLUTION OPHTHALMIC at 21:32

## 2022-08-05 NOTE — PROGRESS NOTES
OCCUPATIONAL THERAPY TREATMENT  Patient: Kirby Cruz (23 y.o. female)  Date: 8/5/2022  Diagnosis: Syncope [R55] <principal problem not specified>  Procedure(s) (LRB):  ESOPHAGOGASTRODUODENOSCOPY (EGD) (N/A) Day of Surgery  Precautions:    Chart, occupational therapy assessment, plan of care, and goals were reviewed. ASSESSMENT  Patient continues with skilled OT services and is progressing towards goals. Upon FIELDS/PT arrival, pt semi supine in bed and agreeable to tx session. Pt completed bed mobility with SBA this date. Pt noted with good static and dynamic sitting balance while at EOB. Pt donned socks at EOB with SBA and additional time requiring rest break at EOB. Pt completed sit>stand from EOB with CGA using RW for balance upon standing. Pt became incontinent of urine and returned to EOB. Pt completed LB bathing with CGA/SBA and LB bathing with SBA. Pt donned clean socks with SBA and then reporting dizziness at EOB. Pt educated on PLB and able to demonstrate fair carryover at times. Pt began shaking/twitching with BUE and unable to hold self upright. Unable to get BP reading while at EOB. Pt then reporting feeling like she was going to pass out. Pt returned to supine with Mod/MaxA x2 and BP noted to read 95/56. Pt minimally vomited in trash can once in supine. Pt reporting decrease in dizziness after ~3 minutes in supine. Pt requesting to be placed on bedpan, pt rolled L and R with SBA for placing on bedpan. Pt left semi supine in bed with call bell within reach and RN notified. Will continue to follow pt throughout remainder of stay and progress towards OT goals. Recommending SNF at discharge when medically appropriate. Other factors to consider for discharge: family/social support, DME, time since onset, severity of deficits, decline from functional baseline         PLAN :  Patient continues to benefit from skilled intervention to address the above impairments.   Continue treatment per established plan of care. to address goals. Recommendation for discharge: (in order for the patient to meet his/her long term goals)  Gilbert Knapp    This discharge recommendation:  Has been made in collaboration with the attending provider and/or case management    IF patient discharges home will need the following DME: TBD       SUBJECTIVE:   Patient stated I don't feel right.     OBJECTIVE DATA SUMMARY:   Cognitive/Behavioral Status:  Neurologic State: Alert  Orientation Level: Oriented X4  Cognition: Follows commands    Functional Mobility and Transfers for ADLs:  Bed Mobility:  Rolling: Stand-by assistance  Supine to Sit: Stand-by assistance  Sit to Supine: Moderate assistance;Assist x2  Scooting: Stand-by assistance    Transfers:  Sit to Stand: Contact guard assistance;Assist x2    Balance:  Sitting: Intact; Without support  Sitting - Static: Good (unsupported)  Standing: Impaired; With support  Standing - Static: Fair;Constant support    ADL Intervention:  Grooming  Position Performed: Seated edge of bed  Washing Hands: Stand-by assistance    Lower Body Dressing Assistance  Socks: Stand-by assistance    Toileting  Bladder Hygiene: Minimum assistance    Pain:  0/10    Activity Tolerance:   Fair and signs and symptoms of orthostatic hypotension  Please refer to the flowsheet for vital signs taken during this treatment. After treatment patient left in no apparent distress:   Supine in bed, Call bell within reach, Side rails x 3, and on bedpan    COMMUNICATION/COLLABORATION:   The patients plan of care was discussed with: Physical therapy assistant and Registered nurse. [place] with PT for increased safety for pt and clinician. DONNIE Kuo  Time Calculation: 36 mins    Problem: Self Care Deficits Care Plan (Adult)  Goal: *Acute Goals and Plan of Care (Insert Text)  Description: Pt stated goal \"I want to get better\".      Pt will be IND sup <>sit in prep for EOB ADLs  Pt will be Mod I grooming standing at sinktop LRAD  Pt will be IND LE dressing sitting EOB/long sit  Pt will be Mod I sit <>  prep for toileting LRAD  Pt will be Mod I toileting/toilet transfer/cloth mgmt LRAD  Pt will be IND following UE HEP in prep for self care tasks   Outcome: Progressing Towards Goal

## 2022-08-05 NOTE — PROGRESS NOTES
CM called daughter to discuss DCP since patient declined at [id number] [facility]. Daughter asked CM to call Jordan Bingham at [phone]. Choice list emailed from [email or web address] to [email or web address]

## 2022-08-05 NOTE — PROGRESS NOTES
1045 Notified by OT that during therapy in room patient had an episode of incontinence and dizziness when attempted to stand up at bedside. Patient with generalized weakness as well. SBP 90's per OT.

## 2022-08-05 NOTE — PROGRESS NOTES
Progress Note      8/5/2022 11:51 AM  NAME: Mercy Stern   MRN:  [id number]   Admit Diagnosis: Syncope [R55]          Assessment/Plan:     NSTEMI,? type II, known coronary artery disease status post stenting 5 years ago  EKG has been unremarkable,  EKG this morning with sinus rhythm 54/min, no acute changes. No interval changes. Agree to  proceed with EGD. History of GERD, now also with GERD symptoms    Syncope/presyncope, no recurrence. History of prior syncopal episodes in the past.    Anemia, home on Eliquis for history of pulmonary embolism    History of TIA. History of mild aortic stenosis and moderate tricuspid regurgitation 8/22 echo with mildly calcified aortic valve, moderately thickened mitral valve, EF of 55 to 60%    Chronic kidney disease         []       High complexity decision making was performed in this patient at high risk for decompensation with multiple organ involvement. Subjective:     Mercy Stern is with continued GERD symptoms. Discussed with RN events overnight. Review of Systems:    Symptom Y/N Comments  Symptom Y/N Comments   Fever/Chills N   Chest Pain N    Poor Appetite N   Edema N    Cough N   Abdominal Pain N    Sputum N   Joint Pain N    SOB/PALOMARES N   Pruritis/Rash N    Nausea/vomit N   Tolerating PT/OT Y    Diarrhea N   Tolerating Diet Y    Constipation N   Other       Could NOT obtain due to:      Objective:      Physical Exam:    Last 24hrs VS reviewed since prior progress note. Most recent are:    Visit Vitals  BP (!) 130/56 (BP 1 Location: Right upper arm, BP Patient Position: Lying)   Pulse 64   Temp 98.1 °F (36.7 °C)   Resp 18   Ht 5' 6\" (1.676 m)   Wt 79.7 kg (175 lb 11.3 oz)   SpO2 100%   Breastfeeding No   BMI 28.36 kg/m²     No intake or output data in the 24 hours ending 08/05/22 1151     General Appearance: Well developed, well nourished, alert; no acute distress.   Ears/Nose/Mouth/Throat: Hearing grossly normal; moist mucous membranes  Neck: Supple. Chest: Lungs clear to auscultation bilaterally. Cardiovascular: Regular rate and rhythm, S1S2 normal, 2/6 systolic murmur. Abdomen: Soft, non-tender, bowel sounds are active. Extremities: No edema bilaterally. Skin: Warm and dry. []         Post-cath site without hematoma, bruit, tenderness, or thrill. Distal pulses intact. PMH/ reviewed - no change compared to H&P    Data Review    Telemetry:     EKG:   []  No new EKG for review    Lab Data Personally Reviewed:    Recent Labs     08/04/22  1523 08/04/22  0634 08/03/22  0739   WBC  --  4.7 4.7   HGB 8.2* 8.0* 8.3*   HCT 26.0* 25.2* 26.0*   PLT  --  175 174     No results for input(s): INR, PTP, APTT, INREXT in the last 72 hours. Recent Labs     08/04/22  0634 08/03/22  0739   * 136   K 4.3 4.5   CL 94* 97   CO2 35* 36*   BUN 57* 60*   CREA 2.49* 2.62*   * 125*   CA 9.2 9.2     No results for input(s): CPK, CKNDX, TROIQ in the last 72 hours. No lab exists for component: CPKMB  Lab Results   Component Value Date/Time    Cholesterol, total 164 11/22/2020 01:12 AM    HDL Cholesterol 64 11/22/2020 01:12 AM    LDL,Direct 61 06/20/2022 09:55 AM    LDL, calculated 73.2 11/22/2020 01:12 AM    Triglyceride 134 11/22/2020 01:12 AM    CHOL/HDL Ratio 2.6 11/22/2020 01:12 AM       No results for input(s): AP, TBIL, TP, ALB, GLOB, GGT, AML, LPSE in the last 72 hours. No lab exists for component: SGOT, GPT, AMYP, HLPSE  No results for input(s): PH, PCO2, PO2 in the last 72 hours.     Medications Personally Reviewed:    Current Facility-Administered Medications   Medication Dose Route Frequency    senna-docusate (PERICOLACE) 8.6-50 mg per tablet 1 Tablet  1 Tablet Oral DAILY    torsemide (DEMADEX) tablet 20 mg  20 mg Oral BID    glucose chewable tablet 16 g  4 Tablet Oral PRN    glucagon (GLUCAGEN) injection 1 mg  1 mg IntraMUSCular PRN    dextrose 10% infusion 0-250 mL  0-250 mL IntraVENous PRN    latanoprost (XALATAN) 0.005 % ophthalmic solution 1 Drop  1 Drop Both Eyes QHS    atorvastatin (LIPITOR) tablet 20 mg  20 mg Oral QHS    [Held by provider] aspirin chewable tablet 81 mg  81 mg Oral DAILY    venlafaxine-SR (EFFEXOR-XR) capsule 75 mg  75 mg Oral DAILY    [Held by provider] apixaban (ELIQUIS) tablet 2.5 mg  2.5 mg Oral BID    gabapentin (NEURONTIN) capsule 300 mg  300 mg Oral BID    calcitRIOL (ROCALTROL) capsule 0.25 mcg  0.25 mcg Oral DAILY    donepeziL (ARICEPT) tablet 10 mg  10 mg Oral QHS    cetirizine (ZYRTEC) tablet 10 mg  10 mg Oral DAILY    carvediloL (COREG) tablet 6.25 mg  6.25 mg Oral BID WITH MEALS    [Held by provider] amLODIPine (NORVASC) tablet 10 mg  10 mg Oral DAILY    ferrous sulfate tablet 325 mg  325 mg Oral ACB    sodium chloride (NS) flush 5-40 mL  5-40 mL IntraVENous Q8H    sodium chloride (NS) flush 5-40 mL  5-40 mL IntraVENous PRN    acetaminophen (TYLENOL) tablet 650 mg  650 mg Oral Q6H PRN    Or    acetaminophen (TYLENOL) suppository 650 mg  650 mg Rectal Q6H PRN    polyethylene glycol (MIRALAX) packet 17 g  17 g Oral DAILY PRN    bisacodyL (DULCOLAX) tablet 5 mg  5 mg Oral DAILY PRN    ondansetron (ZOFRAN ODT) tablet 4 mg  4 mg Oral Q6H PRN    Or    ondansetron (ZOFRAN) injection 4 mg  4 mg IntraVENous Q6H PRN         Rohini Han MD

## 2022-08-05 NOTE — ANESTHESIA PREPROCEDURE EVALUATION
Relevant Problems   NEUROLOGY   (+) Cerebrovascular accident (CVA) (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 Neuro/Psych     seizures  CVA  TIA and psychiatric history    Comments: MENTAL RETARDATION. Cardiovascular    Hypertension          CAD, PAD, cardiac stents and hyperlipidemia      Comments:  Hx OF PE.    ELEVATED TROPONINS. GI/Hepatic/Renal         Renal disease: CRI      Comments: ONLY ONE FUNCTIONING KIDNEY. Endo/Other    Diabetes  Hypothyroidism  Arthritis, cancer (RENAL CELL ADENOCARCINOMA. ) and anemia    Comments: GOUT. Other Findings   Comments: Eliquis: Last dose on 7-31-22. Neck pain.      8/1/22 16:33  INR: 1.9 (H)  Prothrombin time: 22.0 (H)             Physical Exam    Airway  Mallampati: I  TM Distance: 4 - 6 cm    Mouth opening: Normal     Cardiovascular    Rhythm: regular  Rate: normal         Dental    Dentition: Edentulous     Pulmonary  Breath sounds clear to auscultation               Abdominal  Abdominal exam normal       Other Findings            Anesthetic Plan    ASA: 3  Anesthesia type: MAC          Induction: Intravenous  Anesthetic plan and risks discussed with: Patient

## 2022-08-05 NOTE — ANESTHESIA POSTPROCEDURE EVALUATION
Procedure(s):  ESOPHAGOGASTRODUODENOSCOPY (EGD). MAC    Anesthesia Post Evaluation        Patient location during evaluation: bedside (Endoscopy suite)  Patient participation: complete - patient cannot participate  Level of consciousness: sleepy but conscious  Pain score: 0  Pain management: adequate  Airway patency: patent  Anesthetic complications: no  Cardiovascular status: acceptable  Respiratory status: acceptable and nasal cannula  Hydration status: acceptable  Comments: This patient remained on the stretcher. The patient was handed off to the endoscopy nursing team.  All questions regarding pre-, intra-, and postoperative care were answered. Post anesthesia nausea and vomiting:  none      INITIAL Post-op Vital signs:   Vitals Value Taken Time   /68 08/05/22 1726   Temp 36.1 °C (97 °F) 08/05/22 1522   Pulse 70 08/05/22 1726   Resp 18 08/05/22 1555   SpO2 95 % 08/05/22 1555   Vitals shown include unvalidated device data.

## 2022-08-05 NOTE — PROGRESS NOTES
PHYSICAL THERAPY TREATMENT  Patient: Beka Crews (41 y.o. female)  Date: 8/5/2022  Diagnosis: Syncope [R55] <principal problem not specified>  Procedure(s) (LRB):  ESOPHAGOGASTRODUODENOSCOPY (EGD) cardiac clearance (N/A)    Precautions:    Chart, physical therapy assessment, plan of care and goals were reviewed. ASSESSMENT  Patient continues with skilled PT services and is progressing towards goals. Patient supine in bed upon approach and agreed to therapy session today. Patient was SBA for bed mobility, supine>sit and scooting EOB. Patient demonstrated good sitting balance while seated EOB. Patient then performed STS to RW at [place] [address] and stood for 5-10 seconds at [place] [address] but started to urinate on floor. Patient then returned to seated EOB to allow therapist to clean floor and perform max care/change socks (see Ot note for more details). Patient urine noted to have gray cloudy color to it. Patient noted to report slight dizziness with sitting upright. Patient then was set up for second STS but then patient presented twitching in both arms and slouched position with decreased response to therapist cues. Patient then reported that she felt like she was going to pass out. Therapists attempted to get BP reading while patient was sitting EOB but were unable to ( 2/2 to patient requiring support to remain upright) and returned patient to supine in bed at mod Ax2 where patients BP take semi supine in bed at 95/56. Patient recovered from dizziness and no longer reported like she was going to\" pass out\" after 1-3 minutes laying supine in bed. Patient then requested to be placed on bed pan which patient was SBA for rolling to assist with. Patient then left supine on bed pan with call bell within reach and all needs meet. Nursing alerted to patient being on bed pan and her possible hypotension episode and how long it took her to recover       Current Level of Function Impacting Discharge (mobility/balance):  Impaired balance, general weakness, poor activity tolerance    Other factors to consider for discharge: PLOF, assistance at home, level of deficits, acute medical state         PLAN :  Patient continues to benefit from skilled intervention to address the above impairments. Continue treatment per established plan of care. to address goals. Recommendation for discharge: (in order for the patient to meet his/her long term goals)  iGlbert Knapp    This discharge recommendation:  Has been made in collaboration with the attending provider and/or case management    IF patient discharges home will need the following DME: gait belt and rolling walker       SUBJECTIVE:   Patient stated ill try to walk a bit today.     OBJECTIVE DATA SUMMARY:   Critical Behavior:  Neurologic State: Alert  Orientation Level: Oriented X4  Cognition: Follows commands     Functional Mobility Training:  Bed Mobility:  Rolling: Stand-by assistance  Supine to Sit: Stand-by assistance  Sit to Supine: Moderate assistance;Assist x2  Scooting: Stand-by assistance  Transfers:  Sit to Stand: Contact guard assistance;Assist x2  Stand to Sit: Contact guard assistance;Assist x2  Balance:  Sitting: Intact; Without support  Sitting - Static: Good (unsupported)  Standing: Impaired; With support  Standing - Static: Fair;Constant support    Pain Rating:  No pain reported during session    Activity Tolerance:   Poor and signs and symptoms of orthostatic hypotension  Please refer to the flowsheet for vital signs taken during this treatment. After treatment patient left in no apparent distress:   Supine in bed, Call bell within reach, and Side rails x 3    COMMUNICATION/COLLABORATION:   The patients plan of care was discussed with: Occupational therapy assistant and Registered nurse. Treatment occurred with OT due to patient requiring ax2 for mobility and safety.      Warden Mcintosh, PTA

## 2022-08-05 NOTE — PROGRESS NOTES
[facility] Hospitalist Progress Note  Julian Guerrier MD  NJVO:[id number]       [facility]  Patient Name:Tamy Moore     YOB: 1945     Age:77 y.o.    22 admission course  Richard Ortega is a 65 y.o. female who has a PMH significant for renal cell carcinoma status post left nephrectomy, HTN, CKD, CAD, HLD, CVA and diabetes. She comes to the emergency room from rehab center after having a syncopal episode while having a bowel movement on the toilet. Patient states she has had previous episodes similar. She states she lost consciousness for a couple of seconds but denies falling, chest pain or palpitations. Significant labs on admission hemoglobin 7.8, potassium 3.1, BUN 51, creatinine 2.10, FOBT positive, INR 1.9, troponin 380. Patient was admitted for syncope, anemia, Hemoccult positive, hypokalemia and non-STEMI. GI and cardiology was consulted. ECHO pending. Subjective      Same. S/p EGD --> gastritis; diaphragmatic hernia w/o obstruction      Objective         Vitals Last 24 Hours:  TEMPERATURE:  Temp  Av.1 °F (36.7 °C)  Min: 97 °F (36.1 °C)  Max: 99 °F (37.2 °C)  RESPIRATIONS RANGE: Resp  Av.4  Min: 18  Max: 24  PULSE OXIMETRY RANGE: SpO2  Av %  Min: 93 %  Max: 100 %  PULSE RANGE: Pulse  Av.7  Min: 58  Max: 75  BLOOD PRESSURE RANGE: Systolic (39FEF), RR , Min:75 , ALEX:300   ; Diastolic (80UPZ), HUS:43, Min:37, Max:95    I/O (24Hr):     Intake/Output Summary (Last 24 hours) at 2022 1723  Last data filed at 2022 1519  Gross per 24 hour   Intake 100 ml   Output --   Net 100 ml       Constitutional: No fevers, No chills, No sweats, No fatigue, No Weakness  Eyes: No redness  Ears, nose, mouth, throat, and face: No nasal congestion, No sore throat, No voice change  Respiratory: No Shortness of Breath, No cough, No wheezing  Cardiovascular: No chest pain, No palpitations, No extremity edema  Gastrointestinal: No nausea, No vomiting, No diarrhea, No abdominal pain  Genitourinary: No frequency, No dysuria, No hematuria  Integument/breast: No skin lesion(s)  Neurological: No Confusion, No headaches, No dizziness    PHYSICAL EXAM:  Constitutional: No acute distress  Skin: Extremities and face reveal no rashes. HEENT: Sclerae anicteric. Extra-occular muscles are intact. No oral ulcers. The neck is supple and no masses. Cardiovascular: Regular rate and rhythm. Respiratory:  Clear breath sounds bilaterally with no wheezes, rales, or rhonchi. GI: Abdomen nondistended, soft, and nontender. Normal active bowel sounds. Musculoskeletal: No pitting edema of the lower legs. Able to move all ext. Has involuntary movements of hands and feet. Neurological:  Patient is alert and oriented. Cranial nerves II-XII grossly intact  Psychiatric: Mood appears appropriate       Labs/Imaging/Diagnostics    Labs:  CBC:  Recent Labs     08/04/22  1523 08/04/22  0634 08/03/22  0739   WBC  --  4.7 4.7   RBC  --  2.52* 2.59*   HGB 8.2* 8.0* 8.3*   HCT 26.0* 25.2* 26.0*   MCV  --  100.0* 100.4*   RDW  --  16.4* 17.1*   PLT  --  175 174       CHEMISTRIES:  Recent Labs     08/04/22  0634 08/03/22  0739   * 136   K 4.3 4.5   CL 94* 97   CO2 35* 36*   BUN 57* 60*   CA 9.2 9.2     PT/INR:  No results for input(s): INR, INREXT, INREXT in the last 72 hours. No lab exists for component: PROTIME    APTT:No results for input(s): APTT in the last 72 hours. LIVER PROFILE:  No results for input(s): AST, ALT in the last 72 hours. No lab exists for component: Tonya Arriaza ALKPHOS    Lab Results   Component Value Date/Time    ALT (SGPT) 17 08/01/2022 02:56 PM    AST (SGOT) 25 08/01/2022 02:56 PM    Alk. phosphatase 117 08/01/2022 02:56 PM    Bilirubin, total 0.5 08/01/2022 02:56 PM     ECHO 8/3/22    Left Ventricle: Normal left ventricular systolic function with a visually estimated EF of 55 - 60%. Left ventricle size is normal. Mildly increased wall thickness. Normal wall motion.  Normal diastolic function. Aortic Valve: Valve structure is normal. Mildly calcified cusp. Mitral Valve: Moderately thickened leaflet. Left Atrium: Left atrium is mildly dilated. Assessment & Plan    1) Syncopal episode while straining on the toilet  - likely vasovagal syncope  - Appreciate cardiology consult    2) Acute anemia. Holding apixaban and aspirin  Appreciate GI consult. S/p colonoscopy/EGD    3) Cardiovascular issues including coronary disease, hypertension. Some elevation in troponin at admission.    Apixaban and aspirin on hold for anemia   Continue medical regimen    Amlodipine    Atorvastatin    Carvedilol    4) Chronic renal failure   Continue torsemide    Repeat labs  DC plan      Electronically signed by Phan Marvin MD on 8/5/2022 at 8:00 AM

## 2022-08-06 LAB
ALBUMIN SERPL-MCNC: 2.4 G/DL (ref 3.5–5)
ALBUMIN/GLOB SERPL: 0.7 {RATIO} (ref 1.1–2.2)
ALP SERPL-CCNC: 144 U/L (ref 45–117)
ALT SERPL-CCNC: 16 U/L (ref 12–78)
ANION GAP SERPL CALC-SCNC: 4 MMOL/L (ref 5–15)
AST SERPL W P-5'-P-CCNC: 25 U/L (ref 15–37)
BASOPHILS # BLD: 0 K/UL (ref 0–0.1)
BASOPHILS NFR BLD: 0 % (ref 0–1)
BILIRUB SERPL-MCNC: 0.7 MG/DL (ref 0.2–1)
BUN SERPL-MCNC: 56 MG/DL (ref 6–20)
BUN/CREAT SERPL: 21 (ref 12–20)
CA-I BLD-MCNC: 9.1 MG/DL (ref 8.5–10.1)
CHLORIDE SERPL-SCNC: 94 MMOL/L (ref 97–108)
CO2 SERPL-SCNC: 34 MMOL/L (ref 21–32)
CREAT SERPL-MCNC: 2.67 MG/DL (ref 0.55–1.02)
DIFFERENTIAL METHOD BLD: ABNORMAL
EOSINOPHIL # BLD: 0.1 K/UL (ref 0–0.4)
EOSINOPHIL NFR BLD: 1 % (ref 0–7)
ERYTHROCYTE [DISTWIDTH] IN BLOOD BY AUTOMATED COUNT: 15.6 % (ref 11.5–14.5)
GLOBULIN SER CALC-MCNC: 3.4 G/DL (ref 2–4)
GLUCOSE BLD STRIP.AUTO-MCNC: 120 MG/DL (ref 65–117)
GLUCOSE BLD STRIP.AUTO-MCNC: 145 MG/DL (ref 65–117)
GLUCOSE BLD STRIP.AUTO-MCNC: 182 MG/DL (ref 65–117)
GLUCOSE SERPL-MCNC: 134 MG/DL (ref 65–100)
HCT VFR BLD AUTO: 27.2 % (ref 35–47)
HGB BLD-MCNC: 8.4 G/DL (ref 11.5–16)
IMM GRANULOCYTES # BLD AUTO: 0 K/UL (ref 0–0.04)
IMM GRANULOCYTES NFR BLD AUTO: 0 % (ref 0–0.5)
LYMPHOCYTES # BLD: 1 K/UL (ref 0.8–3.5)
LYMPHOCYTES NFR BLD: 22 % (ref 12–49)
MAGNESIUM SERPL-MCNC: 2 MG/DL (ref 1.6–2.4)
MCH RBC QN AUTO: 30.8 PG (ref 26–34)
MCHC RBC AUTO-ENTMCNC: 30.9 G/DL (ref 30–36.5)
MCV RBC AUTO: 99.6 FL (ref 80–99)
MONOCYTES # BLD: 0.8 K/UL (ref 0–1)
MONOCYTES NFR BLD: 18 % (ref 5–13)
NEUTS SEG # BLD: 2.8 K/UL (ref 1.8–8)
NEUTS SEG NFR BLD: 59 % (ref 32–75)
NRBC # BLD: 0 K/UL (ref 0–0.01)
NRBC BLD-RTO: 0 PER 100 WBC
PERFORMED BY, TECHID: ABNORMAL
PLATELET # BLD AUTO: 206 K/UL (ref 150–400)
PMV BLD AUTO: 10 FL (ref 8.9–12.9)
POTASSIUM SERPL-SCNC: 4 MMOL/L (ref 3.5–5.1)
PROT SERPL-MCNC: 5.8 G/DL (ref 6.4–8.2)
RBC # BLD AUTO: 2.73 M/UL (ref 3.8–5.2)
SODIUM SERPL-SCNC: 132 MMOL/L (ref 136–145)
WBC # BLD AUTO: 4.7 K/UL (ref 3.6–11)

## 2022-08-06 PROCEDURE — 36415 COLL VENOUS BLD VENIPUNCTURE: CPT

## 2022-08-06 PROCEDURE — 74011250637 HC RX REV CODE- 250/637: Performed by: INTERNAL MEDICINE

## 2022-08-06 PROCEDURE — C9113 INJ PANTOPRAZOLE SODIUM, VIA: HCPCS | Performed by: INTERNAL MEDICINE

## 2022-08-06 PROCEDURE — 83735 ASSAY OF MAGNESIUM: CPT

## 2022-08-06 PROCEDURE — 82962 GLUCOSE BLOOD TEST: CPT

## 2022-08-06 PROCEDURE — 80053 COMPREHEN METABOLIC PANEL: CPT

## 2022-08-06 PROCEDURE — 85025 COMPLETE CBC W/AUTO DIFF WBC: CPT

## 2022-08-06 PROCEDURE — 74011250636 HC RX REV CODE- 250/636: Performed by: INTERNAL MEDICINE

## 2022-08-06 PROCEDURE — 74011000250 HC RX REV CODE- 250: Performed by: NURSE PRACTITIONER

## 2022-08-06 PROCEDURE — 74011000250 HC RX REV CODE- 250: Performed by: INTERNAL MEDICINE

## 2022-08-06 PROCEDURE — 65270000029 HC RM PRIVATE

## 2022-08-06 PROCEDURE — 74011250637 HC RX REV CODE- 250/637: Performed by: NURSE PRACTITIONER

## 2022-08-06 RX ORDER — MAG HYDROX/ALUMINUM HYD/SIMETH 200-200-20
30 SUSPENSION, ORAL (FINAL DOSE FORM) ORAL
Status: DISCONTINUED | OUTPATIENT
Start: 2022-08-06 | End: 2022-08-08 | Stop reason: HOSPADM

## 2022-08-06 RX ADMIN — LATANOPROST 1 DROP: 50 SOLUTION OPHTHALMIC at 21:28

## 2022-08-06 RX ADMIN — SODIUM CHLORIDE, PRESERVATIVE FREE 40 MG: 5 INJECTION INTRAVENOUS at 08:48

## 2022-08-06 RX ADMIN — DONEPEZIL HYDROCHLORIDE 10 MG: 5 TABLET, FILM COATED ORAL at 21:28

## 2022-08-06 RX ADMIN — VENLAFAXINE HYDROCHLORIDE 75 MG: 75 CAPSULE, EXTENDED RELEASE ORAL at 08:48

## 2022-08-06 RX ADMIN — SODIUM CHLORIDE, PRESERVATIVE FREE 10 ML: 5 INJECTION INTRAVENOUS at 14:51

## 2022-08-06 RX ADMIN — TORSEMIDE 20 MG: 10 TABLET ORAL at 08:48

## 2022-08-06 RX ADMIN — FERROUS SULFATE TAB 325 MG (65 MG ELEMENTAL FE) 325 MG: 325 (65 FE) TAB at 08:48

## 2022-08-06 RX ADMIN — BISACODYL 5 MG: 5 TABLET, COATED ORAL at 21:28

## 2022-08-06 RX ADMIN — SODIUM CHLORIDE, PRESERVATIVE FREE 10 ML: 5 INJECTION INTRAVENOUS at 05:53

## 2022-08-06 RX ADMIN — GABAPENTIN 300 MG: 300 CAPSULE ORAL at 08:48

## 2022-08-06 RX ADMIN — SENNOSIDES AND DOCUSATE SODIUM 1 TABLET: 50; 8.6 TABLET ORAL at 08:48

## 2022-08-06 RX ADMIN — SODIUM CHLORIDE, PRESERVATIVE FREE 40 MG: 5 INJECTION INTRAVENOUS at 21:28

## 2022-08-06 RX ADMIN — ATORVASTATIN CALCIUM 20 MG: 10 TABLET, FILM COATED ORAL at 21:28

## 2022-08-06 RX ADMIN — TORSEMIDE 20 MG: 10 TABLET ORAL at 21:28

## 2022-08-06 RX ADMIN — CARVEDILOL 6.25 MG: 3.12 TABLET, FILM COATED ORAL at 16:35

## 2022-08-06 RX ADMIN — CETIRIZINE HYDROCHLORIDE 10 MG: 10 TABLET, FILM COATED ORAL at 08:48

## 2022-08-06 RX ADMIN — CARVEDILOL 6.25 MG: 3.12 TABLET, FILM COATED ORAL at 08:48

## 2022-08-06 RX ADMIN — CALCITRIOL CAPSULES 0.25 MCG 0.25 MCG: 0.25 CAPSULE ORAL at 08:48

## 2022-08-06 RX ADMIN — SODIUM CHLORIDE, PRESERVATIVE FREE 10 ML: 5 INJECTION INTRAVENOUS at 21:28

## 2022-08-06 RX ADMIN — GABAPENTIN 300 MG: 300 CAPSULE ORAL at 21:28

## 2022-08-06 NOTE — PROGRESS NOTES
Problem: Patient Education: Go to Patient Education Activity  Goal: Patient/Family Education  Outcome: Progressing Towards Goal     Problem: Falls - Risk of  Goal: *Absence of Falls  Description: Document Ramos Reason Fall Risk and appropriate interventions in the flowsheet.   Outcome: Progressing Towards Goal  Note: Fall Risk Interventions:  Mobility Interventions: Bed/chair exit alarm, Patient to call before getting OOB, PT Consult for mobility concerns, PT Consult for assist device competence         Medication Interventions: Patient to call before getting OOB, Bed/chair exit alarm, Teach patient to arise slowly    Elimination Interventions: Bed/chair exit alarm, Call light in reach, Toileting schedule/hourly rounds              Problem: Patient Education: Go to Patient Education Activity  Goal: Patient/Family Education  Outcome: Progressing Towards Goal

## 2022-08-06 NOTE — PROGRESS NOTES
Renal Daily Progress Note    Admit Date: 8/1/2022      Subjective:     Demetri Villagomez is a 54-year-old pleasant -American female with longstanding history of hypertension,  3 functioning right kidney for many years due to left nephrectomy    Renal cell CVA, known CKD with multiple episodes of acute kidney injury because of which patient underwent AV graft placement to the left upper extremity many years ago however patient recovered renal function hence dialysis never been started on this patient being monitored for CKD in the office she has been stage IV for many years. Obviously patient had an episode of syncope leading to a fall at home requiring this hospitalization and evaluations. Obviously patient had significant drop of hemoglobin at 7.8 g requiring GI evaluation. She was also ruled in for non-STEMI requiring cardiac evaluation. At the time of exam patient is resting comfortably and tells me that she is ready to go home.     Current Facility-Administered Medications   Medication Dose Route Frequency    pantoprazole (PROTONIX) 40 mg in 0.9% sodium chloride 10 mL injection  40 mg IntraVENous BID    senna-docusate (PERICOLACE) 8.6-50 mg per tablet 1 Tablet  1 Tablet Oral DAILY    torsemide (DEMADEX) tablet 20 mg  20 mg Oral BID    glucose chewable tablet 16 g  4 Tablet Oral PRN    glucagon (GLUCAGEN) injection 1 mg  1 mg IntraMUSCular PRN    dextrose 10% infusion 0-250 mL  0-250 mL IntraVENous PRN    latanoprost (XALATAN) 0.005 % ophthalmic solution 1 Drop  1 Drop Both Eyes QHS    atorvastatin (LIPITOR) tablet 20 mg  20 mg Oral QHS    [Held by provider] aspirin chewable tablet 81 mg  81 mg Oral DAILY    venlafaxine-SR (EFFEXOR-XR) capsule 75 mg  75 mg Oral DAILY    [Held by provider] apixaban (ELIQUIS) tablet 2.5 mg  2.5 mg Oral BID    gabapentin (NEURONTIN) capsule 300 mg  300 mg Oral BID    calcitRIOL (ROCALTROL) capsule 0.25 mcg  0.25 mcg Oral DAILY    donepeziL (ARICEPT) tablet 10 mg  10 mg Oral QHS cetirizine (ZYRTEC) tablet 10 mg  10 mg Oral DAILY    carvediloL (COREG) tablet 6.25 mg  6.25 mg Oral BID WITH MEALS    [Held by provider] amLODIPine (NORVASC) tablet 10 mg  10 mg Oral DAILY    ferrous sulfate tablet 325 mg  325 mg Oral ACB    sodium chloride (NS) flush 5-40 mL  5-40 mL IntraVENous Q8H    sodium chloride (NS) flush 5-40 mL  5-40 mL IntraVENous PRN    acetaminophen (TYLENOL) tablet 650 mg  650 mg Oral Q6H PRN    Or    acetaminophen (TYLENOL) suppository 650 mg  650 mg Rectal Q6H PRN    polyethylene glycol (MIRALAX) packet 17 g  17 g Oral DAILY PRN    bisacodyL (DULCOLAX) tablet 5 mg  5 mg Oral DAILY PRN    ondansetron (ZOFRAN ODT) tablet 4 mg  4 mg Oral Q6H PRN    Or    ondansetron (ZOFRAN) injection 4 mg  4 mg IntraVENous Q6H PRN        Review of Systems    ROS       Objective:     Patient Vitals for the past 8 hrs:   BP Temp Pulse Resp SpO2   08/06/22 1047 (!) 124/59 98.1 °F (36.7 °C) 65 20 100 %   08/06/22 0828 134/67 98.1 °F (36.7 °C) 67 20 100 %   08/06/22 0356 (!) 133/59 99.2 °F (37.3 °C) 70 20 98 %     No intake/output data recorded. 08/04 1901 - 08/06 0700  In: 100 [I.V.:100]  Out: -     Physical Exam:   Physical Exam  Constitutional:       General: She is not in acute distress. Appearance: Normal appearance. She is not ill-appearing. HENT:      Head: Normocephalic and atraumatic. Mouth/Throat:      Mouth: Mucous membranes are moist.   Eyes:      General: No scleral icterus. Cardiovascular:      Rate and Rhythm: Normal rate. Heart sounds: No murmur heard. Pulmonary:      Effort: Pulmonary effort is normal.      Breath sounds: Normal breath sounds. Abdominal:      General: Abdomen is flat. Bowel sounds are normal.      Palpations: Abdomen is soft. Musculoskeletal:      Cervical back: Neck supple. Right lower leg: No edema. Left lower leg: No edema. Skin:     General: Skin is warm and dry. Findings: Lesion present.    Neurological:      General: No focal deficit present. Mental Status: She is alert. Psychiatric:         Mood and Affect: Mood normal.      Left upper arm AV graft with good bruit and thrill      XR CHEST PORT   Final Result   No acute process. Data Review   Recent Labs     08/04/22  1523 08/04/22  0634   WBC  --  4.7   HGB 8.2* 8.0*   HCT 26.0* 25.2*   PLT  --  175     Recent Labs     08/04/22  0634   *   K 4.3   CL 94*   CO2 35*   *   BUN 57*   CREA 2.49*   CA 9.2           Assessment:     #1 CKD stage IV with solitary functioning right kidney with stable renal function patient is advised to keep up with follow-up in the office   #2 anemia-due to gastritis?   Patient need iron studies if her iron saturation is 15% or above she can receive IVETTE  #3 hypertension  #4 secondary hyperparathyroidism  #5 CAD with NSTEMI  #6 severe hypoalbuminemia with albumin level of 1.8 on admission      Active Problems:    Syncope (11/21/2020)      Non-STEMI (non-ST elevated myocardial infarction) ([facility] [address] (8/1/2022)      Anemia (8/1/2022)        Plan:     Patient need to call office so that she can be on RETACRIT/IVETTE  to avoid  blood transfusions  She need close monitoring of renal function  She needs labs to rule out secondary hyperparathyroidism

## 2022-08-06 NOTE — PROGRESS NOTES
ASHA called to speak to Brittanie Sweet, patient niece regarding SNF choices. No answer, left voicemail. 1 pm  Niece called back to say patient decided she wants to go home instead of to a facility. No preference for [facility] choices.

## 2022-08-06 NOTE — PROGRESS NOTES
[facility] Hospitalist Progress Note  Julian Guerrier MD  KBIT:       [facility]  Patient Name:Tamy Moore     YOB: 1945     Age:77 y.o.    22 admission course  Richard Ortega is a 65 y.o. female who has a PMH significant for renal cell carcinoma status post left nephrectomy, HTN, CKD, CAD, HLD, CVA and diabetes. She comes to the emergency room from rehab center after having a syncopal episode while having a bowel movement on the toilet. Patient states she has had previous episodes similar. She states she lost consciousness for a couple of seconds but denies falling, chest pain or palpitations. Significant labs on admission hemoglobin 7.8, potassium 3.1, BUN 51, creatinine 2.10, FOBT positive, INR 1.9, troponin 380. Patient was admitted for syncope, anemia, Hemoccult positive, hypokalemia and non-STEMI. GI and cardiology was consulted. ECHO pending. Subjective      No active complaint. \"Gas\". No CP, SOB, bleeding. 22: S/p EGD --> gastritis; diaphragmatic hernia w/o obstruction      Objective         Vitals Last 24 Hours:  TEMPERATURE:  Temp  Av.1 °F (36.7 °C)  Min: 97 °F (36.1 °C)  Max: 99.2 °F (37.3 °C)  RESPIRATIONS RANGE: Resp  Av.3  Min: 18  Max: 24  PULSE OXIMETRY RANGE: SpO2  Av.2 %  Min: 95 %  Max: 100 %  PULSE RANGE: Pulse  Av.1  Min: 65  Max: 92  BLOOD PRESSURE RANGE: Systolic (01GYX), AGZ:960 , Min:75 , IAA:077   ; Diastolic (17FFS), DFV:60, Min:37, Max:68    I/O (24Hr):     Intake/Output Summary (Last 24 hours) at 2022 1257  Last data filed at 2022 1519  Gross per 24 hour   Intake 100 ml   Output --   Net 100 ml       Constitutional: No fevers, No chills, No sweats, No fatigue, No Weakness  Eyes: No redness  Ears, nose, mouth, throat, and face: No nasal congestion, No sore throat, No voice change  Respiratory: No Shortness of Breath, No cough, No wheezing  Cardiovascular: No chest pain, No palpitations, No extremity edema  Gastrointestinal: No nausea, No vomiting, No diarrhea, No abdominal pain  Genitourinary: No frequency, No dysuria, No hematuria  Integument/breast: No skin lesion(s)  Neurological: No Confusion, No headaches, No dizziness    PHYSICAL EXAM:  Constitutional: No acute distress  Skin: Extremities and face reveal no rashes. HEENT: Sclerae anicteric. Extra-occular muscles are intact. No oral ulcers. The neck is supple and no masses. Cardiovascular: Regular rate and rhythm. Respiratory:  Clear breath sounds bilaterally with no wheezes, rales, or rhonchi. GI: Abdomen nondistended, soft, and nontender. Normal active bowel sounds. Musculoskeletal: No pitting edema of the lower legs. Able to move all ext. Has involuntary movements of hands and feet. Neurological:  Patient is alert and oriented. Cranial nerves II-XII grossly intact  Psychiatric: Mood appears appropriate       Labs/Imaging/Diagnostics    Labs:  CBC:  Recent Labs     08/06/22  1105 08/04/22  1523 08/04/22  0634   WBC 4.7  --  4.7   RBC 2.73*  --  2.52*   HGB 8.4* 8.2* 8.0*   HCT 27.2* 26.0* 25.2*   MCV 99.6*  --  100.0*   RDW 15.6*  --  16.4*     --  175       CHEMISTRIES:  Recent Labs     08/06/22  1105 08/04/22  0634   * 133*   K 4.0 4.3   CL 94* 94*   CO2 34* 35*   BUN 56* 57*   CA 9.1 9.2   MG 2.0  --      PT/INR:  No results for input(s): INR, INREXT, INREXT in the last 72 hours. No lab exists for component: PROTIME    APTT:No results for input(s): APTT in the last 72 hours. LIVER PROFILE:  Recent Labs     08/06/22  1105   AST 25   ALT 16       Lab Results   Component Value Date/Time    ALT (SGPT) 16 08/06/2022 11:05 AM    AST (SGOT) 25 08/06/2022 11:05 AM    Alk. phosphatase 144 (H) 08/06/2022 11:05 AM    Bilirubin, total 0.7 08/06/2022 11:05 AM     ECHO 8/3/22    Left Ventricle: Normal left ventricular systolic function with a visually estimated EF of 55 - 60%. Left ventricle size is normal. Mildly increased wall thickness. Normal wall motion. Normal diastolic function. Aortic Valve: Valve structure is normal. Mildly calcified cusp. Mitral Valve: Moderately thickened leaflet. Left Atrium: Left atrium is mildly dilated. Assessment & Plan    1) Syncopal episode while straining on the toilet  - likely vasovagal syncope  - Appreciate cardiology consult    2) Acute anemia. Holding apixaban and aspirin  Appreciate GI consult. S/p colonoscopy/EGD    3) Cardiovascular issues including coronary disease, hypertension. Some elevation in troponin at admission.    Apixaban and aspirin on hold for anemia   Continue medical regimen    Amlodipine    Atorvastatin    Carvedilol    4) Chronic renal failure   Continue torsemide    T 99 --> monitor  DC plan; spoke with case mgmt; SNF      Electronically signed by Yovani Ngo MD on 8/6/2022 at 8:00 AM

## 2022-08-07 LAB
GLUCOSE BLD STRIP.AUTO-MCNC: 125 MG/DL (ref 65–117)
GLUCOSE BLD STRIP.AUTO-MCNC: 128 MG/DL (ref 65–117)
GLUCOSE BLD STRIP.AUTO-MCNC: 128 MG/DL (ref 65–117)
GLUCOSE BLD STRIP.AUTO-MCNC: 150 MG/DL (ref 65–117)
PERFORMED BY, TECHID: ABNORMAL

## 2022-08-07 PROCEDURE — 74011250636 HC RX REV CODE- 250/636: Performed by: INTERNAL MEDICINE

## 2022-08-07 PROCEDURE — 74011250637 HC RX REV CODE- 250/637: Performed by: INTERNAL MEDICINE

## 2022-08-07 PROCEDURE — 82962 GLUCOSE BLOOD TEST: CPT

## 2022-08-07 PROCEDURE — 74011000250 HC RX REV CODE- 250: Performed by: NURSE PRACTITIONER

## 2022-08-07 PROCEDURE — C9113 INJ PANTOPRAZOLE SODIUM, VIA: HCPCS | Performed by: INTERNAL MEDICINE

## 2022-08-07 PROCEDURE — 65270000029 HC RM PRIVATE

## 2022-08-07 PROCEDURE — 74011000250 HC RX REV CODE- 250: Performed by: INTERNAL MEDICINE

## 2022-08-07 PROCEDURE — 74011250637 HC RX REV CODE- 250/637: Performed by: NURSE PRACTITIONER

## 2022-08-07 RX ORDER — INSULIN LISPRO 100 [IU]/ML
INJECTION, SOLUTION INTRAVENOUS; SUBCUTANEOUS
Status: DISCONTINUED | OUTPATIENT
Start: 2022-08-07 | End: 2022-08-08 | Stop reason: HOSPADM

## 2022-08-07 RX ORDER — MAGNESIUM SULFATE 100 %
4 CRYSTALS MISCELLANEOUS AS NEEDED
Status: DISCONTINUED | OUTPATIENT
Start: 2022-08-07 | End: 2022-08-08 | Stop reason: HOSPADM

## 2022-08-07 RX ORDER — DEXTROSE MONOHYDRATE 100 MG/ML
0-250 INJECTION, SOLUTION INTRAVENOUS AS NEEDED
Status: DISCONTINUED | OUTPATIENT
Start: 2022-08-07 | End: 2022-08-07 | Stop reason: SDUPTHER

## 2022-08-07 RX ADMIN — SODIUM CHLORIDE, PRESERVATIVE FREE 10 ML: 5 INJECTION INTRAVENOUS at 21:19

## 2022-08-07 RX ADMIN — BISACODYL 5 MG: 5 TABLET, COATED ORAL at 21:20

## 2022-08-07 RX ADMIN — SENNOSIDES AND DOCUSATE SODIUM 1 TABLET: 50; 8.6 TABLET ORAL at 09:03

## 2022-08-07 RX ADMIN — ALUMINUM HYDROXIDE, MAGNESIUM HYDROXIDE, AND SIMETHICONE 30 ML: 200; 200; 20 SUSPENSION ORAL at 03:11

## 2022-08-07 RX ADMIN — POLYETHYLENE GLYCOL 3350 17 G: 17 POWDER, FOR SOLUTION ORAL at 03:11

## 2022-08-07 RX ADMIN — DONEPEZIL HYDROCHLORIDE 10 MG: 5 TABLET, FILM COATED ORAL at 21:20

## 2022-08-07 RX ADMIN — SODIUM CHLORIDE, PRESERVATIVE FREE 40 MG: 5 INJECTION INTRAVENOUS at 21:19

## 2022-08-07 RX ADMIN — CARVEDILOL 6.25 MG: 3.12 TABLET, FILM COATED ORAL at 09:03

## 2022-08-07 RX ADMIN — FERROUS SULFATE TAB 325 MG (65 MG ELEMENTAL FE) 325 MG: 325 (65 FE) TAB at 07:46

## 2022-08-07 RX ADMIN — POLYETHYLENE GLYCOL 3350 17 G: 17 POWDER, FOR SOLUTION ORAL at 09:03

## 2022-08-07 RX ADMIN — SODIUM CHLORIDE, PRESERVATIVE FREE 10 ML: 5 INJECTION INTRAVENOUS at 13:53

## 2022-08-07 RX ADMIN — SODIUM CHLORIDE, PRESERVATIVE FREE 40 MG: 5 INJECTION INTRAVENOUS at 09:03

## 2022-08-07 RX ADMIN — LATANOPROST 1 DROP: 50 SOLUTION OPHTHALMIC at 21:22

## 2022-08-07 RX ADMIN — TORSEMIDE 20 MG: 10 TABLET ORAL at 21:20

## 2022-08-07 RX ADMIN — SODIUM CHLORIDE, PRESERVATIVE FREE 10 ML: 5 INJECTION INTRAVENOUS at 05:04

## 2022-08-07 RX ADMIN — VENLAFAXINE HYDROCHLORIDE 75 MG: 75 CAPSULE, EXTENDED RELEASE ORAL at 09:03

## 2022-08-07 RX ADMIN — ATORVASTATIN CALCIUM 20 MG: 10 TABLET, FILM COATED ORAL at 21:20

## 2022-08-07 RX ADMIN — GABAPENTIN 300 MG: 300 CAPSULE ORAL at 09:03

## 2022-08-07 RX ADMIN — TORSEMIDE 20 MG: 10 TABLET ORAL at 09:03

## 2022-08-07 RX ADMIN — CALCITRIOL CAPSULES 0.25 MCG 0.25 MCG: 0.25 CAPSULE ORAL at 09:03

## 2022-08-07 RX ADMIN — CETIRIZINE HYDROCHLORIDE 10 MG: 10 TABLET, FILM COATED ORAL at 09:03

## 2022-08-07 RX ADMIN — GABAPENTIN 300 MG: 300 CAPSULE ORAL at 21:20

## 2022-08-07 NOTE — PROGRESS NOTES
[facility] Hospitalist Progress Note  Julian Jaramillo MD  NURJ:[id number]       [facility]  Patient Name:Tamy Moore     YOB: 1945     Age:77 y.o.    22 admission course  Patricia Correia is a 65 y.o. female who has a PMH significant for renal cell carcinoma status post left nephrectomy, HTN, CKD, CAD, HLD, CVA and diabetes. She comes to the emergency room from rehab center after having a syncopal episode while having a bowel movement on the toilet. Patient states she has had previous episodes similar. She states she lost consciousness for a couple of seconds but denies falling, chest pain or palpitations. Significant labs on admission hemoglobin 7.8, potassium 3.1, BUN 51, creatinine 2.10, FOBT positive, INR 1.9, troponin 380. Patient was admitted for syncope, anemia, Hemoccult positive, hypokalemia and non-STEMI. GI and cardiology was consulted. ECHO pending. Subjective      No new complaint. \"Gas pain\"; no BM. No CP, SOB, bleeding.  ------------------------------    22: S/p EGD --> gastritis; diaphragmatic hernia w/o obstruction      Objective         Vitals Last 24 Hours:  TEMPERATURE:  Temp  Av.2 °F (36.8 °C)  Min: 97.9 °F (36.6 °C)  Max: 98.5 °F (36.9 °C)  RESPIRATIONS RANGE: Resp  Av  Min: 20  Max: 20  PULSE OXIMETRY RANGE: SpO2  Av %  Min: 98 %  Max: 100 %  PULSE RANGE: Pulse  Av.5  Min: 62  Max: 68  BLOOD PRESSURE RANGE: Systolic (38TNK), SKYLER:302 , Min:102 , XXX:433   ; Diastolic (69YZG), SUJ:40, Min:55, Max:68    I/O (24Hr):     Intake/Output Summary (Last 24 hours) at 2022 1408  Last data filed at 2022 1108  Gross per 24 hour   Intake 320 ml   Output 2150 ml   Net -1830 ml       Constitutional: No fevers, No chills, No sweats, No fatigue, No Weakness  Eyes: No redness  Ears, nose, mouth, throat, and face: No nasal congestion, No sore throat, No voice change  Respiratory: No Shortness of Breath, No cough, No wheezing  Cardiovascular: No chest pain, No palpitations, No extremity edema  Gastrointestinal: No nausea, No vomiting, No diarrhea, No abdominal pain  Genitourinary: No frequency, No dysuria, No hematuria  Integument/breast: No skin lesion(s)  Neurological: No Confusion, No headaches, No dizziness    PHYSICAL EXAM:  Constitutional: No acute distress  Skin: Extremities and face reveal no rashes. HEENT: Sclerae anicteric. Extra-occular muscles are intact. No oral ulcers. The neck is supple and no masses. Cardiovascular: Regular rate and rhythm. Respiratory:  Clear breath sounds bilaterally with no wheezes, rales, or rhonchi. GI: Abdomen nondistended, soft, and nontender. Normal active bowel sounds. Musculoskeletal: No pitting edema of the lower legs. Able to move all ext. Has involuntary movements of hands and feet. Neurological:  Patient is alert and oriented. Cranial nerves II-XII grossly intact  Psychiatric: Mood appears appropriate       Labs/Imaging/Diagnostics    Labs:  CBC:  Recent Labs     08/06/22  1105 08/04/22  1523   WBC 4.7  --    RBC 2.73*  --    HGB 8.4* 8.2*   HCT 27.2* 26.0*   MCV 99.6*  --    RDW 15.6*  --      --        CHEMISTRIES:  Recent Labs     08/06/22  1105   *   K 4.0   CL 94*   CO2 34*   BUN 56*   CA 9.1   MG 2.0     PT/INR:  No results for input(s): INR, INREXT, INREXT in the last 72 hours. No lab exists for component: PROTIME    APTT:No results for input(s): APTT in the last 72 hours. LIVER PROFILE:  Recent Labs     08/06/22  1105   AST 25   ALT 16       Lab Results   Component Value Date/Time    ALT (SGPT) 16 08/06/2022 11:05 AM    AST (SGOT) 25 08/06/2022 11:05 AM    Alk. phosphatase 144 (H) 08/06/2022 11:05 AM    Bilirubin, total 0.7 08/06/2022 11:05 AM     ECHO 8/3/22    Left Ventricle: Normal left ventricular systolic function with a visually estimated EF of 55 - 60%. Left ventricle size is normal. Mildly increased wall thickness. Normal wall motion. Normal diastolic function.     Aortic Valve: Valve structure is normal. Mildly calcified cusp. Mitral Valve: Moderately thickened leaflet. Left Atrium: Left atrium is mildly dilated. Assessment & Plan    1) Syncopal episode while straining on the toilet  - likely vasovagal syncope  - Appreciate cardiology consult    2) Acute anemia. Holding apixaban and aspirin  Appreciate GI consult. S/p colonoscopy/EGD    3) Cardiovascular issues including coronary disease, hypertension. Some elevation in troponin at admission.    Apixaban and aspirin on hold for anemia   Continue medical regimen    Amlodipine    Atorvastatin    Carvedilol    4) Chronic renal failure   Continue torsemide    T 99 --> monitor --> resolved  DC plan; spoke with case mgmt; SNF vs   L[facility]      Electronically signed by Martell Lopez MD on 8/7/2022 at 8:00 AM

## 2022-08-07 NOTE — PROGRESS NOTES
Problem: Patient Education: Go to Patient Education Activity  Goal: Patient/Family Education  Outcome: Progressing Towards Goal     Problem: Patient Education: Go to Patient Education Activity  Goal: Patient/Family Education  Outcome: Progressing Towards Goal     Problem: Falls - Risk of  Goal: *Absence of Falls  Description: Document Michel Crenshaw Fall Risk and appropriate interventions in the flowsheet.   Outcome: Progressing Towards Goal  Note: Fall Risk Interventions:  Mobility Interventions: Bed/chair exit alarm, OT consult for ADLs         Medication Interventions: Bed/chair exit alarm, Patient to call before getting OOB    Elimination Interventions: Bed/chair exit alarm, Call light in reach

## 2022-08-07 NOTE — PROGRESS NOTES
Renal Daily Progress Note    Admit Date: 8/1/2022      Subjective:     Thom Polanco is a 65-year-old pleasant -American female with longstanding history of hypertension,  3 functioning right kidney for many years due to left nephrectomy    Renal cell CVA, known CKD with multiple episodes of acute kidney injury because of which patient underwent AV graft placement to the left upper extremity many years ago however patient recovered renal function hence dialysis never been started on this patient being monitored for CKD in the office she has been stage IV for many years. Obviously patient had an episode of syncope leading to a fall at home requiring this hospitalization and evaluations. Obviously patient had significant drop of hemoglobin at 7.8 g requiring GI evaluation. She was also ruled in for non-STEMI requiring cardiac evaluation. Patient is seen and examined in the room. She is informed that she needs to get Procrit shots to avoid  blood transfusions. Hence patient is instructed to call the office when she is discharged. Since that sodium level is low side at 132 Will repeat BMP in the a.m. along with hemoglobin and hematocrit. BP continue to fluctuate.   Current Facility-Administered Medications   Medication Dose Route Frequency    glucose chewable tablet 16 g  4 Tablet Oral PRN    glucagon (GLUCAGEN) injection 1 mg  1 mg IntraMUSCular PRN    insulin lispro (HUMALOG) injection   SubCUTAneous AC&HS    alum-mag hydroxide-simeth (MYLANTA) oral suspension 30 mL  30 mL Oral Q4H PRN    pantoprazole (PROTONIX) 40 mg in 0.9% sodium chloride 10 mL injection  40 mg IntraVENous BID    senna-docusate (PERICOLACE) 8.6-50 mg per tablet 1 Tablet  1 Tablet Oral DAILY    torsemide (DEMADEX) tablet 20 mg  20 mg Oral BID    dextrose 10% infusion 0-250 mL  0-250 mL IntraVENous PRN    latanoprost (XALATAN) 0.005 % ophthalmic solution 1 Drop  1 Drop Both Eyes QHS    atorvastatin (LIPITOR) tablet 20 mg  20 mg Oral QHS    [Held by provider] aspirin chewable tablet 81 mg  81 mg Oral DAILY    venlafaxine-SR (EFFEXOR-XR) capsule 75 mg  75 mg Oral DAILY    [Held by provider] apixaban (ELIQUIS) tablet 2.5 mg  2.5 mg Oral BID    gabapentin (NEURONTIN) capsule 300 mg  300 mg Oral BID    calcitRIOL (ROCALTROL) capsule 0.25 mcg  0.25 mcg Oral DAILY    donepeziL (ARICEPT) tablet 10 mg  10 mg Oral QHS    cetirizine (ZYRTEC) tablet 10 mg  10 mg Oral DAILY    carvediloL (COREG) tablet 6.25 mg  6.25 mg Oral BID WITH MEALS    [Held by provider] amLODIPine (NORVASC) tablet 10 mg  10 mg Oral DAILY    ferrous sulfate tablet 325 mg  325 mg Oral ACB    sodium chloride (NS) flush 5-40 mL  5-40 mL IntraVENous Q8H    sodium chloride (NS) flush 5-40 mL  5-40 mL IntraVENous PRN    acetaminophen (TYLENOL) tablet 650 mg  650 mg Oral Q6H PRN    Or    acetaminophen (TYLENOL) suppository 650 mg  650 mg Rectal Q6H PRN    polyethylene glycol (MIRALAX) packet 17 g  17 g Oral DAILY PRN    bisacodyL (DULCOLAX) tablet 5 mg  5 mg Oral DAILY PRN    ondansetron (ZOFRAN ODT) tablet 4 mg  4 mg Oral Q6H PRN    Or    ondansetron (ZOFRAN) injection 4 mg  4 mg IntraVENous Q6H PRN        Review of Systems    Review of Systems   Constitutional:  Negative for fever. Respiratory:  Negative for shortness of breath. Cardiovascular:  Negative for chest pain and leg swelling. Gastrointestinal:  Positive for constipation. Musculoskeletal:  Positive for joint pain. Neurological:  Negative for dizziness and headaches. Objective:     Patient Vitals for the past 8 hrs:   BP Temp Pulse Resp SpO2   08/07/22 1645 115/68 -- -- -- --   08/07/22 1419 (!) 101/57 98.3 °F (36.8 °C) (!) 59 20 --   08/07/22 1108 102/60 98 °F (36.7 °C) 62 20 98 %       08/07 0701 - 08/07 1900  In: 600 [P.O.:600]  Out: 1350 [CQMLN:8086]  08/05 1901 - 08/07 0700  In: -   Out: 1494 [Urine:1450]    Physical Exam:   Physical Exam  Constitutional:       General: She is not in acute distress. Appearance: Normal appearance. She is not ill-appearing. HENT:      Head: Normocephalic and atraumatic. Mouth/Throat:      Mouth: Mucous membranes are moist.   Eyes:      General: No scleral icterus. Cardiovascular:      Rate and Rhythm: Normal rate. Heart sounds: No murmur heard. Pulmonary:      Effort: Pulmonary effort is normal.      Breath sounds: Normal breath sounds. Abdominal:      General: Abdomen is flat. Bowel sounds are normal.      Palpations: Abdomen is soft. Musculoskeletal:      Cervical back: Neck supple. Right lower leg: No edema. Left lower leg: No edema. Skin:     General: Skin is warm and dry. Findings: Lesion present. Neurological:      General: No focal deficit present. Mental Status: She is alert. Psychiatric:         Mood and Affect: Mood normal.      Left upper arm AV graft with good bruit and thrill      XR CHEST PORT   Final Result   No acute process. Data Review   Recent Labs     08/06/22  1105   WBC 4.7   HGB 8.4*   HCT 27.2*          Recent Labs     08/06/22  1105   *   K 4.0   CL 94*   CO2 34*   *   BUN 56*   CREA 2.67*   CA 9.1   MG 2.0   ALB 2.4*   ALT 16             Assessment:     #1 CKD stage IV with solitary functioning right kidney with stable renal function patient is advised to keep up with follow-up in the office   #2 anemia-due to gastritis? Patient need iron studies if her iron saturation is 15% or above she can receive IVETTE  #3 hypertension  #4 secondary hyperparathyroidism  #5 CAD with NSTEMI  #6 severe hypoalbuminemia with albumin level of 1.8 on admission  #7 hyponatremia     Active Problems:    Syncope (11/21/2020)      Non-STEMI (non-ST elevated myocardial infarction) ([facility] [address] (8/1/2022)      Anemia (8/1/2022)      Plan:     Repeat BMP  Repeat hemoglobin and hematocrit in the a.m.   To follow patient and Procrit weekly and in the office

## 2022-08-08 VITALS
SYSTOLIC BLOOD PRESSURE: 107 MMHG | HEART RATE: 63 BPM | TEMPERATURE: 98.7 F | HEIGHT: 66 IN | DIASTOLIC BLOOD PRESSURE: 63 MMHG | OXYGEN SATURATION: 97 % | WEIGHT: 175.49 LBS | BODY MASS INDEX: 28.2 KG/M2 | RESPIRATION RATE: 20 BRPM

## 2022-08-08 LAB
ANION GAP SERPL CALC-SCNC: 5 MMOL/L (ref 5–15)
BUN SERPL-MCNC: 44 MG/DL (ref 6–20)
BUN/CREAT SERPL: 17 (ref 12–20)
CA-I BLD-MCNC: 9.4 MG/DL (ref 8.5–10.1)
CHLORIDE SERPL-SCNC: 98 MMOL/L (ref 97–108)
CO2 SERPL-SCNC: 34 MMOL/L (ref 21–32)
CREAT SERPL-MCNC: 2.64 MG/DL (ref 0.55–1.02)
GLUCOSE BLD STRIP.AUTO-MCNC: 109 MG/DL (ref 65–117)
GLUCOSE BLD STRIP.AUTO-MCNC: 120 MG/DL (ref 65–117)
GLUCOSE BLD STRIP.AUTO-MCNC: 130 MG/DL (ref 65–117)
GLUCOSE SERPL-MCNC: 94 MG/DL (ref 65–100)
HCT VFR BLD AUTO: 27 % (ref 35–47)
HGB BLD-MCNC: 8.3 G/DL (ref 11.5–16)
PERFORMED BY, TECHID: ABNORMAL
PERFORMED BY, TECHID: ABNORMAL
PERFORMED BY, TECHID: NORMAL
POTASSIUM SERPL-SCNC: 4 MMOL/L (ref 3.5–5.1)
SODIUM SERPL-SCNC: 137 MMOL/L (ref 136–145)

## 2022-08-08 PROCEDURE — 74011000250 HC RX REV CODE- 250: Performed by: INTERNAL MEDICINE

## 2022-08-08 PROCEDURE — C9113 INJ PANTOPRAZOLE SODIUM, VIA: HCPCS | Performed by: INTERNAL MEDICINE

## 2022-08-08 PROCEDURE — 80048 BASIC METABOLIC PNL TOTAL CA: CPT

## 2022-08-08 PROCEDURE — 82962 GLUCOSE BLOOD TEST: CPT

## 2022-08-08 PROCEDURE — 74011250636 HC RX REV CODE- 250/636: Performed by: INTERNAL MEDICINE

## 2022-08-08 PROCEDURE — 74011250637 HC RX REV CODE- 250/637: Performed by: INTERNAL MEDICINE

## 2022-08-08 PROCEDURE — 36415 COLL VENOUS BLD VENIPUNCTURE: CPT

## 2022-08-08 PROCEDURE — 74011000250 HC RX REV CODE- 250: Performed by: NURSE PRACTITIONER

## 2022-08-08 PROCEDURE — 85018 HEMOGLOBIN: CPT

## 2022-08-08 PROCEDURE — 74011250637 HC RX REV CODE- 250/637: Performed by: NURSE PRACTITIONER

## 2022-08-08 RX ORDER — PANTOPRAZOLE SODIUM 40 MG/1
40 TABLET, DELAYED RELEASE ORAL 2 TIMES DAILY
Qty: 60 TABLET | Refills: 0 | Status: SHIPPED | OUTPATIENT
Start: 2022-08-08

## 2022-08-08 RX ADMIN — CETIRIZINE HYDROCHLORIDE 10 MG: 10 TABLET, FILM COATED ORAL at 08:34

## 2022-08-08 RX ADMIN — FERROUS SULFATE TAB 325 MG (65 MG ELEMENTAL FE) 325 MG: 325 (65 FE) TAB at 08:34

## 2022-08-08 RX ADMIN — SODIUM CHLORIDE, PRESERVATIVE FREE 10 ML: 5 INJECTION INTRAVENOUS at 05:06

## 2022-08-08 RX ADMIN — SODIUM CHLORIDE, PRESERVATIVE FREE 40 MG: 5 INJECTION INTRAVENOUS at 08:35

## 2022-08-08 RX ADMIN — TORSEMIDE 20 MG: 10 TABLET ORAL at 08:34

## 2022-08-08 RX ADMIN — CALCITRIOL CAPSULES 0.25 MCG 0.25 MCG: 0.25 CAPSULE ORAL at 08:34

## 2022-08-08 RX ADMIN — SENNOSIDES AND DOCUSATE SODIUM 1 TABLET: 50; 8.6 TABLET ORAL at 08:34

## 2022-08-08 RX ADMIN — GABAPENTIN 300 MG: 300 CAPSULE ORAL at 08:34

## 2022-08-08 RX ADMIN — VENLAFAXINE HYDROCHLORIDE 75 MG: 75 CAPSULE, EXTENDED RELEASE ORAL at 08:34

## 2022-08-08 RX ADMIN — CARVEDILOL 6.25 MG: 3.12 TABLET, FILM COATED ORAL at 08:34

## 2022-08-08 RX ADMIN — CARVEDILOL 6.25 MG: 3.12 TABLET, FILM COATED ORAL at 16:41

## 2022-08-08 RX ADMIN — POLYETHYLENE GLYCOL 3350 17 G: 17 POWDER, FOR SOLUTION ORAL at 10:18

## 2022-08-08 RX ADMIN — SODIUM CHLORIDE, PRESERVATIVE FREE 10 ML: 5 INJECTION INTRAVENOUS at 14:18

## 2022-08-08 NOTE — DISCHARGE SUMMARY
Hospitalist Discharge Summary     Patient ID:    Piyush Murphy  [id number]  87 y.o.  1945    Admit date: 8/1/2022    Discharge date : 8/8/2022      Final Diagnoses: Active Problems:    Syncope (11/21/2020)      Non-STEMI (non-ST elevated myocardial infarction) ([facility] [address] (8/1/2022)      Anemia (8/1/2022)        Reason for Hospitalization:  Piyush Murphy is a 65 y.o. female who has a PMH significant for renal cell carcinoma status post left nephrectomy, HTN, CKD, CAD, HLD, CVA and diabetes. She comes to the emergency room from rehab center after having a syncopal episode while having a bowel movement on the toilet. Patient states she has had previous episodes similar. She states she lost consciousness for a couple of seconds but denies falling, chest pain or palpitations. Significant labs on admission hemoglobin 7.8, potassium 3.1, BUN 51, creatinine 2.10, FOBT positive, INR 1.9, troponin 380. Patient was admitted for syncope, anemia, Hemoccult positive, hypokalemia and non-STEMI. GI and cardiology was consulted. Hospital Course:   Patient has been clinically stable for 72 hrs. Was for SNF but now opted for [facility] with daughter. No recurrent bleeding. H&H stable. Has been off of Eliquis/ASA due to GIB. Will need to re-addressed at a later date.  ------------------------    1) Syncopal episode while straining on the toilet  - likely vasovagal syncope  - Appreciate cardiology consult; per Dr. Humphries Breeding:     NSTEMI,? type II, known coronary artery disease status post stenting 5 years ago EKG has been unremarkable,  EKG this morning with sinus rhythm 54/min, no acute changes. No interval changes. Agree to  proceed with EGD. History of GERD, now also with GERD symptoms. Syncope/presyncope, no recurrence. History of prior syncopal episodes in the past. Anemia, home on Eliquis for history of pulmonary embolism  History of TIA.  History of mild aortic stenosis and moderate tricuspid regurgitation 8/22 echo with mildly calcified aortic valve, moderately thickened mitral valve, EF of 55 to 60%     2) Acute anemia. Holding apixaban and aspirin  Appreciate GI consult. S/p EGD by Dr. Marlene Hanley:    8/5/22: S/p EGD --> gastritis; diaphragmatic hernia w/o obstruction     3) Chronic renal failure    Discharge Medications:   Current Discharge Medication List        CONTINUE these medications which have CHANGED    Details   pantoprazole (PROTONIX) 40 mg tablet Take 1 Tablet by mouth two (2) times a day. Qty: 60 Tablet, Refills: 0  Start date: 8/8/2022           CONTINUE these medications which have NOT CHANGED    Details   albuterol (PROVENTIL VENTOLIN) 2.5 mg /3 mL (0.083 %) nebu 2.5 mg by Nebulization route every six (6) hours as needed for Shortness of Breath. fluticasone propionate (FLONASE) 50 mcg/actuation nasal spray 2 Sprays by Both Nostrils route daily. insulin lispro (HUMALOG) 100 unit/mL injection by SubCUTAneous route Before breakfast, lunch, dinner and at bedtime. SS: 150-200=2 units 201-250=4 units 251-300=6 units 301-350=8 units 351-400=10 units      insulin glargine (LANTUS) 100 unit/mL injection 10 Units by SubCUTAneous route nightly. lidocaine (LIDODERM) 5 % 1 Patch by TransDERmal route every twenty-four (24) hours. Apply to Right Hip Remove at bedtime      loratadine (CLARITIN) 10 mg tablet Take 10 mg by mouth in the morning. aluminum & magnesium hydroxide-simethicone (MYLANTA II) 400-400-40 mg/5 mL suspension Take 30 mL by mouth every six (6) hours as needed for Indigestion. senna (SENOKOT) 8.6 mg tablet Take 2 Tablets by mouth nightly. torsemide (DEMADEX) 20 mg tablet Take 20 mg by mouth two (2) times a day. carvediloL (COREG) 6.25 mg tablet Take 6.25 mg by mouth two (2) times daily (with meals). amLODIPine (NORVASC) 10 mg tablet Take 10 mg by mouth in the morning.       ferrous sulfate 325 mg (65 mg iron) tablet Take 325 mg by mouth Daily (before breakfast). docusate sodium (COLACE) 100 mg capsule 1 capsule as needed      acetaminophen (TYLENOL) 325 mg tablet Take 325 mg by mouth every six (6) hours as needed for Pain. calcitRIOL (ROCALTROL) 0.25 mcg capsule Take 0.25 mcg by mouth BID Mon Wed & Fri.      donepeziL (ARICEPT) 10 mg tablet Take 10 mg by mouth nightly.      gabapentin (NEURONTIN) 300 mg capsule Take 300 mg by mouth nightly. venlafaxine (EFFEXOR) 75 mg tablet Take 75 mg by mouth daily. latanoprost (XALATAN) 0.005 % ophthalmic solution Administer 1 Drop to both eyes nightly. pravastatin (PRAVACHOL) 80 mg tablet Take 80 mg by mouth nightly. STOP taking these medications       apixaban (ELIQUIS) 2.5 mg tablet Comments:   Reason for Stopping:         aspirin 81 mg chewable tablet Comments:   Reason for Stopping:         cetirizine (ZYRTEC) 10 mg tablet Comments:   Reason for Stopping: Follow up Care:    1. Juarez Wakefield NP in 1-2 weeks. Follow-up Information       Follow up With Specialties Details Why Contact Info    Juarez Wakefield NP Nurse Practitioner   [address]  [phone]                Patient Follow Up Instructions: Activity: PT/OT per Home Health  Diet:  Cardiac Diet, Diabetic Diet, and Renal Diet    Condition at Discharge:  Stable  __________________________________________________________________    Disposition  Home Health Care [facility]  ____________________________________________________________________    Code Status:  Full Code  ___________________________________________________________________    Discharge Exam:  Patient seen and examined by me on discharge day. Pertinent Findings:  Gen:    Not in distress  Chest: Clear lungs  CVS:   Regular rhythm.   No edema  Abd:  Soft, not distended, not tender  Neuro:  Alert    CONSULTATIONS: Cardiology, GI, and Nephrology    Significant Diagnostic Studies:   Recent Results (from the past 24 hour(s)) GLUCOSE, POC    Collection Time: 08/07/22 12:22 PM   Result Value Ref Range    Glucose (POC) 150 (H) 65 - 117 mg/dL    Performed by Della Vega, POC    Collection Time: 08/07/22  4:32 PM   Result Value Ref Range    Glucose (POC) 128 (H) 65 - 117 mg/dL    Performed by Priti Guillaume    GLUCOSE, POC    Collection Time: 08/07/22  8:48 PM   Result Value Ref Range    Glucose (POC) 125 (H) 65 - 117 mg/dL    Performed by Sandra Speed    METABOLIC PANEL, BASIC    Collection Time: 08/08/22  8:24 AM   Result Value Ref Range    Sodium 137 136 - 145 mmol/L    Potassium 4.0 3.5 - 5.1 mmol/L    Chloride 98 97 - 108 mmol/L    CO2 34 (H) 21 - 32 mmol/L    Anion gap 5 5 - 15 mmol/L    Glucose 94 65 - 100 mg/dL    BUN 44 (H) 6 - 20 mg/dL    Creatinine 2.64 (H) 0.55 - 1.02 mg/dL    BUN/Creatinine ratio 17 12 - 20      GFR est AA 21 (L) >60 ml/min/1.73m2    GFR est non-AA 17 (L) >60 ml/min/1.73m2    Calcium 9.4 8.5 - 10.1 mg/dL   HGB & HCT    Collection Time: 08/08/22  8:24 AM   Result Value Ref Range    HGB 8.3 (L) 11.5 - 16.0 g/dL    HCT 27.0 (L) 35.0 - 47.0 %   GLUCOSE, POC    Collection Time: 08/08/22  8:33 AM   Result Value Ref Range    Glucose (POC) 109 65 - 117 mg/dL    Performed by Sonali Modi      XR CHEST PORT   Final Result   No acute process.                  Signed:  Cristofer Rdz MD  8/8/2022  11:01 AM

## 2022-08-08 NOTE — PROGRESS NOTES
1040 am  Patient accepted with [facility] Anticipated start of care is 8/9/22. Per previous conversation with patient's niece, patient will now be going home with [facility] instead of to SNF as recommended. 1130 am  CM called and spoke to Luisa Delacruz, daughter at [phone]. Daughter states she has already spoken to the attending physician today about discharge home. Daughter does not have any questions and states the patient's granddaughter will pick her up this afternoon at 5 pm when she gets out of work. Medicare pt has received, reviewed, and signed 2nd IM letter informing them of their right to appeal the discharge. Signed copied has been placed on pt bedside chart.

## 2022-08-08 NOTE — PROGRESS NOTES
8/8/2022, 10:55 AM        [facility]  Phone: [phone]      Daily Progress Note:      Patient identification:     Yoselyn Booker  65 y. o.female  1945      Primary Cardiologist:  Dr. Rsoe Smith    Chief Complaint:  Syncope    Assessment:     NSTEMI,? type II, known coronary artery disease status post stenting 5 years ago  EKG has been unremarkable,  EKG this morning with sinus rhythm 54/min, no acute changes. No interval changes. Agree to  proceed with EGD. History of GERD, now also with GERD symptoms    Syncope/presyncope, no recurrence. History of prior syncopal episodes in the past.  Likely vagal episodes. Anemia, home on Eliquis for history of pulmonary embolism    History of TIA. History of mild aortic stenosis and moderate tricuspid regurgitation 8/22 echo with mildly calcified aortic valve, moderately thickened mitral valve, EF of 55 to 60%     Chronic kidney disease    Recommendation:     She was advised to avoid straining while on the toilet. Consider stool softeners to help prevent constipation. Maintain adequate hydration and avoid dehydration. Interval History:  No new cardiac developments. No significant dysrhythmias identified on telemetry. Subjective:  Pt. States no further syncopal episodes    Review of Systems:   Negative for syncope. No chest pain or shortness of breath. No worsening edema.     Medications:     Current Facility-Administered Medications   Medication Dose Route Frequency    glucose chewable tablet 16 g  4 Tablet Oral PRN    glucagon (GLUCAGEN) injection 1 mg  1 mg IntraMUSCular PRN    insulin lispro (HUMALOG) injection   SubCUTAneous AC&HS    alum-mag hydroxide-simeth (MYLANTA) oral suspension 30 mL  30 mL Oral Q4H PRN    pantoprazole (PROTONIX) 40 mg in 0.9% sodium chloride 10 mL injection  40 mg IntraVENous BID    senna-docusate (PERICOLACE) 8.6-50 mg per tablet 1 Tablet  1 Tablet Oral DAILY torsemide (DEMADEX) tablet 20 mg  20 mg Oral BID    dextrose 10% infusion 0-250 mL  0-250 mL IntraVENous PRN    latanoprost (XALATAN) 0.005 % ophthalmic solution 1 Drop  1 Drop Both Eyes QHS    atorvastatin (LIPITOR) tablet 20 mg  20 mg Oral QHS    [Held by provider] aspirin chewable tablet 81 mg  81 mg Oral DAILY    venlafaxine-SR (EFFEXOR-XR) capsule 75 mg  75 mg Oral DAILY    [Held by provider] apixaban (ELIQUIS) tablet 2.5 mg  2.5 mg Oral BID    gabapentin (NEURONTIN) capsule 300 mg  300 mg Oral BID    calcitRIOL (ROCALTROL) capsule 0.25 mcg  0.25 mcg Oral DAILY    donepeziL (ARICEPT) tablet 10 mg  10 mg Oral QHS    cetirizine (ZYRTEC) tablet 10 mg  10 mg Oral DAILY    carvediloL (COREG) tablet 6.25 mg  6.25 mg Oral BID WITH MEALS    [Held by provider] amLODIPine (NORVASC) tablet 10 mg  10 mg Oral DAILY    ferrous sulfate tablet 325 mg  325 mg Oral ACB    sodium chloride (NS) flush 5-40 mL  5-40 mL IntraVENous Q8H    sodium chloride (NS) flush 5-40 mL  5-40 mL IntraVENous PRN    acetaminophen (TYLENOL) tablet 650 mg  650 mg Oral Q6H PRN    Or    acetaminophen (TYLENOL) suppository 650 mg  650 mg Rectal Q6H PRN    polyethylene glycol (MIRALAX) packet 17 g  17 g Oral DAILY PRN    bisacodyL (DULCOLAX) tablet 5 mg  5 mg Oral DAILY PRN    ondansetron (ZOFRAN ODT) tablet 4 mg  4 mg Oral Q6H PRN    Or    ondansetron (ZOFRAN) injection 4 mg  4 mg IntraVENous Q6H PRN       Allergies:   No Known Allergies    Physical Exam:   Visit Vitals  /64 (BP 1 Location: Right upper arm, BP Patient Position: Semi fowlers)   Pulse 64   Temp 97.8 °F (36.6 °C)   Resp 20   Ht 5' 6\" (1.676 m)   Wt 79.6 kg (175 lb 7.8 oz)   SpO2 99%   Breastfeeding No   BMI 28.32 kg/m²       General:  NAD, calm, cooperative  CVS:  Regular rate and rhythm, normal Y4A2, 1/6 systolic murmur, rubs or gallops  Pulm:  Normal effort, clear to auscultation bilaterally  Abd:  Soft, non-tender, non-distended  Ext:  Warm and well perfused without edema  Neuro:  Alert and oriented, answering questions appropriately    Telemetry reviewed:  NSR    Labs:    CBC  Lab Results   Component Value Date/Time    WBC 4.7 08/06/2022 11:05 AM    RBC 2.73 (L) 08/06/2022 11:05 AM    HCT 27.0 (L) 08/08/2022 08:24 AM    MCV 99.6 (H) 08/06/2022 11:05 AM    MCH 30.8 08/06/2022 11:05 AM    RDW 15.6 (H) 08/06/2022 11:05 AM    MONOS 18 (H) 08/06/2022 11:05 AM    EOS 1 08/06/2022 11:05 AM    BASOS 0 08/06/2022 11:05 AM       Basic Metabolic Profile  Lab Results   Component Value Date     08/08/2022    CO2 34 (H) 08/08/2022    BUN 44 (H) 08/08/2022       Diagnostic Studies:    Echo Results  (Last 48 hours)      None          XR CHEST PORT   Final Result   No acute process.                Dr. Osbaldo Wheeler MD, Mariano Dejesus

## 2022-08-08 NOTE — PROGRESS NOTES
Problem: Patient Education: Go to Patient Education Activity  Goal: Patient/Family Education  Outcome: Progressing Towards Goal     Problem: Patient Education: Go to Patient Education Activity  Goal: Patient/Family Education  Outcome: Progressing Towards Goal     Problem: Falls - Risk of  Goal: *Absence of Falls  Description: Document Jesus Ordoñez Fall Risk and appropriate interventions in the flowsheet.   Outcome: Progressing Towards Goal  Note: Fall Risk Interventions:  Mobility Interventions: PT Consult for mobility concerns, PT Consult for assist device competence, Patient to call before getting OOB         Medication Interventions: Assess postural VS orthostatic hypotension    Elimination Interventions: Call light in reach

## 2022-08-09 ENCOUNTER — HOSPITAL ENCOUNTER (EMERGENCY)
Age: 77
Discharge: HOME | End: 2022-08-09
Attending: EMERGENCY MEDICINE
Payer: MEDICARE

## 2022-08-09 VITALS
WEIGHT: 175 LBS | HEART RATE: 72 BPM | RESPIRATION RATE: 23 BRPM | BODY MASS INDEX: 28.12 KG/M2 | OXYGEN SATURATION: 95 % | SYSTOLIC BLOOD PRESSURE: 132 MMHG | DIASTOLIC BLOOD PRESSURE: 80 MMHG | TEMPERATURE: 97.8 F | HEIGHT: 66 IN

## 2022-08-09 DIAGNOSIS — R55 VASOVAGAL SYNCOPE: Primary | ICD-10-CM

## 2022-08-09 LAB
ALBUMIN SERPL-MCNC: 2.3 G/DL (ref 3.5–5)
ALBUMIN/GLOB SERPL: 0.7 {RATIO} (ref 1.1–2.2)
ALP SERPL-CCNC: 126 U/L (ref 45–117)
ALT SERPL-CCNC: 13 U/L (ref 12–78)
ANION GAP SERPL CALC-SCNC: 7 MMOL/L (ref 5–15)
AST SERPL W P-5'-P-CCNC: 22 U/L (ref 15–37)
BASOPHILS # BLD: 0.1 K/UL (ref 0–0.1)
BASOPHILS NFR BLD: 1 % (ref 0–1)
BILIRUB SERPL-MCNC: 0.6 MG/DL (ref 0.2–1)
BUN SERPL-MCNC: 47 MG/DL (ref 6–20)
BUN/CREAT SERPL: 14 (ref 12–20)
CA-I BLD-MCNC: 8.8 MG/DL (ref 8.5–10.1)
CHLORIDE SERPL-SCNC: 97 MMOL/L (ref 97–108)
CO2 SERPL-SCNC: 30 MMOL/L (ref 21–32)
COLLECT DATE STL: ABNORMAL
CREAT SERPL-MCNC: 3.42 MG/DL (ref 0.55–1.02)
DIFFERENTIAL METHOD BLD: ABNORMAL
EOSINOPHIL # BLD: 0.1 K/UL (ref 0–0.4)
EOSINOPHIL NFR BLD: 1 % (ref 0–7)
ERYTHROCYTE [DISTWIDTH] IN BLOOD BY AUTOMATED COUNT: 14.9 % (ref 11.5–14.5)
GLOBULIN SER CALC-MCNC: 3.3 G/DL (ref 2–4)
GLUCOSE BLD STRIP.AUTO-MCNC: 122 MG/DL (ref 65–117)
GLUCOSE SERPL-MCNC: 122 MG/DL (ref 65–100)
HCT VFR BLD AUTO: 25 % (ref 35–47)
HEMOCCULT SP1 STL QL: POSITIVE
HGB BLD-MCNC: 7.7 G/DL (ref 11.5–16)
IMM GRANULOCYTES # BLD AUTO: 0.1 K/UL (ref 0–0.04)
IMM GRANULOCYTES NFR BLD AUTO: 1 % (ref 0–0.5)
LACTATE SERPL-SCNC: 1.9 MMOL/L (ref 0.4–2)
LYMPHOCYTES # BLD: 1.3 K/UL (ref 0.8–3.5)
LYMPHOCYTES NFR BLD: 20 % (ref 12–49)
MAGNESIUM SERPL-MCNC: 1.9 MG/DL (ref 1.6–2.4)
MCH RBC QN AUTO: 30.6 PG (ref 26–34)
MCHC RBC AUTO-ENTMCNC: 30.8 G/DL (ref 30–36.5)
MCV RBC AUTO: 99.2 FL (ref 80–99)
MONOCYTES # BLD: 0.9 K/UL (ref 0–1)
MONOCYTES NFR BLD: 14 % (ref 5–13)
NEUTS SEG # BLD: 4.2 K/UL (ref 1.8–8)
NEUTS SEG NFR BLD: 63 % (ref 32–75)
NRBC # BLD: 0 K/UL (ref 0–0.01)
NRBC BLD-RTO: 0 PER 100 WBC
PERFORMED BY, TECHID: ABNORMAL
PLATELET # BLD AUTO: 187 K/UL (ref 150–400)
PMV BLD AUTO: 9.4 FL (ref 8.9–12.9)
POTASSIUM SERPL-SCNC: 3.7 MMOL/L (ref 3.5–5.1)
PROT SERPL-MCNC: 5.6 G/DL (ref 6.4–8.2)
RBC # BLD AUTO: 2.52 M/UL (ref 3.8–5.2)
SODIUM SERPL-SCNC: 134 MMOL/L (ref 136–145)
TROPONIN-HIGH SENSITIVITY: 179 NG/L (ref 0–51)
WBC # BLD AUTO: 6.6 K/UL (ref 3.6–11)

## 2022-08-09 PROCEDURE — 84484 ASSAY OF TROPONIN QUANT: CPT

## 2022-08-09 PROCEDURE — 83735 ASSAY OF MAGNESIUM: CPT

## 2022-08-09 PROCEDURE — 82272 OCCULT BLD FECES 1-3 TESTS: CPT

## 2022-08-09 PROCEDURE — 82962 GLUCOSE BLOOD TEST: CPT

## 2022-08-09 PROCEDURE — 83605 ASSAY OF LACTIC ACID: CPT

## 2022-08-09 PROCEDURE — 93005 ELECTROCARDIOGRAM TRACING: CPT

## 2022-08-09 PROCEDURE — 85025 COMPLETE CBC W/AUTO DIFF WBC: CPT

## 2022-08-09 PROCEDURE — 36415 COLL VENOUS BLD VENIPUNCTURE: CPT

## 2022-08-09 PROCEDURE — 80053 COMPREHEN METABOLIC PANEL: CPT

## 2022-08-09 PROCEDURE — 99284 EMERGENCY DEPT VISIT MOD MDM: CPT

## 2022-08-09 PROCEDURE — 87040 BLOOD CULTURE FOR BACTERIA: CPT

## 2022-08-09 NOTE — DISCHARGE INSTRUCTIONS
Thank you! Thank you for allowing me to care for you in the emergency department. It is my goal to provide you with excellent care. If you have not received excellent quality care, please ask to speak to the nurse manager. Please fill out the survey that will come to you by mail or email since we listen to your feedback! Below you will find a list of your tests from today's visit. Should you have any questions, please do not hesitate to call the emergency department. Labs  Recent Results (from the past 12 hour(s))   MAGNESIUM    Collection Time: 08/09/22  4:13 PM   Result Value Ref Range    Magnesium 1.9 1.6 - 2.4 mg/dL   TROPONIN-HIGH SENSITIVITY    Collection Time: 08/09/22  4:13 PM   Result Value Ref Range    Troponin-High Sensitivity 179 (HH) 0 - 51 ng/L   CBC WITH AUTOMATED DIFF    Collection Time: 08/09/22  4:13 PM   Result Value Ref Range    WBC 6.6 3.6 - 11.0 K/uL    RBC 2.52 (L) 3.80 - 5.20 M/uL    HGB 7.7 (L) 11.5 - 16.0 g/dL    HCT 25.0 (L) 35.0 - 47.0 %    MCV 99.2 (H) 80.0 - 99.0 FL    MCH 30.6 26.0 - 34.0 PG    MCHC 30.8 30.0 - 36.5 g/dL    RDW 14.9 (H) 11.5 - 14.5 %    PLATELET 140 313 - 204 K/uL    MPV 9.4 8.9 - 12.9 FL    NRBC 0.0 0.0  WBC    ABSOLUTE NRBC 0.00 0.00 - 0.01 K/uL    NEUTROPHILS 63 32 - 75 %    LYMPHOCYTES 20 12 - 49 %    MONOCYTES 14 (H) 5 - 13 %    EOSINOPHILS 1 0 - 7 %    BASOPHILS 1 0 - 1 %    IMMATURE GRANULOCYTES 1 (H) 0 - 0.5 %    ABS. NEUTROPHILS 4.2 1.8 - 8.0 K/UL    ABS. LYMPHOCYTES 1.3 0.8 - 3.5 K/UL    ABS. MONOCYTES 0.9 0.0 - 1.0 K/UL    ABS. EOSINOPHILS 0.1 0.0 - 0.4 K/UL    ABS. BASOPHILS 0.1 0.0 - 0.1 K/UL    ABS. IMM.  GRANS. 0.1 (H) 0.00 - 0.04 K/UL    DF AUTOMATED     METABOLIC PANEL, COMPREHENSIVE    Collection Time: 08/09/22  4:13 PM   Result Value Ref Range    Sodium 134 (L) 136 - 145 mmol/L    Potassium 3.7 3.5 - 5.1 mmol/L    Chloride 97 97 - 108 mmol/L    CO2 30 21 - 32 mmol/L    Anion gap 7 5 - 15 mmol/L    Glucose 122 (H) 65 - 100 mg/dL    BUN 47 (H) 6 - 20 mg/dL    Creatinine 3.42 (H) 0.55 - 1.02 mg/dL    BUN/Creatinine ratio 14 12 - 20      GFR est AA 16 (L) >60 ml/min/1.73m2    GFR est non-AA 13 (L) >60 ml/min/1.73m2    Calcium 8.8 8.5 - 10.1 mg/dL    Bilirubin, total 0.6 0.2 - 1.0 mg/dL    AST (SGOT) 22 15 - 37 U/L    ALT (SGPT) 13 12 - 78 U/L    Alk. phosphatase 126 (H) 45 - 117 U/L    Protein, total 5.6 (L) 6.4 - 8.2 g/dL    Albumin 2.3 (L) 3.5 - 5.0 g/dL    Globulin 3.3 2.0 - 4.0 g/dL    A-G Ratio 0.7 (L) 1.1 - 2.2     LACTIC ACID    Collection Time: 08/09/22  4:13 PM   Result Value Ref Range    Lactic acid 1.9 0.4 - 2.0 mmol/L   GLUCOSE, POC    Collection Time: 08/09/22  4:14 PM   Result Value Ref Range    Glucose (POC) 122 (H) 65 - 117 mg/dL    Performed by Mariia Hammond    OCCULT BLOOD, STOOL    Collection Time: 08/09/22  5:02 PM   Result Value Ref Range    Occult Blood,day 1 Positive (A) Negative      Day 1 date: 8,092,022         Radiologic Studies  No orders to display     CT Results  (Last 48 hours)      None          CXR Results  (Last 48 hours)      None          ------------------------------------------------------------------------------------------------------------  The exam and treatment you received in the Emergency Department were for an urgent problem and are not intended as complete care. It is important that you follow-up with a doctor, nurse practitioner, or physician assistant to:  (1) confirm your diagnosis,  (2) re-evaluation of changes in your illness and treatment, and  (3) for ongoing care. Please take your discharge instructions with you when you go to your follow-up appointment. If you have any problem arranging a follow-up appointment, contact the Emergency Department. If your symptoms become worse or you do not improve as expected and you are unable to reach your health care provider, please return to the Emergency Department. We are available 24 hours a day.      If a prescription has been provided, please have it filled as soon as possible to prevent a delay in treatment. If you have any questions or reservations about taking the medication due to side effects or interactions with other medications, please call your primary care provider or contact the ER.

## 2022-08-09 NOTE — ED PROVIDER NOTES
EMERGENCY DEPARTMENT HISTORY AND PHYSICAL EXAM      Date: 8/9/2022  Patient Name: Zheng Tapia      History of Presenting Illness     Chief Complaint   Patient presents with    Hypotension       History Provided By: Patient and EMS    HPI: Zheng Tapia, 65 y.o. female with a past medical history significant for stroke, diabetes, depression, CKD, hypertension, PE, gout, seizures presents to the ED with cc of being found unresponsive and hypotensive at home. Per EMS initial blood pressure was 48/26, administered 500 mL normal saline. Patient arrived alert and oriented x4, blood pressure 100/60. Patient denies any somatic complaints at this time. There are no other complaints, changes, or physical findings at this time. PCP: Win Galvin NP    Current Facility-Administered Medications   Medication Dose Route Frequency Provider Last Rate Last Admin    sodium chloride 0.9 % bolus infusion 1,000 mL  1,000 mL IntraVENous ONCE Carlos Higgins MD         Current Outpatient Medications   Medication Sig Dispense Refill    pantoprazole (PROTONIX) 40 mg tablet Take 1 Tablet by mouth two (2) times a day. 60 Tablet 0    albuterol (PROVENTIL VENTOLIN) 2.5 mg /3 mL (0.083 %) nebu 2.5 mg by Nebulization route every six (6) hours as needed for Shortness of Breath. fluticasone propionate (FLONASE) 50 mcg/actuation nasal spray 2 Sprays by Both Nostrils route daily. insulin lispro (HUMALOG) 100 unit/mL injection by SubCUTAneous route Before breakfast, lunch, dinner and at bedtime. SS: 150-200=2 units 201-250=4 units 251-300=6 units 301-350=8 units 351-400=10 units      insulin glargine (LANTUS) 100 unit/mL injection 10 Units by SubCUTAneous route nightly. lidocaine (LIDODERM) 5 % 1 Patch by TransDERmal route every twenty-four (24) hours. Apply to Right Hip Remove at bedtime      loratadine (CLARITIN) 10 mg tablet Take 10 mg by mouth in the morning.       aluminum & magnesium hydroxide-simethicone (MYLANTA II) 400-400-40 mg/5 mL suspension Take 30 mL by mouth every six (6) hours as needed for Indigestion. senna (SENOKOT) 8.6 mg tablet Take 2 Tablets by mouth nightly. torsemide (DEMADEX) 20 mg tablet Take 20 mg by mouth two (2) times a day. carvediloL (COREG) 6.25 mg tablet Take 6.25 mg by mouth two (2) times daily (with meals). amLODIPine (NORVASC) 10 mg tablet Take 10 mg by mouth in the morning. ferrous sulfate 325 mg (65 mg iron) tablet Take 325 mg by mouth Daily (before breakfast). docusate sodium (COLACE) 100 mg capsule 1 capsule as needed      acetaminophen (TYLENOL) 325 mg tablet Take 325 mg by mouth every six (6) hours as needed for Pain. calcitRIOL (ROCALTROL) 0.25 mcg capsule Take 0.25 mcg by mouth BID Mon Wed & Fri.      donepeziL (ARICEPT) 10 mg tablet Take 10 mg by mouth nightly.      gabapentin (NEURONTIN) 300 mg capsule Take 300 mg by mouth nightly. venlafaxine (EFFEXOR) 75 mg tablet Take 75 mg by mouth daily. latanoprost (XALATAN) 0.005 % ophthalmic solution Administer 1 Drop to both eyes nightly. pravastatin (PRAVACHOL) 80 mg tablet Take 80 mg by mouth nightly.          Past History   Past Medical History:  Past Medical History:   Diagnosis Date    Adenocarcinoma, renal cell ([facility] [address] 9/2/2020    Arthritis     CAD (coronary artery disease)     Chronic kidney disease     Diabetes ([address]     Gout     Hypercholesterolemia     Hypertension     Mental depression     Pulmonary embolism (HCC)     Seizures ([address]     Stroke ([facility] [address]     Thyroid disease        Past Surgical History:  Past Surgical History:   Procedure Laterality Date    HX GYN      HX HYSTERECTOMY      HX NEPHRECTOMY Left 02/12/2015    HX ORTHOPAEDIC      HX UROLOGICAL  02/12/2015    PARTIAL URETERECTOMY     [place] CARDIAC SURG PROCEDURE UNLIST      stents placed        Family History:  Family History   Problem Relation Age of Onset    Heart Disease Mother     Heart Disease Father     Heart Disease Sister     Cancer Sister     Heart Disease Brother     Cancer Maternal Grandmother     Stroke Son     Cancer Sister        Social History:  Social History     Tobacco Use    Smoking status: Former     Years: 15.00     Types: Cigarettes    Smokeless tobacco: Never   Vaping Use    Vaping Use: Never used   Substance Use Topics    Alcohol use: Not Currently    Drug use: Never       Allergies:  No Known Allergies  Review of Systems   Review of Systems  Review of Systems   Constitutional: Negative for chills and fever. HENT: Negative for sinus pressure and sinus pain. Eyes: Negative for photophobia and redness. Respiratory: Negative for shortness of breath and wheezing. Cardiovascular: Negative for chest pain and palpitations. Gastrointestinal: Negative for abdominal pain and nausea. Genitourinary: Negative for flank pain and hematuria. Musculoskeletal: Negative for arthralgias and gait problem. Skin: Negative for color change and pallor. Neurological: Negative for dizziness and weakness. Physical Exam   Physical Exam  Physical Exam  Constitutional:       General: No acute distress. Appearance: Normal appearance. Not toxic-appearing. HENT:      Head: Normocephalic and atraumatic. Nose: Nose normal.      Mouth/Throat:      Mouth: Mucous membranes are moist.   Eyes:      Extraocular Movements: Extraocular movements intact. Pupils: Pupils are equal, round, and reactive to light. Cardiovascular:      Rate and Rhythm: Normal rate. Pulses: Normal pulses. Pulmonary:      Effort: Pulmonary effort is normal.      Breath sounds: No stridor, clear to auscultation bilaterally  Abdominal:      General: Abdomen is flat. There is no distension. Musculoskeletal:         General: Normal range of motion. Cervical back: Normal range of motion and neck supple. Skin:     General: Skin is warm and dry. Capillary Refill: Capillary refill takes less than 2 seconds.    Neurological: General: No focal deficit present. Mental Status: Alert and oriented to person, place, and time. Psychiatric:         Mood and Affect: Mood normal.         Behavior: Behavior normal.     Lab and Diagnostic Study Results   Labs -     Recent Results (from the past 12 hour(s))   MAGNESIUM    Collection Time: 08/09/22  4:13 PM   Result Value Ref Range    Magnesium 1.9 1.6 - 2.4 mg/dL   TROPONIN-HIGH SENSITIVITY    Collection Time: 08/09/22  4:13 PM   Result Value Ref Range    Troponin-High Sensitivity 179 (HH) 0 - 51 ng/L   CBC WITH AUTOMATED DIFF    Collection Time: 08/09/22  4:13 PM   Result Value Ref Range    WBC 6.6 3.6 - 11.0 K/uL    RBC 2.52 (L) 3.80 - 5.20 M/uL    HGB 7.7 (L) 11.5 - 16.0 g/dL    HCT 25.0 (L) 35.0 - 47.0 %    MCV 99.2 (H) 80.0 - 99.0 FL    MCH 30.6 26.0 - 34.0 PG    MCHC 30.8 30.0 - 36.5 g/dL    RDW 14.9 (H) 11.5 - 14.5 %    PLATELET 551 316 - 714 K/uL    MPV 9.4 8.9 - 12.9 FL    NRBC 0.0 0.0  WBC    ABSOLUTE NRBC 0.00 0.00 - 0.01 K/uL    NEUTROPHILS 63 32 - 75 %    LYMPHOCYTES 20 12 - 49 %    MONOCYTES 14 (H) 5 - 13 %    EOSINOPHILS 1 0 - 7 %    BASOPHILS 1 0 - 1 %    IMMATURE GRANULOCYTES 1 (H) 0 - 0.5 %    ABS. NEUTROPHILS 4.2 1.8 - 8.0 K/UL    ABS. LYMPHOCYTES 1.3 0.8 - 3.5 K/UL    ABS. MONOCYTES 0.9 0.0 - 1.0 K/UL    ABS. EOSINOPHILS 0.1 0.0 - 0.4 K/UL    ABS. BASOPHILS 0.1 0.0 - 0.1 K/UL    ABS. IMM.  GRANS. 0.1 (H) 0.00 - 0.04 K/UL    DF AUTOMATED     METABOLIC PANEL, COMPREHENSIVE    Collection Time: 08/09/22  4:13 PM   Result Value Ref Range    Sodium 134 (L) 136 - 145 mmol/L    Potassium 3.7 3.5 - 5.1 mmol/L    Chloride 97 97 - 108 mmol/L    CO2 30 21 - 32 mmol/L    Anion gap 7 5 - 15 mmol/L    Glucose 122 (H) 65 - 100 mg/dL    BUN 47 (H) 6 - 20 mg/dL    Creatinine 3.42 (H) 0.55 - 1.02 mg/dL    BUN/Creatinine ratio 14 12 - 20      GFR est AA 16 (L) >60 ml/min/1.73m2    GFR est non-AA 13 (L) >60 ml/min/1.73m2    Calcium 8.8 8.5 - 10.1 mg/dL    Bilirubin, total 0.6 0.2 - 1.0 mg/dL    AST (SGOT) 22 15 - 37 U/L    ALT (SGPT) 13 12 - 78 U/L    Alk. phosphatase 126 (H) 45 - 117 U/L    Protein, total 5.6 (L) 6.4 - 8.2 g/dL    Albumin 2.3 (L) 3.5 - 5.0 g/dL    Globulin 3.3 2.0 - 4.0 g/dL    A-G Ratio 0.7 (L) 1.1 - 2.2     LACTIC ACID    Collection Time: 08/09/22  4:13 PM   Result Value Ref Range    Lactic acid 1.9 0.4 - 2.0 mmol/L   GLUCOSE, POC    Collection Time: 08/09/22  4:14 PM   Result Value Ref Range    Glucose (POC) 122 (H) 65 - 117 mg/dL    Performed by [employer]    OCCULT BLOOD, STOOL    Collection Time: 08/09/22  5:02 PM   Result Value Ref Range    Occult Blood,day 1 Positive (A) Negative      Day 1 date: 8,092,[id number]         Radiologic Studies -   [unfilled]  CT Results  (Last 48 hours)      None          CXR Results  (Last 48 hours)      None            Medical Decision Making and ED Course   - I am the first and primary provider for this patient AND AM THE PRIMARY PROVIDER OF RECORD. I reviewed the vital signs, available nursing notes, past medical history, past surgical history, family history and social history. - Initial assessment performed. The patients presenting problems have been discussed, and the staff are in agreement with the care plan formulated and outlined with them. I have encouraged them to ask questions as they arise throughout their visit. Differential Diagnosis & Medical Decision Making Provider Note:   Patient was noted to have had a bowel movement prior to the episode, was cleaned up here by nursing. Evaluating patient's chart including discharge yesterday, it appears she had a similar episode today as she has had in the past including for her prior admission, consistent with a vasovagal syncopal episode. Her blood pressure returned to normal, actually slightly hypertensive with minimal fluids. Her elevated troponin is trending down from yesterday. She is still Hemoccult positive, but that was also the case during her last admission. Discussed case with team providing her recent admission care, who agree that patient needs to follow-up with GI as an outpatient for colonoscopy as she only had a unremarkable EGD done while as an inpatient. Discussed the same with the patient who states she feels better and is ready for discharge home at this time. ARLEEN       Vital Signs-Reviewed the patient's vital signs. Patient Vitals for the past 12 hrs:   Temp Pulse Resp BP SpO2   08/09/22 1735 -- 74 21 (!) 141/92 100 %   08/09/22 1644 -- 83 -- 114/62 96 %   08/09/22 1613 -- -- -- -- 91 %   08/09/22 1605 97.8 °F (36.6 °C) -- -- -- --   08/09/22 1559 -- 76 12 92/60 93 %         Disposition   Disposition: DC- Adult Discharges: All of the diagnostic tests were reviewed and questions answered. Diagnosis, care plan and treatment options were discussed. The patient understands the instructions and will follow up as directed. The patients results have been reviewed with them. They have been counseled regarding their diagnosis. The patient verbally convey understanding and agreement of the signs, symptoms, diagnosis, treatment and prognosis and additionally agrees to follow up as recommended with their PCP in 24 - 48 hours. They also agree with the care-plan and convey that all of their questions have been answered. I have also put together some discharge instructions for them that include: 1) educational information regarding their diagnosis, 2) how to care for their diagnosis at home, as well a 3) list of reasons why they would want to return to the ED prior to their follow-up appointment, should their condition change. Discharged    DISCHARGE PLAN:  1. Current Discharge Medication List        CONTINUE these medications which have NOT CHANGED    Details   pantoprazole (PROTONIX) 40 mg tablet Take 1 Tablet by mouth two (2) times a day.   Qty: 60 Tablet, Refills: 0      albuterol (PROVENTIL VENTOLIN) 2.5 mg /3 mL (0.083 %) nebu 2.5 mg by Nebulization route every six (6) hours as needed for Shortness of Breath. fluticasone propionate (FLONASE) 50 mcg/actuation nasal spray 2 Sprays by Both Nostrils route daily. insulin lispro (HUMALOG) 100 unit/mL injection by SubCUTAneous route Before breakfast, lunch, dinner and at bedtime. SS: 150-200=2 units 201-250=4 units 251-300=6 units 301-350=8 units 351-400=10 units      insulin glargine (LANTUS) 100 unit/mL injection 10 Units by SubCUTAneous route nightly. lidocaine (LIDODERM) 5 % 1 Patch by TransDERmal route every twenty-four (24) hours. Apply to Right Hip Remove at bedtime      loratadine (CLARITIN) 10 mg tablet Take 10 mg by mouth in the morning. aluminum & magnesium hydroxide-simethicone (MYLANTA II) 400-400-40 mg/5 mL suspension Take 30 mL by mouth every six (6) hours as needed for Indigestion. senna (SENOKOT) 8.6 mg tablet Take 2 Tablets by mouth nightly. torsemide (DEMADEX) 20 mg tablet Take 20 mg by mouth two (2) times a day. carvediloL (COREG) 6.25 mg tablet Take 6.25 mg by mouth two (2) times daily (with meals). amLODIPine (NORVASC) 10 mg tablet Take 10 mg by mouth in the morning. ferrous sulfate 325 mg (65 mg iron) tablet Take 325 mg by mouth Daily (before breakfast). docusate sodium (COLACE) 100 mg capsule 1 capsule as needed      acetaminophen (TYLENOL) 325 mg tablet Take 325 mg by mouth every six (6) hours as needed for Pain. calcitRIOL (ROCALTROL) 0.25 mcg capsule Take 0.25 mcg by mouth BID Mon Wed & Fri.      donepeziL (ARICEPT) 10 mg tablet Take 10 mg by mouth nightly.      gabapentin (NEURONTIN) 300 mg capsule Take 300 mg by mouth nightly. venlafaxine (EFFEXOR) 75 mg tablet Take 75 mg by mouth daily. latanoprost (XALATAN) 0.005 % ophthalmic solution Administer 1 Drop to both eyes nightly. pravastatin (PRAVACHOL) 80 mg tablet Take 80 mg by mouth nightly.            2.   Follow-up Information       Follow up With Specialties Details Why Contact Info    Danya Singletary MD Gastroenterology, Internal Medicine Physician Schedule an appointment as soon as possible for a visit  Scheduled for outpatient colonoscopy. [address] [facility] [address] [phone]            3. Return to ED if worse   4. Current Discharge Medication List          Diagnosis/Clinical Impression     Clinical Impression:   1. Vasovagal syncope        Attestations: Emma Douglas MD, am the primary clinician of record. Please note that this dictation was completed with [employer], the computer voice recognition software. Quite often unanticipated grammatical, syntax, homophones, and other interpretive errors are inadvertently transcribed by the computer software. Please disregard these errors. Please excuse any errors that have escaped final proofreading. Thank you.

## 2022-08-09 NOTE — ED TRIAGE NOTES
Pt arrives by EMS from home. Pt found unresponsive with BP 48/26. EMS administered 550mL NS and 0.1mg levo x2 min. Pt arrived with 100/60 and is A&Ox4. Hx of renal cell carcinoma. Pt just discharged yesterday for syncope, NSTEMI & anemia.

## 2022-08-10 ENCOUNTER — HOSPITAL ENCOUNTER (OUTPATIENT)
Age: 77
Setting detail: OBSERVATION
Discharge: HOME | End: 2022-08-13
Attending: EMERGENCY MEDICINE | Admitting: HOSPITALIST
Payer: MEDICARE

## 2022-08-10 DIAGNOSIS — R55 SYNCOPE AND COLLAPSE: ICD-10-CM

## 2022-08-10 DIAGNOSIS — R56.9 SEIZURE (HCC): Primary | ICD-10-CM

## 2022-08-10 LAB
ALBUMIN SERPL-MCNC: 2.5 G/DL (ref 3.5–5)
ALBUMIN/GLOB SERPL: 0.7 {RATIO} (ref 1.1–2.2)
ALP SERPL-CCNC: 127 U/L (ref 45–117)
ALT SERPL-CCNC: 13 U/L (ref 12–78)
ANION GAP SERPL CALC-SCNC: 6 MMOL/L (ref 5–15)
AST SERPL W P-5'-P-CCNC: 25 U/L (ref 15–37)
ATRIAL RATE: 57 BPM
ATRIAL RATE: 73 BPM
BILIRUB SERPL-MCNC: 0.6 MG/DL (ref 0.2–1)
BUN SERPL-MCNC: 48 MG/DL (ref 6–20)
BUN/CREAT SERPL: 14 (ref 12–20)
CA-I BLD-MCNC: 10 MG/DL (ref 8.5–10.1)
CALCULATED P AXIS, ECG09: 26 DEGREES
CALCULATED P AXIS, ECG09: 46 DEGREES
CALCULATED R AXIS, ECG10: 23 DEGREES
CALCULATED R AXIS, ECG10: 28 DEGREES
CALCULATED T AXIS, ECG11: 25 DEGREES
CALCULATED T AXIS, ECG11: 37 DEGREES
CHLORIDE SERPL-SCNC: 97 MMOL/L (ref 97–108)
CO2 SERPL-SCNC: 30 MMOL/L (ref 21–32)
CREAT SERPL-MCNC: 3.42 MG/DL (ref 0.55–1.02)
DIAGNOSIS, 93000: NORMAL
DIAGNOSIS, 93000: NORMAL
ERYTHROCYTE [DISTWIDTH] IN BLOOD BY AUTOMATED COUNT: 14.8 % (ref 11.5–14.5)
GLOBULIN SER CALC-MCNC: 3.5 G/DL (ref 2–4)
GLUCOSE BLD STRIP.AUTO-MCNC: 200 MG/DL (ref 65–117)
GLUCOSE BLD STRIP.AUTO-MCNC: 239 MG/DL (ref 65–117)
GLUCOSE SERPL-MCNC: 135 MG/DL (ref 65–100)
HCT VFR BLD AUTO: 25.2 % (ref 35–47)
HGB BLD-MCNC: 7.8 G/DL (ref 11.5–16)
MCH RBC QN AUTO: 30 PG (ref 26–34)
MCHC RBC AUTO-ENTMCNC: 31 G/DL (ref 30–36.5)
MCV RBC AUTO: 96.9 FL (ref 80–99)
NRBC # BLD: 0 K/UL (ref 0–0.01)
NRBC BLD-RTO: 0 PER 100 WBC
P-R INTERVAL, ECG05: 138 MS
P-R INTERVAL, ECG05: 138 MS
PERFORMED BY, TECHID: ABNORMAL
PERFORMED BY, TECHID: ABNORMAL
PLATELET # BLD AUTO: 207 K/UL (ref 150–400)
PMV BLD AUTO: 10.1 FL (ref 8.9–12.9)
POTASSIUM SERPL-SCNC: 4.2 MMOL/L (ref 3.5–5.1)
PROT SERPL-MCNC: 6 G/DL (ref 6.4–8.2)
Q-T INTERVAL, ECG07: 388 MS
Q-T INTERVAL, ECG07: 456 MS
QRS DURATION, ECG06: 94 MS
QRS DURATION, ECG06: 94 MS
QTC CALCULATION (BEZET), ECG08: 427 MS
QTC CALCULATION (BEZET), ECG08: 443 MS
RBC # BLD AUTO: 2.6 M/UL (ref 3.8–5.2)
SODIUM SERPL-SCNC: 133 MMOL/L (ref 136–145)
TROPONIN-HIGH SENSITIVITY: 161 NG/L (ref 0–51)
VENTRICULAR RATE, ECG03: 57 BPM
VENTRICULAR RATE, ECG03: 73 BPM
WBC # BLD AUTO: 7.8 K/UL (ref 3.6–11)

## 2022-08-10 PROCEDURE — 93005 ELECTROCARDIOGRAM TRACING: CPT

## 2022-08-10 PROCEDURE — G0378 HOSPITAL OBSERVATION PER HR: HCPCS

## 2022-08-10 PROCEDURE — 74011000250 HC RX REV CODE- 250: Performed by: NURSE PRACTITIONER

## 2022-08-10 PROCEDURE — 99285 EMERGENCY DEPT VISIT HI MDM: CPT

## 2022-08-10 PROCEDURE — 74011636637 HC RX REV CODE- 636/637: Performed by: NURSE PRACTITIONER

## 2022-08-10 PROCEDURE — 80053 COMPREHEN METABOLIC PANEL: CPT

## 2022-08-10 PROCEDURE — 74011250637 HC RX REV CODE- 250/637: Performed by: NURSE PRACTITIONER

## 2022-08-10 PROCEDURE — 85027 COMPLETE CBC AUTOMATED: CPT

## 2022-08-10 PROCEDURE — 84484 ASSAY OF TROPONIN QUANT: CPT

## 2022-08-10 PROCEDURE — 36415 COLL VENOUS BLD VENIPUNCTURE: CPT

## 2022-08-10 PROCEDURE — 82962 GLUCOSE BLOOD TEST: CPT

## 2022-08-10 RX ORDER — INSULIN LISPRO 100 [IU]/ML
INJECTION, SOLUTION INTRAVENOUS; SUBCUTANEOUS
Status: DISCONTINUED | OUTPATIENT
Start: 2022-08-10 | End: 2022-08-13 | Stop reason: HOSPADM

## 2022-08-10 RX ORDER — SODIUM CHLORIDE 0.9 % (FLUSH) 0.9 %
5-40 SYRINGE (ML) INJECTION EVERY 8 HOURS
Status: DISCONTINUED | OUTPATIENT
Start: 2022-08-10 | End: 2022-08-13 | Stop reason: HOSPADM

## 2022-08-10 RX ORDER — TRAVOPROST OPHTHALMIC SOLUTION 0.04 MG/ML
1 SOLUTION OPHTHALMIC EVERY EVENING
COMMUNITY
End: 2022-10-10

## 2022-08-10 RX ORDER — LANOLIN ALCOHOL/MO/W.PET/CERES
325 CREAM (GRAM) TOPICAL
Status: DISCONTINUED | OUTPATIENT
Start: 2022-08-11 | End: 2022-08-13 | Stop reason: HOSPADM

## 2022-08-10 RX ORDER — ONDANSETRON 4 MG/1
4 TABLET, ORALLY DISINTEGRATING ORAL
Status: DISCONTINUED | OUTPATIENT
Start: 2022-08-10 | End: 2022-08-13 | Stop reason: HOSPADM

## 2022-08-10 RX ORDER — METOPROLOL SUCCINATE 25 MG/1
25 TABLET, EXTENDED RELEASE ORAL DAILY
COMMUNITY
End: 2022-08-13

## 2022-08-10 RX ORDER — DONEPEZIL HYDROCHLORIDE 5 MG/1
10 TABLET, FILM COATED ORAL
Status: DISCONTINUED | OUTPATIENT
Start: 2022-08-10 | End: 2022-08-13 | Stop reason: HOSPADM

## 2022-08-10 RX ORDER — FUROSEMIDE 40 MG/1
60 TABLET ORAL DAILY
COMMUNITY
End: 2022-10-10

## 2022-08-10 RX ORDER — INSULIN GLARGINE 100 [IU]/ML
10 INJECTION, SOLUTION SUBCUTANEOUS
Status: DISCONTINUED | OUTPATIENT
Start: 2022-08-10 | End: 2022-08-13 | Stop reason: HOSPADM

## 2022-08-10 RX ORDER — CETIRIZINE HCL 10 MG
10 TABLET ORAL DAILY
Status: DISCONTINUED | OUTPATIENT
Start: 2022-08-11 | End: 2022-08-13 | Stop reason: HOSPADM

## 2022-08-10 RX ORDER — SENNOSIDES 8.6 MG/1
2 TABLET ORAL
Status: DISCONTINUED | OUTPATIENT
Start: 2022-08-10 | End: 2022-08-13 | Stop reason: HOSPADM

## 2022-08-10 RX ORDER — LATANOPROST 50 UG/ML
1 SOLUTION/ DROPS OPHTHALMIC
Status: DISCONTINUED | OUTPATIENT
Start: 2022-08-10 | End: 2022-08-13 | Stop reason: HOSPADM

## 2022-08-10 RX ORDER — DOCUSATE SODIUM 100 MG/1
100 CAPSULE, LIQUID FILLED ORAL 2 TIMES DAILY
Status: DISCONTINUED | OUTPATIENT
Start: 2022-08-10 | End: 2022-08-13 | Stop reason: HOSPADM

## 2022-08-10 RX ORDER — SODIUM CHLORIDE 0.9 % (FLUSH) 0.9 %
5-40 SYRINGE (ML) INJECTION AS NEEDED
Status: DISCONTINUED | OUTPATIENT
Start: 2022-08-10 | End: 2022-08-13 | Stop reason: HOSPADM

## 2022-08-10 RX ORDER — HYDRALAZINE HYDROCHLORIDE 10 MG/1
10 TABLET, FILM COATED ORAL 4 TIMES DAILY
COMMUNITY
End: 2022-08-13

## 2022-08-10 RX ORDER — FLUTICASONE PROPIONATE 50 MCG
2 SPRAY, SUSPENSION (ML) NASAL DAILY
Status: DISCONTINUED | OUTPATIENT
Start: 2022-08-11 | End: 2022-08-13 | Stop reason: HOSPADM

## 2022-08-10 RX ORDER — ATORVASTATIN CALCIUM 20 MG/1
20 TABLET, FILM COATED ORAL
Status: DISCONTINUED | OUTPATIENT
Start: 2022-08-10 | End: 2022-08-13 | Stop reason: HOSPADM

## 2022-08-10 RX ORDER — CARVEDILOL 3.12 MG/1
6.25 TABLET ORAL 2 TIMES DAILY WITH MEALS
Status: DISCONTINUED | OUTPATIENT
Start: 2022-08-10 | End: 2022-08-13 | Stop reason: HOSPADM

## 2022-08-10 RX ORDER — PRAVASTATIN SODIUM 80 MG/1
80 TABLET ORAL
Status: DISCONTINUED | OUTPATIENT
Start: 2022-08-10 | End: 2022-08-10 | Stop reason: CLARIF

## 2022-08-10 RX ORDER — CALCITRIOL 0.25 UG/1
0.25 CAPSULE ORAL
Status: DISCONTINUED | OUTPATIENT
Start: 2022-08-10 | End: 2022-08-13 | Stop reason: HOSPADM

## 2022-08-10 RX ORDER — LIDOCAINE 4 G/100G
1 PATCH TOPICAL EVERY 24 HOURS
Status: DISCONTINUED | OUTPATIENT
Start: 2022-08-10 | End: 2022-08-13 | Stop reason: HOSPADM

## 2022-08-10 RX ORDER — PANTOPRAZOLE SODIUM 40 MG/1
40 TABLET, DELAYED RELEASE ORAL 2 TIMES DAILY
Status: DISCONTINUED | OUTPATIENT
Start: 2022-08-10 | End: 2022-08-13 | Stop reason: HOSPADM

## 2022-08-10 RX ORDER — VENLAFAXINE HYDROCHLORIDE 75 MG/1
75 CAPSULE, EXTENDED RELEASE ORAL DAILY
Status: DISCONTINUED | OUTPATIENT
Start: 2022-08-11 | End: 2022-08-13 | Stop reason: HOSPADM

## 2022-08-10 RX ORDER — GABAPENTIN 300 MG/1
300 CAPSULE ORAL
Status: DISCONTINUED | OUTPATIENT
Start: 2022-08-10 | End: 2022-08-13 | Stop reason: HOSPADM

## 2022-08-10 RX ORDER — ACETAMINOPHEN 325 MG/1
650 TABLET ORAL
Status: DISCONTINUED | OUTPATIENT
Start: 2022-08-10 | End: 2022-08-13 | Stop reason: HOSPADM

## 2022-08-10 RX ORDER — ONDANSETRON 2 MG/ML
4 INJECTION INTRAMUSCULAR; INTRAVENOUS
Status: DISCONTINUED | OUTPATIENT
Start: 2022-08-10 | End: 2022-08-13 | Stop reason: HOSPADM

## 2022-08-10 RX ORDER — POLYETHYLENE GLYCOL 3350 17 G/17G
17 POWDER, FOR SOLUTION ORAL DAILY PRN
Status: DISCONTINUED | OUTPATIENT
Start: 2022-08-10 | End: 2022-08-13 | Stop reason: HOSPADM

## 2022-08-10 RX ORDER — LOSARTAN POTASSIUM 50 MG/1
50 TABLET ORAL DAILY
COMMUNITY
End: 2022-08-13

## 2022-08-10 RX ORDER — PLECANATIDE 3 MG/1
3 TABLET ORAL DAILY
COMMUNITY

## 2022-08-10 RX ORDER — ACETAMINOPHEN 650 MG/1
650 SUPPOSITORY RECTAL
Status: DISCONTINUED | OUTPATIENT
Start: 2022-08-10 | End: 2022-08-13 | Stop reason: HOSPADM

## 2022-08-10 RX ORDER — AMLODIPINE BESYLATE 5 MG/1
10 TABLET ORAL DAILY
Status: DISCONTINUED | OUTPATIENT
Start: 2022-08-11 | End: 2022-08-13 | Stop reason: HOSPADM

## 2022-08-10 RX ORDER — ISOSORBIDE MONONITRATE 60 MG/1
60 TABLET, EXTENDED RELEASE ORAL 2 TIMES DAILY
COMMUNITY
End: 2022-08-13

## 2022-08-10 RX ORDER — TORSEMIDE 10 MG/1
20 TABLET ORAL 2 TIMES DAILY
Status: DISCONTINUED | OUTPATIENT
Start: 2022-08-10 | End: 2022-08-13 | Stop reason: HOSPADM

## 2022-08-10 RX ADMIN — DOCUSATE SODIUM 100 MG: 100 CAPSULE, LIQUID FILLED ORAL at 21:44

## 2022-08-10 RX ADMIN — DONEPEZIL HYDROCHLORIDE 10 MG: 5 TABLET, FILM COATED ORAL at 21:44

## 2022-08-10 RX ADMIN — LATANOPROST 1 DROP: 50 SOLUTION OPHTHALMIC at 21:44

## 2022-08-10 RX ADMIN — INSULIN GLARGINE 10 UNITS: 100 INJECTION, SOLUTION SUBCUTANEOUS at 22:00

## 2022-08-10 RX ADMIN — SODIUM CHLORIDE, PRESERVATIVE FREE 10 ML: 5 INJECTION INTRAVENOUS at 18:42

## 2022-08-10 RX ADMIN — ATORVASTATIN CALCIUM 20 MG: 20 TABLET, FILM COATED ORAL at 21:44

## 2022-08-10 RX ADMIN — PANTOPRAZOLE SODIUM 40 MG: 40 TABLET, DELAYED RELEASE ORAL at 21:44

## 2022-08-10 RX ADMIN — CALCITRIOL CAPSULES 0.25 MCG 0.25 MCG: 0.25 CAPSULE ORAL at 22:15

## 2022-08-10 RX ADMIN — GABAPENTIN 300 MG: 300 CAPSULE ORAL at 21:44

## 2022-08-10 RX ADMIN — SENNOSIDES 17.2 MG: 8.6 TABLET, FILM COATED ORAL at 22:15

## 2022-08-10 RX ADMIN — SODIUM CHLORIDE, PRESERVATIVE FREE 10 ML: 5 INJECTION INTRAVENOUS at 22:56

## 2022-08-10 NOTE — H&P
Amanda PAK, GABRIELLAP-C    History and Physical      Patient: Patricia Correia MRN: [id number]  SSN: [id number]    YOB: 1945  Age: 65 y.o. Sex: female        Chief Complaint   Patient presents with    Syncope    Hypotension       Subjective:      Patricia Correia is a 65 y.o. female who presents to the ED following syncope episode. Of note patient was recently discharged from here 2 days ago for same complaint. Today she was with family when she had another syncopal episode. EMS reported that her blood pressure was systolic 60 but then improved. Patient herself is a poor historian. Family also reports increased lethargy after syncope episode with concern for seizure activity. Patient herself is a poor historian. Chronic anemia noted on routine lab. Will admit to observation status. Consult neurology. Past Medical History:   Diagnosis Date    Adenocarcinoma, renal cell ([address] 9/2/2020    Arthritis     CAD (coronary artery disease)     Chronic kidney disease     Diabetes ([address]     Gout     Hypercholesterolemia     Hypertension     Mental depression     Pulmonary embolism (HCC)     Seizures ([address]     Stroke ([facility] [address]     Thyroid disease      Past Surgical History:   Procedure Laterality Date    HX GYN      HX HYSTERECTOMY      HX NEPHRECTOMY Left 02/12/2015    HX ORTHOPAEDIC      HX UROLOGICAL  02/12/2015    PARTIAL URETERECTOMY     [place] CARDIAC SURG PROCEDURE UNLIST      stents placed       Family History   Problem Relation Age of Onset    Heart Disease Mother     Heart Disease Father     Heart Disease Sister     Cancer Sister     Heart Disease Brother     Cancer Maternal Grandmother     Stroke Son     Cancer Sister      Social History     Tobacco Use    Smoking status: Former     Years: 15.00     Types: Cigarettes    Smokeless tobacco: Never   Substance Use Topics    Alcohol use: Not Currently      Prior to Admission medications    Medication Sig Start Date End Date Taking?  Authorizing Provider pantoprazole (PROTONIX) 40 mg tablet Take 1 Tablet by mouth two (2) times a day. 8/8/22   Bladimir Rodriguez MD   albuterol (PROVENTIL VENTOLIN) 2.5 mg /3 mL (0.083 %) nebu 2.5 mg by Nebulization route every six (6) hours as needed for Shortness of Breath. Provider, Historical   fluticasone propionate (FLONASE) 50 mcg/actuation nasal spray 2 Sprays by Both Nostrils route daily. Provider, Historical   insulin lispro (HUMALOG) 100 unit/mL injection by SubCUTAneous route Before breakfast, lunch, dinner and at bedtime. SS: 150-200=2 units 201-250=4 units 251-300=6 units 301-350=8 units 351-400=10 units    Provider, Historical   insulin glargine (LANTUS) 100 unit/mL injection 10 Units by SubCUTAneous route nightly. Provider, Historical   lidocaine (LIDODERM) 5 % 1 Patch by TransDERmal route every twenty-four (24) hours. Apply to Right Hip Remove at bedtime    Provider, Historical   loratadine (CLARITIN) 10 mg tablet Take 10 mg by mouth in the morning. Provider, Historical   aluminum & magnesium hydroxide-simethicone (MYLANTA II) 400-400-40 mg/5 mL suspension Take 30 mL by mouth every six (6) hours as needed for Indigestion. Provider, Historical   senna (SENOKOT) 8.6 mg tablet Take 2 Tablets by mouth nightly. Provider, Historical   torsemide (DEMADEX) 20 mg tablet Take 20 mg by mouth two (2) times a day. Provider, Historical   carvediloL (COREG) 6.25 mg tablet Take 6.25 mg by mouth two (2) times daily (with meals). Provider, Historical   amLODIPine (NORVASC) 10 mg tablet Take 10 mg by mouth in the morning. Provider, Historical   ferrous sulfate 325 mg (65 mg iron) tablet Take 325 mg by mouth Daily (before breakfast). Provider, Historical   docusate sodium (COLACE) 100 mg capsule 1 capsule as needed    Other, MD Mariela   acetaminophen (TYLENOL) 325 mg tablet Take 325 mg by mouth every six (6) hours as needed for Pain.     Other, MD Mariela   calcitRIOL (ROCALTROL) 0.25 mcg capsule Take 0.25 mcg by mouth BID Mon Wed & Fri. Provider, Historical   donepeziL (ARICEPT) 10 mg tablet Take 10 mg by mouth nightly. Provider, Historical   gabapentin (NEURONTIN) 300 mg capsule Take 300 mg by mouth nightly. 6/29/20   Provider, Historical   venlafaxine (EFFEXOR) 75 mg tablet Take 75 mg by mouth daily. Provider, Historical   latanoprost (XALATAN) 0.005 % ophthalmic solution Administer 1 Drop to both eyes nightly. Provider, Historical   pravastatin (PRAVACHOL) 80 mg tablet Take 80 mg by mouth nightly. Provider, Historical        No Known Allergies    Review of Systems:  Review of Systems   Reason unable to perform ROS: limited due to mental status. Constitutional:  Negative for chills and fever. Respiratory:  Negative for cough and shortness of breath. Cardiovascular:  Negative for chest pain and leg swelling. Objective:     Vitals:    08/10/22 1411 08/10/22 1544 08/10/22 1554 08/10/22 1555   BP:  111/79     Pulse:  71 75    Resp:  18 22    Temp: (P) 97.5 °F (36.4 °C)      SpO2:   100%    Weight:    79.4 kg (175 lb)   Height:    5' 6\" (1.676 m)          Physical Exam  Vitals and nursing note reviewed. Constitutional:       Appearance: Normal appearance. Comments: lethargic   Eyes:      Extraocular Movements: Extraocular movements intact. Cardiovascular:      Rate and Rhythm: Normal rate. Pulmonary:      Breath sounds: Normal breath sounds. Abdominal:      General: Bowel sounds are normal.      Palpations: Abdomen is soft. Neurological:      Mental Status: She is alert. She is disoriented.         Assessment:     Hospital Problems  Date Reviewed: 6/20/2022            Codes Class Noted POA    Seizure [facility]) ICD-10-CM: R56.9  ICD-9-CM: 780.39  8/10/2022 Unknown        Syncope ICD-10-CM: R55  ICD-9-CM: 780.2  11/21/2020 Unknown         Syncope  Questionable seizure postictal reported   -obtain neuro evaluation  -defer seizure medication initiation to neuro    Acute anemia  -hgb appears stable 7.8  -previously on eliquis and asa  -continue to trend    Hypertension  -bp currently soft  -will hold amlodipine and coreg    Hld  -managed with pravastatin    Elevated troponin  -recent echo showed EF 50-55%  -EKG showed sinus bradycardia    DM Type 2  -ac/hs accu check  -med dose ssi  -continue gabapentin for neuropathy    Depression  -managed with effexor and aricept    Chronic renal failure  -appears neat baseline  -renal dose all meds to current gfr    Chronic constipation  -managed with senna      Full Code  GI PROPHYLAXIS protonix  DVT PROPHYLAXIS scd's  Home medications reviewed and reconciled    Above treatment plan reviewed and discussed with patient in detail at bedside, all questions answered.      Time Spent: > 75 minutes    Signed By: Kade Colon NP     August 10, 2022

## 2022-08-10 NOTE — ED TRIAGE NOTES
Pt came in by ambulance from home with complaints of syncope and hypotension around noon. No fall noted. Pt did not hit head. LOC still present upon arrival of EMS personal. 2mg of narcan administered on ambulance. Hx of CHF and dialysis pt.

## 2022-08-10 NOTE — ED PROVIDER NOTES
EMERGENCY DEPARTMENT HISTORY AND PHYSICAL EXAM      Date: 8/10/2022  Patient Name: Patricia Correia  Patient Age and Sex: 65 y.o. female     History of Presenting Illness     Chief Complaint   Patient presents with    Syncope    Hypotension       History Provided By: Patient    HPI: Patricia Correia is a 74-year-old female with a history of CHF, diabetes, syncope, presenting for syncopal episode. According to EMS, patient was here just recently for syncopal episode and was discharged. Has been admitted for syncope before as well. Today she was with family when she had another syncopal episode. EMS reported that her blood pressure was systolic 60 but then improved. Here blood pressures have been systolic 069 as well as 189L. Patient denies any chest pain, shortness of breath, lightheadedness prior to the event. She is a poor historian. There are no other complaints, changes, or physical findings at this time. PCP: Juarez Wakefield NP    Current Facility-Administered Medications on File Prior to Encounter   Medication Dose Route Frequency Provider Last Rate Last Admin    [DISCONTINUED] sodium chloride 0.9 % bolus infusion 1,000 mL  1,000 mL IntraVENous ONCE Myra Kohler MD         Current Outpatient Medications on File Prior to Encounter   Medication Sig Dispense Refill    apixaban (ELIQUIS) 2.5 mg tablet Take 2.5 mg by mouth two (2) times a day. furosemide (LASIX) 40 mg tablet Take 60 mg by mouth in the morning. hydrALAZINE (APRESOLINE) 10 mg tablet Take 10 mg by mouth four (4) times daily. insulin NPH (NOVOLIN N, HUMULIN N) 100 unit/mL injection 20 Units by SubCUTAneous route every evening. isosorbide mononitrate ER (IMDUR) 60 mg CR tablet Take 60 mg by mouth two (2) times a day. losartan (COZAAR) 50 mg tablet Take 50 mg by mouth in the morning. metoprolol succinate (TOPROL-XL) 25 mg XL tablet Take 25 mg by mouth in the morning.       plecanatide (Trulance) 3 mg tab Take 3 mg by mouth daily. travoprost (TRAVATAN Z) 0.004 % ophthalmic solution Administer 1 Drop to both eyes every evening. pantoprazole (PROTONIX) 40 mg tablet Take 1 Tablet by mouth two (2) times a day. (Patient taking differently: Take 40 mg by mouth in the morning.) 60 Tablet 0    albuterol (PROVENTIL VENTOLIN) 2.5 mg /3 mL (0.083 %) nebu 2.5 mg by Nebulization route every six (6) hours as needed for Shortness of Breath. fluticasone propionate (FLONASE) 50 mcg/actuation nasal spray 2 Sprays by Both Nostrils route daily. torsemide (DEMADEX) 20 mg tablet Take 20 mg by mouth two (2) times a day. carvediloL (COREG) 6.25 mg tablet Take 6.25 mg by mouth two (2) times daily (with meals). amLODIPine (NORVASC) 10 mg tablet Take 10 mg by mouth in the morning. calcitRIOL (ROCALTROL) 0.25 mcg capsule Take 0.25 mcg by mouth BID Mon Wed & Fri.      donepeziL (ARICEPT) 10 mg tablet Take 10 mg by mouth nightly.      gabapentin (NEURONTIN) 300 mg capsule Take 300 mg by mouth nightly. Venlafaxine-ER 24 HR (EFFEXOR-ER) 75 mg tr24 tablet Take 75 mg by mouth daily. latanoprost (XALATAN) 0.005 % ophthalmic solution Administer 1 Drop to both eyes nightly. pravastatin (PRAVACHOL) 80 mg tablet Take 80 mg by mouth nightly. [DISCONTINUED] insulin lispro (HUMALOG) 100 unit/mL injection by SubCUTAneous route Before breakfast, lunch, dinner and at bedtime. SS: 150-200=2 units 201-250=4 units 251-300=6 units 301-350=8 units 351-400=10 units      [DISCONTINUED] insulin glargine (LANTUS) 100 unit/mL injection 10 Units by SubCUTAneous route nightly. [DISCONTINUED] lidocaine (LIDODERM) 5 % 1 Patch by TransDERmal route every twenty-four (24) hours. Apply to Right Hip Remove at bedtime      [DISCONTINUED] loratadine (CLARITIN) 10 mg tablet Take 10 mg by mouth in the morning.       [DISCONTINUED] aluminum & magnesium hydroxide-simethicone (MYLANTA II) 400-400-40 mg/5 mL suspension Take 30 mL by mouth every six (6) hours as needed for Indigestion. [DISCONTINUED] senna (SENOKOT) 8.6 mg tablet Take 2 Tablets by mouth nightly. [DISCONTINUED] ferrous sulfate 325 mg (65 mg iron) tablet Take 325 mg by mouth Daily (before breakfast). [DISCONTINUED] docusate sodium (COLACE) 100 mg capsule 1 capsule as needed      [DISCONTINUED] acetaminophen (TYLENOL) 325 mg tablet Take 325 mg by mouth every six (6) hours as needed for Pain. Past History     Past Medical History:  Past Medical History:   Diagnosis Date    Adenocarcinoma, renal cell ([facility] 9/2/2020    Arthritis     CAD (coronary artery disease)     Chronic kidney disease     Diabetes ([facility]     Gout     Hypercholesterolemia     Hypertension     Mental depression     Pulmonary embolism (HCC)     Seizures ([facility] [address]     Stroke ([facility]     Thyroid disease        Past Surgical History:  Past Surgical History:   Procedure Laterality Date    HX GYN      HX HYSTERECTOMY      HX NEPHRECTOMY Left 02/12/2015    HX ORTHOPAEDIC      HX UROLOGICAL  02/12/2015    PARTIAL URETERECTOMY     TX CARDIAC SURG PROCEDURE UNLIST      stents placed        Family History:  Family History   Problem Relation Age of Onset    Heart Disease Mother     Heart Disease Father     Heart Disease Sister     Cancer Sister     Heart Disease Brother     Cancer Maternal Grandmother     Stroke Son     Cancer Sister        Social History:  Social History     Tobacco Use    Smoking status: Former     Years: 15.00     Types: Cigarettes    Smokeless tobacco: Never   Vaping Use    Vaping Use: Never used   Substance Use Topics    Alcohol use: Not Currently    Drug use: Never       Allergies:  No Known Allergies      Review of Systems   Review of Systems   Constitutional:  Negative for chills and fever. Respiratory:  Negative for cough and shortness of breath. Cardiovascular:  Negative for chest pain.    Gastrointestinal:  Negative for abdominal pain, constipation, diarrhea, nausea and vomiting. Genitourinary:  Negative for dysuria, frequency and hematuria. Neurological:  Positive for syncope. Negative for weakness and numbness. All other systems reviewed and are negative. Physical Exam   Physical Exam  Vitals and nursing note reviewed. Constitutional:       Appearance: She is well-developed. HENT:      Head: Normocephalic and atraumatic. Nose: Nose normal.      Mouth/Throat:      Mouth: Mucous membranes are dry. Comments: Patient is a dentulous, dry mucous membranes. Eyes:      Extraocular Movements: Extraocular movements intact. Conjunctiva/sclera: Conjunctivae normal.   Cardiovascular:      Rate and Rhythm: Normal rate and regular rhythm. Pulmonary:      Effort: Pulmonary effort is normal. No respiratory distress. Breath sounds: Normal breath sounds. Abdominal:      General: There is no distension. Palpations: Abdomen is soft. Tenderness: There is no abdominal tenderness. Musculoskeletal:         General: Normal range of motion. Cervical back: Normal range of motion and neck supple. Skin:     General: Skin is warm and dry. Neurological:      General: No focal deficit present. Mental Status: She is alert and oriented to person, place, and time. Mental status is at baseline. Comments: Patient answering my questions appropriately, 4-5 strength in all 4 extremities.   End-stage renal disease   Psychiatric:         Mood and Affect: Mood normal.        Diagnostic Study Results     Labs -     Recent Results (from the past 12 hour(s))   EKG, 12 LEAD, INITIAL    Collection Time: 08/10/22  2:42 PM   Result Value Ref Range    Ventricular Rate 73 BPM    Atrial Rate 73 BPM    P-R Interval 138 ms    QRS Duration 94 ms    Q-T Interval 388 ms    QTC Calculation (Bezet) 427 ms    Calculated P Axis 46 degrees    Calculated R Axis 28 degrees    Calculated T Axis 25 degrees    Diagnosis       Sinus rhythm with marked sinus arrhythmia  Otherwise normal ECG    Confirmed by Ashwin Rasmussen MD, Anay Kearns ([id number]) on 8/10/2022 4:04:27 PM     CBC W/O DIFF    Collection Time: 08/10/22  2:57 PM   Result Value Ref Range    WBC 7.8 3.6 - 11.0 K/uL    RBC 2.60 (L) 3.80 - 5.20 M/uL    HGB 7.8 (L) 11.5 - 16.0 g/dL    HCT 25.2 (L) 35.0 - 47.0 %    MCV 96.9 80.0 - 99.0 FL    MCH 30.0 26.0 - 34.0 PG    MCHC 31.0 30.0 - 36.5 g/dL    RDW 14.8 (H) 11.5 - 14.5 %    PLATELET 404 889 - 220 K/uL    MPV 10.1 8.9 - 12.9 FL    NRBC 0.0 0.0  WBC    ABSOLUTE NRBC 0.00 0.00 - 8.87 K/uL   METABOLIC PANEL, COMPREHENSIVE    Collection Time: 08/10/22  2:57 PM   Result Value Ref Range    Sodium 133 (L) 136 - 145 mmol/L    Potassium 4.2 3.5 - 5.1 mmol/L    Chloride 97 97 - 108 mmol/L    CO2 30 21 - 32 mmol/L    Anion gap 6 5 - 15 mmol/L    Glucose 135 (H) 65 - 100 mg/dL    BUN 48 (H) 6 - 20 mg/dL    Creatinine 3.42 (H) 0.55 - 1.02 mg/dL    BUN/Creatinine ratio 14 12 - 20      GFR est AA 16 (L) >60 ml/min/1.73m2    GFR est non-AA 13 (L) >60 ml/min/1.73m2    Calcium 10.0 8.5 - 10.1 mg/dL    Bilirubin, total 0.6 0.2 - 1.0 mg/dL    AST (SGOT) 25 15 - 37 U/L    ALT (SGPT) 13 12 - 78 U/L    Alk. phosphatase 127 (H) 45 - 117 U/L    Protein, total 6.0 (L) 6.4 - 8.2 g/dL    Albumin 2.5 (L) 3.5 - 5.0 g/dL    Globulin 3.5 2.0 - 4.0 g/dL    A-G Ratio 0.7 (L) 1.1 - 2.2     TROPONIN-HIGH SENSITIVITY    Collection Time: 08/10/22  2:57 PM   Result Value Ref Range    Troponin-High Sensitivity 161 (HH) 0 - 51 ng/L       Radiologic Studies -   No orders to display     CT Results  (Last 48 hours)      None          CXR Results  (Last 48 hours)      None              Medical Decision Making   I am the first provider for this patient. I reviewed the vital signs, available nursing notes, past medical history, past surgical history, family history and social history. Vital Signs-Reviewed the patient's vital signs.   Patient Vitals for the past 12 hrs:   Temp Pulse Resp BP SpO2   08/10/22 1730 -- 66 16 104/83 92 % 08/10/22 1554 -- 75 22 -- 100 %   08/10/22 1544 -- 71 18 111/79 --   08/10/22 1411 (P) 97.5 °F (36.4 °C) -- -- -- --       Records Reviewed: Nursing Notes and Old Medical Records    Provider Notes (Medical Decision Making):   Patient with multiple comorbidities presenting with syncope. Does have risk factors for possible cardiac syncope. Was admitted to the hospital in early August for syncope and collapse. Could be CHF related. Differential also includes vasovagal syncope, hypovolemia. Plan will be to get labs, orthostatics. ED Course:   Initial assessment performed. The patients presenting problems have been discussed, and they are in agreement with the care plan formulated and outlined with them. I have encouraged them to ask questions as they arise throughout their visit. ED Course as of 08/10/22 1739   Wed Aug 10, 2022   1446 EKG interpreted by me. Shows normal sinus rhythm with a heart rate of 73. No STEMI. [JS]   7540 Per discharge summary  1) Syncopal episode while straining on the toilet  - likely vasovagal syncope  - Appreciate cardiology consult; per Dr. Jody Christensen:                 NSTEMI,? type II, known coronary artery disease status post stenting 5 years ago EKG has been unremarkable,  EKG this morning with sinus rhythm 54/min, no acute changes. No interval changes. Agree to  proceed with EGD. History of GERD, now also with GERD symptoms. Syncope/presyncope, no recurrence. History of prior syncopal episodes in the past. Anemia, home on Eliquis for history of pulmonary embolism  History of TIA. History of mild aortic stenosis and moderate tricuspid regurgitation 8/22 echo with mildly calcified aortic valve, moderately thickened mitral valve, EF of 55 to 60%     2) Acute anemia. Holding apixaban and aspirin  Appreciate GI consult. S/p EGD by Dr. Vance Waterman:    [JS]   3618 Nurse reported that patient was in a wheelchair when she had her syncopal episode.   According to EMS it took him 25 minutes to arrive and even after the syncopal episode she was still out of it. Questionable whether she had a seizure versus syncope. She is tremulous all over but no signs of any active seizing. [JS]   6618 Patient was not orthostatic. [JS]   6575 Patient's blood pressures continue to be within normal limits. She was not orthostatic. I will be readmitting her because I do think she needs a seizure work-up given how long it took her to come out of her unresponsive state. [JS]   7252 Patient's troponin 161 however always has an elevated troponin and this is actually better than her most recent ones. [JS]      ED Course User Index  [JS] Cedric Alarcon MD     Critical Care Time:   0      Disposition:    Admission Note:  Patient is being admitted to the hospital by Dr. Kiarra Snell, Service: Hospitalist.  The results of their tests and reasons for their admission have been discussed with them and available family. They convey agreement and understanding for the need to be admitted and for their admission diagnosis. Diagnosis     Clinical Impression:   1. Seizure ([address]    2. Syncope and collapse        Attestations:   Patricia Locke M.D., am the primary clinician of record. Please note that this dictation was completed with [employer], the computer voice recognition software. Quite often unanticipated grammatical, syntax, homophones, and other interpretive errors are inadvertently transcribed by the computer software. Please disregard these errors. Please excuse any errors that have escaped final proofreading. Thank you.

## 2022-08-10 NOTE — PROGRESS NOTES
Reason for Admission:  Seizures, a week ago Pt admitted for Syncope and Collapse                      RUR Score:    N/A                 Plan for utilizing home health:  Not at this time        PCP: First and Last name:  Chris Allison NP     Name of Practice:    Are you a current patient: Yes/No:    Approximate date of last visit: 'awhile\"   Can you participate in a virtual visit with your PCP:                     Current Advanced Directive/Advance Care Plan: Full Code      Healthcare Decision Maker:   Click here to complete [employer] including selection of the Healthcare Decision Maker Relationship (ie \"Primary\")             Primary Decision MakerLamir Valentine - Daughter - [phone]                  Transition of Care Plan:       Met f/f with Pt, she confirmed that the information on the face sheet is correct. Pt stated that she lives with her daughter. Pt stated no HH, no DME and independent with ADL. Pt stated that she uses [employer] [facility]. Pt stated that family will give her a ride home when she is D/C. CM dispo: Home with no needs as of this time.

## 2022-08-10 NOTE — PROGRESS NOTES
Admission Medication Reconciliation:    Information obtained from:  [facility]    Comments/Recommendations: Reviewed PTA medications and patient's allergies. Removed mylanta  Removed docusate 100 mg  Removed ferrous sulfate 325 mg  Removed lantus   Removed humalog  Removed lidocaine 5% patch  Removed loratadine 10 mg  Removed senna 8.6 mg        Allergies:  Patient has no known allergies. Significant PMH/Disease States:   Past Medical History:   Diagnosis Date    Adenocarcinoma, renal cell ([facility] [address] 2020    Arthritis     CAD (coronary artery disease)     Chronic kidney disease     Diabetes ([facility] [address]     Gout     Hypercholesterolemia     Hypertension     Mental depression     Pulmonary embolism ([facility] [address]     Seizures ([facility] [address]     Stroke [facility])     Thyroid disease      Chief Complaint for this Admission:    Chief Complaint   Patient presents with    Syncope    Hypotension     Prior to Admission Medications:   Prior to Admission Medications   Prescriptions Last Dose Informant Patient Reported? Taking? Venlafaxine-ER 24 HR (EFFEXOR-ER) 75 mg tr24 tablet  Other Yes Yes   Sig: Take 75 mg by mouth daily. albuterol (PROVENTIL VENTOLIN) 2.5 mg /3 mL (0.083 %) nebu  Other Yes Yes   Si.5 mg by Nebulization route every six (6) hours as needed for Shortness of Breath. amLODIPine (NORVASC) 10 mg tablet  Other Yes Yes   Sig: Take 10 mg by mouth in the morning. apixaban (ELIQUIS) 2.5 mg tablet  Other Yes Yes   Sig: Take 2.5 mg by mouth two (2) times a day. calcitRIOL (ROCALTROL) 0.25 mcg capsule  Other Yes Yes   Sig: Take 0.25 mcg by mouth BID Mon Wed & Fri.   carvediloL (COREG) 6.25 mg tablet  Other Yes Yes   Sig: Take 6.25 mg by mouth two (2) times daily (with meals). donepeziL (ARICEPT) 10 mg tablet  Other Yes Yes   Sig: Take 10 mg by mouth nightly. fluticasone propionate (FLONASE) 50 mcg/actuation nasal spray  Other Yes Yes   Si Sprays by Both Nostrils route daily.    furosemide (LASIX) 40 mg tablet Other Yes Yes   Sig: Take 60 mg by mouth in the morning.   gabapentin (NEURONTIN) 300 mg capsule  Other Yes Yes   Sig: Take 300 mg by mouth nightly. hydrALAZINE (APRESOLINE) 10 mg tablet  Other Yes Yes   Sig: Take 10 mg by mouth four (4) times daily. insulin NPH (NOVOLIN N, HUMULIN N) 100 unit/mL injection  Other Yes Yes   Si Units by SubCUTAneous route every evening. isosorbide mononitrate ER (IMDUR) 60 mg CR tablet  Other Yes Yes   Sig: Take 60 mg by mouth two (2) times a day. latanoprost (XALATAN) 0.005 % ophthalmic solution  Other Yes Yes   Sig: Administer 1 Drop to both eyes nightly. losartan (COZAAR) 50 mg tablet  Other Yes Yes   Sig: Take 50 mg by mouth in the morning. metoprolol succinate (TOPROL-XL) 25 mg XL tablet  Other Yes Yes   Sig: Take 25 mg by mouth in the morning. pantoprazole (PROTONIX) 40 mg tablet  Other No Yes   Sig: Take 1 Tablet by mouth two (2) times a day. Patient taking differently: Take 40 mg by mouth in the morning. plecanatide (Trulance) 3 mg tab  Other Yes Yes   Sig: Take 3 mg by mouth daily. pravastatin (PRAVACHOL) 80 mg tablet  Other Yes Yes   Sig: Take 80 mg by mouth nightly. torsemide (DEMADEX) 20 mg tablet  Other Yes Yes   Sig: Take 20 mg by mouth two (2) times a day. travoprost (TRAVATAN Z) 0.004 % ophthalmic solution  Other Yes Yes   Sig: Administer 1 Drop to both eyes every evening.       Facility-Administered Medications: None       Daniela Wood

## 2022-08-10 NOTE — ED NOTES
TRANSFER - OUT REPORT:    Verbal report given to [employer] RN on Yenny Vega  being transferred to [address] [place] [id number] for routine progression of care       Report consisted of patients Situation, Background, Assessment and   Recommendations(SBAR). Information from the following report(s) SBAR, ED Summary, MAR, Recent Results, and Cardiac Rhythm SR  was reviewed with the receiving nurse. Lines:   Venous Access Device Fistula Arm, left (Active)       Peripheral IV 08/10/22 Anterior;Right Forearm (Active)   Site Assessment Clean, dry, & intact 08/10/22 1503   Phlebitis Assessment 0 08/10/22 1503   Infiltration Assessment 0 08/10/22 1503   Dressing Status Clean, dry, & intact 08/10/22 1503   Hub Color/Line Status Green 08/10/22 1503        Opportunity for questions and clarification was provided.       Patient transported with:   Monitor, Transporter

## 2022-08-10 NOTE — PROGRESS NOTES
Medicare Outpatient Observation Notice (MOON)/ [place] Outpatient Observation Notice (Kailey Shields) provided to patient/representative with verbal explanation of the notice. Time allotted for questions regarding the notice. Patient /representative provided a completed copy of the MOON/VOON notice. Copy placed on bedside chart.

## 2022-08-11 LAB
BASOPHILS # BLD: 0 K/UL (ref 0–0.1)
BASOPHILS NFR BLD: 1 % (ref 0–1)
DIFFERENTIAL METHOD BLD: ABNORMAL
EOSINOPHIL # BLD: 0.1 K/UL (ref 0–0.4)
EOSINOPHIL NFR BLD: 1 % (ref 0–7)
ERYTHROCYTE [DISTWIDTH] IN BLOOD BY AUTOMATED COUNT: 14.7 % (ref 11.5–14.5)
GLUCOSE BLD STRIP.AUTO-MCNC: 102 MG/DL (ref 65–117)
GLUCOSE BLD STRIP.AUTO-MCNC: 103 MG/DL (ref 65–117)
GLUCOSE BLD STRIP.AUTO-MCNC: 130 MG/DL (ref 65–117)
GLUCOSE BLD STRIP.AUTO-MCNC: 134 MG/DL (ref 65–117)
HCT VFR BLD AUTO: 24.8 % (ref 35–47)
HGB BLD-MCNC: 7.8 G/DL (ref 11.5–16)
IMM GRANULOCYTES # BLD AUTO: 0 K/UL (ref 0–0.04)
IMM GRANULOCYTES NFR BLD AUTO: 0 % (ref 0–0.5)
LYMPHOCYTES # BLD: 1.8 K/UL (ref 0.8–3.5)
LYMPHOCYTES NFR BLD: 24 % (ref 12–49)
MCH RBC QN AUTO: 30.7 PG (ref 26–34)
MCHC RBC AUTO-ENTMCNC: 31.5 G/DL (ref 30–36.5)
MCV RBC AUTO: 97.6 FL (ref 80–99)
MONOCYTES # BLD: 0.9 K/UL (ref 0–1)
MONOCYTES NFR BLD: 12 % (ref 5–13)
NEUTS SEG # BLD: 4.8 K/UL (ref 1.8–8)
NEUTS SEG NFR BLD: 62 % (ref 32–75)
NRBC # BLD: 0 K/UL (ref 0–0.01)
NRBC BLD-RTO: 0 PER 100 WBC
PERFORMED BY, TECHID: ABNORMAL
PERFORMED BY, TECHID: ABNORMAL
PERFORMED BY, TECHID: NORMAL
PERFORMED BY, TECHID: NORMAL
PLATELET # BLD AUTO: 202 K/UL (ref 150–400)
PMV BLD AUTO: 9.9 FL (ref 8.9–12.9)
RBC # BLD AUTO: 2.54 M/UL (ref 3.8–5.2)
WBC # BLD AUTO: 7.7 K/UL (ref 3.6–11)

## 2022-08-11 PROCEDURE — 85025 COMPLETE CBC W/AUTO DIFF WBC: CPT

## 2022-08-11 PROCEDURE — 82962 GLUCOSE BLOOD TEST: CPT

## 2022-08-11 PROCEDURE — 74011250637 HC RX REV CODE- 250/637: Performed by: NURSE PRACTITIONER

## 2022-08-11 PROCEDURE — 36415 COLL VENOUS BLD VENIPUNCTURE: CPT

## 2022-08-11 PROCEDURE — 74011000250 HC RX REV CODE- 250: Performed by: NURSE PRACTITIONER

## 2022-08-11 PROCEDURE — 74011636637 HC RX REV CODE- 636/637: Performed by: NURSE PRACTITIONER

## 2022-08-11 PROCEDURE — G0378 HOSPITAL OBSERVATION PER HR: HCPCS

## 2022-08-11 PROCEDURE — 95816 EEG AWAKE AND DROWSY: CPT | Performed by: PSYCHIATRY & NEUROLOGY

## 2022-08-11 RX ADMIN — SODIUM CHLORIDE, PRESERVATIVE FREE 10 ML: 5 INJECTION INTRAVENOUS at 22:08

## 2022-08-11 RX ADMIN — ATORVASTATIN CALCIUM 20 MG: 20 TABLET, FILM COATED ORAL at 22:07

## 2022-08-11 RX ADMIN — PANTOPRAZOLE SODIUM 40 MG: 40 TABLET, DELAYED RELEASE ORAL at 22:07

## 2022-08-11 RX ADMIN — ACETAMINOPHEN 650 MG: 325 TABLET, FILM COATED ORAL at 09:12

## 2022-08-11 RX ADMIN — DONEPEZIL HYDROCHLORIDE 10 MG: 5 TABLET, FILM COATED ORAL at 22:07

## 2022-08-11 RX ADMIN — PANTOPRAZOLE SODIUM 40 MG: 40 TABLET, DELAYED RELEASE ORAL at 09:12

## 2022-08-11 RX ADMIN — GABAPENTIN 300 MG: 300 CAPSULE ORAL at 22:07

## 2022-08-11 RX ADMIN — LATANOPROST 1 DROP: 50 SOLUTION OPHTHALMIC at 22:07

## 2022-08-11 RX ADMIN — VENLAFAXINE HYDROCHLORIDE 75 MG: 75 CAPSULE, EXTENDED RELEASE ORAL at 09:13

## 2022-08-11 RX ADMIN — CETIRIZINE HYDROCHLORIDE 10 MG: 10 TABLET, FILM COATED ORAL at 09:13

## 2022-08-11 RX ADMIN — INSULIN GLARGINE 10 UNITS: 100 INJECTION, SOLUTION SUBCUTANEOUS at 22:07

## 2022-08-11 RX ADMIN — DOCUSATE SODIUM 100 MG: 100 CAPSULE, LIQUID FILLED ORAL at 09:12

## 2022-08-11 RX ADMIN — SENNOSIDES 17.2 MG: 8.6 TABLET, FILM COATED ORAL at 22:07

## 2022-08-11 RX ADMIN — SODIUM CHLORIDE, PRESERVATIVE FREE 10 ML: 5 INJECTION INTRAVENOUS at 04:59

## 2022-08-11 RX ADMIN — FERROUS SULFATE TAB 325 MG (65 MG ELEMENTAL FE) 325 MG: 325 (65 FE) TAB at 09:13

## 2022-08-11 RX ADMIN — SODIUM CHLORIDE, PRESERVATIVE FREE 10 ML: 5 INJECTION INTRAVENOUS at 16:39

## 2022-08-11 RX ADMIN — DOCUSATE SODIUM 100 MG: 100 CAPSULE, LIQUID FILLED ORAL at 22:07

## 2022-08-11 NOTE — PROGRESS NOTES
Hospitalist Progress Note         Gabi SalasPASHA aguirre, FNP-C    Daily Progress Note: 8/11/2022      Subjective:   Subjective   Patient examined alert and oriented sitting in bed. She reports overall feeling much better. No acute distress noted on examination. No overnight events reported. Review of Systems:   Review of Systems   Constitutional:  Negative for chills and fever. Respiratory:  Negative for cough, sputum production and shortness of breath. Cardiovascular:  Negative for chest pain and leg swelling. Gastrointestinal:  Negative for abdominal pain, nausea and vomiting. Genitourinary:  Negative for dysuria and urgency. Musculoskeletal:  Negative for back pain, myalgias and neck pain. Neurological:  Negative for dizziness and headaches. Psychiatric/Behavioral:  Negative for depression, substance abuse and suicidal ideas. Objective:   Objective      Vitals:  Patient Vitals for the past 12 hrs:   Temp Pulse Resp BP SpO2   08/11/22 0800 -- 74 -- -- --   08/11/22 0727 98.6 °F (37 °C) 74 18 (!) 122/52 100 %   08/11/22 0400 -- 75 -- -- --        Physical Exam:  Physical Exam  Vitals and nursing note reviewed. Constitutional:       Appearance: Normal appearance. Cardiovascular:      Rate and Rhythm: Normal rate. Heart sounds: Normal heart sounds. Abdominal:      General: Bowel sounds are normal.      Palpations: Abdomen is soft. Skin:     General: Skin is warm. Capillary Refill: Capillary refill takes less than 2 seconds. Neurological:      Mental Status: She is alert. Mental status is at baseline.         Lab Results:  Recent Results (from the past 24 hour(s))   EKG, 12 LEAD, INITIAL    Collection Time: 08/10/22  2:42 PM   Result Value Ref Range    Ventricular Rate 73 BPM    Atrial Rate 73 BPM    P-R Interval 138 ms    QRS Duration 94 ms    Q-T Interval 388 ms    QTC Calculation (Bezet) 427 ms    Calculated P Axis 46 degrees    Calculated R Axis 28 degrees Calculated T Axis 25 degrees    Diagnosis       Sinus rhythm with marked sinus arrhythmia  Otherwise normal ECG    Confirmed by Josué Shaw MD, Anjelica Garner ([id number]) on 8/10/2022 4:04:27 PM     CBC W/O DIFF    Collection Time: 08/10/22  2:57 PM   Result Value Ref Range    WBC 7.8 3.6 - 11.0 K/uL    RBC 2.60 (L) 3.80 - 5.20 M/uL    HGB 7.8 (L) 11.5 - 16.0 g/dL    HCT 25.2 (L) 35.0 - 47.0 %    MCV 96.9 80.0 - 99.0 FL    MCH 30.0 26.0 - 34.0 PG    MCHC 31.0 30.0 - 36.5 g/dL    RDW 14.8 (H) 11.5 - 14.5 %    PLATELET 855 542 - 418 K/uL    MPV 10.1 8.9 - 12.9 FL    NRBC 0.0 0.0  WBC    ABSOLUTE NRBC 0.00 0.00 - 3.18 K/uL   METABOLIC PANEL, COMPREHENSIVE    Collection Time: 08/10/22  2:57 PM   Result Value Ref Range    Sodium 133 (L) 136 - 145 mmol/L    Potassium 4.2 3.5 - 5.1 mmol/L    Chloride 97 97 - 108 mmol/L    CO2 30 21 - 32 mmol/L    Anion gap 6 5 - 15 mmol/L    Glucose 135 (H) 65 - 100 mg/dL    BUN 48 (H) 6 - 20 mg/dL    Creatinine 3.42 (H) 0.55 - 1.02 mg/dL    BUN/Creatinine ratio 14 12 - 20      GFR est AA 16 (L) >60 ml/min/1.73m2    GFR est non-AA 13 (L) >60 ml/min/1.73m2    Calcium 10.0 8.5 - 10.1 mg/dL    Bilirubin, total 0.6 0.2 - 1.0 mg/dL    AST (SGOT) 25 15 - 37 U/L    ALT (SGPT) 13 12 - 78 U/L    Alk.  phosphatase 127 (H) 45 - 117 U/L    Protein, total 6.0 (L) 6.4 - 8.2 g/dL    Albumin 2.5 (L) 3.5 - 5.0 g/dL    Globulin 3.5 2.0 - 4.0 g/dL    A-G Ratio 0.7 (L) 1.1 - 2.2     TROPONIN-HIGH SENSITIVITY    Collection Time: 08/10/22  2:57 PM   Result Value Ref Range    Troponin-High Sensitivity 161 (HH) 0 - 51 ng/L   GLUCOSE, POC    Collection Time: 08/10/22  7:48 PM   Result Value Ref Range    Glucose (POC) 239 (H) 65 - 117 mg/dL    Performed by [address] [place] [address], POC    Collection Time: 08/10/22  9:32 PM   Result Value Ref Range    Glucose (POC) 200 (H) 65 - 117 mg/dL    Performed by Andrea Foster    CBC WITH AUTOMATED DIFF    Collection Time: 08/11/22  6:14 AM   Result Value Ref Range    WBC 7.7 3.6 - 11.0 K/uL    RBC 2.54 (L) 3.80 - 5.20 M/uL    HGB 7.8 (L) 11.5 - 16.0 g/dL    HCT 24.8 (L) 35.0 - 47.0 %    MCV 97.6 80.0 - 99.0 FL    MCH 30.7 26.0 - 34.0 PG    MCHC 31.5 30.0 - 36.5 g/dL    RDW 14.7 (H) 11.5 - 14.5 %    PLATELET 462 406 - 603 K/uL    MPV 9.9 8.9 - 12.9 FL    NRBC 0.0 0.0  WBC    ABSOLUTE NRBC 0.00 0.00 - 0.01 K/uL    NEUTROPHILS 62 32 - 75 %    LYMPHOCYTES 24 12 - 49 %    MONOCYTES 12 5 - 13 %    EOSINOPHILS 1 0 - 7 %    BASOPHILS 1 0 - 1 %    IMMATURE GRANULOCYTES 0 0 - 0.5 %    ABS. NEUTROPHILS 4.8 1.8 - 8.0 K/UL    ABS. LYMPHOCYTES 1.8 0.8 - 3.5 K/UL    ABS. MONOCYTES 0.9 0.0 - 1.0 K/UL    ABS. EOSINOPHILS 0.1 0.0 - 0.4 K/UL    ABS. BASOPHILS 0.0 0.0 - 0.1 K/UL    ABS. IMM.  GRANS. 0.0 0.00 - 0.04 K/UL    DF AUTOMATED     GLUCOSE, POC    Collection Time: 08/11/22  7:59 AM   Result Value Ref Range    Glucose (POC) 130 (H) 65 - 117 mg/dL    Performed by Jackie Ge, POC    Collection Time: 08/11/22 11:49 AM   Result Value Ref Range    Glucose (POC) 103 65 - 117 mg/dL    Performed by Marilynn Calzada       Results       Procedure Component Value Units Date/Time    CULTURE, BLOOD, PAIRED [[id number]] Collected: 08/09/22 1613    Order Status: Completed Specimen: Blood Updated: 08/11/22 1234     Special Requests: No Special Requests        Culture result: No growth 1 day                Diagnostic Images:  [unfilled]      Current Medications:    Current Facility-Administered Medications:     sodium chloride (NS) flush 5-40 mL, 5-40 mL, IntraVENous, Q8H, Chris, Serina, NP, 10 mL at 08/11/22 0459    sodium chloride (NS) flush 5-40 mL, 5-40 mL, IntraVENous, PRN, Angelica Cousins, NP    acetaminophen (TYLENOL) tablet 650 mg, 650 mg, Oral, Q6H PRN, 650 mg at 08/11/22 0912 **OR** acetaminophen (TYLENOL) suppository 650 mg, 650 mg, Rectal, Q6H PRN, Angelica Cousins, NP    polyethylene glycol (MIRALAX) packet 17 g, 17 g, Oral, DAILY PRN, Angelica Cousins, NP    ondansetron (ZOFRAN ODT) tablet 4 mg, 4 mg, Oral, Q8H PRN **OR** ondansetron (ZOFRAN) injection 4 mg, 4 mg, IntraVENous, Q6H PRN, May Fairchild NP    [Held by provider] amLODIPine (NORVASC) tablet 10 mg, 10 mg, Oral, DAILY, May Fairchild, NP    calcitRIOL (ROCALTROL) capsule 0.25 mcg, 0.25 mcg, Oral, BID Mon Wed & Fri, Chris, William Abraham, NP, 0.25 mcg at 08/10/22 2215    [Held by provider] carvediloL (COREG) tablet 6.25 mg, 6.25 mg, Oral, BID WITH MEALS, May Fairchild, NP    docusate sodium (COLACE) capsule 100 mg, 100 mg, Oral, BID, May Fairchild NP, 100 mg at 08/11/22 0912    donepeziL (ARICEPT) tablet 10 mg, 10 mg, Oral, QHS, Chris, William Abraham, NP, 10 mg at 08/10/22 2144    ferrous sulfate tablet 325 mg, 325 mg, Oral, ACB, Chris, William Abraham, NP, 325 mg at 08/11/22 0913    fluticasone propionate (FLONASE) 50 mcg/actuation nasal spray 2 Spray, 2 Spray, Both Nostrils, DAILY, Serina Perez NP    gabapentin (NEURONTIN) capsule 300 mg, 300 mg, Oral, QHS, Chris, William Abraham, NP, 300 mg at 08/10/22 2144    insulin glargine (LANTUS) injection 10 Units, 10 Units, SubCUTAneous, QHS, William Perez, NP, 10 Units at 08/10/22 2200    insulin lispro (HUMALOG) injection, , SubCUTAneous, AC&HS, May Fairchild, NP    latanoprost (XALATAN) 0.005 % ophthalmic solution 1 Drop, 1 Drop, Both Eyes, QHS, Serina Perez NP, 1 Drop at 08/10/22 2144    venlafaxine-SR (EFFEXOR-XR) capsule 75 mg, 75 mg, Oral, DAILY, William Perez, NP, 75 mg at 08/11/22 0913    [Held by provider] torsemide (DEMADEX) tablet 20 mg, 20 mg, Oral, BID, Serina Perez NP    senna (SENOKOT) tablet 17.2 mg, 2 Tablet, Oral, QHS, Serina Perez NP, 17.2 mg at 08/10/22 2215    pantoprazole (PROTONIX) tablet 40 mg, 40 mg, Oral, BID, May Fairchild NP, 40 mg at 08/11/22 0912    cetirizine (ZYRTEC) tablet 10 mg, 10 mg, Oral, DAILY, William Perez NP, 10 mg at 08/11/22 0913    lidocaine 4 % patch 1 Patch, 1 Patch, TransDERmal, Q24H, May Fairchild NP, 1 Patch at 08/10/22 2215    atorvastatin (LIPITOR) tablet 20 mg, 20 mg, Oral, QHS, Jordon Vilchis NP, 20 mg at 08/10/22 4151       ASSESSMENT:  Mercy Stern is a 65 y.o. female who presents to the ED following syncope episode. Of note patient was recently discharged from here 2 days ago for same complaint. Today she was with family when she had another syncopal episode. EMS reported that her blood pressure was systolic 60 but then improved. Patient herself is a poor historian. Family also reports increased lethargy after syncope episode with concern for seizure activity. Patient herself is a poor historian. Chronic anemia noted on routine lab. Will admit to observation status. Consult neurology. MRI brain pending. EEG pending    Syncope  Questionable seizure postictal reported  -Neuro following  -defer seizure medication initiation to neuro  -MRI and EEG pending     Acute anemia  -hgb appears stable 7.8  -previously on eliquis and asa  -continue to trend     Hypertension  -bp currently soft  -will hold amlodipine and coreg     Hld  -managed with pravastatin     Elevated troponin  -recent echo showed EF 50-55%  -EKG showed sinus bradycardia     DM Type 2  -ac/hs accu check  -med dose ssi  -continue gabapentin for neuropathy     Depression  -managed with effexor and aricept     Chronic renal failure w/ hx renal adenocarcinoma  -appears neat baseline  -renal dose all meds to current gfr     Chronic constipation  -managed with senna      Full Code  GI PROPHYLAXIS protonix  DVT PROPHYLAXIS scd's      Above treatment plan reviewed and discussed with patient in detail at bedside, all questions answered. Care Plan discussed with: Patient/Family    Total time spent with patient: 30 minutes.     Andrea Parker NP

## 2022-08-11 NOTE — PROGRESS NOTES
Problem: Falls - Risk of  Goal: *Absence of Falls  Description: Document Grayson Morales Fall Risk and appropriate interventions in the flowsheet. Outcome: Progressing Towards Goal  Note: Fall Risk Interventions:  Mobility Interventions: Bed/chair exit alarm, Patient to call before getting OOB, PT Consult for mobility concerns    Mentation Interventions: Adequate sleep, hydration, pain control, Bed/chair exit alarm    Medication Interventions: Bed/chair exit alarm, Assess postural VS orthostatic hypotension, Teach patient to arise slowly, Patient to call before getting OOB    Elimination Interventions: Bed/chair exit alarm, Call light in reach    History of Falls Interventions: Bed/chair exit alarm, Door open when patient unattended, Evaluate medications/consider consulting pharmacy, Investigate reason for fall, Utilize gait belt for transfer/ambulation         Problem: Pressure Injury - Risk of  Goal: *Prevention of pressure injury  Description: Document Kirit Scale and appropriate interventions in the flowsheet.   Outcome: Progressing Towards Goal  Note: Pressure Injury Interventions:  Sensory Interventions: Assess changes in LOC, Keep linens dry and wrinkle-free, Maintain/enhance activity level, Minimize linen layers    Moisture Interventions: Absorbent underpads, Internal/External urinary devices, Apply protective barrier, creams and emollients, Minimize layers    Activity Interventions: PT/OT evaluation, Increase time out of bed    Mobility Interventions: Assess need for specialty bed, PT/OT evaluation, Pressure redistribution bed/mattress (bed type)    Nutrition Interventions: Document food/fluid/supplement intake    Friction and Shear Interventions: Apply protective barrier, creams and emollients, HOB 30 degrees or less, Minimize layers, Feet elevated on foot rest, Foam dressings/transparent film/skin sealants                Problem: Discharge Planning  Goal: *Discharge to safe environment  Outcome: Progressing Towards Goal  Goal: *Knowledge of medication management  Outcome: Progressing Towards Goal  Goal: *Knowledge of discharge instructions  Outcome: Progressing Towards Goal

## 2022-08-11 NOTE — PROGRESS NOTES
CM reviewed clinical chart. Patient was set up with [facility] on last admission. CM met with patient at bedside to discuss resuming home health. Patient is agreeable. Choice for [facility] obtained.  CM sent referral.

## 2022-08-11 NOTE — CONSULTS
NEUROLOGY CONSULT    Name Aleida Levy Age 65 y.o. MRN [id number]  1945     Referring Physician: Jackelin Barnes NP    Chief Complaint: Syncope     This is a 65 y.o. right handed -American female who was discharged from the hospital 2 days ago after evaluation of a syncopal episode and was talking to her son at the home sitting on the wheelchair and thus the last thing she remembers. Next thing she woke up in the hospital.  Her son thought that she was sleeping, but when the daughter came she tried to wake her up but could not so call the EMS. She was brought to the hospital where she woke up with no symptoms. No seizure activity was witnessed at home or in the hospital.  She has no history of seizures. At the time of my evaluation this morning the patient was feeling much better with no more spells. She was not feeling dizzy and denied any prodromal symptoms prior to losing awareness. Assessment and Plan:  1. Sudden episode of losing awareness without any warning: Etiology could be related to cardiogenic versus neurogenic. Since she did not feel any thing coming on a small possibility of seizure cannot be ruled out though no seizure activity was noticed. She had a similar episode for which she was in the hospital recently and discharged 2 days ago. We will order an EEG to check for any possible seizure focus. If confirmed we will start her on seizure medicines. With her history of adenocarcinoma of the renal cell, a small possibility of metastasis to the brain cannot be completely ruled out. She had an MRI without contrast in , as such I will order an MRI with contrast to check for any metastatic disease. 2.  History of renal cell adenocarcinoma  3. Questionable history of seizures in the past work note or any medicine. 4.  Hypertension  5. Hyperlipidemia  6. Coronary disease  7.   Diabetes mellitus    Thank you for the consult,    No Known Allergies     Current Facility-Administered Medications   Medication Dose Route Frequency    sodium chloride (NS) flush 5-40 mL  5-40 mL IntraVENous Q8H    sodium chloride (NS) flush 5-40 mL  5-40 mL IntraVENous PRN    acetaminophen (TYLENOL) tablet 650 mg  650 mg Oral Q6H PRN    Or    acetaminophen (TYLENOL) suppository 650 mg  650 mg Rectal Q6H PRN    polyethylene glycol (MIRALAX) packet 17 g  17 g Oral DAILY PRN    ondansetron (ZOFRAN ODT) tablet 4 mg  4 mg Oral Q8H PRN    Or    ondansetron (ZOFRAN) injection 4 mg  4 mg IntraVENous Q6H PRN    [Held by provider] amLODIPine (NORVASC) tablet 10 mg  10 mg Oral DAILY    calcitRIOL (ROCALTROL) capsule 0.25 mcg  0.25 mcg Oral BID Mon Wed & Fri    [Held by provider] carvediloL (COREG) tablet 6.25 mg  6.25 mg Oral BID WITH MEALS    docusate sodium (COLACE) capsule 100 mg  100 mg Oral BID    donepeziL (ARICEPT) tablet 10 mg  10 mg Oral QHS    ferrous sulfate tablet 325 mg  325 mg Oral ACB    fluticasone propionate (FLONASE) 50 mcg/actuation nasal spray 2 Spray  2 Spray Both Nostrils DAILY    gabapentin (NEURONTIN) capsule 300 mg  300 mg Oral QHS    insulin glargine (LANTUS) injection 10 Units  10 Units SubCUTAneous QHS    insulin lispro (HUMALOG) injection   SubCUTAneous AC&HS    latanoprost (XALATAN) 0.005 % ophthalmic solution 1 Drop  1 Drop Both Eyes QHS    venlafaxine-SR (EFFEXOR-XR) capsule 75 mg  75 mg Oral DAILY    [Held by provider] torsemide (DEMADEX) tablet 20 mg  20 mg Oral BID    senna (SENOKOT) tablet 17.2 mg  2 Tablet Oral QHS    pantoprazole (PROTONIX) tablet 40 mg  40 mg Oral BID    cetirizine (ZYRTEC) tablet 10 mg  10 mg Oral DAILY    lidocaine 4 % patch 1 Patch  1 Patch TransDERmal Q24H    atorvastatin (LIPITOR) tablet 20 mg  20 mg Oral QHS     Past Medical History:   Diagnosis Date    Adenocarcinoma, renal cell ([facility] [address] 9/2/2020    Arthritis     CAD (coronary artery disease)     Chronic kidney disease     Diabetes (HCC)     Gout     Hypercholesterolemia     Hypertension Mental depression     Pulmonary embolism (HCC)     Seizures (HCC)     Stroke ([facility] [address]     Thyroid disease      Social History     Tobacco Use    Smoking status: Former     Years: 15.00     Types: Cigarettes    Smokeless tobacco: Never   Vaping Use    Vaping Use: Never used   Substance Use Topics    Alcohol use: Not Currently    Drug use: Never     Exam  Visit Vitals  BP (!) 122/52 (BP 1 Location: Right upper arm, BP Patient Position: Supine)   Pulse 74   Temp 98.6 °F (37 °C)   Resp 18   Ht 5' 6\" (1.676 m)   Wt 79.4 kg (175 lb)   SpO2 100%   BMI 28.25 kg/m²     General: Well developed, well nourished. Patient in no distress   Head: Normocephalic, atraumatic, anicteric sclera   Neck Normal ROM, No thyromegally   Lungs:  Clear to auscultation    Cardiac: Regular rate and rhythm with no murmurs. Abd: Bowel sounds were audible   Ext: No pedal edema   Skin: Supple no rash     NeurologicExam:  Mental Status: Alert and oriented to person place and time   Speech: Fluent no aphasia or dysarthria. Cranial Nerves:   Pupils are equal round and reactive to light and accommodation, extraocular movements are intact and full, visual fields are intact by confrontation, no nystagmus noted, face is symmetric, sensation in face is intact and symmetric, hearing is intact and symmetric, tongue and uvula are in midline with normal movements, palate is elevating equally, shoulder shrug is intact and symmetric. Motor:  Full and symmetric strength of upper and lower ext . Normal bulk and tone. Reflexes:   Deep tendon reflexes 1/4 and symmetric. Sensory:   Symmetric and intact    Gait:  Gait is deferred   Tremor:   No tremor noted. Cerebellar:  Coordination intact for finger-nose-finger. Neurovascular: No carotid bruits.  No JVD      Lab Review  Lab Results   Component Value Date/Time    WBC 7.7 08/11/2022 06:14 AM    HCT 24.8 (L) 08/11/2022 06:14 AM    HGB 7.8 (L) 08/11/2022 06:14 AM    PLATELET 627 02/02/7663 06:14 AM     Lab Results   Component Value Date/Time    Sodium 133 (L) 08/10/2022 02:57 PM    Potassium 4.2 08/10/2022 02:57 PM    Chloride 97 08/10/2022 02:57 PM    CO2 30 08/10/2022 02:57 PM    Glucose 135 (H) 08/10/2022 02:57 PM    BUN 48 (H) 08/10/2022 02:57 PM    Creatinine 3.42 (H) 08/10/2022 02:57 PM    Calcium 10.0 08/10/2022 02:57 PM     Lab Results   Component Value Date/Time    Vitamin B12 1,343 (H) 08/02/2022 03:13 AM    Folate 10.0 09/17/2020 02:14 AM     Lab Results   Component Value Date/Time    LDL,Direct 61 06/20/2022 09:55 AM    LDL, calculated 73.2 11/22/2020 01:12 AM     Lab Results   Component Value Date/Time    Hemoglobin A1c 5.8 (H) 06/20/2022 09:55 AM    Hemoglobin A1c (POC) 5.8 09/23/2020 01:15 PM    Hemoglobin A1c, External 6.5 03/14/2021 12:00 AM     No components found for: TROPQUANT  No results found for: MARISSA

## 2022-08-12 ENCOUNTER — APPOINTMENT (OUTPATIENT)
Dept: MRI IMAGING | Age: 77
End: 2022-08-12
Attending: PSYCHIATRY & NEUROLOGY
Payer: MEDICARE

## 2022-08-12 LAB
GLUCOSE BLD STRIP.AUTO-MCNC: 102 MG/DL (ref 65–117)
GLUCOSE BLD STRIP.AUTO-MCNC: 66 MG/DL (ref 65–117)
GLUCOSE BLD STRIP.AUTO-MCNC: 70 MG/DL (ref 65–117)
GLUCOSE BLD STRIP.AUTO-MCNC: 79 MG/DL (ref 65–117)
GLUCOSE BLD STRIP.AUTO-MCNC: 81 MG/DL (ref 65–117)
PERFORMED BY, TECHID: NORMAL

## 2022-08-12 PROCEDURE — G0378 HOSPITAL OBSERVATION PER HR: HCPCS

## 2022-08-12 PROCEDURE — 82962 GLUCOSE BLOOD TEST: CPT

## 2022-08-12 PROCEDURE — 77010033678 HC OXYGEN DAILY

## 2022-08-12 PROCEDURE — 74011636637 HC RX REV CODE- 636/637: Performed by: NURSE PRACTITIONER

## 2022-08-12 PROCEDURE — 74011000250 HC RX REV CODE- 250: Performed by: NURSE PRACTITIONER

## 2022-08-12 PROCEDURE — 94761 N-INVAS EAR/PLS OXIMETRY MLT: CPT

## 2022-08-12 PROCEDURE — 74011250637 HC RX REV CODE- 250/637: Performed by: NURSE PRACTITIONER

## 2022-08-12 PROCEDURE — 70551 MRI BRAIN STEM W/O DYE: CPT

## 2022-08-12 RX ADMIN — FERROUS SULFATE TAB 325 MG (65 MG ELEMENTAL FE) 325 MG: 325 (65 FE) TAB at 08:22

## 2022-08-12 RX ADMIN — ATORVASTATIN CALCIUM 20 MG: 20 TABLET, FILM COATED ORAL at 22:36

## 2022-08-12 RX ADMIN — LATANOPROST 1 DROP: 50 SOLUTION OPHTHALMIC at 22:37

## 2022-08-12 RX ADMIN — DOCUSATE SODIUM 100 MG: 100 CAPSULE, LIQUID FILLED ORAL at 22:36

## 2022-08-12 RX ADMIN — VENLAFAXINE HYDROCHLORIDE 75 MG: 75 CAPSULE, EXTENDED RELEASE ORAL at 08:22

## 2022-08-12 RX ADMIN — GABAPENTIN 300 MG: 300 CAPSULE ORAL at 22:36

## 2022-08-12 RX ADMIN — DONEPEZIL HYDROCHLORIDE 10 MG: 5 TABLET, FILM COATED ORAL at 22:36

## 2022-08-12 RX ADMIN — CALCITRIOL CAPSULES 0.25 MCG 0.25 MCG: 0.25 CAPSULE ORAL at 08:22

## 2022-08-12 RX ADMIN — SENNOSIDES 17.2 MG: 8.6 TABLET, FILM COATED ORAL at 22:36

## 2022-08-12 RX ADMIN — CETIRIZINE HYDROCHLORIDE 10 MG: 10 TABLET, FILM COATED ORAL at 08:22

## 2022-08-12 RX ADMIN — SODIUM CHLORIDE, PRESERVATIVE FREE 10 ML: 5 INJECTION INTRAVENOUS at 18:15

## 2022-08-12 RX ADMIN — ACETAMINOPHEN 650 MG: 325 TABLET, FILM COATED ORAL at 05:14

## 2022-08-12 RX ADMIN — PANTOPRAZOLE SODIUM 40 MG: 40 TABLET, DELAYED RELEASE ORAL at 08:22

## 2022-08-12 RX ADMIN — ACETAMINOPHEN 650 MG: 325 TABLET, FILM COATED ORAL at 10:06

## 2022-08-12 RX ADMIN — CALCITRIOL CAPSULES 0.25 MCG 0.25 MCG: 0.25 CAPSULE ORAL at 18:15

## 2022-08-12 RX ADMIN — SODIUM CHLORIDE, PRESERVATIVE FREE 10 ML: 5 INJECTION INTRAVENOUS at 22:37

## 2022-08-12 RX ADMIN — PANTOPRAZOLE SODIUM 40 MG: 40 TABLET, DELAYED RELEASE ORAL at 22:36

## 2022-08-12 RX ADMIN — FLUTICASONE PROPIONATE 2 SPRAY: 50 SPRAY, METERED NASAL at 08:28

## 2022-08-12 RX ADMIN — INSULIN GLARGINE 10 UNITS: 100 INJECTION, SOLUTION SUBCUTANEOUS at 22:37

## 2022-08-12 RX ADMIN — SODIUM CHLORIDE, PRESERVATIVE FREE 10 ML: 5 INJECTION INTRAVENOUS at 05:25

## 2022-08-12 RX ADMIN — DOCUSATE SODIUM 100 MG: 100 CAPSULE, LIQUID FILLED ORAL at 08:22

## 2022-08-12 NOTE — PROGRESS NOTES
Problem: Falls - Risk of  Goal: *Absence of Falls  Description: Document Clydene Bone Fall Risk and appropriate interventions in the flowsheet. Outcome: Progressing Towards Goal  Note: Fall Risk Interventions:  Mobility Interventions: Bed/chair exit alarm, Strengthening exercises (ROM-active/passive)    Mentation Interventions: Adequate sleep, hydration, pain control, Bed/chair exit alarm, Room close to nurse's station    Medication Interventions: Bed/chair exit alarm    Elimination Interventions: Bed/chair exit alarm, Call light in reach    History of Falls Interventions: Bed/chair exit alarm, Investigate reason for fall, Room close to nurse's station         Problem: Pressure Injury - Risk of  Goal: *Prevention of pressure injury  Description: Document Kirit Scale and appropriate interventions in the flowsheet.   Outcome: Progressing Towards Goal  Note: Pressure Injury Interventions:  Sensory Interventions: Assess changes in LOC, Keep linens dry and wrinkle-free, Minimize linen layers    Moisture Interventions: Absorbent underpads, Internal/External urinary devices, Minimize layers    Activity Interventions: PT/OT evaluation    Mobility Interventions: PT/OT evaluation    Nutrition Interventions: Document food/fluid/supplement intake    Friction and Shear Interventions: Apply protective barrier, creams and emollients, Minimize layers, Transferring/repositioning devices                Problem: Discharge Planning  Goal: *Discharge to safe environment  Outcome: Progressing Towards Goal  Goal: *Knowledge of medication management  Outcome: Progressing Towards Goal  Goal: *Knowledge of discharge instructions  Outcome: Progressing Towards Goal

## 2022-08-12 NOTE — PROGRESS NOTES
CM reviewed chart. Patient was declined by [facility]. CM spoke with patient's daughter, Daphney Hamilton, [phone]. She is agreeable with CM sending multiple referrals to find an accepting [address] [place]. Choice letter signed and placed on chart. Referral sent. CM will continue to follow.

## 2022-08-12 NOTE — PROGRESS NOTES
Neurology Progress Note    Patient ID:  Zheng Tapia  [id number]  53 y.o.  1945    Subjective: Went to visit Ms. Moore this morning. She was sitting in bed eating breakfast. Nurses were at bedside. She verbalized she was doing well. Denied any dizziness and seizure activities. Chief Complaint: Syncope     Ms. Danette Chavis is a 65 y.o. right handed -American female who was discharged from the hospital 3 days ago after evaluation of a syncopal episode and was talking to her son at home, sitting on the wheelchair and that's the last thing she remembered. She then later woke up in the hospital.  Her son thought that she was sleeping, but when the daughter came she tried to wake her up but could not, and call the EMS. She was brought to the hospital where she woke up with no symptoms. No seizure activity was witnessed at home or in the hospital.  She has no history of seizures.      Current Facility-Administered Medications   Medication Dose Route Frequency    sodium chloride (NS) flush 5-40 mL  5-40 mL IntraVENous Q8H    sodium chloride (NS) flush 5-40 mL  5-40 mL IntraVENous PRN    acetaminophen (TYLENOL) tablet 650 mg  650 mg Oral Q6H PRN    Or    acetaminophen (TYLENOL) suppository 650 mg  650 mg Rectal Q6H PRN    polyethylene glycol (MIRALAX) packet 17 g  17 g Oral DAILY PRN    ondansetron (ZOFRAN ODT) tablet 4 mg  4 mg Oral Q8H PRN    Or    ondansetron (ZOFRAN) injection 4 mg  4 mg IntraVENous Q6H PRN    [Held by provider] amLODIPine (NORVASC) tablet 10 mg  10 mg Oral DAILY    calcitRIOL (ROCALTROL) capsule 0.25 mcg  0.25 mcg Oral BID Mon Wed & Fri    [Held by provider] carvediloL (COREG) tablet 6.25 mg  6.25 mg Oral BID WITH MEALS    docusate sodium (COLACE) capsule 100 mg  100 mg Oral BID    donepeziL (ARICEPT) tablet 10 mg  10 mg Oral QHS    ferrous sulfate tablet 325 mg  325 mg Oral ACB    fluticasone propionate (FLONASE) 50 mcg/actuation nasal spray 2 Spray  2 Spray Both Nostrils DAILY gabapentin (NEURONTIN) capsule 300 mg  300 mg Oral QHS    insulin glargine (LANTUS) injection 10 Units  10 Units SubCUTAneous QHS    insulin lispro (HUMALOG) injection   SubCUTAneous AC&HS    latanoprost (XALATAN) 0.005 % ophthalmic solution 1 Drop  1 Drop Both Eyes QHS    venlafaxine-SR (EFFEXOR-XR) capsule 75 mg  75 mg Oral DAILY    [Held by provider] torsemide (DEMADEX) tablet 20 mg  20 mg Oral BID    senna (SENOKOT) tablet 17.2 mg  2 Tablet Oral QHS    pantoprazole (PROTONIX) tablet 40 mg  40 mg Oral BID    cetirizine (ZYRTEC) tablet 10 mg  10 mg Oral DAILY    lidocaine 4 % patch 1 Patch  1 Patch TransDERmal Q24H    atorvastatin (LIPITOR) tablet 20 mg  20 mg Oral QHS          Objective: Ms. Miladys Blakely was awake, alert, and oriented. She follows commands without any difficulty. Patient Vitals for the past 8 hrs:   BP Temp Pulse Resp   08/12/22 0732 (!) 114/56 97.8 °F (36.6 °C) 66 18   08/12/22 0400 -- -- 76 --       No intake/output data recorded. 08/10 1901 - 08/12 0700  In: -   Out: 1500 [Urine:1500]    Lab Review   Recent Results (from the past 24 hour(s))   GLUCOSE, POC    Collection Time: 08/11/22 11:49 AM   Result Value Ref Range    Glucose (POC) 103 65 - 117 mg/dL    Performed by Usman Reed    GLUCOSE, POC    Collection Time: 08/11/22  4:18 PM   Result Value Ref Range    Glucose (POC) 102 65 - 117 mg/dL    Performed by Ross Mcclure, POC    Collection Time: 08/11/22  7:35 PM   Result Value Ref Range    Glucose (POC) 134 (H) 65 - 117 mg/dL    Performed by Fadumo Red, POC    Collection Time: 08/12/22  7:34 AM   Result Value Ref Range    Glucose (POC) 66 65 - 117 mg/dL    Performed by Ronni Seip    GLUCOSE, POC    Collection Time: 08/12/22  7:35 AM   Result Value Ref Range    Glucose (POC) 70 65 - 117 mg/dL    Performed by Ronni Seip        NEUROLOGICAL EXAM:    Appearance:   The patient is well developed, well nourished, provides a coherent history and is in no acute distress. Mental Status: Oriented to time, place and person. Mood and affect appropriate. Cranial Nerves:   Intact visual fields. CHRIS, EOM's full, no nystagmus, no ptosis. Facial sensation is normal. Facial movement is symmetric. Hearing is normal bilaterally. Palate is midline with normal sternocleidomastoid and trapezius muscles are normal. Tongue is midline. Motor:  5/5 strength in upper and lower proximal and distal muscles. Normal bulk and tone. Reflexes:   Deep tendon reflexes 1+/4 and symmetrical.   Sensory:   Symmetric and intact. Gait:  Deferred. Tremor:   No tremor noted. Cerebellar:  No cerebellar signs present. Babinski:  Down b/l         Assessment:  1) Sudden episode of losing awareness without warning. Etiology could be cardiogenic vs. Neurogenic. A small possibility of seizures cannot be ruled out though no seizure activity was noticed. EEG was ordered to rule out any possible focal seizure. EEG completed, results pending. 2) History of renal cell adenocarcinoma  3) Questionable history of epilepsy. 4) HTN  5) Hyperlipidemia  6) Coronary disease  7) Diabetes Mellitus     Principal Problem:    Syncope (11/21/2020)    Active Problems:    Seizure ([address] (8/10/2022)        Plan:  - EEG results pending. If confirmed possible seizure focus. Will start patient on antiepileptic medications.  - Brain MRI to rule out any metastatic lesions in the brain secondary to adenocarcinoma in kidney. - Will continue to follow patient clinically.      Thanks for the consult,    Signed:  Wesley Delacruz  8/12/2022  10:08 AM     Collaborating Physician:  Dr. Darylene Lacks

## 2022-08-13 VITALS
SYSTOLIC BLOOD PRESSURE: 139 MMHG | HEART RATE: 68 BPM | BODY MASS INDEX: 28.12 KG/M2 | WEIGHT: 175 LBS | DIASTOLIC BLOOD PRESSURE: 89 MMHG | OXYGEN SATURATION: 96 % | RESPIRATION RATE: 18 BRPM | TEMPERATURE: 98.3 F | HEIGHT: 66 IN

## 2022-08-13 LAB
GLUCOSE BLD STRIP.AUTO-MCNC: 136 MG/DL (ref 65–117)
GLUCOSE BLD STRIP.AUTO-MCNC: 69 MG/DL (ref 65–117)
GLUCOSE BLD STRIP.AUTO-MCNC: 79 MG/DL (ref 65–117)
PERFORMED BY, TECHID: ABNORMAL
PERFORMED BY, TECHID: NORMAL
PERFORMED BY, TECHID: NORMAL

## 2022-08-13 PROCEDURE — 74011250637 HC RX REV CODE- 250/637: Performed by: NURSE PRACTITIONER

## 2022-08-13 PROCEDURE — 74011000250 HC RX REV CODE- 250: Performed by: NURSE PRACTITIONER

## 2022-08-13 PROCEDURE — G0378 HOSPITAL OBSERVATION PER HR: HCPCS

## 2022-08-13 PROCEDURE — 82962 GLUCOSE BLOOD TEST: CPT

## 2022-08-13 RX ADMIN — DOCUSATE SODIUM 100 MG: 100 CAPSULE, LIQUID FILLED ORAL at 08:58

## 2022-08-13 RX ADMIN — PANTOPRAZOLE SODIUM 40 MG: 40 TABLET, DELAYED RELEASE ORAL at 08:58

## 2022-08-13 RX ADMIN — ACETAMINOPHEN 650 MG: 325 TABLET, FILM COATED ORAL at 07:19

## 2022-08-13 RX ADMIN — CETIRIZINE HYDROCHLORIDE 10 MG: 10 TABLET, FILM COATED ORAL at 08:58

## 2022-08-13 RX ADMIN — FLUTICASONE PROPIONATE 2 SPRAY: 50 SPRAY, METERED NASAL at 09:02

## 2022-08-13 RX ADMIN — SODIUM CHLORIDE, PRESERVATIVE FREE 10 ML: 5 INJECTION INTRAVENOUS at 05:55

## 2022-08-13 RX ADMIN — VENLAFAXINE HYDROCHLORIDE 75 MG: 75 CAPSULE, EXTENDED RELEASE ORAL at 08:58

## 2022-08-13 RX ADMIN — FERROUS SULFATE TAB 325 MG (65 MG ELEMENTAL FE) 325 MG: 325 (65 FE) TAB at 08:58

## 2022-08-13 NOTE — DISCHARGE SUMMARY
Admit date: 8/10/2022   Admitting Provider: Cory Farris MD    Discharge date: 8/13/2022  Discharging Provider: Paul Love NP      * Admission Diagnoses: Seizure [facility]) [R56.9]  Syncope [R55]    * Discharge Diagnoses:    Hospital Problems as of 8/13/2022 Date Reviewed: 6/20/2022            Codes Class Noted - Resolved POA    Seizure ([address] ICD-10-CM: R56.9  ICD-9-CM: 576.55  8/10/2022 - Present Unknown        * (Principal) Syncope ICD-10-CM: R55  ICD-9-CM: 780.2  11/21/2020 - Present Unknown         HPI: Yenny Vega is a 65 y.o. female who presents to the ED following syncope episode. Of note patient was recently discharged from here 2 days ago for same complaint. Today she was with family when she had another syncopal episode. EMS reported that her blood pressure was systolic 60 but then improved. Patient herself is a poor historian. Family also reports increased lethargy after syncope episode with concern for seizure activity. Patient herself is a poor historian. Chronic anemia noted on routine lab. Will admit to observation status. Consult neurology. [facility] Course: EEG ordered, outpatient follow up with neurology. MRI brain negative for acute findings and lesions. Patient to follow up with neurology in 1 month. * Procedures:   * No surgery found *      Consults: Neurology    Significant Diagnostic Studies:   08/12/22 1348  MRI BRAIN WO CONT   Performed: 08/12/22 3458  Final        Impression: Extensive chronic microvascular angiopathy. No evidence of intracranial metastases on this noncontrast MRI. No evidence of acute infarct. Discharge Exam:  Physical Exam  Vitals and nursing note reviewed. Constitutional:       Appearance: Normal appearance. Eyes:      Extraocular Movements: Extraocular movements intact. Cardiovascular:      Rate and Rhythm: Normal rate. Pulmonary:      Breath sounds: Normal breath sounds.       Comments: 2L NC  Abdominal:      General: Bowel sounds are normal.      Palpations: Abdomen is soft. Skin:     General: Skin is warm and dry. Neurological:      Mental Status: She is alert and oriented to person, place, and time. * Discharge Condition: improved  * Disposition: Home    Discharge Medications:  Current Discharge Medication List        CONTINUE these medications which have NOT CHANGED    Details   apixaban (ELIQUIS) 2.5 mg tablet Take 2.5 mg by mouth two (2) times a day. furosemide (LASIX) 40 mg tablet Take 60 mg by mouth in the morning. plecanatide (Trulance) 3 mg tab Take 3 mg by mouth daily. travoprost (TRAVATAN Z) 0.004 % ophthalmic solution Administer 1 Drop to both eyes every evening. pantoprazole (PROTONIX) 40 mg tablet Take 1 Tablet by mouth two (2) times a day. Qty: 60 Tablet, Refills: 0      albuterol (PROVENTIL VENTOLIN) 2.5 mg /3 mL (0.083 %) nebu 2.5 mg by Nebulization route every six (6) hours as needed for Shortness of Breath. fluticasone propionate (FLONASE) 50 mcg/actuation nasal spray 2 Sprays by Both Nostrils route daily. torsemide (DEMADEX) 20 mg tablet Take 20 mg by mouth two (2) times a day. carvediloL (COREG) 6.25 mg tablet Take 6.25 mg by mouth two (2) times daily (with meals). calcitRIOL (ROCALTROL) 0.25 mcg capsule Take 0.25 mcg by mouth BID Mon Wed & Fri.      donepeziL (ARICEPT) 10 mg tablet Take 10 mg by mouth nightly.      gabapentin (NEURONTIN) 300 mg capsule Take 300 mg by mouth nightly. Venlafaxine-ER 24 HR (EFFEXOR-ER) 75 mg tr24 tablet Take 75 mg by mouth daily. latanoprost (XALATAN) 0.005 % ophthalmic solution Administer 1 Drop to both eyes nightly. pravastatin (PRAVACHOL) 80 mg tablet Take 80 mg by mouth nightly.            STOP taking these medications       hydrALAZINE (APRESOLINE) 10 mg tablet Comments:   Reason for Stopping:         insulin NPH (NOVOLIN N, HUMULIN N) 100 unit/mL injection Comments:   Reason for Stopping:         isosorbide mononitrate ER (IMDUR) 60 mg CR tablet Comments:   Reason for Stopping:         losartan (COZAAR) 50 mg tablet Comments:   Reason for Stopping:         metoprolol succinate (TOPROL-XL) 25 mg XL tablet Comments:   Reason for Stopping:         amLODIPine (NORVASC) 10 mg tablet Comments:   Reason for Stopping:         insulin lispro (HUMALOG) 100 unit/mL injection Comments:   Reason for Stopping:         insulin glargine (LANTUS) 100 unit/mL injection Comments:   Reason for Stopping:         lidocaine (LIDODERM) 5 % Comments:   Reason for Stopping:         loratadine (CLARITIN) 10 mg tablet Comments:   Reason for Stopping:         senna (SENOKOT) 8.6 mg tablet Comments:   Reason for Stopping:         ferrous sulfate 325 mg (65 mg iron) tablet Comments:   Reason for Stopping:         docusate sodium (COLACE) 100 mg capsule Comments:   Reason for Stopping:               * Follow-up Care/Patient Instructions: Activity: Activity as tolerated  Diet: Cardiac Diet  Wound Care: wash daily with warm soapy water, pat dry, cover with mepilex.     Follow-up Information       Follow up With Specialties Details Why Contact Info    Varinder Suresh NP Nurse Practitioner   [address]      Basil Ghotra MD Neurology Follow up in 1 month(s)  [facility] [address]  [phone]            Time Spent: > 35 minutes    Signed:  Kade Colon NP  8/13/2022  10:32 AM

## 2022-08-13 NOTE — PROCEDURES
[facility]  EEG    Name:  Zahira Treviño  MR#:  [id number]  :  1945  ACCOUNT #:  [de-identified]  DATE OF SERVICE:  2022    PATIENT OF:  Doris WhittenSAMI    HISTORY:  EEG was ordered to evaluate for spells of unresponsiveness. MEDICATIONS:  Please review the chart for the list.    DESCRIPTION:  This is a 19-channel digital recording done in bipolar and referential montages with one channel lead assigned to the EKG and the two referential electrodes of the Cz using the 10-20 international system of electrodes. The background rhythm consisted of lower voltages of 10-15 mV, 5-7 Hz theta activity which was well-attenuated by the eyes opening. Photic stimulation did not produce any photic driving. Hyperventilation was not performed. Myogenic and motion artifact was noted intermittently and frequently during the recording. Sinus rhythm with a heart rate in low 60s was recorded in a single channel EKG lead. No focal asymmetries or epileptiform activity or discharges were noted during this recording. The patient became drowsy; however, no significant amount of sleep was noticed. INTERPRETATION:  This is an abnormal EEG due to mild-to-moderate generalized slowing which is a nonspecific finding and is indicative of diffuse cerebral dysfunction as can be seen in metabolic, toxic, hypoxic, degenerative, or vascular etiologies or in the postictal state. Clinical correlation is suggested.       MD BUD [email or web address]  D:  2022 11:14  T:  2022 16:39  JOB #:  [id number]

## 2022-08-13 NOTE — PROGRESS NOTES
Dc instructions given to patient and granddaughter. Alert and oriented x4. On room air. No distress noted. Discharge plan of care/case management plan validated with provider discharge order.  Dc home self care via wheelchair

## 2022-08-13 NOTE — PROGRESS NOTES
CM noted discharge order. [facility] sent referrals to [place] yesterday and there are no  [address] in her area that accept her insurance. CM met with patient at bedside to make her aware of this. Patient verbalized understanding. Patient will discharge home/self. Patient states that her daughter or granddaughter will transport her home. Primary RN made aware. Discharge plan of care/case management plan validated with provider discharge order.

## 2022-08-16 LAB
BACTERIA SPEC CULT: NORMAL
SPECIAL REQUESTS,SREQ: NORMAL

## 2022-09-21 ENCOUNTER — APPOINTMENT (OUTPATIENT)
Dept: GENERAL RADIOLOGY | Age: 77
End: 2022-09-21
Attending: PHYSICIAN ASSISTANT
Payer: MEDICARE

## 2022-09-21 ENCOUNTER — HOSPITAL ENCOUNTER (EMERGENCY)
Age: 77
Discharge: HOME | End: 2022-09-21
Attending: EMERGENCY MEDICINE
Payer: MEDICARE

## 2022-09-21 VITALS
SYSTOLIC BLOOD PRESSURE: 166 MMHG | OXYGEN SATURATION: 99 % | RESPIRATION RATE: 20 BRPM | TEMPERATURE: 98.1 F | HEART RATE: 72 BPM | DIASTOLIC BLOOD PRESSURE: 77 MMHG | WEIGHT: 175 LBS | BODY MASS INDEX: 26.52 KG/M2 | HEIGHT: 68 IN

## 2022-09-21 DIAGNOSIS — R55 VASOVAGAL SYNCOPE: Primary | ICD-10-CM

## 2022-09-21 LAB
ALBUMIN SERPL-MCNC: 2.8 G/DL (ref 3.5–5)
ALBUMIN/GLOB SERPL: 0.7 {RATIO} (ref 1.1–2.2)
ALP SERPL-CCNC: 101 U/L (ref 45–117)
ALT SERPL-CCNC: 12 U/L (ref 12–78)
ANION GAP SERPL CALC-SCNC: 5 MMOL/L (ref 5–15)
AST SERPL W P-5'-P-CCNC: 18 U/L (ref 15–37)
BASOPHILS # BLD: 0.1 K/UL (ref 0–0.1)
BASOPHILS NFR BLD: 1 % (ref 0–1)
BILIRUB SERPL-MCNC: 0.3 MG/DL (ref 0.2–1)
BUN SERPL-MCNC: 53 MG/DL (ref 6–20)
BUN/CREAT SERPL: 22 (ref 12–20)
CA-I BLD-MCNC: 9.4 MG/DL (ref 8.5–10.1)
CHLORIDE SERPL-SCNC: 100 MMOL/L (ref 97–108)
CO2 SERPL-SCNC: 31 MMOL/L (ref 21–32)
CREAT SERPL-MCNC: 2.43 MG/DL (ref 0.55–1.02)
DIFFERENTIAL METHOD BLD: ABNORMAL
EOSINOPHIL # BLD: 0.1 K/UL (ref 0–0.4)
EOSINOPHIL NFR BLD: 2 % (ref 0–7)
ERYTHROCYTE [DISTWIDTH] IN BLOOD BY AUTOMATED COUNT: 18.3 % (ref 11.5–14.5)
GLOBULIN SER CALC-MCNC: 3.9 G/DL (ref 2–4)
GLUCOSE SERPL-MCNC: 173 MG/DL (ref 65–100)
HCT VFR BLD AUTO: 26.5 % (ref 35–47)
HGB BLD-MCNC: 8.3 G/DL (ref 11.5–16)
IMM GRANULOCYTES # BLD AUTO: 0 K/UL (ref 0–0.04)
IMM GRANULOCYTES NFR BLD AUTO: 0 % (ref 0–0.5)
LACTATE SERPL-SCNC: 1 MMOL/L (ref 0.4–2)
LYMPHOCYTES # BLD: 1.1 K/UL (ref 0.8–3.5)
LYMPHOCYTES NFR BLD: 17 % (ref 12–49)
MAGNESIUM SERPL-MCNC: 1.9 MG/DL (ref 1.6–2.4)
MCH RBC QN AUTO: 26.3 PG (ref 26–34)
MCHC RBC AUTO-ENTMCNC: 31.3 G/DL (ref 30–36.5)
MCV RBC AUTO: 84.1 FL (ref 80–99)
MONOCYTES # BLD: 0.7 K/UL (ref 0–1)
MONOCYTES NFR BLD: 11 % (ref 5–13)
NEUTS SEG # BLD: 4.4 K/UL (ref 1.8–8)
NEUTS SEG NFR BLD: 69 % (ref 32–75)
NRBC # BLD: 0 K/UL (ref 0–0.01)
NRBC BLD-RTO: 0 PER 100 WBC
PLATELET # BLD AUTO: 218 K/UL (ref 150–400)
PMV BLD AUTO: 11.4 FL (ref 8.9–12.9)
POTASSIUM SERPL-SCNC: 4.1 MMOL/L (ref 3.5–5.1)
PROT SERPL-MCNC: 6.7 G/DL (ref 6.4–8.2)
RBC # BLD AUTO: 3.15 M/UL (ref 3.8–5.2)
SODIUM SERPL-SCNC: 136 MMOL/L (ref 136–145)
TROPONIN-HIGH SENSITIVITY: 67 NG/L (ref 0–51)
TROPONIN-HIGH SENSITIVITY: 73 NG/L (ref 0–51)
WBC # BLD AUTO: 6.4 K/UL (ref 3.6–11)

## 2022-09-21 PROCEDURE — 84484 ASSAY OF TROPONIN QUANT: CPT

## 2022-09-21 PROCEDURE — 83605 ASSAY OF LACTIC ACID: CPT

## 2022-09-21 PROCEDURE — 80053 COMPREHEN METABOLIC PANEL: CPT

## 2022-09-21 PROCEDURE — 71045 X-RAY EXAM CHEST 1 VIEW: CPT

## 2022-09-21 PROCEDURE — 36415 COLL VENOUS BLD VENIPUNCTURE: CPT

## 2022-09-21 PROCEDURE — 83735 ASSAY OF MAGNESIUM: CPT

## 2022-09-21 PROCEDURE — 93005 ELECTROCARDIOGRAM TRACING: CPT

## 2022-09-21 PROCEDURE — 99285 EMERGENCY DEPT VISIT HI MDM: CPT

## 2022-09-21 PROCEDURE — 85025 COMPLETE CBC W/AUTO DIFF WBC: CPT

## 2022-09-21 NOTE — ED TRIAGE NOTES
Pt had syncopal episode while sitting in chair, witnessed by family who called 911. Pt unresponsive upon ems arrival, pt then became responsive to pain. Pt alert and oriented x3 upon arrival to ER. Per EMS pt has pmh of same.

## 2022-09-21 NOTE — ED PROVIDER NOTES
EMERGENCY DEPARTMENT HISTORY AND PHYSICAL EXAM      Date: 9/21/2022  Patient Name: Kin Shone    History of Presenting Illness     Chief Complaint   Patient presents with    Syncope       History Provided By: Patient    HPI: Kin Shone, 65 y.o. female with past medical history significant for HTN, DM, CAD, CKD, seizures, and gout who presents to this ED for evaluation of syncope. Patient reports that she was sitting in her chair this morning when she developed lightheadedness and had subsequent syncopal episode witnessed by family. Patient reports multiple prior similar episodes, which she states been related to positional changes and micturition. Patient denies any precipitating chest pain, shortness of breath, palpitations. Patient presents with no complaints of pain. Denies any associated symptoms or modifying factors of symptoms. Patient presents asymptomatic. She specifically denies any headaches, numbness, weakness, changes in vision, fevers, chills, abdominal pain, nausea, vomiting, changes in bowel or bladder habits, diaphoresis, or rash. There are no other complaints, changes, or physical findings at this time. PCP: Antonia Carrington NP    No current facility-administered medications on file prior to encounter. Current Outpatient Medications on File Prior to Encounter   Medication Sig Dispense Refill    apixaban (ELIQUIS) 2.5 mg tablet Take 2.5 mg by mouth two (2) times a day. furosemide (LASIX) 40 mg tablet Take 60 mg by mouth in the morning. plecanatide (Trulance) 3 mg tab Take 3 mg by mouth daily. travoprost (TRAVATAN Z) 0.004 % ophthalmic solution Administer 1 Drop to both eyes every evening. pantoprazole (PROTONIX) 40 mg tablet Take 1 Tablet by mouth two (2) times a day. 60 Tablet 0    albuterol (PROVENTIL VENTOLIN) 2.5 mg /3 mL (0.083 %) nebu 2.5 mg by Nebulization route every six (6) hours as needed for Shortness of Breath.       fluticasone propionate (FLONASE) 50 mcg/actuation nasal spray 2 Sprays by Both Nostrils route daily. torsemide (DEMADEX) 20 mg tablet Take 20 mg by mouth two (2) times a day. carvediloL (COREG) 6.25 mg tablet Take 6.25 mg by mouth two (2) times daily (with meals). calcitRIOL (ROCALTROL) 0.25 mcg capsule Take 0.25 mcg by mouth BID Mon Wed & Fri.      donepeziL (ARICEPT) 10 mg tablet Take 10 mg by mouth nightly.      gabapentin (NEURONTIN) 300 mg capsule Take 300 mg by mouth nightly. Venlafaxine-ER 24 HR (EFFEXOR-ER) 75 mg tr24 tablet Take 75 mg by mouth daily. latanoprost (XALATAN) 0.005 % ophthalmic solution Administer 1 Drop to both eyes nightly. pravastatin (PRAVACHOL) 80 mg tablet Take 80 mg by mouth nightly.          Past History     Past Medical History:  Past Medical History:   Diagnosis Date    Adenocarcinoma, renal cell ([facility] [address] 9/2/2020    Arthritis     CAD (coronary artery disease)     Chronic kidney disease     Diabetes ([facility] [address]     Gout     Hypercholesterolemia     Hypertension     Mental depression     Pulmonary embolism (HCC)     Seizures ([facility] [address]     Stroke ([facility] [address]     Thyroid disease        Past Surgical History:  Past Surgical History:   Procedure Laterality Date    HX GYN      HX HYSTERECTOMY      HX NEPHRECTOMY Left 02/12/2015    HX ORTHOPAEDIC      HX UROLOGICAL  02/12/2015    PARTIAL URETERECTOMY     ND CARDIAC SURG PROCEDURE UNLIST      stents placed        Family History:  Family History   Problem Relation Age of Onset    Heart Disease Mother     Heart Disease Father     Heart Disease Sister     Cancer Sister     Heart Disease Brother     Cancer Maternal Grandmother     Stroke Son     Cancer Sister        Social History:  Social History     Tobacco Use    Smoking status: Former     Years: 15.00     Types: Cigarettes    Smokeless tobacco: Never   Vaping Use    Vaping Use: Never used   Substance Use Topics    Alcohol use: Not Currently    Drug use: Never       Allergies:  No Known Allergies    Review of Systems   Review of Systems   Constitutional: Negative. Negative for chills, diaphoresis and fever. HENT: Negative. Negative for congestion, rhinorrhea and sore throat. Eyes: Negative. Respiratory: Negative. Negative for cough, chest tightness, shortness of breath and wheezing. Cardiovascular: Negative. Negative for chest pain and palpitations. Gastrointestinal: Negative. Negative for abdominal pain, diarrhea, nausea and vomiting. Genitourinary: Negative. Negative for difficulty urinating, dysuria, flank pain, frequency and hematuria. Musculoskeletal: Negative. Skin: Negative. Negative for rash. Neurological:  Positive for syncope and light-headedness. Negative for dizziness, weakness, numbness and headaches. Psychiatric/Behavioral: Negative. All other systems reviewed and are negative. Physical Exam   Physical Exam  Vitals and nursing note reviewed. Constitutional:       General: She is not in acute distress. Appearance: Normal appearance. She is not ill-appearing or toxic-appearing. HENT:      Head: Normocephalic and atraumatic. Mouth/Throat:      Mouth: Mucous membranes are moist.      Pharynx: Oropharynx is clear. Eyes:      Extraocular Movements: Extraocular movements intact. Conjunctiva/sclera: Conjunctivae normal.      Pupils: Pupils are equal, round, and reactive to light. Cardiovascular:      Rate and Rhythm: Normal rate and regular rhythm. Pulses: Normal pulses. Heart sounds: No murmur heard. No friction rub. No gallop. Pulmonary:      Effort: Pulmonary effort is normal.      Breath sounds: Normal breath sounds. No wheezing, rhonchi or rales. Abdominal:      General: There is no distension. Palpations: Abdomen is soft. Tenderness: There is no abdominal tenderness. There is no guarding or rebound. Musculoskeletal:      Cervical back: Neck supple. No tenderness. Skin:     General: Skin is warm and dry. Capillary Refill: Capillary refill takes less than 2 seconds. Findings: No rash. Neurological:      General: No focal deficit present. Mental Status: She is alert and oriented to person, place, and time. Psychiatric:         Mood and Affect: Mood normal.         Behavior: Behavior normal.       Lab and Diagnostic Study Results   Labs -   No results found for this or any previous visit (from the past 12 hour(s)). Radiologic Studies -   @lastxrresult@  CT Results  (Last 48 hours)      None          CXR Results  (Last 48 hours)                 09/21/22 1349  XR CHEST PORT Final result    Impression:  No acute cardiopulmonary disease radiographically. .  . Narrative:  INDICATION:  syncope        EXAM: Chest single view. COMPARISON: 8/1/2022. FINDINGS: A single frontal view of the chest at 1340 hours shows clear lungs. The heart, mediastinum and pulmonary vasculature are stable with cardiomegaly. .    The bony thorax is unremarkable for age. .                   Medical Decision Making and ED Course   Differential Diagnosis & Medical Decision Making Provider Note:   Pt presents with syncope. Stable vitals with nonfocal exam. Clinical presentation most c/w vasovagal syncope. Pt is without c/o of headache, intractable vertigo/dizziness, or ataxia on exam.  Ddx: vasovagal/situational syncope, cardiogenic syncope, orthostatic hypotension, dehydration. Will get labs, EKG, orthostatics and fluids PRN. If negative, pt is safe for discharge home and outpatient f/u per [facility] and [facility] Syncope Rules. Given syncope, I did advise that patient cannot drive for 6 months given [facility] [employer].     Per the [facility] Syncope Rule, the patient does not have the following criteria:  1) Signs and symptoms of ACS  2) Signs of conduction disease  3) Worrisome cardiac history  4) Valvular heart disease by history or physical examination  5) Family history of sudden death  6) Persistent abnormal vital signs in the ED  7) Volume depletion such as persistent dehydration, gastrointestinal bleeding, or hematocrit < 30  8) Primary CNS (central nervous system) event    The patient does not have any of the following risk factors for the [facility] Syncope Rule (SFSR):   C - History of congestive heart failure  H - Hematocrit < 30%  E - Abnormal ECG  S - Shortness of breath  S - Triage systolic blood pressure < 90      - I am the first provider for this patient. I reviewed the vital signs, available nursing notes, past medical history, past surgical history, family history and social history. The patients presenting problems have been discussed, and they are in agreement with the care plan formulated and outlined with them. I have encouraged them to ask questions as they arise throughout their visit. Vital Signs-Reviewed the patient's vital signs. No data found. ED Course:   Patient reassessed and updated on results and findings. She is remained asymptomatic during entire ED stay and has been observed ambulating throughout the part without difficulty. She is orthostatic negative. Patient has been encouraged to schedule close follow-up with her primary care within the next week, or return to ED sooner if worse. She is been educated on strict return precautions and conveys good understanding agree with care plan as outlined. Patient has no new concerns or complaints and all her questions have been answered. Anticipate discharge home shortly. Procedures   Performed by: Cornelius Peoples PA-C  Procedures      Disposition   Disposition: DC- Adult Discharges: All of the diagnostic tests were reviewed and questions answered. Diagnosis, care plan and treatment options were discussed. The patient understands the instructions and will follow up as directed. The patients results have been reviewed with them. They have been counseled regarding their diagnosis.   The patient verbally convey understanding and agreement of the signs, symptoms, diagnosis, treatment and prognosis and additionally agrees to follow up as recommended with their PCP in 24 - 48 hours. They also agree with the care-plan and convey that all of their questions have been answered. I have also put together some discharge instructions for them that include: 1) educational information regarding their diagnosis, 2) how to care for their diagnosis at home, as well a 3) list of reasons why they would want to return to the ED prior to their follow-up appointment, should their condition change. DISCHARGE PLAN:  1. Current Discharge Medication List        CONTINUE these medications which have NOT CHANGED    Details   apixaban (ELIQUIS) 2.5 mg tablet Take 2.5 mg by mouth two (2) times a day. furosemide (LASIX) 40 mg tablet Take 60 mg by mouth in the morning. plecanatide (Trulance) 3 mg tab Take 3 mg by mouth daily. travoprost (TRAVATAN Z) 0.004 % ophthalmic solution Administer 1 Drop to both eyes every evening. pantoprazole (PROTONIX) 40 mg tablet Take 1 Tablet by mouth two (2) times a day. Qty: 60 Tablet, Refills: 0      albuterol (PROVENTIL VENTOLIN) 2.5 mg /3 mL (0.083 %) nebu 2.5 mg by Nebulization route every six (6) hours as needed for Shortness of Breath. fluticasone propionate (FLONASE) 50 mcg/actuation nasal spray 2 Sprays by Both Nostrils route daily. torsemide (DEMADEX) 20 mg tablet Take 20 mg by mouth two (2) times a day. carvediloL (COREG) 6.25 mg tablet Take 6.25 mg by mouth two (2) times daily (with meals). calcitRIOL (ROCALTROL) 0.25 mcg capsule Take 0.25 mcg by mouth BID Mon Wed & Fri.      donepeziL (ARICEPT) 10 mg tablet Take 10 mg by mouth nightly.      gabapentin (NEURONTIN) 300 mg capsule Take 300 mg by mouth nightly. Venlafaxine-ER 24 HR (EFFEXOR-ER) 75 mg tr24 tablet Take 75 mg by mouth daily.       latanoprost (XALATAN) 0.005 % ophthalmic solution Administer 1 Drop to both eyes nightly. pravastatin (PRAVACHOL) 80 mg tablet Take 80 mg by mouth nightly. 2.   Follow-up Information       Follow up With Specialties Details Why Contact Kasi Johansen NP Nurse Practitioner Schedule an appointment as soon as possible for a visit   [place] [address] [address] [facility]  [phone]            3. Return to ED if worse   4. Discharge Medication List as of 9/21/2022  7:24 PM        Remove if admitted/transferred    Diagnosis/Clinical Impression     Clinical Impression:   1. Vasovagal syncope        Attestations: Ed Byrne PA-C, am the primary clinician of record. Please note that this dictation was completed with [employer], the computer voice recognition software. Quite often unanticipated grammatical, syntax, homophones, and other interpretive errors are inadvertently transcribed by the computer software. Please disregard these errors. Please excuse any errors that have escaped final proofreading. Thank you.

## 2022-09-22 LAB
ATRIAL RATE: 64 BPM
CALCULATED P AXIS, ECG09: 36 DEGREES
CALCULATED R AXIS, ECG10: 19 DEGREES
CALCULATED T AXIS, ECG11: 56 DEGREES
DIAGNOSIS, 93000: NORMAL
P-R INTERVAL, ECG05: 146 MS
Q-T INTERVAL, ECG07: 450 MS
QRS DURATION, ECG06: 92 MS
QTC CALCULATION (BEZET), ECG08: 464 MS
VENTRICULAR RATE, ECG03: 64 BPM

## 2022-10-04 NOTE — PROGRESS NOTES
Hospitalist Progress Note         PASHA Lezama, FNP-C    Daily Progress Note: 10/4/2022      Subjective:   Subjective   Patient examined alert and oriented sitting in bed. Participating in therapy well. Review of Systems:   Review of Systems   Constitutional:  Negative for chills and fever. Respiratory:  Negative for cough, sputum production and shortness of breath. Cardiovascular:  Negative for chest pain and leg swelling. Gastrointestinal:  Negative for abdominal pain, nausea and vomiting. Genitourinary:  Negative for dysuria and urgency. Musculoskeletal:  Negative for back pain, myalgias and neck pain. Neurological:  Negative for dizziness and headaches. Psychiatric/Behavioral:  Negative for depression, substance abuse and suicidal ideas. Objective:   Objective      Vitals:  Patient Vitals for the past 8 hrs:    BP Temp Pulse Resp   08/12/22 0732 (!) 114/56 97.8 °F (36.6 °C) 66 18   08/12/22 0400 -- -- 76 --          Physical Exam:  Physical Exam  Vitals and nursing note reviewed. Constitutional:       Appearance: Normal appearance. Cardiovascular:      Rate and Rhythm: Normal rate. Heart sounds: Normal heart sounds. Abdominal:      General: Bowel sounds are normal.      Palpations: Abdomen is soft. Skin:     General: Skin is warm. Capillary Refill: Capillary refill takes less than 2 seconds. Neurological:      Mental Status: She is alert. Mental status is at baseline. Lab Results:  No results found for this or any previous visit (from the past 24 hour(s)). Results       ** No results found for the last 336 hours. **             Diagnostic Images:  [unfilled]      Current Medications:  No current facility-administered medications for this encounter.     Current Outpatient Medications:     apixaban (ELIQUIS) 2.5 mg tablet, Take 2.5 mg by mouth two (2) times a day., Disp: , Rfl:     furosemide (LASIX) 40 mg tablet, Take 60 mg by mouth in the morning., Disp: , Rfl:     plecanatide (Trulance) 3 mg tab, Take 3 mg by mouth daily. , Disp: , Rfl:     travoprost (TRAVATAN Z) 0.004 % ophthalmic solution, Administer 1 Drop to both eyes every evening., Disp: , Rfl:     pantoprazole (PROTONIX) 40 mg tablet, Take 1 Tablet by mouth two (2) times a day., Disp: 60 Tablet, Rfl: 0    albuterol (PROVENTIL VENTOLIN) 2.5 mg /3 mL (0.083 %) nebu, 2.5 mg by Nebulization route every six (6) hours as needed for Shortness of Breath., Disp: , Rfl:     fluticasone propionate (FLONASE) 50 mcg/actuation nasal spray, 2 Sprays by Both Nostrils route daily. , Disp: , Rfl:     torsemide (DEMADEX) 20 mg tablet, Take 20 mg by mouth two (2) times a day., Disp: , Rfl:     carvediloL (COREG) 6.25 mg tablet, Take 6.25 mg by mouth two (2) times daily (with meals). , Disp: , Rfl:     calcitRIOL (ROCALTROL) 0.25 mcg capsule, Take 0.25 mcg by mouth BID Mon Wed & Fri., Disp: , Rfl:     donepeziL (ARICEPT) 10 mg tablet, Take 10 mg by mouth nightly., Disp: , Rfl:     gabapentin (NEURONTIN) 300 mg capsule, Take 300 mg by mouth nightly., Disp: , Rfl:     Venlafaxine-ER 24 HR (EFFEXOR-ER) 75 mg tr24 tablet, Take 75 mg by mouth daily. , Disp: , Rfl:     latanoprost (XALATAN) 0.005 % ophthalmic solution, Administer 1 Drop to both eyes nightly., Disp: , Rfl:     pravastatin (PRAVACHOL) 80 mg tablet, Take 80 mg by mouth nightly., Disp: , Rfl:        ASSESSMENT:  Mindi Olszewski is a 65 y.o. female who presents to the ED following syncope episode. Of note patient was recently discharged from here 2 days ago for same complaint. Today she was with family when she had another syncopal episode. EMS reported that her blood pressure was systolic 60 but then improved. Patient herself is a poor historian. Family also reports increased lethargy after syncope episode with concern for seizure activity. Patient herself is a poor historian. Chronic anemia noted on routine lab. Will admit to observation status. Consulted neurology. MRI brain pending. EEG pending. Further management pending diagnostic results. Syncope  Questionable seizure postictal reported  -Neuro following  -defer seizure medication initiation to neuro  -MRI and EEG pending     Acute anemia  -hgb appears stable 7.8  -previously on eliquis and asa  -continue to trend     Hypertension  -bp currently acceptable  -continue to hold amlodipine and coreg     Hld  -managed with pravastatin     Elevated troponin  -recent echo showed EF 50-55%  -EKG showed sinus bradycardia     DM Type 2  -ac/hs accu check  -med dose ssi  -continue gabapentin for neuropathy     Depression  -managed with effexor and aricept     Chronic renal failure w/ hx renal adenocarcinoma  -appears neat baseline  -renal dose all meds to current gfr     Chronic constipation  -managed with senna      Prior  GI PROPHYLAXIS protonix  DVT PROPHYLAXIS scd's  Discharge barriers:  -pending MRI and EEG    Above treatment plan reviewed and discussed with patient in detail at bedside, all questions answered. Care Plan discussed with: Patient/Family    Total time spent with patient: 30 minutes.     Willam Soares NP

## 2022-10-10 ENCOUNTER — APPOINTMENT (OUTPATIENT)
Dept: GENERAL RADIOLOGY | Age: 77
End: 2022-10-10
Attending: EMERGENCY MEDICINE
Payer: MEDICARE

## 2022-10-10 ENCOUNTER — HOSPITAL ENCOUNTER (OUTPATIENT)
Age: 77
Setting detail: OBSERVATION
Discharge: HOME HEALTH | End: 2022-10-13
Attending: EMERGENCY MEDICINE | Admitting: HOSPITALIST
Payer: MEDICARE

## 2022-10-10 DIAGNOSIS — R55 SYNCOPE AND COLLAPSE: Primary | ICD-10-CM

## 2022-10-10 DIAGNOSIS — K92.2 LOWER GI BLEED: ICD-10-CM

## 2022-10-10 LAB
ALBUMIN SERPL-MCNC: 3 G/DL (ref 3.5–5)
ALBUMIN/GLOB SERPL: 0.8 {RATIO} (ref 1.1–2.2)
ALP SERPL-CCNC: 94 U/L (ref 45–117)
ALT SERPL-CCNC: 12 U/L (ref 12–78)
ANION GAP SERPL CALC-SCNC: 6 MMOL/L (ref 5–15)
AST SERPL W P-5'-P-CCNC: 22 U/L (ref 15–37)
BASOPHILS # BLD: 0 K/UL (ref 0–0.1)
BASOPHILS NFR BLD: 1 % (ref 0–1)
BILIRUB SERPL-MCNC: 0.3 MG/DL (ref 0.2–1)
BNP SERPL-MCNC: 1585 PG/ML
BUN SERPL-MCNC: 72 MG/DL (ref 6–20)
BUN/CREAT SERPL: 27 (ref 12–20)
CA-I BLD-MCNC: 9.3 MG/DL (ref 8.5–10.1)
CHLORIDE SERPL-SCNC: 97 MMOL/L (ref 97–108)
CO2 SERPL-SCNC: 32 MMOL/L (ref 21–32)
COLLECT DATE STL: ABNORMAL
CREAT SERPL-MCNC: 2.63 MG/DL (ref 0.55–1.02)
DIFFERENTIAL METHOD BLD: ABNORMAL
EOSINOPHIL # BLD: 0.1 K/UL (ref 0–0.4)
EOSINOPHIL NFR BLD: 1 % (ref 0–7)
ERYTHROCYTE [DISTWIDTH] IN BLOOD BY AUTOMATED COUNT: 19.2 % (ref 11.5–14.5)
GLOBULIN SER CALC-MCNC: 3.9 G/DL (ref 2–4)
GLUCOSE SERPL-MCNC: 143 MG/DL (ref 65–100)
HCT VFR BLD AUTO: 26.4 % (ref 35–47)
HEMOCCULT SP1 STL QL: POSITIVE
HGB BLD-MCNC: 8.3 G/DL (ref 11.5–16)
IMM GRANULOCYTES # BLD AUTO: 0 K/UL (ref 0–0.04)
IMM GRANULOCYTES NFR BLD AUTO: 0 % (ref 0–0.5)
LYMPHOCYTES # BLD: 1.4 K/UL (ref 0.8–3.5)
LYMPHOCYTES NFR BLD: 16 % (ref 12–49)
MCH RBC QN AUTO: 25.3 PG (ref 26–34)
MCHC RBC AUTO-ENTMCNC: 31.4 G/DL (ref 30–36.5)
MCV RBC AUTO: 80.5 FL (ref 80–99)
MONOCYTES # BLD: 0.9 K/UL (ref 0–1)
MONOCYTES NFR BLD: 10 % (ref 5–13)
NEUTS SEG # BLD: 6.2 K/UL (ref 1.8–8)
NEUTS SEG NFR BLD: 72 % (ref 32–75)
NRBC # BLD: 0 K/UL (ref 0–0.01)
NRBC BLD-RTO: 0 PER 100 WBC
PLATELET # BLD AUTO: 219 K/UL (ref 150–400)
PMV BLD AUTO: 10.5 FL (ref 8.9–12.9)
POTASSIUM SERPL-SCNC: 4.2 MMOL/L (ref 3.5–5.1)
PROT SERPL-MCNC: 6.9 G/DL (ref 6.4–8.2)
RBC # BLD AUTO: 3.28 M/UL (ref 3.8–5.2)
SODIUM SERPL-SCNC: 135 MMOL/L (ref 136–145)
TROPONIN-HIGH SENSITIVITY: 54 NG/L (ref 0–51)
WBC # BLD AUTO: 8.6 K/UL (ref 3.6–11)

## 2022-10-10 PROCEDURE — 99285 EMERGENCY DEPT VISIT HI MDM: CPT

## 2022-10-10 PROCEDURE — 84484 ASSAY OF TROPONIN QUANT: CPT

## 2022-10-10 PROCEDURE — 83880 ASSAY OF NATRIURETIC PEPTIDE: CPT

## 2022-10-10 PROCEDURE — 82272 OCCULT BLD FECES 1-3 TESTS: CPT

## 2022-10-10 PROCEDURE — G0378 HOSPITAL OBSERVATION PER HR: HCPCS

## 2022-10-10 PROCEDURE — 36415 COLL VENOUS BLD VENIPUNCTURE: CPT

## 2022-10-10 PROCEDURE — 71045 X-RAY EXAM CHEST 1 VIEW: CPT

## 2022-10-10 PROCEDURE — 94761 N-INVAS EAR/PLS OXIMETRY MLT: CPT

## 2022-10-10 PROCEDURE — 85025 COMPLETE CBC W/AUTO DIFF WBC: CPT

## 2022-10-10 PROCEDURE — 80053 COMPREHEN METABOLIC PANEL: CPT

## 2022-10-10 PROCEDURE — 74011000250 HC RX REV CODE- 250: Performed by: HOSPITALIST

## 2022-10-10 PROCEDURE — 93005 ELECTROCARDIOGRAM TRACING: CPT

## 2022-10-10 PROCEDURE — 74011250637 HC RX REV CODE- 250/637: Performed by: HOSPITALIST

## 2022-10-10 PROCEDURE — 65270000029 HC RM PRIVATE

## 2022-10-10 RX ORDER — ASPIRIN 81 MG/1
81 TABLET ORAL DAILY
COMMUNITY
End: 2022-11-03

## 2022-10-10 RX ORDER — PANTOPRAZOLE SODIUM 40 MG/1
40 TABLET, DELAYED RELEASE ORAL 2 TIMES DAILY
Status: DISCONTINUED | OUTPATIENT
Start: 2022-10-10 | End: 2022-10-13 | Stop reason: HOSPADM

## 2022-10-10 RX ORDER — ONDANSETRON 2 MG/ML
4 INJECTION INTRAMUSCULAR; INTRAVENOUS
Status: DISCONTINUED | OUTPATIENT
Start: 2022-10-10 | End: 2022-10-13 | Stop reason: HOSPADM

## 2022-10-10 RX ORDER — FLUTICASONE PROPIONATE 50 MCG
2 SPRAY, SUSPENSION (ML) NASAL DAILY
Status: DISCONTINUED | OUTPATIENT
Start: 2022-10-11 | End: 2022-10-10

## 2022-10-10 RX ORDER — SODIUM CHLORIDE 0.9 % (FLUSH) 0.9 %
5-40 SYRINGE (ML) INJECTION EVERY 8 HOURS
Status: DISCONTINUED | OUTPATIENT
Start: 2022-10-10 | End: 2022-10-13 | Stop reason: HOSPADM

## 2022-10-10 RX ORDER — ACETAMINOPHEN 650 MG/1
650 SUPPOSITORY RECTAL
Status: DISCONTINUED | OUTPATIENT
Start: 2022-10-10 | End: 2022-10-13 | Stop reason: HOSPADM

## 2022-10-10 RX ORDER — ACETAMINOPHEN 325 MG/1
650 TABLET ORAL
Status: DISCONTINUED | OUTPATIENT
Start: 2022-10-10 | End: 2022-10-13 | Stop reason: HOSPADM

## 2022-10-10 RX ORDER — DEXTROSE MONOHYDRATE 100 MG/ML
0-250 INJECTION, SOLUTION INTRAVENOUS AS NEEDED
Status: DISCONTINUED | OUTPATIENT
Start: 2022-10-10 | End: 2022-10-13 | Stop reason: HOSPADM

## 2022-10-10 RX ORDER — ACETAMINOPHEN 325 MG/1
325 TABLET ORAL AS NEEDED
COMMUNITY

## 2022-10-10 RX ORDER — POLYETHYLENE GLYCOL 3350 17 G/17G
17 POWDER, FOR SOLUTION ORAL DAILY PRN
Status: DISCONTINUED | OUTPATIENT
Start: 2022-10-10 | End: 2022-10-13 | Stop reason: HOSPADM

## 2022-10-10 RX ORDER — CARVEDILOL 3.12 MG/1
6.25 TABLET ORAL 2 TIMES DAILY WITH MEALS
Status: DISCONTINUED | OUTPATIENT
Start: 2022-10-10 | End: 2022-10-13 | Stop reason: HOSPADM

## 2022-10-10 RX ORDER — GABAPENTIN 300 MG/1
300 CAPSULE ORAL
Status: DISCONTINUED | OUTPATIENT
Start: 2022-10-10 | End: 2022-10-13 | Stop reason: HOSPADM

## 2022-10-10 RX ORDER — CHOLECALCIFEROL (VITAMIN D3) 125 MCG
5 CAPSULE ORAL
COMMUNITY

## 2022-10-10 RX ORDER — INSULIN ASPART 100 [IU]/ML
INJECTION, SOLUTION INTRAVENOUS; SUBCUTANEOUS
COMMUNITY

## 2022-10-10 RX ORDER — CETIRIZINE HCL 10 MG
10 TABLET ORAL DAILY
COMMUNITY

## 2022-10-10 RX ORDER — FUROSEMIDE 40 MG/1
60 TABLET ORAL DAILY
Status: DISCONTINUED | OUTPATIENT
Start: 2022-10-11 | End: 2022-10-10

## 2022-10-10 RX ORDER — ONDANSETRON 4 MG/1
4 TABLET, ORALLY DISINTEGRATING ORAL
Status: DISCONTINUED | OUTPATIENT
Start: 2022-10-10 | End: 2022-10-13 | Stop reason: HOSPADM

## 2022-10-10 RX ORDER — INSULIN LISPRO 100 [IU]/ML
INJECTION, SOLUTION INTRAVENOUS; SUBCUTANEOUS
Status: DISCONTINUED | OUTPATIENT
Start: 2022-10-10 | End: 2022-10-13 | Stop reason: HOSPADM

## 2022-10-10 RX ORDER — DONEPEZIL HYDROCHLORIDE 5 MG/1
10 TABLET, FILM COATED ORAL
Status: DISCONTINUED | OUTPATIENT
Start: 2022-10-10 | End: 2022-10-13 | Stop reason: HOSPADM

## 2022-10-10 RX ORDER — VENLAFAXINE HYDROCHLORIDE 37.5 MG/1
75 CAPSULE, EXTENDED RELEASE ORAL DAILY
Status: DISCONTINUED | OUTPATIENT
Start: 2022-10-11 | End: 2022-10-13 | Stop reason: HOSPADM

## 2022-10-10 RX ORDER — DOCUSATE SODIUM 100 MG/1
100 CAPSULE, LIQUID FILLED ORAL 2 TIMES DAILY
COMMUNITY
End: 2022-10-10

## 2022-10-10 RX ORDER — ALBUTEROL SULFATE 2.5 MG/.5ML
2.5 SOLUTION RESPIRATORY (INHALATION)
Status: DISCONTINUED | OUTPATIENT
Start: 2022-10-10 | End: 2022-10-13 | Stop reason: HOSPADM

## 2022-10-10 RX ORDER — CALCITRIOL 0.25 UG/1
0.25 CAPSULE ORAL
Status: DISCONTINUED | OUTPATIENT
Start: 2022-10-10 | End: 2022-10-13 | Stop reason: HOSPADM

## 2022-10-10 RX ORDER — MAGNESIUM SULFATE 100 %
4 CRYSTALS MISCELLANEOUS AS NEEDED
Status: DISCONTINUED | OUTPATIENT
Start: 2022-10-10 | End: 2022-10-13 | Stop reason: HOSPADM

## 2022-10-10 RX ORDER — LATANOPROST 50 UG/ML
1 SOLUTION/ DROPS OPHTHALMIC
Status: DISCONTINUED | OUTPATIENT
Start: 2022-10-10 | End: 2022-10-13 | Stop reason: HOSPADM

## 2022-10-10 RX ORDER — SODIUM CHLORIDE 0.9 % (FLUSH) 0.9 %
5-40 SYRINGE (ML) INJECTION AS NEEDED
Status: DISCONTINUED | OUTPATIENT
Start: 2022-10-10 | End: 2022-10-13 | Stop reason: HOSPADM

## 2022-10-10 RX ADMIN — CALCITRIOL CAPSULES 0.25 MCG 0.25 MCG: 0.25 CAPSULE ORAL at 19:09

## 2022-10-10 RX ADMIN — DONEPEZIL HYDROCHLORIDE 10 MG: 5 TABLET, FILM COATED ORAL at 23:12

## 2022-10-10 RX ADMIN — SODIUM CHLORIDE, PRESERVATIVE FREE 10 ML: 5 INJECTION INTRAVENOUS at 18:37

## 2022-10-10 RX ADMIN — CARVEDILOL 6.25 MG: 3.12 TABLET, FILM COATED ORAL at 18:37

## 2022-10-10 NOTE — ED PROVIDER NOTES
EMERGENCY DEPARTMENT HISTORY AND PHYSICAL EXAM      Date: 10/10/2022  Patient Name: Blaine Rollins    History of Presenting Illness     Chief Complaint   Patient presents with    Syncope       History Provided By: Patient    HPI: Blaine Rollins, 76-year-old female with history of hypertension, CKD, CAD, HLD, CVA, DM, renal cell carcinoma status post left nephrectomy presenting after syncopal episode. Patient states today she drink coffee and then had a syncopal episode. Patient states yesterday she had eaten some cereal and then dropped something on the ground and when she went to pick it up she passed out as well. Patient denies any chest pain with episodes or right now. She has not had a cough. Denies any shortness of breath, nausea, vomiting associated with syncopal episodes. Patient was discharged in August for similar episode. Patient states she has had black stools for the past 3 days. She states she is taking Eliquis. On last admission they reported she had been stopped off this medication due to GI bleed with plans to readdress initiation in the future    There are no other complaints, changes, or physical findings at this time. PCP: Jeannie Lockett NP    No current facility-administered medications on file prior to encounter. Current Outpatient Medications on File Prior to Encounter   Medication Sig Dispense Refill    apixaban (ELIQUIS) 2.5 mg tablet Take 2.5 mg by mouth two (2) times a day. furosemide (LASIX) 40 mg tablet Take 60 mg by mouth in the morning. plecanatide (Trulance) 3 mg tab Take 3 mg by mouth daily. travoprost (TRAVATAN Z) 0.004 % ophthalmic solution Administer 1 Drop to both eyes every evening. pantoprazole (PROTONIX) 40 mg tablet Take 1 Tablet by mouth two (2) times a day. 60 Tablet 0    albuterol (PROVENTIL VENTOLIN) 2.5 mg /3 mL (0.083 %) nebu 2.5 mg by Nebulization route every six (6) hours as needed for Shortness of Breath.       fluticasone propionate (FLONASE) 50 mcg/actuation nasal spray 2 Sprays by Both Nostrils route daily. torsemide (DEMADEX) 20 mg tablet Take 20 mg by mouth two (2) times a day. carvediloL (COREG) 6.25 mg tablet Take 6.25 mg by mouth two (2) times daily (with meals). calcitRIOL (ROCALTROL) 0.25 mcg capsule Take 0.25 mcg by mouth BID Mon Wed & Fri.      donepeziL (ARICEPT) 10 mg tablet Take 10 mg by mouth nightly.      gabapentin (NEURONTIN) 300 mg capsule Take 300 mg by mouth nightly. Venlafaxine-ER 24 HR (EFFEXOR-ER) 75 mg tr24 tablet Take 75 mg by mouth daily. latanoprost (XALATAN) 0.005 % ophthalmic solution Administer 1 Drop to both eyes nightly. pravastatin (PRAVACHOL) 80 mg tablet Take 80 mg by mouth nightly.          Past History     Past Medical History:  Past Medical History:   Diagnosis Date    Adenocarcinoma, renal cell ([facility] [address] 9/2/2020    Arthritis     CAD (coronary artery disease)     Chronic kidney disease     Diabetes ([facility] [address]     Gout     Hypercholesterolemia     Hypertension     Mental depression     Pulmonary embolism (HCC)     Seizures ([facility] [address]     Stroke ([facility] [address]     Thyroid disease        Past Surgical History:  Past Surgical History:   Procedure Laterality Date    HX GYN      HX HYSTERECTOMY      HX NEPHRECTOMY Left 02/12/2015    HX ORTHOPAEDIC      HX UROLOGICAL  02/12/2015    PARTIAL URETERECTOMY     SC CARDIAC SURG PROCEDURE UNLIST      stents placed        Family History:  Family History   Problem Relation Age of Onset    Heart Disease Mother     Heart Disease Father     Heart Disease Sister     Cancer Sister     Heart Disease Brother     Cancer Maternal Grandmother     Stroke Son     Cancer Sister        Social History:  Social History     Tobacco Use    Smoking status: Former     Years: 15.00     Types: Cigarettes    Smokeless tobacco: Never   Vaping Use    Vaping Use: Never used   Substance Use Topics    Alcohol use: Not Currently    Drug use: Never       Allergies:  No Known Allergies    Review of Systems   Review of Systems   Constitutional:  Negative for chills and fever. HENT:  Negative for sore throat. Eyes:  Negative for redness. Respiratory:  Negative for shortness of breath. Cardiovascular:  Negative for chest pain. Gastrointestinal:  Positive for blood in stool. Negative for abdominal pain, nausea and vomiting. Genitourinary:  Negative for flank pain. Musculoskeletal:  Negative for myalgias. Skin:  Negative for rash. Neurological:  Positive for syncope. Negative for headaches. Physical Exam   Physical Exam  Vitals and nursing note reviewed. Constitutional:       General: She is not in acute distress. Appearance: Normal appearance. HENT:      Head: Normocephalic and atraumatic. Mouth/Throat:      Mouth: Mucous membranes are moist.   Eyes:      Extraocular Movements: Extraocular movements intact. Conjunctiva/sclera: Conjunctivae normal.   Cardiovascular:      Rate and Rhythm: Normal rate and regular rhythm. Pulmonary:      Effort: Pulmonary effort is normal. No respiratory distress. Breath sounds: Normal breath sounds. No wheezing, rhonchi or rales. Abdominal:      General: There is no distension. Palpations: Abdomen is soft. Tenderness: There is no abdominal tenderness. Musculoskeletal:         General: Normal range of motion. Cervical back: Normal range of motion. Skin:     General: Skin is warm and dry. Neurological:      General: No focal deficit present. Mental Status: She is alert and oriented to person, place, and time. Mental status is at baseline. Lab and Diagnostic Study Results   Labs -   No results found for this or any previous visit (from the past 12 hour(s)).     Radiologic Studies -   @lastxrresult@  CT Results  (Last 48 hours)      None          CXR Results  (Last 48 hours)      None            Medical Decision Making and ED Course   Differential Diagnosis & Medical Decision Making Provider Note:   48-year-old female presenting after syncopal episode. Patient has recurrent syncopal episodes with no etiology has been found and she has multiple admissions in the past for similar episodes. Today is complicated as patient reports recent black stools after being put back on her Eliquis. She was previously taken off Eliquis due to a history of GI bleed. Her stool today is brown but Hemoccult positive. H&H is low but at baseline. Patient is high risk for severe GI bleed. Will admit to medicine for repeat syncope work-up and evaluation for GI bleed. - I am the first provider for this patient. I reviewed the vital signs, available nursing notes, past medical history, past surgical history, family history and social history. The patients presenting problems have been discussed, and they are in agreement with the care plan formulated and outlined with them. I have encouraged them to ask questions as they arise throughout their visit. Vital Signs-Reviewed the patient's vital signs. Patient Vitals for the past 12 hrs:   Temp Pulse Resp BP SpO2   10/10/22 1358 97.2 °F (36.2 °C) 64 17 132/64 100 %       ED Course:            Disposition   Disposition: Admitted to Floor Medical Floor the case was discussed with the admitting physician     Diagnosis/Clinical Impression     Clinical Impression:   1. Syncope and collapse    2. Lower GI bleed        Attestations: Kary Bragg MD, am the primary clinician of record. Please note that this dictation was completed with [employer], the computer voice recognition software. Quite often unanticipated grammatical, syntax, homophones, and other interpretive errors are inadvertently transcribed by the computer software. Please disregard these errors. Please excuse any errors that have escaped final proofreading. Thank you.

## 2022-10-10 NOTE — PROGRESS NOTES
Admission Medication Reconciliation:    Information obtained from:  Patient's daughter    Comments/Recommendations: Reviewed PTA medications and patient's allergies. Removed flonase  Removed furosemide 40 mg  Removed travatan z eye drop    Pharmacy: [facility])      Allergies:  Patient has no known allergies. Significant PMH/Disease States:   Past Medical History:   Diagnosis Date    Adenocarcinoma, renal cell ([facility] [address] 2020    Arthritis     CAD (coronary artery disease)     Chronic kidney disease     Diabetes ([facility] [address]     Gout     Hypercholesterolemia     Hypertension     Mental depression     P[facility]     Seizures ([facility] [address]     Stroke [facility])     Thyroid disease      Chief Complaint for this Admission:    Chief Complaint   Patient presents with    Syncope     Prior to Admission Medications:   Prior to Admission Medications   Prescriptions Last Dose Informant Patient Reported? Taking? Venlafaxine-ER 24 HR (EFFEXOR-ER) 75 mg tr24 tablet  Family Member Yes Yes   Sig: Take 75 mg by mouth daily. acetaminophen (TYLENOL) 325 mg tablet  Family Member Yes Yes   Sig: Take 325 mg by mouth as needed for Pain. albuterol (PROVENTIL VENTOLIN) 2.5 mg /3 mL (0.083 %) nebu  Family Member Yes Yes   Si.5 mg by Nebulization route every six (6) hours as needed for Shortness of Breath. apixaban (ELIQUIS) 2.5 mg tablet  Family Member Yes Yes   Sig: Take 2.5 mg by mouth two (2) times a day. aspirin delayed-release 81 mg tablet  Family Member Yes Yes   Sig: Take 81 mg by mouth daily. calcitRIOL (ROCALTROL) 0.25 mcg capsule  Family Member Yes Yes   Sig: Take 0.25 mcg by mouth BID Mon Wed & Fri.   carvediloL (COREG) 6.25 mg tablet  Family Member Yes Yes   Sig: Take 6.25 mg by mouth two (2) times daily (with meals). cetirizine (ZYRTEC) 10 mg tablet  Family Member Yes Yes   Sig: Take 10 mg by mouth daily. donepeziL (ARICEPT) 10 mg tablet  Family Member Yes Yes   Sig: Take 10 mg by mouth nightly. gabapentin (NEURONTIN) 300 mg capsule  Family Member Yes Yes   Sig: Take 300 mg by mouth two (2) times a day. insulin aspart U-100 (NOVOLOG) 100 unit/mL injection  Family Member Yes Yes   Sig: by SubCUTAneous route Before breakfast, lunch, dinner and at bedtime. SS: 150-200=2 units 201-250=4 units 251-300=6 units 301-350=8 units 351-400=10 units   insulin detemir U-100 (LEVEMIR) 100 unit/mL injection  Family Member Yes Yes   Sig: 10 Units by SubCUTAneous route nightly. latanoprost (XALATAN) 0.005 % ophthalmic solution  Family Member Yes Yes   Sig: Administer 1 Drop to both eyes nightly. melatonin 5 mg tablet  Family Member Yes Yes   Sig: Take 5 mg by mouth nightly. pantoprazole (PROTONIX) 40 mg tablet  Family Member No Yes   Sig: Take 1 Tablet by mouth two (2) times a day. plecanatide (Trulance) 3 mg tab  Family Member Yes Yes   Sig: Take 3 mg by mouth daily. pravastatin (PRAVACHOL) 80 mg tablet  Family Member Yes Yes   Sig: Take 80 mg by mouth nightly. torsemide (DEMADEX) 20 mg tablet  Family Member Yes Yes   Sig: Take 20 mg by mouth two (2) times a day.       Facility-Administered Medications: None       Daniela Wood

## 2022-10-10 NOTE — H&P
Admission History and Physical      NAME:  Blaine Rollins   :   1945   MRN:  [id number]     PCP:  Jeannie Lockett NP     Date/Time:  10/10/2022           Subjective:     CHIEF COMPLAINT:  syncope    HISTORY OF PRESENT ILLNESS:     Ms. Becca Castanon is a 65 y.o.   F was brought by family while she had some syncopal-like events at home. She was also noted to be heme positive in ER with a hemoglobin of 8. Patient had had black stools without any andrey bleeding per rectum. Patient otherwise denies any chest pain, nausea, vomiting, diarrhea. Apparently patient is on Eliquis which was supposedly was discontinued at the discharge last time. It was felt by the ER physician patient needs to be admitted for further care and evaluation. Past Medical History:   Diagnosis Date    Adenocarcinoma, renal cell ([address] 2020    Arthritis     CAD (coronary artery disease)     Chronic kidney disease     Diabetes ([facility] [address]     Gout     Hypercholesterolemia     Hypertension     Mental depression     Pulmonary embolism (HCC)     Seizures ([facility] [address]     Stroke ([facility] [address]     Thyroid disease         Past Surgical History:   Procedure Laterality Date    HX GYN      HX HYSTERECTOMY      HX NEPHRECTOMY Left 2015    HX ORTHOPAEDIC      HX UROLOGICAL  2015    PARTIAL URETERECTOMY     [place] CARDIAC SURG PROCEDURE UNLIST      stents placed        Social History     Tobacco Use    Smoking status: Former     Years: 15.00     Types: Cigarettes    Smokeless tobacco: Never   Substance Use Topics    Alcohol use: Not Currently        Family History   Problem Relation Age of Onset    Heart Disease Mother     Heart Disease Father     Heart Disease Sister     Cancer Sister     Heart Disease Brother     Cancer Maternal Grandmother     Stroke Son     Cancer Sister         No Known Allergies     Prior to Admission medications    Medication Sig Start Date End Date Taking?  Authorizing Provider   apixaban (ELIQUIS) 2.5 mg tablet Take 2.5 mg by mouth two (2) times a day. Provider, Historical   furosemide (LASIX) 40 mg tablet Take 60 mg by mouth in the morning. Provider, Historical   plecanatide (Trulance) 3 mg tab Take 3 mg by mouth daily. Provider, Historical   travoprost (TRAVATAN Z) 0.004 % ophthalmic solution Administer 1 Drop to both eyes every evening. Provider, Historical   pantoprazole (PROTONIX) 40 mg tablet Take 1 Tablet by mouth two (2) times a day. 8/8/22   Tressa Jones MD   albuterol (PROVENTIL VENTOLIN) 2.5 mg /3 mL (0.083 %) nebu 2.5 mg by Nebulization route every six (6) hours as needed for Shortness of Breath. Provider, Historical   fluticasone propionate (FLONASE) 50 mcg/actuation nasal spray 2 Sprays by Both Nostrils route daily. Provider, Historical   torsemide (DEMADEX) 20 mg tablet Take 20 mg by mouth two (2) times a day. Provider, Historical   carvediloL (COREG) 6.25 mg tablet Take 6.25 mg by mouth two (2) times daily (with meals). Provider, Historical   calcitRIOL (ROCALTROL) 0.25 mcg capsule Take 0.25 mcg by mouth BID Mon Wed & Fri. Provider, Historical   donepeziL (ARICEPT) 10 mg tablet Take 10 mg by mouth nightly. Provider, Historical   gabapentin (NEURONTIN) 300 mg capsule Take 300 mg by mouth nightly. 6/29/20   Provider, Historical   Venlafaxine-ER 24 HR (EFFEXOR-ER) 75 mg tr24 tablet Take 75 mg by mouth daily. Provider, Historical   latanoprost (XALATAN) 0.005 % ophthalmic solution Administer 1 Drop to both eyes nightly. Provider, Historical   pravastatin (PRAVACHOL) 80 mg tablet Take 80 mg by mouth nightly.     Provider, Historical         Review of Systems:  Pertinent findings per HPI otherwise remaining 10 review of system negative         Objective:      VITALS:    Vital signs reviewed; most recent are:    Visit Vitals  /64   Pulse 64   Temp 97.2 °F (36.2 °C)   Resp 17   Ht 5' 4\" (1.626 m)   Wt 86.2 kg (190 lb)   SpO2 100%   BMI 32.61 kg/m²     SpO2 Readings from Last 6 Encounters: 10/10/22 100%   09/21/22 99%   08/13/22 96%   08/09/22 95%   08/08/22 97%   06/20/22 98%        No intake or output data in the 24 hours ending 10/10/22 1708         Exam:     Physical Exam:    Gen:    in no acute distress  HEENT:   hearing intact to voice, moist mucous membranes  Neck:  Supple,   Resp:  No accessory muscle use, symmetric expansion  Card:  No   peripheral edema, RRR  Abd:  Soft, non-tender, non-distended   Lymph:  No visible cervical adenopathy  Musc:  No visible cyanosis or clubbing  Skin:  No visible rashes or ulcers   Neuro:   follows commands appropriately, normal speech  Psych:  Alert with good insight. Labs:    Recent Labs     10/10/22  1517   WBC 8.6   HGB 8.3*   HCT 26.4*        Recent Labs     10/10/22  1517   *   K 4.2   CL 97   CO2 32   *   BUN 72*   CREA 2.63*   CA 9.3   ALB 3.0*   TBILI 0.3   ALT 12     Lab Results   Component Value Date/Time    Glucose (POC) 136 (H) 08/13/2022 11:07 AM    Glucose (POC) 69 08/13/2022 08:17 AM     No results for input(s): PH, PCO2, PO2, HCO3, FIO2 in the last 72 hours. No results for input(s): INR, INREXT in the last 72 hours. CXR - IMPRESSION Clear lungs. Cardiac megaly. Assesment and Plan:    Syncopal-like events-  Monitor on telemetry  Orthostatic BP  Trend cardiac enzymes  Cardiology evaluation requested  Recent Echo noted  Defer further work-up to cardiology team    Chronic anemia-  Heme positive in ED  Had black stools  Eliquis on hold  GI consult requested  Recent EGD last admission noted  Continue oral PPI  No active rectal bleed at this time    DMT2-monitor BG on sliding scale  HTN-monitor BP on home meds  CKD stage 3 x 4- Monitor Crt , holding Diuretics as Crt mildly elevated  Debility/deconditioned state-get PT OT evaluation  Chr PE-holding Eliquis for suspected GI bleed. Resume once cleared by GI.     AD FC  DVT PRx SCD  NOK daughter  Dispo: TBD ___________________________________________________    Attending Physician: Rogerio Merchant MD   10/10/2022

## 2022-10-10 NOTE — PROGRESS NOTES
Reason for Admission:  syncope                     RUR Score:     N/A                Plan for utilizing home health:   Not at this time        PCP: First and Last name:  Gerald Gasca NP     Name of Practice:    Are you a current patient: Yes/No:    Approximate date of last visit: 2 weeks ago   Can you participate in a virtual visit with your PCP:                     Current Advanced Directive/Advance Care Plan: Full Code      Healthcare Decision Maker:   Click here to complete [id number] including selection of the Healthcare Decision Maker Relationship (ie \"Primary\")             Primary Decision MakerRoxanna Spoon - Daughter - [phone]                  Transition of Care Plan:      Met f/f with Pt and her daughter, Pt confirmed that the information on the face sheet is correct. Pt stated that she lives with her daughter. Pt stated no HH, she has a cane and walker, and is independent with ADL    Pt stated that she uses [address] in [place]. Pt stated that family will give her a ride home when she is D/C. Cm dispo: home with no needs at this time.

## 2022-10-10 NOTE — ED TRIAGE NOTES
Pt arrives to ED by EMS from home. Pt had syncopal episode and was unresponsive. EMS paramedic reports this happens frequently with Pt. Pt found to be bradycardic on ECG.

## 2022-10-11 LAB
ALBUMIN SERPL-MCNC: 2.7 G/DL (ref 3.5–5)
ALBUMIN/GLOB SERPL: 0.8 {RATIO} (ref 1.1–2.2)
ALP SERPL-CCNC: 82 U/L (ref 45–117)
ALT SERPL-CCNC: 12 U/L (ref 12–78)
ANION GAP SERPL CALC-SCNC: 7 MMOL/L (ref 5–15)
AST SERPL W P-5'-P-CCNC: 18 U/L (ref 15–37)
ATRIAL RATE: 62 BPM
BASOPHILS # BLD: 0 K/UL (ref 0–0.1)
BASOPHILS NFR BLD: 1 % (ref 0–1)
BILIRUB DIRECT SERPL-MCNC: 0.1 MG/DL (ref 0–0.2)
BILIRUB SERPL-MCNC: 0.2 MG/DL (ref 0.2–1)
BUN SERPL-MCNC: 75 MG/DL (ref 6–20)
BUN/CREAT SERPL: 26 (ref 12–20)
CA-I BLD-MCNC: 9.4 MG/DL (ref 8.5–10.1)
CALCULATED P AXIS, ECG09: 53 DEGREES
CALCULATED R AXIS, ECG10: 14 DEGREES
CALCULATED T AXIS, ECG11: 61 DEGREES
CHLORIDE SERPL-SCNC: 98 MMOL/L (ref 97–108)
CO2 SERPL-SCNC: 32 MMOL/L (ref 21–32)
CREAT SERPL-MCNC: 2.91 MG/DL (ref 0.55–1.02)
DIAGNOSIS, 93000: NORMAL
DIFFERENTIAL METHOD BLD: ABNORMAL
EOSINOPHIL # BLD: 0.2 K/UL (ref 0–0.4)
EOSINOPHIL NFR BLD: 2 % (ref 0–7)
ERYTHROCYTE [DISTWIDTH] IN BLOOD BY AUTOMATED COUNT: 18.7 % (ref 11.5–14.5)
ERYTHROCYTE [DISTWIDTH] IN BLOOD BY AUTOMATED COUNT: 19.1 % (ref 11.5–14.5)
GLOBULIN SER CALC-MCNC: 3.4 G/DL (ref 2–4)
GLUCOSE BLD STRIP.AUTO-MCNC: 116 MG/DL (ref 65–100)
GLUCOSE BLD STRIP.AUTO-MCNC: 117 MG/DL (ref 65–100)
GLUCOSE BLD STRIP.AUTO-MCNC: 154 MG/DL (ref 65–100)
GLUCOSE BLD STRIP.AUTO-MCNC: 205 MG/DL (ref 65–100)
GLUCOSE SERPL-MCNC: 143 MG/DL (ref 65–100)
HCT VFR BLD AUTO: 22.8 % (ref 35–47)
HCT VFR BLD AUTO: 27.9 % (ref 35–47)
HGB BLD-MCNC: 7 G/DL (ref 11.5–16)
HGB BLD-MCNC: 8.8 G/DL (ref 11.5–16)
IMM GRANULOCYTES # BLD AUTO: 0 K/UL (ref 0–0.04)
IMM GRANULOCYTES NFR BLD AUTO: 0 % (ref 0–0.5)
LYMPHOCYTES # BLD: 1.5 K/UL (ref 0.8–3.5)
LYMPHOCYTES NFR BLD: 20 % (ref 12–49)
MAGNESIUM SERPL-MCNC: 1.8 MG/DL (ref 1.6–2.4)
MCH RBC QN AUTO: 24.6 PG (ref 26–34)
MCH RBC QN AUTO: 25.6 PG (ref 26–34)
MCHC RBC AUTO-ENTMCNC: 30.7 G/DL (ref 30–36.5)
MCHC RBC AUTO-ENTMCNC: 31.5 G/DL (ref 30–36.5)
MCV RBC AUTO: 80.3 FL (ref 80–99)
MCV RBC AUTO: 81.1 FL (ref 80–99)
MONOCYTES # BLD: 0.9 K/UL (ref 0–1)
MONOCYTES NFR BLD: 12 % (ref 5–13)
NEUTS SEG # BLD: 4.9 K/UL (ref 1.8–8)
NEUTS SEG NFR BLD: 65 % (ref 32–75)
NRBC # BLD: 0 K/UL (ref 0–0.01)
NRBC # BLD: 0 K/UL (ref 0–0.01)
NRBC BLD-RTO: 0 PER 100 WBC
NRBC BLD-RTO: 0 PER 100 WBC
P-R INTERVAL, ECG05: 142 MS
PERFORMED BY, TECHID: ABNORMAL
PLATELET # BLD AUTO: 194 K/UL (ref 150–400)
PLATELET # BLD AUTO: 198 K/UL (ref 150–400)
PMV BLD AUTO: 10.3 FL (ref 8.9–12.9)
PMV BLD AUTO: 10.8 FL (ref 8.9–12.9)
POTASSIUM SERPL-SCNC: 3.9 MMOL/L (ref 3.5–5.1)
PROT SERPL-MCNC: 6.1 G/DL (ref 6.4–8.2)
Q-T INTERVAL, ECG07: 454 MS
QRS DURATION, ECG06: 94 MS
QTC CALCULATION (BEZET), ECG08: 460 MS
RBC # BLD AUTO: 2.84 M/UL (ref 3.8–5.2)
RBC # BLD AUTO: 3.44 M/UL (ref 3.8–5.2)
SODIUM SERPL-SCNC: 137 MMOL/L (ref 136–145)
VENTRICULAR RATE, ECG03: 62 BPM
WBC # BLD AUTO: 7.5 K/UL (ref 3.6–11)
WBC # BLD AUTO: 8.5 K/UL (ref 3.6–11)

## 2022-10-11 PROCEDURE — 74011000250 HC RX REV CODE- 250: Performed by: HOSPITALIST

## 2022-10-11 PROCEDURE — 36430 TRANSFUSION BLD/BLD COMPNT: CPT

## 2022-10-11 PROCEDURE — 82962 GLUCOSE BLOOD TEST: CPT

## 2022-10-11 PROCEDURE — 74011250637 HC RX REV CODE- 250/637: Performed by: HOSPITALIST

## 2022-10-11 PROCEDURE — 36415 COLL VENOUS BLD VENIPUNCTURE: CPT

## 2022-10-11 PROCEDURE — G0378 HOSPITAL OBSERVATION PER HR: HCPCS

## 2022-10-11 PROCEDURE — 80048 BASIC METABOLIC PNL TOTAL CA: CPT

## 2022-10-11 PROCEDURE — 86923 COMPATIBILITY TEST ELECTRIC: CPT

## 2022-10-11 PROCEDURE — 83735 ASSAY OF MAGNESIUM: CPT

## 2022-10-11 PROCEDURE — 86900 BLOOD TYPING SEROLOGIC ABO: CPT

## 2022-10-11 PROCEDURE — 80076 HEPATIC FUNCTION PANEL: CPT

## 2022-10-11 PROCEDURE — 85025 COMPLETE CBC W/AUTO DIFF WBC: CPT

## 2022-10-11 PROCEDURE — 85027 COMPLETE CBC AUTOMATED: CPT

## 2022-10-11 PROCEDURE — P9016 RBC LEUKOCYTES REDUCED: HCPCS

## 2022-10-11 PROCEDURE — 65270000029 HC RM PRIVATE

## 2022-10-11 RX ORDER — SODIUM CHLORIDE 9 MG/ML
250 INJECTION, SOLUTION INTRAVENOUS AS NEEDED
Status: DISCONTINUED | OUTPATIENT
Start: 2022-10-11 | End: 2022-10-13 | Stop reason: HOSPADM

## 2022-10-11 RX ADMIN — SODIUM CHLORIDE, PRESERVATIVE FREE 10 ML: 5 INJECTION INTRAVENOUS at 14:41

## 2022-10-11 RX ADMIN — ACETAMINOPHEN 650 MG: 325 TABLET ORAL at 22:33

## 2022-10-11 RX ADMIN — VENLAFAXINE HYDROCHLORIDE 75 MG: 37.5 CAPSULE, EXTENDED RELEASE ORAL at 09:23

## 2022-10-11 RX ADMIN — PANTOPRAZOLE SODIUM 40 MG: 40 TABLET, DELAYED RELEASE ORAL at 09:23

## 2022-10-11 RX ADMIN — SODIUM CHLORIDE, PRESERVATIVE FREE 10 ML: 5 INJECTION INTRAVENOUS at 14:44

## 2022-10-11 RX ADMIN — SODIUM CHLORIDE, PRESERVATIVE FREE 10 ML: 5 INJECTION INTRAVENOUS at 23:44

## 2022-10-11 RX ADMIN — PANTOPRAZOLE SODIUM 40 MG: 40 TABLET, DELAYED RELEASE ORAL at 22:33

## 2022-10-11 RX ADMIN — CARVEDILOL 6.25 MG: 3.12 TABLET, FILM COATED ORAL at 17:29

## 2022-10-11 RX ADMIN — DONEPEZIL HYDROCHLORIDE 10 MG: 5 TABLET, FILM COATED ORAL at 22:33

## 2022-10-11 RX ADMIN — GABAPENTIN 300 MG: 300 CAPSULE ORAL at 22:33

## 2022-10-11 NOTE — CONSULTS
Cardiology Consult    NAME: Nishi Monsivais   :  1945   MRN:  [id number]     Date/Time:  10/11/2022 10:28 AM    Patient PCP: Donita De Anda NP  ________________________________________________________________________     Assessment/Plan:   GI bleed, anemia,  blood in stool/black. Will hold aspirin and Eliquis. 22 EGD with gastritis    Syncopal spells, last spell . Most likely secondary to anemia. Coronary artery disease, status post PTCA and SHU ? 2006 history of NSTEMI on hospitalization , conservative management    Chronic kidney disease. History of pulmonary embolism, patient reports was 10 years ago. (? In ) ? 2016 at Nephrectomy. Renal cell cancer, status post left nephrectomy           []        High complexity decision making was performed        Subjective:   CHIEF COMPLAINT:     Blood in stool  REASON FOR CONSULT:  Syncope/GI bleed/anticoagulant therapy    HISTORY OF PRESENT ILLNESS:     Nishi Monsivais is a 65 y.o. BLACK/ female who presents with blood in stool, black, syncopal spells. Patient says she had fallen last bout was . Patient has been feeling weak and dizzy. Also noted blood in her stool. This was blackish. With this patient did come to the emergency room. Was evaluated and admitted. History of GI bleed anemia, had a hospitalization in August also with NSTEMI. Conservative management. Has history of coronary artery disease and stenting .  cardiac catheterization with mid to distal LAD with moderate disease. History of CVA, left-sided weakness. History of renal cancer status post left nephrectomy. Chronic kidney disease, followed by Dr. Stefan Robledo, left arm with AV fistula. Not on dialysis yet. Will try to confirm dates with daughter, I did call and could not get her.         Past Medical History:   Diagnosis Date    Adenocarcinoma, renal cell ([address] 2020    Arthritis     CAD (coronary artery disease) Chronic kidney disease     Diabetes ([facility]     Gout     Hypercholesterolemia     Hypertension     Mental depression     Pulmonary embolism (HCC)     Seizures ([facility]     Stroke ([facility]     Thyroid disease       Past Surgical History:   Procedure Laterality Date    HX GYN      HX HYSTERECTOMY      HX NEPHRECTOMY Left 02/12/2015    HX ORTHOPAEDIC      HX UROLOGICAL  02/12/2015    PARTIAL URETERECTOMY     [place] CARDIAC SURG PROCEDURE UNLIST      stents placed      No Known Allergies   Meds:  See below  Social History     Tobacco Use    Smoking status: Former     Years: 15.00     Types: Cigarettes    Smokeless tobacco: Never   Substance Use Topics    Alcohol use: Not Currently      Family History   Problem Relation Age of Onset    Heart Disease Mother     Heart Disease Father     Heart Disease Sister     Cancer Sister     Heart Disease Brother     Cancer Maternal Grandmother     Stroke Son     Cancer Sister        REVIEW OF SYSTEMS:     []         Unable to obtain  ROS due to ---   [x]         Total of 12 systems reviewed as follows:    Constitutional: negative fever, negative chills, negative weight loss  Eyes:   negative visual changes  ENT:   negative sore throat, tongue or lip swelling  Respiratory:  negative cough, negative dyspnea  Cards:  negative for chest pain, palpitations, lower extremity edema  GI:   negative for nausea, vomiting, diarrhea, and abdominal pain  Genitourinary: negative for frequency, dysuria  Integument:  negative for rash   Hematologic:  negative for easy bruising and gum/nose bleeding  Musculoskel: negative for myalgias,  back pain  Neurological:  negative for headaches, dizziness, vertigo, weakness  Behavl/Psych: negative for feelings of anxiety, depression     Pertinent Positives include :    Objective:      Physical Exam:    Last 24hrs VS reviewed since prior progress note.  Most recent are:    Visit Vitals  BP (!) 146/68   Pulse 66   Temp 97.9 °F (36.6 °C)   Resp 18   Ht 5' 4\" (1.626 m)   Wt 86.2 kg (190 lb)   SpO2 98%   BMI 32.61 kg/m²     No intake or output data in the 24 hours ending 10/11/22 1028     General Appearance: Well developed, alert, no acute distress. Ears/Nose/Mouth/Throat: Pupils equal and round, Hearing grossly normal.  Neck: Supple. JVP within normal limits. Carotids good upstrokes, with no bruit. Chest: Lungs clear to auscultation bilaterally. Cardiovascular: Regular rate and rhythm, S1S2 normal, 2/6 systolic murmur, best at the left parasternal area no rubs, gallops, . Abdomen: Soft, non-tender, bowel sounds are active. No organomegaly. Extremities: No edema bilaterally. Femoral pulses +2, left arm fistula   skin: Warm and dry. Neuro: Alert and oriented x3, normal speech; follows simple commands  Psychiatric: Cooperative, normal affect and judgment    []         Post-cath site without hematoma, bruit, tenderness, or thrill. Distal pulses intact. Data:      Telemetry:    EKG:  []  No new EKG for review. EKG with sinus rhythm  Chest x-ray clear lungs, cardiomegaly    Prior to Admission medications    Medication Sig Start Date End Date Taking? Authorizing Provider   cetirizine (ZYRTEC) 10 mg tablet Take 10 mg by mouth daily. Yes Provider, Historical   acetaminophen (TYLENOL) 325 mg tablet Take 325 mg by mouth as needed for Pain. Yes Provider, Historical   aspirin delayed-release 81 mg tablet Take 81 mg by mouth daily. Yes Provider, Historical   melatonin 5 mg tablet Take 5 mg by mouth nightly. Yes Provider, Historical   insulin aspart U-100 (NOVOLOG) 100 unit/mL injection by SubCUTAneous route Before breakfast, lunch, dinner and at bedtime. SS: 150-200=2 units 201-250=4 units 251-300=6 units 301-350=8 units 351-400=10 units   Yes Provider, Historical   insulin detemir U-100 (LEVEMIR) 100 unit/mL injection 10 Units by SubCUTAneous route nightly. Yes Provider, Historical   apixaban (ELIQUIS) 2.5 mg tablet Take 2.5 mg by mouth two (2) times a day.    Yes Provider, Historical   plecanatide (Trulance) 3 mg tab Take 3 mg by mouth daily. Yes Provider, Historical   pantoprazole (PROTONIX) 40 mg tablet Take 1 Tablet by mouth two (2) times a day. 8/8/22  Yes Xiomara Acuna MD   albuterol (PROVENTIL VENTOLIN) 2.5 mg /3 mL (0.083 %) nebu 2.5 mg by Nebulization route every six (6) hours as needed for Shortness of Breath. Yes Provider, Historical   torsemide (DEMADEX) 20 mg tablet Take 20 mg by mouth two (2) times a day. Yes Provider, Historical   carvediloL (COREG) 6.25 mg tablet Take 6.25 mg by mouth two (2) times daily (with meals). Yes Provider, Historical   calcitRIOL (ROCALTROL) 0.25 mcg capsule Take 0.25 mcg by mouth BID Mon Wed & Fri. Yes Provider, Historical   donepeziL (ARICEPT) 10 mg tablet Take 10 mg by mouth nightly. Yes Provider, Historical   gabapentin (NEURONTIN) 300 mg capsule Take 300 mg by mouth two (2) times a day. 6/29/20  Yes Provider, Historical   Venlafaxine-ER 24 HR (EFFEXOR-ER) 75 mg tr24 tablet Take 75 mg by mouth daily. Yes Provider, Historical   latanoprost (XALATAN) 0.005 % ophthalmic solution Administer 1 Drop to both eyes nightly. Yes Provider, Historical   pravastatin (PRAVACHOL) 80 mg tablet Take 80 mg by mouth nightly. Yes Provider, Historical       Recent Results (from the past 24 hour(s))   CBC WITH AUTOMATED DIFF    Collection Time: 10/10/22  3:17 PM   Result Value Ref Range    WBC 8.6 3.6 - 11.0 K/uL    RBC 3.28 (L) 3.80 - 5.20 M/uL    HGB 8.3 (L) 11.5 - 16.0 g/dL    HCT 26.4 (L) 35.0 - 47.0 %    MCV 80.5 80.0 - 99.0 FL    MCH 25.3 (L) 26.0 - 34.0 PG    MCHC 31.4 30.0 - 36.5 g/dL    RDW 19.2 (H) 11.5 - 14.5 %    PLATELET 190 467 - 848 K/uL    MPV 10.5 8.9 - 12.9 FL    NRBC 0.0 0.0  WBC    ABSOLUTE NRBC 0.00 0.00 - 0.01 K/uL    NEUTROPHILS 72 32 - 75 %    LYMPHOCYTES 16 12 - 49 %    MONOCYTES 10 5 - 13 %    EOSINOPHILS 1 0 - 7 %    BASOPHILS 1 0 - 1 %    IMMATURE GRANULOCYTES 0 0 - 0.5 %    ABS.  NEUTROPHILS 6.2 1.8 - 8.0 K/UL    ABS. LYMPHOCYTES 1.4 0.8 - 3.5 K/UL    ABS. MONOCYTES 0.9 0.0 - 1.0 K/UL    ABS. EOSINOPHILS 0.1 0.0 - 0.4 K/UL    ABS. BASOPHILS 0.0 0.0 - 0.1 K/UL    ABS. IMM. GRANS. 0.0 0.00 - 0.04 K/UL    DF AUTOMATED     METABOLIC PANEL, COMPREHENSIVE    Collection Time: 10/10/22  3:17 PM   Result Value Ref Range    Sodium 135 (L) 136 - 145 mmol/L    Potassium 4.2 3.5 - 5.1 mmol/L    Chloride 97 97 - 108 mmol/L    CO2 32 21 - 32 mmol/L    Anion gap 6 5 - 15 mmol/L    Glucose 143 (H) 65 - 100 mg/dL    BUN 72 (H) 6 - 20 mg/dL    Creatinine 2.63 (H) 0.55 - 1.02 mg/dL    BUN/Creatinine ratio 27 (H) 12 - 20      eGFR 18 (L) >60 ml/min/1.73m2    Calcium 9.3 8.5 - 10.1 mg/dL    Bilirubin, total 0.3 0.2 - 1.0 mg/dL    AST (SGOT) 22 15 - 37 U/L    ALT (SGPT) 12 12 - 78 U/L    Alk.  phosphatase 94 45 - 117 U/L    Protein, total 6.9 6.4 - 8.2 g/dL    Albumin 3.0 (L) 3.5 - 5.0 g/dL    Globulin 3.9 2.0 - 4.0 g/dL    A-G Ratio 0.8 (L) 1.1 - 2.2     TROPONIN-HIGH SENSITIVITY    Collection Time: 10/10/22  3:17 PM   Result Value Ref Range    Troponin-High Sensitivity 54 (H) 0 - 51 ng/L   NT-PRO BNP    Collection Time: 10/10/22  3:17 PM   Result Value Ref Range    NT pro-BNP 1,585 (H) <450 pg/mL   OCCULT BLOOD, STOOL    Collection Time: 10/10/22  3:17 PM   Result Value Ref Range    Occult Blood,day 1 Positive (A) Negative      Day 1 date: 67,600,766     METABOLIC PANEL, BASIC    Collection Time: 10/11/22  3:39 AM   Result Value Ref Range    Sodium 137 136 - 145 mmol/L    Potassium 3.9 3.5 - 5.1 mmol/L    Chloride 98 97 - 108 mmol/L    CO2 32 21 - 32 mmol/L    Anion gap 7 5 - 15 mmol/L    Glucose 143 (H) 65 - 100 mg/dL    BUN 75 (H) 6 - 20 mg/dL    Creatinine 2.91 (H) 0.55 - 1.02 mg/dL    BUN/Creatinine ratio 26 (H) 12 - 20      eGFR 16 (L) >60 ml/min/1.73m2    Calcium 9.4 8.5 - 10.1 mg/dL   HEPATIC FUNCTION PANEL    Collection Time: 10/11/22  3:39 AM   Result Value Ref Range    Protein, total 6.1 (L) 6.4 - 8.2 g/dL    Albumin 2.7 (L) 3.5 - 5.0 g/dL    Globulin 3.4 2.0 - 4.0 g/dL    A-G Ratio 0.8 (L) 1.1 - 2.2      Bilirubin, total 0.2 0.2 - 1.0 mg/dL    Bilirubin, direct 0.1 0.0 - 0.2 mg/dL    Alk. phosphatase 82 45 - 117 U/L    AST (SGOT) 18 15 - 37 U/L    ALT (SGPT) 12 12 - 78 U/L   MAGNESIUM    Collection Time: 10/11/22  3:39 AM   Result Value Ref Range    Magnesium 1.8 1.6 - 2.4 mg/dL   CBC WITH AUTOMATED DIFF    Collection Time: 10/11/22  3:39 AM   Result Value Ref Range    WBC 7.5 3.6 - 11.0 K/uL    RBC 2.84 (L) 3.80 - 5.20 M/uL    HGB 7.0 (L) 11.5 - 16.0 g/dL    HCT 22.8 (L) 35.0 - 47.0 %    MCV 80.3 80.0 - 99.0 FL    MCH 24.6 (L) 26.0 - 34.0 PG    MCHC 30.7 30.0 - 36.5 g/dL    RDW 19.1 (H) 11.5 - 14.5 %    PLATELET 936 734 - 145 K/uL    MPV 10.8 8.9 - 12.9 FL    NRBC 0.0 0.0  WBC    ABSOLUTE NRBC 0.00 0.00 - 0.01 K/uL    NEUTROPHILS 65 32 - 75 %    LYMPHOCYTES 20 12 - 49 %    MONOCYTES 12 5 - 13 %    EOSINOPHILS 2 0 - 7 %    BASOPHILS 1 0 - 1 %    IMMATURE GRANULOCYTES 0 0 - 0.5 %    ABS. NEUTROPHILS 4.9 1.8 - 8.0 K/UL    ABS. LYMPHOCYTES 1.5 0.8 - 3.5 K/UL    ABS. MONOCYTES 0.9 0.0 - 1.0 K/UL    ABS. EOSINOPHILS 0.2 0.0 - 0.4 K/UL    ABS. BASOPHILS 0.0 0.0 - 0.1 K/UL    ABS. IMM.  GRANS. 0.0 0.00 - 0.04 K/UL    DF AUTOMATED     GLUCOSE, POC    Collection Time: 10/11/22  7:31 AM   Result Value Ref Range    Glucose (POC) 154 (H) 65 - 100 mg/dL    Performed by [facility]    TYPE & SCREEN    Collection Time: 10/11/22  8:46 AM   Result Value Ref Range    Crossmatch Expiration 10/14/2022,2359     ABO/Rh(D) A Positive     Antibody screen Negative     Unit number [id number]     Blood component type  LR     Unit division 00     Status of unit Allocated     TRANSFUSION STATUS Ok to transfuse     Crossmatch result Augusta Snyder MD

## 2022-10-11 NOTE — PROGRESS NOTES
Problem: Pressure Injury - Risk of  Goal: *Prevention of pressure injury  Description: Document Kirit Scale and appropriate interventions in the flowsheet. Outcome: Progressing Towards Goal  Note: Pressure Injury Interventions:  Sensory Interventions: Minimize linen layers, Keep linens dry and wrinkle-free    Moisture Interventions: Minimize layers    Activity Interventions: PT/OT evaluation    Mobility Interventions: PT/OT evaluation    Nutrition Interventions: Offer support with meals,snacks and hydration    Friction and Shear Interventions: Minimize layers                Problem: Patient Education: Go to Patient Education Activity  Goal: Patient/Family Education  Outcome: Progressing Towards Goal     Problem: Falls - Risk of  Goal: *Absence of Falls  Description: Document Emi Fall Risk and appropriate interventions in the flowsheet.   Outcome: Progressing Towards Goal  Note: Fall Risk Interventions:  Mobility Interventions: PT Consult for mobility concerns, OT consult for ADLs         Medication Interventions: Patient to call before getting OOB    Elimination Interventions: Call light in reach              Problem: Patient Education: Go to Patient Education Activity  Goal: Patient/Family Education  Outcome: Progressing Towards Goal

## 2022-10-11 NOTE — CONSULTS
Renal Consult Note    Admit Date: 10/10/2022      HPI:   42-year-old -American lady with history of chronic kidney disease stage IV in the setting of a solitary right kidney who was admitted here with melanotic stools. Her creatinine today is 2.91 mg. On September 21, 2022, her creatinine was 2.4 mg. She is known to have coronary artery disease and has undergone a left nephrectomy for renal cell carcinoma. There is history of hypertension and diabetes as well. At present she is hungry. She denies abdominal pain. Stools have been black in color. She denies hematochezia. She denies chest pain or palpitation. She sleeps on 2 pillows and denies orthopnea or PND. She denies night sweats. She tells me that she has lost about 3 pounds over the last month, intentionally. Current Facility-Administered Medications   Medication Dose Route Frequency    0.9% sodium chloride infusion 250 mL  250 mL IntraVENous PRN    albuterol CONCENTRATE 2.5mg/0.5 mL neb soln  2.5 mg Nebulization Q6H PRN    [Held by provider] apixaban (ELIQUIS) tablet 2.5 mg  2.5 mg Oral BID    calcitRIOL (ROCALTROL) capsule 0.25 mcg  0.25 mcg Oral BID Mon Wed & Fri    carvediloL (COREG) tablet 6.25 mg  6.25 mg Oral BID WITH MEALS    donepeziL (ARICEPT) tablet 10 mg  10 mg Oral QHS    gabapentin (NEURONTIN) capsule 300 mg  300 mg Oral QHS    latanoprost (XALATAN) 0.005 % ophthalmic solution 1 Drop  1 Drop Both Eyes QHS    pantoprazole (PROTONIX) tablet 40 mg  40 mg Oral BID    . PHARMACY TO SUBSTITUTE PER PROTOCOL (Reordered from: plecanatide (Trulance) 3 mg tab)    Per Protocol    venlafaxine-SR (EFFEXOR-XR) capsule 75 mg  75 mg Oral DAILY    insulin lispro (HUMALOG) injection   SubCUTAneous AC&HS    glucose chewable tablet 16 g  4 Tablet Oral PRN    glucagon (GLUCAGEN) injection 1 mg  1 mg IntraMUSCular PRN    dextrose 10% infusion 0-250 mL  0-250 mL IntraVENous PRN    sodium chloride (NS) flush 5-40 mL  5-40 mL IntraVENous Q8H    sodium chloride (NS) flush 5-40 mL  5-40 mL IntraVENous PRN    acetaminophen (TYLENOL) tablet 650 mg  650 mg Oral Q6H PRN    Or    acetaminophen (TYLENOL) suppository 650 mg  650 mg Rectal Q6H PRN    polyethylene glycol (MIRALAX) packet 17 g  17 g Oral DAILY PRN    ondansetron (ZOFRAN ODT) tablet 4 mg  4 mg Oral Q8H PRN    Or    ondansetron (ZOFRAN) injection 4 mg  4 mg IntraVENous Q6H PRN        Past Medical History:   Diagnosis Date    Adenocarcinoma, renal cell ([facility] 9/2/2020    Arthritis     CAD (coronary artery disease)     Chronic kidney disease     Diabetes ([facility]     Gout     Hypercholesterolemia     Hypertension     Mental depression     Pulmonary embolism (HCC)     Seizures ([facility]     Stroke ([facility]     Thyroid disease       Past Surgical History:   Procedure Laterality Date    HX GYN      HX HYSTERECTOMY      HX NEPHRECTOMY Left 02/12/2015    HX ORTHOPAEDIC      HX UROLOGICAL  02/12/2015    PARTIAL URETERECTOMY     [place] CARDIAC SURG PROCEDURE UNLIST      stents placed      Family History   Problem Relation Age of Onset    Heart Disease Mother     Heart Disease Father     Heart Disease Sister     Cancer Sister     Heart Disease Brother     Cancer Maternal Grandmother     Stroke Son     Cancer Sister       Social History     Tobacco Use    Smoking status: Former     Years: 15.00     Types: Cigarettes    Smokeless tobacco: Never   Substance Use Topics    Alcohol use: Not Currently         Review of Systems    Review of Systems   Constitutional:  Negative for fever. Respiratory:  Negative for shortness of breath. Cardiovascular:  Negative for chest pain. Gastrointestinal:  Negative for abdominal pain. Physical Exam:     Physical Exam  Cardiovascular:      Rate and Rhythm: Regular rhythm. Pulmonary:      Breath sounds: Normal breath sounds. Abdominal:      General: Bowel sounds are normal.      Palpations: Abdomen is soft. Neurological:      Mental Status: She is alert.    Left upper arm AV graft is patent. No asterixis. Patient Vitals for the past 8 hrs:   BP Temp Pulse Resp SpO2   10/11/22 1604 (!) 164/75 98.4 °F (36.9 °C) 66 18 99 %   10/11/22 1600 -- -- 70 -- --   10/11/22 1330 (!) 153/62 98.2 °F (36.8 °C) 67 17 --   10/11/22 1204 (!) 153/62 -- -- -- --   10/11/22 1128 130/69 98.2 °F (36.8 °C) 67 17 99 %   10/11/22 1115 (!) 147/55 98.1 °F (36.7 °C) 67 18 100 %   10/11/22 0931 (!) 146/68 -- 66 18 98 %     10/11 0701 - 10/11 1900  In: 342.5   Out: -   No intake/output data recorded. XR CHEST PORT   Final Result   Clear lungs. Cardiac megaly. Data Review   Recent Labs     10/11/22  1539 10/11/22  0339 10/10/22  1517   WBC 8.5 7.5 8.6   HGB 8.8* 7.0* 8.3*   HCT 27.9* 22.8* 26.4*    194 219     Recent Labs     10/11/22  0339 10/10/22  1517    135*   K 3.9 4.2   CL 98 97   CO2 32 32   * 143*   BUN 75* 72*   CREA 2.91* 2.63*   CA 9.4 9.3   MG 1.8  --    ALB 2.7* 3.0*   ALT 12 12     No components found for: GLPOC  No results for input(s): PH, PCO2, PO2, HCO3, FIO2 in the last 72 hours. No results for input(s): INR, INREXT, INREXT in the last 72 hours. Assessment:           Active Problems:    Syncope (11/21/2020)      GI bleed (10/10/2022)    Stage IV CKD secondary to hypertension. Renal function is relatively stable. She is not uremic and I see no indication for extracorporeal therapy. Hypertension. Blood pressures at present under suboptimal control. Diabetes mellitus    Melena evaluation in progress. S/p left nephrectomy for renal cell carcinoma    Coronary artery disease. Plan:   GI eval  Check iron stores  If adequate, start Procrit  Follow renal function. No indication for extracorporeal therapy. Goal blood pressure is 130/80.   Thank you for this consult

## 2022-10-11 NOTE — PROGRESS NOTES
PT consult received and acknowledged. Held PT eval currently as Hgb is 7.0.   Will check back at a later time when pt is more medically appropriate to work with PT.

## 2022-10-11 NOTE — ACP (ADVANCE CARE PLANNING)
Advance Care Planning   Healthcare Decision Maker:       Primary Decision Maker: [employer] [facility] - daughter - [phone]

## 2022-10-11 NOTE — ED NOTES
MORNING LABS DRAWN AND PERSONALLY WALKED TO LAB BY THIS NURSE AND PLACED INTO BASKET ON LAB DESK. PT. DENIES ANY NEEDS. PLACED ON [place] AT THIS TIME.

## 2022-10-11 NOTE — CONSULTS
Consult    Patient: Flavia Buchanan MRN: [id number]  SSN: [id number]    YOB: 1945  Age: 65 y.o. Sex: female      Subjective:      Flavia Buchanan is a 65 y.o. female who is being seen for melena  and anemia. She was here 3 months ago with similar issue  she was brougth into er with some syncopal-like events at home. She has heme positive in ER with a hemoglobin of 8. Patient had had black stools without any andrey bleeding per rectum. patient is back on Eliquis  and ASA which was supposedly was discontinued at the discharge last time. there was plan for outpt colonoscopy if bleeding persistent,   Patient  denies any chest pain, nausea, vomiting, diarrhea. She was admitted for further evaluation.   Past Surgical History:   Procedure Laterality Date    HX GYN      HX HYSTERECTOMY      HX NEPHRECTOMY Left 02/12/2015    HX ORTHOPAEDIC      HX UROLOGICAL  02/12/2015    PARTIAL URETERECTOMY     IA CARDIAC SURG PROCEDURE UNLIST      stents placed       Family History   Problem Relation Age of Onset    Heart Disease Mother     Heart Disease Father     Heart Disease Sister     Cancer Sister     Heart Disease Brother     Cancer Maternal Grandmother     Stroke Son     Cancer Sister      Social History     Tobacco Use    Smoking status: Former     Years: 15.00     Types: Cigarettes    Smokeless tobacco: Never   Substance Use Topics    Alcohol use: Not Currently      Current Facility-Administered Medications   Medication Dose Route Frequency Provider Last Rate Last Admin    albuterol CONCENTRATE 2.5mg/0.5 mL neb soln  2.5 mg Nebulization Q6H PRN Sandeep Murray MD        [Held by provider] apixaban (ELIQUIS) tablet 2.5 mg  2.5 mg Oral BID Sandeep Murray MD        calcitRIOL (ROCALTROL) capsule 0.25 mcg  0.25 mcg Oral BID Mon Wed & Fri Sandeep Murray MD   0.25 mcg at 10/10/22 1909    carvediloL (COREG) tablet 6.25 mg  6.25 mg Oral BID WITH MEALS Sandeep Murray MD   6.25 mg at 10/10/22 1837    donepeziL (ARICEPT) tablet 10 mg  10 mg Oral QHS Sandeep Murray MD        gabapentin (NEURONTIN) capsule 300 mg  300 mg Oral QHS Sandeep Murray MD        latanoprost (XALATAN) 0.005 % ophthalmic solution 1 Drop  1 Drop Both Eyes QHS Sandeep Murray MD        pantoprazole (PROTONIX) tablet 40 mg  40 mg Oral BID Sandeep Murray MD        .PHARMACY TO SUBSTITUTE PER PROTOCOL (Reordered from: plecanatide (Trulance) 3 mg tab)    Per Protocol Sandeep Murray MD        [START ON 10/11/2022] venlafaxine-SR (EFFEXOR-XR) capsule 75 mg  75 mg Oral DAILY Sandeep Murray MD        insulin lispro (HUMALOG) injection   SubCUTAneous AC&HS Sandeep Murray MD        glucose chewable tablet 16 g  4 Tablet Oral PRN Sandeep Murray MD        glucagon (GLUCAGEN) injection 1 mg  1 mg IntraMUSCular PRN Sandeep Murray MD        dextrose 10% infusion 0-250 mL  0-250 mL IntraVENous PRN Sandeep Murray MD        sodium chloride (NS) flush 5-40 mL  5-40 mL IntraVENous Q8H Sandeep Murray MD   10 mL at 10/10/22 1837    sodium chloride (NS) flush 5-40 mL  5-40 mL IntraVENous PRN Sandeep Murray MD        acetaminophen (TYLENOL) tablet 650 mg  650 mg Oral Q6H PRN Sandeep Murray MD        Or    acetaminophen (TYLENOL) suppository 650 mg  650 mg Rectal Q6H PRN Sandeep Murray MD        polyethylene glycol (MIRALAX) packet 17 g  17 g Oral DAILY PRN Sandeep Murrya MD        ondansetron (ZOFRAN ODT) tablet 4 mg  4 mg Oral Q8H PRN Sandeep Murray MD        Or    ondansetron (ZOFRAN) injection 4 mg  4 mg IntraVENous Q6H PRN Sandeep Murray MD         Current Outpatient Medications   Medication Sig Dispense Refill    cetirizine (ZYRTEC) 10 mg tablet Take 10 mg by mouth daily. acetaminophen (TYLENOL) 325 mg tablet Take 325 mg by mouth as needed for Pain. aspirin delayed-release 81 mg tablet Take 81 mg by mouth daily. melatonin 5 mg tablet Take 5 mg by mouth nightly. insulin aspart U-100 (NOVOLOG) 100 unit/mL injection by SubCUTAneous route Before breakfast, lunch, dinner and at bedtime.  SS: 150-200=2 units 201-250=4 units 251-300=6 units 301-350=8 units 351-400=10 units      insulin detemir U-100 (LEVEMIR) 100 unit/mL injection 10 Units by SubCUTAneous route nightly. apixaban (ELIQUIS) 2.5 mg tablet Take 2.5 mg by mouth two (2) times a day. plecanatide (Trulance) 3 mg tab Take 3 mg by mouth daily. pantoprazole (PROTONIX) 40 mg tablet Take 1 Tablet by mouth two (2) times a day. 60 Tablet 0    albuterol (PROVENTIL VENTOLIN) 2.5 mg /3 mL (0.083 %) nebu 2.5 mg by Nebulization route every six (6) hours as needed for Shortness of Breath. torsemide (DEMADEX) 20 mg tablet Take 20 mg by mouth two (2) times a day. carvediloL (COREG) 6.25 mg tablet Take 6.25 mg by mouth two (2) times daily (with meals). calcitRIOL (ROCALTROL) 0.25 mcg capsule Take 0.25 mcg by mouth BID Mon Wed & Fri.      donepeziL (ARICEPT) 10 mg tablet Take 10 mg by mouth nightly.      gabapentin (NEURONTIN) 300 mg capsule Take 300 mg by mouth two (2) times a day. Venlafaxine-ER 24 HR (EFFEXOR-ER) 75 mg tr24 tablet Take 75 mg by mouth daily. latanoprost (XALATAN) 0.005 % ophthalmic solution Administer 1 Drop to both eyes nightly. pravastatin (PRAVACHOL) 80 mg tablet Take 80 mg by mouth nightly. No Known Allergies    Review of Systems:  Review of Systems   Unable to perform ROS: Medical condition   Gastrointestinal:  Positive for vomiting. Objective:     Vitals:    10/10/22 1832 10/10/22 1913 10/10/22 1928 10/10/22 1943   BP: (!) 141/91 (!) 180/53 (!) 171/58 (!) 162/57   Pulse: 71 69 70 70   Resp: 18 17 14 21   Temp:       SpO2: 100% 98% 98%    Weight:       Height:            Physical Exam:  Physical Exam  Constitutional:       Appearance: She is ill-appearing. HENT:      Head: Atraumatic. Mouth/Throat:      Mouth: Mucous membranes are dry. Eyes:      General: No scleral icterus. Cardiovascular:      Rate and Rhythm: Rhythm irregular. Heart sounds: Normal heart sounds. Pulmonary:      Effort: Pulmonary effort is normal.   Abdominal:      General: Abdomen is flat. Bowel sounds are normal.   Musculoskeletal:         General: No swelling or deformity. Skin:     General: Skin is warm. Findings: No bruising. Psychiatric:         Mood and Affect: Mood normal.        Recent Results (from the past 24 hour(s))   CBC WITH AUTOMATED DIFF    Collection Time: 10/10/22  3:17 PM   Result Value Ref Range    WBC 8.6 3.6 - 11.0 K/uL    RBC 3.28 (L) 3.80 - 5.20 M/uL    HGB 8.3 (L) 11.5 - 16.0 g/dL    HCT 26.4 (L) 35.0 - 47.0 %    MCV 80.5 80.0 - 99.0 FL    MCH 25.3 (L) 26.0 - 34.0 PG    MCHC 31.4 30.0 - 36.5 g/dL    RDW 19.2 (H) 11.5 - 14.5 %    PLATELET 262 391 - 690 K/uL    MPV 10.5 8.9 - 12.9 FL    NRBC 0.0 0.0  WBC    ABSOLUTE NRBC 0.00 0.00 - 0.01 K/uL    NEUTROPHILS 72 32 - 75 %    LYMPHOCYTES 16 12 - 49 %    MONOCYTES 10 5 - 13 %    EOSINOPHILS 1 0 - 7 %    BASOPHILS 1 0 - 1 %    IMMATURE GRANULOCYTES 0 0 - 0.5 %    ABS. NEUTROPHILS 6.2 1.8 - 8.0 K/UL    ABS. LYMPHOCYTES 1.4 0.8 - 3.5 K/UL    ABS. MONOCYTES 0.9 0.0 - 1.0 K/UL    ABS. EOSINOPHILS 0.1 0.0 - 0.4 K/UL    ABS. BASOPHILS 0.0 0.0 - 0.1 K/UL    ABS. IMM. GRANS. 0.0 0.00 - 0.04 K/UL    DF AUTOMATED     METABOLIC PANEL, COMPREHENSIVE    Collection Time: 10/10/22  3:17 PM   Result Value Ref Range    Sodium 135 (L) 136 - 145 mmol/L    Potassium 4.2 3.5 - 5.1 mmol/L    Chloride 97 97 - 108 mmol/L    CO2 32 21 - 32 mmol/L    Anion gap 6 5 - 15 mmol/L    Glucose 143 (H) 65 - 100 mg/dL    BUN 72 (H) 6 - 20 mg/dL    Creatinine 2.63 (H) 0.55 - 1.02 mg/dL    BUN/Creatinine ratio 27 (H) 12 - 20      eGFR 18 (L) >60 ml/min/1.73m2    Calcium 9.3 8.5 - 10.1 mg/dL    Bilirubin, total 0.3 0.2 - 1.0 mg/dL    AST (SGOT) 22 15 - 37 U/L    ALT (SGPT) 12 12 - 78 U/L    Alk.  phosphatase 94 45 - 117 U/L    Protein, total 6.9 6.4 - 8.2 g/dL    Albumin 3.0 (L) 3.5 - 5.0 g/dL    Globulin 3.9 2.0 - 4.0 g/dL    A-G Ratio 0.8 (L) 1.1 - 2.2 TROPONIN-HIGH SENSITIVITY    Collection Time: 10/10/22  3:17 PM   Result Value Ref Range    Troponin-High Sensitivity 54 (H) 0 - 51 ng/L   NT-PRO BNP    Collection Time: 10/10/22  3:17 PM   Result Value Ref Range    NT pro-BNP 1,585 (H) <450 pg/mL   OCCULT BLOOD, STOOL    Collection Time: 10/10/22  3:17 PM   Result Value Ref Range    Occult Blood,day 1 Positive (A) Negative      Day 1 date: 10,102,022          XR CHEST PORT   Final Result   Clear lungs. Cardiac megaly. Assessment:     Hospital Problems  Date Reviewed: 6/20/2022            Codes Class Noted POA    GI bleed ICD-10-CM: K92.2  ICD-9-CM: 578.9  10/10/2022 Unknown        Syncope ICD-10-CM: R55  ICD-9-CM: 780.2  11/21/2020 Unknown         GI bleeding , upper likely  Apparently patient is on Eliquis which was supposedly was discontinued at the discharge last time.    Plan:   Wait for cardiology to see  Monitory HB  On PPI  Hold anticoagulation for now     Signed By: Aubrey Villela MD     October 10, 2022         Thank you for allowing me to participate in this patients care  Cc Referring Physician   Yaron Wilks NP

## 2022-10-11 NOTE — PROGRESS NOTES
[facility] Hospitalist Progress Note  Julian Ruiz MD    Date:10/11/2022       Room:1[facility]  Patient Name:Tamy Moore     YOB: 1945     Age:77 y.o.    8/3/22 echocardiogram  Result status: Final result       Left Ventricle: Normal left ventricular systolic function with a visually estimated EF of 55 - 60%. Left ventricle size is normal. Mildly increased wall thickness. Normal wall motion. Normal diastolic function. Aortic Valve: Valve structure is normal. Mildly calcified cusp. Mitral Valve: Moderately thickened leaflet. Left Atrium: Left atrium is mildly dilated. 8/5/22 EGD Dr Tyson Modest  Diaphragmatic hernia without obstruction or  gangrene - K44.9  Acute gastritis without bleeding - K29.00  CONSENT : Informed consent was obtained from the patient or an authorized representative  after providing an opportunity for questions. The indications, risks and options  were discussed again. Esophagus - No esophageal varices noted. No stricture, mass, infection, or other lesion(s)  No Gala Scherer tear  EG-Junction - Hiatal hernia  Cardia - Normal  Fundus - Normal  Body - No evidence of Gastric Ulcer  Pylorus - Normal  Duodenum Bulb - Normal  2nd Portion - Normal  PLAN : Return patient to medical unit for care. Continue present PPI therapy  moniotoring HB and transfusion if needed. 10/10/22 admission course  Ms. Dior Dumas is a 65 y.o.   F was brought by family while she had some syncopal-like events at home. She was also noted to be heme positive in ER with a hemoglobin of 8. Patient had had black stools without any andrey bleeding per rectum. Patient otherwise denies any chest pain, nausea, vomiting, diarrhea. Apparently patient is on Eliquis which was supposedly was discontinued at the discharge last time. It was felt by the ER physician patient needs to be admitted for further care and evaluation.     10/11/22 no new complaints, tolerating medications well  Hemoglobin continues to decline  1 PRBC ordered  Gastro to see    Subjective    Subjective:  Symptoms:  Stable. Review of Systems   All other systems reviewed and are negative. Objective         Vitals Last 24 Hours:  TEMPERATURE:  Temp  Av.6 °F (36.4 °C)  Min: 97.2 °F (36.2 °C)  Max: 97.9 °F (36.6 °C)  RESPIRATIONS RANGE: Resp  Av  Min: 12  Max: 24  PULSE OXIMETRY RANGE: SpO2  Av.4 %  Min: 72 %  Max: 100 %  PULSE RANGE: Pulse  Av.2  Min: 61  Max: 71  BLOOD PRESSURE RANGE: Systolic (75TYY), ATJ:401 , Min:113 , WUF:950   ; Diastolic (88DFU), HQR:39, Min:35, Max:91    I/O (24Hr): No intake or output data in the 24 hours ending 10/11/22 0752  Objective:  General Appearance:  Comfortable. Vital signs: (most recent): Blood pressure 122/61, pulse 61, temperature 97.9 °F (36.6 °C), resp. rate 24, height 5' 4\" (1.626 m), weight 86.2 kg (190 lb), SpO2 97 %. Gen:    in no acute distress  HEENT:   hearing intact to voice, moist mucous membranes  Neck:  Supple,   Resp:  No accessory muscle use, symmetric expansion  Card:  No   peripheral edema, RRR  Abd:  Soft, non-tender, non-distended   Lymph:  No visible cervical adenopathy  Musc:  No visible cyanosis or clubbing  Skin:  No visible rashes or ulcers   Neuro:   follows commands appropriately, normal speech  Psych:  Alert with good insight. Labs/Imaging/Diagnostics    Labs:  CBC:  Recent Labs     10/11/22  0339 10/10/22  1517   WBC 7.5 8.6   RBC 2.84* 3.28*   HGB 7.0* 8.3*   HCT 22.8* 26.4*   MCV 80.3 80.5   RDW 19.1* 19.2*    219     CHEMISTRIES:  Recent Labs     10/11/22  0339 10/10/22  1517    135*   K 3.9 4.2   CL 98 97   CO2 32 32   BUN 75* 72*   CA 9.4 9.3   MG 1.8  --    PT/INR:No results for input(s): INR, INREXT in the last 72 hours. No lab exists for component: PROTIME  APTT:No results for input(s): APTT in the last 72 hours.   LIVER PROFILE:  Recent Labs     10/11/22  0339 10/10/22  1517   AST 18 22   ALT 12 12     Lab Results   Component Value Date/Time ALT (SGPT) 12 10/11/2022 03:39 AM    AST (SGOT) 18 10/11/2022 03:39 AM    Alk. phosphatase 82 10/11/2022 03:39 AM    Bilirubin, direct 0.1 10/11/2022 03:39 AM    Bilirubin, total 0.2 10/11/2022 03:39 AM       Imaging Last 24 Hours:  XR CHEST PORT    Result Date: 10/10/2022  PORTABLE CHEST RADIOGRAPH/S: 10/10/2022 2:27 PM INDICATION: Syncope. COMPARISON: 9/21/2022, 2/20/2022. CT chest 1/24/2022. TECHNIQUE: Portable frontal upright radiograph/s of the chest. FINDINGS: The lungs are clear. The central airways are patent. No pneumothorax or pleural effusion. The heart is probably enlarged, even given portable technique. There is bilateral glenohumeral osteoarthritis and old rotator cuff disease. Clear lungs. Cardiac megaly. Assessment//Plan   Active Problems:    Syncope (11/21/2020)      GI bleed (10/10/2022)      Assessment & Plan  8/3/22 echocardiogram  Result status: Final result       Left Ventricle: Normal left ventricular systolic function with a visually estimated EF of 55 - 60%. Left ventricle size is normal. Mildly increased wall thickness. Normal wall motion. Normal diastolic function. Aortic Valve: Valve structure is normal. Mildly calcified cusp. Mitral Valve: Moderately thickened leaflet. Left Atrium: Left atrium is mildly dilated. 8/5/22 EGD Dr Milan Edouard  Diaphragmatic hernia without obstruction or  gangrene - K44.9  Acute gastritis without bleeding - K29.00  CONSENT : Informed consent was obtained from the patient or an authorized representative  after providing an opportunity for questions. The indications, risks and options  were discussed again. Esophagus - No esophageal varices noted. No stricture, mass, infection, or other lesion(s)  No Gala Scherer tear  EG-Junction - Hiatal hernia  Cardia - Normal  Fundus - Normal  Body - No evidence of Gastric Ulcer  Pylorus - Normal  Duodenum Bulb - Normal  2nd Portion - Normal  PLAN : Return patient to medical unit for care.   Continue present PPI therapy  moniotoring HB and transfusion if needed. 10/10/22 admission course  Ms. Kierra Valentino is a 65 y.o.   F was brought by family while she had some syncopal-like events at home. She was also noted to be heme positive in ER with a hemoglobin of 8. Patient had had black stools without any andrey bleeding per rectum. Patient otherwise denies any chest pain, nausea, vomiting, diarrhea. Apparently patient is on Eliquis which was supposedly was discontinued at the discharge last time. It was felt by the ER physician patient needs to be admitted for further care and evaluation. 10/11/22 no new complaints, tolerating medications well  Hemoglobin continues to decline  1 PRBC ordered  Gastro to see    ASSESSMENT AND PLAN    1) Acute gastrointestinal bleeding in a patient with recent history of gastrointestinal bleeding. Recent EGD as above. Supportive care   PRBC as needed   Hold anticoagulants   Pantoprazole BID   Gastro to see    2) Cardiovascular issues including coronary artery disease, stent.  History of mild aortic stenosis and moderate tricuspid regurgitation 8/22 echo with mildly calcified aortic valve, moderately thickened mitral valve, EF of 55 to 60%     Telemetry monitoring   Echocardiogram as above   Aixaban held   Carvedilol    3) DVT prophylaxis     Apixaban held      Electronically signed by Chan Guadarrama MD on 10/11/2022 at 7:52 AM

## 2022-10-11 NOTE — PROGRESS NOTES
Reason for Admission:   GIB                 RUR Score: 23%          PCP: First and Last name:  Flora Prado NP     Name of Practice:   Are you a current patient: Yes/No: Yes   Approximate date of last visit: Seen a week ago. Can you do a virtual visit with your PCP:  Yes/Call             Resources/supports as identified by patient/family:   Family                Top Challenges facing patient (as identified by patient/family and CM): Finances/Medication cost?  No                  Transportation? No              Support system or lack thereof? No                     Living arrangements? No              Self-care/ADLs/Cognition? May need assist with some ADLS d/t  decreased mobility. Current Advanced Directive/Advance Care Plan:  Full Code      Healthcare Decision Maker:       Primary Decision Maker: Negra Resendez Daughter - [phone]    Payor Source Payor: [facility] MEDICARE / Plan: Li SALVAD[employer] CCP / Product Type: Managed Care Medicare /                             Plan for utilizing home health:  None @ this time/uses walker/rolator. Awaiting therapy evals. Transition of Care Plan: D/C Plan is home -daughter lives with her & will transport home. Send Rxs to [facility] upon discharge.

## 2022-10-12 ENCOUNTER — ANESTHESIA EVENT (OUTPATIENT)
Dept: ENDOSCOPY | Age: 77
End: 2022-10-12
Payer: MEDICARE

## 2022-10-12 ENCOUNTER — APPOINTMENT (OUTPATIENT)
Dept: ENDOSCOPY | Age: 77
End: 2022-10-12
Attending: INTERNAL MEDICINE
Payer: MEDICARE

## 2022-10-12 ENCOUNTER — ANESTHESIA (OUTPATIENT)
Dept: ENDOSCOPY | Age: 77
End: 2022-10-12
Payer: MEDICARE

## 2022-10-12 LAB
ABO + RH BLD: NORMAL
ANION GAP SERPL CALC-SCNC: 5 MMOL/L (ref 5–15)
BLD PROD TYP BPU: NORMAL
BLOOD GROUP ANTIBODIES SERPL: NEGATIVE
BPU ID: NORMAL
BUN SERPL-MCNC: 68 MG/DL (ref 6–20)
BUN/CREAT SERPL: 31 (ref 12–20)
CA-I BLD-MCNC: 9.4 MG/DL (ref 8.5–10.1)
CHLORIDE SERPL-SCNC: 101 MMOL/L (ref 97–108)
CO2 SERPL-SCNC: 33 MMOL/L (ref 21–32)
CREAT SERPL-MCNC: 2.19 MG/DL (ref 0.55–1.02)
CROSSMATCH RESULT,%XM: NORMAL
ERYTHROCYTE [DISTWIDTH] IN BLOOD BY AUTOMATED COUNT: 19.1 % (ref 11.5–14.5)
FERRITIN SERPL-MCNC: 21 NG/ML (ref 8–252)
GLUCOSE BLD STRIP.AUTO-MCNC: 123 MG/DL (ref 65–100)
GLUCOSE BLD STRIP.AUTO-MCNC: 136 MG/DL (ref 65–100)
GLUCOSE BLD STRIP.AUTO-MCNC: 136 MG/DL (ref 65–100)
GLUCOSE BLD STRIP.AUTO-MCNC: 152 MG/DL (ref 65–100)
GLUCOSE BLD STRIP.AUTO-MCNC: 171 MG/DL (ref 65–100)
GLUCOSE SERPL-MCNC: 129 MG/DL (ref 65–100)
HCT VFR BLD AUTO: 27.1 % (ref 35–47)
HGB BLD-MCNC: 8.5 G/DL (ref 11.5–16)
IRON SATN MFR SERPL: 15 % (ref 20–50)
IRON SERPL-MCNC: 57 UG/DL (ref 35–150)
MCH RBC QN AUTO: 25.2 PG (ref 26–34)
MCHC RBC AUTO-ENTMCNC: 31.4 G/DL (ref 30–36.5)
MCV RBC AUTO: 80.4 FL (ref 80–99)
NRBC # BLD: 0 K/UL (ref 0–0.01)
NRBC BLD-RTO: 0 PER 100 WBC
PERFORMED BY, TECHID: ABNORMAL
PHOSPHATE SERPL-MCNC: 3.6 MG/DL (ref 2.6–4.7)
PLATELET # BLD AUTO: 185 K/UL (ref 150–400)
PMV BLD AUTO: 9.9 FL (ref 8.9–12.9)
POTASSIUM SERPL-SCNC: 4 MMOL/L (ref 3.5–5.1)
RBC # BLD AUTO: 3.37 M/UL (ref 3.8–5.2)
SODIUM SERPL-SCNC: 139 MMOL/L (ref 136–145)
SPECIMEN EXP DATE BLD: NORMAL
STATUS OF UNIT,%ST: NORMAL
TIBC SERPL-MCNC: 386 UG/DL (ref 250–450)
TRANSFUSION STATUS PATIENT QL: NORMAL
UNIT DIVISION, %UDIV: 0
WBC # BLD AUTO: 7.7 K/UL (ref 3.6–11)

## 2022-10-12 PROCEDURE — 74011250636 HC RX REV CODE- 250/636: Performed by: NURSE ANESTHETIST, CERTIFIED REGISTERED

## 2022-10-12 PROCEDURE — 2709999900 HC NON-CHARGEABLE SUPPLY: Performed by: INTERNAL MEDICINE

## 2022-10-12 PROCEDURE — 74011636637 HC RX REV CODE- 636/637: Performed by: HOSPITALIST

## 2022-10-12 PROCEDURE — 65270000029 HC RM PRIVATE

## 2022-10-12 PROCEDURE — 84100 ASSAY OF PHOSPHORUS: CPT

## 2022-10-12 PROCEDURE — 85027 COMPLETE CBC AUTOMATED: CPT

## 2022-10-12 PROCEDURE — 97165 OT EVAL LOW COMPLEX 30 MIN: CPT

## 2022-10-12 PROCEDURE — 74011000250 HC RX REV CODE- 250: Performed by: HOSPITALIST

## 2022-10-12 PROCEDURE — 74011250637 HC RX REV CODE- 250/637: Performed by: HOSPITALIST

## 2022-10-12 PROCEDURE — 97161 PT EVAL LOW COMPLEX 20 MIN: CPT

## 2022-10-12 PROCEDURE — 80048 BASIC METABOLIC PNL TOTAL CA: CPT

## 2022-10-12 PROCEDURE — 76040000008: Performed by: INTERNAL MEDICINE

## 2022-10-12 PROCEDURE — G0378 HOSPITAL OBSERVATION PER HR: HCPCS

## 2022-10-12 PROCEDURE — 76060000033 HC ANESTHESIA 1 TO 1.5 HR: Performed by: INTERNAL MEDICINE

## 2022-10-12 PROCEDURE — 82962 GLUCOSE BLOOD TEST: CPT

## 2022-10-12 PROCEDURE — 83540 ASSAY OF IRON: CPT

## 2022-10-12 PROCEDURE — 36415 COLL VENOUS BLD VENIPUNCTURE: CPT

## 2022-10-12 PROCEDURE — 82728 ASSAY OF FERRITIN: CPT

## 2022-10-12 PROCEDURE — 74011000250 HC RX REV CODE- 250: Performed by: NURSE ANESTHETIST, CERTIFIED REGISTERED

## 2022-10-12 PROCEDURE — 74011000258 HC RX REV CODE- 258: Performed by: NURSE ANESTHETIST, CERTIFIED REGISTERED

## 2022-10-12 PROCEDURE — 97116 GAIT TRAINING THERAPY: CPT

## 2022-10-12 PROCEDURE — 97530 THERAPEUTIC ACTIVITIES: CPT

## 2022-10-12 RX ORDER — PROPOFOL 10 MG/ML
INJECTION, EMULSION INTRAVENOUS AS NEEDED
Status: DISCONTINUED | OUTPATIENT
Start: 2022-10-12 | End: 2022-10-12 | Stop reason: HOSPADM

## 2022-10-12 RX ORDER — SODIUM CHLORIDE 9 MG/ML
INJECTION, SOLUTION INTRAVENOUS
Status: DISCONTINUED | OUTPATIENT
Start: 2022-10-12 | End: 2022-10-12 | Stop reason: HOSPADM

## 2022-10-12 RX ORDER — LIDOCAINE HYDROCHLORIDE 20 MG/ML
INJECTION, SOLUTION EPIDURAL; INFILTRATION; INTRACAUDAL; PERINEURAL AS NEEDED
Status: DISCONTINUED | OUTPATIENT
Start: 2022-10-12 | End: 2022-10-12 | Stop reason: HOSPADM

## 2022-10-12 RX ADMIN — LIDOCAINE HYDROCHLORIDE 40 MG: 20 INJECTION, SOLUTION EPIDURAL; INFILTRATION; INTRACAUDAL; PERINEURAL at 13:37

## 2022-10-12 RX ADMIN — CARVEDILOL 6.25 MG: 3.12 TABLET, FILM COATED ORAL at 16:15

## 2022-10-12 RX ADMIN — DONEPEZIL HYDROCHLORIDE 10 MG: 5 TABLET, FILM COATED ORAL at 22:25

## 2022-10-12 RX ADMIN — LATANOPROST 1 DROP: 50 SOLUTION OPHTHALMIC at 22:26

## 2022-10-12 RX ADMIN — PANTOPRAZOLE SODIUM 40 MG: 40 TABLET, DELAYED RELEASE ORAL at 22:25

## 2022-10-12 RX ADMIN — LATANOPROST 1 DROP: 50 SOLUTION OPHTHALMIC at 01:24

## 2022-10-12 RX ADMIN — GABAPENTIN 300 MG: 300 CAPSULE ORAL at 22:25

## 2022-10-12 RX ADMIN — PROPOFOL 25 MG: 10 INJECTION, EMULSION INTRAVENOUS at 13:37

## 2022-10-12 RX ADMIN — INSULIN LISPRO 2 UNITS: 100 INJECTION, SOLUTION INTRAVENOUS; SUBCUTANEOUS at 16:15

## 2022-10-12 RX ADMIN — CALCITRIOL CAPSULES 0.25 MCG 0.25 MCG: 0.25 CAPSULE ORAL at 16:14

## 2022-10-12 RX ADMIN — PROPOFOL 25 MG: 10 INJECTION, EMULSION INTRAVENOUS at 13:39

## 2022-10-12 RX ADMIN — SODIUM CHLORIDE: 9 INJECTION, SOLUTION INTRAVENOUS at 12:56

## 2022-10-12 RX ADMIN — SODIUM CHLORIDE, PRESERVATIVE FREE 10 ML: 5 INJECTION INTRAVENOUS at 14:09

## 2022-10-12 RX ADMIN — SODIUM CHLORIDE, PRESERVATIVE FREE 10 ML: 5 INJECTION INTRAVENOUS at 22:25

## 2022-10-12 NOTE — PROGRESS NOTES
OCCUPATIONAL THERAPY EVALUATION  Patient: Mac Mon (79 y.o. female)  Date: 10/12/2022  Primary Diagnosis: GI bleed [K92.2]  Syncope [R55]  Procedure(s) (LRB):  ESOPHAGOGASTRODUODENOSCOPY (EGD) (N/A)     Precautions: fall risk       ASSESSMENT  Pt is a 65 y.o. female presenting to [facility] with c/o syncopal-like event, admitted 10/10 and currently being treated for GI bleed. Pt received semi-supine in bed upon arrival and recently finished PT evaluation, AXO x3 (disoriented to year), and agreeable to OT evaluation. Based on current observations, pt presents with deficits in generalized strength/AROM, bed mobility, static/dynamic sitting balance, static/dynamic standing balance (see PT note for gait details), and functional activity tolerance currently impacting overall performance of ADLs and functional transfers/mobility (see below for objective details and assist levels). Overall, pt tolerates session fair w/out c/o pain, dizziness, or SOB during ambulation. Pt demo'd good standing balance using RW while ambulating and standing at sinktop to complete BADLs; no LOB or unsteadiness noted. She demo'd good sitting balance and forward hip flexion while completing LB dressing. Pt would benefit from continued skilled OT services to address current impairments, to prevent further decline during admission, to improve activity tolerance, and improve IND and safety with self cares and functional transfers/mobility. Current OT d/c recommendation Home with Home Health Therapy once medically appropriate. Other factors to consider for discharge: family/social support, DME, time since onset, severity of deficits, functional baseline     Patient will benefit from skilled therapy intervention to address the above noted impairments.        PLAN :  Recommendations and Planned Interventions: self care training, functional mobility training, therapeutic exercise, balance training, therapeutic activities, endurance activities, patient education, and home safety training    Recommend with staff: Amb to bathroom for toileting with gt belt and AD    Recommend next session: Standing grooming    Frequency/Duration: Patient will be followed by occupational therapy:  3-5x/week to address goals. Recommendation for discharge: (in order for the patient to meet his/her long term goals)  Home with [facility]    This discharge recommendation:  Has been made in collaboration with the attending provider and/or case management    IF patient discharges home will need the following DME: none at this time       SUBJECTIVE:   Patient stated Rasheed Merchant will be fine when I get home.     OBJECTIVE DATA SUMMARY:   HISTORY:   Past Medical History:   Diagnosis Date    Adenocarcinoma, renal cell ([facility] 9/2/2020    Arthritis     CAD (coronary artery disease)     Chronic kidney disease     Diabetes ([facility]     Gout     Hypercholesterolemia     Hypertension     Mental depression     Pulmonary embolism (HCC)     Seizures ([facility]     Stroke ([facility]     Thyroid disease      Past Surgical History:   Procedure Laterality Date    HX GYN      HX HYSTERECTOMY      HX NEPHRECTOMY Left 02/12/2015    HX ORTHOPAEDIC      HX UROLOGICAL  02/12/2015    PARTIAL URETERECTOMY     [place] CARDIAC SURG PROCEDURE UNLIST      stents placed        Per pt:   Home Situation  Home Environment: Private residence  # Steps to Enter: 4  Rails to Enter: Yes  Hand Rails : Bilateral  One/Two Story Residence: One story  Living Alone: No  Support Systems: Child(connie) (dtr lives with pt)  Patient Expects to be Discharged to[de-identified] Home with home health  Current DME Used/Available at Home: Waynetta Bears, rolling, Waynetta Bears, rollator, Cane, straight, Shower chair  Tub or Shower Type: Shower    Hand dominance: Right    EXAMINATION OF PERFORMANCE DEFICITS:  Cognitive/Behavioral Status:  Neurologic State: Alert  Orientation Level: Oriented X4  Cognition: Follows commands               Hearing:   Auditory  Auditory Impairment: Hard of hearing, left side      Range of Motion:  AROM: Generally decreased, functional                         Strength:  Strength: Generally decreased, functional                Coordination:     Fine Motor Skills-Upper: Left Intact; Right Intact    Gross Motor Skills-Upper: Left Intact; Right Intact    Tone & Sensation:     Sensation: Intact                      Balance:  Sitting: Intact; Without support  Standing: Intact; With support  Standing - Static: Occasional;Fair  Standing - Dynamic : Fair;Occasional    Functional Mobility and Transfers for ADLs:  Bed Mobility:  Supine to Sit: Contact guard assistance  Sit to Supine: Contact guard assistance  Scooting: Stand-by assistance    Transfers:  Sit to Stand: Contact guard assistance  Stand to Sit: Contact guard assistance  Bathroom Mobility: Contact guard assistance  Toilet Transfer : Contact guard assistance  Assistive Device : Walker, rolling      ADL Intervention and task modifications:       Grooming  Washing Face: Stand-by assistance;Contact guard assistance  Washing Hands: Stand-by assistance;Contact guard assistance                   Lower Body Dressing Assistance  Socks: Independent (in long sitting)    Toileting  Bladder Hygiene: Independent         Therapeutic Exercise:  Pt will benefit from BUE HEP to improve participation in ADLs and mobility. Plan will be initiated at next session. Functional Measure:    Luke MEJIASPACKARLO \"6 Clicks\"                                                       Daily Activity Inpatient Short Form  How much help from another person does the patient currently need. .. Total; A Lot A Little None   1. Putting on and taking off regular lower body clothing? []  1 []  2 []  3 [x]  4   2. Bathing (including washing, rinsing, drying)? []  1 []  2 [x]  3 []  4   3. Toileting, which includes using toilet, bedpan or urinal? [] 1 []  2 [x]  3 []  4   4. Putting on and taking off regular upper body clothing?  []  1 []  2 [x]  3 [] 4   5.  Taking care of personal grooming such as brushing teeth? []  1 []  2 [x]  3 []  4   6. Eating meals? []  1 []  2 []  3 [x]  4   © 2007, Trustees of [address] [place] [address] [address] [id number], under license to [employer]. All rights reserved     Score: 20/24     Interpretation of Tool:  Represents clinically-significant functional categories (i.e. Activities of daily living). Percentage of Impairment CH    0%   CI    1-19% CJ    20-39% CK    40-59% CL    60-79% CM    80-99% CN     100%   [facility]  Score 6-24 24 23 20-22 15-19 10-14 7-9 6     Occupational Therapy Evaluation Charge Determination   History Examination Decision-Making   LOW Complexity : Brief history review  LOW Complexity : 1-3 performance deficits relating to physical, cognitive , or psychosocial skils that result in activity limitations and / or participation restrictions  MEDIUM Complexity : Patient may present with comorbidities that affect occupational performnce. Miniml to moderate modification of tasks or assistance (eg, physical or verbal ) with assesment(s) is necessary to enable patient to complete evaluation       Based on the above components, the patient evaluation is determined to be of the following complexity level: LOW   Pain Rating:  No reports of pain    Activity Tolerance:   Good and tolerates ADLs without rest breaks    After treatment patient left in no apparent distress:    Supine in bed, Call bell within reach, Bed / chair alarm activated, and Side rails x 3, bed locked and in lowest position    COMMUNICATION/EDUCATION:   The patients plan of care was discussed with: Registered nurse. Patient/family have participated as able in goal setting and plan of care. and Patient/family agree to work toward stated goals and plan of care. This patients plan of care is appropriate for delegation to [facility].      Thank you for this referral.  Bryan Wolf OT  Time Calculation: 28 mins   Problem: Self Care Deficits Care Plan (Adult)  Goal: *Acute Goals and Plan of Care (Insert Text)  Description: FUNCTIONAL STATUS PRIOR TO ADMISSION: Pt utilized RW for ambulation. She was IND w/ ADLs. HOME SUPPORT: The patient lived with daughter.     Occupational Therapy Goals  Initiated 10/12/2022    Pt stated goal I want to go home  Pt will be IN sup <> sit in prep for EOB ADLs  Pt will be Mod I grooming standing sink side LRAD  Pt will be Mod I UB dressing sitting EOB/long sit   Pt will be Mod I LE dressing sitting EOB/long sit  Pt will be Mod I sit <>  prep for toileting LRAD  Pt will be Mod I toileting/toilet transfer/cloth mgmt LRADIND  Pt will be IND following UE HEP in prep for self care tasks   Outcome: Not Met

## 2022-10-12 NOTE — PROGRESS NOTES
Progress Note    Patient: Nishi Monsivais MRN: [id number]  SSN: [id number]    YOB: 1945  Age: 65 y.o. Sex: female      Admit Date: 10/10/2022    LOS: 1 day     Subjective:   No pain or BMS  Has echo cardiogram   On blood transfusion now. Past Medical History:   Diagnosis Date    Adenocarcinoma, renal cell ([facility] 9/2/2020    Arthritis     CAD (coronary artery disease)     Chronic kidney disease     Diabetes (HCC)     Gout     Hypercholesterolemia     Hypertension     Mental depression     Pulmonary embolism (HCC)     Seizures ([facility]     Stroke ([facility]     Thyroid disease         Current Facility-Administered Medications:     0.9% sodium chloride infusion 250 mL, 250 mL, IntraVENous, PRN, Julian Gonzales MD    albuterol CONCENTRATE 2.5mg/0.5 mL neb soln, 2.5 mg, Nebulization, Q6H PRN, Sandeep Murray MD    [Held by provider] apixaban (ELIQUIS) tablet 2.5 mg, 2.5 mg, Oral, BID, Sandeep Murray MD    calcitRIOL (ROCALTROL) capsule 0.25 mcg, 0.25 mcg, Oral, BID Mon Wed & Fri, Sandeep Murray MD, 0.25 mcg at 10/10/22 1909    carvediloL (COREG) tablet 6.25 mg, 6.25 mg, Oral, BID WITH MEALS, Sandeep Murray MD, 6.25 mg at 10/11/22 1729    donepeziL (ARICEPT) tablet 10 mg, 10 mg, Oral, QHS, Sandeep Murray MD, 10 mg at 10/10/22 2312    gabapentin (NEURONTIN) capsule 300 mg, 300 mg, Oral, QHS, Sandeep Murray MD    latanoprost (XALATAN) 0.005 % ophthalmic solution 1 Drop, 1 Drop, Both Eyes, QHS, Sandeep Murray MD    pantoprazole (PROTONIX) tablet 40 mg, 40 mg, Oral, BID, Sandeep Murray MD, 40 mg at 10/11/22 0923    . PHARMACY TO SUBSTITUTE PER PROTOCOL (Reordered from: plecanatide (Trulance) 3 mg tab), , , Per Protocol, Sandeep Murray MD    venlafaxine-SR [facility]) capsule 75 mg, 75 mg, Oral, DAILY, Sandeep Murray MD, 75 mg at 10/11/22 6521    insulin lispro (HUMALOG) injection, , SubCUTAneous, AC&HS, Sandeep Murray MD    glucose chewable tablet 16 g, 4 Tablet, Oral, PRN, Sandeep Murray MD    glucagon (GLUCAGEN) injection 1 mg, 1 mg, IntraMUSCular, PRN, Sandeep Murray MD    dextrose 10% infusion 0-250 mL, 0-250 mL, IntraVENous, PRN, Sandeep Murray MD    sodium chloride (NS) flush 5-40 mL, 5-40 mL, IntraVENous, Q8H, Sandeep Murray MD, 10 mL at 10/11/22 1444    sodium chloride (NS) flush 5-40 mL, 5-40 mL, IntraVENous, PRN, Sandeep Murray MD, 10 mL at 10/11/22 1441    acetaminophen (TYLENOL) tablet 650 mg, 650 mg, Oral, Q6H PRN **OR** acetaminophen (TYLENOL) suppository 650 mg, 650 mg, Rectal, Q6H PRN, Sandeep Murray MD    polyethylene glycol (MIRALAX) packet 17 g, 17 g, Oral, DAILY PRN, Sandeep Murray MD    ondansetron (ZOFRAN ODT) tablet 4 mg, 4 mg, Oral, Q8H PRN **OR** ondansetron (ZOFRAN) injection 4 mg, 4 mg, IntraVENous, Q6H PRN, Sandeep Murray MD    Objective:     Vitals:    10/11/22 1330 10/11/22 1600 10/11/22 1604 10/11/22 2131   BP: (!) 153/62  (!) 164/75 135/82   Pulse: 67 70 66 62   Resp: 17  18 16   Temp: 98.2 °F (36.8 °C)  98.4 °F (36.9 °C) 98.1 °F (36.7 °C)   SpO2:   99% 98%   Weight:       Height:            Intake and Output:  Current Shift: No intake/output data recorded. Last three shifts: 10/10 0701 - 10/11 1900  In: 586.5 [P.O.:244]  Out: 450 [Urine:450]    Physical Exam:   Physical Exam  Constitutional:       Appearance: She is ill-appearing. HENT:      Head: Atraumatic. Mouth/Throat:      Mouth: Mucous membranes are dry. Eyes:      Extraocular Movements: Extraocular movements intact. Cardiovascular:      Rate and Rhythm: Normal rate. Abdominal:      General: Bowel sounds are normal.   Musculoskeletal:      Cervical back: Neck supple. Skin:     General: Skin is warm. Neurological:      Mental Status: Mental status is at baseline.    Psychiatric:         Mood and Affect: Mood normal.        Lab/Data Review:  Recent Results (from the past 24 hour(s))   METABOLIC PANEL, BASIC    Collection Time: 10/11/22  3:39 AM   Result Value Ref Range    Sodium 137 136 - 145 mmol/L    Potassium 3.9 3.5 - 5.1 mmol/L    Chloride 98 97 - 108 mmol/L    CO2 32 21 - 32 mmol/L    Anion gap 7 5 - 15 mmol/L    Glucose 143 (H) 65 - 100 mg/dL    BUN 75 (H) 6 - 20 mg/dL    Creatinine 2.91 (H) 0.55 - 1.02 mg/dL    BUN/Creatinine ratio 26 (H) 12 - 20      eGFR 16 (L) >60 ml/min/1.73m2    Calcium 9.4 8.5 - 10.1 mg/dL   HEPATIC FUNCTION PANEL    Collection Time: 10/11/22  3:39 AM   Result Value Ref Range    Protein, total 6.1 (L) 6.4 - 8.2 g/dL    Albumin 2.7 (L) 3.5 - 5.0 g/dL    Globulin 3.4 2.0 - 4.0 g/dL    A-G Ratio 0.8 (L) 1.1 - 2.2      Bilirubin, total 0.2 0.2 - 1.0 mg/dL    Bilirubin, direct 0.1 0.0 - 0.2 mg/dL    Alk. phosphatase 82 45 - 117 U/L    AST (SGOT) 18 15 - 37 U/L    ALT (SGPT) 12 12 - 78 U/L   MAGNESIUM    Collection Time: 10/11/22  3:39 AM   Result Value Ref Range    Magnesium 1.8 1.6 - 2.4 mg/dL   CBC WITH AUTOMATED DIFF    Collection Time: 10/11/22  3:39 AM   Result Value Ref Range    WBC 7.5 3.6 - 11.0 K/uL    RBC 2.84 (L) 3.80 - 5.20 M/uL    HGB 7.0 (L) 11.5 - 16.0 g/dL    HCT 22.8 (L) 35.0 - 47.0 %    MCV 80.3 80.0 - 99.0 FL    MCH 24.6 (L) 26.0 - 34.0 PG    MCHC 30.7 30.0 - 36.5 g/dL    RDW 19.1 (H) 11.5 - 14.5 %    PLATELET 363 903 - 454 K/uL    MPV 10.8 8.9 - 12.9 FL    NRBC 0.0 0.0  WBC    ABSOLUTE NRBC 0.00 0.00 - 0.01 K/uL    NEUTROPHILS 65 32 - 75 %    LYMPHOCYTES 20 12 - 49 %    MONOCYTES 12 5 - 13 %    EOSINOPHILS 2 0 - 7 %    BASOPHILS 1 0 - 1 %    IMMATURE GRANULOCYTES 0 0 - 0.5 %    ABS. NEUTROPHILS 4.9 1.8 - 8.0 K/UL    ABS. LYMPHOCYTES 1.5 0.8 - 3.5 K/UL    ABS. MONOCYTES 0.9 0.0 - 1.0 K/UL    ABS. EOSINOPHILS 0.2 0.0 - 0.4 K/UL    ABS. BASOPHILS 0.0 0.0 - 0.1 K/UL    ABS. IMM.  GRANS. 0.0 0.00 - 0.04 K/UL    DF AUTOMATED     GLUCOSE, POC    Collection Time: 10/11/22  7:31 AM   Result Value Ref Range    Glucose (POC) 154 (H) 65 - 100 mg/dL    Performed by [facility]    Collection Time: 10/11/22  8:46 AM   Result Value Ref Range    Crossmatch Expiration 10/14/2022,2359     ABO/Rh(D) A Positive Antibody screen Negative     Unit number [id number]     Blood component type RC LR     Unit division 00     Status of unit [address] to transfuse     Crossmatch result Compatible    GLUCOSE, POC    Collection Time: 10/11/22  1:01 PM   Result Value Ref Range    Glucose (POC) 117 (H) 65 - 100 mg/dL    Performed by Himanshu Mitchell    CBC W/O DIFF    Collection Time: 10/11/22  3:39 PM   Result Value Ref Range    WBC 8.5 3.6 - 11.0 K/uL    RBC 3.44 (L) 3.80 - 5.20 M/uL    HGB 8.8 (L) 11.5 - 16.0 g/dL    HCT 27.9 (L) 35.0 - 47.0 %    MCV 81.1 80.0 - 99.0 FL    MCH 25.6 (L) 26.0 - 34.0 PG    MCHC 31.5 30.0 - 36.5 g/dL    RDW 18.7 (H) 11.5 - 14.5 %    PLATELET 506 961 - 441 K/uL    MPV 10.3 8.9 - 12.9 FL    NRBC 0.0 0.0  WBC    ABSOLUTE NRBC 0.00 0.00 - 0.01 K/uL   GLUCOSE, POC    Collection Time: 10/11/22  5:11 PM   Result Value Ref Range    Glucose (POC) 116 (H) 65 - 100 mg/dL    Performed by Sergio Segundo    GLUCOSE, POC    Collection Time: 10/11/22  9:33 PM   Result Value Ref Range    Glucose (POC) 205 (H) 65 - 100 mg/dL    Performed by Geraldine Ontiveros (PCT)         XR CHEST PORT   Final Result   Clear lungs. Cardiac megaly. Assessment:     Active Problems:    Syncope (11/21/2020)      GI bleed (10/10/2022)    GI bleeding , upper likely  Apparently patient is on Eliquis which was supposedly was discontinued at the discharge last time.    Plan:   Finished PRBC transfusuion  Monitory HB  On PPI  Hold anticoagulation for now   Cardiac diet  Npo after midnight  May repeat egd Tomorrow if hb drop again           Signed By: Devorah Ibarra MD     October 11, 2022        Thank you for allowing me to participate in this patients care  Cc Referring Physician   Taiwo Anand NP

## 2022-10-12 NOTE — ANESTHESIA POSTPROCEDURE EVALUATION
Procedure(s):  ESOPHAGOGASTRODUODENOSCOPY (EGD). MAC, total IV anesthesia    Anesthesia Post Evaluation      Multimodal analgesia: multimodal analgesia not used between 6 hours prior to anesthesia start to PACU discharge  Patient location during evaluation: bedside (Endoscopy suite)  Patient participation: complete - patient cannot participate  Level of consciousness: sleepy but conscious  Pain score: 0  Pain management: adequate  Airway patency: patent  Anesthetic complications: no  Cardiovascular status: acceptable  Respiratory status: acceptable and nasal cannula  Hydration status: acceptable  Comments: This patient remained on the stretcher. The patient was handed off to the endoscopy nursing team.  All questions regarding pre-, intra-, and postoperative care were answered. Post anesthesia nausea and vomiting:  none      INITIAL Post-op Vital signs:   Vitals Value Taken Time   BP     Temp     Pulse 68 10/12/22 1353   Resp 22 10/12/22 1353   SpO2 99 % 10/12/22 1353   Vitals shown include unvalidated device data.

## 2022-10-12 NOTE — ROUTINE PROCESS
TRANSFER - OUT REPORT:    Verbal report given to Nurse Burns on Arnol Sandoval  being transferred to  (unit) for routine progression of care       Report consisted of patients Situation, Background, Assessment and   Recommendations(SBAR). Information from the following report(s) SBAR, Kardex, Procedure Summary, and Cardiac Rhythm sinus rhythm  was reviewed with the receiving nurse. Lines:   Venous Access Device Fistula Arm, left (Active)   Criteria for Appropriate Use Dialysis/apheresis 10/11/22 1454   Site Assessment Clean, dry, & intact 10/12/22 1350   Dressing Status Clean, dry, & intact 10/12/22 1350   Dressing Type Stabilization/securement device 10/11/22 1454       Peripheral IV 10/10/22 Distal;Posterior;Right Forearm (Active)   Site Assessment Clean, dry, & intact 10/11/22 1454   Phlebitis Assessment 0 10/11/22 1454   Infiltration Assessment 0 10/11/22 1454   Dressing Status Clean, dry, & intact 10/11/22 1454   Dressing Type Transparent 10/11/22 1454   Hub Color/Line Status Pink 10/11/22 1454   Alcohol Cap Used Yes 10/11/22 1454        Opportunity for questions and clarification was provided.       Patient transported with:   Registered Nurse

## 2022-10-12 NOTE — PROGRESS NOTES
Renal Daily Progress Note    Admit Date: 10/10/2022      Subjective:   She is comfortable in bed. She denies dizziness. No abdominal pain. No chest pain no shortness of breath. She is due to have an EGD done today. Current Facility-Administered Medications   Medication Dose Route Frequency    0.9% sodium chloride infusion 250 mL  250 mL IntraVENous PRN    albuterol CONCENTRATE 2.5mg/0.5 mL neb soln  2.5 mg Nebulization Q6H PRN    [Held by provider] apixaban (ELIQUIS) tablet 2.5 mg  2.5 mg Oral BID    calcitRIOL (ROCALTROL) capsule 0.25 mcg  0.25 mcg Oral BID Mon Wed & Fri    carvediloL (COREG) tablet 6.25 mg  6.25 mg Oral BID WITH MEALS    donepeziL (ARICEPT) tablet 10 mg  10 mg Oral QHS    gabapentin (NEURONTIN) capsule 300 mg  300 mg Oral QHS    latanoprost (XALATAN) 0.005 % ophthalmic solution 1 Drop  1 Drop Both Eyes QHS    pantoprazole (PROTONIX) tablet 40 mg  40 mg Oral BID    . PHARMACY TO SUBSTITUTE PER PROTOCOL (Reordered from: plecanatide (Trulance) 3 mg tab)    Per Protocol    venlafaxine-SR (EFFEXOR-XR) capsule 75 mg  75 mg Oral DAILY    insulin lispro (HUMALOG) injection   SubCUTAneous AC&HS    glucose chewable tablet 16 g  4 Tablet Oral PRN    glucagon (GLUCAGEN) injection 1 mg  1 mg IntraMUSCular PRN    dextrose 10% infusion 0-250 mL  0-250 mL IntraVENous PRN    sodium chloride (NS) flush 5-40 mL  5-40 mL IntraVENous Q8H    sodium chloride (NS) flush 5-40 mL  5-40 mL IntraVENous PRN    acetaminophen (TYLENOL) tablet 650 mg  650 mg Oral Q6H PRN    Or    acetaminophen (TYLENOL) suppository 650 mg  650 mg Rectal Q6H PRN    polyethylene glycol (MIRALAX) packet 17 g  17 g Oral DAILY PRN    ondansetron (ZOFRAN ODT) tablet 4 mg  4 mg Oral Q8H PRN    Or    ondansetron (ZOFRAN) injection 4 mg  4 mg IntraVENous Q6H PRN        Review of Systems    Review of Systems   Respiratory:  Negative for shortness of breath. Cardiovascular:  Negative for chest pain.    Gastrointestinal:  Negative for abdominal pain.   Neurological:  Negative for dizziness and headaches. Objective:     Patient Vitals for the past 8 hrs:   BP Temp Pulse Resp SpO2   10/12/22 1406 (!) 142/67 98.1 °F (36.7 °C) 68 20 98 %   10/12/22 1351 (!) 121/103 -- 70 22 98 %   10/12/22 1349 (!) 132/53 -- 62 23 95 %   10/12/22 1343 138/60 97.7 °F (36.5 °C) 63 12 100 %   10/12/22 1037 139/66 98.1 °F (36.7 °C) 63 22 98 %   10/12/22 0822 (!) 159/65 98.1 °F (36.7 °C) 76 17 98 %   10/12/22 0800 -- -- 75 -- --     10/12 0701 - 10/12 1900  In: 200 [I.V.:200]  Out: -   10/10 1901 - 10/12 0700  In: 586.5 [P.O.:244]  Out: 950 [Urine:950]    Physical Exam:   Physical Exam  Cardiovascular:      Rate and Rhythm: Regular rhythm. Heart sounds: Murmur heard. Pulmonary:      Breath sounds: Normal breath sounds. Abdominal:      General: Bowel sounds are normal.      Palpations: Abdomen is soft. Neurological:      Mental Status: She is alert. No edema  No asterixis  Left upper arm AV graft is patent. XR CHEST PORT   Final Result   Clear lungs. Cardiac megaly. Data Review   Recent Labs     10/12/22  0835 10/11/22  1539 10/11/22  0339   WBC 7.7 8.5 7.5   HGB 8.5* 8.8* 7.0*   HCT 27.1* 27.9* 22.8*    198 194     Recent Labs     10/12/22  0835 10/11/22  0339 10/10/22  1517    137 135*   K 4.0 3.9 4.2    98 97   CO2 33* 32 32   * 143* 143*   BUN 68* 75* 72*   CREA 2.19* 2.91* 2.63*   CA 9.4 9.4 9.3   MG  --  1.8  --    PHOS 3.6  --   --    ALB  --  2.7* 3.0*   ALT  --  12 12     No components found for: GLPOC  No results for input(s): PH, PCO2, PO2, HCO3, FIO2 in the last 72 hours. No results for input(s): INR, INREXT, INREXT in the last 72 hours. Assessment:           Active Problems:    Syncope (11/21/2020)      GI bleed (10/10/2022)    Stage IV CKD. Renal function is stable. Anemia. Source of GI bleed being evaluated. For EGD today.     Hypertension under acceptable control    Diabetes mellitus    S/p left nephrectomy for renal cell carcinoma    Coronary artery disease    Plan:     No indication for initiation of dialysis. Await ferritin and iron saturation. If adequate we will start subcutaneous Retacrit.

## 2022-10-12 NOTE — ROUTINE PROCESS
Primary Nurse Roosevelt Mistry RN and Leidy Justin RN performed a dual skin assessment on this patient. Old surgical scar noted on right knee and lower extremities. Otherwise No impairment noted.

## 2022-10-12 NOTE — PROGRESS NOTES
Problem: Mobility Impaired (Adult and Pediatric)  Goal: *Acute Goals and Plan of Care (Insert Text)  Description: I with LE HEP x7 days  Mod I with bed mob x7 days  Mod I with all transfers x7 days  Amb 50-100ft with RW and SBAx1 x7 days    Pt stated goal: to go back home  Outcome: Not Met    PHYSICAL THERAPY EVALUATION  Patient: Darleen Lindo (1 y.o. female)  Date: 10/12/2022  Primary Diagnosis: GI bleed [K92.2]  Syncope [R55]  Procedure(s) (LRB):  ESOPHAGOGASTRODUODENOSCOPY (EGD) (N/A)     Precautions:        ASSESSMENT    Pt is a 65 y.o. female admitted to hospital with GI bleed and syncope. Current Hgb has improved to 8.5 post blood transfusion yesterday. Pt supine in bed upon PT arrival, agreeable to evaluation. Pt A&O x 4. PLOF listed below. Based on the objective data described below, the patient presents with generalized weakness, impaired functional mobility, impaired amb, impaired balance, and overall is SBA/CGA with bed mob, transfers, and amb. (See below for objective details and assist levels). Overall pt tolerated session good today with ability to amb with step to gt pattern with RW without LOB. Pt amb with slow rossana, but overall did well with amb. No c/o dizziness or SOB during amb. Pt will benefit from continued skilled PT to address above deficits and return to PLOF. Current PT DC recommendation Home with Home Health Therapy once medically appropriate. PLAN :  Recommendations and Planned Interventions: bed mobility training, transfer training, gait training, therapeutic exercises, patient and family training/education, and therapeutic activities      Recommend for staff: Encourage HEP in prep for ADLs/mobility, Amb to bathroom for toileting with gt belt and AD, Use of bed/chair alarm for safety, and Amb in hallway    Frequency/Duration: Patient will be followed by physical therapy:  3-5x/week to address goals.     Recommendation for discharge: (in order for the patient to meet his/her long term goals)  Home with Home Health Therapy             SUBJECTIVE:   Patient supine in bed upon PT arrival, agreeable to work with PT    OBJECTIVE DATA SUMMARY:   HISTORY:    Past Medical History:   Diagnosis Date    Adenocarcinoma, renal cell ([facility] 9/2/2020    Arthritis     CAD (coronary artery disease)     Chronic kidney disease     Diabetes ([facility]     Gout     Hypercholesterolemia     Hypertension     Mental depression     Pulmonary embolism (HCC)     Seizures ([facility]     Stroke ([facility] [facility]     Thyroid disease      Past Surgical History:   Procedure Laterality Date    HX GYN      HX HYSTERECTOMY      HX NEPHRECTOMY Left 02/12/2015    HX ORTHOPAEDIC      HX UROLOGICAL  02/12/2015    PARTIAL URETERECTOMY     MT CARDIAC SURG PROCEDURE UNLIST      stents placed        Home Situation  Home Environment: Private residence  # Steps to Enter: 4  Rails to Enter: Yes  Hand Rails : Bilateral  One/Two Story Residence: One story  Living Alone: No  Support Systems: Child(connie) (dtr lives with pt)  Patient Expects to be Discharged to[de-identified] Home with home health  Current DME Used/Available at Home: [address], rollator, [address], rolling, Cane, straight    Personal factors and/or comorbidities impacting plan of care:     Home Situation  Home Environment: Private residence  # Steps to Enter: 4  Rails to Enter: Yes  Hand Rails : Bilateral  One/Two Story Residence: One story  Living Alone: No  Support Systems: Child(connie) (dtr lives with pt)  Patient Expects to be Discharged to[de-identified] Home with home health  Current DME Used/Available at Home: [address], rollator, [address], rolling, Cane, straight    EXAMINATION/PRESENTATION/DECISION MAKING:   Critical Behavior:  Neurologic State: Alert  Orientation Level: Oriented X4          Skin:  intact where exposed    Edema: none noted    Range Of Motion:  AROM: Within functional limits   B LE                         Strength:    Strength: Generally decreased, functional  Grossly 4/5 B LE Tone & Sensation:                  Sensation: Intact (Intact to LT B LE)               Coordination:       Functional Mobility:  Bed Mobility:     Supine to Sit: Contact guard assistance  Sit to Supine: Stand-by assistance  Scooting: Stand-by assistance  Transfers:  Sit to Stand: Contact guard assistance  Stand to Sit: Contact guard assistance  Stand Pivot Transfers: Contact guard assistance                    Balance:   Sitting: Intact  Standing: Intact; With support  Ambulation/Gait Training:  Distance (ft): 22 Feet (ft)  Assistive Device: Walker, rolling  Ambulation - Level of Assistance: Contact guard assistance    Speed/Liya: Slow          Functional Measure:  [facility] AM-PAC 6 Clicks         Basic Mobility Inpatient Short Form  How much difficulty does the patient currently have. .. Unable A Lot A Little None   1. Turning over in bed (including adjusting bedclothes, sheets and blankets)? [] 1   [] 2   [x] 3   [] 4   2. Sitting down on and standing up from a chair with arms ( e.g., wheelchair, bedside commode, etc.)   [] 1   [] 2   [x] 3   [] 4   3. Moving from lying on back to sitting on the side of the bed? [] 1   [] 2   [x] 3   [] 4          How much help from another person does the patient currently need. .. Total A Lot A Little None   4. Moving to and from a bed to a chair (including a wheelchair)? [] 1   [] 2   [x] 3   [] 4   5. Need to walk in hospital room? [] 1   [] 2   [x] 3   [] 4   6. Climbing 3-5 steps with a railing? [] 1   [] 2   [x] 3   [] 4   © 2007, Trustees of [facility], under license to [employer]. All rights reserved     Score:  Initial: 18 Most Recent: X (Date: -- )   Interpretation of Tool:  Represents activities that are increasingly more difficult (i.e. Bed mobility, Transfers, Gait).   Score 24 23 22-20 19-15 14-10 9-7 6   Modifier CH CI CJ CK CL CM CN           Physical Therapy Evaluation Charge Determination   History Examination Presentation Decision-Making   MEDIUM  Complexity : 1-2 comorbidities / personal factors will impact the outcome/ POC  MEDIUM Complexity : 3 Standardized tests and measures addressing body structure, function, activity limitation and / or participation in recreation  LOW Complexity : Stable, uncomplicated  Other Functional [facility] MED      Based on the above components, the patient evaluation is determined to be of the following complexity level: LOW     Pain Rating:  No  c/o pain during session    Activity Tolerance:   Good    After treatment patient left in no apparent distress:   Bed locked and in lowest position Supine in bed, Call bell within reach, Bed / chair alarm activated, and Side rails x 3     Patient Stated Goal:      COMMUNICATION/EDUCATION:   The patients plan of care was discussed with: Occupational therapist (OT present upon PT departure)    Fall prevention education was provided and the patient/caregiver indicated understanding. and Patient/family agree to work toward stated goals and plan of care.       Thank you for this referral.  Ruben Rizzo   Time Calculation: 18 mins

## 2022-10-12 NOTE — ANESTHESIA PREPROCEDURE EVALUATION
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, with stage 5 chronic kidney disease, or end stage renal disease (HCC)      ENDOCRINE   (+) Arthritis   (+) Chronic gouty arthritis   (+) Diabetes mellitus type 2, controlled (HCC)   (+) Morbid obesity (HCC)      HEMATOLOGY   (+) Anemia      PERSONAL HX & FAMILY HX OF CANCER   (+) Adenocarcinoma, renal cell (HCC)       Anesthetic History   No history of anesthetic complications            Review of Systems / Medical History  Patient summary reviewed and pertinent labs reviewed    Pulmonary  Within defined limits                 Neuro/Psych       CVA       Cardiovascular    Hypertension          Past MI, CAD and PAD      Comments: Sinus rhythm with Premature atrial complexes   Otherwise normal ECG    GI/Hepatic/Renal         Renal disease: CRI      Comments: GIB Endo/Other    Diabetes: type 2  Hypothyroidism  Obesity and anemia     Other Findings            Past Medical History:   Diagnosis Date    Adenocarcinoma, renal cell ([address] 9/2/2020    Arthritis     CAD (coronary artery disease)     Chronic kidney disease     Diabetes ([address]     Gout     Hypercholesterolemia     Hypertension     Mental depression     Pulmonary embolism ([address]     Seizures ([address]     Stroke ([address]     Thyroid disease        Past Surgical History:   Procedure Laterality Date    HX GYN      HX HYSTERECTOMY      HX NEPHRECTOMY Left 02/12/2015    HX ORTHOPAEDIC      HX UROLOGICAL  02/12/2015    PARTIAL URETERECTOMY     UT CARDIAC SURG PROCEDURE UNLIST      stents placed        Current Outpatient Medications   Medication Instructions    acetaminophen (TYLENOL) 325 mg, Oral, AS NEEDED    albuterol (PROVENTIL VENTOLIN) 2.5 mg, Nebulization, EVERY 6 HOURS AS NEEDED    apixaban (ELIQUIS) 2.5 mg, Oral, 2 TIMES DAILY    aspirin delayed-release 81 mg, Oral, DAILY    calcitRIOL (ROCALTROL) 0.25 mcg, Oral, 3 TIMES WEEK BID (MON WED &FRI    carvediloL (COREG) 6.25 mg, Oral, 2 TIMES DAILY WITH MEALS    cetirizine (ZYRTEC) 10 mg, Oral, DAILY    donepeziL (ARICEPT) 10 mg, Oral, EVERY BEDTIME    gabapentin (NEURONTIN) 300 mg, Oral, 2 TIMES DAILY    insulin aspart U-100 (NOVOLOG) 100 unit/mL injection SubCUTAneous, 4 TIMES DAILY BEFORE MEALS & NIGHTLY, SS: 150-200=2 units 201-250=4 units 251-300=6 units 301-350=8 units 351-400=10 units    insulin detemir U-100 (LEVEMIR) 10 Units, SubCUTAneous, EVERY BEDTIME    latanoprost (XALATAN) 0.005 % ophthalmic solution 1 Drop, EVERY BEDTIME    melatonin 5 mg, Oral, EVERY BEDTIME    pantoprazole (PROTONIX) 40 mg, Oral, 2 TIMES DAILY    pravastatin (PRAVACHOL) 80 mg, EVERY BEDTIME    torsemide (DEMADEX) 20 mg, Oral, 2 TIMES DAILY    Trulance 3 mg, Oral, DAILY    Venlafaxine-ER 24 HR (EFFEXOR-ER) 75 mg, Oral, DAILY       Current Facility-Administered Medications   Medication Dose Route Frequency    0.9% sodium chloride infusion 250 mL  250 mL IntraVENous PRN    albuterol CONCENTRATE 2.5mg/0.5 mL neb soln  2.5 mg Nebulization Q6H PRN    [Held by provider] apixaban (ELIQUIS) tablet 2.5 mg  2.5 mg Oral BID    calcitRIOL (ROCALTROL) capsule 0.25 mcg  0.25 mcg Oral BID Mon Wed & Fri    carvediloL (COREG) tablet 6.25 mg  6.25 mg Oral BID WITH MEALS    donepeziL (ARICEPT) tablet 10 mg  10 mg Oral QHS    gabapentin (NEURONTIN) capsule 300 mg  300 mg Oral QHS    latanoprost (XALATAN) 0.005 % ophthalmic solution 1 Drop  1 Drop Both Eyes QHS    pantoprazole (PROTONIX) tablet 40 mg  40 mg Oral BID    . PHARMACY TO SUBSTITUTE PER PROTOCOL (Reordered from: plecanatide (Trulance) 3 mg tab)    Per Protocol    venlafaxine-SR (EFFEXOR-XR) capsule 75 mg  75 mg Oral DAILY    insulin lispro (HUMALOG) injection   SubCUTAneous AC&HS    glucose chewable tablet 16 g  4 Tablet Oral PRN    glucagon (GLUCAGEN) injection 1 mg  1 mg IntraMUSCular PRN    dextrose 10% infusion 0-250 mL  0-250 mL IntraVENous PRN    sodium chloride (NS) flush 5-40 mL  5-40 mL IntraVENous Q8H    sodium chloride (NS) flush 5-40 mL  5-40 mL IntraVENous PRN    acetaminophen (TYLENOL) tablet 650 mg  650 mg Oral Q6H PRN    Or    acetaminophen (TYLENOL) suppository 650 mg  650 mg Rectal Q6H PRN    polyethylene glycol (MIRALAX) packet 17 g  17 g Oral DAILY PRN    ondansetron (ZOFRAN ODT) tablet 4 mg  4 mg Oral Q8H PRN    Or    ondansetron (ZOFRAN) injection 4 mg  4 mg IntraVENous Q6H PRN       Patient Vitals for the past 24 hrs:   Temp Pulse Resp BP SpO2   10/12/22 1037 36.7 °C (98.1 °F) 63 22 139/66 98 %   10/12/22 0822 36.7 °C (98.1 °F) 76 17 (!) 159/65 98 %   10/12/22 0800 -- 75 -- -- --   10/12/22 0243 36.7 °C (98.1 °F) 70 18 (!) 157/74 99 %   10/11/22 2131 36.7 °C (98.1 °F) 62 16 135/82 98 %   10/11/22 1604 36.9 °C (98.4 °F) 66 18 (!) 164/75 99 %   10/11/22 1600 -- 70 -- -- --   10/11/22 1330 36.8 °C (98.2 °F) 67 17 (!) 153/62 --       Lab Results   Component Value Date/Time    WBC 7.7 10/12/2022 08:35 AM    HGB 8.5 (L) 10/12/2022 08:35 AM    HCT 27.1 (L) 10/12/2022 08:35 AM    PLATELET 705 21/30/6422 08:35 AM    MCV 80.4 10/12/2022 08:35 AM     Lab Results   Component Value Date/Time    Sodium 139 10/12/2022 08:35 AM    Potassium 4.0 10/12/2022 08:35 AM    Chloride 101 10/12/2022 08:35 AM    CO2 33 (H) 10/12/2022 08:35 AM    Anion gap 5 10/12/2022 08:35 AM    Glucose 129 (H) 10/12/2022 08:35 AM    BUN 68 (H) 10/12/2022 08:35 AM    Creatinine 2.19 (H) 10/12/2022 08:35 AM    BUN/Creatinine ratio 31 (H) 10/12/2022 08:35 AM    GFR est AA 23 (L) 09/21/2022 02:46 PM    GFR est non-AA 19 (L) 09/21/2022 02:46 PM    Calcium 9.4 10/12/2022 08:35 AM     No results found for: APTT, PTP, INR, INREXT  Lab Results   Component Value Date/Time    Glucose 129 (H) 10/12/2022 08:35 AM    Glucose (POC) 136 (H) 10/12/2022 11:09 AM         Physical Exam    Airway  Mallampati: III    Neck ROM: normal range of motion        Cardiovascular    Rhythm: regular  Rate: normal         Dental         Pulmonary  Breath sounds clear to auscultation               Abdominal         Other Findings            Anesthetic Plan    ASA: 4  Anesthesia type: MAC and total IV anesthesia    Monitoring Plan: Continuous noninvasive hemodynamic monitoring      Induction: Intravenous  Anesthetic plan and risks discussed with: Patient MD Cayetano,Attending: please see MDM

## 2022-10-12 NOTE — PROGRESS NOTES
[facility] Hospitalist Progress Note  Julian Pate MD    Date:10/12/2022       Room:[facility]  Patient Name:Tamy Moore     YOB: 1945     Age:77 y.o.    8/3/22 echocardiogram  Result status: Final result       Left Ventricle: Normal left ventricular systolic function with a visually estimated EF of 55 - 60%. Left ventricle size is normal. Mildly increased wall thickness. Normal wall motion. Normal diastolic function. Aortic Valve: Valve structure is normal. Mildly calcified cusp. Mitral Valve: Moderately thickened leaflet. Left Atrium: Left atrium is mildly dilated. 8/5/22 EGD Dr Milly Collins  Diaphragmatic hernia without obstruction or  gangrene - K44.9  Acute gastritis without bleeding - K29.00  CONSENT : Informed consent was obtained from the patient or an authorized representative  after providing an opportunity for questions. The indications, risks and options  were discussed again. Esophagus - No esophageal varices noted. No stricture, mass, infection, or other lesion(s)  No Gala Scherer tear  EG-Junction - Hiatal hernia  Cardia - Normal  Fundus - Normal  Body - No evidence of Gastric Ulcer  Pylorus - Normal  Duodenum Bulb - Normal  2nd Portion - Normal  PLAN : Return patient to medical unit for care. Continue present PPI therapy  moniotoring HB and transfusion if needed. 10/10/22 admission course  Ms. Bailey Roland is a 65 y.o.   F was brought by family while she had some syncopal-like events at home. She was also noted to be heme positive in ER with a hemoglobin of 8. Patient had had black stools without any andrey bleeding per rectum. Patient otherwise denies any chest pain, nausea, vomiting, diarrhea. Apparently patient is on Eliquis which was supposedly was discontinued at the discharge last time. It was felt by the ER physician patient needs to be admitted for further care and evaluation.     10/11/22 no new complaints, tolerating medications well  Hemoglobin continues to decline  1 PRBC ordered  Gastro to see    10/12/22 no new complaints  Hemoglobin stable    Subjective    Subjective:  Symptoms:  Stable. Review of Systems   All other systems reviewed and are negative. Objective         Vitals Last 24 Hours:  TEMPERATURE:  Temp  Av.2 °F (36.8 °C)  Min: 98.1 °F (36.7 °C)  Max: 98.4 °F (36.9 °C)  RESPIRATIONS RANGE: Resp  Av.3  Min: 16  Max: 18  PULSE OXIMETRY RANGE: SpO2  Av.8 %  Min: 98 %  Max: 100 %  PULSE RANGE: Pulse  Av.9  Min: 62  Max: 76  BLOOD PRESSURE RANGE: Systolic (59RNB), ALDEN:505 , Min:130 , IKC:654   ; Diastolic (85GNL), WDJ:08, Min:55, Max:82    I/O (24Hr): Intake/Output Summary (Last 24 hours) at 10/12/2022 0955  Last data filed at 10/12/2022 0654  Gross per 24 hour   Intake 586.5 ml   Output 950 ml   Net -363.5 ml     Objective:  General Appearance:  Comfortable. Vital signs: (most recent): Blood pressure (!) 159/65, pulse 76, temperature 98.1 °F (36.7 °C), resp. rate 17, height 5' 4\" (1.626 m), weight 86.2 kg (190 lb), SpO2 98 %, not currently breastfeeding. Gen:    in no acute distress  HEENT:   hearing intact to voice, moist mucous membranes  Neck:  Supple,   Resp:  No accessory muscle use, symmetric expansion  Card:  No   peripheral edema, RRR  Abd:  Soft, non-tender, non-distended   Lymph:  No visible cervical adenopathy  Musc:  No visible cyanosis or clubbing  Skin:  No visible rashes or ulcers   Neuro:   follows commands appropriately, normal speech  Psych:  Alert with good insight.      Labs/Imaging/Diagnostics    Labs:  CBC:  Recent Labs     10/12/22  0835 10/11/22  1539 10/11/22  0339   WBC 7.7 8.5 7.5   RBC 3.37* 3.44* 2.84*   HGB 8.5* 8.8* 7.0*   HCT 27.1* 27.9* 22.8*   MCV 80.4 81.1 80.3   RDW 19.1* 18.7* 19.1*    198 194       CHEMISTRIES:  Recent Labs     10/12/22  0835 10/11/22  0339 10/10/22  1517    137 135*   K 4.0 3.9 4.2    98 97   CO2 33* 32 32   BUN 68* 75* 72*   CA 9.4 9.4 9.3   PHOS 3.6  --   --    MG  -- 1.8  --      PT/INR:No results for input(s): INR, INREXT, INREXT in the last 72 hours. No lab exists for component: PROTIME  APTT:No results for input(s): APTT in the last 72 hours. LIVER PROFILE:  Recent Labs     10/11/22  0339 10/10/22  1517   AST 18 22   ALT 12 12       Lab Results   Component Value Date/Time    ALT (SGPT) 12 10/11/2022 03:39 AM    AST (SGOT) 18 10/11/2022 03:39 AM    Alk. phosphatase 82 10/11/2022 03:39 AM    Bilirubin, direct 0.1 10/11/2022 03:39 AM    Bilirubin, total 0.2 10/11/2022 03:39 AM       Imaging Last 24 Hours:  No results found. Assessment//Plan   Active Problems:    Syncope (11/21/2020)      GI bleed (10/10/2022)    Assessment & Plan  8/3/22 echocardiogram  Result status: Final result       Left Ventricle: Normal left ventricular systolic function with a visually estimated EF of 55 - 60%. Left ventricle size is normal. Mildly increased wall thickness. Normal wall motion. Normal diastolic function. Aortic Valve: Valve structure is normal. Mildly calcified cusp. Mitral Valve: Moderately thickened leaflet. Left Atrium: Left atrium is mildly dilated. 8/5/22 EGD Dr Bustillos Minus  Diaphragmatic hernia without obstruction or  gangrene - K44.9  Acute gastritis without bleeding - K29.00  CONSENT : Informed consent was obtained from the patient or an authorized representative  after providing an opportunity for questions. The indications, risks and options  were discussed again. Esophagus - No esophageal varices noted. No stricture, mass, infection, or other lesion(s)  No Gala Scherer tear  EG-Junction - Hiatal hernia  Cardia - Normal  Fundus - Normal  Body - No evidence of Gastric Ulcer  Pylorus - Normal  Duodenum Bulb - Normal  2nd Portion - Normal  PLAN : Return patient to medical unit for care. Continue present PPI therapy  moniotoring HB and transfusion if needed. 10/10/22 admission course  Ms. Cyndi Wild is a 65 y.o.   F was brought by family while she had some syncopal-like events at home. She was also noted to be heme positive in ER with a hemoglobin of 8. Patient had had black stools without any andrey bleeding per rectum. Patient otherwise denies any chest pain, nausea, vomiting, diarrhea. Apparently patient is on Eliquis which was supposedly was discontinued at the discharge last time. It was felt by the ER physician patient needs to be admitted for further care and evaluation. 10/11/22 no new complaints, tolerating medications well  Hemoglobin continues to decline  1 PRBC ordered  Gastro to see    10/12/22 no new complaints  Hemoglobin stable    ASSESSMENT AND PLAN    1) Acute gastrointestinal bleeding in a patient with recent history of gastrointestinal bleeding. Recent EGD as above. Supportive care   PRBC as needed   Hold anticoagulants   Pantoprazole BID   Endoscopy as needed    2) Cardiovascular issues including coronary artery disease, stent.  History of mild aortic stenosis and moderate tricuspid regurgitation 8/22 echo with mildly calcified aortic valve, moderately thickened mitral valve, EF of 55 to 60%     Telemetry monitoring   Echocardiogram as above   Aixaban held   Carvedilol    3) DVT prophylaxis     Apixaban held      Electronically signed by Chan Guadarrama MD on 10/12/2022 at 7:52 AM

## 2022-10-12 NOTE — PROGRESS NOTES
Progress Note      10/12/2022 11:33 AM  NAME: Rafia Arriola   MRN:  [id number]   Admit Diagnosis: GI bleed [K92.2]  Syncope [R55]          Assessment/Plan:   Anemia status post transfusion, improved hemoglobin. Syncopal spell most likely secondary to anemia, no recurrence    Coronary artery disease, status post stent 5/2006, NSTEMI 8/22, continue medical management without cardiac symptoms    Systolic murmur, likely from aortic valve sclerosis. History of pulmonary embolism? In 2015    Renal cancer status post left nephrectomy, elevated creatinine. History of CVA, daughter does report patient was started on Eliquis after the stroke by Dr. Mery Padilla, checking  with him also about discontinuing Eliquis  as well as with Dr. Kristina Cutler her regular cardiologist.  Subsequently I was able to speak to Dr. Mery Padilla and agrees to keep her off Eliquis for now. []       High complexity decision making was performed in this patient at high risk for decompensation with multiple organ involvement. Subjective:     Rafia Arriola denies chest pain, dyspnea. Discussed with RN events overnight. Review of Systems:    Symptom Y/N Comments  Symptom Y/N Comments   Fever/Chills N   Chest Pain N    Poor Appetite N   Edema N    Cough N   Abdominal Pain N    Sputum N   Joint Pain N    SOB/PALOMARES N   Pruritis/Rash N    Nausea/vomit N   Tolerating PT/OT Y    Diarrhea N   Tolerating Diet Y    Constipation N   Other       Could NOT obtain due to:      Objective:      Physical Exam:    Last 24hrs VS reviewed since prior progress note.  Most recent are:    Visit Vitals  /66 (BP 1 Location: Right upper arm, BP Patient Position: Supine)   Pulse 63   Temp 98.1 °F (36.7 °C)   Resp 22   Ht 5' 4\" (1.626 m)   Wt 86.2 kg (190 lb)   SpO2 98%   Breastfeeding No   BMI 32.61 kg/m²       Intake/Output Summary (Last 24 hours) at 10/12/2022 1133  Last data filed at 10/12/2022 0654  Gross per 24 hour   Intake 586.5 ml   Output 950 ml   Net -363.5 ml        General Appearance: Well developed, well nourished, alert; no acute distress. Ears/Nose/Mouth/Throat: Hearing grossly normal; moist mucous membranes  Neck: Supple. Chest: Lungs clear to auscultation bilaterally. Cardiovascular: Regular rate and rhythm, S1S2 normal, 3/6 systolic murmur, best heard in the left parasternal area  Abdomen: Soft, non-tender, bowel sounds are active. Extremities: No edema bilaterally. Skin: Warm and dry. []         Post-cath site without hematoma, bruit, tenderness, or thrill. Distal pulses intact. PMH/SH reviewed - no change compared to H&P    Data Review    Telemetry:     EKG:   []  No new EKG for review    Lab Data Personally Reviewed:    Recent Labs     10/12/22  0835 10/11/22  1539   WBC 7.7 8.5   HGB 8.5* 8.8*   HCT 27.1* 27.9*    198     No results for input(s): INR, PTP, APTT, INREXT in the last 72 hours. Recent Labs     10/12/22  0835 10/11/22  0339 10/10/22  1517    137 135*   K 4.0 3.9 4.2    98 97   CO2 33* 32 32   BUN 68* 75* 72*   CREA 2.19* 2.91* 2.63*   * 143* 143*   CA 9.4 9.4 9.3   MG  --  1.8  --      No results for input(s): CPK, CKNDX, TROIQ in the last 72 hours. No lab exists for component: CPKMB  Lab Results   Component Value Date/Time    Cholesterol, total 164 11/22/2020 01:12 AM    HDL Cholesterol 64 11/22/2020 01:12 AM    LDL,Direct 61 06/20/2022 09:55 AM    LDL, calculated 73.2 11/22/2020 01:12 AM    Triglyceride 134 11/22/2020 01:12 AM    CHOL/HDL Ratio 2.6 11/22/2020 01:12 AM       Recent Labs     10/11/22  0339 10/10/22  1517   AP 82 94   TP 6.1* 6.9   ALB 2.7* 3.0*   GLOB 3.4 3.9     No results for input(s): PH, PCO2, PO2 in the last 72 hours.     Medications Personally Reviewed:    Current Facility-Administered Medications   Medication Dose Route Frequency    0.9% sodium chloride infusion 250 mL  250 mL IntraVENous PRN    albuterol CONCENTRATE 2.5mg/0.5 mL neb soln  2.5 mg Nebulization Q6H PRN    [Held by provider] apixaban (ELIQUIS) tablet 2.5 mg  2.5 mg Oral BID    calcitRIOL (ROCALTROL) capsule 0.25 mcg  0.25 mcg Oral BID Mon Wed & Fri    carvediloL (COREG) tablet 6.25 mg  6.25 mg Oral BID WITH MEALS    donepeziL (ARICEPT) tablet 10 mg  10 mg Oral QHS    gabapentin (NEURONTIN) capsule 300 mg  300 mg Oral QHS    latanoprost (XALATAN) 0.005 % ophthalmic solution 1 Drop  1 Drop Both Eyes QHS    pantoprazole (PROTONIX) tablet 40 mg  40 mg Oral BID    . PHARMACY TO SUBSTITUTE PER PROTOCOL (Reordered from: plecanatide (Trulance) 3 mg tab)    Per Protocol    venlafaxine-SR (EFFEXOR-XR) capsule 75 mg  75 mg Oral DAILY    insulin lispro (HUMALOG) injection   SubCUTAneous AC&HS    glucose chewable tablet 16 g  4 Tablet Oral PRN    glucagon (GLUCAGEN) injection 1 mg  1 mg IntraMUSCular PRN    dextrose 10% infusion 0-250 mL  0-250 mL IntraVENous PRN    sodium chloride (NS) flush 5-40 mL  5-40 mL IntraVENous Q8H    sodium chloride (NS) flush 5-40 mL  5-40 mL IntraVENous PRN    acetaminophen (TYLENOL) tablet 650 mg  650 mg Oral Q6H PRN    Or    acetaminophen (TYLENOL) suppository 650 mg  650 mg Rectal Q6H PRN    polyethylene glycol (MIRALAX) packet 17 g  17 g Oral DAILY PRN    ondansetron (ZOFRAN ODT) tablet 4 mg  4 mg Oral Q8H PRN    Or    ondansetron (ZOFRAN) injection 4 mg  4 mg IntraVENous Q6H PRN         Tony Almanza MD

## 2022-10-12 NOTE — PROGRESS NOTES
Physician Progress Note      Cesilia Franz  CSN #:                  [id number]  :                       1945  ADMIT DATE:       10/10/2022 1:56 PM  [address] [place] DATE:  RESPONDING  PROVIDER #:        RYAN CARBAJAL MD          QUERY TEXT:    Dear Dr. Willy Wright,    Pt admitted with GI bleeding and has anemia documented. If possible, please document in progress notes and discharge summary further specificity regarding the acuity and type of anemia:    The medical record reflects the following:  Risk Factors: 65 F presents with syncope; admitted with GI bleeding, anemia  Clinical Indicators: Hgb 8.3. ..7.0.. Nataliia Bloodgood 8. 8  Treatment: labs, GI consult, Protonix, PRBCs    Thank you,  RILEY Hood, RN, CRCR  Clinical   [email or web address] or contact via [employer]  Options provided:  -- Anemia due to acute blood loss  -- Anemia due to chronic blood loss  -- Anemia due to acute on chronic blood loss  -- Anemia due to iron deficiency  -- Anemia due to CKD  -- Other - I will add my own diagnosis  -- Disagree - Not applicable / Not valid  -- Disagree - Clinically unable to determine / Unknown  -- Refer to Clinical Documentation Reviewer    PROVIDER RESPONSE TEXT:    This patient has acute on chronic blood loss anemia.     Query created by: Fadumo Marinelli on 10/12/2022 8:00 AM      Electronically signed by:  Ashanti Nice MD 10/12/2022 8:13 AM

## 2022-10-12 NOTE — PROGRESS NOTES
Physician Progress Note      Siobhan Fabian  CSN #:                  [id number]  :                       1945  ADMIT DATE:       10/10/2022 1:56 PM  [address] [place] DATE:  RESPONDING  PROVIDER #:        RYAN CARBAJAL MD          QUERY TEXT:    Dear Dr. Laureano Wasserman,    Patient admitted with syncope, anemia, GI bleed and is on chronic anticoagulation. If possible, please document in the progress notes and discharge summary if you are evaluating and/or treating any of the following: The medical record reflects the following:  Risk Factors: 65 F presents with syncope, anemia, GI bleeding  Clinical Indicators: Hgb 8.3. ..7.0; stool heme +; patient takes Eliquis for PE; per H&P, \"Apparently patient is on Eliquis which was supposedly was discontinued at the discharge last time. \"  Treatment: labs, hold Eliquis, GI consult, Cardiology consult    Thank you,  Kenneth Carpio, REGN, RN, CRCR  Clinical   [email or web address] or contact via [employer]  Options provided:  -- GI bleeding with anemia is associated with Eliquis  -- Bleeding unrelated to anticoagulation  -- Other - I will add my own diagnosis  -- Disagree - Not applicable / Not valid  -- Disagree - Clinically unable to determine / Unknown  -- Refer to Clinical Documentation Reviewer    PROVIDER RESPONSE TEXT:    This patient has GI bleeding with anemia is associated with Eliquis.     Query created by: Sharlene Torrez on 10/11/2022 10:38 AM      Electronically signed by:  Victorino Brady MD 10/12/2022 7:26 AM

## 2022-10-13 VITALS
TEMPERATURE: 98 F | SYSTOLIC BLOOD PRESSURE: 123 MMHG | WEIGHT: 190 LBS | HEIGHT: 64 IN | DIASTOLIC BLOOD PRESSURE: 88 MMHG | HEART RATE: 89 BPM | BODY MASS INDEX: 32.44 KG/M2 | RESPIRATION RATE: 17 BRPM | OXYGEN SATURATION: 98 %

## 2022-10-13 LAB
ANION GAP SERPL CALC-SCNC: 4 MMOL/L (ref 5–15)
BUN SERPL-MCNC: 61 MG/DL (ref 6–20)
BUN/CREAT SERPL: 32 (ref 12–20)
CA-I BLD-MCNC: 9.3 MG/DL (ref 8.5–10.1)
CHLORIDE SERPL-SCNC: 103 MMOL/L (ref 97–108)
CO2 SERPL-SCNC: 33 MMOL/L (ref 21–32)
CREAT SERPL-MCNC: 1.91 MG/DL (ref 0.55–1.02)
ERYTHROCYTE [DISTWIDTH] IN BLOOD BY AUTOMATED COUNT: 19.2 % (ref 11.5–14.5)
GLUCOSE BLD STRIP.AUTO-MCNC: 141 MG/DL (ref 65–100)
GLUCOSE BLD STRIP.AUTO-MCNC: 154 MG/DL (ref 65–100)
GLUCOSE BLD STRIP.AUTO-MCNC: 186 MG/DL (ref 65–100)
GLUCOSE SERPL-MCNC: 143 MG/DL (ref 65–100)
HCT VFR BLD AUTO: 26.3 % (ref 35–47)
HGB BLD-MCNC: 8.2 G/DL (ref 11.5–16)
MCH RBC QN AUTO: 25.3 PG (ref 26–34)
MCHC RBC AUTO-ENTMCNC: 31.2 G/DL (ref 30–36.5)
MCV RBC AUTO: 81.2 FL (ref 80–99)
NRBC # BLD: 0 K/UL (ref 0–0.01)
NRBC BLD-RTO: 0 PER 100 WBC
PERFORMED BY, TECHID: ABNORMAL
PLATELET # BLD AUTO: 197 K/UL (ref 150–400)
PMV BLD AUTO: 10.7 FL (ref 8.9–12.9)
POTASSIUM SERPL-SCNC: 4.2 MMOL/L (ref 3.5–5.1)
RBC # BLD AUTO: 3.24 M/UL (ref 3.8–5.2)
SODIUM SERPL-SCNC: 140 MMOL/L (ref 136–145)
WBC # BLD AUTO: 8.4 K/UL (ref 3.6–11)

## 2022-10-13 PROCEDURE — 36415 COLL VENOUS BLD VENIPUNCTURE: CPT

## 2022-10-13 PROCEDURE — 85027 COMPLETE CBC AUTOMATED: CPT

## 2022-10-13 PROCEDURE — G0378 HOSPITAL OBSERVATION PER HR: HCPCS

## 2022-10-13 PROCEDURE — 74011250637 HC RX REV CODE- 250/637: Performed by: HOSPITALIST

## 2022-10-13 PROCEDURE — 74011000250 HC RX REV CODE- 250: Performed by: HOSPITALIST

## 2022-10-13 PROCEDURE — 74011636637 HC RX REV CODE- 636/637: Performed by: HOSPITALIST

## 2022-10-13 PROCEDURE — 82962 GLUCOSE BLOOD TEST: CPT

## 2022-10-13 PROCEDURE — 80048 BASIC METABOLIC PNL TOTAL CA: CPT

## 2022-10-13 PROCEDURE — 97530 THERAPEUTIC ACTIVITIES: CPT

## 2022-10-13 RX ADMIN — ACETAMINOPHEN 650 MG: 325 TABLET ORAL at 08:53

## 2022-10-13 RX ADMIN — SODIUM CHLORIDE, PRESERVATIVE FREE 10 ML: 5 INJECTION INTRAVENOUS at 06:01

## 2022-10-13 RX ADMIN — VENLAFAXINE HYDROCHLORIDE 75 MG: 37.5 CAPSULE, EXTENDED RELEASE ORAL at 08:34

## 2022-10-13 RX ADMIN — INSULIN LISPRO 2 UNITS: 100 INJECTION, SOLUTION INTRAVENOUS; SUBCUTANEOUS at 08:33

## 2022-10-13 RX ADMIN — CARVEDILOL 6.25 MG: 3.12 TABLET, FILM COATED ORAL at 17:16

## 2022-10-13 RX ADMIN — SODIUM CHLORIDE, PRESERVATIVE FREE 10 ML: 5 INJECTION INTRAVENOUS at 15:00

## 2022-10-13 RX ADMIN — POLYETHYLENE GLYCOL 3350 17 G: 17 POWDER, FOR SOLUTION ORAL at 08:54

## 2022-10-13 RX ADMIN — PANTOPRAZOLE SODIUM 40 MG: 40 TABLET, DELAYED RELEASE ORAL at 08:34

## 2022-10-13 RX ADMIN — CARVEDILOL 6.25 MG: 3.12 TABLET, FILM COATED ORAL at 08:34

## 2022-10-13 NOTE — PROGRESS NOTES
Problem: Pressure Injury - Risk of  Goal: *Prevention of pressure injury  Description: Document Kirit Scale and appropriate interventions in the flowsheet.   Outcome: Progressing Towards Goal  Note: Pressure Injury Interventions:  Sensory Interventions: Assess changes in LOC, Keep linens dry and wrinkle-free, Minimize linen layers, Pressure redistribution bed/mattress (bed type)    Moisture Interventions: Absorbent underpads, Apply protective barrier, creams and emollients, Minimize layers, Internal/External urinary devices    Activity Interventions: Increase time out of bed, Pressure redistribution bed/mattress(bed type), PT/OT evaluation    Mobility Interventions: HOB 30 degrees or less, Pressure redistribution bed/mattress (bed type), PT/OT evaluation    Nutrition Interventions: Document food/fluid/supplement intake, Offer support with meals,snacks and hydration    Friction and Shear Interventions: Apply protective barrier, creams and emollients, HOB 30 degrees or less, Minimize layers

## 2022-10-13 NOTE — PROGRESS NOTES
Problem: Mobility Impaired (Adult and Pediatric)  Goal: *Acute Goals and Plan of Care (Insert Text)  Description: I with LE HEP x7 days  Mod I with bed mob x7 days  Mod I with all transfers x7 days  Amb 50-100ft with RW and SBAx1 x7 days    Pt stated goal: to go back home  Outcome: Progressing Towards Goal   PHYSICAL THERAPY TREATMENT  Patient: Dipak Crespo (89 y.o. female)  Date: 10/13/2022  Diagnosis: GI bleed [K92.2]  Syncope [R55] <principal problem not specified>  Procedure(s) (LRB):  ESOPHAGOGASTRODUODENOSCOPY (EGD) (N/A) 1 Day Post-Op  Precautions:    Chart, physical therapy assessment, plan of care and goals were reviewed. ASSESSMENT  Patient continues with skilled PT services and is progressing towards goals. Pt found semi supine upon PTA arrival, agreeable to session. (See below for objective details and assist levels). Overall pt tolerated session well today with increase in ambulation and therex. Demo's progression towards goals, req VC for scooting, UE push off with transfers and upright posture during ambulation. Will continue to benefit from skilled PT services, and will continue to progress as tolerated. Current Level of Function Impacting Discharge (mobility/balance): bed mobility, req HOB elevated     Other factors to consider for discharge; PLOF          PLAN :  Patient continues to benefit from skilled intervention to address the above impairments. Continue treatment per established plan of care to address goals.     Recommend with staff: Encourage HEP in prep for ADLs/mobility, Amb to bathroom for toileting with gt belt and AD, and Amb in hallway    Recommendation for discharge: (in order for the patient to meet his/her long term goals)  Home with [address] [place] [address]    This discharge recommendation:  Has been made in collaboration with the attending provider and/or case management    IF patient discharges home will need the following DME: patient owns DME required for discharge SUBJECTIVE:   Patient stated I could do more if this walker wasn't so loud.     OBJECTIVE DATA SUMMARY:   Critical Behavior:  Neurologic State: Alert  Orientation Level: Oriented X4  Cognition: Follows commands     Functional Mobility Training:  Bed Mobility:  Supine to Sit: Stand-by assistance  Sit to Supine: Stand-by assistance;Bed Modified  Scooting: Stand-by assistance  Transfers:  Sit to Stand: Contact guard assistance;Assist x1  Stand to Sit: Contact guard assistance  Balance:  Sitting: Intact; Without support  Standing: Intact; With support  Standing - Static: Occasional;Fair  Standing - Dynamic : Occasional;Fair  Ambulation/Gait Training:  Distance (ft): 80 Feet (ft)  Assistive Device: Gait belt;Walker, rolling  Ambulation - Level of Assistance: Contact guard assistance; Adaptive equipment  Gait Description (WDL): Exceptions to [facility]  Base of Support: Narrowed  Speed/Liya: Slow  Interventions: Safety awareness training;Manual cues  Therapeutic Exercises:       EXERCISE   Sets   Reps   Active Active Assist   Passive Self ROM   Comments   Ankle Pumps 1 15 [x] [] [] []    Quad Sets/Glut Sets   [] [] [] []    Hamstring Sets   [] [] [] []    Short Arc Quads   [] [] [] []    Heel Slides   [] [] [] []    Straight Leg Raises   [] [] [] []    Hip abd/add   [] [] [] []    Long Arc Quads 1 10 [x] [] [] []    Marching 1 15 [x] [] [] []       [] [] [] []       Pain Rating:  Pt denies pain    Activity Tolerance:   Good    After treatment patient left in no apparent distress:   Bed locked and returned to lowest position, Supine in bed, Call bell within reach, and Caregiver / family present    COMMUNICATION/COLLABORATION:   The patients plan of care was discussed with: Registered nurse.      Milton Ramos, PTA, PT   Time Calculation: 30 mins

## 2022-10-13 NOTE — DISCHARGE SUMMARY
Admit date: 10/10/2022   Admitting Provider: Hilario Jeong MD    Discharge date: 10/13/2022  Discharging Provider: Frida Wild MD      * Admission Diagnoses: GI bleed [K92.2]  Syncope [R55]    * Discharge Diagnoses:    Hospital Problems as of 10/13/2022 Date Reviewed: 10/13/2022            Codes Class Noted - Resolved POA    * (Principal) GI bleed ICD-10-CM: K92.2  ICD-9-CM: 578.9  10/10/2022 - Present Yes        Syncope ICD-10-CM: R55  ICD-9-CM: 780.2  11/21/2020 - Present Yes           * Hospital Course:   8/3/22 echocardiogram  Result status: Final result       Left Ventricle: Normal left ventricular systolic function with a visually estimated EF of 55 - 60%. Left ventricle size is normal. Mildly increased wall thickness. Normal wall motion. Normal diastolic function. Aortic Valve: Valve structure is normal. Mildly calcified cusp. Mitral Valve: Moderately thickened leaflet. Left Atrium: Left atrium is mildly dilated. 8/5/22 EGD Dr Boom Novoa  Diaphragmatic hernia without obstruction or  gangrene - K44.9  Acute gastritis without bleeding - K29.00  CONSENT : Informed consent was obtained from the patient or an authorized representative  after providing an opportunity for questions. The indications, risks and options  were discussed again. Esophagus - No esophageal varices noted. No stricture, mass, infection, or other lesion(s)  No Gala Scherer tear  EG-Junction - Hiatal hernia  Cardia - Normal  Fundus - Normal  Body - No evidence of Gastric Ulcer  Pylorus - Normal  Duodenum Bulb - Normal  2nd Portion - Normal  PLAN : Return patient to medical unit for care. Continue present PPI therapy  moniotoring HB and transfusion if needed. 10/10/22 admission course  Ms. Josué Vaz is a 65 y.o.   F was brought by family while she had some syncopal-like events at home. She was also noted to be heme positive in ER with a hemoglobin of 8. Patient had had black stools without any andrey bleeding per rectum.   Patient otherwise denies any chest pain, nausea, vomiting, diarrhea. Apparently patient is on Eliquis which was supposedly was discontinued at the discharge last time. It was felt by the ER physician patient needs to be admitted for further care and evaluation. 10/11/22 no new complaints, tolerating medications well  Hemoglobin continues to decline  1 PRBC ordered  Gastro to see     10/12/22 no new complaints  Hemoglobin stable    10/13/22 discharge home with home health services  Apixaban stopped  No other medication changes  Outpatient follow up with PCP, cards, nephrology, gastro  Outpatient colonoscopy anticipated in a month    * Procedures:   Procedure(s):  ESOPHAGOGASTRODUODENOSCOPY (EGD)  Acute gastritis without bleeding - K29.00  Duodenitis without bleeding - K29.80  CONSENT : Informed consent was obtained from the patient or an authorized representative  after providing an opportunity for questions. The indications, risks and options  were discussed again. Esophagus - examined esophagus looks normal, No stricture, mass,  infection, hiatal hernia, diverticulcum or other lesion  (s)  EG-Junction - Normal  Cardia - Normal  Fundus - Normal  Body - Normal  Pylorus - Normal  Duodenum Bulb - Mild Duodenitis  2nd Portion - Normal  PLAN : Return patient to medical unit for care.   continue on current medications, no anticoagulaiton  iron replacement  follow up after 1 month for outpatient colonoscopy    Consults: Cardiology, GI, and Nephrology    Significant Diagnostic Studies:   Recent Results (from the past 24 hour(s))   GLUCOSE, POC    Collection Time: 10/12/22  4:05 PM   Result Value Ref Range    Glucose (POC) 171 (H) 65 - 100 mg/dL    Performed by Anne Marie Browne    GLUCOSE, POC    Collection Time: 10/12/22 10:18 PM   Result Value Ref Range    Glucose (POC) 152 (H) 65 - 100 mg/dL    Performed by Warren Gallo    CBC W/O DIFF    Collection Time: 10/13/22  7:15 AM   Result Value Ref Range    WBC 8.4 3.6 - 11.0 K/uL RBC 3.24 (L) 3.80 - 5.20 M/uL    HGB 8.2 (L) 11.5 - 16.0 g/dL    HCT 26.3 (L) 35.0 - 47.0 %    MCV 81.2 80.0 - 99.0 FL    MCH 25.3 (L) 26.0 - 34.0 PG    MCHC 31.2 30.0 - 36.5 g/dL    RDW 19.2 (H) 11.5 - 14.5 %    PLATELET 387 693 - 807 K/uL    MPV 10.7 8.9 - 12.9 FL    NRBC 0.0 0.0  WBC    ABSOLUTE NRBC 0.00 0.00 - 4.56 K/uL   METABOLIC PANEL, BASIC    Collection Time: 10/13/22  7:15 AM   Result Value Ref Range    Sodium 140 136 - 145 mmol/L    Potassium 4.2 3.5 - 5.1 mmol/L    Chloride 103 97 - 108 mmol/L    CO2 33 (H) 21 - 32 mmol/L    Anion gap 4 (L) 5 - 15 mmol/L    Glucose 143 (H) 65 - 100 mg/dL    BUN 61 (H) 6 - 20 mg/dL    Creatinine 1.91 (H) 0.55 - 1.02 mg/dL    BUN/Creatinine ratio 32 (H) 12 - 20      eGFR 27 (L) >60 ml/min/1.73m2    Calcium 9.3 8.5 - 10.1 mg/dL   GLUCOSE, POC    Collection Time: 10/13/22  7:57 AM   Result Value Ref Range    Glucose (POC) 154 (H) 65 - 100 mg/dL    Performed by Indira Daniels          Discharge Exam:  Patient Vitals for the past 24 hrs:   Temp Pulse Resp BP SpO2   10/13/22 0833 -- 74 -- -- --   10/13/22 0754 98 °F (36.7 °C) (!) 103 17 (!) 161/73 96 %   10/13/22 0520 -- 74 -- -- --   10/13/22 0445 98.3 °F (36.8 °C) 69 18 (!) 148/74 95 %   10/13/22 0046 98.1 °F (36.7 °C) 67 20 (!) 176/79 96 %   10/12/22 2044 98.2 °F (36.8 °C) 66 20 (!) 145/65 99 %   10/12/22 1841 98.2 °F (36.8 °C) 65 18 (!) 147/67 97 %   10/12/22 1637 98.7 °F (37.1 °C) 67 18 135/69 97 %   10/12/22 1600 -- 67 -- -- --   10/12/22 1406 98.1 °F (36.7 °C) 68 20 (!) 142/67 98 %   10/12/22 1351 -- 70 22 (!) 121/103 98 %   10/12/22 1349 -- 62 23 (!) 132/53 95 %   10/12/22 1343 97.7 °F (36.5 °C) 63 12 138/60 100 %   Gen:    in no acute distress  HEENT:   hearing intact to voice, moist mucous membranes  Neck:  Supple,   Resp:  No accessory muscle use, symmetric expansion  Card:  No   peripheral edema, RRR  Abd:  Soft, non-tender, non-distended   Lymph:  No visible cervical adenopathy  Musc:  No visible cyanosis or clubbing  Skin:  No visible rashes or ulcers   Neuro:   follows commands appropriately, normal speech  Psych:  Alert with good insight. * Discharge Condition: stable  * Disposition: Home    Discharge Medications:  Current Discharge Medication List        CONTINUE these medications which have NOT CHANGED    Details   cetirizine (ZYRTEC) 10 mg tablet Take 10 mg by mouth daily. acetaminophen (TYLENOL) 325 mg tablet Take 325 mg by mouth as needed for Pain. aspirin delayed-release 81 mg tablet Take 81 mg by mouth daily. melatonin 5 mg tablet Take 5 mg by mouth nightly. insulin aspart U-100 (NOVOLOG) 100 unit/mL injection by SubCUTAneous route Before breakfast, lunch, dinner and at bedtime. SS: 150-200=2 units 201-250=4 units 251-300=6 units 301-350=8 units 351-400=10 units      insulin detemir U-100 (LEVEMIR) 100 unit/mL injection 10 Units by SubCUTAneous route nightly. plecanatide (Trulance) 3 mg tab Take 3 mg by mouth daily. pantoprazole (PROTONIX) 40 mg tablet Take 1 Tablet by mouth two (2) times a day. Qty: 60 Tablet, Refills: 0      albuterol (PROVENTIL VENTOLIN) 2.5 mg /3 mL (0.083 %) nebu 2.5 mg by Nebulization route every six (6) hours as needed for Shortness of Breath. torsemide (DEMADEX) 20 mg tablet Take 20 mg by mouth two (2) times a day. carvediloL (COREG) 6.25 mg tablet Take 6.25 mg by mouth two (2) times daily (with meals). calcitRIOL (ROCALTROL) 0.25 mcg capsule Take 0.25 mcg by mouth BID Mon Wed & Fri.      donepeziL (ARICEPT) 10 mg tablet Take 10 mg by mouth nightly.      gabapentin (NEURONTIN) 300 mg capsule Take 300 mg by mouth two (2) times a day. Venlafaxine-ER 24 HR (EFFEXOR-ER) 75 mg tr24 tablet Take 75 mg by mouth daily. latanoprost (XALATAN) 0.005 % ophthalmic solution Administer 1 Drop to both eyes nightly. pravastatin (PRAVACHOL) 80 mg tablet Take 80 mg by mouth nightly.            STOP taking these medications apixaban (ELIQUIS) 2.5 mg tablet Comments:   Reason for Stopping:               * Follow-up Care/Patient Instructions:   Activity: Activity as tolerated and no driving for today  Diet: Resume previous diet  Wound Care: As directed    Follow-up Information       Follow up With Specialties Details Why Contact Info    Ophelia Blizzard, NP Nurse Practitioner   [address]      Camden Irizarry MD Gastroenterology, Internal Medicine Physician Follow up in 2 week(s)  [address] [facility]  [phone]      [facility], [address] [place] [address] Vascular Surgery, Cardiovascular Disease Physician Follow up in 2 week(s)  [place] [address]  [phone]      Flory Anderson MD Nephrology, Internal Medicine Physician Follow up in 2 week(s)  [address] [facility]  [phone]            Discharge time 35 mins    Signed:  Julian Crowe MD  10/13/2022  11:21 AM

## 2022-10-13 NOTE — PROGRESS NOTES
Discussed with the patient and all questioned fully answered. She will call me if any problems arise. Granddaughter at bedside with patient, discharge instructions, follow-up appointments, and stop taking Apixiban review with patient, verbalizes understanding and signed in agreement. Patient transported via [employer] to private vehicle home.

## 2022-10-13 NOTE — PROGRESS NOTES
Problem: Pressure Injury - Risk of  Goal: *Prevention of pressure injury  Description: Document Kirit Scale and appropriate interventions in the flowsheet. Outcome: Resolved/Met  Note: Pressure Injury Interventions:  Sensory Interventions: Assess changes in LOC, Keep linens dry and wrinkle-free, Minimize linen layers, Pressure redistribution bed/mattress (bed type)    Moisture Interventions: Absorbent underpads, Apply protective barrier, creams and emollients, Minimize layers, Internal/External urinary devices    Activity Interventions: Increase time out of bed, Pressure redistribution bed/mattress(bed type), PT/OT evaluation    Mobility Interventions: HOB 30 degrees or less, Pressure redistribution bed/mattress (bed type), PT/OT evaluation    Nutrition Interventions: Document food/fluid/supplement intake, Offer support with meals,snacks and hydration    Friction and Shear Interventions: Apply protective barrier, creams and emollients, HOB 30 degrees or less, Minimize layers                Problem: Patient Education: Go to Patient Education Activity  Goal: Patient/Family Education  Outcome: Resolved/Met     Problem: Patient Education: Go to Patient Education Activity  Goal: Patient/Family Education  Outcome: Resolved/Met     Problem: Patient Education: Go to Patient Education Activity  Goal: Patient/Family Education  Outcome: Resolved/Met     Problem: Patient Education: Go to Patient Education Activity  Goal: Patient/Family Education  Outcome: Resolved/Met     Problem: Falls - Risk of  Goal: *Absence of Falls  Description: Document Emi Fall Risk and appropriate interventions in the flowsheet.   Outcome: Resolved/Met  Note: Fall Risk Interventions:  Mobility Interventions: Assess mobility with egress test, Bed/chair exit alarm, Patient to call before getting OOB, Strengthening exercises (ROM-active/passive)         Medication Interventions: Assess postural VS orthostatic hypotension, Bed/chair exit alarm, Patient to call before getting OOB, Teach patient to arise slowly    Elimination Interventions: Bed/chair exit alarm, Call light in reach, Patient to call for help with toileting needs    History of Falls Interventions: Bed/chair exit alarm, Vital signs minimum Q4HRs X 24 hrs (comment for end date)         Problem: Patient Education: Go to Patient Education Activity  Goal: Patient/Family Education  Outcome: Resolved/Met

## 2022-10-13 NOTE — PROGRESS NOTES
3781: Chart reviewed. PT/OT recs for home with [facility] noted. CM will speak with patient/family regarding above. : Daughter, Fransico Hernandez [phone]. CM will continue to follow patient and recs of medical team.    1000: CM met with patient sitting up in bed and son at side for DCP. Both understand, therapy recs for [facility]. Choice letter signed, no preference and placed on chart. Referral sent via [employer]. 1130: 1 out of [address] has accepted patient with [facility] 10/19/2022. CM met with patient to share info. Patient verbalized understanding and agreeable. CM informed MD who also agreeable. [facility]charge summary/order attached in [facility] 251. CM met with patient again providing [facility] info in writing. Patient states her granddaughter will pick her up when she gets off work. 1350: [facility] has messaged via [employer] now stating patient's insurance is OON and cannot accept patient. CM has informed patient. Patient states she has the help of her family and does not need HH.    1510: DC ordered modified. Medicare pt has received, reviewed, and signed [facility] letter informing them of their right to appeal the discharge. Signed copied has been placed on pt bedside chart. Discharge plan of care/case management plan validated with provider discharge order. Discharge Checklist Completed.

## 2022-10-13 NOTE — ROUTINE PROCESS
Bedside shift change report given to Johnathan Edwards RN (oncoming nurse) by Ivette Torres RN (offgoing nurse). Report included the following information SBAR, Kardex, Intake/Output, MAR, and Recent Results.

## 2022-10-21 ENCOUNTER — ANESTHESIA EVENT (OUTPATIENT)
Dept: ENDOSCOPY | Age: 77
DRG: 378 | End: 2022-10-21
Payer: MEDICARE

## 2022-10-21 ENCOUNTER — APPOINTMENT (OUTPATIENT)
Dept: ENDOSCOPY | Age: 77
DRG: 378 | End: 2022-10-21
Attending: INTERNAL MEDICINE
Payer: MEDICARE

## 2022-10-21 ENCOUNTER — HOSPITAL ENCOUNTER (INPATIENT)
Age: 77
LOS: 13 days | Discharge: HOME | DRG: 378 | End: 2022-11-03
Attending: EMERGENCY MEDICINE | Admitting: INTERNAL MEDICINE
Payer: MEDICARE

## 2022-10-21 ENCOUNTER — ANESTHESIA (OUTPATIENT)
Dept: ENDOSCOPY | Age: 77
DRG: 378 | End: 2022-10-21
Payer: MEDICARE

## 2022-10-21 DIAGNOSIS — R55 SYNCOPE, UNSPECIFIED SYNCOPE TYPE: ICD-10-CM

## 2022-10-21 DIAGNOSIS — D64.9 ACUTE ON CHRONIC ANEMIA: Primary | ICD-10-CM

## 2022-10-21 PROBLEM — K62.5 RECTAL BLEEDING: Status: ACTIVE | Noted: 2022-10-21

## 2022-10-21 LAB
ALBUMIN SERPL-MCNC: 2.9 G/DL (ref 3.5–5)
ALBUMIN/GLOB SERPL: 0.9 {RATIO} (ref 1.1–2.2)
ALP SERPL-CCNC: 88 U/L (ref 45–117)
ALT SERPL-CCNC: 11 U/L (ref 12–78)
ANION GAP SERPL CALC-SCNC: 7 MMOL/L (ref 5–15)
APPEARANCE UR: ABNORMAL
AST SERPL W P-5'-P-CCNC: 20 U/L (ref 15–37)
BACTERIA URNS QL MICRO: ABNORMAL /HPF
BASOPHILS # BLD: 0 K/UL (ref 0–0.1)
BASOPHILS NFR BLD: 0 % (ref 0–1)
BILIRUB SERPL-MCNC: 0.3 MG/DL (ref 0.2–1)
BILIRUB UR QL: NEGATIVE
BUN SERPL-MCNC: 81 MG/DL (ref 6–20)
BUN/CREAT SERPL: 31 (ref 12–20)
CA-I BLD-MCNC: 9.4 MG/DL (ref 8.5–10.1)
CHLORIDE SERPL-SCNC: 97 MMOL/L (ref 97–108)
CO2 SERPL-SCNC: 31 MMOL/L (ref 21–32)
COLOR UR: ABNORMAL
CREAT SERPL-MCNC: 2.65 MG/DL (ref 0.55–1.02)
DIFFERENTIAL METHOD BLD: ABNORMAL
EOSINOPHIL # BLD: 0.2 K/UL (ref 0–0.4)
EOSINOPHIL NFR BLD: 2 % (ref 0–7)
ERYTHROCYTE [DISTWIDTH] IN BLOOD BY AUTOMATED COUNT: 19.7 % (ref 11.5–14.5)
GLOBULIN SER CALC-MCNC: 3.2 G/DL (ref 2–4)
GLUCOSE BLD STRIP.AUTO-MCNC: 136 MG/DL (ref 65–100)
GLUCOSE BLD STRIP.AUTO-MCNC: 209 MG/DL (ref 65–100)
GLUCOSE SERPL-MCNC: 228 MG/DL (ref 65–100)
GLUCOSE UR STRIP.AUTO-MCNC: NEGATIVE MG/DL
HCT VFR BLD AUTO: 20.1 % (ref 35–47)
HGB BLD-MCNC: 6.3 G/DL (ref 11.5–16)
HGB UR QL STRIP: ABNORMAL
IMM GRANULOCYTES # BLD AUTO: 0 K/UL (ref 0–0.04)
IMM GRANULOCYTES NFR BLD AUTO: 0 % (ref 0–0.5)
KETONES UR QL STRIP.AUTO: NEGATIVE MG/DL
LEUKOCYTE ESTERASE UR QL STRIP.AUTO: ABNORMAL
LYMPHOCYTES # BLD: 1.1 K/UL (ref 0.8–3.5)
LYMPHOCYTES NFR BLD: 14 % (ref 12–49)
MCH RBC QN AUTO: 25.9 PG (ref 26–34)
MCHC RBC AUTO-ENTMCNC: 31.3 G/DL (ref 30–36.5)
MCV RBC AUTO: 82.7 FL (ref 80–99)
MONOCYTES # BLD: 0.8 K/UL (ref 0–1)
MONOCYTES NFR BLD: 11 % (ref 5–13)
NEUTS SEG # BLD: 5.3 K/UL (ref 1.8–8)
NEUTS SEG NFR BLD: 73 % (ref 32–75)
NITRITE UR QL STRIP.AUTO: NEGATIVE
NRBC # BLD: 0 K/UL (ref 0–0.01)
NRBC BLD-RTO: 0 PER 100 WBC
PERFORMED BY, TECHID: ABNORMAL
PERFORMED BY, TECHID: ABNORMAL
PH UR STRIP: 7 [PH] (ref 5–8)
PLATELET # BLD AUTO: 220 K/UL (ref 150–400)
PMV BLD AUTO: 10.7 FL (ref 8.9–12.9)
POTASSIUM SERPL-SCNC: 3.6 MMOL/L (ref 3.5–5.1)
PROT SERPL-MCNC: 6.1 G/DL (ref 6.4–8.2)
PROT UR STRIP-MCNC: NEGATIVE MG/DL
RBC # BLD AUTO: 2.43 M/UL (ref 3.8–5.2)
RBC #/AREA URNS HPF: ABNORMAL /HPF (ref 0–5)
SODIUM SERPL-SCNC: 135 MMOL/L (ref 136–145)
SP GR UR REFRACTOMETRY: 1.01 (ref 1–1.03)
TROPONIN-HIGH SENSITIVITY: 61 NG/L (ref 0–51)
UA: UC IF INDICATED,UAUC: ABNORMAL
UROBILINOGEN UR QL STRIP.AUTO: 0.1 EU/DL (ref 0.1–1)
WBC # BLD AUTO: 7.4 K/UL (ref 3.6–11)
WBC URNS QL MICRO: ABNORMAL /HPF (ref 0–4)

## 2022-10-21 PROCEDURE — 74011250637 HC RX REV CODE- 250/637: Performed by: INTERNAL MEDICINE

## 2022-10-21 PROCEDURE — 65270000029 HC RM PRIVATE

## 2022-10-21 PROCEDURE — 30233N1 TRANSFUSION OF NONAUTOLOGOUS RED BLOOD CELLS INTO PERIPHERAL VEIN, PERCUTANEOUS APPROACH: ICD-10-PCS | Performed by: INTERNAL MEDICINE

## 2022-10-21 PROCEDURE — 84484 ASSAY OF TROPONIN QUANT: CPT

## 2022-10-21 PROCEDURE — 93005 ELECTROCARDIOGRAM TRACING: CPT

## 2022-10-21 PROCEDURE — C9113 INJ PANTOPRAZOLE SODIUM, VIA: HCPCS | Performed by: INTERNAL MEDICINE

## 2022-10-21 PROCEDURE — 85025 COMPLETE CBC W/AUTO DIFF WBC: CPT

## 2022-10-21 PROCEDURE — 99285 EMERGENCY DEPT VISIT HI MDM: CPT

## 2022-10-21 PROCEDURE — P9016 RBC LEUKOCYTES REDUCED: HCPCS

## 2022-10-21 PROCEDURE — 74011250636 HC RX REV CODE- 250/636: Performed by: REGISTERED NURSE

## 2022-10-21 PROCEDURE — 82962 GLUCOSE BLOOD TEST: CPT

## 2022-10-21 PROCEDURE — 74011636637 HC RX REV CODE- 636/637: Performed by: INTERNAL MEDICINE

## 2022-10-21 PROCEDURE — 80053 COMPREHEN METABOLIC PANEL: CPT

## 2022-10-21 PROCEDURE — 0DJ08ZZ INSPECTION OF UPPER INTESTINAL TRACT, VIA NATURAL OR ARTIFICIAL OPENING ENDOSCOPIC: ICD-10-PCS | Performed by: INTERNAL MEDICINE

## 2022-10-21 PROCEDURE — 2709999900 HC NON-CHARGEABLE SUPPLY: Performed by: INTERNAL MEDICINE

## 2022-10-21 PROCEDURE — 86923 COMPATIBILITY TEST ELECTRIC: CPT

## 2022-10-21 PROCEDURE — 76060000032 HC ANESTHESIA 0.5 TO 1 HR: Performed by: INTERNAL MEDICINE

## 2022-10-21 PROCEDURE — 74011000250 HC RX REV CODE- 250: Performed by: EMERGENCY MEDICINE

## 2022-10-21 PROCEDURE — 36430 TRANSFUSION BLD/BLD COMPNT: CPT

## 2022-10-21 PROCEDURE — 86900 BLOOD TYPING SEROLOGIC ABO: CPT

## 2022-10-21 PROCEDURE — 36415 COLL VENOUS BLD VENIPUNCTURE: CPT

## 2022-10-21 PROCEDURE — 74011000250 HC RX REV CODE- 250: Performed by: INTERNAL MEDICINE

## 2022-10-21 PROCEDURE — 74011250636 HC RX REV CODE- 250/636: Performed by: INTERNAL MEDICINE

## 2022-10-21 PROCEDURE — 81001 URINALYSIS AUTO W/SCOPE: CPT

## 2022-10-21 PROCEDURE — 76040000007: Performed by: INTERNAL MEDICINE

## 2022-10-21 RX ORDER — SODIUM CHLORIDE 9 MG/ML
250 INJECTION, SOLUTION INTRAVENOUS AS NEEDED
Status: DISCONTINUED | OUTPATIENT
Start: 2022-10-21 | End: 2022-11-02

## 2022-10-21 RX ORDER — CHOLECALCIFEROL (VITAMIN D3) 125 MCG
5 CAPSULE ORAL
Status: DISCONTINUED | OUTPATIENT
Start: 2022-10-21 | End: 2022-11-03 | Stop reason: HOSPADM

## 2022-10-21 RX ORDER — LATANOPROST 50 UG/ML
1 SOLUTION/ DROPS OPHTHALMIC
Status: DISCONTINUED | OUTPATIENT
Start: 2022-10-21 | End: 2022-11-03 | Stop reason: HOSPADM

## 2022-10-21 RX ORDER — PROPOFOL 10 MG/ML
INJECTION, EMULSION INTRAVENOUS AS NEEDED
Status: DISCONTINUED | OUTPATIENT
Start: 2022-10-21 | End: 2022-10-21 | Stop reason: HOSPADM

## 2022-10-21 RX ORDER — ONDANSETRON 4 MG/1
4 TABLET, ORALLY DISINTEGRATING ORAL
Status: DISCONTINUED | OUTPATIENT
Start: 2022-10-21 | End: 2022-11-03 | Stop reason: HOSPADM

## 2022-10-21 RX ORDER — SODIUM CHLORIDE 9 MG/ML
75 INJECTION, SOLUTION INTRAVENOUS CONTINUOUS
Status: DISCONTINUED | OUTPATIENT
Start: 2022-10-21 | End: 2022-10-23

## 2022-10-21 RX ORDER — SODIUM CHLORIDE 0.9 % (FLUSH) 0.9 %
5-40 SYRINGE (ML) INJECTION EVERY 8 HOURS
Status: DISCONTINUED | OUTPATIENT
Start: 2022-10-21 | End: 2022-10-29

## 2022-10-21 RX ORDER — ONDANSETRON 2 MG/ML
4 INJECTION INTRAMUSCULAR; INTRAVENOUS
Status: DISCONTINUED | OUTPATIENT
Start: 2022-10-21 | End: 2022-11-03 | Stop reason: HOSPADM

## 2022-10-21 RX ORDER — POLYETHYLENE GLYCOL 3350 17 G/17G
17 POWDER, FOR SOLUTION ORAL DAILY PRN
Status: DISCONTINUED | OUTPATIENT
Start: 2022-10-21 | End: 2022-11-03 | Stop reason: HOSPADM

## 2022-10-21 RX ORDER — DONEPEZIL HYDROCHLORIDE 5 MG/1
10 TABLET, FILM COATED ORAL
Status: DISCONTINUED | OUTPATIENT
Start: 2022-10-21 | End: 2022-11-03 | Stop reason: HOSPADM

## 2022-10-21 RX ORDER — SODIUM CHLORIDE 9 MG/ML
INJECTION, SOLUTION INTRAVENOUS
Status: DISCONTINUED | OUTPATIENT
Start: 2022-10-21 | End: 2022-10-21 | Stop reason: HOSPADM

## 2022-10-21 RX ORDER — VENLAFAXINE HYDROCHLORIDE 75 MG/1
75 CAPSULE, EXTENDED RELEASE ORAL DAILY
Status: DISCONTINUED | OUTPATIENT
Start: 2022-10-22 | End: 2022-11-03 | Stop reason: HOSPADM

## 2022-10-21 RX ORDER — GABAPENTIN 300 MG/1
300 CAPSULE ORAL 2 TIMES DAILY
Status: DISCONTINUED | OUTPATIENT
Start: 2022-10-21 | End: 2022-11-03 | Stop reason: HOSPADM

## 2022-10-21 RX ORDER — SODIUM CHLORIDE 0.9 % (FLUSH) 0.9 %
5-40 SYRINGE (ML) INJECTION AS NEEDED
Status: DISCONTINUED | OUTPATIENT
Start: 2022-10-21 | End: 2022-10-27

## 2022-10-21 RX ORDER — CARVEDILOL 3.12 MG/1
6.25 TABLET ORAL 2 TIMES DAILY WITH MEALS
Status: DISCONTINUED | OUTPATIENT
Start: 2022-10-21 | End: 2022-11-03 | Stop reason: HOSPADM

## 2022-10-21 RX ORDER — INSULIN LISPRO 100 [IU]/ML
INJECTION, SOLUTION INTRAVENOUS; SUBCUTANEOUS
Status: DISCONTINUED | OUTPATIENT
Start: 2022-10-21 | End: 2022-11-03 | Stop reason: HOSPADM

## 2022-10-21 RX ORDER — ACETAMINOPHEN 325 MG/1
650 TABLET ORAL
Status: DISCONTINUED | OUTPATIENT
Start: 2022-10-21 | End: 2022-11-03 | Stop reason: HOSPADM

## 2022-10-21 RX ORDER — SODIUM CHLORIDE 0.9 % (FLUSH) 0.9 %
5-40 SYRINGE (ML) INJECTION AS NEEDED
Status: DISCONTINUED | OUTPATIENT
Start: 2022-10-21 | End: 2022-11-02

## 2022-10-21 RX ORDER — ACETAMINOPHEN 650 MG/1
650 SUPPOSITORY RECTAL
Status: DISCONTINUED | OUTPATIENT
Start: 2022-10-21 | End: 2022-11-03 | Stop reason: HOSPADM

## 2022-10-21 RX ORDER — SODIUM CHLORIDE 0.9 % (FLUSH) 0.9 %
5-40 SYRINGE (ML) INJECTION EVERY 8 HOURS
Status: DISCONTINUED | OUTPATIENT
Start: 2022-10-21 | End: 2022-11-02

## 2022-10-21 RX ADMIN — SODIUM CHLORIDE: 9 INJECTION, SOLUTION INTRAVENOUS at 15:44

## 2022-10-21 RX ADMIN — LATANOPROST 1 DROP: 50 SOLUTION OPHTHALMIC at 21:52

## 2022-10-21 RX ADMIN — SODIUM CHLORIDE 75 ML/HR: 9 INJECTION, SOLUTION INTRAVENOUS at 18:35

## 2022-10-21 RX ADMIN — CARVEDILOL 6.25 MG: 3.12 TABLET, FILM COATED ORAL at 17:56

## 2022-10-21 RX ADMIN — PROPOFOL 30 MG: 10 INJECTION, EMULSION INTRAVENOUS at 15:54

## 2022-10-21 RX ADMIN — SODIUM CHLORIDE, PRESERVATIVE FREE 10 ML: 5 INJECTION INTRAVENOUS at 21:52

## 2022-10-21 RX ADMIN — SODIUM CHLORIDE, PRESERVATIVE FREE 40 MG: 5 INJECTION INTRAVENOUS at 17:56

## 2022-10-21 RX ADMIN — PROPOFOL 30 MG: 10 INJECTION, EMULSION INTRAVENOUS at 15:52

## 2022-10-21 RX ADMIN — DONEPEZIL HYDROCHLORIDE 10 MG: 5 TABLET, FILM COATED ORAL at 21:52

## 2022-10-21 RX ADMIN — MELATONIN TAB 5 MG 5 MG: 5 TAB at 21:52

## 2022-10-21 RX ADMIN — INSULIN LISPRO 3 UNITS: 100 INJECTION, SOLUTION INTRAVENOUS; SUBCUTANEOUS at 18:05

## 2022-10-21 RX ADMIN — PROPOFOL 30 MG: 10 INJECTION, EMULSION INTRAVENOUS at 15:58

## 2022-10-21 RX ADMIN — SODIUM CHLORIDE, PRESERVATIVE FREE 20 ML: 5 INJECTION INTRAVENOUS at 17:58

## 2022-10-21 RX ADMIN — SODIUM CHLORIDE, PRESERVATIVE FREE 10 ML: 5 INJECTION INTRAVENOUS at 18:03

## 2022-10-21 RX ADMIN — GABAPENTIN 300 MG: 300 CAPSULE ORAL at 21:52

## 2022-10-21 RX ADMIN — SODIUM CHLORIDE, PRESERVATIVE FREE 40 MG: 5 INJECTION INTRAVENOUS at 21:54

## 2022-10-21 NOTE — ED NOTES
Report called to [address]ega at [facility]. Notified her Pt will need 2 units of PRBCs and will be coming from [facility].

## 2022-10-21 NOTE — CONSULTS
Consult    Patient: Twila Morris MRN: [id number]  SSN: [id number]    YOB: 1945  Age: 65 y.o. Sex: female      Subjective:      Twila Morris is a 65 y.o. female who is being seen for melena, and drop hemoglobin,. anemia. She was here last week and 3 months ago with similar issue  she was brougth into er with syncopal episode she passed out when patient watch TV, reported to have melena, abdominal pain,. She had evaluated by the emergency room, hemoglobin 6.3, will admit to hospital for evaluation, had endoscopy about 10 days ago did not show any acute GI bleeding, patient was discharged home with a PPI and not on any anticoagulation,     Patient  denies any chest pain, blood per rectum, diarrhea. She was admitted for further evaluation.         Past Surgical History:     Past Medical History:   Diagnosis Date    Adenocarcinoma, renal cell ([facility] [address] 9/2/2020    Arthritis     CAD (coronary artery disease)     Chronic kidney disease     Diabetes ([facility] [address]     Gout     Hypercholesterolemia     Hypertension     Mental depression     Pulmonary embolism (HCC)     Seizures ([facility] [address]     Stroke ([facility] [address]     Thyroid disease      Past Surgical History:   Procedure Laterality Date    HX GYN      HX HYSTERECTOMY      HX NEPHRECTOMY Left 02/12/2015    HX ORTHOPAEDIC      HX UROLOGICAL  02/12/2015    PARTIAL URETERECTOMY     [place] CARDIAC SURG PROCEDURE UNLIST      stents placed       Family History   Problem Relation Age of Onset    Heart Disease Mother     Heart Disease Father     Heart Disease Sister     Cancer Sister     Heart Disease Brother     Cancer Maternal Grandmother     Stroke Son     Cancer Sister      Social History     Tobacco Use    Smoking status: Former     Years: 15.00     Types: Cigarettes    Smokeless tobacco: Never   Substance Use Topics    Alcohol use: Not Currently      Current Facility-Administered Medications   Medication Dose Route Frequency Provider Last Rate Last Admin    sodium chloride (NS) flush 5-40 mL  5-40 mL IntraVENous Q8H Roberta Botello MD        sodium chloride (NS) flush 5-40 mL  5-40 mL IntraVENous PRN Roberta Botello MD        0.9% sodium chloride infusion 250 mL  250 mL IntraVENous PRN Roberta Botello MD        pantoprazole (PROTONIX) 40 mg in 0.9% sodium chloride 10 mL injection  40 mg IntraVENous Q12H Los Chacon MD        0.9% sodium chloride infusion  75 mL/hr IntraVENous CONTINUOUS Los Chacon MD        sodium chloride (NS) flush 5-40 mL  5-40 mL IntraVENous Q8H Los Chacon MD        sodium chloride (NS) flush 5-40 mL  5-40 mL IntraVENous PRN Los Chacon MD        acetaminophen (TYLENOL) tablet 650 mg  650 mg Oral Q6H PRN Los Chacon MD        Or    acetaminophen (TYLENOL) suppository 650 mg  650 mg Rectal Q6H PRN Los Chacon MD        polyethylene glycol (MIRALAX) packet 17 g  17 g Oral DAILY PRN Los Chacon MD        ondansetron (ZOFRAN ODT) tablet 4 mg  4 mg Oral Q8H PRN Los Chacon MD        Or    ondansetron [facility]) injection 4 mg  4 mg IntraVENous Q6H PRN Los Chacon MD        carvediloL (COREG) tablet 6.25 mg  6.25 mg Oral BID WITH MEALS Los Chacon MD        donepeziL (ARICEPT) tablet 10 mg  10 mg Oral QHS Los Chacon MD        gabapentin (NEURONTIN) capsule 300 mg  300 mg Oral BID Los Chacon MD        latanoprost (XALATAN) 0.005 % ophthalmic solution 1 Drop  1 Drop Both Eyes QHS Los Chacon MD        melatonin tablet 5 mg  5 mg Oral QHS Los Chacon MD        [START ON 10/22/2022] venlafaxine-SR (EFFEXOR-XR) capsule 75 mg  75 mg Oral DAILY Los Chacon MD        insulin lispro (HUMALOG) injection   SubCUTAneous AC&HS Los Chacon MD            No Known Allergies    Review of Systems:  ROS   Since last time see  Objective:     Vitals:    10/21/22 1146 10/21/22 1149   BP:  (!) 130/52   Pulse:  73   Resp:  22   Temp:  98 °F (36.7 °C) SpO2:  100%   Weight: 86.2 kg (190 lb)    Height: 5' 4\" (1.626 m)         Physical Exam:  Physical Exam  Constitutional:       Appearance: She is ill-appearing. HENT:      Left Ear: Tympanic membrane normal.      Mouth/Throat:      Mouth: Mucous membranes are dry. Eyes:      General: No scleral icterus. Cardiovascular:      Rate and Rhythm: Normal rate. Pulmonary:      Breath sounds: Normal breath sounds. Abdominal:      Tenderness: There is abdominal tenderness. Musculoskeletal:      Cervical back: Neck supple. Neurological:      General: No focal deficit present. Recent Results (from the past 24 hour(s))   METABOLIC PANEL, COMPREHENSIVE    Collection Time: 10/21/22 11:54 AM   Result Value Ref Range    Sodium 135 (L) 136 - 145 mmol/L    Potassium 3.6 3.5 - 5.1 mmol/L    Chloride 97 97 - 108 mmol/L    CO2 31 21 - 32 mmol/L    Anion gap 7 5 - 15 mmol/L    Glucose 228 (H) 65 - 100 mg/dL    BUN 81 (H) 6 - 20 mg/dL    Creatinine 2.65 (H) 0.55 - 1.02 mg/dL    BUN/Creatinine ratio 31 (H) 12 - 20      eGFR 18 (L) >60 ml/min/1.73m2    Calcium 9.4 8.5 - 10.1 mg/dL    Bilirubin, total 0.3 0.2 - 1.0 mg/dL    AST (SGOT) 20 15 - 37 U/L    ALT (SGPT) 11 (L) 12 - 78 U/L    Alk.  phosphatase 88 45 - 117 U/L    Protein, total 6.1 (L) 6.4 - 8.2 g/dL    Albumin 2.9 (L) 3.5 - 5.0 g/dL    Globulin 3.2 2.0 - 4.0 g/dL    A-G Ratio 0.9 (L) 1.1 - 2.2     CBC WITH AUTOMATED DIFF    Collection Time: 10/21/22 11:54 AM   Result Value Ref Range    WBC 7.4 3.6 - 11.0 K/uL    RBC 2.43 (L) 3.80 - 5.20 M/uL    HGB 6.3 (L) 11.5 - 16.0 g/dL    HCT 20.1 (L) 35.0 - 47.0 %    MCV 82.7 80.0 - 99.0 FL    MCH 25.9 (L) 26.0 - 34.0 PG    MCHC 31.3 30.0 - 36.5 g/dL    RDW 19.7 (H) 11.5 - 14.5 %    PLATELET 064 996 - 903 K/uL    MPV 10.7 8.9 - 12.9 FL    NRBC 0.0 0.0  WBC    ABSOLUTE NRBC 0.00 0.00 - 0.01 K/uL    NEUTROPHILS 73 32 - 75 %    LYMPHOCYTES 14 12 - 49 %    MONOCYTES 11 5 - 13 %    EOSINOPHILS 2 0 - 7 %    BASOPHILS 0 0 - 1 %    IMMATURE GRANULOCYTES 0 0 - 0.5 %    ABS. NEUTROPHILS 5.3 1.8 - 8.0 K/UL    ABS. LYMPHOCYTES 1.1 0.8 - 3.5 K/UL    ABS. MONOCYTES 0.8 0.0 - 1.0 K/UL    ABS. EOSINOPHILS 0.2 0.0 - 0.4 K/UL    ABS. BASOPHILS 0.0 0.0 - 0.1 K/UL    ABS. IMM. GRANS. 0.0 0.00 - 0.04 K/UL    DF AUTOMATED     TROPONIN-HIGH SENSITIVITY    Collection Time: 10/21/22 11:54 AM   Result Value Ref Range    Troponin-High Sensitivity 61 (H) 0 - 51 ng/L   URINALYSIS W/ REFLEX CULTURE    Collection Time: 10/21/22  2:32 PM    Specimen: Urine   Result Value Ref Range    Color Yellow/Straw      Appearance Turbid (A) Clear      Specific gravity 1.006 1.003 - 1.030      pH (UA) 7.0 5.0 - 8.0      Protein Negative Negative mg/dL    Glucose Negative Negative mg/dL    Ketone Negative Negative mg/dL    Bilirubin Negative Negative      Blood Large (A) Negative      Urobilinogen 0.1 0.1 - 1.0 EU/dL    Nitrites Negative Negative      Leukocyte Esterase Moderate (A) Negative      UA:UC IF INDICATED Culture not indicated by UA result Culture not indicated by UA result      WBC 0-4 0 - 4 /hpf    RBC 0-5 0 - 5 /hpf    Bacteria 3+ (A) Negative /hpf        No orders to display      Assessment:     Hospital Problems  Date Reviewed: 10/13/2022            Codes Class Noted POA    Rectal bleeding ICD-10-CM: K62.5  ICD-9-CM: 569.3  10/21/2022 Unknown       Anemia of acute blood loss, melena,    -Probably secondary to upper GI bleed  EGD showed did not show any active bleeding      -Transfuse 2 units of packed red blood cells    -Monitor repeat H&H post blood transfusion  N.p.o.,  EGD today,  Have some abdominal pain but no rebound no rigidity, the CT performed this year,    1/22. Status post left nephrectomy. No evidence of recurrence or metastatic  disease. 2.  Stable right upper lobe nodule. 3.  Cardiac megaly with coronary artery disease and atherosclerotic disease  If BUN /CR  back to normal, recommend repeated abdominal CT.   Other study will be made after endoscopy study, including capsule studies, colonoscopy, if necessary  Neurology evaluation/ cardiac monitoring,  Plan:       Signed By: Aubrey Villela MD     October 21, 2022         Thank you for allowing me to participate in this patients care  Cc Referring Physician   Yaron Wilks NP

## 2022-10-21 NOTE — H&P
History & Physical    Primary Care Provider: Herbie Reis NP  Source of Information: Patient self    History of Presenting Illness:   Francy Azevedo is a 65 y.o. female who presents with reports of syncopal episode at home. According to patient, she was seated on the couch watching TV and does not remember the details of what happened . She remembers waking up in an ambulance  Denies chest pain palpitation nausea vomiting prior to this episode. Reports having epigastric abdominal pain for the last few days with dark tarry stools. Work-up in the ED shows a hemoglobin of 6.3 with a hematocrit of 20.1. She was last admitted in the hospital October 10 through October 13 for similar episode of syncope with bleeding. EGD done October 12 did not find any active upper GI bleed. She was discharged home on oral Protonix twice daily and advised to stop taking apixaban. BUN and creatinine abnormally high compared to last admission     Review of Systems:  A comprehensive review of systems was negative except for that written in the History of Present Illness. Past Medical History:   Diagnosis Date    Adenocarcinoma, renal cell ([address] 9/2/2020    Arthritis     CAD (coronary artery disease)     Chronic kidney disease     Diabetes ([address]     Gout     Hypercholesterolemia     Hypertension     Mental depression     Pulmonary embolism ([address]     Seizures ([address]     Stroke ([facility] [address]     Thyroid disease       Past Surgical History:   Procedure Laterality Date    HX GYN      HX HYSTERECTOMY      HX NEPHRECTOMY Left 02/12/2015    HX ORTHOPAEDIC      HX UROLOGICAL  02/12/2015    PARTIAL URETERECTOMY     CT CARDIAC SURG PROCEDURE UNLIST      stents placed      Prior to Admission medications    Medication Sig Start Date End Date Taking? Authorizing Provider   cetirizine (ZYRTEC) 10 mg tablet Take 10 mg by mouth daily.     Provider, Historical   acetaminophen (TYLENOL) 325 mg tablet Take 325 mg by mouth as needed for Pain. Provider, Historical   aspirin delayed-release 81 mg tablet Take 81 mg by mouth daily. Provider, Historical   melatonin 5 mg tablet Take 5 mg by mouth nightly. Provider, Historical   insulin aspart U-100 (NOVOLOG) 100 unit/mL injection by SubCUTAneous route Before breakfast, lunch, dinner and at bedtime. SS: 150-200=2 units 201-250=4 units 251-300=6 units 301-350=8 units 351-400=10 units    Provider, Historical   insulin detemir U-100 (LEVEMIR) 100 unit/mL injection 10 Units by SubCUTAneous route nightly. Provider, Historical   plecanatide (Trulance) 3 mg tab Take 3 mg by mouth daily. Provider, Historical   pantoprazole (PROTONIX) 40 mg tablet Take 1 Tablet by mouth two (2) times a day. 8/8/22   Eddie Mccall MD   albuterol (PROVENTIL VENTOLIN) 2.5 mg /3 mL (0.083 %) nebu 2.5 mg by Nebulization route every six (6) hours as needed for Shortness of Breath. Provider, Historical   torsemide (DEMADEX) 20 mg tablet Take 20 mg by mouth two (2) times a day. Provider, Historical   carvediloL (COREG) 6.25 mg tablet Take 6.25 mg by mouth two (2) times daily (with meals). Provider, Historical   calcitRIOL (ROCALTROL) 0.25 mcg capsule Take 0.25 mcg by mouth BID Mon Wed & Fri. Provider, Historical   donepeziL (ARICEPT) 10 mg tablet Take 10 mg by mouth nightly. Provider, Historical   gabapentin (NEURONTIN) 300 mg capsule Take 300 mg by mouth two (2) times a day. 6/29/20   Provider, Historical   Venlafaxine-ER 24 HR (EFFEXOR-ER) 75 mg tr24 tablet Take 75 mg by mouth daily. Provider, Historical   latanoprost (XALATAN) 0.005 % ophthalmic solution Administer 1 Drop to both eyes nightly. Provider, Historical   pravastatin (PRAVACHOL) 80 mg tablet Take 80 mg by mouth nightly.     Provider, Historical     No Known Allergies   Family History   Problem Relation Age of Onset    Heart Disease Mother     Heart Disease Father     Heart Disease Sister     Cancer Sister     Heart Disease Brother     Cancer Maternal Grandmother     Stroke Son     Cancer Sister         SOCIAL HISTORY:  Patient resides:  Independently    Assisted Living    SNF    With family care x      Smoking history:   None x   Former    Chronic      Alcohol history:   None x   Social    Chronic      Ambulates:   Independently x   w/cane    w/walker    w/wc    CODE STATUS:  DNR    Full x   Other      Objective:     Physical Exam:     Visit Vitals  BP (!) 130/52   Pulse 73   Temp 98 °F (36.7 °C)   Resp 22   Ht 5' 4\" (1.626 m)   Wt 86.2 kg (190 lb)   SpO2 100%   BMI 32.61 kg/m²      O2 Device: None (Room air)    General:  Alert, cooperative, no distress, appears stated age. Head:  Normocephalic, without obvious abnormality, atraumatic. Eyes:  Conjunctivae/corneas clear. PERRL, EOMs intact. Nose: Nares normal. Septum midline. Mucosa normal. No drainage or sinus tenderness. Throat: Lips, mucosa, and tongue normal. Teeth and gums normal.   Neck: Supple, symmetrical, trachea midline, no adenopathy, thyroid: no enlargement/tenderness/nodules, no carotid bruit and no JVD. Back:   Symmetric, no curvature. ROM normal. No CVA tenderness. Lungs:   Clear to auscultation bilaterally. Chest wall:  No tenderness or deformity. Heart:  Regular rate and rhythm, S1, S2 normal, no murmur, click, rub or gallop. Abdomen:   Soft, non-tender. Bowel sounds normal. No masses,  No organomegaly. Extremities: Extremities normal, atraumatic, no cyanosis or edema. Pulses: 2+ and symmetric all extremities. Skin: Skin color, texture, turgor normal. No rashes or lesions   Neurologic: CNII-XII intact. No motor or sensory deficits. EKG:  nonspecific ST and T waves changes.       Data Review:     Recent Days:  Recent Labs     10/21/22  1154   WBC 7.4   HGB 6.3*   HCT 20.1*        Recent Labs     10/21/22  1154   *   K 3.6   CL 97   CO2 31   *   BUN 81*   CREA 2.65*   CA 9.4   ALB 2.9*   TBILI 0.3   ALT 11* No results for input(s): PH, PCO2, PO2, HCO3, FIO2 in the last 72 hours. 24 Hour Results:  Recent Results (from the past 24 hour(s))   METABOLIC PANEL, COMPREHENSIVE    Collection Time: 10/21/22 11:54 AM   Result Value Ref Range    Sodium 135 (L) 136 - 145 mmol/L    Potassium 3.6 3.5 - 5.1 mmol/L    Chloride 97 97 - 108 mmol/L    CO2 31 21 - 32 mmol/L    Anion gap 7 5 - 15 mmol/L    Glucose 228 (H) 65 - 100 mg/dL    BUN 81 (H) 6 - 20 mg/dL    Creatinine 2.65 (H) 0.55 - 1.02 mg/dL    BUN/Creatinine ratio 31 (H) 12 - 20      eGFR 18 (L) >60 ml/min/1.73m2    Calcium 9.4 8.5 - 10.1 mg/dL    Bilirubin, total 0.3 0.2 - 1.0 mg/dL    AST (SGOT) 20 15 - 37 U/L    ALT (SGPT) 11 (L) 12 - 78 U/L    Alk. phosphatase 88 45 - 117 U/L    Protein, total 6.1 (L) 6.4 - 8.2 g/dL    Albumin 2.9 (L) 3.5 - 5.0 g/dL    Globulin 3.2 2.0 - 4.0 g/dL    A-G Ratio 0.9 (L) 1.1 - 2.2     CBC WITH AUTOMATED DIFF    Collection Time: 10/21/22 11:54 AM   Result Value Ref Range    WBC 7.4 3.6 - 11.0 K/uL    RBC 2.43 (L) 3.80 - 5.20 M/uL    HGB 6.3 (L) 11.5 - 16.0 g/dL    HCT 20.1 (L) 35.0 - 47.0 %    MCV 82.7 80.0 - 99.0 FL    MCH 25.9 (L) 26.0 - 34.0 PG    MCHC 31.3 30.0 - 36.5 g/dL    RDW 19.7 (H) 11.5 - 14.5 %    PLATELET 007 965 - 653 K/uL    MPV 10.7 8.9 - 12.9 FL    NRBC 0.0 0.0  WBC    ABSOLUTE NRBC 0.00 0.00 - 0.01 K/uL    NEUTROPHILS 73 32 - 75 %    LYMPHOCYTES 14 12 - 49 %    MONOCYTES 11 5 - 13 %    EOSINOPHILS 2 0 - 7 %    BASOPHILS 0 0 - 1 %    IMMATURE GRANULOCYTES 0 0 - 0.5 %    ABS. NEUTROPHILS 5.3 1.8 - 8.0 K/UL    ABS. LYMPHOCYTES 1.1 0.8 - 3.5 K/UL    ABS. MONOCYTES 0.8 0.0 - 1.0 K/UL    ABS. EOSINOPHILS 0.2 0.0 - 0.4 K/UL    ABS. BASOPHILS 0.0 0.0 - 0.1 K/UL    ABS. IMM.  GRANS. 0.0 0.00 - 0.04 K/UL    DF AUTOMATED     TROPONIN-HIGH SENSITIVITY    Collection Time: 10/21/22 11:54 AM   Result Value Ref Range    Troponin-High Sensitivity 61 (H) 0 - 51 ng/L         Imaging:   No orders to display         Assessment: Anemia of acute blood loss    -Probably secondary to upper GI bleed    -Transfuse 2 units of packed red blood cells    -Monitor repeat H&H post blood transfusion    -GI, Dr. Asha Griffin consulted for upper EGD today    -Patient kept n.p.o. for procedure    Acute on chronic kidney disease stage IV    -Begin gentle hydration with normal saline at 50 cc an hour    -Recheck electrolytes in a.m. Type 2 diabetes    -Appears fairly stable    -Blood sugar ACH S    -Sliding scale insulin    Syncope    -Most likely vasovagal in response to blood loss    -Patient has a cardiac monitor placed last Monday    -Will be interesting to figure out what rhythm patient had during this episode      Plan:     Admit to medical telemetry floor inpatient  Treatment plan as above  Avoid anticoagulants due to active GI bleed  Begin Protonix 40 mg IV twice daily  CODE STATUS discussed.   She is full code      Signed By: Luciano Walker MD     October 21, 2022

## 2022-10-21 NOTE — ANESTHESIA PREPROCEDURE EVALUATION
Relevant Problems   NEUROLOGY   (+) Cerebrovascular accident (CVA) ([facility] [address]   (+) Seizure ([facility] [address]   (+) TIA (transient ischemic attack)      CARDIOVASCULAR   (+) Arteriosclerosis of coronary artery   (+) Essential hypertension   (+) Hypertension   (+) Non-STEMI (non-ST elevated myocardial infarction) ([facility] [address]      RENAL FAILURE   (+) Adenocarcinoma, renal cell (HCC)   (+) Benign hypertensive renal disease   (+) Chronic kidney disease (CKD), stage IV (severe) (HCC)   (+) 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 Neuro/Psych     seizures  CVA  TIA and psychiatric history    Comments: MENTAL RETARDATION. Cardiovascular    Hypertension          CAD, PAD, cardiac stents and hyperlipidemia      Comments:  Hx OF PE.     EKG (10-11-22) Sinus rhythm with Premature atrial complexes   Otherwise normal ECG   When compared with ECG of 21-SEP-2022 12:14,   Premature atrial complexes are now Present    GI/Hepatic/Renal         Renal disease: CRI      Comments: ONLY ONE FUNCTIONING KIDNEY. Dark black stools. Endo/Other    Diabetes  Hypothyroidism  Obesity, arthritis, cancer (RENAL CELL ADENOCARCINOMA. ) and anemia    Comments: GOUT. Other Findings   Comments: Eliquis: Last dose about a week ago. Neck pain.      8/1/22 16:33  INR: 1.9 (H)  Prothrombin time: 22.0 (H)    11/21/20 18:10  NT pro-BNP: 278    10/21/21 19:30  NT pro-BNP: 1,411 (H)    10/22/21 02:09  NT pro-BNP: 1,152 (H)    10/22/21 16:30  NT pro-BNP: 836 (H)    2/20/22 10:37  NT pro-BNP: 1,152 (H)    10/10/22 15:17  NT pro-BNP: 1,585 (H)                   Physical Exam    Airway  Mallampati: I  TM Distance: 4 - 6 cm    Mouth opening: Normal     Cardiovascular    Rhythm: regular  Rate: normal         Dental    Dentition: Edentulous     Pulmonary  Breath sounds clear to auscultation               Abdominal  Abdominal exam normal       Other Findings            Anesthetic Plan    ASA: 3  Anesthesia type: MAC          Induction: Intravenous  Anesthetic plan and risks discussed with: Patient

## 2022-10-21 NOTE — ANESTHESIA POSTPROCEDURE EVALUATION
Procedure(s):  ESOPHAGOGASTRODUODENOSCOPY (EGD). MAC    Anesthesia Post Evaluation        Patient location during evaluation: bedside  Patient participation: complete - patient participated  Level of consciousness: awake  Pain score: 0  Airway patency: patent  Anesthetic complications: no  Cardiovascular status: acceptable and hemodynamically stable  Respiratory status: acceptable and spontaneous ventilation  Hydration status: acceptable  Post anesthesia nausea and vomiting:  controlled  Final Post Anesthesia Temperature Assessment:  Normothermia (36.0-37.5 degrees C)      INITIAL Post-op Vital signs: No vitals data found for the desired time range.

## 2022-10-21 NOTE — ED PROVIDER NOTES
EMERGENCY DEPARTMENT HISTORY AND PHYSICAL EXAM      Date: 10/21/2022  Patient Name: Shauna Reilly    History of Presenting Illness     Chief Complaint   Patient presents with    Abdominal Pain    Headache       History Provided By: Patient and EMS    HPI: Shauna Reilly, 65 y.o. female brought to the emergency department after being called for an unresponsive episode. EMS states they arrived patient was unresponsive but maintaining airway. EMS states they noticed her becoming alert and oriented without apparent postictal phase. Patient states that she recalls talking with her daughter and then blacking out. Patient denies any other complaints at the time or prior to the episode. Patient reports history of similar episodes, has been admitted for the same. Patient denies any complaints at present. There are no other complaints, changes, or physical findings at this time. PCP: Milagro Knight NP    Current Facility-Administered Medications   Medication Dose Route Frequency Provider Last Rate Last Admin    sodium chloride (NS) flush 5-40 mL  5-40 mL IntraVENous Q8H Shmuel Bass MD        sodium chloride (NS) flush 5-40 mL  5-40 mL IntraVENous PRN Shmuel Bass MD        0.9% sodium chloride infusion 250 mL  250 mL IntraVENous PRN Shmuel Bass MD         Current Outpatient Medications   Medication Sig Dispense Refill    cetirizine (ZYRTEC) 10 mg tablet Take 10 mg by mouth daily. acetaminophen (TYLENOL) 325 mg tablet Take 325 mg by mouth as needed for Pain. aspirin delayed-release 81 mg tablet Take 81 mg by mouth daily. melatonin 5 mg tablet Take 5 mg by mouth nightly. insulin aspart U-100 (NOVOLOG) 100 unit/mL injection by SubCUTAneous route Before breakfast, lunch, dinner and at bedtime.  SS: 150-200=2 units 201-250=4 units 251-300=6 units 301-350=8 units 351-400=10 units      insulin detemir U-100 (LEVEMIR) 100 unit/mL injection 10 Units by SubCUTAneous route nightly. plecanatide (Trulance) 3 mg tab Take 3 mg by mouth daily. pantoprazole (PROTONIX) 40 mg tablet Take 1 Tablet by mouth two (2) times a day. 60 Tablet 0    albuterol (PROVENTIL VENTOLIN) 2.5 mg /3 mL (0.083 %) nebu 2.5 mg by Nebulization route every six (6) hours as needed for Shortness of Breath. torsemide (DEMADEX) 20 mg tablet Take 20 mg by mouth two (2) times a day. carvediloL (COREG) 6.25 mg tablet Take 6.25 mg by mouth two (2) times daily (with meals). calcitRIOL (ROCALTROL) 0.25 mcg capsule Take 0.25 mcg by mouth BID Mon Wed & Fri.      donepeziL (ARICEPT) 10 mg tablet Take 10 mg by mouth nightly.      gabapentin (NEURONTIN) 300 mg capsule Take 300 mg by mouth two (2) times a day. Venlafaxine-ER 24 HR (EFFEXOR-ER) 75 mg tr24 tablet Take 75 mg by mouth daily. latanoprost (XALATAN) 0.005 % ophthalmic solution Administer 1 Drop to both eyes nightly. pravastatin (PRAVACHOL) 80 mg tablet Take 80 mg by mouth nightly.          Past History   Past Medical History:  Past Medical History:   Diagnosis Date    Adenocarcinoma, renal cell ([address] 9/2/2020    Arthritis     CAD (coronary artery disease)     Chronic kidney disease     Diabetes ([address]     Gout     Hypercholesterolemia     Hypertension     Mental depression     Pulmonary embolism (HCC)     Seizures ([address]     Stroke ([address]     Thyroid disease        Past Surgical History:  Past Surgical History:   Procedure Laterality Date    HX GYN      HX HYSTERECTOMY      HX NEPHRECTOMY Left 02/12/2015    HX ORTHOPAEDIC      HX UROLOGICAL  02/12/2015    PARTIAL URETERECTOMY     [place] CARDIAC SURG PROCEDURE UNLIST      stents placed        Family History:  Family History   Problem Relation Age of Onset    Heart Disease Mother     Heart Disease Father     Heart Disease Sister     Cancer Sister     Heart Disease Brother     Cancer Maternal Grandmother     Stroke Son     Cancer Sister        Social History:  Social History Tobacco Use    Smoking status: Former     Years: 15.00     Types: Cigarettes    Smokeless tobacco: Never   Vaping Use    Vaping Use: Never used   Substance Use Topics    Alcohol use: Not Currently    Drug use: Never       Allergies:  No Known Allergies  Review of Systems   Review of Systems  Review of Systems   Constitutional: Negative for chills and fever. HENT: Negative for sinus pressure and sinus pain. Eyes: Negative for photophobia and redness. Respiratory: Negative for shortness of breath and wheezing. Cardiovascular: Negative for chest pain and palpitations. Gastrointestinal: Negative for abdominal pain and nausea. Genitourinary: Negative for flank pain and hematuria. Musculoskeletal: Negative for arthralgias and gait problem. Skin: Negative for color change and pallor. Neurological: Negative for dizziness and weakness. Physical Exam   Physical Exam  Physical Exam  Constitutional:       General: No acute distress. Appearance: Normal appearance. Not toxic-appearing. HENT:      Head: Normocephalic and atraumatic. Nose: Nose normal.      Mouth/Throat:      Mouth: Mucous membranes are moist.   Eyes:      Extraocular Movements: Extraocular movements intact. Pupils: Pupils are equal, round, and reactive to light. Cardiovascular: 2 out of 6 systolic ejection murmur     Rate and Rhythm: Normal rate. Pulses: Normal pulses. Pulmonary:      Effort: Pulmonary effort is normal.      Breath sounds: No stridor, clear to auscultation bilaterally  Abdominal:      General: Abdomen is flat. There is no distension. Musculoskeletal:         General: Normal range of motion. Cervical back: Normal range of motion and neck supple. Skin:     General: Skin is warm and dry. Capillary Refill: Capillary refill takes less than 2 seconds. Neurological:      General: No focal deficit present. Mental Status: Alert and oriented to person, place, and time.    Psychiatric: Mood and Affect: Mood normal.         Behavior: Behavior normal.     Lab and Diagnostic Study Results   Labs -     Recent Results (from the past 12 hour(s))   METABOLIC PANEL, COMPREHENSIVE    Collection Time: 10/21/22 11:54 AM   Result Value Ref Range    Sodium 135 (L) 136 - 145 mmol/L    Potassium 3.6 3.5 - 5.1 mmol/L    Chloride 97 97 - 108 mmol/L    CO2 31 21 - 32 mmol/L    Anion gap 7 5 - 15 mmol/L    Glucose 228 (H) 65 - 100 mg/dL    BUN 81 (H) 6 - 20 mg/dL    Creatinine 2.65 (H) 0.55 - 1.02 mg/dL    BUN/Creatinine ratio 31 (H) 12 - 20      eGFR 18 (L) >60 ml/min/1.73m2    Calcium 9.4 8.5 - 10.1 mg/dL    Bilirubin, total 0.3 0.2 - 1.0 mg/dL    AST (SGOT) 20 15 - 37 U/L    ALT (SGPT) 11 (L) 12 - 78 U/L    Alk. phosphatase 88 45 - 117 U/L    Protein, total 6.1 (L) 6.4 - 8.2 g/dL    Albumin 2.9 (L) 3.5 - 5.0 g/dL    Globulin 3.2 2.0 - 4.0 g/dL    A-G Ratio 0.9 (L) 1.1 - 2.2     CBC WITH AUTOMATED DIFF    Collection Time: 10/21/22 11:54 AM   Result Value Ref Range    WBC 7.4 3.6 - 11.0 K/uL    RBC 2.43 (L) 3.80 - 5.20 M/uL    HGB 6.3 (L) 11.5 - 16.0 g/dL    HCT 20.1 (L) 35.0 - 47.0 %    MCV 82.7 80.0 - 99.0 FL    MCH 25.9 (L) 26.0 - 34.0 PG    MCHC 31.3 30.0 - 36.5 g/dL    RDW 19.7 (H) 11.5 - 14.5 %    PLATELET 682 090 - 054 K/uL    MPV 10.7 8.9 - 12.9 FL    NRBC 0.0 0.0  WBC    ABSOLUTE NRBC 0.00 0.00 - 0.01 K/uL    NEUTROPHILS 73 32 - 75 %    LYMPHOCYTES 14 12 - 49 %    MONOCYTES 11 5 - 13 %    EOSINOPHILS 2 0 - 7 %    BASOPHILS 0 0 - 1 %    IMMATURE GRANULOCYTES 0 0 - 0.5 %    ABS. NEUTROPHILS 5.3 1.8 - 8.0 K/UL    ABS. LYMPHOCYTES 1.1 0.8 - 3.5 K/UL    ABS. MONOCYTES 0.8 0.0 - 1.0 K/UL    ABS. EOSINOPHILS 0.2 0.0 - 0.4 K/UL    ABS. BASOPHILS 0.0 0.0 - 0.1 K/UL    ABS. IMM.  GRANS. 0.0 0.00 - 0.04 K/UL    DF AUTOMATED     TROPONIN-HIGH SENSITIVITY    Collection Time: 10/21/22 11:54 AM   Result Value Ref Range    Troponin-High Sensitivity 61 (H) 0 - 51 ng/L       Radiologic Studies - [unfilled]  CT Results  (Last 48 hours)      None          CXR Results  (Last 48 hours)      None            Medical Decision Making and ED Course   Differential Diagnosis & Medical Decision Making Provider Note:   Patient admitted to the hospital last week for similar episodes. Was cleared by cardiology and neurology, believe them to be vagal syncopal episodes. Patient is currently wearing a heart monitor. Last admission was noted also to be anemic with a GI bleed and hemoglobin of 8. Patient was transfused at that time, blood thinners have been stopped. - I am the first and primary provider for this patient. I reviewed the vital signs, available nursing notes, past medical history, past surgical history, family history and social history. The patient's presenting problems have been discussed, and the staff are in agreement with the care plan formulated and outlined with them. I have encouraged them to ask questions as they arise throughout their visit. Vital Signs-Reviewed the patient's vital signs. Patient Vitals for the past 12 hrs:   Temp Pulse Resp BP SpO2   10/21/22 1149 98 °F (36.7 °C) 73 22 (!) 130/52 100 %       EKG interpretation: (Preliminary): EKG Interpreted by me. Shows sinus rhythm with PACs at a ventricular rate of 71 bpm.  Normal axes and intervals. Normal EKG. Disposition   Disposition: Admitted to Floor Medical Floor the case was discussed with the admitting physician         Diagnosis/Clinical Impression     Clinical Impression:   1. Acute on chronic anemia    2. Syncope, unspecified syncope type        Attestations: Edward Sandra MD, am the primary clinician of record. Please note that this dictation was completed with [employer], the computer voice recognition software. Quite often unanticipated grammatical, syntax, homophones, and other interpretive errors are inadvertently transcribed by the computer software. Please disregard these errors. Please excuse any errors that have escaped final proofreading. Thank you.

## 2022-10-21 NOTE — PROGRESS NOTES
Bedside shift change report given to Yin (oncoming nurse) by Jazmin Hammond (offgoing nurse). Report included the following information SBAR.

## 2022-10-21 NOTE — ED NOTES
Pt to endo at this time - transported by Daily Bashir, [facility].

## 2022-10-21 NOTE — ED TRIAGE NOTES
Patient arrives via ems from home call was for unresponsive, upon ems arrival patient was unresponsive but breathing patient was laid down and she became alert and oriented hx of dm kidney failure

## 2022-10-22 LAB
ABO + RH BLD: NORMAL
ANION GAP SERPL CALC-SCNC: 7 MMOL/L (ref 5–15)
ATRIAL RATE: 71 BPM
BLD PROD TYP BPU: NORMAL
BLD PROD TYP BPU: NORMAL
BLOOD GROUP ANTIBODIES SERPL: NEGATIVE
BPU ID: NORMAL
BPU ID: NORMAL
BUN SERPL-MCNC: 71 MG/DL (ref 6–20)
BUN/CREAT SERPL: 34 (ref 12–20)
CA-I BLD-MCNC: 8.9 MG/DL (ref 8.5–10.1)
CALCULATED P AXIS, ECG09: 45 DEGREES
CALCULATED R AXIS, ECG10: 16 DEGREES
CALCULATED T AXIS, ECG11: 73 DEGREES
CHLORIDE SERPL-SCNC: 103 MMOL/L (ref 97–108)
CO2 SERPL-SCNC: 30 MMOL/L (ref 21–32)
CREAT SERPL-MCNC: 2.1 MG/DL (ref 0.55–1.02)
CROSSMATCH RESULT,%XM: NORMAL
CROSSMATCH RESULT,%XM: NORMAL
DIAGNOSIS, 93000: NORMAL
ERYTHROCYTE [DISTWIDTH] IN BLOOD BY AUTOMATED COUNT: 18 % (ref 11.5–14.5)
GLUCOSE BLD STRIP.AUTO-MCNC: 118 MG/DL (ref 65–100)
GLUCOSE BLD STRIP.AUTO-MCNC: 195 MG/DL (ref 65–100)
GLUCOSE BLD STRIP.AUTO-MCNC: 201 MG/DL (ref 65–100)
GLUCOSE BLD STRIP.AUTO-MCNC: 230 MG/DL (ref 65–100)
GLUCOSE SERPL-MCNC: 120 MG/DL (ref 65–100)
HCT VFR BLD AUTO: 25.3 % (ref 35–47)
HGB BLD-MCNC: 8.3 G/DL (ref 11.5–16)
MCH RBC QN AUTO: 27.5 PG (ref 26–34)
MCHC RBC AUTO-ENTMCNC: 32.8 G/DL (ref 30–36.5)
MCV RBC AUTO: 83.8 FL (ref 80–99)
NRBC # BLD: 0 K/UL (ref 0–0.01)
NRBC BLD-RTO: 0 PER 100 WBC
P-R INTERVAL, ECG05: 150 MS
PERFORMED BY, TECHID: ABNORMAL
PLATELET # BLD AUTO: 193 K/UL (ref 150–400)
PMV BLD AUTO: 10.4 FL (ref 8.9–12.9)
POTASSIUM SERPL-SCNC: 3.7 MMOL/L (ref 3.5–5.1)
Q-T INTERVAL, ECG07: 430 MS
QRS DURATION, ECG06: 90 MS
QTC CALCULATION (BEZET), ECG08: 467 MS
RBC # BLD AUTO: 3.02 M/UL (ref 3.8–5.2)
SODIUM SERPL-SCNC: 140 MMOL/L (ref 136–145)
SPECIMEN EXP DATE BLD: NORMAL
STATUS OF UNIT,%ST: NORMAL
STATUS OF UNIT,%ST: NORMAL
TRANSFUSION STATUS PATIENT QL: NORMAL
TRANSFUSION STATUS PATIENT QL: NORMAL
UNIT DIVISION, %UDIV: 0
UNIT DIVISION, %UDIV: 0
VENTRICULAR RATE, ECG03: 71 BPM
WBC # BLD AUTO: 8.4 K/UL (ref 3.6–11)

## 2022-10-22 PROCEDURE — 74011250636 HC RX REV CODE- 250/636: Performed by: INTERNAL MEDICINE

## 2022-10-22 PROCEDURE — 80048 BASIC METABOLIC PNL TOTAL CA: CPT

## 2022-10-22 PROCEDURE — 85027 COMPLETE CBC AUTOMATED: CPT

## 2022-10-22 PROCEDURE — 74011000250 HC RX REV CODE- 250: Performed by: INTERNAL MEDICINE

## 2022-10-22 PROCEDURE — 36415 COLL VENOUS BLD VENIPUNCTURE: CPT

## 2022-10-22 PROCEDURE — 74011636637 HC RX REV CODE- 636/637: Performed by: INTERNAL MEDICINE

## 2022-10-22 PROCEDURE — 82962 GLUCOSE BLOOD TEST: CPT

## 2022-10-22 PROCEDURE — 74011000250 HC RX REV CODE- 250: Performed by: EMERGENCY MEDICINE

## 2022-10-22 PROCEDURE — 65270000029 HC RM PRIVATE

## 2022-10-22 PROCEDURE — C9113 INJ PANTOPRAZOLE SODIUM, VIA: HCPCS | Performed by: INTERNAL MEDICINE

## 2022-10-22 PROCEDURE — 74011250637 HC RX REV CODE- 250/637: Performed by: INTERNAL MEDICINE

## 2022-10-22 PROCEDURE — 74011000258 HC RX REV CODE- 258: Performed by: INTERNAL MEDICINE

## 2022-10-22 RX ADMIN — INSULIN LISPRO 3 UNITS: 100 INJECTION, SOLUTION INTRAVENOUS; SUBCUTANEOUS at 18:37

## 2022-10-22 RX ADMIN — GABAPENTIN 300 MG: 300 CAPSULE ORAL at 21:04

## 2022-10-22 RX ADMIN — MELATONIN TAB 5 MG 5 MG: 5 TAB at 21:04

## 2022-10-22 RX ADMIN — DONEPEZIL HYDROCHLORIDE 10 MG: 5 TABLET, FILM COATED ORAL at 21:04

## 2022-10-22 RX ADMIN — CARVEDILOL 6.25 MG: 3.12 TABLET, FILM COATED ORAL at 18:37

## 2022-10-22 RX ADMIN — VENLAFAXINE HYDROCHLORIDE 75 MG: 75 CAPSULE, EXTENDED RELEASE ORAL at 10:16

## 2022-10-22 RX ADMIN — SODIUM CHLORIDE, PRESERVATIVE FREE 10 ML: 5 INJECTION INTRAVENOUS at 14:34

## 2022-10-22 RX ADMIN — SODIUM CHLORIDE, PRESERVATIVE FREE 10 ML: 5 INJECTION INTRAVENOUS at 21:06

## 2022-10-22 RX ADMIN — SODIUM CHLORIDE, PRESERVATIVE FREE 40 MG: 5 INJECTION INTRAVENOUS at 21:04

## 2022-10-22 RX ADMIN — LATANOPROST 1 DROP: 50 SOLUTION OPHTHALMIC at 21:05

## 2022-10-22 RX ADMIN — INSULIN LISPRO 3 UNITS: 100 INJECTION, SOLUTION INTRAVENOUS; SUBCUTANEOUS at 21:07

## 2022-10-22 RX ADMIN — CARVEDILOL 6.25 MG: 3.12 TABLET, FILM COATED ORAL at 10:16

## 2022-10-22 RX ADMIN — SODIUM CHLORIDE 75 ML/HR: 9 INJECTION, SOLUTION INTRAVENOUS at 10:20

## 2022-10-22 RX ADMIN — SODIUM CHLORIDE, PRESERVATIVE FREE 10 ML: 5 INJECTION INTRAVENOUS at 06:17

## 2022-10-22 RX ADMIN — GABAPENTIN 300 MG: 300 CAPSULE ORAL at 10:16

## 2022-10-22 RX ADMIN — SODIUM CHLORIDE, PRESERVATIVE FREE 40 MG: 5 INJECTION INTRAVENOUS at 10:16

## 2022-10-22 RX ADMIN — CEFTRIAXONE SODIUM 1 G: 1 INJECTION, POWDER, FOR SOLUTION INTRAMUSCULAR; INTRAVENOUS at 10:16

## 2022-10-22 NOTE — PROGRESS NOTES
Bedside shift change report given to JNA Aragon (oncoming nurse) by Yazmin Mares (offgoing nurse). Report included the following information SBAR.

## 2022-10-22 NOTE — PROGRESS NOTES
Bedside shift change report given to Florence Gavin (oncoming nurse) by Sanya Smith RN (offgoing nurse). Report included the following information SBAR, Intake/Output, MAR, and Cardiac Rhythm NSR .

## 2022-10-22 NOTE — PROGRESS NOTES
Reason for Admission:  syncope                     RUR Score:          26%           Plan for utilizing home health:  declined         PCP: First and Last name:  Herbie Reis NP     Name of Practice:    Are you a current patient: Yes/No: Yes   Approximate date of last visit: 10/14/2022   Can you participate in a virtual visit with your PCP:                     Current Advanced Directive/Advance Care Plan: Full Code  CM confirmed code status with patient. Healthcare Decision Maker:   Click here to complete [employer] including selection of the Healthcare Decision Maker Relationship (ie \"Primary\")             Primary Decision MakerLavaun Kwaku - Daughter - [phone]                  Transition of Care Plan:                      CM met with patient at bedside to complete DCP assessment. Patient lives with her daughter in a single story home with steps to enter and egress. She ambulates with a cane or walker, and completes her ADLs with assistance. Her daughter or grandson drives here wherever she needs to go. Patient politely declined home health services at this time. Current discharge dispo: home self care. CM will continue to follow.

## 2022-10-22 NOTE — PROGRESS NOTES
Hospitalist Progress Note    Subjective:   Daily Progress Note: 10/22/2022 11:46 AM    Hospital Course:  Parveen Guaman is a 65 y.o. female who presents with reports of syncopal episode at home. According to patient, she was seated on the couch watching TV and does not remember the details of what happened . She remembers waking up in an ambulance  Denies chest pain palpitation nausea vomiting prior to this episode. Reports having epigastric abdominal pain for the last few days with dark tarry stools. Work-up in the ED shows a hemoglobin of 6.3 with a hematocrit of 20.1. She was last admitted in the hospital October 10 through October 13 for similar episode of syncope with bleeding. EGD done October 12 did not find any active upper GI bleed. She was discharged home on oral Protonix twice daily and advised to stop taking apixaban  S/p EGD on 10/21/22 showed gastritis without evidence of bleeding. S/p 2 unit of PRBC transfusion on 10/21/22    Subjective:  Patient c/o dysuria, lower abdominal discomfort and cloudy urine. Patient was on phone with her daughter when I entered the room. Patient stated that her daughter wanted to listen to the conversation. I explained to the patient findings about EGD and further plan.      Current Facility-Administered Medications   Medication Dose Route Frequency    cefTRIAXone (ROCEPHIN) 1 g in 0.9% sodium chloride (MBP/ADV) 50 mL MBP  1 g IntraVENous Q24H    sodium chloride (NS) flush 5-40 mL  5-40 mL IntraVENous Q8H    sodium chloride (NS) flush 5-40 mL  5-40 mL IntraVENous PRN    0.9% sodium chloride infusion 250 mL  250 mL IntraVENous PRN    pantoprazole (PROTONIX) 40 mg in 0.9% sodium chloride 10 mL injection  40 mg IntraVENous Q12H    0.9% sodium chloride infusion  75 mL/hr IntraVENous CONTINUOUS    sodium chloride (NS) flush 5-40 mL  5-40 mL IntraVENous Q8H    sodium chloride (NS) flush 5-40 mL  5-40 mL IntraVENous PRN    acetaminophen (TYLENOL) tablet 650 mg  650 mg Oral Q6H PRN    Or    acetaminophen (TYLENOL) suppository 650 mg  650 mg Rectal Q6H PRN    polyethylene glycol (MIRALAX) packet 17 g  17 g Oral DAILY PRN    ondansetron (ZOFRAN ODT) tablet 4 mg  4 mg Oral Q8H PRN    Or    ondansetron (ZOFRAN) injection 4 mg  4 mg IntraVENous Q6H PRN    carvediloL (COREG) tablet 6.25 mg  6.25 mg Oral BID WITH MEALS    donepeziL (ARICEPT) tablet 10 mg  10 mg Oral QHS    gabapentin (NEURONTIN) capsule 300 mg  300 mg Oral BID    latanoprost (XALATAN) 0.005 % ophthalmic solution 1 Drop  1 Drop Both Eyes QHS    melatonin tablet 5 mg  5 mg Oral QHS    venlafaxine-SR (EFFEXOR-XR) capsule 75 mg  75 mg Oral DAILY    insulin lispro (HUMALOG) injection   SubCUTAneous AC&HS        Review of Systems  Constitutional: No fevers, No chills, No sweats, No fatigue, No Weakness  Eyes: No redness  Ears, nose, mouth, throat, and face: No nasal congestion, No sore throat, No voice change  Respiratory: No Shortness of Breath, No cough, No wheezing  Cardiovascular: No chest pain, No palpitations, No extremity edema  Gastrointestinal: No nausea, No vomiting, No diarrhea, No abdominal pain  Genitourinary: No frequency, has dysuria, No hematuria  Integument/breast: No skin lesion(s)   Neurological: No Confusion, No headaches, No dizziness      Objective:     Visit Vitals  BP (!) 143/64 (BP 1 Location: Left upper arm, BP Patient Position: At rest;Supine)   Pulse 71   Temp 98.2 °F (36.8 °C)   Resp 20   Ht 5' 4\" (1.626 m)   Wt 86.2 kg (190 lb)   SpO2 100%   BMI 32.61 kg/m²      O2 Device: None (Room air)    Temp (24hrs), Av °F (36.7 °C), Min:97.5 °F (36.4 °C), Max:98.7 °F (37.1 °C)      No intake/output data recorded. 10/20 1901 - 10/22 0700  In: 2259.6 [P.O.:500; I.V.:978.8]  Out: 800 [Urine:800]    PHYSICAL EXAM:  Constitutional: No acute distress  Skin: Extremities and face reveal no rashes. HEENT: Sclerae anicteric. Extra-occular muscles are intact. No oral ulcers. The neck is supple and no masses. Cardiovascular: Regular rate and rhythm. Respiratory:  Clear breath sounds bilaterally with no wheezes, rales, or rhonchi. GI: Abdomen nondistended, soft, and nontender. Normal active bowel sounds. Musculoskeletal: No pitting edema of the lower legs. Able to move all ext  Neurological:  Patient is alert and oriented. Cranial nerves II-XII grossly intact  Psychiatric: Mood appears appropriate       Data Review    Recent Results (from the past 24 hour(s))   METABOLIC PANEL, COMPREHENSIVE    Collection Time: 10/21/22 11:54 AM   Result Value Ref Range    Sodium 135 (L) 136 - 145 mmol/L    Potassium 3.6 3.5 - 5.1 mmol/L    Chloride 97 97 - 108 mmol/L    CO2 31 21 - 32 mmol/L    Anion gap 7 5 - 15 mmol/L    Glucose 228 (H) 65 - 100 mg/dL    BUN 81 (H) 6 - 20 mg/dL    Creatinine 2.65 (H) 0.55 - 1.02 mg/dL    BUN/Creatinine ratio 31 (H) 12 - 20      eGFR 18 (L) >60 ml/min/1.73m2    Calcium 9.4 8.5 - 10.1 mg/dL    Bilirubin, total 0.3 0.2 - 1.0 mg/dL    AST (SGOT) 20 15 - 37 U/L    ALT (SGPT) 11 (L) 12 - 78 U/L    Alk. phosphatase 88 45 - 117 U/L    Protein, total 6.1 (L) 6.4 - 8.2 g/dL    Albumin 2.9 (L) 3.5 - 5.0 g/dL    Globulin 3.2 2.0 - 4.0 g/dL    A-G Ratio 0.9 (L) 1.1 - 2.2     CBC WITH AUTOMATED DIFF    Collection Time: 10/21/22 11:54 AM   Result Value Ref Range    WBC 7.4 3.6 - 11.0 K/uL    RBC 2.43 (L) 3.80 - 5.20 M/uL    HGB 6.3 (L) 11.5 - 16.0 g/dL    HCT 20.1 (L) 35.0 - 47.0 %    MCV 82.7 80.0 - 99.0 FL    MCH 25.9 (L) 26.0 - 34.0 PG    MCHC 31.3 30.0 - 36.5 g/dL    RDW 19.7 (H) 11.5 - 14.5 %    PLATELET 496 630 - 909 K/uL    MPV 10.7 8.9 - 12.9 FL    NRBC 0.0 0.0  WBC    ABSOLUTE NRBC 0.00 0.00 - 0.01 K/uL    NEUTROPHILS 73 32 - 75 %    LYMPHOCYTES 14 12 - 49 %    MONOCYTES 11 5 - 13 %    EOSINOPHILS 2 0 - 7 %    BASOPHILS 0 0 - 1 %    IMMATURE GRANULOCYTES 0 0 - 0.5 %    ABS. NEUTROPHILS 5.3 1.8 - 8.0 K/UL    ABS. LYMPHOCYTES 1.1 0.8 - 3.5 K/UL    ABS. MONOCYTES 0.8 0.0 - 1.0 K/UL    ABS. EOSINOPHILS 0.2 0.0 - 0.4 K/UL    ABS. BASOPHILS 0.0 0.0 - 0.1 K/UL    ABS. IMM.  GRANS. 0.0 0.00 - 0.04 K/UL    DF AUTOMATED     TROPONIN-HIGH SENSITIVITY    Collection Time: 10/21/22 11:54 AM   Result Value Ref Range    Troponin-High Sensitivity 61 (H) 0 - 51 ng/L   URINALYSIS W/ REFLEX CULTURE    Collection Time: 10/21/22  2:32 PM    Specimen: Urine   Result Value Ref Range    Color Yellow/Straw      Appearance Turbid (A) Clear      Specific gravity 1.006 1.003 - 1.030      pH (UA) 7.0 5.0 - 8.0      Protein Negative Negative mg/dL    Glucose Negative Negative mg/dL    Ketone Negative Negative mg/dL    Bilirubin Negative Negative      Blood Large (A) Negative      Urobilinogen 0.1 0.1 - 1.0 EU/dL    Nitrites Negative Negative      Leukocyte Esterase Moderate (A) Negative      UA:UC IF INDICATED Culture not indicated by UA result Culture not indicated by UA result      WBC 0-4 0 - 4 /hpf    RBC 0-5 0 - 5 /hpf    Bacteria 3+ (A) Negative /hpf   TYPE & SCREEN    Collection Time: 10/21/22  2:37 PM   Result Value Ref Range    Crossmatch Expiration 10/24/2022,2359     ABO/Rh(D) A Positive     Antibody screen Negative     Unit number [id number]     Blood component type [facility] [place]     Unit division 00     Status of unit Issued,final     TRANSFUSION STATUS Ok to transfuse     Crossmatch result Compatible     Unit number [id number]     Blood component type [facility] [place]     Unit division 00     Status of unit Issued,final     TRANSFUSION STATUS Ok to transfuse     Crossmatch result Compatible    GLUCOSE, POC    Collection Time: 10/21/22  5:58 PM   Result Value Ref Range    Glucose (POC) 209 (H) 65 - 100 mg/dL    Performed by Humberto Carmen    GLUCOSE, POC    Collection Time: 10/21/22  9:45 PM   Result Value Ref Range    Glucose (POC) 136 (H) 65 - 100 mg/dL    Performed by David Edmonds    METABOLIC PANEL, BASIC    Collection Time: 10/22/22  4:09 AM   Result Value Ref Range    Sodium 140 136 - 145 mmol/L    Potassium 3.7 3.5 - 5.1 mmol/L    Chloride 103 97 - 108 mmol/L    CO2 30 21 - 32 mmol/L    Anion gap 7 5 - 15 mmol/L    Glucose 120 (H) 65 - 100 mg/dL    BUN 71 (H) 6 - 20 mg/dL    Creatinine 2.10 (H) 0.55 - 1.02 mg/dL    BUN/Creatinine ratio 34 (H) 12 - 20      eGFR 24 (L) >60 ml/min/1.73m2    Calcium 8.9 8.5 - 10.1 mg/dL   CBC W/O DIFF    Collection Time: 10/22/22  4:09 AM   Result Value Ref Range    WBC 8.4 3.6 - 11.0 K/uL    RBC 3.02 (L) 3.80 - 5.20 M/uL    HGB 8.3 (L) 11.5 - 16.0 g/dL    HCT 25.3 (L) 35.0 - 47.0 %    MCV 83.8 80.0 - 99.0 FL    MCH 27.5 26.0 - 34.0 PG    MCHC 32.8 30.0 - 36.5 g/dL    RDW 18.0 (H) 11.5 - 14.5 %    PLATELET 942 655 - 612 K/uL    MPV 10.4 8.9 - 12.9 FL    NRBC 0.0 0.0  WBC    ABSOLUTE NRBC 0.00 0.00 - 0.01 K/uL   GLUCOSE, POC    Collection Time: 10/22/22  8:24 AM   Result Value Ref Range    Glucose (POC) 118 (H) 65 - 100 mg/dL    Performed by Tomás Kyle    GLUCOSE, POC    Collection Time: 10/22/22 11:26 AM   Result Value Ref Range    Glucose (POC) 195 (H) 65 - 100 mg/dL    Performed by Patt Vance / Plan:  Acute blood loss anemia:  S/p 2 unit of PRBC transfusion on 10/21/22  Hb stable after transfusion  S/p EGD on 10/21/22- gastritis without active bleeding  Appreciate GI consult  Inpatient vs outpatient colonoscopy and capsule endoscopy as per GI  Will monitor Hb  Continue PPI    Acute GI bleed  As above      ALEX on CKD  Creatinine improving    Acute UTI:  Start Ceftriaxone    Type II DM  Continue lispro sliding scale    Syncope:  Likely s/s acute blood loss anemia  Has cardiac monitor placed last Monday. 30.0 - 39.9 Obese / Body mass index is 32.61 kg/m².     Code status: Full  Prophylaxis: SCD's  Recommended Disposition: Home w/Family       time spent 35 minutes involving direct patient care as well as reviewing patient's labs and coordination of care with nursing staff     Care Plan discussed with: Patient/Family/RN/Case Management        Total time spent with patient: 35 minutes.

## 2022-10-22 NOTE — PROGRESS NOTES
Problem: Falls - Risk of  Goal: *Absence of Falls  Description: Document Vernia Colder Fall Risk and appropriate interventions in the flowsheet. Outcome: Progressing Towards Goal  Note: Fall Risk Interventions:  Mobility Interventions: Bed/chair exit alarm, Communicate number of staff needed for ambulation/transfer              Elimination Interventions: Call light in reach, Bed/chair exit alarm    History of Falls Interventions: Bed/chair exit alarm         Problem: Patient Education: Go to Patient Education Activity  Goal: Patient/Family Education  Outcome: Progressing Towards Goal     Problem: Pressure Injury - Risk of  Goal: *Prevention of pressure injury  Description: Document Kirit Scale and appropriate interventions in the flowsheet.   Outcome: Progressing Towards Goal  Note: Pressure Injury Interventions:  Sensory Interventions: Keep linens dry and wrinkle-free, Minimize linen layers    Moisture Interventions: Absorbent underpads, Internal/External urinary devices, Minimize layers    Activity Interventions: Increase time out of bed    Mobility Interventions: HOB 30 degrees or less    Nutrition Interventions: Document food/fluid/supplement intake, Offer support with meals,snacks and hydration                     Problem: Patient Education: Go to Patient Education Activity  Goal: Patient/Family Education  Outcome: Progressing Towards Goal

## 2022-10-23 LAB
ANION GAP SERPL CALC-SCNC: 5 MMOL/L (ref 5–15)
BUN SERPL-MCNC: 57 MG/DL (ref 6–20)
BUN/CREAT SERPL: 32 (ref 12–20)
CA-I BLD-MCNC: 8.4 MG/DL (ref 8.5–10.1)
CHLORIDE SERPL-SCNC: 109 MMOL/L (ref 97–108)
CO2 SERPL-SCNC: 28 MMOL/L (ref 21–32)
CREAT SERPL-MCNC: 1.8 MG/DL (ref 0.55–1.02)
ERYTHROCYTE [DISTWIDTH] IN BLOOD BY AUTOMATED COUNT: 18.2 % (ref 11.5–14.5)
GLUCOSE BLD STRIP.AUTO-MCNC: 201 MG/DL (ref 65–100)
GLUCOSE BLD STRIP.AUTO-MCNC: 230 MG/DL (ref 65–100)
GLUCOSE BLD STRIP.AUTO-MCNC: 232 MG/DL (ref 65–100)
GLUCOSE BLD STRIP.AUTO-MCNC: 77 MG/DL (ref 65–100)
GLUCOSE SERPL-MCNC: 119 MG/DL (ref 65–100)
HCT VFR BLD AUTO: 24.1 % (ref 35–47)
HGB BLD-MCNC: 7.7 G/DL (ref 11.5–16)
MCH RBC QN AUTO: 27 PG (ref 26–34)
MCHC RBC AUTO-ENTMCNC: 32 G/DL (ref 30–36.5)
MCV RBC AUTO: 84.6 FL (ref 80–99)
NRBC # BLD: 0 K/UL (ref 0–0.01)
NRBC BLD-RTO: 0 PER 100 WBC
PERFORMED BY, TECHID: ABNORMAL
PERFORMED BY, TECHID: NORMAL
PLATELET # BLD AUTO: 176 K/UL (ref 150–400)
PMV BLD AUTO: 9.9 FL (ref 8.9–12.9)
POTASSIUM SERPL-SCNC: 3.8 MMOL/L (ref 3.5–5.1)
RBC # BLD AUTO: 2.85 M/UL (ref 3.8–5.2)
SODIUM SERPL-SCNC: 142 MMOL/L (ref 136–145)
WBC # BLD AUTO: 8.5 K/UL (ref 3.6–11)

## 2022-10-23 PROCEDURE — 74011000250 HC RX REV CODE- 250: Performed by: INTERNAL MEDICINE

## 2022-10-23 PROCEDURE — 74011250636 HC RX REV CODE- 250/636: Performed by: INTERNAL MEDICINE

## 2022-10-23 PROCEDURE — 74011000250 HC RX REV CODE- 250: Performed by: EMERGENCY MEDICINE

## 2022-10-23 PROCEDURE — 36415 COLL VENOUS BLD VENIPUNCTURE: CPT

## 2022-10-23 PROCEDURE — 85027 COMPLETE CBC AUTOMATED: CPT

## 2022-10-23 PROCEDURE — 74011636637 HC RX REV CODE- 636/637: Performed by: INTERNAL MEDICINE

## 2022-10-23 PROCEDURE — 74011000258 HC RX REV CODE- 258: Performed by: INTERNAL MEDICINE

## 2022-10-23 PROCEDURE — 74011250637 HC RX REV CODE- 250/637: Performed by: INTERNAL MEDICINE

## 2022-10-23 PROCEDURE — C9113 INJ PANTOPRAZOLE SODIUM, VIA: HCPCS | Performed by: INTERNAL MEDICINE

## 2022-10-23 PROCEDURE — 80048 BASIC METABOLIC PNL TOTAL CA: CPT

## 2022-10-23 PROCEDURE — 82962 GLUCOSE BLOOD TEST: CPT

## 2022-10-23 PROCEDURE — 65270000029 HC RM PRIVATE

## 2022-10-23 RX ORDER — GUAIFENESIN 100 MG/5ML
100 SOLUTION ORAL
Status: DISCONTINUED | OUTPATIENT
Start: 2022-10-23 | End: 2022-11-03 | Stop reason: HOSPADM

## 2022-10-23 RX ADMIN — SODIUM CHLORIDE, PRESERVATIVE FREE 10 ML: 5 INJECTION INTRAVENOUS at 13:40

## 2022-10-23 RX ADMIN — SODIUM CHLORIDE, PRESERVATIVE FREE 10 ML: 5 INJECTION INTRAVENOUS at 00:01

## 2022-10-23 RX ADMIN — CARVEDILOL 6.25 MG: 3.12 TABLET, FILM COATED ORAL at 08:33

## 2022-10-23 RX ADMIN — SODIUM CHLORIDE, PRESERVATIVE FREE 10 ML: 5 INJECTION INTRAVENOUS at 13:39

## 2022-10-23 RX ADMIN — SODIUM CHLORIDE, PRESERVATIVE FREE 40 MG: 5 INJECTION INTRAVENOUS at 21:42

## 2022-10-23 RX ADMIN — CARVEDILOL 6.25 MG: 3.12 TABLET, FILM COATED ORAL at 16:24

## 2022-10-23 RX ADMIN — MELATONIN TAB 5 MG 5 MG: 5 TAB at 21:42

## 2022-10-23 RX ADMIN — LATANOPROST 1 DROP: 50 SOLUTION OPHTHALMIC at 21:49

## 2022-10-23 RX ADMIN — VENLAFAXINE HYDROCHLORIDE 75 MG: 75 CAPSULE, EXTENDED RELEASE ORAL at 08:32

## 2022-10-23 RX ADMIN — GABAPENTIN 300 MG: 300 CAPSULE ORAL at 08:32

## 2022-10-23 RX ADMIN — POLYETHYLENE GLYCOL 3350 17 G: 17 POWDER, FOR SOLUTION ORAL at 13:45

## 2022-10-23 RX ADMIN — DONEPEZIL HYDROCHLORIDE 10 MG: 5 TABLET, FILM COATED ORAL at 21:42

## 2022-10-23 RX ADMIN — INSULIN LISPRO 3 UNITS: 100 INJECTION, SOLUTION INTRAVENOUS; SUBCUTANEOUS at 13:37

## 2022-10-23 RX ADMIN — SODIUM CHLORIDE, PRESERVATIVE FREE 10 ML: 5 INJECTION INTRAVENOUS at 06:53

## 2022-10-23 RX ADMIN — SODIUM CHLORIDE 75 ML/HR: 9 INJECTION, SOLUTION INTRAVENOUS at 01:25

## 2022-10-23 RX ADMIN — CEFTRIAXONE SODIUM 1 G: 1 INJECTION, POWDER, FOR SOLUTION INTRAMUSCULAR; INTRAVENOUS at 10:29

## 2022-10-23 RX ADMIN — GABAPENTIN 300 MG: 300 CAPSULE ORAL at 21:42

## 2022-10-23 RX ADMIN — SODIUM CHLORIDE, PRESERVATIVE FREE 10 ML: 5 INJECTION INTRAVENOUS at 21:44

## 2022-10-23 RX ADMIN — ACETAMINOPHEN 650 MG: 325 TABLET, FILM COATED ORAL at 08:32

## 2022-10-23 RX ADMIN — SODIUM CHLORIDE, PRESERVATIVE FREE 40 MG: 5 INJECTION INTRAVENOUS at 08:43

## 2022-10-23 RX ADMIN — INSULIN LISPRO 2 UNITS: 100 INJECTION, SOLUTION INTRAVENOUS; SUBCUTANEOUS at 23:26

## 2022-10-23 RX ADMIN — POLYETHYLENE GLYCOL 3350, SODIUM SULFATE ANHYDROUS, SODIUM BICARBONATE, SODIUM CHLORIDE, POTASSIUM CHLORIDE 4000 ML: 236; 22.74; 6.74; 5.86; 2.97 POWDER, FOR SOLUTION ORAL at 23:26

## 2022-10-23 RX ADMIN — ACETAMINOPHEN 650 MG: 325 TABLET, FILM COATED ORAL at 21:49

## 2022-10-23 RX ADMIN — GUAIFENESIN 100 MG: 200 SOLUTION ORAL at 01:24

## 2022-10-23 RX ADMIN — INSULIN LISPRO 3 UNITS: 100 INJECTION, SOLUTION INTRAVENOUS; SUBCUTANEOUS at 08:33

## 2022-10-23 NOTE — PROGRESS NOTES
Spiritual Care Assessment/Progress Note  [facility]      NAME: Don Yanez      MRN: [id number]  AGE: 65 y.o. SEX: female  [employer] Affiliation: Protestant   Language: English     10/23/2022     Total Time (in minutes): 30     Spiritual Assessment begun in [facility] through conversation with:         [x]Patient        [] Family    [] Friend(s)              Spiritual beliefs: (Please include comment if needed)     [] Identifies with a lorri tradition:         [] Supported by a lorri community:            [] Claims no spiritual orientation:           [] Seeking spiritual identity:                [] Adheres to an individual form of spirituality:           [x] Not able to assess:  Asleep                         Identified resources for coping:      [] Prayer                               [] Music                  [] Guided Imagery     [] Family/friends                 [] Pet visits     [] Devotional reading                         [x] Unknown     [] Other:                                               Interventions offered during this visit: (See comments for more details)    Patient Interventions: Advance medical directive consult, Initial/Spiritual assessment, patient floor           Plan of Care:     [x] Support spiritual and/or cultural needs    [] Support AMD and/or advance care planning process      [] Support grieving process   [] Coordinate Rites and/or Rituals    [] Coordination with community clergy   [] No spiritual needs identified at this time   [] Detailed Plan of Care below (See Comments)  [] Make referral to Music Therapy  [] Make referral to Pet Therapy     [] Make referral to Addiction services  [] Make referral to [facility]  [] Make referral to Spiritual Care Partner  [] No future visits requested        [x] Contact Spiritual Care for further referrals     Comments:  responded to Advance Medical Directive , in box request. Patient was asleep when  arrived.   did not wake patient. Advised nurse to contact [facility] for any further referrals.     Chaplain Yon Raya MDiv, [facility]

## 2022-10-23 NOTE — PROGRESS NOTES
Hospitalist Progress Note    Subjective:   Daily Progress Note: 10/23/2022 11:46 AM    Hospital Course:  Nishi Monsivais is a 65 y.o. female who presents with reports of syncopal episode at home. According to patient, she was seated on the couch watching TV and does not remember the details of what happened . She remembers waking up in an ambulance  Denies chest pain palpitation nausea vomiting prior to this episode. Reports having epigastric abdominal pain for the last few days with dark tarry stools. Work-up in the ED shows a hemoglobin of 6.3 with a hematocrit of 20.1. She was last admitted in the hospital October 10 through October 13 for similar episode of syncope with bleeding. EGD done October 12 did not find any active upper GI bleed. She was discharged home on oral Protonix twice daily and advised to stop taking apixaban  S/p EGD on 10/21/22 showed gastritis without evidence of bleeding. S/p 2 unit of PRBC transfusion on 10/21/22    Subjective:  Hb slightly trended down today from 8.3 yesterday to 7.7 today.      Current Facility-Administered Medications   Medication Dose Route Frequency    guaiFENesin (ROBITUSSIN) 100 mg/5 mL oral liquid 100 mg  100 mg Oral Q4H PRN    cefTRIAXone (ROCEPHIN) 1 g in 0.9% sodium chloride (MBP/ADV) 50 mL MBP  1 g IntraVENous Q24H    sodium chloride (NS) flush 5-40 mL  5-40 mL IntraVENous Q8H    sodium chloride (NS) flush 5-40 mL  5-40 mL IntraVENous PRN    0.9% sodium chloride infusion 250 mL  250 mL IntraVENous PRN    pantoprazole (PROTONIX) 40 mg in 0.9% sodium chloride 10 mL injection  40 mg IntraVENous Q12H    sodium chloride (NS) flush 5-40 mL  5-40 mL IntraVENous Q8H    sodium chloride (NS) flush 5-40 mL  5-40 mL IntraVENous PRN    acetaminophen (TYLENOL) tablet 650 mg  650 mg Oral Q6H PRN    Or    acetaminophen (TYLENOL) suppository 650 mg  650 mg Rectal Q6H PRN    polyethylene glycol (MIRALAX) packet 17 g  17 g Oral DAILY PRN    ondansetron (ZOFRAN ODT) tablet 4 mg  4 mg Oral Q8H PRN    Or    ondansetron (ZOFRAN) injection 4 mg  4 mg IntraVENous Q6H PRN    carvediloL (COREG) tablet 6.25 mg  6.25 mg Oral BID WITH MEALS    donepeziL (ARICEPT) tablet 10 mg  10 mg Oral QHS    gabapentin (NEURONTIN) capsule 300 mg  300 mg Oral BID    latanoprost (XALATAN) 0.005 % ophthalmic solution 1 Drop  1 Drop Both Eyes QHS    melatonin tablet 5 mg  5 mg Oral QHS    venlafaxine-SR (EFFEXOR-XR) capsule 75 mg  75 mg Oral DAILY    insulin lispro (HUMALOG) injection   SubCUTAneous AC&HS        Review of Systems  Constitutional: No fevers, No chills, No sweats, No fatigue, No Weakness  Eyes: No redness  Ears, nose, mouth, throat, and face: No nasal congestion, No sore throat, No voice change  Respiratory: No Shortness of Breath, No cough, No wheezing  Cardiovascular: No chest pain, No palpitations, No extremity edema  Gastrointestinal: No nausea, No vomiting, No diarrhea, No abdominal pain  Genitourinary: No frequency, has dysuria, No hematuria  Integument/breast: No skin lesion(s)   Neurological: No Confusion, No headaches, No dizziness      Objective:     Visit Vitals  BP (!) 141/69 (BP 1 Location: Right upper arm, BP Patient Position: At rest)   Pulse 70   Temp 98.3 °F (36.8 °C)   Resp 18   Ht 5' 4\" (1.626 m)   Wt 86.2 kg (190 lb)   SpO2 97%   BMI 32.61 kg/m²      O2 Device: None (Room air)    Temp (24hrs), Av.2 °F (36.8 °C), Min:98 °F (36.7 °C), Max:98.3 °F (36.8 °C)      No intake/output data recorded. 10/21 1901 - 10/23 0700  In: 2459.6 [P.O.:800; I.V.:878.8]  Out:  [Urine:]    PHYSICAL EXAM:  Constitutional: No acute distress  Skin: Extremities and face reveal no rashes. HEENT: Sclerae anicteric. Extra-occular muscles are intact. No oral ulcers. The neck is supple and no masses. Cardiovascular: Regular rate and rhythm. Respiratory:  Clear breath sounds bilaterally with no wheezes, rales, or rhonchi. GI: Abdomen nondistended, soft, and nontender.  Normal active bowel sounds. Musculoskeletal: No pitting edema of the lower legs. Able to move all ext  Neurological:  Patient is alert and oriented.  Cranial nerves II-XII grossly intact  Psychiatric: Mood appears appropriate       Data Review    Recent Results (from the past 24 hour(s))   GLUCOSE, POC    Collection Time: 10/22/22  4:19 PM   Result Value Ref Range    Glucose (POC) 201 (H) 65 - 100 mg/dL    Performed by Diaz Chery    GLUCOSE, POC    Collection Time: 10/22/22  7:32 PM   Result Value Ref Range    Glucose (POC) 230 (H) 65 - 100 mg/dL    Performed by Gurmeet Hodges    METABOLIC PANEL, BASIC    Collection Time: 10/23/22  7:09 AM   Result Value Ref Range    Sodium 142 136 - 145 mmol/L    Potassium 3.8 3.5 - 5.1 mmol/L    Chloride 109 (H) 97 - 108 mmol/L    CO2 28 21 - 32 mmol/L    Anion gap 5 5 - 15 mmol/L    Glucose 119 (H) 65 - 100 mg/dL    BUN 57 (H) 6 - 20 mg/dL    Creatinine 1.80 (H) 0.55 - 1.02 mg/dL    BUN/Creatinine ratio 32 (H) 12 - 20      eGFR 28 (L) >60 ml/min/1.73m2    Calcium 8.4 (L) 8.5 - 10.1 mg/dL   CBC W/O DIFF    Collection Time: 10/23/22  7:09 AM   Result Value Ref Range    WBC 8.5 3.6 - 11.0 K/uL    RBC 2.85 (L) 3.80 - 5.20 M/uL    HGB 7.7 (L) 11.5 - 16.0 g/dL    HCT 24.1 (L) 35.0 - 47.0 %    MCV 84.6 80.0 - 99.0 FL    MCH 27.0 26.0 - 34.0 PG    MCHC 32.0 30.0 - 36.5 g/dL    RDW 18.2 (H) 11.5 - 14.5 %    PLATELET 025 688 - 813 K/uL    MPV 9.9 8.9 - 12.9 FL    NRBC 0.0 0.0  WBC    ABSOLUTE NRBC 0.00 0.00 - 0.01 K/uL   GLUCOSE, POC    Collection Time: 10/23/22  8:21 AM   Result Value Ref Range    Glucose (POC) 201 (H) 65 - 100 mg/dL    Performed by Samira Castañeda    GLUCOSE, POC    Collection Time: 10/23/22 12:13 PM   Result Value Ref Range    Glucose (POC) 230 (H) 65 - 100 mg/dL    Performed by Samira Castañeda            Assessment / Plan:  Acute blood loss anemia:  S/p 2 unit of PRBC transfusion on 10/21/22  Hb stable after transfusion  S/p EGD on 10/21/22- gastritis without active bleeding  Appreciate GI consult  Inpatient vs outpatient colonoscopy and capsule endoscopy as per GI  Will monitor Hb  Continue PPI    Acute GI bleed  As above      ALEX on CKD  Creatinine improving    Acute UTI:  Start Ceftriaxone    Type II DM  Continue lispro sliding scale    Syncope:  Likely s/s acute blood loss anemia  Has cardiac monitor placed last Monday. 30.0 - 39.9 Obese / Body mass index is 32.61 kg/m². Code status: Full  Prophylaxis: SCD's  Recommended Disposition: Home w/Family(barrier- Hb stabilization, GI clearance, outpatient vs inpatient colonoscopy, capsule endoscopy)       time spent 35 minutes involving direct patient care as well as reviewing patient's labs and coordination of care with nursing staff     Care Plan discussed with: Patient/Family/RN/Case Management        Total time spent with patient: 35 minutes.

## 2022-10-24 ENCOUNTER — APPOINTMENT (OUTPATIENT)
Dept: ENDOSCOPY | Age: 77
DRG: 378 | End: 2022-10-24
Attending: INTERNAL MEDICINE
Payer: MEDICARE

## 2022-10-24 ENCOUNTER — ANESTHESIA EVENT (OUTPATIENT)
Dept: ENDOSCOPY | Age: 77
DRG: 378 | End: 2022-10-24
Payer: MEDICARE

## 2022-10-24 ENCOUNTER — ANESTHESIA (OUTPATIENT)
Dept: ENDOSCOPY | Age: 77
DRG: 378 | End: 2022-10-24
Payer: MEDICARE

## 2022-10-24 LAB
ANION GAP SERPL CALC-SCNC: 4 MMOL/L (ref 5–15)
BUN SERPL-MCNC: 50 MG/DL (ref 6–20)
BUN/CREAT SERPL: 30 (ref 12–20)
CA-I BLD-MCNC: 8.6 MG/DL (ref 8.5–10.1)
CHLORIDE SERPL-SCNC: 107 MMOL/L (ref 97–108)
CO2 SERPL-SCNC: 32 MMOL/L (ref 21–32)
CREAT SERPL-MCNC: 1.68 MG/DL (ref 0.55–1.02)
ERYTHROCYTE [DISTWIDTH] IN BLOOD BY AUTOMATED COUNT: 18.8 % (ref 11.5–14.5)
GLUCOSE BLD STRIP.AUTO-MCNC: 116 MG/DL (ref 65–100)
GLUCOSE BLD STRIP.AUTO-MCNC: 122 MG/DL (ref 65–100)
GLUCOSE BLD STRIP.AUTO-MCNC: 167 MG/DL (ref 65–100)
GLUCOSE BLD STRIP.AUTO-MCNC: 172 MG/DL (ref 65–100)
GLUCOSE SERPL-MCNC: 113 MG/DL (ref 65–100)
HCT VFR BLD AUTO: 25.9 % (ref 35–47)
HGB BLD-MCNC: 7.9 G/DL (ref 11.5–16)
MCH RBC QN AUTO: 26.7 PG (ref 26–34)
MCHC RBC AUTO-ENTMCNC: 30.5 G/DL (ref 30–36.5)
MCV RBC AUTO: 87.5 FL (ref 80–99)
NRBC # BLD: 0 K/UL (ref 0–0.01)
NRBC BLD-RTO: 0 PER 100 WBC
PERFORMED BY, TECHID: ABNORMAL
PLATELET # BLD AUTO: 200 K/UL (ref 150–400)
PMV BLD AUTO: 11 FL (ref 8.9–12.9)
POTASSIUM SERPL-SCNC: 4.4 MMOL/L (ref 3.5–5.1)
RBC # BLD AUTO: 2.96 M/UL (ref 3.8–5.2)
SODIUM SERPL-SCNC: 143 MMOL/L (ref 136–145)
WBC # BLD AUTO: 8 K/UL (ref 3.6–11)

## 2022-10-24 PROCEDURE — 36415 COLL VENOUS BLD VENIPUNCTURE: CPT

## 2022-10-24 PROCEDURE — 74011250636 HC RX REV CODE- 250/636: Performed by: INTERNAL MEDICINE

## 2022-10-24 PROCEDURE — 94761 N-INVAS EAR/PLS OXIMETRY MLT: CPT

## 2022-10-24 PROCEDURE — 74011250637 HC RX REV CODE- 250/637: Performed by: INTERNAL MEDICINE

## 2022-10-24 PROCEDURE — 82962 GLUCOSE BLOOD TEST: CPT

## 2022-10-24 PROCEDURE — 74011000250 HC RX REV CODE- 250: Performed by: NURSE ANESTHETIST, CERTIFIED REGISTERED

## 2022-10-24 PROCEDURE — C9113 INJ PANTOPRAZOLE SODIUM, VIA: HCPCS | Performed by: INTERNAL MEDICINE

## 2022-10-24 PROCEDURE — 74011000250 HC RX REV CODE- 250: Performed by: INTERNAL MEDICINE

## 2022-10-24 PROCEDURE — 76040000007: Performed by: INTERNAL MEDICINE

## 2022-10-24 PROCEDURE — 74011000258 HC RX REV CODE- 258: Performed by: INTERNAL MEDICINE

## 2022-10-24 PROCEDURE — 2709999900 HC NON-CHARGEABLE SUPPLY: Performed by: INTERNAL MEDICINE

## 2022-10-24 PROCEDURE — 80048 BASIC METABOLIC PNL TOTAL CA: CPT

## 2022-10-24 PROCEDURE — 65270000029 HC RM PRIVATE

## 2022-10-24 PROCEDURE — 74011000250 HC RX REV CODE- 250: Performed by: EMERGENCY MEDICINE

## 2022-10-24 PROCEDURE — 74011250636 HC RX REV CODE- 250/636: Performed by: NURSE ANESTHETIST, CERTIFIED REGISTERED

## 2022-10-24 PROCEDURE — 77030019988 HC FCPS ENDOSC DISP BSC -B: Performed by: INTERNAL MEDICINE

## 2022-10-24 PROCEDURE — 85027 COMPLETE CBC AUTOMATED: CPT

## 2022-10-24 PROCEDURE — 74011636637 HC RX REV CODE- 636/637: Performed by: INTERNAL MEDICINE

## 2022-10-24 PROCEDURE — 0DJD8ZZ INSPECTION OF LOWER INTESTINAL TRACT, VIA NATURAL OR ARTIFICIAL OPENING ENDOSCOPIC: ICD-10-PCS | Performed by: INTERNAL MEDICINE

## 2022-10-24 PROCEDURE — 76060000032 HC ANESTHESIA 0.5 TO 1 HR: Performed by: INTERNAL MEDICINE

## 2022-10-24 PROCEDURE — 94640 AIRWAY INHALATION TREATMENT: CPT

## 2022-10-24 RX ORDER — IPRATROPIUM BROMIDE AND ALBUTEROL SULFATE 2.5; .5 MG/3ML; MG/3ML
3 SOLUTION RESPIRATORY (INHALATION)
Status: COMPLETED | OUTPATIENT
Start: 2022-10-24 | End: 2022-10-24

## 2022-10-24 RX ORDER — HYDRALAZINE HYDROCHLORIDE 50 MG/1
25 TABLET, FILM COATED ORAL
Status: DISCONTINUED | OUTPATIENT
Start: 2022-10-24 | End: 2022-11-03 | Stop reason: HOSPADM

## 2022-10-24 RX ORDER — PHENYLEPHRINE HCL IN 0.9% NACL 1 MG/10 ML
SYRINGE (ML) INTRAVENOUS
Status: DISPENSED
Start: 2022-10-24 | End: 2022-10-25

## 2022-10-24 RX ORDER — SODIUM CHLORIDE 9 MG/ML
INJECTION, SOLUTION INTRAVENOUS
Status: DISCONTINUED | OUTPATIENT
Start: 2022-10-24 | End: 2022-10-24 | Stop reason: HOSPADM

## 2022-10-24 RX ORDER — PROPOFOL 10 MG/ML
INJECTION, EMULSION INTRAVENOUS AS NEEDED
Status: DISCONTINUED | OUTPATIENT
Start: 2022-10-24 | End: 2022-10-24 | Stop reason: HOSPADM

## 2022-10-24 RX ORDER — LIDOCAINE HCL/PF 100 MG/5ML
SYRINGE (ML) INTRAVENOUS
Status: DISPENSED
Start: 2022-10-24 | End: 2022-10-25

## 2022-10-24 RX ORDER — PROPOFOL 10 MG/ML
INJECTION, EMULSION INTRAVENOUS
Status: COMPLETED
Start: 2022-10-24 | End: 2022-10-24

## 2022-10-24 RX ORDER — LIDOCAINE HYDROCHLORIDE 20 MG/ML
INJECTION, SOLUTION EPIDURAL; INFILTRATION; INTRACAUDAL; PERINEURAL AS NEEDED
Status: DISCONTINUED | OUTPATIENT
Start: 2022-10-24 | End: 2022-10-24 | Stop reason: HOSPADM

## 2022-10-24 RX ADMIN — PROPOFOL 20 MG: 10 INJECTION, EMULSION INTRAVENOUS at 13:48

## 2022-10-24 RX ADMIN — HYDRALAZINE HYDROCHLORIDE 25 MG: 50 TABLET, FILM COATED ORAL at 12:17

## 2022-10-24 RX ADMIN — PROPOFOL 10 MG: 10 INJECTION, EMULSION INTRAVENOUS at 13:51

## 2022-10-24 RX ADMIN — SODIUM CHLORIDE, PRESERVATIVE FREE 10 ML: 5 INJECTION INTRAVENOUS at 21:37

## 2022-10-24 RX ADMIN — SODIUM CHLORIDE, PRESERVATIVE FREE 10 ML: 5 INJECTION INTRAVENOUS at 18:17

## 2022-10-24 RX ADMIN — CEFTRIAXONE SODIUM 1 G: 1 INJECTION, POWDER, FOR SOLUTION INTRAMUSCULAR; INTRAVENOUS at 10:15

## 2022-10-24 RX ADMIN — PROPOFOL 10 MG: 10 INJECTION, EMULSION INTRAVENOUS at 13:53

## 2022-10-24 RX ADMIN — GABAPENTIN 300 MG: 300 CAPSULE ORAL at 21:33

## 2022-10-24 RX ADMIN — IPRATROPIUM BROMIDE AND ALBUTEROL SULFATE 3 ML: .5; 2.5 SOLUTION RESPIRATORY (INHALATION) at 10:37

## 2022-10-24 RX ADMIN — LIDOCAINE HYDROCHLORIDE 60 MG: 20 INJECTION, SOLUTION EPIDURAL; INFILTRATION; INTRACAUDAL; PERINEURAL at 13:45

## 2022-10-24 RX ADMIN — GABAPENTIN 300 MG: 300 CAPSULE ORAL at 10:15

## 2022-10-24 RX ADMIN — PHENYLEPHRINE HYDROCHLORIDE 100 MCG: 10 INJECTION INTRAVENOUS at 13:55

## 2022-10-24 RX ADMIN — LATANOPROST 1 DROP: 50 SOLUTION OPHTHALMIC at 21:36

## 2022-10-24 RX ADMIN — SODIUM CHLORIDE: 9 INJECTION, SOLUTION INTRAVENOUS at 13:45

## 2022-10-24 RX ADMIN — ACETAMINOPHEN 650 MG: 325 TABLET, FILM COATED ORAL at 21:33

## 2022-10-24 RX ADMIN — PROPOFOL 60 MG: 10 INJECTION, EMULSION INTRAVENOUS at 13:45

## 2022-10-24 RX ADMIN — PHENYLEPHRINE HYDROCHLORIDE 50 MCG: 10 INJECTION INTRAVENOUS at 13:50

## 2022-10-24 RX ADMIN — SODIUM CHLORIDE, PRESERVATIVE FREE 40 MG: 5 INJECTION INTRAVENOUS at 21:34

## 2022-10-24 RX ADMIN — CARVEDILOL 6.25 MG: 3.12 TABLET, FILM COATED ORAL at 10:27

## 2022-10-24 RX ADMIN — SODIUM CHLORIDE, PRESERVATIVE FREE 10 ML: 5 INJECTION INTRAVENOUS at 21:36

## 2022-10-24 RX ADMIN — SODIUM CHLORIDE, PRESERVATIVE FREE 40 MG: 5 INJECTION INTRAVENOUS at 10:15

## 2022-10-24 RX ADMIN — SODIUM CHLORIDE, PRESERVATIVE FREE 10 ML: 5 INJECTION INTRAVENOUS at 05:57

## 2022-10-24 RX ADMIN — VENLAFAXINE HYDROCHLORIDE 75 MG: 75 CAPSULE, EXTENDED RELEASE ORAL at 10:15

## 2022-10-24 RX ADMIN — DONEPEZIL HYDROCHLORIDE 10 MG: 5 TABLET, FILM COATED ORAL at 21:34

## 2022-10-24 RX ADMIN — PHENYLEPHRINE HYDROCHLORIDE 100 MCG: 10 INJECTION INTRAVENOUS at 13:48

## 2022-10-24 RX ADMIN — MELATONIN TAB 5 MG 5 MG: 5 TAB at 21:33

## 2022-10-24 RX ADMIN — CARVEDILOL 6.25 MG: 3.12 TABLET, FILM COATED ORAL at 18:15

## 2022-10-24 NOTE — PROGRESS NOTES
CM met with patient at bedside to discuss discharge plans. CM offered home health to the patient and explained home health in detail. Patient politely declined services. Disposition remains home/self.

## 2022-10-24 NOTE — PROGRESS NOTES
Progress Note    Patient: Vikas Rodarte MRN: [id number]  SSN: [id number]    YOB: 1945  Age: 65 y.o.   Sex: female      Admit Date: 10/21/2022    LOS: 2 days     Subjective:   Some weakness, no blood in the stool  Past Medical History:   Diagnosis Date    Adenocarcinoma, renal cell ([facility] 9/2/2020    Arthritis     CAD (coronary artery disease)     Chronic kidney disease     Diabetes ([facility]     Gout     Hypercholesterolemia     Hypertension     Mental depression     Pulmonary embolism (HCC)     Seizures ([facility]     Stroke ([facility]     Thyroid disease         Current Facility-Administered Medications:     guaiFENesin (ROBITUSSIN) 100 mg/5 mL oral liquid 100 mg, 100 mg, Oral, Q4H PRN, Sky Salvador MD, 100 mg at 10/23/22 0124    peg 3350-electrolytes (COLYTE) 4000 mL, 4,000 mL, Oral, BID, Karol Becerra MD    cefTRIAXone (ROCEPHIN) 1 g in 0.9% sodium chloride (MBP/ADV) 50 mL MBP, 1 g, IntraVENous, Q24H, Vania Marie MD, Last Rate: 100 mL/hr at 10/23/22 1029, 1 g at 10/23/22 1029    sodium chloride (NS) flush 5-40 mL, 5-40 mL, IntraVENous, Q8H, Vel Valencia MD, 10 mL at 10/23/22 1340    sodium chloride (NS) flush 5-40 mL, 5-40 mL, IntraVENous, PRN, Vel Valencia MD    0.9% sodium chloride infusion 250 mL, 250 mL, IntraVENous, PRN, Vel Valencia MD    pantoprazole (PROTONIX) 40 mg in 0.9% sodium chloride 10 mL injection, 40 mg, IntraVENous, Q12H, Jesica Cortes MD, 40 mg at 10/23/22 0843    sodium chloride (NS) flush 5-40 mL, 5-40 mL, IntraVENous, Q8H, Jesica Cortes MD, 10 mL at 10/23/22 1339    sodium chloride (NS) flush 5-40 mL, 5-40 mL, IntraVENous, PRN, Jesica Cortes MD, 10 mL at 10/22/22 1434    acetaminophen (TYLENOL) tablet 650 mg, 650 mg, Oral, Q6H PRN, 650 mg at 10/23/22 0711 **OR** acetaminophen (TYLENOL) suppository 650 mg, 650 mg, Rectal, Q6H PRN, Jesica Cortes MD    polyethylene glycol (MIRALAX) packet 17 g, 17 g, Oral, DAILY PRN, Angel, Griselda Hernandez MD, 17 g at 10/23/22 1345    ondansetron (ZOFRAN ODT) tablet 4 mg, 4 mg, Oral, Q8H PRN **OR** ondansetron (ZOFRAN) injection 4 mg, 4 mg, IntraVENous, Q6H PRN, Pao Ocampo MD    carvediloL (COREG) tablet 6.25 mg, 6.25 mg, Oral, BID WITH MEALS, Pao Ocampo MD, 6.25 mg at 10/23/22 1624    donepeziL (ARICEPT) tablet 10 mg, 10 mg, Oral, QHS, Pao Ocampo MD, 10 mg at 10/22/22 2104    gabapentin (NEURONTIN) capsule 300 mg, 300 mg, Oral, BID, Pao Ocampo MD, 300 mg at 10/23/22 0832    latanoprost (XALATAN) 0.005 % ophthalmic solution 1 Drop, 1 Drop, Both Eyes, QHS, Pao Ocampo MD, 1 Drop at 10/22/22 2105    melatonin tablet 5 mg, 5 mg, Oral, QHS, Pao Ocampo MD, 5 mg at 10/22/22 2104    venlafaxine-SR (EFFEXOR-XR) capsule 75 mg, 75 mg, Oral, DAILY, Pao Ocampo MD, 75 mg at 10/23/22 5055    insulin lispro (HUMALOG) injection, , SubCUTAneous, AC&HS, Pao Ocampo MD, 3 Units at 10/23/22 1337    Objective:     Vitals:    10/22/22 2012 10/23/22 0241 10/23/22 0826 10/23/22 1605   BP: (!) 118/53 (!) 145/63 (!) 141/69 (!) 111/57   Pulse: 67 68 70 62   Resp: 18 18 18 18   Temp: 98 °F (36.7 °C) 98.2 °F (36.8 °C) 98.3 °F (36.8 °C) 98.8 °F (37.1 °C)   SpO2: 99% 100% 97% 99%   Weight:       Height:            Intake and Output:  Current Shift: No intake/output data recorded. Last three shifts: 10/22 0701 - 10/23 1900  In: 300 [P.O.:300]  Out: 1200 [Urine:1200]    Physical Exam:   Physical Exam  Constitutional:       Appearance: She is ill-appearing. HENT:      Head: Atraumatic. Mouth/Throat:      Mouth: Mucous membranes are dry. Eyes:      General: No scleral icterus. Cardiovascular:      Rate and Rhythm: Normal rate. Pulmonary:      Breath sounds: Normal breath sounds. Abdominal:      Palpations: Abdomen is soft. Skin:     General: Skin is warm. Neurological:      Mental Status: Mental status is at baseline. Motor: Weakness present. Lab/Data Review:  Recent Results (from the past 24 hour(s))   METABOLIC PANEL, BASIC    Collection Time: 10/23/22  7:09 AM   Result Value Ref Range    Sodium 142 136 - 145 mmol/L    Potassium 3.8 3.5 - 5.1 mmol/L    Chloride 109 (H) 97 - 108 mmol/L    CO2 28 21 - 32 mmol/L    Anion gap 5 5 - 15 mmol/L    Glucose 119 (H) 65 - 100 mg/dL    BUN 57 (H) 6 - 20 mg/dL    Creatinine 1.80 (H) 0.55 - 1.02 mg/dL    BUN/Creatinine ratio 32 (H) 12 - 20      eGFR 28 (L) >60 ml/min/1.73m2    Calcium 8.4 (L) 8.5 - 10.1 mg/dL   CBC W/O DIFF    Collection Time: 10/23/22  7:09 AM   Result Value Ref Range    WBC 8.5 3.6 - 11.0 K/uL    RBC 2.85 (L) 3.80 - 5.20 M/uL    HGB 7.7 (L) 11.5 - 16.0 g/dL    HCT 24.1 (L) 35.0 - 47.0 %    MCV 84.6 80.0 - 99.0 FL    MCH 27.0 26.0 - 34.0 PG    MCHC 32.0 30.0 - 36.5 g/dL    RDW 18.2 (H) 11.5 - 14.5 %    PLATELET 564 553 - 043 K/uL    MPV 9.9 8.9 - 12.9 FL    NRBC 0.0 0.0  WBC    ABSOLUTE NRBC 0.00 0.00 - 0.01 K/uL   GLUCOSE, POC    Collection Time: 10/23/22  8:21 AM   Result Value Ref Range    Glucose (POC) 201 (H) 65 - 100 mg/dL    Performed by Jacsimonealin Collar    GLUCOSE, POC    Collection Time: 10/23/22 12:13 PM   Result Value Ref Range    Glucose (POC) 230 (H) 65 - 100 mg/dL    Performed by Eduardaalin Collar    GLUCOSE, POC    Collection Time: 10/23/22  4:27 PM   Result Value Ref Range    Glucose (POC) 77 65 - 100 mg/dL    Performed by Elina Hardy         No orders to display        Assessment:     Active Problems:    Rectal bleeding (10/21/2022)      EGD showed did not show any active bleeding       -s/p packed red blood cells   HB drop slowly  Plan:   Clear liquids  HD  Iron iv  Bowel prep  Npo after prep  Colonoscopy in am , indication/risk/option discussed     Signed By: Gabriel Flower MD     October 23, 2022        Thank you for allowing me to participate in this patients care  Cc Referring Physician   Stanislav Wright NP

## 2022-10-24 NOTE — PROGRESS NOTES
Patient arrived from PACU at approximately 1415. Report was given from PACU RN Zelda Hernandez to Kim Haynes. Vitals were taken and reads as follows: /67 (92), HR 84, O2 97%, and Temp 98.3 (oral). Patient resting comfortably as unit of blood is completing.

## 2022-10-24 NOTE — ANESTHESIA PREPROCEDURE EVALUATION
Relevant Problems   NEUROLOGY   (+) Cerebrovascular accident (CVA) ([facility]   (+) Seizure ([facility]   (+) TIA (transient ischemic attack)      CARDIOVASCULAR   (+) Arteriosclerosis of coronary artery   (+) Essential hypertension   (+) Hypertension   (+) Non-STEMI (non-ST elevated myocardial infarction) ([facility]      RENAL FAILURE   (+) Adenocarcinoma, renal cell (HCC)   (+) Benign hypertensive renal disease   (+) Chronic kidney disease (CKD), stage IV (severe) (HCC)   (+) 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 Neuro/Psych     seizures  CVA  TIA and psychiatric history    Comments: MENTAL RETARDATION. Cardiovascular    Hypertension          CAD, PAD, cardiac stents and hyperlipidemia      Comments:  Hx OF PE.     EKG (10-11-22) Sinus rhythm with Premature atrial complexes   Otherwise normal ECG   When compared with ECG of 21-SEP-2022 12:14,   Premature atrial complexes are now Present    GI/Hepatic/Renal         Renal disease: CRI      Comments: ONLY ONE FUNCTIONING KIDNEY. Dark black stools. Endo/Other    Diabetes  Hypothyroidism  Obesity, arthritis, cancer (RENAL CELL ADENOCARCINOMA. ) and anemia    Comments: GOUT. Other Findings   Comments: Eliquis: Last dose about > week ago. Neck pain.      8/1/22 16:33  INR: 1.9 (H)  Prothrombin time: 22.0 (H)    11/21/20 18:10  NT pro-BNP: 278    10/21/21 19:30  NT pro-BNP: 1,411 (H)    10/22/21 02:09  NT pro-BNP: 1,152 (H)    10/22/21 16:30  NT pro-BNP: 836 (H)    2/20/22 10:37  NT pro-BNP: 1,152 (H)    10/10/22 15:17  NT pro-BNP: 1,585 (H)                     Physical Exam    Airway  Mallampati: I  TM Distance: 4 - 6 cm    Mouth opening: Normal     Cardiovascular    Rhythm: regular  Rate: normal         Dental    Dentition: Edentulous     Pulmonary  Breath sounds clear to auscultation               Abdominal  Abdominal exam normal       Other Findings            Anesthetic Plan    ASA: 3  Anesthesia type: MAC          Induction: Intravenous  Anesthetic plan and risks discussed with: Patient

## 2022-10-24 NOTE — PROGRESS NOTES
Hospitalist Progress Note    Subjective:   Daily Progress Note: 10/24/2022 11:46 AM    Hospital Course:  Joni Bishop is a 65 y.o. female who presents with reports of syncopal episode at home. According to patient, she was seated on the couch watching TV and does not remember the details of what happened . She remembers waking up in an ambulance  Denies chest pain palpitation nausea vomiting prior to this episode. Reports having epigastric abdominal pain for the last few days with dark tarry stools. Work-up in the ED shows a hemoglobin of 6.3 with a hematocrit of 20.1. She was last admitted in the hospital October 10 through October 13 for similar episode of syncope with bleeding. EGD done October 12 did not find any active upper GI bleed. She was discharged home on oral Protonix twice daily and advised to stop taking apixaban  S/p EGD on 10/21/22 showed gastritis without evidence of bleeding. S/p 2 unit of PRBC transfusion on 10/21/22. Plan for colonoscopy today. Subjective:  Hb slightly uptrended from 7.7 to 7.9 today  Reports that she is fully prepped for colonoscopy today  Reports wheezing.      Current Facility-Administered Medications   Medication Dose Route Frequency    hydrALAZINE (APRESOLINE) tablet 25 mg  25 mg Oral TID PRN    guaiFENesin (ROBITUSSIN) 100 mg/5 mL oral liquid 100 mg  100 mg Oral Q4H PRN    sodium chloride (NS) flush 5-40 mL  5-40 mL IntraVENous Q8H    sodium chloride (NS) flush 5-40 mL  5-40 mL IntraVENous PRN    0.9% sodium chloride infusion 250 mL  250 mL IntraVENous PRN    pantoprazole (PROTONIX) 40 mg in 0.9% sodium chloride 10 mL injection  40 mg IntraVENous Q12H    sodium chloride (NS) flush 5-40 mL  5-40 mL IntraVENous Q8H    sodium chloride (NS) flush 5-40 mL  5-40 mL IntraVENous PRN    acetaminophen (TYLENOL) tablet 650 mg  650 mg Oral Q6H PRN    Or    acetaminophen (TYLENOL) suppository 650 mg  650 mg Rectal Q6H PRN    polyethylene glycol (MIRALAX) packet 17 g  17 g Oral DAILY PRN    ondansetron (ZOFRAN ODT) tablet 4 mg  4 mg Oral Q8H PRN    Or    ondansetron (ZOFRAN) injection 4 mg  4 mg IntraVENous Q6H PRN    carvediloL (COREG) tablet 6.25 mg  6.25 mg Oral BID WITH MEALS    donepeziL (ARICEPT) tablet 10 mg  10 mg Oral QHS    gabapentin (NEURONTIN) capsule 300 mg  300 mg Oral BID    latanoprost (XALATAN) 0.005 % ophthalmic solution 1 Drop  1 Drop Both Eyes QHS    melatonin tablet 5 mg  5 mg Oral QHS    venlafaxine-SR (EFFEXOR-XR) capsule 75 mg  75 mg Oral DAILY    insulin lispro (HUMALOG) injection   SubCUTAneous AC&HS        Review of Systems  Constitutional: No fevers, No chills, No sweats, No fatigue, No Weakness  Eyes: No redness  Ears, nose, mouth, throat, and face: No nasal congestion, No sore throat, No voice change  Respiratory: No Shortness of Breath, No cough, has wheezing  Cardiovascular: No chest pain, No palpitations, No extremity edema  Gastrointestinal: No nausea, No vomiting, No diarrhea, No abdominal pain  Genitourinary: No frequency, has dysuria, No hematuria  Integument/breast: No skin lesion(s)   Neurological: No Confusion, No headaches, No dizziness      Objective:     Visit Vitals  BP (!) 179/90   Pulse 77   Temp 98.3 °F (36.8 °C)   Resp 16   Ht 5' 4\" (1.626 m)   Wt 86.2 kg (190 lb)   SpO2 100%   BMI 32.61 kg/m²      O2 Device: None (Room air)    Temp (24hrs), Av.5 °F (36.9 °C), Min:98.2 °F (36.8 °C), Max:98.8 °F (37.1 °C)      No intake/output data recorded. 10/22 1901 - 10/24 0700  In: 300 [P.O.:300]  Out: 1200 [Urine:1200]    PHYSICAL EXAM:  Constitutional: No acute distress  Skin: Extremities and face reveal no rashes. HEENT: Sclerae anicteric. Extra-occular muscles are intact. No oral ulcers. The neck is supple and no masses. Cardiovascular: Regular rate and rhythm. Respiratory:  B/L wheezing  GI: Abdomen nondistended, soft, and nontender. Normal active bowel sounds. Musculoskeletal: No pitting edema of the lower legs.  Able to move all ext  Neurological:  Patient is alert and oriented.  Cranial nerves II-XII grossly intact  Psychiatric: Mood appears appropriate       Data Review    Recent Results (from the past 24 hour(s))   GLUCOSE, POC    Collection Time: 10/23/22 12:13 PM   Result Value Ref Range    Glucose (POC) 230 (H) 65 - 100 mg/dL    Performed by Maeve Bryan    GLUCOSE, POC    Collection Time: 10/23/22  4:27 PM   Result Value Ref Range    Glucose (POC) 77 65 - 100 mg/dL    Performed by Sophie Delong    GLUCOSE, POC    Collection Time: 10/23/22 10:00 PM   Result Value Ref Range    Glucose (POC) 232 (H) 65 - 100 mg/dL    Performed by Rock Ontiveros    CBC W/O DIFF    Collection Time: 10/24/22  5:52 AM   Result Value Ref Range    WBC 8.0 3.6 - 11.0 K/uL    RBC 2.96 (L) 3.80 - 5.20 M/uL    HGB 7.9 (L) 11.5 - 16.0 g/dL    HCT 25.9 (L) 35.0 - 47.0 %    MCV 87.5 80.0 - 99.0 FL    MCH 26.7 26.0 - 34.0 PG    MCHC 30.5 30.0 - 36.5 g/dL    RDW 18.8 (H) 11.5 - 14.5 %    PLATELET 680 491 - 601 K/uL    MPV 11.0 8.9 - 12.9 FL    NRBC 0.0 0.0  WBC    ABSOLUTE NRBC 0.00 0.00 - 6.54 K/uL   METABOLIC PANEL, BASIC    Collection Time: 10/24/22  5:52 AM   Result Value Ref Range    Sodium 143 136 - 145 mmol/L    Potassium 4.4 3.5 - 5.1 mmol/L    Chloride 107 97 - 108 mmol/L    CO2 32 21 - 32 mmol/L    Anion gap 4 (L) 5 - 15 mmol/L    Glucose 113 (H) 65 - 100 mg/dL    BUN 50 (H) 6 - 20 mg/dL    Creatinine 1.68 (H) 0.55 - 1.02 mg/dL    BUN/Creatinine ratio 30 (H) 12 - 20      eGFR 31 (L) >60 ml/min/1.73m2    Calcium 8.6 8.5 - 10.1 mg/dL   GLUCOSE, POC    Collection Time: 10/24/22 10:12 AM   Result Value Ref Range    Glucose (POC) 172 (H) 65 - 100 mg/dL    Performed by Cayetano Hale    GLUCOSE, POC    Collection Time: 10/24/22 11:33 AM   Result Value Ref Range    Glucose (POC) 116 (H) 65 - 100 mg/dL    Performed by Cayetano Hale            Assessment / Plan:  Acute blood loss anemia:  S/p 2 unit of PRBC transfusion on 10/21/22  Hb stable after transfusion  S/p EGD on 10/21/22- gastritis without active bleeding  Appreciate GI consult  Plan for colonoscopy today  Will monitor Hb  Continue PPI    Acute GI bleed  As above    Wheezing: Will give nebulization. ALEX on CKD  Creatinine improving    Acute UTI:  Completed 3 doses of insulin    Type II DM  Continue lispro sliding scale    Syncope:  Likely s/s acute blood loss anemia  Has cardiac monitor placed last Monday. Hypertension:  Continue coreg  Start hydralazine 25 mg po q8h prn for SBP>160mm Hg      30.0 - 39.9 Obese / Body mass index is 32.61 kg/m². Code status: Full  Prophylaxis: SCD's  Recommended Disposition: Home w/Family(barrier- colonoscopy today) discharge tomorrow. time spent 35 minutes involving direct patient care as well as reviewing patient's labs and coordination of care with nursing staff     Care Plan discussed with: Patient/Family/RN/Case Management        Total time spent with patient: 35 minutes.

## 2022-10-24 NOTE — PROGRESS NOTES
Problem: Falls - Risk of  Goal: *Absence of Falls  Description: Document Tino Murphy Fall Risk and appropriate interventions in the flowsheet. Outcome: Progressing Towards Goal  Note: Fall Risk Interventions:  Mobility Interventions: Bed/chair exit alarm         Medication Interventions: Bed/chair exit alarm    Elimination Interventions: Bed/chair exit alarm, Call light in reach, Patient to call for help with toileting needs    History of Falls Interventions: Bed/chair exit alarm         Problem: Patient Education: Go to Patient Education Activity  Goal: Patient/Family Education  Outcome: Progressing Towards Goal     Problem: Pressure Injury - Risk of  Goal: *Prevention of pressure injury  Description: Document Kirit Scale and appropriate interventions in the flowsheet.   Outcome: Progressing Towards Goal  Note: Pressure Injury Interventions:  Sensory Interventions: Assess changes in LOC, Keep linens dry and wrinkle-free, Minimize linen layers    Moisture Interventions: Absorbent underpads, Apply protective barrier, creams and emollients    Activity Interventions: Increase time out of bed    Mobility Interventions: HOB 30 degrees or less    Nutrition Interventions: Document food/fluid/supplement intake                     Problem: Patient Education: Go to Patient Education Activity  Goal: Patient/Family Education  Outcome: Progressing Towards Goal     Problem: Pain  Goal: *Control of Pain  Outcome: Progressing Towards Goal  Goal: *PALLIATIVE CARE:  Alleviation of Pain  Outcome: Progressing Towards Goal     Problem: Patient Education: Go to Patient Education Activity  Goal: Patient/Family Education  Outcome: Progressing Towards Goal

## 2022-10-25 ENCOUNTER — ANESTHESIA EVENT (OUTPATIENT)
Dept: ENDOSCOPY | Age: 77
DRG: 378 | End: 2022-10-25
Payer: MEDICARE

## 2022-10-25 LAB
ANION GAP SERPL CALC-SCNC: 4 MMOL/L (ref 5–15)
BUN SERPL-MCNC: 38 MG/DL (ref 6–20)
BUN/CREAT SERPL: 27 (ref 12–20)
CA-I BLD-MCNC: 9.4 MG/DL (ref 8.5–10.1)
CHLORIDE SERPL-SCNC: 107 MMOL/L (ref 97–108)
CO2 SERPL-SCNC: 30 MMOL/L (ref 21–32)
CREAT SERPL-MCNC: 1.42 MG/DL (ref 0.55–1.02)
ERYTHROCYTE [DISTWIDTH] IN BLOOD BY AUTOMATED COUNT: 19.3 % (ref 11.5–14.5)
GLUCOSE BLD STRIP.AUTO-MCNC: 100 MG/DL (ref 65–100)
GLUCOSE BLD STRIP.AUTO-MCNC: 134 MG/DL (ref 65–100)
GLUCOSE BLD STRIP.AUTO-MCNC: 167 MG/DL (ref 65–100)
GLUCOSE BLD STRIP.AUTO-MCNC: 205 MG/DL (ref 65–100)
GLUCOSE SERPL-MCNC: 119 MG/DL (ref 65–100)
HCT VFR BLD AUTO: 26.4 % (ref 35–47)
HGB BLD-MCNC: 8.2 G/DL (ref 11.5–16)
MCH RBC QN AUTO: 27.1 PG (ref 26–34)
MCHC RBC AUTO-ENTMCNC: 31.1 G/DL (ref 30–36.5)
MCV RBC AUTO: 87.1 FL (ref 80–99)
NRBC # BLD: 0 K/UL (ref 0–0.01)
NRBC BLD-RTO: 0 PER 100 WBC
PERFORMED BY, TECHID: ABNORMAL
PERFORMED BY, TECHID: NORMAL
PLATELET # BLD AUTO: 199 K/UL (ref 150–400)
PMV BLD AUTO: 10.6 FL (ref 8.9–12.9)
POTASSIUM SERPL-SCNC: 4.4 MMOL/L (ref 3.5–5.1)
RBC # BLD AUTO: 3.03 M/UL (ref 3.8–5.2)
SODIUM SERPL-SCNC: 141 MMOL/L (ref 136–145)
WBC # BLD AUTO: 9.7 K/UL (ref 3.6–11)

## 2022-10-25 PROCEDURE — 85027 COMPLETE CBC AUTOMATED: CPT

## 2022-10-25 PROCEDURE — 76040000019: Performed by: INTERNAL MEDICINE

## 2022-10-25 PROCEDURE — 74011000250 HC RX REV CODE- 250: Performed by: INTERNAL MEDICINE

## 2022-10-25 PROCEDURE — 0DJ07ZZ INSPECTION OF UPPER INTESTINAL TRACT, VIA NATURAL OR ARTIFICIAL OPENING: ICD-10-PCS | Performed by: INTERNAL MEDICINE

## 2022-10-25 PROCEDURE — 65270000029 HC RM PRIVATE

## 2022-10-25 PROCEDURE — 36415 COLL VENOUS BLD VENIPUNCTURE: CPT

## 2022-10-25 PROCEDURE — 74011000250 HC RX REV CODE- 250: Performed by: EMERGENCY MEDICINE

## 2022-10-25 PROCEDURE — 80048 BASIC METABOLIC PNL TOTAL CA: CPT

## 2022-10-25 PROCEDURE — 74011250636 HC RX REV CODE- 250/636: Performed by: INTERNAL MEDICINE

## 2022-10-25 PROCEDURE — 74011250637 HC RX REV CODE- 250/637: Performed by: INTERNAL MEDICINE

## 2022-10-25 PROCEDURE — 74011636637 HC RX REV CODE- 636/637: Performed by: INTERNAL MEDICINE

## 2022-10-25 PROCEDURE — 77030041695 HC CAPSULE ENDO OCOA -E: Performed by: INTERNAL MEDICINE

## 2022-10-25 PROCEDURE — C9113 INJ PANTOPRAZOLE SODIUM, VIA: HCPCS | Performed by: INTERNAL MEDICINE

## 2022-10-25 PROCEDURE — 82962 GLUCOSE BLOOD TEST: CPT

## 2022-10-25 RX ADMIN — SODIUM CHLORIDE, PRESERVATIVE FREE 10 ML: 5 INJECTION INTRAVENOUS at 18:58

## 2022-10-25 RX ADMIN — MELATONIN TAB 5 MG 5 MG: 5 TAB at 20:43

## 2022-10-25 RX ADMIN — SODIUM CHLORIDE, PRESERVATIVE FREE 40 MG: 5 INJECTION INTRAVENOUS at 20:43

## 2022-10-25 RX ADMIN — SODIUM CHLORIDE, PRESERVATIVE FREE 10 ML: 5 INJECTION INTRAVENOUS at 18:59

## 2022-10-25 RX ADMIN — CARVEDILOL 6.25 MG: 3.12 TABLET, FILM COATED ORAL at 18:56

## 2022-10-25 RX ADMIN — GABAPENTIN 300 MG: 300 CAPSULE ORAL at 20:43

## 2022-10-25 RX ADMIN — SODIUM CHLORIDE, PRESERVATIVE FREE 10 ML: 5 INJECTION INTRAVENOUS at 20:46

## 2022-10-25 RX ADMIN — SODIUM CHLORIDE, PRESERVATIVE FREE 40 MG: 5 INJECTION INTRAVENOUS at 08:56

## 2022-10-25 RX ADMIN — DONEPEZIL HYDROCHLORIDE 10 MG: 5 TABLET, FILM COATED ORAL at 20:43

## 2022-10-25 RX ADMIN — ACETAMINOPHEN 650 MG: 325 TABLET, FILM COATED ORAL at 20:43

## 2022-10-25 RX ADMIN — SODIUM CHLORIDE, PRESERVATIVE FREE 10 ML: 5 INJECTION INTRAVENOUS at 20:49

## 2022-10-25 RX ADMIN — INSULIN LISPRO 3 UNITS: 100 INJECTION, SOLUTION INTRAVENOUS; SUBCUTANEOUS at 12:34

## 2022-10-25 RX ADMIN — GABAPENTIN 300 MG: 300 CAPSULE ORAL at 08:56

## 2022-10-25 RX ADMIN — VENLAFAXINE HYDROCHLORIDE 75 MG: 75 CAPSULE, EXTENDED RELEASE ORAL at 08:56

## 2022-10-25 RX ADMIN — LATANOPROST 1 DROP: 50 SOLUTION OPHTHALMIC at 20:46

## 2022-10-25 RX ADMIN — CARVEDILOL 6.25 MG: 3.12 TABLET, FILM COATED ORAL at 08:56

## 2022-10-25 NOTE — PROGRESS NOTES
Problem: Falls - Risk of  Goal: *Absence of Falls  Description: Document Monse Embs Fall Risk and appropriate interventions in the flowsheet.   Outcome: Progressing Towards Goal  Note: Fall Risk Interventions:  Mobility Interventions: Bed/chair exit alarm         Medication Interventions: Bed/chair exit alarm    Elimination Interventions: Call light in reach    History of Falls Interventions: Bed/chair exit alarm

## 2022-10-25 NOTE — BRIEF OP NOTE
Brief Postoperative Note  See full report for detail information with the photos  Patient: Horace Foreman  YOB: 1945  MRN: [id number]    Date of Procedure: 10/25/2022     Pre-Op Diagnosis: Anemia, unspecified type [D64.9]    Post-Op Diagnosis:   Bleeding from the duodenal and jejunal,    Procedure(s):  Small bowel  CAPSULE ENDO    Surgeon(s):  Elier Rodas MD    Surgical Assistant: None    Anesthesia: None     Estimated Blood Loss (mL): Minimal    Complications: None    Specimens: * No specimens in log *     Implants: * No implants in log *    Drains:   External Urinary Catheter 10/21/22 (Active)   Site Assessment Clean, dry, & intact 10/24/22 2140   Repositioned Yes 10/23/22 0153   Perineal Care Yes 10/23/22 0153   Wick Changed Yes 10/23/22 0153   Suction Canister/Tubing Changed No 10/23/22 0153   Urine Output (mL) 1000 ml 10/23/22 0153       Findings: Blood clots loaded at the duodenal and made of proximal jejunum, indicating active bleeding, unknown etiology, no AVM, mass, noted,    We will keep the patient in clear liquid  A push small bowel endoscope will be performed tomorrow to examine  small bowel, to treat bleeding site.     Electronically Signed by Diogenes Solo MD on 10/25/2022 at 4:36 PM

## 2022-10-25 NOTE — PROGRESS NOTES
CM reviewed clinical chart. Patient's current disposition is home/self. CM team will continue to follow patient's hospitalization as needs arise.

## 2022-10-25 NOTE — PROGRESS NOTES
Hospitalist Progress Note    Subjective:   Daily Progress Note: 10/25/2022 11:46 AM    Hospital Course:  Benji Jovel is a 65 y.o. female who presents with reports of syncopal episode at home. According to patient, she was seated on the couch watching TV and does not remember the details of what happened . She remembers waking up in an ambulance  Denies chest pain palpitation nausea vomiting prior to this episode. Reports having epigastric abdominal pain for the last few days with dark tarry stools. Work-up in the ED shows a hemoglobin of 6.3 with a hematocrit of 20.1. She was last admitted in the hospital October 10 through October 13 for similar episode of syncope with bleeding. EGD done October 12 did not find any active upper GI bleed. She was discharged home on oral Protonix twice daily and advised to stop taking apixaban  S/p EGD on 10/21/22 showed gastritis without evidence of bleeding. S/p 2 unit of PRBC transfusion on 10/21/22. Colonoscopy on 10/24/22 showed mild diverticulosis, polyp and hemorrhoids. Subjective:  Patient is getting capsule endoscopy today.      Current Facility-Administered Medications   Medication Dose Route Frequency    hydrALAZINE (APRESOLINE) tablet 25 mg  25 mg Oral TID PRN    guaiFENesin (ROBITUSSIN) 100 mg/5 mL oral liquid 100 mg  100 mg Oral Q4H PRN    sodium chloride (NS) flush 5-40 mL  5-40 mL IntraVENous Q8H    sodium chloride (NS) flush 5-40 mL  5-40 mL IntraVENous PRN    0.9% sodium chloride infusion 250 mL  250 mL IntraVENous PRN    pantoprazole (PROTONIX) 40 mg in 0.9% sodium chloride 10 mL injection  40 mg IntraVENous Q12H    sodium chloride (NS) flush 5-40 mL  5-40 mL IntraVENous Q8H    sodium chloride (NS) flush 5-40 mL  5-40 mL IntraVENous PRN    acetaminophen (TYLENOL) tablet 650 mg  650 mg Oral Q6H PRN    Or    acetaminophen (TYLENOL) suppository 650 mg  650 mg Rectal Q6H PRN    polyethylene glycol (MIRALAX) packet 17 g  17 g Oral DAILY PRN    ondansetron (ZOFRAN ODT) tablet 4 mg  4 mg Oral Q8H PRN    Or    ondansetron (ZOFRAN) injection 4 mg  4 mg IntraVENous Q6H PRN    carvediloL (COREG) tablet 6.25 mg  6.25 mg Oral BID WITH MEALS    donepeziL (ARICEPT) tablet 10 mg  10 mg Oral QHS    gabapentin (NEURONTIN) capsule 300 mg  300 mg Oral BID    latanoprost (XALATAN) 0.005 % ophthalmic solution 1 Drop  1 Drop Both Eyes QHS    melatonin tablet 5 mg  5 mg Oral QHS    venlafaxine-SR (EFFEXOR-XR) capsule 75 mg  75 mg Oral DAILY    insulin lispro (HUMALOG) injection   SubCUTAneous AC&HS        Review of Systems  Constitutional: No fevers, No chills, No sweats, No fatigue, No Weakness  Eyes: No redness  Ears, nose, mouth, throat, and face: No nasal congestion, No sore throat, No voice change  Respiratory: No Shortness of Breath, No cough, has wheezing  Cardiovascular: No chest pain, No palpitations, No extremity edema  Gastrointestinal: No nausea, No vomiting, No diarrhea, No abdominal pain  Genitourinary: No frequency, has dysuria, No hematuria  Integument/breast: No skin lesion(s)   Neurological: No Confusion, No headaches, No dizziness      Objective:     Visit Vitals  BP (!) 155/59 (BP 1 Location: Right upper arm, BP Patient Position: At rest)   Pulse 68   Temp 98.7 °F (37.1 °C)   Resp 18   Ht 5' 4\" (1.626 m)   Wt 86.2 kg (190 lb)   SpO2 99%   BMI 32.61 kg/m²      O2 Device: None (Room air)    Temp (24hrs), Av.7 °F (37.1 °C), Min:98.3 °F (36.8 °C), Max:99.3 °F (37.4 °C)      No intake/output data recorded. 10/23 1901 - 10/25 0700  In: -   Out: 900 [Urine:900]    PHYSICAL EXAM:  Constitutional: No acute distress  Skin: Extremities and face reveal no rashes. HEENT: Sclerae anicteric. Extra-occular muscles are intact. No oral ulcers. The neck is supple and no masses. Cardiovascular: Regular rate and rhythm. Respiratory:  B/L clear breath sounds. GI: Abdomen nondistended, soft, and nontender. Normal active bowel sounds.   Musculoskeletal: No pitting edema of the lower legs. Able to move all ext  Neurological:  Patient is alert and oriented.  Cranial nerves II-XII grossly intact  Psychiatric: Mood appears appropriate       Data Review    Recent Results (from the past 24 hour(s))   GLUCOSE, POC    Collection Time: 10/24/22  4:14 PM   Result Value Ref Range    Glucose (POC) 122 (H) 65 - 100 mg/dL    Performed by Yvonne Warner    GLUCOSE, POC    Collection Time: 10/24/22  9:28 PM   Result Value Ref Range    Glucose (POC) 167 (H) 65 - 100 mg/dL    Performed by Monet Vásquez    CBC W/O DIFF    Collection Time: 10/25/22  7:46 AM   Result Value Ref Range    WBC 9.7 3.6 - 11.0 K/uL    RBC 3.03 (L) 3.80 - 5.20 M/uL    HGB 8.2 (L) 11.5 - 16.0 g/dL    HCT 26.4 (L) 35.0 - 47.0 %    MCV 87.1 80.0 - 99.0 FL    MCH 27.1 26.0 - 34.0 PG    MCHC 31.1 30.0 - 36.5 g/dL    RDW 19.3 (H) 11.5 - 14.5 %    PLATELET 845 660 - 984 K/uL    MPV 10.6 8.9 - 12.9 FL    NRBC 0.0 0.0  WBC    ABSOLUTE NRBC 0.00 0.00 - 8.40 K/uL   METABOLIC PANEL, BASIC    Collection Time: 10/25/22  7:46 AM   Result Value Ref Range    Sodium 141 136 - 145 mmol/L    Potassium 4.4 3.5 - 5.1 mmol/L    Chloride 107 97 - 108 mmol/L    CO2 30 21 - 32 mmol/L    Anion gap 4 (L) 5 - 15 mmol/L    Glucose 119 (H) 65 - 100 mg/dL    BUN 38 (H) 6 - 20 mg/dL    Creatinine 1.42 (H) 0.55 - 1.02 mg/dL    BUN/Creatinine ratio 27 (H) 12 - 20      eGFR 38 (L) >60 ml/min/1.73m2    Calcium 9.4 8.5 - 10.1 mg/dL   GLUCOSE, POC    Collection Time: 10/25/22  8:07 AM   Result Value Ref Range    Glucose (POC) 134 (H) 65 - 100 mg/dL    Performed by Monet Vásquez    GLUCOSE, POC    Collection Time: 10/25/22 11:35 AM   Result Value Ref Range    Glucose (POC) 205 (H) 65 - 100 mg/dL    Performed by Hilton Gutierrez / Plan:  Acute blood loss anemia:  S/p 2 unit of PRBC transfusion on 10/21/22  Hb stable after transfusion  S/p EGD on 10/21/22- gastritis without active bleeding  Appreciate GI consult  S/p colonoscopy on 10/24- hemorrhoids, mild diverticulosis and polyp. Is getting capsule endoscopy today. Will monitor Hb  Continue PPI    Acute GI bleed  As above        ALEX on CKD  Creatinine improving    Acute UTI:  Completed 3 doses of iCeftriaxone    Type II DM  Continue lispro sliding scale    Syncope:  Likely s/s acute blood loss anemia  Has cardiac monitor placed last Monday. Hypertension:  Continue coreg  Continue hydralazine 25 mg po q8h prn for SBP>160mm Hg      30.0 - 39.9 Obese / Body mass index is 32.61 kg/m². Code status: Full  Prophylaxis: SCD's  Recommended Disposition: Home w/Family(barrier- colonoscopy today) discharge tomorrow. time spent 35 minutes involving direct patient care as well as reviewing patient's labs and coordination of care with nursing staff     Care Plan discussed with: Patient/Family/RN/Case Management        Total time spent with patient: 35 minutes.

## 2022-10-25 NOTE — PROGRESS NOTES
Problem: Falls - Risk of  Goal: *Absence of Falls  Description: Document Reta Gray Fall Risk and appropriate interventions in the flowsheet. Outcome: Progressing Towards Goal  Note: Fall Risk Interventions:  Mobility Interventions: Bed/chair exit alarm         Medication Interventions: Bed/chair exit alarm    Elimination Interventions: Call light in reach, Bed/chair exit alarm    History of Falls Interventions: Bed/chair exit alarm         Problem: Patient Education: Go to Patient Education Activity  Goal: Patient/Family Education  Outcome: Progressing Towards Goal     Problem: Pressure Injury - Risk of  Goal: *Prevention of pressure injury  Description: Document Kirit Scale and appropriate interventions in the flowsheet.   Outcome: Progressing Towards Goal  Note: Pressure Injury Interventions:  Sensory Interventions: Assess changes in LOC    Moisture Interventions: Absorbent underpads    Activity Interventions: Increase time out of bed, PT/OT evaluation    Mobility Interventions: HOB 30 degrees or less    Nutrition Interventions: Document food/fluid/supplement intake                     Problem: Patient Education: Go to Patient Education Activity  Goal: Patient/Family Education  Outcome: Progressing Towards Goal     Problem: Pain  Goal: *Control of Pain  Outcome: Progressing Towards Goal  Goal: *PALLIATIVE CARE:  Alleviation of Pain  Outcome: Progressing Towards Goal     Problem: Patient Education: Go to Patient Education Activity  Goal: Patient/Family Education  Outcome: Progressing Towards Goal

## 2022-10-25 NOTE — ANESTHESIA PREPROCEDURE EVALUATION
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 Neuro/Psych     seizures  CVA  TIA and psychiatric history    Comments: MENTAL RETARDATION. Cardiovascular    Hypertension          CAD, PAD, cardiac stents and hyperlipidemia      Comments:  Hx OF PE.     EKG (10-11-22) Sinus rhythm with Premature atrial complexes   Otherwise normal ECG   When compared with ECG of 21-SEP-2022 12:14,   Premature atrial complexes are now Present    GI/Hepatic/Renal         Renal disease: CRI      Comments: ONLY ONE FUNCTIONING KIDNEY. Dark black stools. Endo/Other    Diabetes  Hypothyroidism  Obesity, arthritis, cancer (RENAL CELL ADENOCARCINOMA. ) and anemia    Comments: GOUT. Other Findings   Comments: Eliquis: Last dose about > week ago. Neck pain.      8/1/22 16:33  INR: 1.9 (H)  Prothrombin time: 22.0 (H)    11/21/20 18:10  NT pro-BNP: 278                               Physical Exam    Airway  Mallampati: I  TM Distance: 4 - 6 cm    Mouth opening: Normal     Cardiovascular    Rhythm: regular  Rate: normal         Dental    Dentition: Edentulous     Pulmonary  Breath sounds clear to auscultation               Abdominal  Abdominal exam normal       Other Findings            Anesthetic Plan    ASA: 3  Anesthesia type: MAC          Induction: Intravenous  Anesthetic plan and risks discussed with: Patient

## 2022-10-26 ENCOUNTER — ANESTHESIA (OUTPATIENT)
Dept: ENDOSCOPY | Age: 77
DRG: 378 | End: 2022-10-26
Payer: MEDICARE

## 2022-10-26 ENCOUNTER — APPOINTMENT (OUTPATIENT)
Dept: ENDOSCOPY | Age: 77
DRG: 378 | End: 2022-10-26
Attending: INTERNAL MEDICINE
Payer: MEDICARE

## 2022-10-26 LAB
ANION GAP SERPL CALC-SCNC: 4 MMOL/L (ref 5–15)
BASOPHILS # BLD: 0 K/UL (ref 0–0.1)
BASOPHILS NFR BLD: 0 % (ref 0–1)
BUN SERPL-MCNC: 38 MG/DL (ref 6–20)
BUN/CREAT SERPL: 27 (ref 12–20)
CA-I BLD-MCNC: 9.2 MG/DL (ref 8.5–10.1)
CHLORIDE SERPL-SCNC: 108 MMOL/L (ref 97–108)
CO2 SERPL-SCNC: 28 MMOL/L (ref 21–32)
CREAT SERPL-MCNC: 1.41 MG/DL (ref 0.55–1.02)
DIFFERENTIAL METHOD BLD: ABNORMAL
EOSINOPHIL # BLD: 0.2 K/UL (ref 0–0.4)
EOSINOPHIL NFR BLD: 2 % (ref 0–7)
ERYTHROCYTE [DISTWIDTH] IN BLOOD BY AUTOMATED COUNT: 19.4 % (ref 11.5–14.5)
ERYTHROCYTE [DISTWIDTH] IN BLOOD BY AUTOMATED COUNT: 19.5 % (ref 11.5–14.5)
GLUCOSE BLD STRIP.AUTO-MCNC: 106 MG/DL (ref 65–100)
GLUCOSE BLD STRIP.AUTO-MCNC: 119 MG/DL (ref 65–100)
GLUCOSE BLD STRIP.AUTO-MCNC: 138 MG/DL (ref 65–100)
GLUCOSE BLD STRIP.AUTO-MCNC: 156 MG/DL (ref 65–100)
GLUCOSE BLD STRIP.AUTO-MCNC: 183 MG/DL (ref 65–100)
GLUCOSE SERPL-MCNC: 135 MG/DL (ref 65–100)
HCT VFR BLD AUTO: 21.4 % (ref 35–47)
HCT VFR BLD AUTO: 22.4 % (ref 35–47)
HGB BLD-MCNC: 6.7 G/DL (ref 11.5–16)
HGB BLD-MCNC: 7.1 G/DL (ref 11.5–16)
IMM GRANULOCYTES # BLD AUTO: 0 K/UL (ref 0–0.04)
IMM GRANULOCYTES NFR BLD AUTO: 0 % (ref 0–0.5)
LYMPHOCYTES # BLD: 1.3 K/UL (ref 0.8–3.5)
LYMPHOCYTES NFR BLD: 13 % (ref 12–49)
MCH RBC QN AUTO: 27.5 PG (ref 26–34)
MCH RBC QN AUTO: 27.5 PG (ref 26–34)
MCHC RBC AUTO-ENTMCNC: 31.3 G/DL (ref 30–36.5)
MCHC RBC AUTO-ENTMCNC: 31.7 G/DL (ref 30–36.5)
MCV RBC AUTO: 86.8 FL (ref 80–99)
MCV RBC AUTO: 87.7 FL (ref 80–99)
MONOCYTES # BLD: 0.9 K/UL (ref 0–1)
MONOCYTES NFR BLD: 9 % (ref 5–13)
NEUTS SEG # BLD: 7.4 K/UL (ref 1.8–8)
NEUTS SEG NFR BLD: 76 % (ref 32–75)
NRBC # BLD: 0 K/UL (ref 0–0.01)
NRBC # BLD: 0 K/UL (ref 0–0.01)
NRBC BLD-RTO: 0 PER 100 WBC
NRBC BLD-RTO: 0 PER 100 WBC
PERFORMED BY, TECHID: ABNORMAL
PLATELET # BLD AUTO: 151 K/UL (ref 150–400)
PLATELET # BLD AUTO: 174 K/UL (ref 150–400)
PMV BLD AUTO: 10.1 FL (ref 8.9–12.9)
PMV BLD AUTO: 10.5 FL (ref 8.9–12.9)
POTASSIUM SERPL-SCNC: 4.1 MMOL/L (ref 3.5–5.1)
RBC # BLD AUTO: 2.44 M/UL (ref 3.8–5.2)
RBC # BLD AUTO: 2.58 M/UL (ref 3.8–5.2)
SODIUM SERPL-SCNC: 140 MMOL/L (ref 136–145)
WBC # BLD AUTO: 10.2 K/UL (ref 3.6–11)
WBC # BLD AUTO: 9.8 K/UL (ref 3.6–11)

## 2022-10-26 PROCEDURE — 76060000032 HC ANESTHESIA 0.5 TO 1 HR: Performed by: INTERNAL MEDICINE

## 2022-10-26 PROCEDURE — P9016 RBC LEUKOCYTES REDUCED: HCPCS

## 2022-10-26 PROCEDURE — 74011000250 HC RX REV CODE- 250: Performed by: INTERNAL MEDICINE

## 2022-10-26 PROCEDURE — 85025 COMPLETE CBC W/AUTO DIFF WBC: CPT

## 2022-10-26 PROCEDURE — 36430 TRANSFUSION BLD/BLD COMPNT: CPT

## 2022-10-26 PROCEDURE — 74011250636 HC RX REV CODE- 250/636: Performed by: INTERNAL MEDICINE

## 2022-10-26 PROCEDURE — 30233N1 TRANSFUSION OF NONAUTOLOGOUS RED BLOOD CELLS INTO PERIPHERAL VEIN, PERCUTANEOUS APPROACH: ICD-10-PCS | Performed by: INTERNAL MEDICINE

## 2022-10-26 PROCEDURE — 74011000250 HC RX REV CODE- 250: Performed by: REGISTERED NURSE

## 2022-10-26 PROCEDURE — 85027 COMPLETE CBC AUTOMATED: CPT

## 2022-10-26 PROCEDURE — 0DJ08ZZ INSPECTION OF UPPER INTESTINAL TRACT, VIA NATURAL OR ARTIFICIAL OPENING ENDOSCOPIC: ICD-10-PCS | Performed by: INTERNAL MEDICINE

## 2022-10-26 PROCEDURE — 86900 BLOOD TYPING SEROLOGIC ABO: CPT

## 2022-10-26 PROCEDURE — 74011000250 HC RX REV CODE- 250: Performed by: EMERGENCY MEDICINE

## 2022-10-26 PROCEDURE — 36415 COLL VENOUS BLD VENIPUNCTURE: CPT

## 2022-10-26 PROCEDURE — 65270000029 HC RM PRIVATE

## 2022-10-26 PROCEDURE — 82962 GLUCOSE BLOOD TEST: CPT

## 2022-10-26 PROCEDURE — 74011636637 HC RX REV CODE- 636/637: Performed by: INTERNAL MEDICINE

## 2022-10-26 PROCEDURE — 76040000007: Performed by: INTERNAL MEDICINE

## 2022-10-26 PROCEDURE — 77030003657 HC NDL SCLER BSC -B: Performed by: INTERNAL MEDICINE

## 2022-10-26 PROCEDURE — 74011250637 HC RX REV CODE- 250/637: Performed by: INTERNAL MEDICINE

## 2022-10-26 PROCEDURE — 2709999900 HC NON-CHARGEABLE SUPPLY: Performed by: INTERNAL MEDICINE

## 2022-10-26 PROCEDURE — 74011250636 HC RX REV CODE- 250/636: Performed by: REGISTERED NURSE

## 2022-10-26 PROCEDURE — C9113 INJ PANTOPRAZOLE SODIUM, VIA: HCPCS | Performed by: INTERNAL MEDICINE

## 2022-10-26 PROCEDURE — 86923 COMPATIBILITY TEST ELECTRIC: CPT

## 2022-10-26 PROCEDURE — 80048 BASIC METABOLIC PNL TOTAL CA: CPT

## 2022-10-26 RX ORDER — NORETHINDRONE AND ETHINYL ESTRADIOL 0.5-0.035
KIT ORAL
Status: DISPENSED
Start: 2022-10-26 | End: 2022-10-27

## 2022-10-26 RX ORDER — PANTOPRAZOLE SODIUM 40 MG/1
40 TABLET, DELAYED RELEASE ORAL
Status: DISCONTINUED | OUTPATIENT
Start: 2022-10-27 | End: 2022-11-03 | Stop reason: HOSPADM

## 2022-10-26 RX ORDER — SODIUM CHLORIDE 9 MG/ML
INJECTION, SOLUTION INTRAVENOUS
Status: DISCONTINUED | OUTPATIENT
Start: 2022-10-26 | End: 2022-10-26 | Stop reason: HOSPADM

## 2022-10-26 RX ORDER — EPINEPHRINE 1 MG/ML
INJECTION, SOLUTION, CONCENTRATE INTRAVENOUS AS NEEDED
Status: DISCONTINUED | OUTPATIENT
Start: 2022-10-26 | End: 2022-11-03 | Stop reason: HOSPADM

## 2022-10-26 RX ORDER — NORETHINDRONE AND ETHINYL ESTRADIOL 0.5-0.035
KIT ORAL AS NEEDED
Status: DISCONTINUED | OUTPATIENT
Start: 2022-10-26 | End: 2022-10-26 | Stop reason: HOSPADM

## 2022-10-26 RX ORDER — PROPOFOL 10 MG/ML
INJECTION, EMULSION INTRAVENOUS AS NEEDED
Status: DISCONTINUED | OUTPATIENT
Start: 2022-10-26 | End: 2022-10-26 | Stop reason: HOSPADM

## 2022-10-26 RX ORDER — LIDOCAINE HYDROCHLORIDE 20 MG/ML
INJECTION, SOLUTION EPIDURAL; INFILTRATION; INTRACAUDAL; PERINEURAL AS NEEDED
Status: DISCONTINUED | OUTPATIENT
Start: 2022-10-26 | End: 2022-10-26 | Stop reason: HOSPADM

## 2022-10-26 RX ORDER — SODIUM CHLORIDE 9 MG/ML
250 INJECTION, SOLUTION INTRAVENOUS AS NEEDED
Status: DISCONTINUED | OUTPATIENT
Start: 2022-10-26 | End: 2022-11-02

## 2022-10-26 RX ADMIN — INSULIN LISPRO 2 UNITS: 100 INJECTION, SOLUTION INTRAVENOUS; SUBCUTANEOUS at 12:16

## 2022-10-26 RX ADMIN — PROPOFOL 20 MG: 10 INJECTION, EMULSION INTRAVENOUS at 14:18

## 2022-10-26 RX ADMIN — MELATONIN TAB 5 MG 5 MG: 5 TAB at 22:45

## 2022-10-26 RX ADMIN — SODIUM CHLORIDE, PRESERVATIVE FREE 10 ML: 5 INJECTION INTRAVENOUS at 09:04

## 2022-10-26 RX ADMIN — PHENYLEPHRINE HYDROCHLORIDE 200 MCG: 10 INJECTION INTRAVENOUS at 14:31

## 2022-10-26 RX ADMIN — GABAPENTIN 300 MG: 300 CAPSULE ORAL at 22:45

## 2022-10-26 RX ADMIN — CARVEDILOL 6.25 MG: 3.12 TABLET, FILM COATED ORAL at 09:04

## 2022-10-26 RX ADMIN — LIDOCAINE HYDROCHLORIDE 80 MG: 20 INJECTION, SOLUTION EPIDURAL; INFILTRATION; INTRACAUDAL; PERINEURAL at 14:12

## 2022-10-26 RX ADMIN — VENLAFAXINE HYDROCHLORIDE 75 MG: 75 CAPSULE, EXTENDED RELEASE ORAL at 09:04

## 2022-10-26 RX ADMIN — SODIUM CHLORIDE, PRESERVATIVE FREE 40 MG: 5 INJECTION INTRAVENOUS at 09:04

## 2022-10-26 RX ADMIN — PROPOFOL 20 MG: 10 INJECTION, EMULSION INTRAVENOUS at 14:24

## 2022-10-26 RX ADMIN — DONEPEZIL HYDROCHLORIDE 10 MG: 5 TABLET, FILM COATED ORAL at 22:49

## 2022-10-26 RX ADMIN — EPHEDRINE SULFATE 10 MG: 50 INJECTION INTRAVENOUS at 14:19

## 2022-10-26 RX ADMIN — ONDANSETRON 4 MG: 2 INJECTION INTRAMUSCULAR; INTRAVENOUS at 14:56

## 2022-10-26 RX ADMIN — ACETAMINOPHEN 650 MG: 325 TABLET, FILM COATED ORAL at 12:16

## 2022-10-26 RX ADMIN — PHENYLEPHRINE HYDROCHLORIDE 200 MCG: 10 INJECTION INTRAVENOUS at 14:23

## 2022-10-26 RX ADMIN — PHENYLEPHRINE HYDROCHLORIDE 100 MCG: 10 INJECTION INTRAVENOUS at 14:19

## 2022-10-26 RX ADMIN — SODIUM CHLORIDE, PRESERVATIVE FREE 10 ML: 5 INJECTION INTRAVENOUS at 22:51

## 2022-10-26 RX ADMIN — LATANOPROST 1 DROP: 50 SOLUTION OPHTHALMIC at 22:50

## 2022-10-26 RX ADMIN — GABAPENTIN 300 MG: 300 CAPSULE ORAL at 09:04

## 2022-10-26 RX ADMIN — PROPOFOL 60 MG: 10 INJECTION, EMULSION INTRAVENOUS at 14:12

## 2022-10-26 RX ADMIN — SODIUM CHLORIDE, PRESERVATIVE FREE 20 ML: 5 INJECTION INTRAVENOUS at 09:04

## 2022-10-26 RX ADMIN — PHENYLEPHRINE HYDROCHLORIDE 100 MCG: 10 INJECTION INTRAVENOUS at 14:16

## 2022-10-26 RX ADMIN — SODIUM CHLORIDE: 9 INJECTION, SOLUTION INTRAVENOUS at 14:06

## 2022-10-26 RX ADMIN — EPHEDRINE SULFATE 10 MG: 50 INJECTION INTRAVENOUS at 14:21

## 2022-10-26 NOTE — PROGRESS NOTES
Adl's completed and up in chair. Demonstrates good gait with walker, states she uses a walker at home. Off floor currently for EGD capsule procedure. No distress. Bm today, unwitnessed, patient states no blood. Will cont to monitor.

## 2022-10-26 NOTE — PROGRESS NOTES
Hospitalist Progress Note    Subjective:   Daily Progress Note: 10/26/2022 11:46 AM    Hospital Course:  Flavia Buchanan is a 65 y.o. female who presents with reports of syncopal episode at home. According to patient, she was seated on the couch watching TV and does not remember the details of what happened . She remembers waking up in an ambulance  Denies chest pain palpitation nausea vomiting prior to this episode. Reports having epigastric abdominal pain for the last few days with dark tarry stools. Work-up in the ED shows a hemoglobin of 6.3 with a hematocrit of 20.1. She was last admitted in the hospital October 10 through October 13 for similar episode of syncope with bleeding. EGD done October 12 did not find any active upper GI bleed. She was discharged home on oral Protonix twice daily and advised to stop taking apixaban  S/p EGD on 10/21/22 showed gastritis without evidence of bleeding. S/p 2 unit of PRBC transfusion on 10/21/22. Colonoscopy on 10/24/22 showed mild diverticulosis, polyp and hemorrhoids. Capsule endoscopy done on 10/25/22 showed blood clot loaded at duodenal and proximal jejunum indicating active bleeding. Plan for push small bowel endoscopy today to examine small intestine and treat bleeding site. Subjective:  Patient is getting push small bowel endoscopy  Patient denies any new complaints. Patient's daughter on phone with patient. Updated daughter about test results and plan.      Current Facility-Administered Medications   Medication Dose Route Frequency    hydrALAZINE (APRESOLINE) tablet 25 mg  25 mg Oral TID PRN    guaiFENesin (ROBITUSSIN) 100 mg/5 mL oral liquid 100 mg  100 mg Oral Q4H PRN    sodium chloride (NS) flush 5-40 mL  5-40 mL IntraVENous Q8H    sodium chloride (NS) flush 5-40 mL  5-40 mL IntraVENous PRN    0.9% sodium chloride infusion 250 mL  250 mL IntraVENous PRN    pantoprazole (PROTONIX) 40 mg in 0.9% sodium chloride 10 mL injection  40 mg IntraVENous Q12H sodium chloride (NS) flush 5-40 mL  5-40 mL IntraVENous Q8H    sodium chloride (NS) flush 5-40 mL  5-40 mL IntraVENous PRN    acetaminophen (TYLENOL) tablet 650 mg  650 mg Oral Q6H PRN    Or    acetaminophen (TYLENOL) suppository 650 mg  650 mg Rectal Q6H PRN    polyethylene glycol (MIRALAX) packet 17 g  17 g Oral DAILY PRN    ondansetron (ZOFRAN ODT) tablet 4 mg  4 mg Oral Q8H PRN    Or    ondansetron (ZOFRAN) injection 4 mg  4 mg IntraVENous Q6H PRN    carvediloL (COREG) tablet 6.25 mg  6.25 mg Oral BID WITH MEALS    donepeziL (ARICEPT) tablet 10 mg  10 mg Oral QHS    gabapentin (NEURONTIN) capsule 300 mg  300 mg Oral BID    latanoprost (XALATAN) 0.005 % ophthalmic solution 1 Drop  1 Drop Both Eyes QHS    melatonin tablet 5 mg  5 mg Oral QHS    venlafaxine-SR (EFFEXOR-XR) capsule 75 mg  75 mg Oral DAILY    insulin lispro (HUMALOG) injection   SubCUTAneous AC&HS     Facility-Administered Medications Ordered in Other Encounters   Medication Dose Route Frequency    0.9% sodium chloride infusion   IntraVENous CONTINUOUS    lidocaine (PF) (XYLOCAINE) 20 mg/mL (2 %) injection   IntraVENous PRN    propofoL (DIPRIVAN) 10 mg/mL injection   IntraVENous PRN        Review of Systems  Constitutional: No fevers, No chills, No sweats, No fatigue, No Weakness  Eyes: No redness  Ears, nose, mouth, throat, and face: No nasal congestion, No sore throat, No voice change  Respiratory: No Shortness of Breath, No cough, has wheezing  Cardiovascular: No chest pain, No palpitations, No extremity edema  Gastrointestinal: No nausea, No vomiting, No diarrhea, No abdominal pain  Genitourinary: No frequency, has dysuria, No hematuria  Integument/breast: No skin lesion(s)   Neurological: No Confusion, No headaches, No dizziness      Objective:     Visit Vitals  BP (!) 126/57   Pulse 60   Temp 99.1 °F (37.3 °C)   Resp 16   Ht 5' 4\" (1.626 m)   Wt 86.2 kg (190 lb)   SpO2 99%   BMI 32.61 kg/m²      O2 Device: None (Room air)    Temp (24hrs), Av.8 °F (37.1 °C), Min:98 °F (36.7 °C), Max:99.2 °F (37.3 °C)      10/26 0701 - 10/26 1900  In: 240 [P.O.:240]  Out: 750 [Urine:750]  10/24 1901 - 10/26 0700  In: -   Out: 900 [Urine:900]    PHYSICAL EXAM:  Constitutional: No acute distress  Skin: Extremities and face reveal no rashes. HEENT: Sclerae anicteric. Extra-occular muscles are intact. No oral ulcers. The neck is supple and no masses. Cardiovascular: Regular rate and rhythm. Respiratory:  B/L clear breath sounds. GI: Abdomen nondistended, soft, and nontender. Normal active bowel sounds. Musculoskeletal: No pitting edema of the lower legs. Able to move all ext  Neurological:  Patient is alert and oriented.  Cranial nerves II-XII grossly intact  Psychiatric: Mood appears appropriate       Data Review    Recent Results (from the past 24 hour(s))   GLUCOSE, POC    Collection Time: 10/25/22  4:44 PM   Result Value Ref Range    Glucose (POC) 100 65 - 100 mg/dL    Performed by Garcia Leblanc    GLUCOSE, POC    Collection Time: 10/25/22  8:36 PM   Result Value Ref Range    Glucose (POC) 167 (H) 65 - 100 mg/dL    Performed by Rodríguez Caba    CBC W/O DIFF    Collection Time: 10/26/22  4:38 AM   Result Value Ref Range    WBC 10.2 3.6 - 11.0 K/uL    RBC 2.58 (L) 3.80 - 5.20 M/uL    HGB 7.1 (L) 11.5 - 16.0 g/dL    HCT 22.4 (L) 35.0 - 47.0 %    MCV 86.8 80.0 - 99.0 FL    MCH 27.5 26.0 - 34.0 PG    MCHC 31.7 30.0 - 36.5 g/dL    RDW 19.4 (H) 11.5 - 14.5 %    PLATELET 418 271 - 899 K/uL    MPV 10.1 8.9 - 12.9 FL    NRBC 0.0 0.0  WBC    ABSOLUTE NRBC 0.00 0.00 - 4.87 K/uL   METABOLIC PANEL, BASIC    Collection Time: 10/26/22  4:38 AM   Result Value Ref Range    Sodium 140 136 - 145 mmol/L    Potassium 4.1 3.5 - 5.1 mmol/L    Chloride 108 97 - 108 mmol/L    CO2 28 21 - 32 mmol/L    Anion gap 4 (L) 5 - 15 mmol/L    Glucose 135 (H) 65 - 100 mg/dL    BUN 38 (H) 6 - 20 mg/dL    Creatinine 1.41 (H) 0.55 - 1.02 mg/dL    BUN/Creatinine ratio 27 (H) 12 - 20 eGFR 38 (L) >60 ml/min/1.73m2    Calcium 9.2 8.5 - 10.1 mg/dL   GLUCOSE, POC    Collection Time: 10/26/22  8:42 AM   Result Value Ref Range    Glucose (POC) 138 (H) 65 - 100 mg/dL    Performed by Katlin Gómez    GLUCOSE, POC    Collection Time: 10/26/22 11:37 AM   Result Value Ref Range    Glucose (POC) 156 (H) 65 - 100 mg/dL    Performed by Yung Howe / Plan:  Acute blood loss anemia:  S/p 2 unit of PRBC transfusion on 10/21/22  Hb stable after transfusion  S/p EGD on 10/21/22- gastritis without active bleeding  Appreciate GI consult  S/p colonoscopy on 10/24- hemorrhoids, mild diverticulosis and polyp. S/p capsule endoscopy- blood clots loaded at duodenum and proximal jejunum indicating active bleeding. Hb dropped to 7.1 today. Will repeat Hb, if <7, will transfuse PRBC  Plan for push small bowel endoscope today. Continue PPI    Acute GI bleed  As above        ALEX on CKD  Creatinine improving    Acute UTI:  Completed 3 doses of iCeftriaxone    Type II DM  Continue lispro sliding scale    Syncope:  Likely s/s acute blood loss anemia  Has cardiac monitor placed last Monday. Hypertension:  Continue coreg  Continue hydralazine 25 mg po q8h prn for SBP>160mm Hg      30.0 - 39.9 Obese / Body mass index is 32.61 kg/m². Code status: Full  Prophylaxis: SCD's  Recommended Disposition: Home w/Family(barrier- push small endoscopy today) discharge tomorrow if Hb stable and after GI clearance. time spent 35 minutes involving direct patient care as well as reviewing patient's labs and coordination of care with nursing staff     Care Plan discussed with: Patient/Family/RN/Case Management        Total time spent with patient: 35 minutes.

## 2022-10-26 NOTE — ANESTHESIA PREPROCEDURE EVALUATION
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 Neuro/Psych     seizures  CVA  TIA and psychiatric history    Comments: MENTAL RETARDATION. Cardiovascular    Hypertension          CAD, PAD, cardiac stents and hyperlipidemia      Comments: 8/2022 TTE:    Interpretation Summary         Left Ventricle: Normal left ventricular systolic function with a visually estimated EF of 55 - 60%. Left ventricle size is normal. Mildly increased wall thickness. Normal wall motion. Normal diastolic function.   Aortic Valve: Valve structure is normal. Mildly calcified cusp.   Mitral Valve: Moderately thickened leaflet.   Left Atrium: Left atrium is mildly dilated.        GI/Hepatic/Renal         Renal disease: CRI      Comments: ONLY ONE FUNCTIONING KIDNEY. Dark black stools. Endo/Other    Diabetes: type 2  Hypothyroidism  Obesity, arthritis, cancer (RENAL CELL ADENOCARCINOMA.  ) and anemia    Comments: GOUT.   Other Findings   Comments:     Procedure Information    Case: [id number] Date/Time: 10/26/22 1400  Procedure: ENTEROSCOPY  Anesthesia type: MAC  Diagnosis: Gastrointestinal hemorrhage, unspecified gastrointestinal hemorrhage type (K92.2)  Pre-op diagnosis: Gastrointestinal hemorrhage, unspecified gastrointestinal hemorrhage type (K92.2)  Location: [facility] ENDO ADD-ON / [facility] ENDOSCOPY  Providers: Lindsay Richard MD      Medical History  Stroke [facility])  Arthritis  Thyroid disease  Diabetes ([facility]  Mental depression  Chronic kidney disease  CAD (coronary artery disease)  Adenocarcinoma, renal cell ([facility]  Hypercholesterolemia  Hypertension  Pulmonary embolism (HCC)  Gout  Seizures (HCC)                 Physical Exam    Airway  Mallampati: I  TM Distance: 4 - 6 cm    Mouth opening: Normal     Cardiovascular    Rhythm: regular  Rate: normal         Dental    Dentition: Edentulous     Pulmonary  Breath sounds clear to auscultation               Abdominal  Abdominal exam normal       Other Findings            Anesthetic Plan    ASA: 3  Anesthesia type: MAC and total IV anesthesia          Induction: Intravenous  Anesthetic plan and risks discussed with: Patient

## 2022-10-26 NOTE — PROGRESS NOTES
Problem: Falls - Risk of  Goal: *Absence of Falls  Description: Document Patterson Fall Risk and appropriate interventions in the flowsheet.   Outcome: Progressing Towards Goal  Note: Fall Risk Interventions:  Mobility Interventions: Communicate number of staff needed for ambulation/transfer         Medication Interventions: Patient to call before getting OOB    Elimination Interventions: Call light in reach    History of Falls Interventions: Door open when patient unattended         Problem: Patient Education: Go to Patient Education Activity  Goal: Patient/Family Education  Outcome: Progressing Towards Goal

## 2022-10-26 NOTE — PROGRESS NOTES
Problem: Falls - Risk of  Goal: *Absence of Falls  Description: Document Darlen [place] Fall Risk and appropriate interventions in the flowsheet. Outcome: Progressing Towards Goal  Note: Fall Risk Interventions:  Mobility Interventions: Bed/chair exit alarm         Medication Interventions: Bed/chair exit alarm    Elimination Interventions: Call light in reach    History of Falls Interventions: Bed/chair exit alarm         Problem: Patient Education: Go to Patient Education Activity  Goal: Patient/Family Education  Outcome: Progressing Towards Goal     Problem: Pressure Injury - Risk of  Goal: *Prevention of pressure injury  Description: Document Kirit Scale and appropriate interventions in the flowsheet.   Outcome: Progressing Towards Goal  Note: Pressure Injury Interventions:  Sensory Interventions: Assess changes in LOC    Moisture Interventions: Absorbent underpads    Activity Interventions: PT/OT evaluation    Mobility Interventions: HOB 30 degrees or less    Nutrition Interventions: Document food/fluid/supplement intake                     Problem: Patient Education: Go to Patient Education Activity  Goal: Patient/Family Education  Outcome: Progressing Towards Goal     Problem: Pain  Goal: *Control of Pain  Outcome: Progressing Towards Goal  Goal: *PALLIATIVE CARE:  Alleviation of Pain  Outcome: Progressing Towards Goal     Problem: Patient Education: Go to Patient Education Activity  Goal: Patient/Family Education  Outcome: Progressing Towards Goal

## 2022-10-26 NOTE — PROGRESS NOTES
Returned to unit status post  enteroscopy. Awake, alert and oriented. Bp 103/57. Md paged with enquiry on holding coreg. No distress. Post procedure vitals ongoing. Will monitor.

## 2022-10-26 NOTE — BRIEF OP NOTE
Brief Postoperative Note    Patient: Nishi Monsivais  YOB: 1945  MRN: [id number]    Date of Procedure: 10/26/2022     Pre-Op Diagnosis: Gastrointestinal hemorrhage, unspecified gastrointestinal hemorrhage type [K92.2]    Post-Op Diagnosis:   Active bleeding in proximal jejunum,  likely from small erosion, no mass, AVM, ischemia, inflammatory process, noted,  Total of six-ml 1/49712  dilated epinephrine injected    Procedure(s):  ENTEROSCOPY    Surgeon(s):  Howard Islas MD    Surgical Assistant: None    Anesthesia: MAC     Estimated Blood Loss (mL): less than 50     Complications: None    Specimens: * No specimens in log *     Implants: * No implants in log *    Drains:   External Urinary Catheter 10/21/22 (Active)   Site Assessment Clean, dry, & intact 10/24/22 2140   Repositioned Yes 10/23/22 0153   Perineal Care Yes 10/23/22 0153   Wick Changed Yes 10/23/22 0153   Suction Canister/Tubing Changed No 10/23/22 0153   Urine Output (mL) 1000 ml 10/23/22 0153       Findings: As above, referred full report for detail information      Recommendations:  PPI to p.o. 40 mg daily  Monitor hemoglobin,  Should hemoglobin drop further patient need a CTA, but patient had renal sufficiency  Absolutely no heparin, aspirin, NSAIDs, or any coagulations and antiplatelet agent  Electronically Signed by Joy Cunningham MD on 10/26/2022 at 2:59 PM

## 2022-10-26 NOTE — ROUTINE PROCESS
Pt. Transferred to room via bed in stable condition by transport. Phone report called to nurse on [address]. Pt. Denies nausea or pain.

## 2022-10-26 NOTE — PROGRESS NOTES
CM reviewed chart. Patient will possibly discharge tomorrow per physician's notes. Current plan is for patient to return home/self at discharge. CM will continue to follow.

## 2022-10-27 LAB
ABO + RH BLD: NORMAL
ANION GAP SERPL CALC-SCNC: 7 MMOL/L (ref 5–15)
BASOPHILS # BLD: 0 K/UL (ref 0–0.1)
BASOPHILS NFR BLD: 0 % (ref 0–1)
BLD PROD TYP BPU: NORMAL
BLOOD GROUP ANTIBODIES SERPL: NEGATIVE
BPU ID: NORMAL
BUN SERPL-MCNC: 43 MG/DL (ref 6–20)
BUN/CREAT SERPL: 28 (ref 12–20)
CA-I BLD-MCNC: 8.8 MG/DL (ref 8.5–10.1)
CHLORIDE SERPL-SCNC: 107 MMOL/L (ref 97–108)
CO2 SERPL-SCNC: 26 MMOL/L (ref 21–32)
CREAT SERPL-MCNC: 1.51 MG/DL (ref 0.55–1.02)
CROSSMATCH RESULT,%XM: NORMAL
DIFFERENTIAL METHOD BLD: ABNORMAL
EOSINOPHIL # BLD: 0.3 K/UL (ref 0–0.4)
EOSINOPHIL NFR BLD: 3 % (ref 0–7)
ERYTHROCYTE [DISTWIDTH] IN BLOOD BY AUTOMATED COUNT: 19.9 % (ref 11.5–14.5)
GLUCOSE BLD STRIP.AUTO-MCNC: 119 MG/DL (ref 65–100)
GLUCOSE BLD STRIP.AUTO-MCNC: 123 MG/DL (ref 65–100)
GLUCOSE BLD STRIP.AUTO-MCNC: 134 MG/DL (ref 65–100)
GLUCOSE BLD STRIP.AUTO-MCNC: 209 MG/DL (ref 65–100)
GLUCOSE BLD STRIP.AUTO-MCNC: 64 MG/DL (ref 65–100)
GLUCOSE SERPL-MCNC: 98 MG/DL (ref 65–100)
HCT VFR BLD AUTO: 23.7 % (ref 35–47)
HGB BLD-MCNC: 7.6 G/DL (ref 11.5–16)
IMM GRANULOCYTES # BLD AUTO: 0 K/UL (ref 0–0.04)
IMM GRANULOCYTES NFR BLD AUTO: 0 % (ref 0–0.5)
LYMPHOCYTES # BLD: 1.6 K/UL (ref 0.8–3.5)
LYMPHOCYTES NFR BLD: 17 % (ref 12–49)
MCH RBC QN AUTO: 27.3 PG (ref 26–34)
MCHC RBC AUTO-ENTMCNC: 32.1 G/DL (ref 30–36.5)
MCV RBC AUTO: 85.3 FL (ref 80–99)
MONOCYTES # BLD: 0.8 K/UL (ref 0–1)
MONOCYTES NFR BLD: 9 % (ref 5–13)
NEUTS SEG # BLD: 6.5 K/UL (ref 1.8–8)
NEUTS SEG NFR BLD: 71 % (ref 32–75)
NRBC # BLD: 0 K/UL (ref 0–0.01)
NRBC BLD-RTO: 0 PER 100 WBC
PERFORMED BY, TECHID: ABNORMAL
PLATELET # BLD AUTO: 153 K/UL (ref 150–400)
PMV BLD AUTO: 10.5 FL (ref 8.9–12.9)
POTASSIUM SERPL-SCNC: 4.4 MMOL/L (ref 3.5–5.1)
RBC # BLD AUTO: 2.78 M/UL (ref 3.8–5.2)
SODIUM SERPL-SCNC: 140 MMOL/L (ref 136–145)
SPECIMEN EXP DATE BLD: NORMAL
STATUS OF UNIT,%ST: NORMAL
TRANSFUSION STATUS PATIENT QL: NORMAL
UNIT DIVISION, %UDIV: 0
WBC # BLD AUTO: 9.2 K/UL (ref 3.6–11)

## 2022-10-27 PROCEDURE — 74011636637 HC RX REV CODE- 636/637: Performed by: INTERNAL MEDICINE

## 2022-10-27 PROCEDURE — 82962 GLUCOSE BLOOD TEST: CPT

## 2022-10-27 PROCEDURE — 74011250637 HC RX REV CODE- 250/637: Performed by: INTERNAL MEDICINE

## 2022-10-27 PROCEDURE — 36415 COLL VENOUS BLD VENIPUNCTURE: CPT

## 2022-10-27 PROCEDURE — 80048 BASIC METABOLIC PNL TOTAL CA: CPT

## 2022-10-27 PROCEDURE — 85025 COMPLETE CBC W/AUTO DIFF WBC: CPT

## 2022-10-27 PROCEDURE — 74011000250 HC RX REV CODE- 250: Performed by: EMERGENCY MEDICINE

## 2022-10-27 PROCEDURE — 65270000029 HC RM PRIVATE

## 2022-10-27 RX ADMIN — VENLAFAXINE HYDROCHLORIDE 75 MG: 75 CAPSULE, EXTENDED RELEASE ORAL at 08:24

## 2022-10-27 RX ADMIN — SODIUM CHLORIDE, PRESERVATIVE FREE 10 ML: 5 INJECTION INTRAVENOUS at 15:06

## 2022-10-27 RX ADMIN — PANTOPRAZOLE SODIUM 40 MG: 40 TABLET, DELAYED RELEASE ORAL at 08:24

## 2022-10-27 RX ADMIN — SODIUM CHLORIDE, PRESERVATIVE FREE 10 ML: 5 INJECTION INTRAVENOUS at 05:54

## 2022-10-27 RX ADMIN — GABAPENTIN 300 MG: 300 CAPSULE ORAL at 21:57

## 2022-10-27 RX ADMIN — MELATONIN TAB 5 MG 5 MG: 5 TAB at 21:57

## 2022-10-27 RX ADMIN — POLYETHYLENE GLYCOL 3350 17 G: 17 POWDER, FOR SOLUTION ORAL at 12:39

## 2022-10-27 RX ADMIN — SODIUM CHLORIDE, PRESERVATIVE FREE 10 ML: 5 INJECTION INTRAVENOUS at 22:01

## 2022-10-27 RX ADMIN — GABAPENTIN 300 MG: 300 CAPSULE ORAL at 08:24

## 2022-10-27 RX ADMIN — CARVEDILOL 6.25 MG: 3.12 TABLET, FILM COATED ORAL at 08:24

## 2022-10-27 RX ADMIN — INSULIN LISPRO 3 UNITS: 100 INJECTION, SOLUTION INTRAVENOUS; SUBCUTANEOUS at 12:39

## 2022-10-27 RX ADMIN — DONEPEZIL HYDROCHLORIDE 10 MG: 5 TABLET, FILM COATED ORAL at 21:57

## 2022-10-27 RX ADMIN — ACETAMINOPHEN 650 MG: 325 TABLET, FILM COATED ORAL at 12:43

## 2022-10-27 RX ADMIN — LATANOPROST 1 DROP: 50 SOLUTION OPHTHALMIC at 21:57

## 2022-10-27 RX ADMIN — ACETAMINOPHEN 650 MG: 325 TABLET, FILM COATED ORAL at 21:57

## 2022-10-27 NOTE — PROGRESS NOTES
Comprehensive Nutrition Assessment    Type and Reason for Visit: Initial, RD nutrition re-screen/LOS (x6 days)    Nutrition Recommendations/Plan:   Full Liquid diet, when appropriate advance to goal of 4 Carb, GI [place] or per GI MD rec's. Ensure HPx1/d (160 kcal, 16 gm PRO). Please monitor and document PO and ONS intakes in I/Os. Malnutrition Assessment:  Malnutrition Status: At risk for malnutrition (specify) (10/27/22 1137)    Context:  Acute illness     Findings of the 6 clinical characteristics of malnutrition:   Energy Intake:  Mild decrease in energy intake (specify)  Weight Loss:  No significant weight loss     Body Fat Loss:  No significant body fat loss,     Muscle Mass Loss:  No significant muscle mass loss,    Fluid Accumulation:  No significant fluid accumulation,     Strength:  Not performed     Nutrition Assessment:    Admitted for rectal bleeding. (10/21) EGD showed gastritis w/o acute bleeding. (10/24) Colonoscopy found diverticulosis, polyp, and hemorrhoids. Multiple PRBCs transfused during LOS. (10/25) Capsule endoscopy showed blood clot at duodenal and proximal jejunum indicating active bleeding. (10/26) 1 PRBC transfused. Today, Pt stated no recent wt loss nor poor intake PTA. Desires diet advanced to solid meals but consumes ~75% of meals. Continue diet, advance per GI rec's. Add ONS. Labs: BUN 43, GFR 35, Cr 1.51, H/H 7.6/23.7. Meds: PPI, Aricept, effexor, coreg. Nutrition Related Findings:    NFPE deferred-inappropriate at visit but observed as well nourished w/o overt fat loss. No N/V/D reported. Last BM 10/26 per RN in I/Os. No edema. Wound Type: None    Current Nutrition Intake & Therapies:  Average Meal Intake: %  Average Supplement Intake: None ordered  ADULT DIET Full Liquid; 4 carb choices (60 gm/meal)    Anthropometric Measures:  Height: 5' 4\" (162.6 cm)  Ideal Body Weight (IBW): 120 lbs (55 kg)  Current Body Wt:  86.2 kg (190 lb 0.6 oz), 158.4 % IBW.  Not specified  Current BMI (kg/m2): 32.6  Usual Body Weight:  (UTO)  BMI Category: Obese class 1 (BMI 30.0-34. 9)    Estimated Daily Nutrient Needs:  Energy Requirements Based On: Kcal/kg  Weight Used for Energy Requirements: Adjusted  Energy (kcal/day): 1683 kcal/d  Weight Used for Protein Requirements: Current  Protein (g/day): 52 gm/d  (0.6 gm/kg for CKD IV?)  Method Used for Fluid Requirements: 1 ml/kcal  Fluid (ml/day): 1683 mL/d    Nutrition Diagnosis:   Inadequate protein-energy intake related to altered GI function, altered GI structure as evidenced by GI abnormality, intake 51-75%    Nutrition Interventions:   Food and/or Nutrient Delivery: Continue current diet, Start oral nutrition supplement  Nutrition Education/Counseling: No recommendations at this time  Coordination of Nutrition Care: Continue to monitor while inpatient  Plan of Care discussed with: Pt    Goals:  Goals: Meet at least 75% of estimated needs, PO intake 75% or greater, within 7 days    Nutrition Monitoring and Evaluation:   Behavioral-Environmental Outcomes: None identified  Food/Nutrient Intake Outcomes: Diet advancement/tolerance, Food and nutrient intake, Supplement intake  Physical Signs/Symptoms Outcomes: Biochemical data, GI status, Meal time behavior, Weight    Discharge Planning: Too soon to determine    Timmy Reese RD  Contact: ext. [phone].

## 2022-10-27 NOTE — PROGRESS NOTES
Problem: Falls - Risk of  Goal: *Absence of Falls  Description: Document Aguilar Blades Fall Risk and appropriate interventions in the flowsheet. Outcome: Progressing Towards Goal  Note: Fall Risk Interventions:  Mobility Interventions: Communicate number of staff needed for ambulation/transfer         Medication Interventions: Patient to call before getting OOB    Elimination Interventions: Bed/chair exit alarm, Call light in reach    History of Falls Interventions: Bed/chair exit alarm, Door open when patient unattended         Problem: Patient Education: Go to Patient Education Activity  Goal: Patient/Family Education  Outcome: Progressing Towards Goal     Problem: Pressure Injury - Risk of  Goal: *Prevention of pressure injury  Description: Document Kirit Scale and appropriate interventions in the flowsheet.   Outcome: Progressing Towards Goal  Note: Pressure Injury Interventions:  Sensory Interventions: Keep linens dry and wrinkle-free, Minimize linen layers    Moisture Interventions: Absorbent underpads    Activity Interventions: Increase time out of bed    Mobility Interventions: HOB 30 degrees or less    Nutrition Interventions: Document food/fluid/supplement intake                     Problem: Patient Education: Go to Patient Education Activity  Goal: Patient/Family Education  Outcome: Progressing Towards Goal     Problem: Pain  Goal: *Control of Pain  Outcome: Progressing Towards Goal  Goal: *PALLIATIVE CARE:  Alleviation of Pain  Outcome: Progressing Towards Goal     Problem: Patient Education: Go to Patient Education Activity  Goal: Patient/Family Education  Outcome: Progressing Towards Goal

## 2022-10-27 NOTE — PROGRESS NOTES
Problem: Falls - Risk of  Goal: *Absence of Falls  Description: Document Jaclyn Gusman Fall Risk and appropriate interventions in the flowsheet. Outcome: Progressing Towards Goal  Note: Fall Risk Interventions:  Mobility Interventions: Patient to call before getting OOB, Utilize walker, cane, or other assistive device         Medication Interventions: Bed/chair exit alarm, Patient to call before getting OOB    Elimination Interventions: Bed/chair exit alarm, Call light in reach, Patient to call for help with toileting needs    History of Falls Interventions: Bed/chair exit alarm, Door open when patient unattended, Room close to nurse's station         Problem: Pressure Injury - Risk of  Goal: *Prevention of pressure injury  Description: Document Kirit Scale and appropriate interventions in the flowsheet.   Outcome: Progressing Towards Goal  Note: Pressure Injury Interventions:  Sensory Interventions: Keep linens dry and wrinkle-free, Minimize linen layers    Moisture Interventions: Absorbent underpads, Internal/External urinary devices    Activity Interventions: Increase time out of bed, PT/OT evaluation    Mobility Interventions: HOB 30 degrees or less    Nutrition Interventions: Document food/fluid/supplement intake

## 2022-10-27 NOTE — PROGRESS NOTES
Hospitalist Progress Note    Subjective:   Daily Progress Note: 10/27/2022 11:46 AM    Hospital Course:  Nishi Monsivais is a 65 y.o. female who presents with reports of syncopal episode at home. According to patient, she was seated on the couch watching TV and does not remember the details of what happened . She remembers waking up in an ambulance  Denies chest pain palpitation nausea vomiting prior to this episode. Reports having epigastric abdominal pain for the last few days with dark tarry stools. Work-up in the ED shows a hemoglobin of 6.3 with a hematocrit of 20.1. She was last admitted in the hospital October 10 through October 13 for similar episode of syncope with bleeding. EGD done October 12 did not find any active upper GI bleed. She was discharged home on oral Protonix twice daily and advised to stop taking apixaban  S/p EGD on 10/21/22 showed gastritis without evidence of bleeding. S/p 2 unit of PRBC transfusion on 10/21/22. Colonoscopy on 10/24/22 showed mild diverticulosis, polyp and hemorrhoids. Capsule endoscopy done on 10/25/22 showed blood clot loaded at duodenal and proximal jejunum indicating active bleeding. Plan for push small bowel endoscopy today to examine small intestine and treat bleeding site. Subjective:  Patient seen and examined, chart was reviewed. Patient dropped hemoglobin yesterday evening, got 1 unit of blood. Patient hemoglobin improved to 7.6. Patient had no bowel movement today. Patient does not feel comfortable going home today. No other acute issues reported to me by patient or staff at this time.     Current Facility-Administered Medications   Medication Dose Route Frequency    pantoprazole (PROTONIX) tablet 40 mg  40 mg Oral ACB    EPINEPHrine HCl (PF) (ADRENALIN) 1 mg/mL (1 mL) injection    PRN    0.9% sodium chloride infusion 250 mL  250 mL IntraVENous PRN    hydrALAZINE (APRESOLINE) tablet 25 mg  25 mg Oral TID PRN    guaiFENesin (ROBITUSSIN) 100 mg/5 mL oral liquid 100 mg  100 mg Oral Q4H PRN    sodium chloride (NS) flush 5-40 mL  5-40 mL IntraVENous Q8H    sodium chloride (NS) flush 5-40 mL  5-40 mL IntraVENous PRN    0.9% sodium chloride infusion 250 mL  250 mL IntraVENous PRN    sodium chloride (NS) flush 5-40 mL  5-40 mL IntraVENous Q8H    sodium chloride (NS) flush 5-40 mL  5-40 mL IntraVENous PRN    acetaminophen (TYLENOL) tablet 650 mg  650 mg Oral Q6H PRN    Or    acetaminophen (TYLENOL) suppository 650 mg  650 mg Rectal Q6H PRN    polyethylene glycol (MIRALAX) packet 17 g  17 g Oral DAILY PRN    ondansetron (ZOFRAN ODT) tablet 4 mg  4 mg Oral Q8H PRN    Or    ondansetron (ZOFRAN) injection 4 mg  4 mg IntraVENous Q6H PRN    carvediloL (COREG) tablet 6.25 mg  6.25 mg Oral BID WITH MEALS    donepeziL (ARICEPT) tablet 10 mg  10 mg Oral QHS    gabapentin (NEURONTIN) capsule 300 mg  300 mg Oral BID    latanoprost (XALATAN) 0.005 % ophthalmic solution 1 Drop  1 Drop Both Eyes QHS    melatonin tablet 5 mg  5 mg Oral QHS    venlafaxine-SR (EFFEXOR-XR) capsule 75 mg  75 mg Oral DAILY    insulin lispro (HUMALOG) injection   SubCUTAneous AC&HS        Review of Systems  Constitutional: No fevers, No chills, No sweats, No fatigue, No Weakness  Eyes: No redness  Ears, nose, mouth, throat, and face: No nasal congestion, No sore throat, No voice change  Respiratory: No Shortness of Breath, No cough, has wheezing  Cardiovascular: No chest pain, No palpitations, No extremity edema  Gastrointestinal: No nausea, No vomiting, No diarrhea, No abdominal pain  Genitourinary: No frequency, has dysuria, No hematuria  Integument/breast: No skin lesion(s)   Neurological: No Confusion, No headaches, No dizziness      Objective:     Visit Vitals  BP (!) 130/56 (BP 1 Location: Left upper arm, BP Patient Position: At rest;Supine)   Pulse 68   Temp 98.1 °F (36.7 °C)   Resp 20   Ht 5' 4\" (1.626 m)   Wt 86.2 kg (190 lb)   SpO2 99%   BMI 32.61 kg/m²      O2 Device: None (Room air)    Temp (24hrs), Av °F (36.7 °C), Min:97.1 °F (36.2 °C), Max:99.1 °F (37.3 °C)      No intake/output data recorded. 10/25 1901 - 10/27 0700  In: 979 [P.O.:240; I.V.:400]  Out: 750 [Urine:750]    PHYSICAL EXAM:  Constitutional: No acute distress  Skin: Extremities and face reveal no rashes. HEENT:   neck is supple and no masses. Cardiovascular: Regular rate and rhythm. Respiratory: Symmetric expansion  GI: Abdomen nondistended, soft, and nontender. Musculoskeletal: No pitting edema of the lower legs. Able to move all ext  Neurological:  Patient is alert and oriented. No focal deficits  Psychiatric: Mood appears appropriate       Data Review    Recent Results (from the past 24 hour(s))   GLUCOSE, POC    Collection Time: 10/26/22 11:37 AM   Result Value Ref Range    Glucose (POC) 156 (H) 65 - 100 mg/dL    Performed by Heriberto Lopez, POC    Collection Time: 10/26/22  2:59 PM   Result Value Ref Range    Glucose (POC) 119 (H) 65 - 100 mg/dL    Performed by JOLANTA MEDINA    CBC WITH AUTOMATED DIFF    Collection Time: 10/26/22  3:45 PM   Result Value Ref Range    WBC 9.8 3.6 - 11.0 K/uL    RBC 2.44 (L) 3.80 - 5.20 M/uL    HGB 6.7 (L) 11.5 - 16.0 g/dL    HCT 21.4 (L) 35.0 - 47.0 %    MCV 87.7 80.0 - 99.0 FL    MCH 27.5 26.0 - 34.0 PG    MCHC 31.3 30.0 - 36.5 g/dL    RDW 19.5 (H) 11.5 - 14.5 %    PLATELET 673 898 - 299 K/uL    MPV 10.5 8.9 - 12.9 FL    NRBC 0.0 0.0  WBC    ABSOLUTE NRBC 0.00 0.00 - 0.01 K/uL    NEUTROPHILS 76 (H) 32 - 75 %    LYMPHOCYTES 13 12 - 49 %    MONOCYTES 9 5 - 13 %    EOSINOPHILS 2 0 - 7 %    BASOPHILS 0 0 - 1 %    IMMATURE GRANULOCYTES 0 0 - 0.5 %    ABS. NEUTROPHILS 7.4 1.8 - 8.0 K/UL    ABS. LYMPHOCYTES 1.3 0.8 - 3.5 K/UL    ABS. MONOCYTES 0.9 0.0 - 1.0 K/UL    ABS. EOSINOPHILS 0.2 0.0 - 0.4 K/UL    ABS. BASOPHILS 0.0 0.0 - 0.1 K/UL    ABS. IMM.  GRANS. 0.0 0.00 - 0.04 K/UL    DF AUTOMATED     GLUCOSE, POC    Collection Time: 10/26/22  4:10 PM   Result Value Ref Range Glucose (POC) 106 (H) 65 - 100 mg/dL    Performed by Kayla Gusman    TYPE & SCREEN    Collection Time: 10/26/22  6:19 PM   Result Value Ref Range    Crossmatch Expiration 10/29/2022,2359     ABO/Rh(D) A Positive     Antibody screen Negative     Unit number [id number]     Blood component type  LR     Unit division 00     Status of unit Issued,final     TRANSFUSION STATUS Ok to transfuse     Crossmatch result Compatible    GLUCOSE, POC    Collection Time: 10/26/22  8:25 PM   Result Value Ref Range    Glucose (POC) 183 (H) 65 - 100 mg/dL    Performed by Twin BRADFORD WITH AUTOMATED DIFF    Collection Time: 10/27/22  4:10 AM   Result Value Ref Range    WBC 9.2 3.6 - 11.0 K/uL    RBC 2.78 (L) 3.80 - 5.20 M/uL    HGB 7.6 (L) 11.5 - 16.0 g/dL    HCT 23.7 (L) 35.0 - 47.0 %    MCV 85.3 80.0 - 99.0 FL    MCH 27.3 26.0 - 34.0 PG    MCHC 32.1 30.0 - 36.5 g/dL    RDW 19.9 (H) 11.5 - 14.5 %    PLATELET 172 448 - 904 K/uL    MPV 10.5 8.9 - 12.9 FL    NRBC 0.0 0.0  WBC    ABSOLUTE NRBC 0.00 0.00 - 0.01 K/uL    NEUTROPHILS 71 32 - 75 %    LYMPHOCYTES 17 12 - 49 %    MONOCYTES 9 5 - 13 %    EOSINOPHILS 3 0 - 7 %    BASOPHILS 0 0 - 1 %    IMMATURE GRANULOCYTES 0 0 - 0.5 %    ABS. NEUTROPHILS 6.5 1.8 - 8.0 K/UL    ABS. LYMPHOCYTES 1.6 0.8 - 3.5 K/UL    ABS. MONOCYTES 0.8 0.0 - 1.0 K/UL    ABS. EOSINOPHILS 0.3 0.0 - 0.4 K/UL    ABS. BASOPHILS 0.0 0.0 - 0.1 K/UL    ABS. IMM.  GRANS. 0.0 0.00 - 0.04 K/UL    DF AUTOMATED     METABOLIC PANEL, BASIC    Collection Time: 10/27/22  4:10 AM   Result Value Ref Range    Sodium 140 136 - 145 mmol/L    Potassium 4.4 3.5 - 5.1 mmol/L    Chloride 107 97 - 108 mmol/L    CO2 26 21 - 32 mmol/L    Anion gap 7 5 - 15 mmol/L    Glucose 98 65 - 100 mg/dL    BUN 43 (H) 6 - 20 mg/dL    Creatinine 1.51 (H) 0.55 - 1.02 mg/dL    BUN/Creatinine ratio 28 (H) 12 - 20      eGFR 35 (L) >60 ml/min/1.73m2    Calcium 8.8 8.5 - 10.1 mg/dL   GLUCOSE, POC    Collection Time: 10/27/22  8:21 AM   Result Value Ref Range    Glucose (POC) 123 (H) 65 - 100 mg/dL    Performed by Tex Rowe / Plan:  Acute blood loss anemia from GI bleed:  S/p 2 unit of PRBC transfusion on 10/21/22  Hb stable after transfusion  S/p EGD on 10/21/22- gastritis without active bleeding  Appreciate GI consult  S/p colonoscopy on 10/24- hemorrhoids, mild diverticulosis and polyp. S/p capsule endoscopy- blood clots loaded at duodenum and proximal jejunum indicating active bleeding. S/p Push Eterroscopy 10/26- Active bleeding in proximal jejunum,  likely from small erosion, no mass, AVM, ischemia, inflammatory process, noted,  Total of six-ml 1/10348  dilated epinephrine injected  Hb dropped to  6.7 on 10/26 s/p prbc  one unit  Continue PPI  Monitor h/h      ALEX on CKD Creatinine improving  Acute UTI: Completed 3 doses of iCeftriaxone  Type II DM Continue lispro sliding scale    Syncope:  Likely s/s acute blood loss anemia  Has cardiac monitor placed last Monday. Hypertension:  Continue coreg  Continue hydralazine 25 mg po q8h prn for SBP>160mm Hg    Code status: Full  Prophylaxis: SCD's  Recommended Disposition: Home w/Family discharge tomorrow if Hb stable and after GI clearance.

## 2022-10-27 NOTE — PROGRESS NOTES
CM reviewed chart. Patient will likely need to stay in hospital overnight to monitor labs per physician's report. Current plan is still for patient to return home/self when medically stable. CM will continue to follow.

## 2022-10-27 NOTE — ANESTHESIA POSTPROCEDURE EVALUATION
Procedure(s):  ENTEROSCOPY.    MAC, total IV anesthesia    Anesthesia Post Evaluation      Multimodal analgesia: multimodal analgesia not used between 6 hours prior to anesthesia start to PACU discharge  Patient location during evaluation: bedside (Endoscopy suite)  Patient participation: complete - patient cannot participate  Level of consciousness: sleepy but conscious  Pain score: 0  Pain management: adequate  Airway patency: patent  Anesthetic complications: no  Cardiovascular status: acceptable  Respiratory status: acceptable and nasal cannula  Hydration status: acceptable  Comments: This patient remained on the stretcher. The patient was handed off to the endoscopy nursing team.  All questions regarding pre-, intra-, and postoperative care were answered.   Post anesthesia nausea and vomiting:  none      INITIAL Post-op Vital signs:   Vitals Value Taken Time   /50 10/27/22 1442   Temp 37.1 °C (98.7 °F) 10/27/22 1442   Pulse 62 10/27/22 1442   Resp 18 10/27/22 1442   SpO2 98 % 10/27/22 1442

## 2022-10-28 LAB
ALBUMIN SERPL-MCNC: 2.4 G/DL (ref 3.5–5)
ANION GAP SERPL CALC-SCNC: 7 MMOL/L (ref 5–15)
BASOPHILS # BLD: 0.1 K/UL (ref 0–0.1)
BASOPHILS NFR BLD: 1 % (ref 0–1)
BUN SERPL-MCNC: 52 MG/DL (ref 6–20)
BUN/CREAT SERPL: 32 (ref 12–20)
CA-I BLD-MCNC: 9.3 MG/DL (ref 8.5–10.1)
CHLORIDE SERPL-SCNC: 106 MMOL/L (ref 97–108)
CO2 SERPL-SCNC: 26 MMOL/L (ref 21–32)
CREAT SERPL-MCNC: 1.62 MG/DL (ref 0.55–1.02)
DIFFERENTIAL METHOD BLD: ABNORMAL
EOSINOPHIL # BLD: 0.3 K/UL (ref 0–0.4)
EOSINOPHIL NFR BLD: 4 % (ref 0–7)
ERYTHROCYTE [DISTWIDTH] IN BLOOD BY AUTOMATED COUNT: 20.1 % (ref 11.5–14.5)
GLUCOSE BLD STRIP.AUTO-MCNC: 115 MG/DL (ref 65–100)
GLUCOSE BLD STRIP.AUTO-MCNC: 144 MG/DL (ref 65–100)
GLUCOSE BLD STRIP.AUTO-MCNC: 156 MG/DL (ref 65–100)
GLUCOSE BLD STRIP.AUTO-MCNC: 84 MG/DL (ref 65–100)
GLUCOSE SERPL-MCNC: 120 MG/DL (ref 65–100)
HCT VFR BLD AUTO: 25 % (ref 35–47)
HGB BLD-MCNC: 7.7 G/DL (ref 11.5–16)
IMM GRANULOCYTES # BLD AUTO: 0 K/UL (ref 0–0.04)
IMM GRANULOCYTES NFR BLD AUTO: 0 % (ref 0–0.5)
LYMPHOCYTES # BLD: 1.5 K/UL (ref 0.8–3.5)
LYMPHOCYTES NFR BLD: 20 % (ref 12–49)
MCH RBC QN AUTO: 26.6 PG (ref 26–34)
MCHC RBC AUTO-ENTMCNC: 30.8 G/DL (ref 30–36.5)
MCV RBC AUTO: 86.2 FL (ref 80–99)
MONOCYTES # BLD: 0.8 K/UL (ref 0–1)
MONOCYTES NFR BLD: 10 % (ref 5–13)
NEUTS SEG # BLD: 5.1 K/UL (ref 1.8–8)
NEUTS SEG NFR BLD: 65 % (ref 32–75)
NRBC # BLD: 0 K/UL (ref 0–0.01)
NRBC BLD-RTO: 0 PER 100 WBC
PERFORMED BY, TECHID: ABNORMAL
PERFORMED BY, TECHID: NORMAL
PHOSPHATE SERPL-MCNC: 3.8 MG/DL (ref 2.6–4.7)
PLATELET # BLD AUTO: 177 K/UL (ref 150–400)
PMV BLD AUTO: 11 FL (ref 8.9–12.9)
POTASSIUM SERPL-SCNC: 4.8 MMOL/L (ref 3.5–5.1)
RBC # BLD AUTO: 2.9 M/UL (ref 3.8–5.2)
SODIUM SERPL-SCNC: 139 MMOL/L (ref 136–145)
WBC # BLD AUTO: 7.8 K/UL (ref 3.6–11)

## 2022-10-28 PROCEDURE — 74011000250 HC RX REV CODE- 250: Performed by: INTERNAL MEDICINE

## 2022-10-28 PROCEDURE — 82962 GLUCOSE BLOOD TEST: CPT

## 2022-10-28 PROCEDURE — 85025 COMPLETE CBC W/AUTO DIFF WBC: CPT

## 2022-10-28 PROCEDURE — 74011250637 HC RX REV CODE- 250/637: Performed by: INTERNAL MEDICINE

## 2022-10-28 PROCEDURE — 74011000250 HC RX REV CODE- 250: Performed by: EMERGENCY MEDICINE

## 2022-10-28 PROCEDURE — 36415 COLL VENOUS BLD VENIPUNCTURE: CPT

## 2022-10-28 PROCEDURE — 74011636637 HC RX REV CODE- 636/637: Performed by: INTERNAL MEDICINE

## 2022-10-28 PROCEDURE — 65270000029 HC RM PRIVATE

## 2022-10-28 PROCEDURE — 80069 RENAL FUNCTION PANEL: CPT

## 2022-10-28 RX ADMIN — INSULIN LISPRO 2 UNITS: 100 INJECTION, SOLUTION INTRAVENOUS; SUBCUTANEOUS at 12:37

## 2022-10-28 RX ADMIN — GABAPENTIN 300 MG: 300 CAPSULE ORAL at 22:26

## 2022-10-28 RX ADMIN — MELATONIN TAB 5 MG 5 MG: 5 TAB at 22:26

## 2022-10-28 RX ADMIN — SODIUM CHLORIDE, PRESERVATIVE FREE 10 ML: 5 INJECTION INTRAVENOUS at 14:43

## 2022-10-28 RX ADMIN — DONEPEZIL HYDROCHLORIDE 10 MG: 5 TABLET, FILM COATED ORAL at 22:26

## 2022-10-28 RX ADMIN — ACETAMINOPHEN 650 MG: 325 TABLET, FILM COATED ORAL at 22:26

## 2022-10-28 RX ADMIN — LATANOPROST 1 DROP: 50 SOLUTION OPHTHALMIC at 22:29

## 2022-10-28 RX ADMIN — ACETAMINOPHEN 650 MG: 325 TABLET, FILM COATED ORAL at 12:40

## 2022-10-28 RX ADMIN — POLYETHYLENE GLYCOL 3350 17 G: 17 POWDER, FOR SOLUTION ORAL at 09:49

## 2022-10-28 RX ADMIN — VENLAFAXINE HYDROCHLORIDE 75 MG: 75 CAPSULE, EXTENDED RELEASE ORAL at 09:50

## 2022-10-28 RX ADMIN — PANTOPRAZOLE SODIUM 40 MG: 40 TABLET, DELAYED RELEASE ORAL at 07:57

## 2022-10-28 RX ADMIN — CARVEDILOL 6.25 MG: 3.12 TABLET, FILM COATED ORAL at 07:57

## 2022-10-28 RX ADMIN — GABAPENTIN 300 MG: 300 CAPSULE ORAL at 09:50

## 2022-10-28 RX ADMIN — SODIUM CHLORIDE, PRESERVATIVE FREE 10 ML: 5 INJECTION INTRAVENOUS at 22:27

## 2022-10-28 RX ADMIN — SODIUM CHLORIDE, PRESERVATIVE FREE 10 ML: 5 INJECTION INTRAVENOUS at 06:29

## 2022-10-28 NOTE — PROGRESS NOTES
Hospitalist Progress Note    Subjective:   Daily Progress Note: 10/28/2022 11:34 AM    Epigastric abdominal pain, no further large bowel movements of blood    Current Facility-Administered Medications   Medication Dose Route Frequency    pantoprazole (PROTONIX) tablet 40 mg  40 mg Oral ACB    EPINEPHrine HCl (PF) (ADRENALIN) 1 mg/mL (1 mL) injection    PRN    0.9% sodium chloride infusion 250 mL  250 mL IntraVENous PRN    hydrALAZINE (APRESOLINE) tablet 25 mg  25 mg Oral TID PRN    guaiFENesin (ROBITUSSIN) 100 mg/5 mL oral liquid 100 mg  100 mg Oral Q4H PRN    sodium chloride (NS) flush 5-40 mL  5-40 mL IntraVENous Q8H    0.9% sodium chloride infusion 250 mL  250 mL IntraVENous PRN    sodium chloride (NS) flush 5-40 mL  5-40 mL IntraVENous Q8H    sodium chloride (NS) flush 5-40 mL  5-40 mL IntraVENous PRN    acetaminophen (TYLENOL) tablet 650 mg  650 mg Oral Q6H PRN    Or    acetaminophen (TYLENOL) suppository 650 mg  650 mg Rectal Q6H PRN    polyethylene glycol (MIRALAX) packet 17 g  17 g Oral DAILY PRN    ondansetron (ZOFRAN ODT) tablet 4 mg  4 mg Oral Q8H PRN    Or    ondansetron (ZOFRAN) injection 4 mg  4 mg IntraVENous Q6H PRN    carvediloL (COREG) tablet 6.25 mg  6.25 mg Oral BID WITH MEALS    donepeziL (ARICEPT) tablet 10 mg  10 mg Oral QHS    gabapentin (NEURONTIN) capsule 300 mg  300 mg Oral BID    latanoprost (XALATAN) 0.005 % ophthalmic solution 1 Drop  1 Drop Both Eyes QHS    melatonin tablet 5 mg  5 mg Oral QHS    venlafaxine-SR (EFFEXOR-XR) capsule 75 mg  75 mg Oral DAILY    insulin lispro (HUMALOG) injection   SubCUTAneous AC&HS        Review of Systems  Review of Systems   Constitutional:  Positive for malaise/fatigue. HENT: Negative. Respiratory:  Negative for cough and shortness of breath. Cardiovascular:  Negative for chest pain and leg swelling. Gastrointestinal:  Positive for abdominal pain and blood in stool. Negative for nausea and vomiting. Genitourinary: Negative. Musculoskeletal: Negative. Skin: Negative. Neurological: Negative. Psychiatric/Behavioral: Negative. Objective:     Visit Vitals  BP (!) 149/68 (BP 1 Location: Right upper arm, BP Patient Position: At rest)   Pulse 62   Temp 97.5 °F (36.4 °C)   Resp 14   Ht 5' 4\" (1.626 m)   Wt 86.2 kg (190 lb)   SpO2 99%   BMI 32.61 kg/m²      O2 Device: None (Room air)    Temp (24hrs), Av.1 °F (36.7 °C), Min:97.5 °F (36.4 °C), Max:98.7 °F (37.1 °C)      10/28 0701 - 10/28 1900  In: -   Out: 500 [Urine:500]  10/26 1901 - 10/28 0700  In: 1139 [P.O.:800]  Out: 640 [Urine:640]    Recent Results (from the past 24 hour(s))   GLUCOSE, POC    Collection Time: 10/27/22 11:55 AM   Result Value Ref Range    Glucose (POC) 209 (H) 65 - 100 mg/dL    Performed by [address] [place] [address], POC    Collection Time: 10/27/22  4:33 PM   Result Value Ref Range    Glucose (POC) 64 (L) 65 - 100 mg/dL    Performed by Khalida Chapa    GLUCOSE, POC    Collection Time: 10/27/22  5:59 PM   Result Value Ref Range    Glucose (POC) 134 (H) 65 - 100 mg/dL    Performed by Ely CAMACHO/ Sarwat Blevins [facility], POC    Collection Time: 10/27/22  8:30 PM   Result Value Ref Range    Glucose (POC) 119 (H) 65 - 100 mg/dL    Performed by Melinda Montgomery    CBC WITH AUTOMATED DIFF    Collection Time: 10/28/22  6:24 AM   Result Value Ref Range    WBC 7.8 3.6 - 11.0 K/uL    RBC 2.90 (L) 3.80 - 5.20 M/uL    HGB 7.7 (L) 11.5 - 16.0 g/dL    HCT 25.0 (L) 35.0 - 47.0 %    MCV 86.2 80.0 - 99.0 FL    MCH 26.6 26.0 - 34.0 PG    MCHC 30.8 30.0 - 36.5 g/dL    RDW 20.1 (H) 11.5 - 14.5 %    PLATELET 640 253 - 345 K/uL    MPV 11.0 8.9 - 12.9 FL    NRBC 0.0 0.0  WBC    ABSOLUTE NRBC 0.00 0.00 - 0.01 K/uL    NEUTROPHILS 65 32 - 75 %    LYMPHOCYTES 20 12 - 49 %    MONOCYTES 10 5 - 13 %    EOSINOPHILS 4 0 - 7 %    BASOPHILS 1 0 - 1 %    IMMATURE GRANULOCYTES 0 0 - 0.5 %    ABS. NEUTROPHILS 5.1 1.8 - 8.0 K/UL    ABS. LYMPHOCYTES 1.5 0.8 - 3.5 K/UL    ABS.  MONOCYTES 0.8 0.0 - 1.0 K/UL    ABS. EOSINOPHILS 0.3 0.0 - 0.4 K/UL    ABS. BASOPHILS 0.1 0.0 - 0.1 K/UL    ABS. IMM. GRANS. 0.0 0.00 - 0.04 K/UL    DF AUTOMATED     RENAL FUNCTION PANEL    Collection Time: 10/28/22  6:24 AM   Result Value Ref Range    Sodium 139 136 - 145 mmol/L    Potassium 4.8 3.5 - 5.1 mmol/L    Chloride 106 97 - 108 mmol/L    CO2 26 21 - 32 mmol/L    Anion gap 7 5 - 15 mmol/L    Glucose 120 (H) 65 - 100 mg/dL    BUN 52 (H) 6 - 20 mg/dL    Creatinine 1.62 (H) 0.55 - 1.02 mg/dL    BUN/Creatinine ratio 32 (H) 12 - 20      eGFR 32 (L) >60 ml/min/1.73m2    Calcium 9.3 8.5 - 10.1 mg/dL    Phosphorus 3.8 2.6 - 4.7 mg/dL    Albumin 2.4 (L) 3.5 - 5.0 g/dL   GLUCOSE, POC    Collection Time: 10/28/22  7:47 AM   Result Value Ref Range    Glucose (POC) 115 (H) 65 - 100 mg/dL    Performed by Carolina Aiken         No orders to display        PHYSICAL EXAM:    Physical Exam  Vitals reviewed. HENT:      Head: Normocephalic and atraumatic. Mouth/Throat:      Pharynx: Oropharynx is clear. Eyes:      Conjunctiva/sclera: Conjunctivae normal.   Cardiovascular:      Rate and Rhythm: Normal rate and regular rhythm. Heart sounds: Normal heart sounds. Pulmonary:      Effort: Pulmonary effort is normal.      Breath sounds: Normal breath sounds. Abdominal:      General: Abdomen is flat. Bowel sounds are normal.      Palpations: Abdomen is soft. Tenderness: There is abdominal tenderness. Comments: Tenderness in the epigastric region   Musculoskeletal:         General: Normal range of motion. Cervical back: Normal range of motion and neck supple. Skin:     General: Skin is warm and dry. Neurological:      General: No focal deficit present. Mental Status: She is alert and oriented to person, place, and time.    Psychiatric:         Mood and Affect: Mood normal.        Data Review    Recent Results (from the past 24 hour(s))   GLUCOSE, POC    Collection Time: 10/27/22 11:55 AM   Result Value Ref Range Glucose (POC) 209 (H) 65 - 100 mg/dL    Performed by Trupti Harris    GLUCOSE, POC    Collection Time: 10/27/22  4:33 PM   Result Value Ref Range    Glucose (POC) 64 (L) 65 - 100 mg/dL    Performed by Trupti Harris    GLUCOSE, POC    Collection Time: 10/27/22  5:59 PM   Result Value Ref Range    Glucose (POC) 134 (H) 65 - 100 mg/dL    [facility] by [facility]levins [facility], POC    Collection Time: 10/27/22  8:30 PM   Result Value Ref Range    Glucose (POC) 119 (H) 65 - 100 mg/dL    Performed by Flash Curran    CBC WITH AUTOMATED DIFF    Collection Time: 10/28/22  6:24 AM   Result Value Ref Range    WBC 7.8 3.6 - 11.0 K/uL    RBC 2.90 (L) 3.80 - 5.20 M/uL    HGB 7.7 (L) 11.5 - 16.0 g/dL    HCT 25.0 (L) 35.0 - 47.0 %    MCV 86.2 80.0 - 99.0 FL    MCH 26.6 26.0 - 34.0 PG    MCHC 30.8 30.0 - 36.5 g/dL    RDW 20.1 (H) 11.5 - 14.5 %    PLATELET 791 319 - 433 K/uL    MPV 11.0 8.9 - 12.9 FL    NRBC 0.0 0.0  WBC    ABSOLUTE NRBC 0.00 0.00 - 0.01 K/uL    NEUTROPHILS 65 32 - 75 %    LYMPHOCYTES 20 12 - 49 %    MONOCYTES 10 5 - 13 %    EOSINOPHILS 4 0 - 7 %    BASOPHILS 1 0 - 1 %    IMMATURE GRANULOCYTES 0 0 - 0.5 %    ABS. NEUTROPHILS 5.1 1.8 - 8.0 K/UL    ABS. LYMPHOCYTES 1.5 0.8 - 3.5 K/UL    ABS. MONOCYTES 0.8 0.0 - 1.0 K/UL    ABS. EOSINOPHILS 0.3 0.0 - 0.4 K/UL    ABS. BASOPHILS 0.1 0.0 - 0.1 K/UL    ABS. IMM.  GRANS. 0.0 0.00 - 0.04 K/UL    DF AUTOMATED     RENAL FUNCTION PANEL    Collection Time: 10/28/22  6:24 AM   Result Value Ref Range    Sodium 139 136 - 145 mmol/L    Potassium 4.8 3.5 - 5.1 mmol/L    Chloride 106 97 - 108 mmol/L    CO2 26 21 - 32 mmol/L    Anion gap 7 5 - 15 mmol/L    Glucose 120 (H) 65 - 100 mg/dL    BUN 52 (H) 6 - 20 mg/dL    Creatinine 1.62 (H) 0.55 - 1.02 mg/dL    BUN/Creatinine ratio 32 (H) 12 - 20      eGFR 32 (L) >60 ml/min/1.73m2    Calcium 9.3 8.5 - 10.1 mg/dL    Phosphorus 3.8 2.6 - 4.7 mg/dL    Albumin 2.4 (L) 3.5 - 5.0 g/dL   GLUCOSE, POC    Collection Time: 10/28/22  7:47 AM Result Value Ref Range    Glucose (POC) 115 (H) 65 - 100 mg/dL    Performed by Ozzie Ramos         Assessment/Plan:     Active Problems:    Rectal bleeding (10/21/2022)      Christian Ordoñez is a 65 y.o. female who presents with reports of syncopal episode at home. According to patient, she was seated on the couch watching TV and does not remember the details of what happened . She remembers waking up in an ambulance. Reports having epigastric abdominal pain for the last few days with dark tarry stools. Work-up in the ED shows a hemoglobin of 6.3 with a hematocrit of 20.1. She was last admitted in the hospital October 10 through October 13 for similar episode of syncope with bleeding. EGD done October 12 did not find any active upper GI bleed. She was discharged home on oral Protonix twice daily and advised to stop taking apixaban. S/p EGD on 10/21/22 showed gastritis without evidence of bleeding. S/p 2 unit of PRBC transfusion on 10/21/22. Colonoscopy on 10/24/22 showed mild diverticulosis, polyp and hemorrhoids. Capsule endoscopy done on 10/25/22 showed blood clot loaded at duodenal and proximal jejunum indicating active bleeding. Repeat EGD performed again active bleeding noted. Patient may require IR intervention although has severe renal dysfunction with 1 kidney due to history of renal cancer and nephrectomy. Continue to monitor hemoglobin    Impression\plan:    1. Acute blood loss anemia secondary to GI bleed  Status posttransfusion of 3 units packed red blood cell  Status post EGD with proximal jejunal bleed with erosions  GI following  Continue to monitor hemoglobin and transfuse for hemoglobin less than 7  Continue Protonix  May require surgical intervention versus interventional radiology embolization    2. ALEX with chronic kidney disease  Baseline 1.4  Dr. Erum Fan primary nephrologist  Consult nephrology for the possibility of requiring a CTA and IR intervention    3.   Syncope  Secondary to #1 continue to monitor hemoglobin maintain greater than 7    4. Essential hypertension  Continue Coreg  Continue hydralazine    5. Previous history of pulm emboli  Have discontinue anticoagulation due to the GI bleeding    Care Plan discussed with: Patient/Family    Total time spent with patient: >35 minutes.

## 2022-10-28 NOTE — PROGRESS NOTES
CM reviewed chart. Patient's hemoglobin is still being monitored. Could require procedure in IR per physician's note. Current plan is still for patient to return home/self when medically clear for discharge. CM will continue to follow.

## 2022-10-28 NOTE — PROGRESS NOTES
Progress Note    Patient: Kaitlynn Solis MRN: [id number]  SSN: [id number]    YOB: 1945  Age: 65 y.o.   Sex: female      Admit Date: 10/21/2022    LOS: 6 days     Subjective:   Patient stayed in the bed comfortably, no documented melena, , no abdominal pain  Past Medical History:   Diagnosis Date    Adenocarcinoma, renal cell ([facility] 9/2/2020    Arthritis     CAD (coronary artery disease)     Chronic kidney disease     Diabetes ([facility]     Gout     Hypercholesterolemia     Hypertension     Mental depression     Pulmonary embolism (HCC)     Seizures ([facility]     Stroke ([facility]     Thyroid disease         Current Facility-Administered Medications:     pantoprazole (PROTONIX) tablet 40 mg, 40 mg, Oral, ACB, Kerry Mac MD, 40 mg at 10/27/22 0824    EPINEPHrine HCl (PF) (ADRENALIN) 1 mg/mL (1 mL) injection, , , PRN, Kerry Mac MD, 0.7 mg at 10/26/22 1420    0.9% sodium chloride infusion 250 mL, 250 mL, IntraVENous, PRN, Patricia Bragg MD    hydrALAZINE (APRESOLINE) tablet 25 mg, 25 mg, Oral, TID PRN, Patricia Bragg MD, 25 mg at 10/24/22 1217    guaiFENesin (ROBITUSSIN) 100 mg/5 mL oral liquid 100 mg, 100 mg, Oral, Q4H PRN, Sky Salvador MD, 100 mg at 10/23/22 0124    sodium chloride (NS) flush 5-40 mL, 5-40 mL, IntraVENous, Q8H, Ulis Cockayne, MD, 10 mL at 10/27/22 2201    0.9% sodium chloride infusion 250 mL, 250 mL, IntraVENous, PRN, Ulis Cockayne, MD    sodium chloride (NS) flush 5-40 mL, 5-40 mL, IntraVENous, Q8H, Isacc Lee MD, 20 mL at 10/26/22 0904    sodium chloride (NS) flush 5-40 mL, 5-40 mL, IntraVENous, PRN, Isacc Lee MD, 10 mL at 10/22/22 1434    acetaminophen (TYLENOL) tablet 650 mg, 650 mg, Oral, Q6H PRN, 650 mg at 10/27/22 4367 **OR** acetaminophen (TYLENOL) suppository 650 mg, 650 mg, Rectal, Q6H PRN, Isacc Lee MD    polyethylene glycol (MIRALAX) packet 17 g, 17 g, Oral, DAILY PRN, Isacc Lee MD, 17 g at 10/27/22 4179 ondansetron (ZOFRAN ODT) tablet 4 mg, 4 mg, Oral, Q8H PRN **OR** ondansetron (ZOFRAN) injection 4 mg, 4 mg, IntraVENous, Q6H PRN, Mimi Campbell MD, 4 mg at 10/26/22 1456    carvediloL (COREG) tablet 6.25 mg, 6.25 mg, Oral, BID WITH MEALS, Mimi Campbell MD, 6.25 mg at 10/27/22 7023    donepeziL (ARICEPT) tablet 10 mg, 10 mg, Oral, QHS, Mimi Campbell MD, 10 mg at 10/27/22 2157    gabapentin (NEURONTIN) capsule 300 mg, 300 mg, Oral, BID, Mimi Campbell MD, 300 mg at 10/27/22 2157    latanoprost (XALATAN) 0.005 % ophthalmic solution 1 Drop, 1 Drop, Both Eyes, QHS, Mimi Campbell MD, 1 Drop at 10/27/22 2157    melatonin tablet 5 mg, 5 mg, Oral, QHS, Mimi Campbell MD, 5 mg at 10/27/22 2157    venlafaxine-SR (EFFEXOR-XR) capsule 75 mg, 75 mg, Oral, DAILY, Mimi Campbell MD, 75 mg at 10/27/22 0824    insulin lispro (HUMALOG) injection, , SubCUTAneous, AC&HS, Mimi Campbell MD, 3 Units at 10/27/22 1239    Objective:     Vitals:    10/27/22 0813 10/27/22 1138 10/27/22 1442 10/27/22 2024   BP: (!) 130/56  (!) 105/50 (!) 143/64   Pulse: 68  62 65   Resp: 20  18 20   Temp: 98.1 °F (36.7 °C)  98.7 °F (37.1 °C) 98.1 °F (36.7 °C)   SpO2: 99%  98% 98%   Weight:       Height:  5' 4\" (1.626 m)          Intake and Output:  Current Shift: No intake/output data recorded. Last three shifts: 10/26 0701 - 10/27 1900  In: 0723 [P.O.:1040; I.V.:400]  Out: 1390 [Urine:1390]    Physical Exam:   Physical Exam  HENT:      Mouth/Throat:      Mouth: Mucous membranes are dry. Cardiovascular:      Rate and Rhythm: Regular rhythm. Heart sounds: Normal heart sounds. Pulmonary:      Breath sounds: Normal breath sounds. Abdominal:      Palpations: Abdomen is soft. Musculoskeletal:      Cervical back: Neck supple. Skin:     General: Skin is warm. Neurological:      Mental Status: Mental status is at baseline.         Lab/Data Review:  Recent Results (from the past 24 hour(s))   CBC WITH AUTOMATED DIFF Collection Time: 10/27/22  4:10 AM   Result Value Ref Range    WBC 9.2 3.6 - 11.0 K/uL    RBC 2.78 (L) 3.80 - 5.20 M/uL    HGB 7.6 (L) 11.5 - 16.0 g/dL    HCT 23.7 (L) 35.0 - 47.0 %    MCV 85.3 80.0 - 99.0 FL    MCH 27.3 26.0 - 34.0 PG    MCHC 32.1 30.0 - 36.5 g/dL    RDW 19.9 (H) 11.5 - 14.5 %    PLATELET 761 066 - 818 K/uL    MPV 10.5 8.9 - 12.9 FL    NRBC 0.0 0.0  WBC    ABSOLUTE NRBC 0.00 0.00 - 0.01 K/uL    NEUTROPHILS 71 32 - 75 %    LYMPHOCYTES 17 12 - 49 %    MONOCYTES 9 5 - 13 %    EOSINOPHILS 3 0 - 7 %    BASOPHILS 0 0 - 1 %    IMMATURE GRANULOCYTES 0 0 - 0.5 %    ABS. NEUTROPHILS 6.5 1.8 - 8.0 K/UL    ABS. LYMPHOCYTES 1.6 0.8 - 3.5 K/UL    ABS. MONOCYTES 0.8 0.0 - 1.0 K/UL    ABS. EOSINOPHILS 0.3 0.0 - 0.4 K/UL    ABS. BASOPHILS 0.0 0.0 - 0.1 K/UL    ABS. IMM.  GRANS. 0.0 0.00 - 0.04 K/UL    DF AUTOMATED     METABOLIC PANEL, BASIC    Collection Time: 10/27/22  4:10 AM   Result Value Ref Range    Sodium 140 136 - 145 mmol/L    Potassium 4.4 3.5 - 5.1 mmol/L    Chloride 107 97 - 108 mmol/L    CO2 26 21 - 32 mmol/L    Anion gap 7 5 - 15 mmol/L    Glucose 98 65 - 100 mg/dL    BUN 43 (H) 6 - 20 mg/dL    Creatinine 1.51 (H) 0.55 - 1.02 mg/dL    BUN/Creatinine ratio 28 (H) 12 - 20      eGFR 35 (L) >60 ml/min/1.73m2    Calcium 8.8 8.5 - 10.1 mg/dL   GLUCOSE, POC    Collection Time: 10/27/22  8:21 AM   Result Value Ref Range    Glucose (POC) 123 (H) 65 - 100 mg/dL    Performed by [place]    GLUCOSE, POC    Collection Time: 10/27/22 11:55 AM   Result Value Ref Range    Glucose (POC) 209 (H) 65 - 100 mg/dL    Performed by Mita Cuadraa    GLUCOSE, POC    Collection Time: 10/27/22  4:33 PM   Result Value Ref Range    Glucose (POC) 64 (L) 65 - 100 mg/dL    Performed by Mita Cuadraa    GLUCOSE, POC    Collection Time: 10/27/22  5:59 PM   Result Value Ref Range    Glucose (POC) 134 (H) 65 - 100 mg/dL    Performed by Ely CAMACHO/ Sarwat Blevins [facility], POC    Collection Time: 10/27/22  8:30 PM   Result Value Ref Range    Glucose (POC) 119 (H) 65 - 100 mg/dL    Performed by Coleen Shanks         No orders to display        Assessment:     Active Problems:    Rectal bleeding (10/21/2022)     bleeding from a small bowel,  Cell possible endoscope will inject epinephrine,  Not able to determi etiology for bleeding, no AVM noted, no inflammation or ischemia  Plan:   Continue monitor hemoglobin close,  Iron replacement  Blood transfusion after hemoglobin dropped to 7  May need a CTA despite renal insufficiency if bleeding persists    Signed By: Julián Blanco MD     October 27, 2022        Thank you for allowing me to participate in this patients care  Cc Referring Physician   Leanna Faust NP

## 2022-10-28 NOTE — CONSULTS
Renal Consult Note    Admit Date: 10/21/2022      HPI:   48-year-old -American lady with history of a solitary kidney who is known to have chronic kidney disease. Creatinine was 2.3 mg and last year. Today her creatinine is 1.62 mg. She was admitted with a GI bleed that was seen on a capsule endoscopy. Her hemoglobin today is 7.7 g.  A mesenteric angiogram is being contemplated though not scheduled. I been asked to see her regarding this. The patient tells me that she has had melanotic stools. She denies abdominal pain. She denies shortness of breath at rest.  She denies chest pain. She is on full liquids at present.       Current Facility-Administered Medications   Medication Dose Route Frequency    pantoprazole (PROTONIX) tablet 40 mg  40 mg Oral ACB    EPINEPHrine HCl (PF) (ADRENALIN) 1 mg/mL (1 mL) injection    PRN    0.9% sodium chloride infusion 250 mL  250 mL IntraVENous PRN    hydrALAZINE (APRESOLINE) tablet 25 mg  25 mg Oral TID PRN    guaiFENesin (ROBITUSSIN) 100 mg/5 mL oral liquid 100 mg  100 mg Oral Q4H PRN    sodium chloride (NS) flush 5-40 mL  5-40 mL IntraVENous Q8H    0.9% sodium chloride infusion 250 mL  250 mL IntraVENous PRN    sodium chloride (NS) flush 5-40 mL  5-40 mL IntraVENous Q8H    sodium chloride (NS) flush 5-40 mL  5-40 mL IntraVENous PRN    acetaminophen (TYLENOL) tablet 650 mg  650 mg Oral Q6H PRN    Or    acetaminophen (TYLENOL) suppository 650 mg  650 mg Rectal Q6H PRN    polyethylene glycol (MIRALAX) packet 17 g  17 g Oral DAILY PRN    ondansetron (ZOFRAN ODT) tablet 4 mg  4 mg Oral Q8H PRN    Or    ondansetron (ZOFRAN) injection 4 mg  4 mg IntraVENous Q6H PRN    carvediloL (COREG) tablet 6.25 mg  6.25 mg Oral BID WITH MEALS    donepeziL (ARICEPT) tablet 10 mg  10 mg Oral QHS    gabapentin (NEURONTIN) capsule 300 mg  300 mg Oral BID    latanoprost (XALATAN) 0.005 % ophthalmic solution 1 Drop  1 Drop Both Eyes QHS    melatonin tablet 5 mg  5 mg Oral QHS venlafaxine-SR (EFFEXOR-XR) capsule 75 mg  75 mg Oral DAILY    insulin lispro (HUMALOG) injection   SubCUTAneous AC&HS        Past Medical History:   Diagnosis Date    Adenocarcinoma, renal cell ([facility] 9/2/2020    Arthritis     CAD (coronary artery disease)     Chronic kidney disease     Diabetes ([facility]     Gout     Hypercholesterolemia     Hypertension     Mental depression     Pulmonary embolism (HCC)     Seizures ([facility]     Stroke [facility])     Thyroid disease       Past Surgical History:   Procedure Laterality Date    COLONOSCOPY N/A 10/24/2022    COLONOSCOPY performed by Katja Pearson MD at [address] [place] [address] ENDOSCOPY    HX GYN      HX HYSTERECTOMY      HX NEPHRECTOMY Left 02/12/2015    HX ORTHOPAEDIC      HX UROLOGICAL  02/12/2015    PARTIAL URETERECTOMY     AL CARDIAC SURG PROCEDURE UNLIST      stents placed      Family History   Problem Relation Age of Onset    Heart Disease Mother     Heart Disease Father     Heart Disease Sister     Cancer Sister     Heart Disease Brother     Cancer Maternal Grandmother     Stroke Son     Cancer Sister       Social History     Tobacco Use    Smoking status: Former     Years: 15.00     Types: Cigarettes    Smokeless tobacco: Never   Substance Use Topics    Alcohol use: Not Currently         Review of Systems    Review of Systems   Constitutional:  Negative for fever. Respiratory:  Positive for cough. Negative for shortness of breath. Cardiovascular:  Negative for chest pain. Gastrointestinal:  Negative for abdominal pain. Neurological:  Negative for dizziness and headaches. Physical Exam:     Physical Exam  Cardiovascular:      Rate and Rhythm: Regular rhythm. Pulmonary:      Breath sounds: Normal breath sounds. Abdominal:      General: Bowel sounds are normal.      Palpations: Abdomen is soft. Musculoskeletal:      Right lower leg: No edema. Left lower leg: No edema. Neurological:      Mental Status: She is alert.    No edema  No asterixis  Left upper arm AV fistula is patent      Patient Vitals for the past 8 hrs:   BP Temp Pulse Resp SpO2   10/28/22 1601 119/66 98.2 °F (36.8 °C) (!) 56 22 99 %   10/28/22 1033 (!) 149/68 97.5 °F (36.4 °C) 62 14 99 %     10/28 0701 - 10/28 1900  In: 800 [P.O.:800]  Out: 800 [Urine:800]  10/26 1901 - 10/28 0700  In: 1139 [P.O.:800]  Out: 640 [Urine:640]          No orders to display        Data Review   Recent Labs     10/28/22  0624 10/27/22  0410 10/26/22  1545   WBC 7.8 9.2 9.8   HGB 7.7* 7.6* 6.7*   HCT 25.0* 23.7* 21.4*    153 151     Recent Labs     10/28/22  0624 10/27/22  0410 10/26/22  0438    140 140   K 4.8 4.4 4.1    107 108   CO2 26 26 28   * 98 135*   BUN 52* 43* 38*   CREA 1.62* 1.51* 1.41*   CA 9.3 8.8 9.2   PHOS 3.8  --   --    ALB 2.4*  --   --      No components found for: GLPOC  No results for input(s): PH, PCO2, PO2, HCO3, FIO2 in the last 72 hours. No results for input(s): INR, INREXT, INREXT in the last 72 hours. Assessment:           Active Problems:    Rectal bleeding (10/21/2022)    Chronic kidney disease stage IIIb/IV. Renal function is stable. Hypertension under good control    Diabetes mellitus    Anemia due to anemia of chronic disease and GI bleed. Exact source of GI bleed has not been determined yet. Plan:     If an mesenteric arteriogram is planned, she will need intravenous fluids for hydration. Her renal function is stable right now. Follow hemoglobin  She could be a candidate for erythropoietin as an outpatient if the hemoglobin stays low.   Thank you for this consult

## 2022-10-28 NOTE — PROGRESS NOTES
Spiritual Care Assessment/Progress Note  [facility]      NAME: Pam Jacobo      MRN: [id number]  AGE: 65 y.o. SEX: female  Temple Affiliation: Voodoo   Language: English     10/28/2022     Total Time (in minutes): 26     Spiritual Assessment begun in [facility] through conversation with:         []Patient        [] Family    [] Friend(s)        Reason for Consult: Initial/Spiritual assessment, patient floor     Spiritual beliefs: (Please include comment if needed)     [x] Identifies with a lorri tradition:         [] Supported by a lorri community:            [] Claims no spiritual orientation:           [] Seeking spiritual identity:                [] Adheres to an individual form of spirituality:           [] Not able to assess:                           Identified resources for coping:      [] Prayer                               [] Music                  [] Guided Imagery     [x] Family/friends                 [] Pet visits     [] Devotional reading                         [] Unknown     [] Other:                                                Interventions offered during this visit: (See comments for more details)    Patient Interventions: Coping skills reviewed/reinforced, Initial/Spiritual assessment, patient floor, Life review/legacy, Normalization of emotional/spiritual concerns, Prayer (assurance of), Temple beliefs/image of God discussed, Integration of medical assessment with existing values and beliefs, Affirmation of lorri, Catharsis/review of pertinent events in supportive environment, Iconic (affirming the presence of God/Higher Power)     Family/Friend(s):  Affirmation of lorri, Catharsis/review of pertinent events in supportive environment, Prayer (assurance of), Temple beliefs/image of God discussed, Life review/legacy     Plan of Care:     [] Support spiritual and/or cultural needs    [] Support AMD and/or advance care planning process      [] Support grieving process [] Coordinate Rites and/or Rituals    [] Coordination with community clergy   [] No spiritual needs identified at this time   [] Detailed Plan of Care below (See Comments)  [] Make referral to Music Therapy  [] Make referral to Pet Therapy     [] Make referral to Addiction services  [] Make referral to [facility]  [] Make referral to Spiritual Care Partner  [] No future visits requested        [x] Contact Spiritual Care for further referrals     Comments:  Patient was in bed and received  warmly when visited in  for initial spiritual assessment. There was a grand daughter present during visit. Patient and family engaged  in extensive conversation and life review and shared about her medical condition, which creates much discomfort. She however feels better today. They also shared about alva and family. Patient has three children but also adopted and raised two of her grand children(including the one present). They consider her their mother. She therefore has a great support system. Alva is also a very important coping resource, they are [address] listened actively and affirmed thoughts and feelings. No need for support requested when  inquired. Patient asked  to come back and see her and they both expressed gratitude for the visit.        Visited by: Manson Libman To page chaplain: [phone])

## 2022-10-28 NOTE — PROGRESS NOTES
Problem: Falls - Risk of  Goal: *Absence of Falls  Description: Document Kandy Skill Fall Risk and appropriate interventions in the flowsheet. Outcome: Progressing Towards Goal  Note: Fall Risk Interventions:  Mobility Interventions: Bed/chair exit alarm, Patient to call before getting OOB, PT Consult for mobility concerns, OT consult for ADLs, Utilize walker, cane, or other assistive device         Medication Interventions: Bed/chair exit alarm, Patient to call before getting OOB    Elimination Interventions: Bed/chair exit alarm, Call light in reach, Patient to call for help with toileting needs    History of Falls Interventions: Bed/chair exit alarm, Door open when patient unattended, Room close to nurse's station         Problem: Pressure Injury - Risk of  Goal: *Prevention of pressure injury  Description: Document Kirit Scale and appropriate interventions in the flowsheet.   Outcome: Progressing Towards Goal  Note: Pressure Injury Interventions:  Sensory Interventions: Maintain/enhance activity level, Minimize linen layers    Moisture Interventions: Absorbent underpads, Internal/External urinary devices, Minimize layers    Activity Interventions: Increase time out of bed    Mobility Interventions: HOB 30 degrees or less, PT/OT evaluation    Nutrition Interventions: Document food/fluid/supplement intake

## 2022-10-28 NOTE — PROGRESS NOTES
Progress Note    Patient: Jerry Ibanez MRN: [id number]  SSN: [id number]    YOB: 1945  Age: 65 y.o.   Sex: female      Admit Date: 10/21/2022    LOS: 7 days     Subjective:   no documented melena, or red blood per rectum, no abdominal pain  Past Medical History:   Diagnosis Date    Adenocarcinoma, renal cell ([facility] 9/2/2020    Arthritis     CAD (coronary artery disease)     Chronic kidney disease     Diabetes ([facility]     Gout     Hypercholesterolemia     Hypertension     Mental depression     Pulmonary embolism (HCC)     Seizures ([facility]     Stroke ([place] [address].)     Thyroid disease         Current Facility-Administered Medications:     pantoprazole (PROTONIX) tablet 40 mg, 40 mg, Oral, ACB, Soo Brown MD, 40 mg at 10/28/22 0757    EPINEPHrine HCl (PF) (ADRENALIN) 1 mg/mL (1 mL) injection, , , PRN, Soo Brown MD, 0.7 mg at 10/26/22 1420    0.9% sodium chloride infusion 250 mL, 250 mL, IntraVENous, PRN, Erica Smith MD    hydrALAZINE (APRESOLINE) tablet 25 mg, 25 mg, Oral, TID PRN, Erica Smith MD, 25 mg at 10/24/22 1217    guaiFENesin (ROBITUSSIN) 100 mg/5 mL oral liquid 100 mg, 100 mg, Oral, Q4H PRN, Sky Salvador MD, 100 mg at 10/23/22 0124    sodium chloride (NS) flush 5-40 mL, 5-40 mL, IntraVENous, Q8H, Maddy De Luna MD, 10 mL at 10/28/22 0629    0.9% sodium chloride infusion 250 mL, 250 mL, IntraVENous, PRN, Maddy De Luna MD    sodium chloride (NS) flush 5-40 mL, 5-40 mL, IntraVENous, Q8H, Dania Omer MD, 20 mL at 10/26/22 0904    sodium chloride (NS) flush 5-40 mL, 5-40 mL, IntraVENous, PRN, Dania Omer MD, 10 mL at 10/22/22 1434    acetaminophen (TYLENOL) tablet 650 mg, 650 mg, Oral, Q6H PRN, 650 mg at 10/27/22 6881 **OR** acetaminophen (TYLENOL) suppository 650 mg, 650 mg, Rectal, Q6H PRN, Estil Staff, MD    polyethylene glycol (MIRALAX) packet 17 g, 17 g, Oral, DAILY PRN, Estil Staff, MD, 17 g at 10/28/22 7199    ondansetron (ZOFRAN ODT) tablet 4 mg, 4 mg, Oral, Q8H PRN **OR** ondansetron (ZOFRAN) injection 4 mg, 4 mg, IntraVENous, Q6H PRN, Luiza Love MD, 4 mg at 10/26/22 1456    carvediloL (COREG) tablet 6.25 mg, 6.25 mg, Oral, BID WITH MEALS, Luiza Love MD, 6.25 mg at 10/28/22 0757    donepeziL (ARICEPT) tablet 10 mg, 10 mg, Oral, QHS, Luiza Love MD, 10 mg at 10/27/22 2157    gabapentin (NEURONTIN) capsule 300 mg, 300 mg, Oral, BID, Luiza Love MD, 300 mg at 10/28/22 0950    latanoprost (XALATAN) 0.005 % ophthalmic solution 1 Drop, 1 Drop, Both Eyes, QHS, Luiza Love MD, 1 Drop at 10/27/22 2157    melatonin tablet 5 mg, 5 mg, Oral, QHS, Luiza Love MD, 5 mg at 10/27/22 2157    venlafaxine-SR (EFFEXOR-XR) capsule 75 mg, 75 mg, Oral, DAILY, Luiza Love MD, 75 mg at 10/28/22 0950    insulin lispro (HUMALOG) injection, , SubCUTAneous, AC&HS, Luiza Love MD, 2 Units at 10/28/22 1237    Objective:     Vitals:    10/27/22 1138 10/27/22 1442 10/27/22 2024 10/28/22 1033   BP:  (!) 105/50 (!) 143/64 (!) 149/68   Pulse:  62 65 62   Resp:  18 20 14   Temp:  98.7 °F (37.1 °C) 98.1 °F (36.7 °C) 97.5 °F (36.4 °C)   SpO2:  98% 98% 99%   Weight:       Height: 5' 4\" (1.626 m)           Intake and Output:  Current Shift: 10/28 0701 - 10/28 1900  In: -   Out: 500 [Urine:500]  Last three shifts: 10/26 1901 - 10/28 0700  In: 1139 [P.O.:800]  Out: 640 [Urine:640]    Physical Exam:   Physical Exam  HENT:      Mouth/Throat:      Mouth: Mucous membranes are dry. Cardiovascular:      Rate and Rhythm: Regular rhythm. Heart sounds: Normal heart sounds. Pulmonary:      Breath sounds: Normal breath sounds. Abdominal:      Palpations: Abdomen is soft. Musculoskeletal:      Cervical back: Neck supple. Skin:     General: Skin is warm. Neurological:      Mental Status: Mental status is at baseline.         Lab/Data Review:  Recent Results (from the past 24 hour(s))   GLUCOSE, POC    Collection Time: 10/27/22 4:33 PM   Result Value Ref Range    Glucose (POC) 64 (L) 65 - 100 mg/dL    Performed by Jameson Granger    GLUCOSE, POC    Collection Time: 10/27/22  5:59 PM   Result Value Ref Range    Glucose (POC) 134 (H) 65 - 100 mg/dL    Performed by Ely CAMACHO/ Sarwat Blevins [facility], POC    Collection Time: 10/27/22  8:30 PM   Result Value Ref Range    Glucose (POC) 119 (H) 65 - 100 mg/dL    Performed by Shayan Claire    CBC WITH AUTOMATED DIFF    Collection Time: 10/28/22  6:24 AM   Result Value Ref Range    WBC 7.8 3.6 - 11.0 K/uL    RBC 2.90 (L) 3.80 - 5.20 M/uL    HGB 7.7 (L) 11.5 - 16.0 g/dL    HCT 25.0 (L) 35.0 - 47.0 %    MCV 86.2 80.0 - 99.0 FL    MCH 26.6 26.0 - 34.0 PG    MCHC 30.8 30.0 - 36.5 g/dL    RDW 20.1 (H) 11.5 - 14.5 %    PLATELET 179 742 - 531 K/uL    MPV 11.0 8.9 - 12.9 FL    NRBC 0.0 0.0  WBC    ABSOLUTE NRBC 0.00 0.00 - 0.01 K/uL    NEUTROPHILS 65 32 - 75 %    LYMPHOCYTES 20 12 - 49 %    MONOCYTES 10 5 - 13 %    EOSINOPHILS 4 0 - 7 %    BASOPHILS 1 0 - 1 %    IMMATURE GRANULOCYTES 0 0 - 0.5 %    ABS. NEUTROPHILS 5.1 1.8 - 8.0 K/UL    ABS. LYMPHOCYTES 1.5 0.8 - 3.5 K/UL    ABS. MONOCYTES 0.8 0.0 - 1.0 K/UL    ABS. EOSINOPHILS 0.3 0.0 - 0.4 K/UL    ABS. BASOPHILS 0.1 0.0 - 0.1 K/UL    ABS. IMM.  GRANS. 0.0 0.00 - 0.04 K/UL    DF AUTOMATED     RENAL FUNCTION PANEL    Collection Time: 10/28/22  6:24 AM   Result Value Ref Range    Sodium 139 136 - 145 mmol/L    Potassium 4.8 3.5 - 5.1 mmol/L    Chloride 106 97 - 108 mmol/L    CO2 26 21 - 32 mmol/L    Anion gap 7 5 - 15 mmol/L    Glucose 120 (H) 65 - 100 mg/dL    BUN 52 (H) 6 - 20 mg/dL    Creatinine 1.62 (H) 0.55 - 1.02 mg/dL    BUN/Creatinine ratio 32 (H) 12 - 20      eGFR 32 (L) >60 ml/min/1.73m2    Calcium 9.3 8.5 - 10.1 mg/dL    Phosphorus 3.8 2.6 - 4.7 mg/dL    Albumin 2.4 (L) 3.5 - 5.0 g/dL   GLUCOSE, POC    Collection Time: 10/28/22  7:47 AM   Result Value Ref Range    Glucose (POC) 115 (H) 65 - 100 mg/dL    Performed by Bubba Mckenna    GLUCOSE, POC Collection Time: 10/28/22 11:41 AM   Result Value Ref Range    Glucose (POC) 156 (H) 65 - 100 mg/dL    Performed by Daphne Marroquin         No orders to display        Assessment:     Active Problems:    Rectal bleeding (10/21/2022)   bleeding from a small bowel,  Cell possible endoscope will inject epinephrine,  Not able to determi etiology for bleeding, no AVM noted, no inflammation or ischemia  Plan:   Continue monitor hemoglobin closely,  Iron replacement  Blood transfusion after hemoglobin dropped to 7  Follow as needed.      Signed By: Tarah Oakes MD     October 28, 2022        Thank you for allowing me to participate in this patients care  Cc Referring Physician   Ophelia Blizzard, NP

## 2022-10-29 LAB
BASOPHILS # BLD: 0 K/UL (ref 0–0.1)
BASOPHILS NFR BLD: 1 % (ref 0–1)
DIFFERENTIAL METHOD BLD: ABNORMAL
EOSINOPHIL # BLD: 0.2 K/UL (ref 0–0.4)
EOSINOPHIL NFR BLD: 3 % (ref 0–7)
ERYTHROCYTE [DISTWIDTH] IN BLOOD BY AUTOMATED COUNT: 19.8 % (ref 11.5–14.5)
GLUCOSE BLD STRIP.AUTO-MCNC: 141 MG/DL (ref 65–100)
GLUCOSE BLD STRIP.AUTO-MCNC: 142 MG/DL (ref 65–100)
GLUCOSE BLD STRIP.AUTO-MCNC: 172 MG/DL (ref 65–100)
GLUCOSE BLD STRIP.AUTO-MCNC: 67 MG/DL (ref 65–100)
HCT VFR BLD AUTO: 24 % (ref 35–47)
HGB BLD-MCNC: 7.6 G/DL (ref 11.5–16)
IMM GRANULOCYTES # BLD AUTO: 0 K/UL (ref 0–0.04)
IMM GRANULOCYTES NFR BLD AUTO: 0 % (ref 0–0.5)
LYMPHOCYTES # BLD: 1.1 K/UL (ref 0.8–3.5)
LYMPHOCYTES NFR BLD: 14 % (ref 12–49)
MCH RBC QN AUTO: 27.5 PG (ref 26–34)
MCHC RBC AUTO-ENTMCNC: 31.7 G/DL (ref 30–36.5)
MCV RBC AUTO: 87 FL (ref 80–99)
MONOCYTES # BLD: 0.7 K/UL (ref 0–1)
MONOCYTES NFR BLD: 10 % (ref 5–13)
NEUTS SEG # BLD: 5.5 K/UL (ref 1.8–8)
NEUTS SEG NFR BLD: 72 % (ref 32–75)
NRBC # BLD: 0 K/UL (ref 0–0.01)
NRBC BLD-RTO: 0 PER 100 WBC
PERFORMED BY, TECHID: ABNORMAL
PERFORMED BY, TECHID: NORMAL
PLATELET # BLD AUTO: 202 K/UL (ref 150–400)
PMV BLD AUTO: 10.9 FL (ref 8.9–12.9)
RBC # BLD AUTO: 2.76 M/UL (ref 3.8–5.2)
WBC # BLD AUTO: 7.6 K/UL (ref 3.6–11)

## 2022-10-29 PROCEDURE — 74011250637 HC RX REV CODE- 250/637: Performed by: INTERNAL MEDICINE

## 2022-10-29 PROCEDURE — 84156 ASSAY OF PROTEIN URINE: CPT

## 2022-10-29 PROCEDURE — 74011636637 HC RX REV CODE- 636/637: Performed by: INTERNAL MEDICINE

## 2022-10-29 PROCEDURE — 82962 GLUCOSE BLOOD TEST: CPT

## 2022-10-29 PROCEDURE — 36415 COLL VENOUS BLD VENIPUNCTURE: CPT

## 2022-10-29 PROCEDURE — 85025 COMPLETE CBC W/AUTO DIFF WBC: CPT

## 2022-10-29 PROCEDURE — 65270000029 HC RM PRIVATE

## 2022-10-29 PROCEDURE — 74011000250 HC RX REV CODE- 250: Performed by: INTERNAL MEDICINE

## 2022-10-29 RX ADMIN — CARVEDILOL 6.25 MG: 3.12 TABLET, FILM COATED ORAL at 08:13

## 2022-10-29 RX ADMIN — GABAPENTIN 300 MG: 300 CAPSULE ORAL at 21:56

## 2022-10-29 RX ADMIN — INSULIN LISPRO 2 UNITS: 100 INJECTION, SOLUTION INTRAVENOUS; SUBCUTANEOUS at 08:14

## 2022-10-29 RX ADMIN — SODIUM CHLORIDE, PRESERVATIVE FREE 10 ML: 5 INJECTION INTRAVENOUS at 07:38

## 2022-10-29 RX ADMIN — INSULIN LISPRO 2 UNITS: 100 INJECTION, SOLUTION INTRAVENOUS; SUBCUTANEOUS at 12:43

## 2022-10-29 RX ADMIN — GABAPENTIN 300 MG: 300 CAPSULE ORAL at 08:13

## 2022-10-29 RX ADMIN — MELATONIN TAB 5 MG 5 MG: 5 TAB at 21:56

## 2022-10-29 RX ADMIN — SODIUM CHLORIDE, PRESERVATIVE FREE 10 ML: 5 INJECTION INTRAVENOUS at 21:58

## 2022-10-29 RX ADMIN — VENLAFAXINE HYDROCHLORIDE 75 MG: 75 CAPSULE, EXTENDED RELEASE ORAL at 08:13

## 2022-10-29 RX ADMIN — DONEPEZIL HYDROCHLORIDE 10 MG: 5 TABLET, FILM COATED ORAL at 21:56

## 2022-10-29 RX ADMIN — SODIUM CHLORIDE, PRESERVATIVE FREE 10 ML: 5 INJECTION INTRAVENOUS at 15:19

## 2022-10-29 RX ADMIN — LATANOPROST 1 DROP: 50 SOLUTION OPHTHALMIC at 22:01

## 2022-10-29 RX ADMIN — PANTOPRAZOLE SODIUM 40 MG: 40 TABLET, DELAYED RELEASE ORAL at 08:13

## 2022-10-29 RX ADMIN — ACETAMINOPHEN 650 MG: 325 TABLET, FILM COATED ORAL at 21:57

## 2022-10-29 NOTE — PROGRESS NOTES
Problem: Falls - Risk of  Goal: *Absence of Falls  Description: Document Johnathan Bartholomew Fall Risk and appropriate interventions in the flowsheet. Outcome: Progressing Towards Goal  Note: Fall Risk Interventions:  Mobility Interventions: Bed/chair exit alarm, Patient to call before getting OOB, PT Consult for mobility concerns         Medication Interventions: Patient to call before getting OOB, Bed/chair exit alarm, Teach patient to arise slowly    Elimination Interventions: Bed/chair exit alarm, Call light in reach, Patient to call for help with toileting needs    History of Falls Interventions: Bed/chair exit alarm, Door open when patient unattended, Room close to nurse's station         Problem: Pressure Injury - Risk of  Goal: *Prevention of pressure injury  Description: Document Kirit Scale and appropriate interventions in the flowsheet.   Outcome: Progressing Towards Goal  Note: Pressure Injury Interventions:  Sensory Interventions: Keep linens dry and wrinkle-free    Moisture Interventions: Absorbent underpads, Internal/External urinary devices    Activity Interventions: PT/OT evaluation, Increase time out of bed    Mobility Interventions: PT/OT evaluation, HOB 30 degrees or less    Nutrition Interventions: Offer support with meals,snacks and hydration, Discuss nutritional consult with provider                     Problem: Pain  Goal: *Control of Pain  Outcome: Progressing Towards Goal

## 2022-10-29 NOTE — PROGRESS NOTES
Renal Daily Progress Note    Admit Date: 10/21/2022      Subjective:     76-year-old -American lady with history of a solitary kidney who is known to have chronic kidney disease with creat  2.3 mg last year. pt is admitted with GI bleeding . A mesenteric angiogram is being contemplated though not scheduled. Hence renal consult is requested   Pt noted to have significant weight loss. No complaints offered. Current Facility-Administered Medications   Medication Dose Route Frequency    pantoprazole (PROTONIX) tablet 40 mg  40 mg Oral ACB    EPINEPHrine HCl (PF) (ADRENALIN) 1 mg/mL (1 mL) injection    PRN    0.9% sodium chloride infusion 250 mL  250 mL IntraVENous PRN    hydrALAZINE (APRESOLINE) tablet 25 mg  25 mg Oral TID PRN    guaiFENesin (ROBITUSSIN) 100 mg/5 mL oral liquid 100 mg  100 mg Oral Q4H PRN    0.9% sodium chloride infusion 250 mL  250 mL IntraVENous PRN    sodium chloride (NS) flush 5-40 mL  5-40 mL IntraVENous Q8H    sodium chloride (NS) flush 5-40 mL  5-40 mL IntraVENous PRN    acetaminophen (TYLENOL) tablet 650 mg  650 mg Oral Q6H PRN    Or    acetaminophen (TYLENOL) suppository 650 mg  650 mg Rectal Q6H PRN    polyethylene glycol (MIRALAX) packet 17 g  17 g Oral DAILY PRN    ondansetron (ZOFRAN ODT) tablet 4 mg  4 mg Oral Q8H PRN    Or    ondansetron (ZOFRAN) injection 4 mg  4 mg IntraVENous Q6H PRN    carvediloL (COREG) tablet 6.25 mg  6.25 mg Oral BID WITH MEALS    donepeziL (ARICEPT) tablet 10 mg  10 mg Oral QHS    gabapentin (NEURONTIN) capsule 300 mg  300 mg Oral BID    latanoprost (XALATAN) 0.005 % ophthalmic solution 1 Drop  1 Drop Both Eyes QHS    melatonin tablet 5 mg  5 mg Oral QHS    venlafaxine-SR (EFFEXOR-XR) capsule 75 mg  75 mg Oral DAILY    insulin lispro (HUMALOG) injection   SubCUTAneous AC&HS        Review of Systems    Review of Systems   Constitutional:  Positive for weight loss. Respiratory:  Positive for cough.     Cardiovascular:  Negative for chest pain and leg swelling. Gastrointestinal:  Negative for abdominal pain, nausea and vomiting. Genitourinary:  Negative for dysuria, hematuria and urgency. Musculoskeletal:  Positive for joint pain. Neurological:  Positive for weakness. Negative for dizziness and headaches. Endo/Heme/Allergies:  Does not bruise/bleed easily. Psychiatric/Behavioral:  Negative for depression. Objective:     Patient Vitals for the past 8 hrs:   BP Temp Pulse Resp SpO2   10/29/22 1644 (!) 161/66 98.6 °F (37 °C) (!) 58 18 90 %     10/29 0701 - 10/29 1900  In: 350 [P.O.:350]  Out: 200 [Urine:200]  10/27 1901 - 10/29 0700  In: 3983 [P.O.:1040]  Out: 1500 [Urine:1500]    Physical Exam:   Physical Exam  Constitutional:       General: She is not in acute distress. Appearance: She is obese. She is not ill-appearing. HENT:      Head: Normocephalic. Mouth/Throat:      Mouth: Mucous membranes are moist.   Eyes:      Conjunctiva/sclera: Conjunctivae normal.      Pupils: Pupils are equal, round, and reactive to light. Cardiovascular:      Rate and Rhythm: Normal rate. Heart sounds: Normal heart sounds. Pulmonary:      Effort: Pulmonary effort is normal.      Breath sounds: Normal breath sounds. Musculoskeletal:      Cervical back: Neck supple. Right lower leg: No edema. Left lower leg: No edema. Skin:     General: Skin is warm and dry. Coloration: Pallor: pt.   Neurological:      General: No focal deficit present. Mental Status: She is alert and oriented to person, place, and time.    Psychiatric:         Mood and Affect: Mood normal.            No orders to display        Data Review   Recent Labs     10/29/22  1220 10/28/22  0624 10/27/22  0410   WBC 7.6 7.8 9.2   HGB 7.6* 7.7* 7.6*   HCT 24.0* 25.0* 23.7*    177 153     Recent Labs     10/28/22  0624 10/27/22  0410    140   K 4.8 4.4    107   CO2 26 26   * 98   BUN 52* 43*   CREA 1.62* 1.51*   CA 9.3 8.8   PHOS 3.8  -- ALB 2.4*  --      No components found for: GLPOC  No results for input(s): PH, PCO2, PO2, HCO3, FIO2 in the last 72 hours. No results for input(s): INR, INREXT, INREXT in the last 72 hours. Assessment:     Pt with CKD  due to Diabetes - stable renal function   Anemia - due to GI bleeding   HTN - B.P is o.k.   R/o Nephrotic syndrome     Active Problems:    Rectal bleeding (10/21/2022)        Plan:     O.k for mesenteric Angiogram   Pt need to be well hydrated before and after   Need repeat labs  Need urine PCR.

## 2022-10-29 NOTE — PROGRESS NOTES
Hospitalist Progress Note    Subjective:   Daily Progress Note: 10/29/2022 11:34 AM    Felling better, HGB stable    Current Facility-Administered Medications   Medication Dose Route Frequency    pantoprazole (PROTONIX) tablet 40 mg  40 mg Oral ACB    EPINEPHrine HCl (PF) (ADRENALIN) 1 mg/mL (1 mL) injection    PRN    0.9% sodium chloride infusion 250 mL  250 mL IntraVENous PRN    hydrALAZINE (APRESOLINE) tablet 25 mg  25 mg Oral TID PRN    guaiFENesin (ROBITUSSIN) 100 mg/5 mL oral liquid 100 mg  100 mg Oral Q4H PRN    0.9% sodium chloride infusion 250 mL  250 mL IntraVENous PRN    sodium chloride (NS) flush 5-40 mL  5-40 mL IntraVENous Q8H    sodium chloride (NS) flush 5-40 mL  5-40 mL IntraVENous PRN    acetaminophen (TYLENOL) tablet 650 mg  650 mg Oral Q6H PRN    Or    acetaminophen (TYLENOL) suppository 650 mg  650 mg Rectal Q6H PRN    polyethylene glycol (MIRALAX) packet 17 g  17 g Oral DAILY PRN    ondansetron (ZOFRAN ODT) tablet 4 mg  4 mg Oral Q8H PRN    Or    ondansetron (ZOFRAN) injection 4 mg  4 mg IntraVENous Q6H PRN    carvediloL (COREG) tablet 6.25 mg  6.25 mg Oral BID WITH MEALS    donepeziL (ARICEPT) tablet 10 mg  10 mg Oral QHS    gabapentin (NEURONTIN) capsule 300 mg  300 mg Oral BID    latanoprost (XALATAN) 0.005 % ophthalmic solution 1 Drop  1 Drop Both Eyes QHS    melatonin tablet 5 mg  5 mg Oral QHS    venlafaxine-SR (EFFEXOR-XR) capsule 75 mg  75 mg Oral DAILY    insulin lispro (HUMALOG) injection   SubCUTAneous AC&HS        Review of Systems  Review of Systems   Constitutional:  Positive for malaise/fatigue. HENT: Negative. Respiratory:  Negative for cough and shortness of breath. Cardiovascular:  Negative for chest pain and leg swelling. Gastrointestinal:  Positive for abdominal pain and blood in stool. Negative for nausea and vomiting. Genitourinary: Negative. Musculoskeletal: Negative. Skin: Negative. Neurological: Negative. Psychiatric/Behavioral: Negative. Objective:     Visit Vitals  BP (!) 163/88 (BP 1 Location: Right upper arm, BP Patient Position: At rest)   Pulse 72   Temp 97.8 °F (36.6 °C)   Resp 18   Ht 5' 4\" (1.626 m)   Wt 86.2 kg (190 lb)   SpO2 100%   BMI 32.61 kg/m²      O2 Device: None (Room air)    Temp (24hrs), Av.2 °F (36.8 °C), Min:97.8 °F (36.6 °C), Max:98.6 °F (37 °C)      No intake/output data recorded. 10/27 1901 - 10/29 0700  In: 1040 [P.O.:1040]  Out: 1500 [Urine:1500]    Recent Results (from the past 24 hour(s))   GLUCOSE, POC    Collection Time: 10/28/22  4:36 PM   Result Value Ref Range    Glucose (POC) 84 65 - 100 mg/dL    Performed by [facility] Coffman    GLUCOSE, POC    Collection Time: 10/28/22  7:58 PM   Result Value Ref Range    Glucose (POC) 144 (H) 65 - 100 mg/dL    Performed by Yuli Bahena    GLUCOSE, POC    Collection Time: 10/29/22  7:47 AM   Result Value Ref Range    Glucose (POC) 141 (H) 65 - 100 mg/dL    Performed by Katlin Gómez    GLUCOSE, POC    Collection Time: 10/29/22 11:38 AM   Result Value Ref Range    Glucose (POC) 142 (H) 65 - 100 mg/dL    Performed by Katlin Gómez    CBC WITH AUTOMATED DIFF    Collection Time: 10/29/22 12:20 PM   Result Value Ref Range    WBC 7.6 3.6 - 11.0 K/uL    RBC 2.76 (L) 3.80 - 5.20 M/uL    HGB 7.6 (L) 11.5 - 16.0 g/dL    HCT 24.0 (L) 35.0 - 47.0 %    MCV 87.0 80.0 - 99.0 FL    MCH 27.5 26.0 - 34.0 PG    MCHC 31.7 30.0 - 36.5 g/dL    RDW 19.8 (H) 11.5 - 14.5 %    PLATELET 158 336 - 165 K/uL    MPV 10.9 8.9 - 12.9 FL    NRBC 0.0 0.0  WBC    ABSOLUTE NRBC 0.00 0.00 - 0.01 K/uL    NEUTROPHILS 72 32 - 75 %    LYMPHOCYTES 14 12 - 49 %    MONOCYTES 10 5 - 13 %    EOSINOPHILS 3 0 - 7 %    BASOPHILS 1 0 - 1 %    IMMATURE GRANULOCYTES 0 0 - 0.5 %    ABS. NEUTROPHILS 5.5 1.8 - 8.0 K/UL    ABS. LYMPHOCYTES 1.1 0.8 - 3.5 K/UL    ABS. MONOCYTES 0.7 0.0 - 1.0 K/UL    ABS. EOSINOPHILS 0.2 0.0 - 0.4 K/UL    ABS. BASOPHILS 0.0 0.0 - 0.1 K/UL    ABS. IMM.  GRANS. 0.0 0.00 - 0.04 K/UL    DF AUTOMATED          No orders to display        PHYSICAL EXAM:    Physical Exam  Vitals reviewed. HENT:      Head: Normocephalic and atraumatic. Mouth/Throat:      Pharynx: Oropharynx is clear. Eyes:      Conjunctiva/sclera: Conjunctivae normal.   Cardiovascular:      Rate and Rhythm: Normal rate and regular rhythm. Heart sounds: Normal heart sounds. Pulmonary:      Effort: Pulmonary effort is normal.      Breath sounds: Normal breath sounds. Abdominal:      General: Abdomen is flat. Bowel sounds are normal.      Palpations: Abdomen is soft. Tenderness: There is abdominal tenderness. Comments: Tenderness in the epigastric region   Musculoskeletal:         General: Normal range of motion. Cervical back: Normal range of motion and neck supple. Skin:     General: Skin is warm and dry. Neurological:      General: No focal deficit present. Mental Status: She is alert and oriented to person, place, and time.    Psychiatric:         Mood and Affect: Mood normal.        Data Review    Recent Results (from the past 24 hour(s))   GLUCOSE, POC    Collection Time: 10/28/22  4:36 PM   Result Value Ref Range    Glucose (POC) 84 65 - 100 mg/dL    Performed by [employer] Rtuher[employer], POC    Collection Time: 10/28/22  7:58 PM   Result Value Ref Range    Glucose (POC) 144 (H) 65 - 100 mg/dL    Performed by Layla Stone    GLUCOSE, POC    Collection Time: 10/29/22  7:47 AM   Result Value Ref Range    Glucose (POC) 141 (H) 65 - 100 mg/dL    Performed by Alcario Card    GLUCOSE, POC    Collection Time: 10/29/22 11:38 AM   Result Value Ref Range    Glucose (POC) 142 (H) 65 - 100 mg/dL    Performed by Alcario Card    CBC WITH AUTOMATED DIFF    Collection Time: 10/29/22 12:20 PM   Result Value Ref Range    WBC 7.6 3.6 - 11.0 K/uL    RBC 2.76 (L) 3.80 - 5.20 M/uL    HGB 7.6 (L) 11.5 - 16.0 g/dL    HCT 24.0 (L) 35.0 - 47.0 %    MCV 87.0 80.0 - 99.0 FL    MCH 27.5 26.0 - 34.0 PG    MCHC 31.7 30.0 - 36.5 g/dL    RDW 19.8 (H) 11.5 - 14.5 %    PLATELET 813 865 - 868 K/uL    MPV 10.9 8.9 - 12.9 FL    NRBC 0.0 0.0  WBC    ABSOLUTE NRBC 0.00 0.00 - 0.01 K/uL    NEUTROPHILS 72 32 - 75 %    LYMPHOCYTES 14 12 - 49 %    MONOCYTES 10 5 - 13 %    EOSINOPHILS 3 0 - 7 %    BASOPHILS 1 0 - 1 %    IMMATURE GRANULOCYTES 0 0 - 0.5 %    ABS. NEUTROPHILS 5.5 1.8 - 8.0 K/UL    ABS. LYMPHOCYTES 1.1 0.8 - 3.5 K/UL    ABS. MONOCYTES 0.7 0.0 - 1.0 K/UL    ABS. EOSINOPHILS 0.2 0.0 - 0.4 K/UL    ABS. BASOPHILS 0.0 0.0 - 0.1 K/UL    ABS. IMM. GRANS. 0.0 0.00 - 0.04 K/UL    DF AUTOMATED          Assessment/Plan:     Active Problems:    Rectal bleeding (10/21/2022)    Jerry Ibanez is a 65 y.o. female who presents with reports of syncopal episode at home. According to patient, she was seated on the couch watching TV and does not remember the details of what happened . She remembers waking up in an ambulance. Reports having epigastric abdominal pain for the last few days with dark tarry stools. Work-up in the ED shows a hemoglobin of 6.3 with a hematocrit of 20.1. She was last admitted in the hospital October 10 through October 13 for similar episode of syncope with bleeding. EGD done October 12 did not find any active upper GI bleed. She was discharged home on oral Protonix twice daily and advised to stop taking apixaban. S/p EGD on 10/21/22 showed gastritis without evidence of bleeding. S/p 2 unit of PRBC transfusion on 10/21/22. Colonoscopy on 10/24/22 showed mild diverticulosis, polyp and hemorrhoids. Capsule endoscopy done on 10/25/22 showed blood clot loaded at duodenal and proximal jejunum indicating active bleeding. Repeat EGD performed again active bleeding noted. Patient may require IR intervention although has severe renal dysfunction with 1 kidney due to history of renal cancer and nephrectomy. Continue to monitor hemoglobin    Impression\plan:    1.   Acute blood loss anemia secondary to GI bleed  Status posttransfusion of 3 units packed red blood cell  Status post EGD with proximal jejunal bleed with erosions  GI following  Continue to monitor hemoglobin and transfuse for hemoglobin less than 7  Continue Protonix  May require surgical intervention versus interventional radiology embolization    2. ALEX with chronic kidney disease  Baseline 1.4  Dr. Miguelito Duke primary nephrologist  Consult nephrology for the possibility of requiring a CTA and IR intervention    3. Syncope  Secondary to #1 continue to monitor hemoglobin maintain greater than 7    4. Essential hypertension  Continue Coreg  Continue hydralazine    5. Previous history of pulm emboli  Have discontinue anticoagulation due to the GI bleeding    Care Plan discussed with: Patient/Family    Total time spent with patient: >35 minutes.

## 2022-10-30 LAB
ANION GAP SERPL CALC-SCNC: 5 MMOL/L (ref 5–15)
BUN SERPL-MCNC: 59 MG/DL (ref 6–20)
BUN/CREAT SERPL: 40 (ref 12–20)
CA-I BLD-MCNC: 9.6 MG/DL (ref 8.5–10.1)
CHLORIDE SERPL-SCNC: 105 MMOL/L (ref 97–108)
CO2 SERPL-SCNC: 28 MMOL/L (ref 21–32)
CREAT SERPL-MCNC: 1.46 MG/DL (ref 0.55–1.02)
CREAT UR-MCNC: 32 MG/DL
GLUCOSE BLD STRIP.AUTO-MCNC: 116 MG/DL (ref 65–100)
GLUCOSE BLD STRIP.AUTO-MCNC: 123 MG/DL (ref 65–100)
GLUCOSE BLD STRIP.AUTO-MCNC: 162 MG/DL (ref 65–100)
GLUCOSE BLD STRIP.AUTO-MCNC: 75 MG/DL (ref 65–100)
GLUCOSE SERPL-MCNC: 149 MG/DL (ref 65–100)
PERFORMED BY, TECHID: ABNORMAL
PERFORMED BY, TECHID: NORMAL
POTASSIUM SERPL-SCNC: 5 MMOL/L (ref 3.5–5.1)
PROT UR-MCNC: 36 MG/DL (ref 0–11.9)
PROT/CREAT UR-RTO: 1.1
SODIUM SERPL-SCNC: 138 MMOL/L (ref 136–145)

## 2022-10-30 PROCEDURE — 65270000029 HC RM PRIVATE

## 2022-10-30 PROCEDURE — 74011250637 HC RX REV CODE- 250/637: Performed by: INTERNAL MEDICINE

## 2022-10-30 PROCEDURE — 36415 COLL VENOUS BLD VENIPUNCTURE: CPT

## 2022-10-30 PROCEDURE — 80048 BASIC METABOLIC PNL TOTAL CA: CPT

## 2022-10-30 PROCEDURE — 74011636637 HC RX REV CODE- 636/637: Performed by: INTERNAL MEDICINE

## 2022-10-30 PROCEDURE — 82962 GLUCOSE BLOOD TEST: CPT

## 2022-10-30 PROCEDURE — 74011000250 HC RX REV CODE- 250: Performed by: INTERNAL MEDICINE

## 2022-10-30 RX ADMIN — CARVEDILOL 6.25 MG: 3.12 TABLET, FILM COATED ORAL at 08:17

## 2022-10-30 RX ADMIN — SODIUM CHLORIDE, PRESERVATIVE FREE 10 ML: 5 INJECTION INTRAVENOUS at 06:30

## 2022-10-30 RX ADMIN — HYDRALAZINE HYDROCHLORIDE 25 MG: 50 TABLET, FILM COATED ORAL at 21:33

## 2022-10-30 RX ADMIN — ACETAMINOPHEN 650 MG: 325 TABLET, FILM COATED ORAL at 21:33

## 2022-10-30 RX ADMIN — MELATONIN TAB 5 MG 5 MG: 5 TAB at 21:33

## 2022-10-30 RX ADMIN — SODIUM CHLORIDE, PRESERVATIVE FREE 10 ML: 5 INJECTION INTRAVENOUS at 13:48

## 2022-10-30 RX ADMIN — LATANOPROST 1 DROP: 50 SOLUTION OPHTHALMIC at 21:43

## 2022-10-30 RX ADMIN — POLYETHYLENE GLYCOL 3350 17 G: 17 POWDER, FOR SOLUTION ORAL at 08:17

## 2022-10-30 RX ADMIN — SODIUM CHLORIDE, PRESERVATIVE FREE 10 ML: 5 INJECTION INTRAVENOUS at 21:44

## 2022-10-30 RX ADMIN — INSULIN LISPRO 2 UNITS: 100 INJECTION, SOLUTION INTRAVENOUS; SUBCUTANEOUS at 13:48

## 2022-10-30 RX ADMIN — DONEPEZIL HYDROCHLORIDE 10 MG: 5 TABLET, FILM COATED ORAL at 21:33

## 2022-10-30 RX ADMIN — GABAPENTIN 300 MG: 300 CAPSULE ORAL at 08:17

## 2022-10-30 RX ADMIN — VENLAFAXINE HYDROCHLORIDE 75 MG: 75 CAPSULE, EXTENDED RELEASE ORAL at 08:17

## 2022-10-30 RX ADMIN — PANTOPRAZOLE SODIUM 40 MG: 40 TABLET, DELAYED RELEASE ORAL at 08:17

## 2022-10-30 RX ADMIN — GABAPENTIN 300 MG: 300 CAPSULE ORAL at 21:33

## 2022-10-30 RX ADMIN — CARVEDILOL 6.25 MG: 3.12 TABLET, FILM COATED ORAL at 16:53

## 2022-10-30 NOTE — PROGRESS NOTES
Renal Daily Progress Note    Admit Date: 10/21/2022  Subjective:     56-year-old -American lady with history of a solitary kidney who is known to have chronic kidney disease with creat  2.3 mg last year. pt is admitted with GI bleeding . A mesenteric angiogram is being contemplated though not scheduled. Hence renal consult is requested   Pt noted to have significant weight loss. She is on the commode   No complaints offered. Today pt is seen and explained why we are seeing her.   Discussed about angiogram and the need for hydration before and after test.   Urine output remains excellent    Don't see any problem with angiogram -as long as limit the amount of dye and hydrate pt  Current Facility-Administered Medications   Medication Dose Route Frequency    pantoprazole (PROTONIX) tablet 40 mg  40 mg Oral ACB    EPINEPHrine HCl (PF) (ADRENALIN) 1 mg/mL (1 mL) injection    PRN    0.9% sodium chloride infusion 250 mL  250 mL IntraVENous PRN    hydrALAZINE (APRESOLINE) tablet 25 mg  25 mg Oral TID PRN    guaiFENesin (ROBITUSSIN) 100 mg/5 mL oral liquid 100 mg  100 mg Oral Q4H PRN    0.9% sodium chloride infusion 250 mL  250 mL IntraVENous PRN    sodium chloride (NS) flush 5-40 mL  5-40 mL IntraVENous Q8H    sodium chloride (NS) flush 5-40 mL  5-40 mL IntraVENous PRN    acetaminophen (TYLENOL) tablet 650 mg  650 mg Oral Q6H PRN    Or    acetaminophen (TYLENOL) suppository 650 mg  650 mg Rectal Q6H PRN    polyethylene glycol (MIRALAX) packet 17 g  17 g Oral DAILY PRN    ondansetron (ZOFRAN ODT) tablet 4 mg  4 mg Oral Q8H PRN    Or    ondansetron (ZOFRAN) injection 4 mg  4 mg IntraVENous Q6H PRN    carvediloL (COREG) tablet 6.25 mg  6.25 mg Oral BID WITH MEALS    donepeziL (ARICEPT) tablet 10 mg  10 mg Oral QHS    gabapentin (NEURONTIN) capsule 300 mg  300 mg Oral BID    latanoprost (XALATAN) 0.005 % ophthalmic solution 1 Drop  1 Drop Both Eyes QHS    melatonin tablet 5 mg  5 mg Oral QHS    venlafaxine-SR (EFFEXOR-XR) capsule 75 mg  75 mg Oral DAILY    insulin lispro (HUMALOG) injection   SubCUTAneous AC&HS        Review of Systems    Review of Systems   Constitutional:  Positive for weight loss. Respiratory:  Positive for cough. Cardiovascular:  Negative for chest pain and leg swelling. Gastrointestinal:  Negative for abdominal pain, nausea and vomiting. Genitourinary:  Negative for dysuria, hematuria and urgency. Musculoskeletal:  Positive for joint pain. Neurological:  Positive for weakness. Negative for dizziness and headaches. Endo/Heme/Allergies:  Does not bruise/bleed easily. Psychiatric/Behavioral:  Negative for depression. Objective:     Patient Vitals for the past 8 hrs:   BP Temp Pulse Resp SpO2   10/30/22 0817 -- -- 67 -- --   10/30/22 0816 (!) 171/71 98.1 °F (36.7 °C) 68 18 99 %       10/30 0701 - 10/30 1900  In: -   Out: 220 [Urine:220]  10/28 1901 - 10/30 0700  In: 710 [P.O.:710]  Out: 1900 [Urine:1900]    Physical Exam:   Physical Exam  Constitutional:       General: She is not in acute distress. Appearance: She is obese. She is not ill-appearing. HENT:      Head: Normocephalic. Mouth/Throat:      Mouth: Mucous membranes are moist.   Eyes:      Conjunctiva/sclera: Conjunctivae normal.      Pupils: Pupils are equal, round, and reactive to light. Cardiovascular:      Rate and Rhythm: Normal rate. Heart sounds: Normal heart sounds. Pulmonary:      Effort: Pulmonary effort is normal.      Breath sounds: Normal breath sounds. Musculoskeletal:      Cervical back: Neck supple. Right lower leg: No edema. Left lower leg: No edema. Skin:     General: Skin is warm and dry. Coloration: Pallor: pt.   Neurological:      General: No focal deficit present. Mental Status: She is alert and oriented to person, place, and time.    Psychiatric:         Mood and Affect: Mood normal.                Data Review   Recent Labs     10/29/22  1220 10/28/22  5795 WBC 7.6 7.8   HGB 7.6* 7.7*   HCT 24.0* 25.0*    177       Recent Labs     10/30/22  0853 10/28/22  0624    139   K 5.0 4.8    106   CO2 28 26   * 120*   BUN 59* 52*   CREA 1.46* 1.62*   CA 9.6 9.3   PHOS  --  3.8   ALB  --  2.4*       No components found for: GLPOC  No results for input(s): PH, PCO2, PO2, HCO3, FIO2 in the last 72 hours. No results for input(s): INR, INREXT, INREXT, INREXT in the last 72 hours. Assessment:     Pt with CKD  due to Diabetes - stable renal function   Anemia - due to GI bleeding   HTN - B.P is o.k.   R/o Nephrotic syndrome     Active Problems:    Rectal bleeding (10/21/2022)      Plan:     O.k for mesenteric Angiogram   Pt need to be well hydrated before and after   Follow renal function with labs.

## 2022-10-30 NOTE — PROGRESS NOTES
Hospitalist Progress Note    Subjective:   Daily Progress Note: 10/30/2022 11:34 AM    Felling better, HGB stable    Current Facility-Administered Medications   Medication Dose Route Frequency    pantoprazole (PROTONIX) tablet 40 mg  40 mg Oral ACB    EPINEPHrine HCl (PF) (ADRENALIN) 1 mg/mL (1 mL) injection    PRN    0.9% sodium chloride infusion 250 mL  250 mL IntraVENous PRN    hydrALAZINE (APRESOLINE) tablet 25 mg  25 mg Oral TID PRN    guaiFENesin (ROBITUSSIN) 100 mg/5 mL oral liquid 100 mg  100 mg Oral Q4H PRN    0.9% sodium chloride infusion 250 mL  250 mL IntraVENous PRN    sodium chloride (NS) flush 5-40 mL  5-40 mL IntraVENous Q8H    sodium chloride (NS) flush 5-40 mL  5-40 mL IntraVENous PRN    acetaminophen (TYLENOL) tablet 650 mg  650 mg Oral Q6H PRN    Or    acetaminophen (TYLENOL) suppository 650 mg  650 mg Rectal Q6H PRN    polyethylene glycol (MIRALAX) packet 17 g  17 g Oral DAILY PRN    ondansetron (ZOFRAN ODT) tablet 4 mg  4 mg Oral Q8H PRN    Or    ondansetron (ZOFRAN) injection 4 mg  4 mg IntraVENous Q6H PRN    carvediloL (COREG) tablet 6.25 mg  6.25 mg Oral BID WITH MEALS    donepeziL (ARICEPT) tablet 10 mg  10 mg Oral QHS    gabapentin (NEURONTIN) capsule 300 mg  300 mg Oral BID    latanoprost (XALATAN) 0.005 % ophthalmic solution 1 Drop  1 Drop Both Eyes QHS    melatonin tablet 5 mg  5 mg Oral QHS    venlafaxine-SR (EFFEXOR-XR) capsule 75 mg  75 mg Oral DAILY    insulin lispro (HUMALOG) injection   SubCUTAneous AC&HS        Review of Systems  Review of Systems   Constitutional:  Positive for malaise/fatigue. HENT: Negative. Respiratory:  Negative for cough and shortness of breath. Cardiovascular:  Negative for chest pain and leg swelling. Gastrointestinal:  Positive for abdominal pain and blood in stool. Negative for nausea and vomiting. Genitourinary: Negative. Musculoskeletal: Negative. Skin: Negative. Neurological: Negative. Psychiatric/Behavioral: Negative. Objective:     Visit Vitals  BP (!) 171/71 (BP 1 Location: Right upper arm, BP Patient Position: Semi fowlers)   Pulse 67   Temp 98.1 °F (36.7 °C)   Resp 18   Ht 5' 4\" (1.626 m)   Wt 86.2 kg (190 lb)   SpO2 99%   BMI 32.61 kg/m²      O2 Device: None (Room air)    Temp (24hrs), Av.3 °F (36.8 °C), Min:98.1 °F (36.7 °C), Max:98.6 °F (37 °C)      10/30 0701 - 10/30 1900  In: -   Out: 700 [Urine:700]  10/28 1901 - 10/30 0700  In: 710 [P.O.:710]  Out: 1900 [Urine:1900]    Recent Results (from the past 24 hour(s))   GLUCOSE, POC    Collection Time: 10/29/22  4:51 PM   Result Value Ref Range    Glucose (POC) 67 65 - 100 mg/dL    Performed by Sommer Athens    GLUCOSE, POC    Collection Time: 10/29/22  9:51 PM   Result Value Ref Range    Glucose (POC) 172 (H) 65 - 100 mg/dL    Performed by [facility]    GLUCOSE, POC    Collection Time: 10/30/22  8:08 AM   Result Value Ref Range    Glucose (POC) 123 (H) 65 - 100 mg/dL    Performed by [facility]    METABOLIC PANEL, BASIC    Collection Time: 10/30/22  8:53 AM   Result Value Ref Range    Sodium 138 136 - 145 mmol/L    Potassium 5.0 3.5 - 5.1 mmol/L    Chloride 105 97 - 108 mmol/L    CO2 28 21 - 32 mmol/L    Anion gap 5 5 - 15 mmol/L    Glucose 149 (H) 65 - 100 mg/dL    BUN 59 (H) 6 - 20 mg/dL    Creatinine 1.46 (H) 0.55 - 1.02 mg/dL    BUN/Creatinine ratio 40 (H) 12 - 20      eGFR 37 (L) >60 ml/min/1.73m2    Calcium 9.6 8.5 - 10.1 mg/dL   GLUCOSE, POC    Collection Time: 10/30/22 11:58 AM   Result Value Ref Range    Glucose (POC) 162 (H) 65 - 100 mg/dL    Performed by Deanna East         No orders to display        PHYSICAL EXAM:    Physical Exam  Vitals reviewed. HENT:      Head: Normocephalic and atraumatic. Mouth/Throat:      Pharynx: Oropharynx is clear. Eyes:      Conjunctiva/sclera: Conjunctivae normal.   Cardiovascular:      Rate and Rhythm: Normal rate and regular rhythm. Heart sounds: Normal heart sounds.    Pulmonary:      Effort: Pulmonary effort is normal.      Breath sounds: Normal breath sounds. Abdominal:      General: Abdomen is flat. Bowel sounds are normal.      Palpations: Abdomen is soft. Tenderness: There is abdominal tenderness. Comments: Tenderness in the epigastric region   Musculoskeletal:         General: Normal range of motion. Cervical back: Normal range of motion and neck supple. Skin:     General: Skin is warm and dry. Neurological:      General: No focal deficit present. Mental Status: She is alert and oriented to person, place, and time. Psychiatric:         Mood and Affect: Mood normal.        Data Review    Recent Results (from the past 24 hour(s))   GLUCOSE, POC    Collection Time: 10/29/22  4:51 PM   Result Value Ref Range    Glucose (POC) 67 65 - 100 mg/dL    Performed by Fili Almaguer    GLUCOSE, POC    Collection Time: 10/29/22  9:51 PM   Result Value Ref Range    Glucose (POC) 172 (H) 65 - 100 mg/dL    Performed by [facility]    GLUCOSE, POC    Collection Time: 10/30/22  8:08 AM   Result Value Ref Range    Glucose (POC) 123 (H) 65 - 100 mg/dL    Performed by [facility], BASIC    Collection Time: 10/30/22  8:53 AM   Result Value Ref Range    Sodium 138 136 - 145 mmol/L    Potassium 5.0 3.5 - 5.1 mmol/L    Chloride 105 97 - 108 mmol/L    CO2 28 21 - 32 mmol/L    Anion gap 5 5 - 15 mmol/L    Glucose 149 (H) 65 - 100 mg/dL    BUN 59 (H) 6 - 20 mg/dL    Creatinine 1.46 (H) 0.55 - 1.02 mg/dL    BUN/Creatinine ratio 40 (H) 12 - 20      eGFR 37 (L) >60 ml/min/1.73m2    Calcium 9.6 8.5 - 10.1 mg/dL   GLUCOSE, POC    Collection Time: 10/30/22 11:58 AM   Result Value Ref Range    Glucose (POC) 162 (H) 65 - 100 mg/dL    Performed by Abdon Fernandez         Assessment/Plan:     Active Problems:    Rectal bleeding (10/21/2022)    Raffi Angeles is a 65 y.o. female who presents with reports of syncopal episode at home.   According to patient, she was seated on the couch watching TV and does not remember the details of what happened . She remembers waking up in an ambulance. Reports having epigastric abdominal pain for the last few days with dark tarry stools. Work-up in the ED shows a hemoglobin of 6.3 with a hematocrit of 20.1. She was last admitted in the hospital October 10 through October 13 for similar episode of syncope with bleeding. EGD done October 12 did not find any active upper GI bleed. She was discharged home on oral Protonix twice daily and advised to stop taking apixaban. S/p EGD on 10/21/22 showed gastritis without evidence of bleeding. S/p 2 unit of PRBC transfusion on 10/21/22. Colonoscopy on 10/24/22 showed mild diverticulosis, polyp and hemorrhoids. Capsule endoscopy done on 10/25/22 showed blood clot loaded at duodenal and proximal jejunum indicating active bleeding. Repeat EGD performed again active bleeding noted. Patient may require IR intervention although has severe renal dysfunction with 1 kidney due to history of renal cancer and nephrectomy. Continue to monitor hemoglobin. Impression\plan:    1. Acute blood loss anemia secondary to GI bleed  Status posttransfusion of 3 units packed red blood cell  Status post EGD with proximal jejunal bleed with erosions  GI following  Continue to monitor hemoglobin and transfuse for hemoglobin less than 7  Continue Protonix  May require surgical intervention versus interventional radiology embolization    2. ALEX with chronic kidney disease  Baseline 1.4  Dr. Pereira Branch primary nephrologist  Consult nephrology for the possibility of requiring a CTA and IR intervention    3. Syncope  Secondary to #1 continue to monitor hemoglobin maintain greater than 7    4. Essential hypertension  Continue Coreg  Continue hydralazine    5. Previous history of pulm emboli  Have discontinued anticoagulation due to the GI bleeding    Care Plan discussed with: Patient/Family    Total time spent with patient: >35 minutes.

## 2022-10-30 NOTE — PROGRESS NOTES
Problem: Falls - Risk of  Goal: *Absence of Falls  Description: Document Jaclyn Gusamn Fall Risk and appropriate interventions in the flowsheet. Outcome: Progressing Towards Goal  Note: Fall Risk Interventions:  Mobility Interventions: Patient to call before getting OOB, PT Consult for mobility concerns, Utilize walker, cane, or other assistive device         Medication Interventions: Patient to call before getting OOB, Teach patient to arise slowly    Elimination Interventions: Call light in reach, Patient to call for help with toileting needs    History of Falls Interventions: Bed/chair exit alarm, Door open when patient unattended, Room close to nurse's station         Problem: Pressure Injury - Risk of  Goal: *Prevention of pressure injury  Description: Document Kirit Scale and appropriate interventions in the flowsheet.   Outcome: Progressing Towards Goal  Note: Pressure Injury Interventions:  Sensory Interventions: Keep linens dry and wrinkle-free, Minimize linen layers    Moisture Interventions: Absorbent underpads, Internal/External urinary devices, Minimize layers    Activity Interventions: PT/OT evaluation, Increase time out of bed    Mobility Interventions: HOB 30 degrees or less    Nutrition Interventions: Discuss nutritional consult with provider

## 2022-10-31 ENCOUNTER — APPOINTMENT (OUTPATIENT)
Dept: CT IMAGING | Age: 77
DRG: 378 | End: 2022-10-31
Attending: PHYSICIAN ASSISTANT
Payer: MEDICARE

## 2022-10-31 LAB
ANION GAP SERPL CALC-SCNC: 4 MMOL/L (ref 5–15)
BASOPHILS # BLD: 0.1 K/UL (ref 0–0.1)
BASOPHILS NFR BLD: 1 % (ref 0–1)
BUN SERPL-MCNC: 65 MG/DL (ref 6–20)
BUN/CREAT SERPL: 44 (ref 12–20)
CA-I BLD-MCNC: 9.2 MG/DL (ref 8.5–10.1)
CHLORIDE SERPL-SCNC: 107 MMOL/L (ref 97–108)
CO2 SERPL-SCNC: 28 MMOL/L (ref 21–32)
CREAT SERPL-MCNC: 1.48 MG/DL (ref 0.55–1.02)
DIFFERENTIAL METHOD BLD: ABNORMAL
EOSINOPHIL # BLD: 0.2 K/UL (ref 0–0.4)
EOSINOPHIL NFR BLD: 2 % (ref 0–7)
ERYTHROCYTE [DISTWIDTH] IN BLOOD BY AUTOMATED COUNT: 19.7 % (ref 11.5–14.5)
GLUCOSE BLD STRIP.AUTO-MCNC: 114 MG/DL (ref 65–100)
GLUCOSE BLD STRIP.AUTO-MCNC: 116 MG/DL (ref 65–100)
GLUCOSE BLD STRIP.AUTO-MCNC: 131 MG/DL (ref 65–100)
GLUCOSE BLD STRIP.AUTO-MCNC: 171 MG/DL (ref 65–100)
GLUCOSE SERPL-MCNC: 157 MG/DL (ref 65–100)
HCT VFR BLD AUTO: 20.8 % (ref 35–47)
HGB BLD-MCNC: 6.3 G/DL (ref 11.5–16)
IMM GRANULOCYTES # BLD AUTO: 0 K/UL (ref 0–0.04)
IMM GRANULOCYTES NFR BLD AUTO: 0 % (ref 0–0.5)
LYMPHOCYTES # BLD: 1 K/UL (ref 0.8–3.5)
LYMPHOCYTES NFR BLD: 12 % (ref 12–49)
MCH RBC QN AUTO: 26.6 PG (ref 26–34)
MCHC RBC AUTO-ENTMCNC: 30.3 G/DL (ref 30–36.5)
MCV RBC AUTO: 87.8 FL (ref 80–99)
MONOCYTES # BLD: 0.7 K/UL (ref 0–1)
MONOCYTES NFR BLD: 9 % (ref 5–13)
NEUTS SEG # BLD: 6.1 K/UL (ref 1.8–8)
NEUTS SEG NFR BLD: 76 % (ref 32–75)
NRBC # BLD: 0 K/UL (ref 0–0.01)
NRBC BLD-RTO: 0 PER 100 WBC
PERFORMED BY, TECHID: ABNORMAL
PLATELET # BLD AUTO: 189 K/UL (ref 150–400)
PMV BLD AUTO: 10.3 FL (ref 8.9–12.9)
POTASSIUM SERPL-SCNC: 5.6 MMOL/L (ref 3.5–5.1)
RBC # BLD AUTO: 2.37 M/UL (ref 3.8–5.2)
SODIUM SERPL-SCNC: 139 MMOL/L (ref 136–145)
WBC # BLD AUTO: 8 K/UL (ref 3.6–11)

## 2022-10-31 PROCEDURE — 74011636637 HC RX REV CODE- 636/637: Performed by: INTERNAL MEDICINE

## 2022-10-31 PROCEDURE — P9016 RBC LEUKOCYTES REDUCED: HCPCS

## 2022-10-31 PROCEDURE — 74011250636 HC RX REV CODE- 250/636: Performed by: INTERNAL MEDICINE

## 2022-10-31 PROCEDURE — 74011250637 HC RX REV CODE- 250/637: Performed by: INTERNAL MEDICINE

## 2022-10-31 PROCEDURE — 74178 CT ABD&PLV WO CNTR FLWD CNTR: CPT

## 2022-10-31 PROCEDURE — 36430 TRANSFUSION BLD/BLD COMPNT: CPT

## 2022-10-31 PROCEDURE — 36415 COLL VENOUS BLD VENIPUNCTURE: CPT

## 2022-10-31 PROCEDURE — 30233N1 TRANSFUSION OF NONAUTOLOGOUS RED BLOOD CELLS INTO PERIPHERAL VEIN, PERCUTANEOUS APPROACH: ICD-10-PCS | Performed by: INTERNAL MEDICINE

## 2022-10-31 PROCEDURE — 85025 COMPLETE CBC W/AUTO DIFF WBC: CPT

## 2022-10-31 PROCEDURE — 80048 BASIC METABOLIC PNL TOTAL CA: CPT

## 2022-10-31 PROCEDURE — 74011000250 HC RX REV CODE- 250: Performed by: INTERNAL MEDICINE

## 2022-10-31 PROCEDURE — 82962 GLUCOSE BLOOD TEST: CPT

## 2022-10-31 PROCEDURE — 86900 BLOOD TYPING SEROLOGIC ABO: CPT

## 2022-10-31 PROCEDURE — 65270000029 HC RM PRIVATE

## 2022-10-31 PROCEDURE — 74011000636 HC RX REV CODE- 636: Performed by: INTERNAL MEDICINE

## 2022-10-31 PROCEDURE — 86923 COMPATIBILITY TEST ELECTRIC: CPT

## 2022-10-31 RX ORDER — SODIUM CHLORIDE 9 MG/ML
50 INJECTION, SOLUTION INTRAVENOUS CONTINUOUS
Status: DISCONTINUED | OUTPATIENT
Start: 2022-10-31 | End: 2022-11-01

## 2022-10-31 RX ORDER — SODIUM CHLORIDE 9 MG/ML
250 INJECTION, SOLUTION INTRAVENOUS AS NEEDED
Status: DISCONTINUED | OUTPATIENT
Start: 2022-10-31 | End: 2022-11-02

## 2022-10-31 RX ADMIN — SODIUM CHLORIDE 50 ML/HR: 9 INJECTION, SOLUTION INTRAVENOUS at 18:00

## 2022-10-31 RX ADMIN — CARVEDILOL 6.25 MG: 3.12 TABLET, FILM COATED ORAL at 10:02

## 2022-10-31 RX ADMIN — ACETAMINOPHEN 650 MG: 325 TABLET, FILM COATED ORAL at 10:04

## 2022-10-31 RX ADMIN — MELATONIN TAB 5 MG 5 MG: 5 TAB at 21:22

## 2022-10-31 RX ADMIN — SODIUM CHLORIDE, PRESERVATIVE FREE 10 ML: 5 INJECTION INTRAVENOUS at 21:22

## 2022-10-31 RX ADMIN — HYDRALAZINE HYDROCHLORIDE 25 MG: 50 TABLET, FILM COATED ORAL at 05:47

## 2022-10-31 RX ADMIN — LATANOPROST 1 DROP: 50 SOLUTION OPHTHALMIC at 21:23

## 2022-10-31 RX ADMIN — CARVEDILOL 6.25 MG: 3.12 TABLET, FILM COATED ORAL at 18:02

## 2022-10-31 RX ADMIN — GABAPENTIN 300 MG: 300 CAPSULE ORAL at 21:21

## 2022-10-31 RX ADMIN — SODIUM CHLORIDE, PRESERVATIVE FREE 10 ML: 5 INJECTION INTRAVENOUS at 17:59

## 2022-10-31 RX ADMIN — DONEPEZIL HYDROCHLORIDE 10 MG: 5 TABLET, FILM COATED ORAL at 21:21

## 2022-10-31 RX ADMIN — GABAPENTIN 300 MG: 300 CAPSULE ORAL at 09:58

## 2022-10-31 RX ADMIN — ACETAMINOPHEN 650 MG: 325 TABLET, FILM COATED ORAL at 21:21

## 2022-10-31 RX ADMIN — IOPAMIDOL 100 ML: 755 INJECTION, SOLUTION INTRAVENOUS at 16:45

## 2022-10-31 RX ADMIN — VENLAFAXINE HYDROCHLORIDE 75 MG: 75 CAPSULE, EXTENDED RELEASE ORAL at 09:58

## 2022-10-31 RX ADMIN — INSULIN LISPRO 2 UNITS: 100 INJECTION, SOLUTION INTRAVENOUS; SUBCUTANEOUS at 12:27

## 2022-10-31 RX ADMIN — PANTOPRAZOLE SODIUM 40 MG: 40 TABLET, DELAYED RELEASE ORAL at 06:32

## 2022-10-31 RX ADMIN — SODIUM CHLORIDE, PRESERVATIVE FREE 10 ML: 5 INJECTION INTRAVENOUS at 05:41

## 2022-10-31 NOTE — PROGRESS NOTES
Renal Daily Progress Note    Admit Date: 10/21/2022      Subjective:   She is sitting up in a chair by the bed. She denies chest pain or shortness of breath. Her hemoglobin is down to 6.3 g today. A CT of the abdomen with contrast has been ordered. Current Facility-Administered Medications   Medication Dose Route Frequency    0.9% sodium chloride infusion 250 mL  250 mL IntraVENous PRN    0.9% sodium chloride infusion  50 mL/hr IntraVENous CONTINUOUS    0.9% sodium chloride infusion  50 mL/hr IntraVENous CONTINUOUS    pantoprazole (PROTONIX) tablet 40 mg  40 mg Oral ACB    EPINEPHrine HCl (PF) (ADRENALIN) 1 mg/mL (1 mL) injection    PRN    0.9% sodium chloride infusion 250 mL  250 mL IntraVENous PRN    hydrALAZINE (APRESOLINE) tablet 25 mg  25 mg Oral TID PRN    guaiFENesin (ROBITUSSIN) 100 mg/5 mL oral liquid 100 mg  100 mg Oral Q4H PRN    0.9% sodium chloride infusion 250 mL  250 mL IntraVENous PRN    sodium chloride (NS) flush 5-40 mL  5-40 mL IntraVENous Q8H    sodium chloride (NS) flush 5-40 mL  5-40 mL IntraVENous PRN    acetaminophen (TYLENOL) tablet 650 mg  650 mg Oral Q6H PRN    Or    acetaminophen (TYLENOL) suppository 650 mg  650 mg Rectal Q6H PRN    polyethylene glycol (MIRALAX) packet 17 g  17 g Oral DAILY PRN    ondansetron (ZOFRAN ODT) tablet 4 mg  4 mg Oral Q8H PRN    Or    ondansetron (ZOFRAN) injection 4 mg  4 mg IntraVENous Q6H PRN    carvediloL (COREG) tablet 6.25 mg  6.25 mg Oral BID WITH MEALS    donepeziL (ARICEPT) tablet 10 mg  10 mg Oral QHS    gabapentin (NEURONTIN) capsule 300 mg  300 mg Oral BID    latanoprost (XALATAN) 0.005 % ophthalmic solution 1 Drop  1 Drop Both Eyes QHS    melatonin tablet 5 mg  5 mg Oral QHS    venlafaxine-SR (EFFEXOR-XR) capsule 75 mg  75 mg Oral DAILY    insulin lispro (HUMALOG) injection   SubCUTAneous AC&HS        Review of Systems    Review of Systems   Respiratory:  Negative for shortness of breath.     Cardiovascular:  Negative for chest pain and palpitations. Gastrointestinal:  Negative for abdominal pain. Neurological:  Negative for headaches. Objective:     Patient Vitals for the past 8 hrs:   BP Temp Pulse Resp SpO2   10/31/22 1529 (!) 131/58 98.2 °F (36.8 °C) 60 18 100 %   10/31/22 0825 (!) 146/65 98.1 °F (36.7 °C) 66 20 97 %     No intake/output data recorded. 10/29 1901 - 10/31 0700  In: 920 [P.O.:920]  Out: 3200 [Urine:3200]    Physical Exam:   Physical Exam  Cardiovascular:      Rate and Rhythm: Regular rhythm. Pulmonary:      Breath sounds: Normal breath sounds. Abdominal:      General: Bowel sounds are normal.      Palpations: Abdomen is soft. Neurological:      General: No focal deficit present. Mental Status: She is alert. No edema        CT ABD PELV W WO CONT    (Results Pending)        Data Review   Recent Labs     10/31/22  1145 10/29/22  1220   WBC 8.0 7.6   HGB 6.3* 7.6*   HCT 20.8* 24.0*    202     Recent Labs     10/31/22  1145 10/30/22  0853    138   K 5.6* 5.0    105   CO2 28 28   * 149*   BUN 65* 59*   CREA 1.48* 1.46*   CA 9.2 9.6     No components found for: GLPOC  No results for input(s): PH, PCO2, PO2, HCO3, FIO2 in the last 72 hours. No results for input(s): INR, INREXT, INREXT in the last 72 hours. Assessment:           Active Problems:    Rectal bleeding (10/21/2022)    Chronic kidney disease stage IIIb. Creatinine is stable at 1.48 mg. Modest hyperkalemia secondary to CKD    Hypertension under acceptable control. Anemia due to anemia of chronic disease and GI bleed. Plan: Will start IV fluids normal saline at 50 mill per hour for gentle hydration prior to the contrasted CT.   Will follow renal function post contrast.

## 2022-10-31 NOTE — PROGRESS NOTES
Hospitalist Progress Note    Subjective:   Daily Progress Note: 10/31/2022 11:34 AM    HGB decreasing consistent with on going bleeding    Current Facility-Administered Medications   Medication Dose Route Frequency    pantoprazole (PROTONIX) tablet 40 mg  40 mg Oral ACB    EPINEPHrine HCl (PF) (ADRENALIN) 1 mg/mL (1 mL) injection    PRN    0.9% sodium chloride infusion 250 mL  250 mL IntraVENous PRN    hydrALAZINE (APRESOLINE) tablet 25 mg  25 mg Oral TID PRN    guaiFENesin (ROBITUSSIN) 100 mg/5 mL oral liquid 100 mg  100 mg Oral Q4H PRN    0.9% sodium chloride infusion 250 mL  250 mL IntraVENous PRN    sodium chloride (NS) flush 5-40 mL  5-40 mL IntraVENous Q8H    sodium chloride (NS) flush 5-40 mL  5-40 mL IntraVENous PRN    acetaminophen (TYLENOL) tablet 650 mg  650 mg Oral Q6H PRN    Or    acetaminophen (TYLENOL) suppository 650 mg  650 mg Rectal Q6H PRN    polyethylene glycol (MIRALAX) packet 17 g  17 g Oral DAILY PRN    ondansetron (ZOFRAN ODT) tablet 4 mg  4 mg Oral Q8H PRN    Or    ondansetron (ZOFRAN) injection 4 mg  4 mg IntraVENous Q6H PRN    carvediloL (COREG) tablet 6.25 mg  6.25 mg Oral BID WITH MEALS    donepeziL (ARICEPT) tablet 10 mg  10 mg Oral QHS    gabapentin (NEURONTIN) capsule 300 mg  300 mg Oral BID    latanoprost (XALATAN) 0.005 % ophthalmic solution 1 Drop  1 Drop Both Eyes QHS    melatonin tablet 5 mg  5 mg Oral QHS    venlafaxine-SR (EFFEXOR-XR) capsule 75 mg  75 mg Oral DAILY    insulin lispro (HUMALOG) injection   SubCUTAneous AC&HS        Review of Systems  Review of Systems   Constitutional:  Positive for malaise/fatigue. HENT: Negative. Respiratory:  Negative for cough and shortness of breath. Cardiovascular:  Negative for chest pain and leg swelling. Gastrointestinal:  Positive for abdominal pain and blood in stool. Negative for nausea and vomiting. Genitourinary: Negative. Musculoskeletal: Negative. Skin: Negative. Neurological: Negative. Psychiatric/Behavioral: Negative. Objective:     Visit Vitals  BP (!) 146/65 (BP 1 Location: Left upper arm, BP Patient Position: At rest;Supine)   Pulse 66   Temp 98.1 °F (36.7 °C)   Resp 20   Ht 5' 4\" (1.626 m)   Wt 86.2 kg (190 lb)   SpO2 97%   BMI 32.61 kg/m²      O2 Device: None (Room air)    Temp (24hrs), Av.1 °F (36.7 °C), Min:97.4 °F (36.3 °C), Max:98.5 °F (36.9 °C)      No intake/output data recorded. 10/29 1901 - 10/31 0700  In: 920 [P.O.:920]  Out: 3200 [Urine:3200]    Recent Results (from the past 24 hour(s))   GLUCOSE, POC    Collection Time: 10/30/22  4:35 PM   Result Value Ref Range    Glucose (POC) 75 65 - 100 mg/dL    Performed by Lashell Wilkerson    GLUCOSE, POC    Collection Time: 10/30/22  9:41 PM   Result Value Ref Range    Glucose (POC) 116 (H) 65 - 100 mg/dL    Performed by Kwame Hunt    GLUCOSE, POC    Collection Time: 10/31/22  8:30 AM   Result Value Ref Range    Glucose (POC) 131 (H) 65 - 100 mg/dL    Performed by Subha Mcbride    GLUCOSE, POC    Collection Time: 10/31/22 10:59 AM   Result Value Ref Range    Glucose (POC) 171 (H) 65 - 100 mg/dL    Performed by Louisa Omer    CBC WITH AUTOMATED DIFF    Collection Time: 10/31/22 11:45 AM   Result Value Ref Range    WBC 8.0 3.6 - 11.0 K/uL    RBC 2.37 (L) 3.80 - 5.20 M/uL    HGB 6.3 (L) 11.5 - 16.0 g/dL    HCT 20.8 (L) 35.0 - 47.0 %    MCV 87.8 80.0 - 99.0 FL    MCH 26.6 26.0 - 34.0 PG    MCHC 30.3 30.0 - 36.5 g/dL    RDW 19.7 (H) 11.5 - 14.5 %    PLATELET 786 505 - 883 K/uL    MPV 10.3 8.9 - 12.9 FL    NRBC 0.0 0.0  WBC    ABSOLUTE NRBC 0.00 0.00 - 0.01 K/uL    NEUTROPHILS 76 (H) 32 - 75 %    LYMPHOCYTES 12 12 - 49 %    MONOCYTES 9 5 - 13 %    EOSINOPHILS 2 0 - 7 %    BASOPHILS 1 0 - 1 %    IMMATURE GRANULOCYTES 0 0 - 0.5 %    ABS. NEUTROPHILS 6.1 1.8 - 8.0 K/UL    ABS. LYMPHOCYTES 1.0 0.8 - 3.5 K/UL    ABS. MONOCYTES 0.7 0.0 - 1.0 K/UL    ABS. EOSINOPHILS 0.2 0.0 - 0.4 K/UL    ABS. BASOPHILS 0.1 0.0 - 0.1 K/UL    ABS. IMM. Jennifer Valle. 0.0 0.00 - 0.04 K/UL    DF AUTOMATED     METABOLIC PANEL, BASIC    Collection Time: 10/31/22 11:45 AM   Result Value Ref Range    Sodium 139 136 - 145 mmol/L    Potassium 5.6 (H) 3.5 - 5.1 mmol/L    Chloride 107 97 - 108 mmol/L    CO2 28 21 - 32 mmol/L    Anion gap 4 (L) 5 - 15 mmol/L    Glucose 157 (H) 65 - 100 mg/dL    BUN 65 (H) 6 - 20 mg/dL    Creatinine 1.48 (H) 0.55 - 1.02 mg/dL    BUN/Creatinine ratio 44 (H) 12 - 20      eGFR 36 (L) >60 ml/min/1.73m2    Calcium 9.2 8.5 - 10.1 mg/dL        CTA ABDOMEN PELV W CONT    (Results Pending)        PHYSICAL EXAM:    Physical Exam  Vitals reviewed. HENT:      Head: Normocephalic and atraumatic. Mouth/Throat:      Pharynx: Oropharynx is clear. Eyes:      Conjunctiva/sclera: Conjunctivae normal.   Cardiovascular:      Rate and Rhythm: Normal rate and regular rhythm. Heart sounds: Normal heart sounds. Pulmonary:      Effort: Pulmonary effort is normal.      Breath sounds: Normal breath sounds. Abdominal:      General: Abdomen is flat. Bowel sounds are normal.      Palpations: Abdomen is soft. Tenderness: There is abdominal tenderness. Comments: Tenderness in the epigastric region   Musculoskeletal:         General: Normal range of motion. Cervical back: Normal range of motion and neck supple. Skin:     General: Skin is warm and dry. Neurological:      General: No focal deficit present. Mental Status: She is alert and oriented to person, place, and time.    Psychiatric:         Mood and Affect: Mood normal.        Data Review    Recent Results (from the past 24 hour(s))   GLUCOSE, POC    Collection Time: 10/30/22  4:35 PM   Result Value Ref Range    Glucose (POC) 75 65 - 100 mg/dL    Performed by Maria Chan    GLUCOSE, POC    Collection Time: 10/30/22  9:41 PM   Result Value Ref Range    Glucose (POC) 116 (H) 65 - 100 mg/dL    Performed by Clenton Schlatter    GLUCOSE, POC    Collection Time: 10/31/22  8:30 AM   Result Value Ref Range    Glucose (POC) 131 (H) 65 - 100 mg/dL    Performed by Cem Baileyton    GLUCOSE, POC    Collection Time: 10/31/22 10:59 AM   Result Value Ref Range    Glucose (POC) 171 (H) 65 - 100 mg/dL    Performed by Mellisa Johnson    CBC WITH AUTOMATED DIFF    Collection Time: 10/31/22 11:45 AM   Result Value Ref Range    WBC 8.0 3.6 - 11.0 K/uL    RBC 2.37 (L) 3.80 - 5.20 M/uL    HGB 6.3 (L) 11.5 - 16.0 g/dL    HCT 20.8 (L) 35.0 - 47.0 %    MCV 87.8 80.0 - 99.0 FL    MCH 26.6 26.0 - 34.0 PG    MCHC 30.3 30.0 - 36.5 g/dL    RDW 19.7 (H) 11.5 - 14.5 %    PLATELET 618 280 - 249 K/uL    MPV 10.3 8.9 - 12.9 FL    NRBC 0.0 0.0  WBC    ABSOLUTE NRBC 0.00 0.00 - 0.01 K/uL    NEUTROPHILS 76 (H) 32 - 75 %    LYMPHOCYTES 12 12 - 49 %    MONOCYTES 9 5 - 13 %    EOSINOPHILS 2 0 - 7 %    BASOPHILS 1 0 - 1 %    IMMATURE GRANULOCYTES 0 0 - 0.5 %    ABS. NEUTROPHILS 6.1 1.8 - 8.0 K/UL    ABS. LYMPHOCYTES 1.0 0.8 - 3.5 K/UL    ABS. MONOCYTES 0.7 0.0 - 1.0 K/UL    ABS. EOSINOPHILS 0.2 0.0 - 0.4 K/UL    ABS. BASOPHILS 0.1 0.0 - 0.1 K/UL    ABS. IMM. GRANS. 0.0 0.00 - 0.04 K/UL    DF AUTOMATED     METABOLIC PANEL, BASIC    Collection Time: 10/31/22 11:45 AM   Result Value Ref Range    Sodium 139 136 - 145 mmol/L    Potassium 5.6 (H) 3.5 - 5.1 mmol/L    Chloride 107 97 - 108 mmol/L    CO2 28 21 - 32 mmol/L    Anion gap 4 (L) 5 - 15 mmol/L    Glucose 157 (H) 65 - 100 mg/dL    BUN 65 (H) 6 - 20 mg/dL    Creatinine 1.48 (H) 0.55 - 1.02 mg/dL    BUN/Creatinine ratio 44 (H) 12 - 20      eGFR 36 (L) >60 ml/min/1.73m2    Calcium 9.2 8.5 - 10.1 mg/dL        Assessment/Plan:     Active Problems:    Rectal bleeding (10/21/2022)    Lizbeth Ochoa is a 65 y.o. female who presents with reports of syncopal episode at home. According to patient, she was seated on the couch watching TV and does not remember the details of what happened . She remembers waking up in an ambulance.   Reports having epigastric abdominal pain for the last few days with dark tarry stools. Work-up in the ED shows a hemoglobin of 6.3 with a hematocrit of 20.1. She was last admitted in the hospital October 10 through October 13 for similar episode of syncope with bleeding. EGD done October 12 did not find any active upper GI bleed. She was discharged home on oral Protonix twice daily and advised to stop taking apixaban. S/p EGD on 10/21/22 showed gastritis without evidence of bleeding. S/p 2 unit of PRBC transfusion on 10/21/22. Colonoscopy on 10/24/22 showed mild diverticulosis, polyp and hemorrhoids. Capsule endoscopy done on 10/25/22 showed blood clot loaded at duodenal and proximal jejunum indicating active bleeding. Repeat EGD performed again active bleeding noted. Patient may require IR intervention although has severe renal dysfunction with 1 kidney due to history of renal cancer and nephrectomy. Hemoglobin dropped to 6.3 will transfuse a unit of packed red blood cells. Nephrology is cleared for mesenteric angiogram and possible embolization and first will do CTA for GI bleeding scan. Have discussed the risks and benefits with the patient's daughter via phone Christina Valle. Impression\plan:    1. Acute blood loss anemia secondary to GI bleed  Status posttransfusion of 3 units packed red blood cell  Status post EGD with proximal jejunal bleed with erosions  GI following  Continue to monitor hemoglobin and transfuse for hemoglobin less than 7  Continue Protonix  May require surgical intervention versus interventional radiology embolization  Ongoing bleeding CTA pending IR consult for mesenteric angiogram with embolization    2. ALEX with chronic kidney disease  Baseline 1.4  Dr. Nhung Holt primary nephrologist  Consult nephrology for the possibility of requiring a CTA and IR intervention    3. Syncope  Secondary to #1 continue to monitor hemoglobin maintain greater than 7    4. Essential hypertension  Continue Coreg  Continue hydralazine    5.   Previous history of pulm emboli  Have discontinued anticoagulation due to the GI bleeding    Care Plan discussed with: Patient/Family    Total time spent with patient: >35 minutes.

## 2022-11-01 LAB
GLUCOSE BLD STRIP.AUTO-MCNC: 124 MG/DL (ref 65–100)
GLUCOSE BLD STRIP.AUTO-MCNC: 138 MG/DL (ref 65–100)
GLUCOSE BLD STRIP.AUTO-MCNC: 158 MG/DL (ref 65–100)
GLUCOSE BLD STRIP.AUTO-MCNC: 169 MG/DL (ref 65–100)
HCT VFR BLD AUTO: 22.8 % (ref 35–47)
HGB BLD-MCNC: 7.2 G/DL (ref 11.5–16)
PERFORMED BY, TECHID: ABNORMAL

## 2022-11-01 PROCEDURE — 74011636637 HC RX REV CODE- 636/637: Performed by: INTERNAL MEDICINE

## 2022-11-01 PROCEDURE — 65270000029 HC RM PRIVATE

## 2022-11-01 PROCEDURE — 85018 HEMOGLOBIN: CPT

## 2022-11-01 PROCEDURE — 74011000250 HC RX REV CODE- 250: Performed by: INTERNAL MEDICINE

## 2022-11-01 PROCEDURE — 82962 GLUCOSE BLOOD TEST: CPT

## 2022-11-01 PROCEDURE — 36415 COLL VENOUS BLD VENIPUNCTURE: CPT

## 2022-11-01 PROCEDURE — 74011250637 HC RX REV CODE- 250/637: Performed by: INTERNAL MEDICINE

## 2022-11-01 RX ADMIN — GABAPENTIN 300 MG: 300 CAPSULE ORAL at 20:57

## 2022-11-01 RX ADMIN — HYDRALAZINE HYDROCHLORIDE 25 MG: 50 TABLET, FILM COATED ORAL at 20:57

## 2022-11-01 RX ADMIN — GABAPENTIN 300 MG: 300 CAPSULE ORAL at 08:46

## 2022-11-01 RX ADMIN — SODIUM CHLORIDE, PRESERVATIVE FREE 10 ML: 5 INJECTION INTRAVENOUS at 08:47

## 2022-11-01 RX ADMIN — ACETAMINOPHEN 650 MG: 325 TABLET, FILM COATED ORAL at 21:01

## 2022-11-01 RX ADMIN — SODIUM CHLORIDE, PRESERVATIVE FREE 10 ML: 5 INJECTION INTRAVENOUS at 17:22

## 2022-11-01 RX ADMIN — PANTOPRAZOLE SODIUM 40 MG: 40 TABLET, DELAYED RELEASE ORAL at 08:46

## 2022-11-01 RX ADMIN — MELATONIN TAB 5 MG 5 MG: 5 TAB at 20:59

## 2022-11-01 RX ADMIN — VENLAFAXINE HYDROCHLORIDE 75 MG: 75 CAPSULE, EXTENDED RELEASE ORAL at 08:46

## 2022-11-01 RX ADMIN — CARVEDILOL 6.25 MG: 3.12 TABLET, FILM COATED ORAL at 17:22

## 2022-11-01 RX ADMIN — SODIUM CHLORIDE, PRESERVATIVE FREE 10 ML: 5 INJECTION INTRAVENOUS at 20:59

## 2022-11-01 RX ADMIN — DONEPEZIL HYDROCHLORIDE 10 MG: 5 TABLET, FILM COATED ORAL at 20:59

## 2022-11-01 RX ADMIN — INSULIN LISPRO 2 UNITS: 100 INJECTION, SOLUTION INTRAVENOUS; SUBCUTANEOUS at 12:28

## 2022-11-01 RX ADMIN — LATANOPROST 1 DROP: 50 SOLUTION OPHTHALMIC at 21:00

## 2022-11-01 RX ADMIN — CARVEDILOL 6.25 MG: 3.12 TABLET, FILM COATED ORAL at 08:46

## 2022-11-01 NOTE — PROGRESS NOTES
Hospitalist Progress Note    Subjective:     Chief Complaint:   Chief Complaint   Patient presents with    Abdominal Pain    Headache          Patient is a 65 y.o. female with past medical history of HTN, arthritis, dyslipidemia, CKD, diabetes mellitus type 2 admitted 10/21 with syncopal episode at home along with reports of epigastric abdominal pain and dark tarry stools over few days prior to presentation. BUN and creatinine were elevated on admission. She was hospitalized previously on October 10 for a similar syncopal episode and discharged on oral protonix twice daily. Seen and examined today and appears well overall. She reports abdominal pain with urination this morning and persistent indigestion. She states she had a bowel movement yesterday. She has no other complaints at this time. Vitals today reveal hypertension. Labs 10/31 reveal hyperglycemia, normocytic anemia, hyperkalemia, increased BUN and creatinine, and increased BUN/Cr ratio. CT abdomen 10/31 showed no evidence of active bleeding.         Current Facility-Administered Medications   Medication Dose Route Frequency Provider Last Rate Last Admin    0.9% sodium chloride infusion 250 mL  250 mL IntraVENous PRN Adelfo Herrera PA-C        0.9% sodium chloride infusion  50 mL/hr IntraVENous CONTINUOUS Adelfo Herrera PA-C   Held at 10/31/22 1500    0.9% sodium chloride infusion  50 mL/hr IntraVENous CONTINUOUS Chris Schmidt MD 50 mL/hr at 10/31/22 1800 50 mL/hr at 10/31/22 1800    pantoprazole (PROTONIX) tablet 40 mg  40 mg Oral ACB Benson Hodgkin, MD   40 mg at 11/01/22 0846    EPINEPHrine HCl (PF) (ADRENALIN) 1 mg/mL (1 mL) injection    PRN Benson Hodgkin, MD   0.7 mg at 10/26/22 1420    0.9% sodium chloride infusion 250 mL  250 mL IntraVENous PRN Rodriguez Carrizales MD        hydrALAZINE (APRESOLINE) tablet 25 mg  25 mg Oral TID PRN Rodriguez Carrizales MD   25 mg at 10/31/22 0547    guaiFENesin (ROBITUSSIN) 100 mg/5 mL oral liquid 100 mg 100 mg Oral Q4H PRN Sky Salvador MD   100 mg at 10/23/22 0124    0.9% sodium chloride infusion 250 mL  250 mL IntraVENous PRN Ramesh Briones MD        sodium chloride (NS) flush 5-40 mL  5-40 mL IntraVENous Q8H Tressa Winter MD   10 mL at 11/01/22 0847    sodium chloride (NS) flush 5-40 mL  5-40 mL IntraVENous PRN Tressa Winter MD   10 mL at 10/22/22 1434    acetaminophen (TYLENOL) tablet 650 mg  650 mg Oral Q6H PRN Tressa Winter MD   650 mg at 10/31/22 2121    Or    acetaminophen (TYLENOL) suppository 650 mg  650 mg Rectal Q6H PRN Tressa Winter MD        polyethylene glycol (MIRALAX) packet 17 g  17 g Oral DAILY PRN Tressa Winter MD   17 g at 10/30/22 0817    ondansetron (ZOFRAN ODT) tablet 4 mg  4 mg Oral Q8H PRN Tressa Winter MD        Or    ondansetron [facility]) injection 4 mg  4 mg IntraVENous Q6H PRN Tressa Winter MD   4 mg at 10/26/22 1456    carvediloL (COREG) tablet 6.25 mg  6.25 mg Oral BID WITH MEALS Tressa Winter MD   6.25 mg at 11/01/22 0846    donepeziL (ARICEPT) tablet 10 mg  10 mg Oral QHS Tressa Winter MD   10 mg at 10/31/22 2121    gabapentin (NEURONTIN) capsule 300 mg  300 mg Oral BID Tressa Winter MD   300 mg at 11/01/22 0846    latanoprost (XALATAN) 0.005 % ophthalmic solution 1 Drop  1 Drop Both Eyes QHS Tressa Winter MD   1 Drop at 10/31/22 2123    melatonin tablet 5 mg  5 mg Oral QHS Tressa Winter MD   5 mg at 10/31/22 2122    venlafaxine-SR (EFFEXOR-XR) capsule 75 mg  75 mg Oral DAILY Tressa Winter MD   75 mg at 11/01/22 0846    insulin lispro (HUMALOG) injection   SubCUTAneous AC&HS Tressa Winter MD   2 Units at 10/31/22 1227            No Known Allergies    Review of Systems:  A comprehensive review of systems was negative except for that written in the HPI. Objective:     Blood pressure (!) 155/65, pulse 63, temperature 98.3 °F (36.8 °C), resp.  rate 16, height 5' 4\" (1.626 m), weight 190 lb (86.2 kg), SpO2 98 %. Temp (24hrs), Av.2 °F (36.8 °C), Min:98 °F (36.7 °C), Max:98.4 °F (36.9 °C)      Intake and Output:  Current Shift: No intake/output data recorded. Last 3 Shifts: 10/30 1901 -  0700  In: 318.8   Out: 1500 [Urine:1500]    Physical Exam:   General:  Alert, cooperative, no distress, appears stated age. Head:  Normocephalic, without obvious abnormality, atraumatic. Eyes:  Conjunctivae/corneas clear. PERRLA, EOMs intact. Ears:  Normal external ear canals both ears. Nose: Nares normal. Septum midline. Mucosa normal. No drainage or sinus tenderness. Throat: No erythema or exudate. Neck: Supple, symmetrical, trachea midline, no adenopathy, no masses or enlargements, no carotid bruit and no JVD. Lungs:   Clear to auscultation bilaterally. No wheezes, rales, or rhonchi. Chest wall:  No tenderness or deformity. Heart:  Regular rate and rhythm, S1, S2 normal, no murmur, click, rub or gallop. No signs of peripheral edema. Abdomen:   Soft, minimally tender to palpation in RUQ, non-tender in other quadrants, non-distended. Bowel sounds normal. No masses. No organomegaly. Extremities: Extremities normal, atraumatic, no cyanosis or edema. Pulses: 2+ and symmetric all extremities. Skin: Skin color, texture, turgor normal. No rashes or lesions. Lymph nodes: Cervical, supraclavicular, and axillary nodes normal.   Neurologic: Alert and oriented x3. No focal neurologic deficits. Strength and sensation intact. Additional comments:None    Lab/Data Review: All lab results for the last 24 hours reviewed.   Recent Results (from the past 24 hour(s))   GLUCOSE, POC    Collection Time: 10/31/22  4:11 PM   Result Value Ref Range    Glucose (POC) 116 (H) 65 - 100 mg/dL    Performed by Huseyin Garcia    Collection Time: 10/31/22  5:55 PM   Result Value Ref Range    Crossmatch Expiration 2022,2359     ABO/Rh(D) A Positive     Antibody screen Negative     Unit number [id number] Blood component type RC LR     Unit division 00     Status of unit Issued,final     TRANSFUSION STATUS Ok to transfuse     Crossmatch result Compatible    GLUCOSE, POC    Collection Time: 10/31/22  8:51 PM   Result Value Ref Range    Glucose (POC) 114 (H) 65 - 100 mg/dL    Performed by Raymond Trejo, POC    Collection Time: 11/01/22  7:52 AM   Result Value Ref Range    Glucose (POC) 138 (H) 65 - 100 mg/dL    Performed by [facility] [place] [facility]    GLUCOSE, POC    Collection Time: 11/01/22 11:47 AM   Result Value Ref Range    Glucose (POC) 158 (H) 65 - 100 mg/dL    Performed by [facility] [place] [facility]      CT ABD PELV W WO CONT 10/31/2022    Narrative  CT ABDOMEN AND PELVIS WITHOUT AND WITH CONTRAST. 10/31/2022 4:45 PM    INDICATION: GI BLEED    COMPARISON: None. TECHNIQUE: Noncontrast, arterial, and delayed phase CT of the abdomen and pelvis  was performed after the administration of 100 mL of Isovue-370. CT dose  reduction was achieved through use of a standardized protocol tailored for this  examination and automatic exposure control for dose modulation. FINDINGS:    No evidence of active arterial bleeding. There is a small blush of enhancement  in the proximal jejunum which is best seen on series 6 image 44    Abdomen/pelvis: Liver is unremarkable. Cholecystectomy changes. The spleen,  pancreas, bilateral adrenal glands are unremarkable. Status post left  nephrectomy. Small hypodensities in the right kidney too small to characterize. No evidence of hydronephrosis. The urinary bladder is unremarkable. Visualized  reproductive organs are unremarkable. Atherosclerotic disease. No evidence of  lymphadenopathy. Osseous Structures and Soft Tissues:  Degenerative changes in the lumbar spine. Lungs:Small bilateral pleural effusions and bilateral lower lobe volume loss. Coronary atherosclerotic disease. Impression  1. No evidence of active arterial GI bleeding.     2.  There is a faint blush of enhancement in the proximal jejunum. This is of  uncertain clinical significance and may represent a small mass or vascular  malformation. 3.  Other incidental findings as above. XR CHEST PORT 10/10/2022    Narrative  PORTABLE CHEST RADIOGRAPH/S: 10/10/2022 2:27 PM    INDICATION: Syncope. COMPARISON: 9/21/2022, 2/20/2022. CT chest 1/24/2022. TECHNIQUE: Portable frontal upright radiograph/s of the chest.    FINDINGS:  The lungs are clear. The central airways are patent. No pneumothorax or pleural  effusion. The heart is probably enlarged, even given portable technique. There  is bilateral glenohumeral osteoarthritis and old rotator cuff disease. Impression  Clear lungs. Cardiac megaly. Assessment:     Patient is a 65 y.o. female with past medical history of HTN, arthritis, dyslipidemia, CKD, diabetes mellitus type 2 admitted 10/21 with syncopal episode at home along with reports of epigastric abdominal pain and dark tarry stools over few days prior to presentation. BUN and creatinine were elevated on admission. She was hospitalized previously on October 10 for a similar syncopal episode and discharged on oral protonix twice daily. Seen and examined today and appears well overall. She reports abdominal pain with urination this morning and persistent indigestion. She states she had a bowel movement yesterday. She has no other complaints at this time. Vitals today reveal hypertension. Labs 10/31 reveal hyperglycemia, normocytic anemia, hyperkalemia, increased BUN and creatinine, and increased BUN/Cr ratio. CT abdomen 10/31 showed no evidence of active bleeding.     Plan:     1.) Acute GI bleed  - Status posttransfusion of 3 units packed RBC  - Status post EGF with proximal jejunal bleed with erosions  - CT abdomen 10/31 showed no evidence of active bleeding  - GI following  - Continue protonix and monitoring hemoglobin levels    2.) ALEX with CKD stage IIIb  - BUN elevated, has been steadily increasing over past week  - Creatinine remains elevated but has been trending down and appears stable  - Nephrology following, agree with their recommendations  - Continue gentle IV fluid hydration    3.) Syncope  - Likely secondary to blood loss from GI bleed  - Continue to monitor hemoglobin levels    4.) Hypertension  - Continue current medications    5.) Hyperglycemia  - Monitor blood glucose levels  - Insulin lispro    6.) DVT & GI Prophylaxis  - Anticoagulation discontinued due to GI bleed, note history of pulmonary embolism  - Continue protonix for GI prophylaxis      CODE STATUS: Full Code    Disposition:    Damian Coombs  11/1/2022  12:17 PM               *ATTENTION:  This note has been created by a medical student for educational purposes only. Please do not refer to the content of this note for clinical decision-making, billing, or other purposes. Please see attending physicians note to obtain clinical information on this patient. *

## 2022-11-01 NOTE — PROGRESS NOTES
Bedside, Verbal, and Written shift change report given to [address] [facility] (oncoming nurse) by Sarahy Stone (offgoing nurse). Report included the following information SBAR and MAR.

## 2022-11-01 NOTE — PROGRESS NOTES
Problem: Falls - Risk of  Goal: *Absence of Falls  Description: Document Dennie Oak Fall Risk and appropriate interventions in the flowsheet.   Outcome: Progressing Towards Goal  Note: Fall Risk Interventions:  Mobility Interventions: Bed/chair exit alarm, Communicate number of staff needed for ambulation/transfer         Medication Interventions: Bed/chair exit alarm    Elimination Interventions: Call light in reach    History of Falls Interventions: Bed/chair exit alarm

## 2022-11-01 NOTE — PROGRESS NOTES
Hospitalist Progress Note         Delfina Wesley MD          Daily Progress Note: 11/1/2022      Subjective: The patient is seen for follow  up. Francy Azevedo is a 65 y.o. female who presents with reports of syncopal episode at home. According to patient, she was seated on the couch watching TV and does not remember the details of what happened . She remembers waking up in an ambulance. Reports having epigastric abdominal pain for the last few days with dark tarry stools. Work-up in the ED shows a hemoglobin of 6.3 with a hematocrit of 20.1. She was last admitted in the hospital October 10 through October 13 for similar episode of syncope with bleeding. EGD done October 12 did not find any active upper GI bleed. She was discharged home on oral Protonix twice daily and advised to stop taking apixaban. S/p EGD on 10/21/22 showed gastritis without evidence of bleeding. S/p 2 unit of PRBC transfusion on 10/21/22. Colonoscopy on 10/24/22 showed mild diverticulosis, polyp and hemorrhoids. Capsule endoscopy done on 10/25/22 showed blood clot loaded at duodenal and proximal jejunum indicating active bleeding. Repeat EGD performed again active bleeding noted. Patient may require IR intervention although has severe renal dysfunction with 1 kidney due to history of renal cancer and nephrectomy. Hemoglobin dropped to 6.3 will transfuse a unit of packed red blood cells. Nephrology is cleared for mesenteric angiogram and possible embolization and first will do CTA for GI bleeding scan. Have discussed the risks and benefits with the patient's daughter via phone Zahra Bailey. ---  Patient seen in follow-up. Clinically improving but continues to drop in H&H.   Possible embolization by interventional radiologist today    Problem List:  Problem List as of 11/1/2022 Date Reviewed: 10/26/2022            Codes Class Noted - Resolved    Rectal bleeding ICD-10-CM: K62.5  ICD-9-CM: 569.3 10/21/2022 - Present        GI bleed ICD-10-CM: K92.2  ICD-9-CM: 578.9  10/10/2022 - Present        Seizure ([facility] ICD-10-CM: R56.9  ICD-9-CM: 780.39  8/10/2022 - Present        Non-STEMI (non-ST elevated myocardial infarction) ([place] [address] ICD-10-CM: I21.4  ICD-9-CM: 410.70  8/1/2022 - Present        Anemia ICD-10-CM: D64.9  ICD-9-CM: 285.9  8/1/2022 - Present        Hypertensive heart and chronic kidney disease without heart failure, with stage 5 chronic kidney disease, or end stage renal disease ([facility] ICD-10-CM: I13.11  ICD-9-CM: 404.92  6/9/2022 - Present        Hyperglycemia due to type 2 diabetes mellitus ([facility] ICD-10-CM: E11.65  ICD-9-CM: 250.00  6/9/2022 - Present        Anxiety ICD-10-CM: F41.9  ICD-9-CM: 300.00  3/4/2022 - Present        Benign hypertensive renal disease ICD-10-CM: I12.9  ICD-9-CM: 403.10  3/4/2022 - Present        Chronic gouty arthritis ICD-10-CM: M1A. 00X0  ICD-9-CM: 274.02  3/4/2022 - Present        Diabetic peripheral neuropathy ([facility] ICD-10-CM: E11.42  ICD-9-CM: 250.60, 357.2  3/4/2022 - Present        Venous insufficiency of left lower extremity ICD-10-CM: I87.2  ICD-9-CM: 459.81  2/10/2022 - Present        Wound of left leg, subsequent encounter ICD-10-CM: S81.802D  ICD-9-CM: V58.89, 894.0  1/13/2022 - Present        Secondary hyperparathyroidism ([facility] ICD-10-CM: N25.81  ICD-9-CM: 588.81  10/22/2021 - Present        Syncopal episodes ICD-10-CM: R55  ICD-9-CM: 780.2  10/21/2021 - Present        Syncope ICD-10-CM: R55  ICD-9-CM: 780.2  11/21/2020 - Present        Episode of unresponsiveness ICD-10-CM: R41.89  ICD-9-CM: 780.09  9/24/2020 - Present        Arteriosclerosis of coronary artery ICD-10-CM: I25.10  ICD-9-CM: 414.00  9/16/2020 - Present        Dyslipidemia ICD-10-CM: E78.5  ICD-9-CM: 272.4  9/16/2020 - Present        Chronic kidney disease (CKD), stage IV (severe) ([facility] ICD-10-CM: N18.4  ICD-9-CM: 585.4  9/16/2020 - Present    Overview Signed 12/22/2020 10:52 AM by Ana Rizzo MD Cr in 2.6 range. She sees Dr. Del Cid Orf             Hypertension ICD-10-CM: I10  ICD-9-CM: 401.9  9/16/2020 - Present        Diabetes mellitus type 2, controlled ([facility] ICD-10-CM: E11.9  ICD-9-CM: 250.00  9/16/2020 - Present        TIA (transient ischemic attack) ICD-10-CM: G45.9  ICD-9-CM: 435.9  9/16/2020 - Present        Adenocarcinoma, renal cell ([facility] ICD-10-CM: C64.9  ICD-9-CM: 189.0  9/2/2020 - Present    Overview Signed 12/17/2020 10:10 AM by Caroline Ozuna NP     Previously seen by Dr. Ismael Garrett. Followed also by Dr. Joni Del Castillo. HX of left radical nephrectomy.              Morbid obesity ([facility] ICD-10-CM: E66.01  ICD-9-CM: 278.01  9/2/2020 - Present        Peripheral vascular disease ([facility] ICD-10-CM: I73.9  ICD-9-CM: 443.9  9/2/2020 - Present        Arthritis ICD-10-CM: M19.90  ICD-9-CM: 716.90  8/21/2019 - Present        Essential hypertension ICD-10-CM: I10  ICD-9-CM: 401.9  8/21/2019 - Present        Impingement syndrome of shoulder region ICD-10-CM: M75.40  ICD-9-CM: 726.2  8/21/2019 - Present        Pulmonary embolism ([facility]) ICD-10-CM: I26.99  ICD-9-CM: 415.19  2/1/2015 - Present        Cerebrovascular accident (CVA) ([facility] ICD-10-CM: I63.9  ICD-9-CM: 434.91  8/1/2014 - Present        RESOLVED: Hyperlipidemia ICD-10-CM: E78.5  ICD-9-CM: 272.4  8/21/2019 - 8/3/2021           Medications reviewed  Current Facility-Administered Medications   Medication Dose Route Frequency    0.9% sodium chloride infusion 250 mL  250 mL IntraVENous PRN    0.9% sodium chloride infusion  50 mL/hr IntraVENous CONTINUOUS    0.9% sodium chloride infusion  50 mL/hr IntraVENous CONTINUOUS    pantoprazole (PROTONIX) tablet 40 mg  40 mg Oral ACB    EPINEPHrine HCl (PF) (ADRENALIN) 1 mg/mL (1 mL) injection    PRN    0.9% sodium chloride infusion 250 mL  250 mL IntraVENous PRN    hydrALAZINE (APRESOLINE) tablet 25 mg  25 mg Oral TID PRN    guaiFENesin (ROBITUSSIN) 100 mg/5 mL oral liquid 100 mg  100 mg Oral Q4H PRN    0.9% sodium chloride infusion 250 mL  250 mL IntraVENous PRN    sodium chloride (NS) flush 5-40 mL  5-40 mL IntraVENous Q8H    sodium chloride (NS) flush 5-40 mL  5-40 mL IntraVENous PRN    acetaminophen (TYLENOL) tablet 650 mg  650 mg Oral Q6H PRN    Or    acetaminophen (TYLENOL) suppository 650 mg  650 mg Rectal Q6H PRN    polyethylene glycol (MIRALAX) packet 17 g  17 g Oral DAILY PRN    ondansetron (ZOFRAN ODT) tablet 4 mg  4 mg Oral Q8H PRN    Or    ondansetron (ZOFRAN) injection 4 mg  4 mg IntraVENous Q6H PRN    carvediloL (COREG) tablet 6.25 mg  6.25 mg Oral BID WITH MEALS    donepeziL (ARICEPT) tablet 10 mg  10 mg Oral QHS    gabapentin (NEURONTIN) capsule 300 mg  300 mg Oral BID    latanoprost (XALATAN) 0.005 % ophthalmic solution 1 Drop  1 Drop Both Eyes QHS    melatonin tablet 5 mg  5 mg Oral QHS    venlafaxine-SR (EFFEXOR-XR) capsule 75 mg  75 mg Oral DAILY    insulin lispro (HUMALOG) injection   SubCUTAneous AC&HS       Review of Systems:   A comprehensive review of systems was negative except for that written in the HPI. Objective:   Physical Exam:     Visit Vitals  BP (!) 155/65   Pulse 63   Temp 98.3 °F (36.8 °C)   Resp 16   Ht 5' 4\" (1.626 m)   Wt 86.2 kg (190 lb)   SpO2 98%   BMI 32.61 kg/m²      O2 Device: None (Room air)    Temp (24hrs), Av.2 °F (36.8 °C), Min:98 °F (36.7 °C), Max:98.4 °F (36.9 °C)    No intake/output data recorded. 10/30 1901 -  0700  In: 318.8   Out: 1500 [Urine:1500]    General:  Alert, cooperative, no distress, appears stated age. Lungs:   Clear to auscultation bilaterally. Chest wall:  No tenderness or deformity. Heart:  Regular rate and rhythm, S1, S2 normal, no murmur, click, rub or gallop. Abdomen:   Soft, non-tender. Bowel sounds normal. No masses,  No organomegaly. Extremities: Extremities normal, atraumatic, no cyanosis or edema. Pulses: 2+ and symmetric all extremities.    Skin: Skin color, texture, turgor normal. No rashes or lesions   Neurologic: CNII-XII intact. No gross sensory or motor deficits     Data Review:       Recent Days:  Recent Labs     10/31/22  1145 10/29/22  1220   WBC 8.0 7.6   HGB 6.3* 7.6*   HCT 20.8* 24.0*    202     Recent Labs     10/31/22  1145 10/30/22  0853    138   K 5.6* 5.0    105   CO2 28 28   * 149*   BUN 65* 59*   CREA 1.48* 1.46*   CA 9.2 9.6     No results for input(s): PH, PCO2, PO2, HCO3, FIO2 in the last 72 hours. 24 Hour Results:  Recent Results (from the past 24 hour(s))   CBC WITH AUTOMATED DIFF    Collection Time: 10/31/22 11:45 AM   Result Value Ref Range    WBC 8.0 3.6 - 11.0 K/uL    RBC 2.37 (L) 3.80 - 5.20 M/uL    HGB 6.3 (L) 11.5 - 16.0 g/dL    HCT 20.8 (L) 35.0 - 47.0 %    MCV 87.8 80.0 - 99.0 FL    MCH 26.6 26.0 - 34.0 PG    MCHC 30.3 30.0 - 36.5 g/dL    RDW 19.7 (H) 11.5 - 14.5 %    PLATELET 834 996 - 937 K/uL    MPV 10.3 8.9 - 12.9 FL    NRBC 0.0 0.0  WBC    ABSOLUTE NRBC 0.00 0.00 - 0.01 K/uL    NEUTROPHILS 76 (H) 32 - 75 %    LYMPHOCYTES 12 12 - 49 %    MONOCYTES 9 5 - 13 %    EOSINOPHILS 2 0 - 7 %    BASOPHILS 1 0 - 1 %    IMMATURE GRANULOCYTES 0 0 - 0.5 %    ABS. NEUTROPHILS 6.1 1.8 - 8.0 K/UL    ABS. LYMPHOCYTES 1.0 0.8 - 3.5 K/UL    ABS. MONOCYTES 0.7 0.0 - 1.0 K/UL    ABS. EOSINOPHILS 0.2 0.0 - 0.4 K/UL    ABS. BASOPHILS 0.1 0.0 - 0.1 K/UL    ABS. IMM.  GRANS. 0.0 0.00 - 0.04 K/UL    DF AUTOMATED     METABOLIC PANEL, BASIC    Collection Time: 10/31/22 11:45 AM   Result Value Ref Range    Sodium 139 136 - 145 mmol/L    Potassium 5.6 (H) 3.5 - 5.1 mmol/L    Chloride 107 97 - 108 mmol/L    CO2 28 21 - 32 mmol/L    Anion gap 4 (L) 5 - 15 mmol/L    Glucose 157 (H) 65 - 100 mg/dL    BUN 65 (H) 6 - 20 mg/dL    Creatinine 1.48 (H) 0.55 - 1.02 mg/dL    BUN/Creatinine ratio 44 (H) 12 - 20      eGFR 36 (L) >60 ml/min/1.73m2    Calcium 9.2 8.5 - 10.1 mg/dL   GLUCOSE, POC    Collection Time: 10/31/22  4:11 PM   Result Value Ref Range    Glucose (POC) 116 (H) 65 - 100 mg/dL Performed by Mauro Martinez    Collection Time: 10/31/22  5:55 PM   Result Value Ref Range    Crossmatch Expiration 11/03/2022,2359     ABO/Rh(D) A Positive     Antibody screen Negative     Unit number [id number]     Blood component type RC LR     Unit division 00     Status of unit [address] to transfuse     Crossmatch result Compatible    GLUCOSE, POC    Collection Time: 10/31/22  8:51 PM   Result Value Ref Range    Glucose (POC) 114 (H) 65 - 100 mg/dL    Performed by Kin Bryant, POC    Collection Time: 11/01/22  7:52 AM   Result Value Ref Range    Glucose (POC) 138 (H) 65 - 100 mg/dL    Performed by Cabrera Burton            Assessment/     1. Acute blood loss anemia secondary to GI bleed  Status posttransfusion of 3 units packed red blood cell  Status post EGD with proximal jejunal bleed with erosions  GI following  Continue to monitor hemoglobin and transfuse for hemoglobin less than 7  Continue Protonix  May require surgical intervention versus interventional radiology embolization  Ongoing bleeding CTA pending IR consult for mesenteric angiogram with embolization    2. ALEX with chronic kidney disease  Baseline 1.4    3. Syncope  Secondary to #1 continue to monitor hemoglobin maintain greater than 7    4. Essential hypertension  Continue Coreg  Continue hydralazine     5. Previous history of pulm emboli  Have discontinued anticoagulation due to the GI bleeding    Plan:  Obtain stat H/H    Care Plan discussed with: Patient/Family    Total time spent with patient: 30 minutes.     Daysi Liao MD

## 2022-11-01 NOTE — PROGRESS NOTES
Renal Daily Progress Note    Admit Date: 10/21/2022      Subjective:   CT of the abdomen results noted. No definite source of bleeding found. Hemoglobin is better at 7.2 g.  Urine output today has been around 700 mL thus far. Current Facility-Administered Medications   Medication Dose Route Frequency    0.9% sodium chloride infusion 250 mL  250 mL IntraVENous PRN    pantoprazole (PROTONIX) tablet 40 mg  40 mg Oral ACB    EPINEPHrine HCl (PF) (ADRENALIN) 1 mg/mL (1 mL) injection    PRN    0.9% sodium chloride infusion 250 mL  250 mL IntraVENous PRN    hydrALAZINE (APRESOLINE) tablet 25 mg  25 mg Oral TID PRN    guaiFENesin (ROBITUSSIN) 100 mg/5 mL oral liquid 100 mg  100 mg Oral Q4H PRN    0.9% sodium chloride infusion 250 mL  250 mL IntraVENous PRN    sodium chloride (NS) flush 5-40 mL  5-40 mL IntraVENous Q8H    sodium chloride (NS) flush 5-40 mL  5-40 mL IntraVENous PRN    acetaminophen (TYLENOL) tablet 650 mg  650 mg Oral Q6H PRN    Or    acetaminophen (TYLENOL) suppository 650 mg  650 mg Rectal Q6H PRN    polyethylene glycol (MIRALAX) packet 17 g  17 g Oral DAILY PRN    ondansetron (ZOFRAN ODT) tablet 4 mg  4 mg Oral Q8H PRN    Or    ondansetron (ZOFRAN) injection 4 mg  4 mg IntraVENous Q6H PRN    carvediloL (COREG) tablet 6.25 mg  6.25 mg Oral BID WITH MEALS    donepeziL (ARICEPT) tablet 10 mg  10 mg Oral QHS    gabapentin (NEURONTIN) capsule 300 mg  300 mg Oral BID    latanoprost (XALATAN) 0.005 % ophthalmic solution 1 Drop  1 Drop Both Eyes QHS    melatonin tablet 5 mg  5 mg Oral QHS    venlafaxine-SR (EFFEXOR-XR) capsule 75 mg  75 mg Oral DAILY    insulin lispro (HUMALOG) injection   SubCUTAneous AC&HS        Review of Systems    Review of Systems   Respiratory:  Negative for shortness of breath. Cardiovascular:  Negative for chest pain and palpitations. Gastrointestinal:  Negative for abdominal pain. Neurological:  Negative for headaches.         Objective:     Patient Vitals for the past 8 hrs:   BP Temp Pulse Resp SpO2   11/01/22 0754 (!) 155/65 98.3 °F (36.8 °C) 63 16 --   11/01/22 0750 -- -- -- -- 98 %   11/01/22 0746 (!) 155/65 98 °F (36.7 °C) -- 16 98 %       No intake/output data recorded. 10/30 1901 - 11/01 0700  In: 318.8   Out: 1500 [Urine:1500]    Physical Exam:   Physical Exam  Cardiovascular:      Rate and Rhythm: Regular rhythm. Pulmonary:      Breath sounds: Normal breath sounds. Abdominal:      General: Bowel sounds are normal.      Palpations: Abdomen is soft. Neurological:      General: No focal deficit present. Mental Status: She is alert. No edema        CT ABD PELV W WO CONT   Final Result   1. No evidence of active arterial GI bleeding. 2.  There is a faint blush of enhancement in the proximal jejunum. This is of   uncertain clinical significance and may represent a small mass or vascular   malformation. 3.  Other incidental findings as above. Data Review   Recent Labs     11/01/22  1155 10/31/22  1145   WBC  --  8.0   HGB 7.2* 6.3*   HCT 22.8* 20.8*   PLT  --  189       Recent Labs     10/31/22  1145 10/30/22  0853    138   K 5.6* 5.0    105   CO2 28 28   * 149*   BUN 65* 59*   CREA 1.48* 1.46*   CA 9.2 9.6       No components found for: GLPOC  No results for input(s): PH, PCO2, PO2, HCO3, FIO2 in the last 72 hours. No results for input(s): INR, INREXT, INREXT, INREXT in the last 72 hours. Assessment:           Active Problems:    Rectal bleeding (10/21/2022)  Chronic kidney disease stage IIIb. She received IV contrast yesterday for the CT. Await BMP results from today. Modest hyperkalemia secondary to CKD. Hypertension under fair control. Avoid hypotension. Anemia due to anemia of chronic disease and GI bleed.     Plan:   Discontinue IV fluids  Await BMP results

## 2022-11-01 NOTE — PROGRESS NOTES
Problem: Falls - Risk of  Goal: *Absence of Falls  Description: Document Lindy Counts Fall Risk and appropriate interventions in the flowsheet. Outcome: Progressing Towards Goal  Note: Fall Risk Interventions:  Mobility Interventions: Bed/chair exit alarm         Medication Interventions: Bed/chair exit alarm    Elimination Interventions: Bed/chair exit alarm, Call light in reach    History of Falls Interventions: Bed/chair exit alarm         Problem: Patient Education: Go to Patient Education Activity  Goal: Patient/Family Education  Outcome: Progressing Towards Goal     Problem: Pressure Injury - Risk of  Goal: *Prevention of pressure injury  Description: Document Kirit Scale and appropriate interventions in the flowsheet.   Outcome: Progressing Towards Goal  Note: Pressure Injury Interventions:  Sensory Interventions: Maintain/enhance activity level    Moisture Interventions: Absorbent underpads    Activity Interventions: Increase time out of bed, PT/OT evaluation    Mobility Interventions: PT/OT evaluation    Nutrition Interventions: Document food/fluid/supplement intake                     Problem: Patient Education: Go to Patient Education Activity  Goal: Patient/Family Education  Outcome: Progressing Towards Goal     Problem: Pain  Goal: *Control of Pain  Outcome: Progressing Towards Goal  Goal: *PALLIATIVE CARE:  Alleviation of Pain  Outcome: Progressing Towards Goal     Problem: Patient Education: Go to Patient Education Activity  Goal: Patient/Family Education  Outcome: Progressing Towards Goal

## 2022-11-02 LAB
ABO + RH BLD: NORMAL
ANION GAP SERPL CALC-SCNC: 5 MMOL/L (ref 5–15)
BLOOD GROUP ANTIBODIES SERPL: NEGATIVE
BUN SERPL-MCNC: 60 MG/DL (ref 6–20)
BUN/CREAT SERPL: 37 (ref 12–20)
CA-I BLD-MCNC: 9 MG/DL (ref 8.5–10.1)
CHLORIDE SERPL-SCNC: 106 MMOL/L (ref 97–108)
CO2 SERPL-SCNC: 26 MMOL/L (ref 21–32)
CREAT SERPL-MCNC: 1.62 MG/DL (ref 0.55–1.02)
GLUCOSE BLD STRIP.AUTO-MCNC: 124 MG/DL (ref 65–100)
GLUCOSE BLD STRIP.AUTO-MCNC: 133 MG/DL (ref 65–100)
GLUCOSE BLD STRIP.AUTO-MCNC: 145 MG/DL (ref 65–100)
GLUCOSE BLD STRIP.AUTO-MCNC: 93 MG/DL (ref 65–100)
GLUCOSE SERPL-MCNC: 123 MG/DL (ref 65–100)
HCT VFR BLD AUTO: 22.4 % (ref 35–47)
HGB BLD-MCNC: 6.9 G/DL (ref 11.5–16)
PERFORMED BY, TECHID: ABNORMAL
PERFORMED BY, TECHID: NORMAL
POTASSIUM SERPL-SCNC: 4.9 MMOL/L (ref 3.5–5.1)
SODIUM SERPL-SCNC: 137 MMOL/L (ref 136–145)
SPECIMEN EXP DATE BLD: NORMAL

## 2022-11-02 PROCEDURE — 74011250637 HC RX REV CODE- 250/637: Performed by: INTERNAL MEDICINE

## 2022-11-02 PROCEDURE — 82962 GLUCOSE BLOOD TEST: CPT

## 2022-11-02 PROCEDURE — 36415 COLL VENOUS BLD VENIPUNCTURE: CPT

## 2022-11-02 PROCEDURE — 36430 TRANSFUSION BLD/BLD COMPNT: CPT

## 2022-11-02 PROCEDURE — 74011636637 HC RX REV CODE- 636/637: Performed by: INTERNAL MEDICINE

## 2022-11-02 PROCEDURE — 65270000029 HC RM PRIVATE

## 2022-11-02 PROCEDURE — 80048 BASIC METABOLIC PNL TOTAL CA: CPT

## 2022-11-02 PROCEDURE — 86900 BLOOD TYPING SEROLOGIC ABO: CPT

## 2022-11-02 PROCEDURE — P9016 RBC LEUKOCYTES REDUCED: HCPCS

## 2022-11-02 PROCEDURE — 30233N1 TRANSFUSION OF NONAUTOLOGOUS RED BLOOD CELLS INTO PERIPHERAL VEIN, PERCUTANEOUS APPROACH: ICD-10-PCS | Performed by: INTERNAL MEDICINE

## 2022-11-02 PROCEDURE — 85018 HEMOGLOBIN: CPT

## 2022-11-02 RX ORDER — HYDRALAZINE HYDROCHLORIDE 50 MG/1
25 TABLET, FILM COATED ORAL 2 TIMES DAILY
Status: DISCONTINUED | OUTPATIENT
Start: 2022-11-02 | End: 2022-11-03

## 2022-11-02 RX ORDER — SODIUM CHLORIDE 9 MG/ML
250 INJECTION, SOLUTION INTRAVENOUS AS NEEDED
Status: DISCONTINUED | OUTPATIENT
Start: 2022-11-02 | End: 2022-11-03 | Stop reason: HOSPADM

## 2022-11-02 RX ADMIN — POLYETHYLENE GLYCOL 3350 17 G: 17 POWDER, FOR SOLUTION ORAL at 09:59

## 2022-11-02 RX ADMIN — MELATONIN TAB 5 MG 5 MG: 5 TAB at 21:18

## 2022-11-02 RX ADMIN — GABAPENTIN 300 MG: 300 CAPSULE ORAL at 21:18

## 2022-11-02 RX ADMIN — CARVEDILOL 6.25 MG: 3.12 TABLET, FILM COATED ORAL at 16:49

## 2022-11-02 RX ADMIN — HYDRALAZINE HYDROCHLORIDE 25 MG: 50 TABLET, FILM COATED ORAL at 03:24

## 2022-11-02 RX ADMIN — PANTOPRAZOLE SODIUM 40 MG: 40 TABLET, DELAYED RELEASE ORAL at 06:26

## 2022-11-02 RX ADMIN — CARVEDILOL 6.25 MG: 3.12 TABLET, FILM COATED ORAL at 09:55

## 2022-11-02 RX ADMIN — LATANOPROST 1 DROP: 50 SOLUTION OPHTHALMIC at 21:20

## 2022-11-02 RX ADMIN — GABAPENTIN 300 MG: 300 CAPSULE ORAL at 09:55

## 2022-11-02 RX ADMIN — INSULIN LISPRO 2 UNITS: 100 INJECTION, SOLUTION INTRAVENOUS; SUBCUTANEOUS at 09:59

## 2022-11-02 RX ADMIN — DONEPEZIL HYDROCHLORIDE 10 MG: 5 TABLET, FILM COATED ORAL at 21:18

## 2022-11-02 RX ADMIN — ACETAMINOPHEN 650 MG: 325 TABLET, FILM COATED ORAL at 21:18

## 2022-11-02 RX ADMIN — VENLAFAXINE HYDROCHLORIDE 75 MG: 75 CAPSULE, EXTENDED RELEASE ORAL at 09:55

## 2022-11-02 RX ADMIN — HYDRALAZINE HYDROCHLORIDE 25 MG: 50 TABLET, FILM COATED ORAL at 21:18

## 2022-11-02 RX ADMIN — GUAIFENESIN 100 MG: 200 SOLUTION ORAL at 03:24

## 2022-11-02 NOTE — ANESTHESIA POSTPROCEDURE EVALUATION

## 2022-11-02 NOTE — PROGRESS NOTES
Renal Daily Progress Note    Admit Date: 10/21/2022      Subjective:   Feels well. No abdominal pain. Hemoglobin is lower at 6.9 g.  Urine output today has been around 1450 mL . No shortness of breath. Current Facility-Administered Medications   Medication Dose Route Frequency    pantoprazole (PROTONIX) tablet 40 mg  40 mg Oral ACB    EPINEPHrine HCl (PF) (ADRENALIN) 1 mg/mL (1 mL) injection    PRN    hydrALAZINE (APRESOLINE) tablet 25 mg  25 mg Oral TID PRN    guaiFENesin (ROBITUSSIN) 100 mg/5 mL oral liquid 100 mg  100 mg Oral Q4H PRN    acetaminophen (TYLENOL) tablet 650 mg  650 mg Oral Q6H PRN    Or    acetaminophen (TYLENOL) suppository 650 mg  650 mg Rectal Q6H PRN    polyethylene glycol (MIRALAX) packet 17 g  17 g Oral DAILY PRN    ondansetron (ZOFRAN ODT) tablet 4 mg  4 mg Oral Q8H PRN    Or    ondansetron (ZOFRAN) injection 4 mg  4 mg IntraVENous Q6H PRN    carvediloL (COREG) tablet 6.25 mg  6.25 mg Oral BID WITH MEALS    donepeziL (ARICEPT) tablet 10 mg  10 mg Oral QHS    gabapentin (NEURONTIN) capsule 300 mg  300 mg Oral BID    latanoprost (XALATAN) 0.005 % ophthalmic solution 1 Drop  1 Drop Both Eyes QHS    melatonin tablet 5 mg  5 mg Oral QHS    venlafaxine-SR (EFFEXOR-XR) capsule 75 mg  75 mg Oral DAILY    insulin lispro (HUMALOG) injection   SubCUTAneous AC&HS        Review of Systems    Review of Systems   Respiratory:  Negative for shortness of breath. Cardiovascular:  Negative for chest pain and palpitations. Gastrointestinal:  Negative for abdominal pain. Neurological:  Negative for headaches.         Objective:     Patient Vitals for the past 8 hrs:   BP Temp Pulse Resp SpO2   11/02/22 1140 139/60 98.3 °F (36.8 °C) (!) 53 -- 99 %   11/02/22 0928 (!) 164/67 98.1 °F (36.7 °C) 65 -- 98 %   11/02/22 0923 (!) 164/66 98.9 °F (37.2 °C) 64 18 99 %   11/02/22 0851 (!) 162/67 98 °F (36.7 °C) 66 18 --       11/02 0701 - 11/02 1900  In: -   Out: 600 [Urine:600]  10/31 1901 - 11/02 0700  In: 070.0 [P.O.:350]  Out: 1450 [Urine:1450]    Physical Exam:   Physical Exam  Cardiovascular:      Rate and Rhythm: Regular rhythm. Pulmonary:      Breath sounds: Normal breath sounds. Abdominal:      General: Bowel sounds are normal.      Palpations: Abdomen is soft. Neurological:      General: No focal deficit present. Mental Status: She is alert. No edema        CT ABD PELV W WO CONT   Final Result   1. No evidence of active arterial GI bleeding. 2.  There is a faint blush of enhancement in the proximal jejunum. This is of   uncertain clinical significance and may represent a small mass or vascular   malformation. 3.  Other incidental findings as above. Data Review   Recent Labs     11/02/22  1135 11/01/22  1155 10/31/22  1145   WBC  --   --  8.0   HGB 6.9* 7.2* 6.3*   HCT 22.4* 22.8* 20.8*   PLT  --   --  189       Recent Labs     10/31/22  1145      K 5.6*      CO2 28   *   BUN 65*   CREA 1.48*   CA 9.2       No components found for: GLPOC  No results for input(s): PH, PCO2, PO2, HCO3, FIO2 in the last 72 hours. No results for input(s): INR, INREXT, INREXT, INREXT in the last 72 hours. Assessment:           Active Problems:    Rectal bleeding (10/21/2022)  Chronic kidney disease stage IIIb. She received IV contrast on 10/31/2022. for the CT. No lab results    Modest hyperkalemia secondary to CKD. Hypertension under fair control. Avoid hypotension. Anemia due to anemia of chronic disease and GI bleed.     Plan:   Off IV fluids  Await BMP results  Add hydralazine for tighter control of blood pressure

## 2022-11-02 NOTE — PROGRESS NOTES
Hospitalist Progress Note         Wilian Menjivar MD          Daily Progress Note: 11/2/2022      Subjective: The patient is seen for follow  up. Jerry Ibanez is a 65 y.o. female who presents with reports of syncopal episode at home. According to patient, she was seated on the couch watching TV and does not remember the details of what happened . She remembers waking up in an ambulance. Reports having epigastric abdominal pain for the last few days with dark tarry stools. Work-up in the ED shows a hemoglobin of 6.3 with a hematocrit of 20.1. She was last admitted in the hospital October 10 through October 13 for similar episode of syncope with bleeding. EGD done October 12 did not find any active upper GI bleed. She was discharged home on oral Protonix twice daily and advised to stop taking apixaban. S/p EGD on 10/21/22 showed gastritis without evidence of bleeding. S/p 2 unit of PRBC transfusion on 10/21/22. Colonoscopy on 10/24/22 showed mild diverticulosis, polyp and hemorrhoids. Capsule endoscopy done on 10/25/22 showed blood clot loaded at duodenal and proximal jejunum indicating active bleeding. Repeat EGD performed again active bleeding noted. Patient may require IR intervention although has severe renal dysfunction with 1 kidney due to history of renal cancer and nephrectomy. Hemoglobin dropped to 6.3 will transfuse a unit of packed red blood cells. Nephrology is cleared for mesenteric angiogram and possible embolization and first will do CTA for GI bleeding scan. Have discussed the risks and benefits with the patient's daughter via phone Dori Pierce. ---  Patient seen in follow-up. Clinically improving but continues to drop in H&H.  CTA abdomen and pelvis shows a faint blush of enhancement in the proximal jejunum. No active bleeding.     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 00X0  ICD-9-CM: 274.02  3/4/2022 - Present        Diabetic peripheral neuropathy ([place] [address].) ICD-10-CM: E11.42  ICD-9-CM: 250.60, 357.2  3/4/2022 - Present        Venous insufficiency of left lower extremity ICD-10-CM: I87.2  ICD-9-CM: 459.81  2/10/2022 - Present        Wound of left leg, subsequent encounter ICD-10-CM: S81.802D  ICD-9-CM: V58.89, 894.0  1/13/2022 - Present        Secondary hyperparathyroidism ([facility].) ICD-10-CM: N25.81  ICD-9-CM: 588.81  10/22/2021 - Present        Syncopal episodes ICD-10-CM: R55  ICD-9-CM: 780.2  10/21/2021 - Present        Syncope ICD-10-CM: R55  ICD-9-CM: 780.2  11/21/2020 - Present        Episode of unresponsiveness ICD-10-CM: R41.89  ICD-9-CM: 780.09  9/24/2020 - Present        Arteriosclerosis of coronary artery ICD-10-CM: I25.10  ICD-9-CM: 414.00  9/16/2020 - Present        Dyslipidemia ICD-10-CM: E78.5  ICD-9-CM: 272.4  9/16/2020 - Present        Chronic kidney disease (CKD), stage IV (severe) ([facility] ICD-10-CM: N18.4  ICD-9-CM: 585.4  9/16/2020 - Present    Overview Signed 12/22/2020 10:52 AM by Aniceto He MD     Cr in 2.6 range. She sees Dr. Derek Dubois             Hypertension ICD-10-CM: I10  ICD-9-CM: 401.9  9/16/2020 - Present        Diabetes mellitus type 2, controlled ([place] [address].) ICD-10-CM: E11.9  ICD-9-CM: 250.00  9/16/2020 - Present        TIA (transient ischemic attack) ICD-10-CM: G45.9  ICD-9-CM: 435.9  9/16/2020 - Present        Adenocarcinoma, renal cell ([facility].) ICD-10-CM: C64.9  ICD-9-CM: 189.0  9/2/2020 - Present    Overview Signed 12/17/2020 10:10 AM by Glenna Del Valle NP     Previously seen by Dr. Lorena Barroso. Followed also by Dr. Vaishali Barry. HX of left radical nephrectomy.              Morbid obesity ([place] [address].) ICD-10-CM: E66.01  ICD-9-CM: 278.01  9/2/2020 - Present        Peripheral vascular disease ([place] [address].) ICD-10-CM: I73.9  ICD-9-CM: 443.9  9/2/2020 - Present        Arthritis ICD-10-CM: M19.90  ICD-9-CM: 716.90  8/21/2019 - Present        Essential hypertension ICD-10-CM: I10  ICD-9-CM: 401.9  8/21/2019 - Present        Impingement syndrome of shoulder region ICD-10-CM: M75.40  ICD-9-CM: 726.2  8/21/2019 - Present        Pulmonary embolism (HCC) ICD-10-CM: I26.99  ICD-9-CM: 415.19  2/1/2015 - Present        Cerebrovascular accident (CVA) ([facility].) ICD-10-CM: I63.9  ICD-9-CM: 434.91  8/1/2014 - Present        RESOLVED: Hyperlipidemia ICD-10-CM: E78.5  ICD-9-CM: 272.4  8/21/2019 - 8/3/2021         Medications reviewed  Current Facility-Administered Medications   Medication Dose Route Frequency    pantoprazole (PROTONIX) tablet 40 mg  40 mg Oral ACB    EPINEPHrine HCl (PF) (ADRENALIN) 1 mg/mL (1 mL) injection    PRN    hydrALAZINE (APRESOLINE) tablet 25 mg  25 mg Oral TID PRN    guaiFENesin (ROBITUSSIN) 100 mg/5 mL oral liquid 100 mg  100 mg Oral Q4H PRN    acetaminophen (TYLENOL) tablet 650 mg  650 mg Oral Q6H PRN    Or    acetaminophen (TYLENOL) suppository 650 mg  650 mg Rectal Q6H PRN    polyethylene glycol (MIRALAX) packet 17 g  17 g Oral DAILY PRN    ondansetron (ZOFRAN ODT) tablet 4 mg  4 mg Oral Q8H PRN    Or    ondansetron (ZOFRAN) injection 4 mg  4 mg IntraVENous Q6H PRN    carvediloL (COREG) tablet 6.25 mg  6.25 mg Oral BID WITH MEALS    donepeziL (ARICEPT) tablet 10 mg  10 mg Oral QHS    gabapentin (NEURONTIN) capsule 300 mg  300 mg Oral BID    latanoprost (XALATAN) 0.005 % ophthalmic solution 1 Drop  1 Drop Both Eyes QHS    melatonin tablet 5 mg  5 mg Oral QHS    venlafaxine-SR (EFFEXOR-XR) capsule 75 mg  75 mg Oral DAILY    insulin lispro (HUMALOG) injection   SubCUTAneous AC&HS       Review of Systems:   A comprehensive review of systems was negative except for that written in the HPI. Objective:   Physical Exam:     Visit Vitals  /60 (BP 1 Location: Right upper arm, BP Patient Position: Sitting)   Pulse (!) 53   Temp 98.3 °F (36.8 °C)   Resp 18   Ht 5' 4\" (1.626 m)   Wt 86.2 kg (190 lb)   SpO2 99%   BMI 32.61 kg/m²      O2 Device: None (Room air)    Temp (24hrs), Av.2 °F (36.8 °C), Min:98 °F (36.7 °C), Max:98.9 °F (37.2 °C)    701 - 1900  In: -   Out: 373 [MEQGT:730]   10/31 1901 -  0700  In: 668.8 [P.O.:350]  Out: 1450 [Urine:1450]    General:  Alert, cooperative, no distress, appears stated age. Lungs:   Clear to auscultation bilaterally. Chest wall:  No tenderness or deformity. Heart:  Regular rate and rhythm, S1, S2 normal, no murmur, click, rub or gallop. Abdomen:   Soft, non-tender. Bowel sounds normal. No masses,  No organomegaly. Extremities: Extremities normal, atraumatic, no cyanosis or edema. Pulses: 2+ and symmetric all extremities. Skin: Skin color, texture, turgor normal. No rashes or lesions   Neurologic: CNII-XII intact.  No gross sensory or motor deficits     Data Review:       Recent Days:  Recent Labs     22  1155 10/31/22  1145   WBC  --  8.0   HGB 7.2* 6.3*   HCT 22.8* 20.8*   PLT  --  189       Recent Labs     10/31/22  1145      K 5.6*      CO2 28   *   BUN 65*   CREA 1.48* CA 9.2       No results for input(s): PH, PCO2, PO2, HCO3, FIO2 in the last 72 hours. 24 Hour Results:  Recent Results (from the past 24 hour(s))   GLUCOSE, POC    Collection Time: 11/01/22  4:26 PM   Result Value Ref Range    Glucose (POC) 124 (H) 65 - 100 mg/dL    Performed by Tommy Stewart, POC    Collection Time: 11/01/22  8:12 PM   Result Value Ref Range    Glucose (POC) 169 (H) 65 - 100 mg/dL    Performed by Rebecca Benson    GLUCOSE, POC    Collection Time: 11/02/22  8:30 AM   Result Value Ref Range    Glucose (POC) 145 (H) 65 - 100 mg/dL    Performed by Gilford Deck    GLUCOSE, POC    Collection Time: 11/02/22 12:10 PM   Result Value Ref Range    Glucose (POC) 124 (H) 65 - 100 mg/dL    Performed by Carmelo Simpson            Assessment/     1. Acute blood loss anemia secondary to GI bleed  Status posttransfusion of 3 units packed red blood cell  Status post EGD with proximal jejunal bleed with erosions  GI following  Continue to monitor hemoglobin and transfuse for hemoglobin less than 7  Continue Protonix  May require surgical intervention versus interventional radiology embolization  Will discuss case with interventional radiologist this afternoon    2. ALEX with chronic kidney disease  Baseline 1.4    3. Syncope  Secondary to #1 continue to monitor hemoglobin maintain greater than 7    4. Essential hypertension  Continue Coreg  Continue hydralazine     5. Previous history of pulm emboli  Have discontinued anticoagulation due to the GI bleeding    Plan:  Obtain stat H/H    Care Plan discussed with: Patient/Family    Total time spent with patient: 30 minutes.     Shasha Mcdonald MD

## 2022-11-02 NOTE — PROGRESS NOTES
Comprehensive Nutrition Assessment    Type and Reason for Visit: Reassess (Early Goal)    Nutrition Recommendations/Plan:   Full Liquid diet, advance to 4 Carb, GI [place] or per MD.  Ensure HP x2/d (vanilla only). Continue to monitor and document PO and ONS intake in I/Os. Malnutrition Assessment:  Malnutrition Status:  Mild malnutrition (11/02/22 1215)    Context:  Acute illness     Findings of the 6 clinical characteristics of malnutrition:   Energy Intake:  50% or less of est energy requirements for 5 or more days  Weight Loss:  No significant weight loss     Body Fat Loss:  No significant body fat loss,     Muscle Mass Loss:  No significant muscle mass loss,    Fluid Accumulation:  No significant fluid accumulation,     Strength:  Not performed     Nutrition Assessment:    Admitted for rectal bleeding. (10/21) EGD showed gastritis w/o acute bleeding. (10/24) Colonoscopy found diverticulosis, polyp, and hemorrhoids. Multiple PRBCs transfused during LOS. (10/25) Capsule endoscopy showed blood clot at duodenal and proximal jejunum indicating active bleeding. (10/26) 1 PRBC transfused. Today, Pt stated no recent wt loss nor poor intake PTA. Desires diet advanced to solid meals but consumes ~75% of meals. Continue diet, advance per GI rec's. Add ONS. (11/2) Fair intake (~50% of tray) per RN and Pt. Pt desires to advance to GI [place] diet. CT scan on 10/31 found no active bleeding. RD rec's advance diet as clinically indicated. Continue ONS. Labs: H/H 7.2/22. 8. Meds: PPI, aricpet, coreg, effexor, hydralazine. Nutrition Related Findings:    NFPE w/o acute findings- well nourished. No N/V/D/C nor chewing/swallowing difficulties per RN and Pt. Last BM 11/1- formed, black tarry stools. No edema.  Wound Type: None    Current Nutrition Intake & Therapies:  Average Meal Intake: 51-75%  Average Supplement Intake: % (when vanilla is provided.)  ADULT DIET Full Liquid; 4 carb choices (60 gm/meal); GI [place] (GERD/Peptic Ulcer)  ADULT ORAL NUTRITION SUPPLEMENT Dinner; Low Calorie/High Protein    Anthropometric Measures:  Height: 5' 4\" (162.6 cm)  Ideal Body Weight (IBW): 120 lbs (55 kg)  Current Body Wt:  86.2 kg (190 lb 0.6 oz), 158.4 % IBW. Not specified  Current BMI (kg/m2): 32.6  Usual Body Weight:  (UTO)  BMI Category: Obese class 1 (BMI 30.0-34. 9)    Estimated Daily Nutrient Needs:  Energy Requirements Based On: Kcal/kg  Weight Used for Energy Requirements: Adjusted  Energy (kcal/day): 1683 kcal/d  Weight Used for Protein Requirements: Current  Protein (g/day): 52 gm/d  (0.6 gm/kg for CKD IV?)  Method Used for Fluid Requirements: 1 ml/kcal  Fluid (ml/day): 1683 mL/d    Nutrition Diagnosis:   Inadequate protein-energy intake related to altered GI function, altered GI structure as evidenced by GI abnormality, intake 51-75%    Nutrition Interventions:   Food and/or Nutrient Delivery: Modify current diet, Continue current diet, Continue oral nutrition supplement  Nutrition Education/Counseling: No recommendations at this time  Coordination of Nutrition Care: Continue to monitor while inpatient  Plan of Care discussed with: RN, Pt    Goals:  Previous Goal Met: Progressing toward goal(s)  Goals: Meet at least 75% of estimated needs, PO intake 50% or greater, within 7 days    Nutrition Monitoring and Evaluation:   Behavioral-Environmental Outcomes: None identified  Food/Nutrient Intake Outcomes: Diet advancement/tolerance, Food and nutrient intake, Supplement intake  Physical Signs/Symptoms Outcomes: Biochemical data, GI status, Weight, Meal time behavior    Discharge Planning: Too soon to determine    Jose Eduardo Rae RD  Contact: ext. [phone].

## 2022-11-02 NOTE — PROGRESS NOTES
Problem: Falls - Risk of  Goal: *Absence of Falls  Description: Document [place] Fall Risk and appropriate interventions in the flowsheet. Outcome: Progressing Towards Goal  Note: Fall Risk Interventions:  Mobility Interventions: Bed/chair exit alarm         Medication Interventions: Patient to call before getting OOB, Bed/chair exit alarm    Elimination Interventions: Call light in reach    History of Falls Interventions: Bed/chair exit alarm         Problem: Patient Education: Go to Patient Education Activity  Goal: Patient/Family Education  Outcome: Progressing Towards Goal     Problem: Pressure Injury - Risk of  Goal: *Prevention of pressure injury  Description: Document Kirit Scale and appropriate interventions in the flowsheet.   Outcome: Progressing Towards Goal  Note: Pressure Injury Interventions:  Sensory Interventions: Minimize linen layers    Moisture Interventions: Minimize layers    Activity Interventions: Increase time out of bed    Mobility Interventions: PT/OT evaluation    Nutrition Interventions: Document food/fluid/supplement intake                     Problem: Patient Education: Go to Patient Education Activity  Goal: Patient/Family Education  Outcome: Progressing Towards Goal     Problem: Pain  Goal: *Control of Pain  Outcome: Progressing Towards Goal  Goal: *PALLIATIVE CARE:  Alleviation of Pain  Outcome: Progressing Towards Goal     Problem: Patient Education: Go to Patient Education Activity  Goal: Patient/Family Education  Outcome: Progressing Towards Goal

## 2022-11-02 NOTE — PROGRESS NOTES
Cm met with patient at bedside to confirm her discharge plan. CM inquired if patient had been ambulating in the room and she stated she has been getting from the bed to the restroom. CM inquired if she felt any weaker than her prior level of function, she stated no. CM explained home health again to see if patient would be interested in a home PT and OT evaluation. Patient politely declined at this time. CM notified patient to let CM know if she changes her mind. Current disposition is home/self.

## 2022-11-02 NOTE — PROGRESS NOTES
Hospitalist Progress Note    Subjective:     Chief Complaint:   Chief Complaint   Patient presents with    Abdominal Pain    Headache          Patient is a 65 y.o. female with past medical history of HTN, arthritis, dyslipidemia, CKD, diabetes mellitus type 2 admitted 10/21 with syncopal episode at home along with reports of epigastric abdominal pain and dark tarry stools over few days prior to presentation. BUN and creatinine were elevated on admission. She was hospitalized previously on October 10 for a similar syncopal episode and discharged on oral protonix twice daily. Seen and examined today and states she is feeling well. She reports resolution of yesterday's abdominal pain. She reports concerns about persistent passage of black tarry stool. She reports a persistent cough that was exacerbated when lying down last night but states it has not worsened today. Vitals today reveal hypertension. Labs today reveal hyperglycemia, decreased hemoglobin and hematocrit trending down.       Current Facility-Administered Medications   Medication Dose Route Frequency Provider Last Rate Last Admin    pantoprazole (PROTONIX) tablet 40 mg  40 mg Oral ACB Elier Rodas MD   40 mg at 11/02/22 0626    EPINEPHrine HCl (PF) (ADRENALIN) 1 mg/mL (1 mL) injection    PRN Elier Rodas MD   0.7 mg at 10/26/22 1420    hydrALAZINE (APRESOLINE) tablet 25 mg  25 mg Oral TID PRN Marky Rowe MD   25 mg at 11/02/22 0324    guaiFENesin (ROBITUSSIN) 100 mg/5 mL oral liquid 100 mg  100 mg Oral Q4H PRN Bianca Houston MD   100 mg at 11/02/22 0324    acetaminophen (TYLENOL) tablet 650 mg  650 mg Oral Q6H PRN Joel Leonardo MD   650 mg at 11/01/22 2101    Or    acetaminophen (TYLENOL) suppository 650 mg  650 mg Rectal Q6H PRN Joel Leonardo MD        polyethylene glycol (MIRALAX) packet 17 g  17 g Oral DAILY PRN Joel Leonardo MD   17 g at 11/02/22 0959    ondansetron (ZOFRAN ODT) tablet 4 mg  4 mg Oral Q8H PRN Angel Chen MD [facility]        Or    ondansetron (ZOFRAN) injection 4 mg  4 mg IntraVENous Q6H PRN Sydnie Rodriguez MD   4 mg at 10/26/22 145    carvediloL (COREG) tablet 6.25 mg  6.25 mg Oral BID WITH MEALS Sydnie Rodriguez MD   6.25 mg at 22 09    donepeziL (ARICEPT) tablet 10 mg  10 mg Oral QHS Sydnie Rodriguez MD   10 mg at 22    gabapentin (NEURONTIN) capsule 300 mg  300 mg Oral BID Sydnie Rodriguez MD   300 mg at 22    latanoprost (XALATAN) 0.005 % ophthalmic solution 1 Drop  1 Drop Both Eyes QHS Sydnie Rodriguez MD   1 Drop at 22    melatonin tablet 5 mg  5 mg Oral QHS Sydnie Rodriguez MD   5 mg at 22    venlafaxine-SR (EFFEXOR-XR) capsule 75 mg  75 mg Oral DAILY Sydnie Rodriguez MD   75 mg at 22 09    insulin lispro (HUMALOG) injection   SubCUTAneous AC&HS Sydnie Rodriguez MD   2 Units at 22 4332            No Known Allergies    Review of Systems:  A comprehensive review of systems was negative except for that written in the HPI. Objective:     Blood pressure 139/60, pulse (!) 53, temperature 98.3 °F (36.8 °C), resp. rate 18, height 5' 4\" (1.626 m), weight 190 lb (86.2 kg), SpO2 99 %. Temp (24hrs), Av.2 °F (36.8 °C), Min:98 °F (36.7 °C), Max:98.9 °F (37.2 °C)      Intake and Output:  Current Shift: 701 - 1900  In: -   Out: 600 [Urine:600]  Last 3 Shifts: 10/31 1901 - 700  In: 668.8 [P.O.:350]  Out: 1450 [Urine:1450]    Physical Exam:   General:  Alert, cooperative, no distress, appears stated age. Head:  Normocephalic, without obvious abnormality, atraumatic. Eyes:  Conjunctivae/corneas clear. PERRLA, EOMs intact. Ears:  Normal external ear canals both ears. Nose: Nares normal. Septum midline. Mucosa normal. No drainage or sinus tenderness. Throat: No erythema or exudate. Neck: Supple, symmetrical, trachea midline, no adenopathy, no masses or enlargements, no carotid bruit and no JVD.    Lungs:   Clear to auscultation bilaterally. No wheezes, rales, or rhonchi. Chest wall:  No tenderness or deformity. Heart:  Regular rate and rhythm, S1, S2 normal, no murmur, click, rub or gallop. No signs of peripheral edema. Abdomen:   Soft, non-tender, non-distended. Bowel sounds normal. No masses. No organomegaly. Extremities: Extremities normal, atraumatic, no cyanosis or edema. Pulses: 2+ and symmetric all extremities. Skin: Skin color, texture, turgor normal. No rashes or lesions. Lymph nodes: Cervical, supraclavicular, and axillary nodes normal.   Neurologic: Alert and oriented x3. No focal neurologic deficits. Strength and sensation intact. Additional comments:None    Lab/Data Review: All lab results for the last 24 hours reviewed. Recent Results (from the past 24 hour(s))   GLUCOSE, POC    Collection Time: 11/01/22  4:26 PM   Result Value Ref Range    Glucose (POC) 124 (H) 65 - 100 mg/dL    Performed by Jim Mar, POC    Collection Time: 11/01/22  8:12 PM   Result Value Ref Range    Glucose (POC) 169 (H) 65 - 100 mg/dL    Performed by Naila Nair    GLUCOSE, POC    Collection Time: 11/02/22  8:30 AM   Result Value Ref Range    Glucose (POC) 145 (H) 65 - 100 mg/dL    Performed by Pedro Millan    HGB & HCT    Collection Time: 11/02/22 11:35 AM   Result Value Ref Range    HGB 6.9 (L) 11.5 - 16.0 g/dL    HCT 22.4 (L) 35.0 - 47.0 %   GLUCOSE, POC    Collection Time: 11/02/22 12:10 PM   Result Value Ref Range    Glucose (POC) 124 (H) 65 - 100 mg/dL    Performed by kwiry        CT ABD PELV W WO CONT 10/31/2022    Narrative  CT ABDOMEN AND PELVIS WITHOUT AND WITH CONTRAST. 10/31/2022 4:45 PM    INDICATION: GI BLEED    COMPARISON: None. TECHNIQUE: Noncontrast, arterial, and delayed phase CT of the abdomen and pelvis  was performed after the administration of 100 mL of Isovue-370.  CT dose  reduction was achieved through use of a standardized protocol tailored for this  examination and automatic exposure control for dose modulation. FINDINGS:    No evidence of active arterial bleeding. There is a small blush of enhancement  in the proximal jejunum which is best seen on series 6 image 44    Abdomen/pelvis: Liver is unremarkable. Cholecystectomy changes. The spleen,  pancreas, bilateral adrenal glands are unremarkable. Status post left  nephrectomy. Small hypodensities in the right kidney too small to characterize. No evidence of hydronephrosis. The urinary bladder is unremarkable. Visualized  reproductive organs are unremarkable. Atherosclerotic disease. No evidence of  lymphadenopathy. Osseous Structures and Soft Tissues:  Degenerative changes in the lumbar spine. Lungs:Small bilateral pleural effusions and bilateral lower lobe volume loss. Coronary atherosclerotic disease. Impression  1. No evidence of active arterial GI bleeding. 2.  There is a faint blush of enhancement in the proximal jejunum. This is of  uncertain clinical significance and may represent a small mass or vascular  malformation. 3.  Other incidental findings as above. Assessment:     Patient is a 65 y.o. female with past medical history of HTN, arthritis, dyslipidemia, CKD, diabetes mellitus type 2 admitted 10/21 with syncopal episode at home along with reports of epigastric abdominal pain and dark tarry stools over few days prior to presentation. BUN and creatinine were elevated on admission. She was hospitalized previously on October 10 for a similar syncopal episode and discharged on oral protonix twice daily. Seen and examined today and states she is feeling well. She reports resolution of yesterday's abdominal pain. She reports concerns about persistent passage of black tarry stool. She reports a persistent cough that was exacerbated when lying down last night but states it has not worsened today. Vitals today reveal hypertension.  Labs today reveal hyperglycemia, decreased hemoglobin and hematocrit trending down. Plan:     1.) Acute GI bleed  - Status posttransfusion of 3 units packed RBC  - Status post EGD with proximal jejunal bleed with erosions  - CT abdomen 10/31 showed no evidence of active bleeding  - GI following  - Continue protonix   - Continue monitoring hemoglobin and hematocrit levels  - Transfusion indicated if HBG drops below 7  - No need for transfusion at this time     2.) ALEX with CKD stage IIIb  - Nephrology following, agree with their recommendations     3.) Syncope  - Likely secondary to blood loss from GI bleed  - Continue to monitor hemoglobin levels     4.) Hypertension  - Continue current medications  - Monitor blood pressure     5.) Hyperglycemia  - Monitor blood glucose levels  - Insulin lispro     6.) DVT & GI Prophylaxis  - Anticoagulation contraindicated at this time due to GI bleed  - Continue protonix for GI prophylaxis      CODE STATUS: Full Code    Emory Darby  11/2/2022  1:51 PM               *ATTENTION:  This note has been created by a medical student for educational purposes only. Please do not refer to the content of this note for clinical decision-making, billing, or other purposes. Please see attending physicians note to obtain clinical information on this patient. *

## 2022-11-02 NOTE — PROGRESS NOTES
Problem: Falls - Risk of  Goal: *Absence of Falls  Description: Document Clearence Skates Fall Risk and appropriate interventions in the flowsheet.   Outcome: Progressing Towards Goal  Note: Fall Risk Interventions:  Mobility Interventions: Bed/chair exit alarm         Medication Interventions: Bed/chair exit alarm    Elimination Interventions: Bed/chair exit alarm    History of Falls Interventions: Bed/chair exit alarm

## 2022-11-03 VITALS
HEART RATE: 67 BPM | SYSTOLIC BLOOD PRESSURE: 164 MMHG | WEIGHT: 190 LBS | DIASTOLIC BLOOD PRESSURE: 72 MMHG | RESPIRATION RATE: 16 BRPM | HEIGHT: 64 IN | BODY MASS INDEX: 32.44 KG/M2 | OXYGEN SATURATION: 99 % | TEMPERATURE: 97.8 F

## 2022-11-03 LAB
ABO + RH BLD: NORMAL
BLD PROD TYP BPU: NORMAL
BLD PROD TYP BPU: NORMAL
BLOOD GROUP ANTIBODIES SERPL: NEGATIVE
BPU ID: NORMAL
BPU ID: NORMAL
CROSSMATCH RESULT,%XM: NORMAL
CROSSMATCH RESULT,%XM: NORMAL
GLUCOSE BLD STRIP.AUTO-MCNC: 138 MG/DL (ref 65–100)
GLUCOSE BLD STRIP.AUTO-MCNC: 265 MG/DL (ref 65–100)
HCT VFR BLD AUTO: 26.3 % (ref 35–47)
HGB BLD-MCNC: 8.3 G/DL (ref 11.5–16)
PERFORMED BY, TECHID: ABNORMAL
PERFORMED BY, TECHID: ABNORMAL
SPECIMEN EXP DATE BLD: NORMAL
STATUS OF UNIT,%ST: NORMAL
STATUS OF UNIT,%ST: NORMAL
TRANSFUSION STATUS PATIENT QL: NORMAL
TRANSFUSION STATUS PATIENT QL: NORMAL
UNIT DIVISION, %UDIV: 0
UNIT DIVISION, %UDIV: 0

## 2022-11-03 PROCEDURE — 74011250637 HC RX REV CODE- 250/637: Performed by: INTERNAL MEDICINE

## 2022-11-03 PROCEDURE — 82962 GLUCOSE BLOOD TEST: CPT

## 2022-11-03 PROCEDURE — 36415 COLL VENOUS BLD VENIPUNCTURE: CPT

## 2022-11-03 PROCEDURE — 85018 HEMOGLOBIN: CPT

## 2022-11-03 PROCEDURE — 74011636637 HC RX REV CODE- 636/637: Performed by: INTERNAL MEDICINE

## 2022-11-03 RX ORDER — HYDRALAZINE HYDROCHLORIDE 50 MG/1
50 TABLET, FILM COATED ORAL 3 TIMES DAILY
Status: DISCONTINUED | OUTPATIENT
Start: 2022-11-03 | End: 2022-11-03 | Stop reason: HOSPADM

## 2022-11-03 RX ORDER — HYDRALAZINE HYDROCHLORIDE 50 MG/1
50 TABLET, FILM COATED ORAL 3 TIMES DAILY
Qty: 90 TABLET | Refills: 0 | Status: SHIPPED | OUTPATIENT
Start: 2022-11-03 | End: 2022-12-03

## 2022-11-03 RX ADMIN — CARVEDILOL 6.25 MG: 3.12 TABLET, FILM COATED ORAL at 11:54

## 2022-11-03 RX ADMIN — PANTOPRAZOLE SODIUM 40 MG: 40 TABLET, DELAYED RELEASE ORAL at 11:52

## 2022-11-03 RX ADMIN — VENLAFAXINE HYDROCHLORIDE 75 MG: 75 CAPSULE, EXTENDED RELEASE ORAL at 11:54

## 2022-11-03 RX ADMIN — GABAPENTIN 300 MG: 300 CAPSULE ORAL at 11:54

## 2022-11-03 RX ADMIN — GUAIFENESIN 100 MG: 200 SOLUTION ORAL at 11:51

## 2022-11-03 RX ADMIN — POLYETHYLENE GLYCOL 3350 17 G: 17 POWDER, FOR SOLUTION ORAL at 13:56

## 2022-11-03 RX ADMIN — INSULIN LISPRO 5 UNITS: 100 INJECTION, SOLUTION INTRAVENOUS; SUBCUTANEOUS at 11:55

## 2022-11-03 RX ADMIN — HYDRALAZINE HYDROCHLORIDE 50 MG: 50 TABLET, FILM COATED ORAL at 11:52

## 2022-11-03 NOTE — PROGRESS NOTES
Renal Daily Progress Note    Admit Date: 10/21/2022      Subjective:   Feels well. No abdominal pain. Hemoglobin is 8.3g today. She has no complaints. She denies nausea or vomiting. Current Facility-Administered Medications   Medication Dose Route Frequency    hydrALAZINE (APRESOLINE) tablet 50 mg  50 mg Oral TID    0.9% sodium chloride infusion 250 mL  250 mL IntraVENous PRN    pantoprazole (PROTONIX) tablet 40 mg  40 mg Oral ACB    EPINEPHrine HCl (PF) (ADRENALIN) 1 mg/mL (1 mL) injection    PRN    hydrALAZINE (APRESOLINE) tablet 25 mg  25 mg Oral TID PRN    guaiFENesin (ROBITUSSIN) 100 mg/5 mL oral liquid 100 mg  100 mg Oral Q4H PRN    acetaminophen (TYLENOL) tablet 650 mg  650 mg Oral Q6H PRN    Or    acetaminophen (TYLENOL) suppository 650 mg  650 mg Rectal Q6H PRN    polyethylene glycol (MIRALAX) packet 17 g  17 g Oral DAILY PRN    ondansetron (ZOFRAN ODT) tablet 4 mg  4 mg Oral Q8H PRN    Or    ondansetron (ZOFRAN) injection 4 mg  4 mg IntraVENous Q6H PRN    carvediloL (COREG) tablet 6.25 mg  6.25 mg Oral BID WITH MEALS    donepeziL (ARICEPT) tablet 10 mg  10 mg Oral QHS    gabapentin (NEURONTIN) capsule 300 mg  300 mg Oral BID    latanoprost (XALATAN) 0.005 % ophthalmic solution 1 Drop  1 Drop Both Eyes QHS    melatonin tablet 5 mg  5 mg Oral QHS    venlafaxine-SR (EFFEXOR-XR) capsule 75 mg  75 mg Oral DAILY    insulin lispro (HUMALOG) injection   SubCUTAneous AC&HS        Review of Systems    Review of Systems   Respiratory:  Negative for shortness of breath. Cardiovascular:  Negative for chest pain and palpitations. Gastrointestinal:  Negative for abdominal pain. Neurological:  Negative for headaches.         Objective:     Patient Vitals for the past 8 hrs:   BP Temp Pulse SpO2   11/03/22 0740 (!) 164/72 97.8 °F (36.6 °C) 67 99 %       11/03 0701 - 11/03 1900  In: 400 [P.O.:400]  Out: -   11/01 1901 - 11/03 0700  In: 650 [P.O.:350]  Out: 950 [Urine:950]    Physical Exam:   Physical Exam  Cardiovascular:      Rate and Rhythm: Regular rhythm. Pulmonary:      Breath sounds: Normal breath sounds. Abdominal:      General: Bowel sounds are normal.      Palpations: Abdomen is soft. Neurological:      General: No focal deficit present. Mental Status: She is alert. No edema        CT ABD PELV W WO CONT   Final Result   1. No evidence of active arterial GI bleeding. 2.  There is a faint blush of enhancement in the proximal jejunum. This is of   uncertain clinical significance and may represent a small mass or vascular   malformation. 3.  Other incidental findings as above. Data Review   Recent Labs     11/03/22  1229 11/02/22  1135 11/01/22  1155   HGB 8.3* 6.9* 7.2*   HCT 26.3* 22.4* 22.8*       Recent Labs     11/02/22  1820      K 4.9      CO2 26   *   BUN 60*   CREA 1.62*   CA 9.0       No components found for: GLPOC  No results for input(s): PH, PCO2, PO2, HCO3, FIO2 in the last 72 hours. No results for input(s): INR, INREXT, INREXT, INREXT in the last 72 hours. Assessment:           Active Problems:    Rectal bleeding (10/21/2022)  Chronic kidney disease stage IIIb. She received IV contrast on 10/31/2022. for the CT. creatinine is stable following contrast administration. Hyperkalemia resolved. Hypertension under fair control. Avoid hypotension. On hydralazine    Anemia due to anemia of chronic disease and GI bleed. Plan:   Off IV fluids  Renal function is stable following contrast administration. Okay for discharge from a renal standpoint. Follow-up with  in 4 weeks.

## 2022-11-03 NOTE — PROGRESS NOTES
Problem: Falls - Risk of  Goal: *Absence of Falls  Description: Document Lillie Felipe Fall Risk and appropriate interventions in the flowsheet. Outcome: Progressing Towards Goal  Note: Fall Risk Interventions:  Mobility Interventions: Patient to call before getting OOB         Medication Interventions: Patient to call before getting OOB    Elimination Interventions: Call light in reach    History of Falls Interventions: Room close to nurse's station         Problem: Patient Education: Go to Patient Education Activity  Goal: Patient/Family Education  Outcome: Progressing Towards Goal     Problem: Pressure Injury - Risk of  Goal: *Prevention of pressure injury  Description: Document Kirit Scale and appropriate interventions in the flowsheet.   Outcome: Progressing Towards Goal  Note: Pressure Injury Interventions:  Sensory Interventions: Assess changes in LOC    Moisture Interventions: Absorbent underpads    Activity Interventions: Increase time out of bed    Mobility Interventions: HOB 30 degrees or less    Nutrition Interventions: Document food/fluid/supplement intake                     Problem: Patient Education: Go to Patient Education Activity  Goal: Patient/Family Education  Outcome: Progressing Towards Goal     Problem: Pain  Goal: *Control of Pain  Outcome: Progressing Towards Goal  Goal: *PALLIATIVE CARE:  Alleviation of Pain  Outcome: Progressing Towards Goal     Problem: Patient Education: Go to Patient Education Activity  Goal: Patient/Family Education  Outcome: Progressing Towards Goal

## 2022-11-03 NOTE — PROGRESS NOTES
VSS. Patient given discharge instructions. Medications and follow-up appointments reviewed. IV and Telemetry removed. Case management, provider, and primary nurse aware of discharge. Discharge plan of care/case management plan validated with provider discharge order. Patient waiting for ride.

## 2022-11-03 NOTE — PROGRESS NOTES
Problem: Falls - Risk of  Goal: *Absence of Falls  Description: Document Sangita Go Fall Risk and appropriate interventions in the flowsheet.   Outcome: Resolved/Met  Note: Fall Risk Interventions:  Mobility Interventions: Patient to call before getting OOB         Medication Interventions: Patient to call before getting OOB    Elimination Interventions: Call light in reach    History of Falls Interventions: Room close to nurse's station

## 2022-11-03 NOTE — DISCHARGE SUMMARY
Physician Discharge Summary     Patient ID:    Darleen Lindo  [id number]  56 y.o.  1945    Admit date: 10/21/2022    Discharge date : 11/3/2022    Chronic Diagnoses:    Problem List as of 11/3/2022 Date Reviewed: 10/26/2022            Codes Class Noted - Resolved    Rectal bleeding ICD-10-CM: K62.5  ICD-9-CM: 569.3  10/21/2022 - Present        GI bleed ICD-10-CM: K92.2  ICD-9-CM: 578.9  10/10/2022 - Present        Seizure ([facility] ICD-10-CM: R56.9  ICD-9-CM: 780.39  8/10/2022 - Present        Non-STEMI (non-ST elevated myocardial infarction) ([facility] ICD-10-CM: I21.4  ICD-9-CM: 410.70  8/1/2022 - Present        Anemia ICD-10-CM: D64.9  ICD-9-CM: 285.9  8/1/2022 - Present        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 00X0  ICD-9-CM: 274.02  3/4/2022 - Present        Diabetic peripheral neuropathy ([facility] ICD-10-CM: E11.42  ICD-9-CM: 250.60, 357.2  3/4/2022 - Present        Venous insufficiency of left lower extremity ICD-10-CM: I87.2  ICD-9-CM: 459.81  2/10/2022 - Present        Wound of left leg, subsequent encounter ICD-10-CM: S81.802D  ICD-9-CM: V58.89, 894.0  1/13/2022 - Present        Secondary hyperparathyroidism ([facility] ICD-10-CM: N25.81  ICD-9-CM: 588.81  10/22/2021 - Present        Syncopal episodes ICD-10-CM: R55  ICD-9-CM: 780.2  10/21/2021 - Present        Syncope ICD-10-CM: R55  ICD-9-CM: 780.2  11/21/2020 - Present        Episode of unresponsiveness ICD-10-CM: R41.89  ICD-9-CM: 780.09  9/24/2020 - Present Arteriosclerosis of coronary artery ICD-10-CM: I25.10  ICD-9-CM: 414.00  9/16/2020 - Present        Dyslipidemia ICD-10-CM: E78.5  ICD-9-CM: 272.4  9/16/2020 - Present        Chronic kidney disease (CKD), stage IV (severe) ([facility] ICD-10-CM: N18.4  ICD-9-CM: 585.4  9/16/2020 - Present    Overview Signed 12/22/2020 10:52 AM by Maddison Jain MD     Cr in 2.6 range. She sees Dr. Junior Kruger             Hypertension ICD-10-CM: I10  ICD-9-CM: 401.9  9/16/2020 - Present        Diabetes mellitus type 2, controlled ([facility] ICD-10-CM: E11.9  ICD-9-CM: 250.00  9/16/2020 - Present        TIA (transient ischemic attack) ICD-10-CM: G45.9  ICD-9-CM: 435.9  9/16/2020 - Present        Adenocarcinoma, renal cell ([facility] ICD-10-CM: C64.9  ICD-9-CM: 189.0  9/2/2020 - Present    Overview Signed 12/17/2020 10:10 AM by Katalina Ford NP     Previously seen by Dr. Sravanthi Zacarias. Followed also by Dr. Ximena Díaz. HX of left radical nephrectomy. Morbid obesity ([facility] ICD-10-CM: E66.01  ICD-9-CM: 278.01  9/2/2020 - Present        Peripheral vascular disease ([facility] ICD-10-CM: I73.9  ICD-9-CM: 443.9  9/2/2020 - Present        Arthritis ICD-10-CM: M19.90  ICD-9-CM: 716.90  8/21/2019 - Present        Essential hypertension ICD-10-CM: I10  ICD-9-CM: 401.9  8/21/2019 - Present        Impingement syndrome of shoulder region ICD-10-CM: M75.40  ICD-9-CM: 726.2  8/21/2019 - Present        Pulmonary embolism ([facility] ICD-10-CM: I26.99  ICD-9-CM: 415.19  2/1/2015 - Present        Cerebrovascular accident (CVA) ([facility] ICD-10-CM: I63.9  ICD-9-CM: 434.91  8/1/2014 - Present        RESOLVED: Hyperlipidemia ICD-10-CM: E78.5  ICD-9-CM: 272.4  8/21/2019 - 8/3/2021       22    Final Diagnoses:   Rectal bleeding [K62.5]    Reason for Hospitalization:        Hospital Course:     Lizbeth Ochoa is a 65 y.o. female who presents with reports of syncopal episode at home.   According to patient, she was seated on the couch watching TV and does not remember the details of what happened . She remembers waking up in an ambulance. Reports having epigastric abdominal pain for the last few days with dark tarry stools. Work-up in the ED shows a hemoglobin of 6.3 with a hematocrit of 20.1. She was last admitted in the hospital October 10 through October 13 for similar episode of syncope with bleeding. EGD done October 12 did not find any active upper GI bleed. She was discharged home on oral Protonix twice daily and advised to stop taking apixaban. S/p EGD on 10/21/22 showed gastritis without evidence of bleeding. S/p 2 unit of PRBC transfusion on 10/21/22. Colonoscopy on 10/24/22 showed mild diverticulosis, polyp and hemorrhoids. Capsule endoscopy done on 10/25/22 showed blood clot loaded at duodenal and proximal jejunum indicating active bleeding. Repeat EGD performed again active bleeding noted. Hemoglobin dropped to 6.3 and she was transfuse a unit of packed red blood cells. CTA abdomen/pelvis negative for acute bleed. She was monitored for several days without significant drop in hemoglobin. If she has any further drop, she will need a small bowel enteroscopy which can be done at [address] [place] [address] or higher tertiary centers. Overall stable for discharge to home with outpatient follow-up          Discharge Medications:   Current Discharge Medication List        START taking these medications    Details   hydrALAZINE (APRESOLINE) 50 mg tablet Take 1 Tablet by mouth three (3) times daily for 30 days. Qty: 90 Tablet, Refills: 0  Start date: 11/3/2022, End date: 12/3/2022           CONTINUE these medications which have NOT CHANGED    Details   cetirizine (ZYRTEC) 10 mg tablet Take 10 mg by mouth daily. acetaminophen (TYLENOL) 325 mg tablet Take 325 mg by mouth as needed for Pain.      melatonin 5 mg tablet Take 5 mg by mouth nightly. insulin aspart U-100 (NOVOLOG) 100 unit/mL injection by SubCUTAneous route Before breakfast, lunch, dinner and at bedtime.  SS: 150-200=2 units 201-250=4 units 251-300=6 units 301-350=8 units 351-400=10 units      insulin detemir U-100 (LEVEMIR) 100 unit/mL injection 10 Units by SubCUTAneous route nightly. plecanatide (Trulance) 3 mg tab Take 3 mg by mouth daily. pantoprazole (PROTONIX) 40 mg tablet Take 1 Tablet by mouth two (2) times a day. Qty: 60 Tablet, Refills: 0      albuterol (PROVENTIL VENTOLIN) 2.5 mg /3 mL (0.083 %) nebu 2.5 mg by Nebulization route every six (6) hours as needed for Shortness of Breath. torsemide (DEMADEX) 20 mg tablet Take 20 mg by mouth two (2) times a day. carvediloL (COREG) 6.25 mg tablet Take 6.25 mg by mouth two (2) times daily (with meals). calcitRIOL (ROCALTROL) 0.25 mcg capsule Take 0.25 mcg by mouth BID Mon Wed & Fri.      donepeziL (ARICEPT) 10 mg tablet Take 10 mg by mouth nightly.      gabapentin (NEURONTIN) 300 mg capsule Take 300 mg by mouth two (2) times a day. Venlafaxine-ER 24 HR (EFFEXOR-ER) 75 mg tr24 tablet Take 75 mg by mouth daily. latanoprost (XALATAN) 0.005 % ophthalmic solution Administer 1 Drop to both eyes nightly. pravastatin (PRAVACHOL) 80 mg tablet Take 80 mg by mouth nightly. STOP taking these medications       aspirin delayed-release 81 mg tablet Comments:   Reason for Stopping: Follow up Care:    1. Rodney Tristan NP in 1-2 weeks. Please call to set up an appointment shortly after discharge. Diet:  Regular Diet    Disposition:  Home. Advanced Directive:   FULL    DNR      Discharge Exam:  Visit Vitals  BP (!) 164/72 (BP 1 Location: Right upper arm, BP Patient Position: Supine)   Pulse 67   Temp 97.8 °F (36.6 °C)   Resp 16   Ht 5' 4\" (1.626 m)   Wt 86.2 kg (190 lb)   SpO2 99%   BMI 32.61 kg/m²      O2 Device: None (Room air)    Temp (24hrs), Av °F (36.7 °C), Min:97.7 °F (36.5 °C), Max:98.7 °F (37.1 °C)    No intake/output data recorded.     1901 -  0700  In: 650 [P.O.:350]  Out: 950 [Urine:950]    General:  Alert, cooperative, no distress, appears stated age. Lungs:   Clear to auscultation bilaterally. Chest wall:  No tenderness or deformity. Heart:  Regular rate and rhythm, S1, S2 normal, no murmur, click, rub or gallop. Abdomen:   Soft, non-tender. Bowel sounds normal. No masses,  No organomegaly. Extremities: Extremities normal, atraumatic, no cyanosis or edema. Pulses: 2+ and symmetric all extremities. Skin: Skin color, texture, turgor normal. No rashes or lesions   Neurologic: CNII-XII intact. No gross sensory or motor deficits         CONSULTATIONS: GI    Significant Diagnostic Studies:   10/21/2022: BUN 81 mg/dL (H; Ref range: 6 - 20 mg/dL); Calcium 9.4 mg/dL (Ref range: 8.5 - 10.1 mg/dL); CO2 31 mmol/L (Ref range: 21 - 32 mmol/L); Creatinine 2.65 mg/dL (H; Ref range: 0.55 - 1.02 mg/dL); Glucose 228 mg/dL (H; Ref range: 65 - 100 mg/dL); HCT 20.1 % (L; Ref range: 35.0 - 47.0 %); HGB 6.3 g/dL (L; Ref range: 11.5 - 16.0 g/dL); Potassium 3.6 mmol/L (Ref range: 3.5 - 5.1 mmol/L); Sodium 135 mmol/L (L; Ref range: 136 - 145 mmol/L)  10/22/2022: BUN 71 mg/dL (H; Ref range: 6 - 20 mg/dL); Calcium 8.9 mg/dL (Ref range: 8.5 - 10.1 mg/dL); CO2 30 mmol/L (Ref range: 21 - 32 mmol/L); Creatinine 2.10 mg/dL (H; Ref range: 0.55 - 1.02 mg/dL); Glucose 120 mg/dL (H; Ref range: 65 - 100 mg/dL); HCT 25.3 % (L; Ref range: 35.0 - 47.0 %); HGB 8.3 g/dL (L; Ref range: 11.5 - 16.0 g/dL); Potassium 3.7 mmol/L (Ref range: 3.5 - 5.1 mmol/L); Sodium 140 mmol/L (Ref range: 136 - 145 mmol/L)  Recent Labs     11/03/22  1229 11/02/22  1135   HGB 8.3* 6.9*   HCT 26.3* 22.4*     Recent Labs     11/02/22  1820      K 4.9      CO2 26   BUN 60*   CREA 1.62*   *   CA 9.0     No results for input(s): ALT, AP, TBIL, TBILI, TP, ALB, GLOB, GGT, AML, LPSE in the last 72 hours.     No lab exists for component: SGOT, GPT, AMYP, HLPSE  No results for input(s): INR, PTP, APTT, INREXT in the last 72 hours. No results for input(s): FE, TIBC, PSAT, FERR in the last 72 hours. No results for input(s): PH, PCO2, PO2 in the last 72 hours. No results for input(s): CPK, CKMB in the last 72 hours.     No lab exists for component: TROPONINI  Lab Results   Component Value Date/Time    Glucose (POC) 265 (H) 11/03/2022 11:29 AM    Glucose (POC) 138 (H) 11/03/2022 08:47 AM    Glucose (POC) 133 (H) 11/02/2022 08:25 PM    Glucose (POC) 93 11/02/2022 04:32 PM    Glucose (POC) 124 (H) 11/02/2022 12:10 PM       Total Time: 35 minutes    Signed:  Nicolasa Daniels MD  11/3/2022  1:37 PM

## 2022-11-03 NOTE — PROGRESS NOTES
Hospitalist Progress Note    Subjective:     Chief Complaint:   Chief Complaint   Patient presents with    Abdominal Pain    Headache          Patient is a 65 y.o. female with past medical history of HTN, arthritis, dyslipidemia, CKD, diabetes mellitus type 2 admitted 10/21 with syncopal episode at home along with reports of epigastric abdominal pain and dark tarry stools over few days prior to presentation. BUN and creatinine were elevated on admission. She was hospitalized previously on October 10 for a similar syncopal episode and discharged on oral protonix twice daily. Seen and examined this morning and states she is feeling well. She reports right upper quadrant abdominal pain that started when she was turned over in bed this morning. She reports persistent passage of black tarry stool, most recent episode yesterday. She has not yet had a bowel movement today. She states that she has not eaten yet today but is feeling hungry. Vitals today reveal hypertension. Labs today reveal hyperglycemia. Repeat hemoglobin and hematocrit levels not obtained today.       Current Facility-Administered Medications   Medication Dose Route Frequency Provider Last Rate Last Admin    hydrALAZINE (APRESOLINE) tablet 50 mg  50 mg Oral TID Joel Leonardo MD        0.9% sodium chloride infusion 250 mL  250 mL IntraVENous PRN Joel Leonardo MD        pantoprazole (PROTONIX) tablet 40 mg  40 mg Oral ACB Elier Rodas MD   40 mg at 11/02/22 0626    EPINEPHrine HCl (PF) (ADRENALIN) 1 mg/mL (1 mL) injection    PRN Elier Rodas MD   0.7 mg at 10/26/22 1420    hydrALAZINE (APRESOLINE) tablet 25 mg  25 mg Oral TID PRN Marky Rowe MD   25 mg at 11/02/22 0324    guaiFENesin (ROBITUSSIN) 100 mg/5 mL oral liquid 100 mg  100 mg Oral Q4H PRN Bianca Houston MD   100 mg at 11/02/22 0324    acetaminophen (TYLENOL) tablet 650 mg  650 mg Oral Q6H PRN Joel Leonardo MD   650 mg at 11/02/22 2118    Or    acetaminophen (TYLENOL) suppository 650 mg  650 mg Rectal Q6H PRN Kaitlynn Rodriguez MD        polyethylene glycol (MIRALAX) packet 17 g  17 g Oral DAILY PRN Kaitlynn Rodriguez MD   17 g at 22 0959    ondansetron (ZOFRAN ODT) tablet 4 mg  4 mg Oral Q8H PRN Kaitlynn Rodriguez MD        Or    [facility]ans[facility]ron [facility] injection 4 mg  4 mg IntraVENous Q6H PRN Kaitlynn Rodriguez MD   4 mg at 10/26/22 1456    carvediloL (COREG) tablet 6.25 mg  6.25 mg Oral BID WITH MEALS Kaitlynn Rodriguez MD   6.25 mg at 22 1649    donepeziL (ARICEPT) tablet 10 mg  10 mg Oral QHS Kaitlynn Rodriguez MD   10 mg at 22    gabapentin (NEURONTIN) capsule 300 mg  300 mg Oral BID Kaitlynn Rodriguez MD   300 mg at 22    latanoprost (XALATAN) 0.005 % ophthalmic solution 1 Drop  1 Drop Both Eyes QHS Kaitlynn Rodriguez MD   1 Drop at 22    melatonin tablet 5 mg  5 mg Oral QHS Kaitlynn Rodriguez MD   5 mg at 22    venlafaxine-SR (EFFEXOR-XR) capsule 75 mg  75 mg Oral DAILY Kaitlynn Rodriguez MD   75 mg at 22 0955    insulin lispro (HUMALOG) injection   SubCUTAneous AC&HS Kaitlynn Rodriguez MD   2 Units at 22 0336            No Known Allergies    Review of Systems:  A comprehensive review of systems was negative except for that written in the HPI. Objective:     Blood pressure (!) 164/72, pulse 67, temperature 97.8 °F (36.6 °C), resp. rate 16, height 5' 4\" (1.626 m), weight 190 lb (86.2 kg), SpO2 99 %. Temp (24hrs), Av °F (36.7 °C), Min:97.7 °F (36.5 °C), Max:98.7 °F (37.1 °C)      Intake and Output:  Current Shift: No intake/output data recorded. Last 3 Shifts: 1901 -  0700  In: 0 [P.O.:350]  Out: 950 [Urine:950]    Physical Exam:   General:  Alert, cooperative, no distress, appears stated age. Head:  Normocephalic, without obvious abnormality, atraumatic. Eyes:  Conjunctivae/corneas clear. PERRLA, EOMs intact. Ears:  Normal external ear canals both ears.    Nose: Nares normal. Septum midline. Mucosa normal. No drainage or sinus tenderness. Throat: No erythema or exudate. Neck: Supple, symmetrical, trachea midline, no adenopathy, no masses or enlargements, no carotid bruit and no JVD. Lungs:   Clear to auscultation bilaterally. No wheezes, rales, or rhonchi. Chest wall:  No tenderness or deformity. Heart:  Regular rate and rhythm, S1, S2 normal, no murmur, click, rub or gallop. No peripheral edema. Abdomen:   Soft, tenderness to palpation of right upper quadrant, non-distended. Bowel sounds normal. No masses. No organomegaly. Extremities: Extremities normal, atraumatic, no cyanosis or edema. Pulses: 2+ and symmetric all extremities. Skin: Skin color, texture, turgor normal. No rashes or lesions. Lymph nodes: Cervical, supraclavicular, and axillary nodes normal.   Neurologic: Alert and oriented x3. No focal neurologic deficits. Strength and sensation intact. Additional comments:None    Lab/Data Review: All lab results for the last 24 hours reviewed.   Recent Results (from the past 24 hour(s))   HGB & HCT    Collection Time: 11/02/22 11:35 AM   Result Value Ref Range    HGB 6.9 (L) 11.5 - 16.0 g/dL    HCT 22.4 (L) 35.0 - 47.0 %   GLUCOSE, POC    Collection Time: 11/02/22 12:10 PM   Result Value Ref Range    Glucose (POC) 124 (H) 65 - 100 mg/dL    Performed by [facility]    GLUCOSE, POC    Collection Time: 11/02/22  4:32 PM   Result Value Ref Range    Glucose (POC) 93 65 - 100 mg/dL    Performed by [facility]    METABOLIC PANEL, BASIC    Collection Time: 11/02/22  6:20 PM   Result Value Ref Range    Sodium 137 136 - 145 mmol/L    Potassium 4.9 3.5 - 5.1 mmol/L    Chloride 106 97 - 108 mmol/L    CO2 26 21 - 32 mmol/L    Anion gap 5 5 - 15 mmol/L    Glucose 123 (H) 65 - 100 mg/dL    BUN 60 (H) 6 - 20 mg/dL    Creatinine 1.62 (H) 0.55 - 1.02 mg/dL    BUN/Creatinine ratio 37 (H) 12 - 20      eGFR 32 (L) >60 ml/min/1.73m2    Calcium 9.0 8.5 - 10.1 mg/dL   TYPE & SCREEN Collection Time: 11/02/22  8:14 PM   Result Value Ref Range    Crossmatch Expiration 11/05/2022,2359     ABO/Rh(D) A Positive     Antibody screen Negative    GLUCOSE, POC    Collection Time: 11/02/22  8:25 PM   Result Value Ref Range    Glucose (POC) 133 (H) 65 - 100 mg/dL    Performed by Chase Franco    GLUCOSE, POC    Collection Time: 11/03/22  8:47 AM   Result Value Ref Range    Glucose (POC) 138 (H) 65 - 100 mg/dL    Performed by Ely CAMACHO[facility], POC    Collection Time: 11/03/22 11:29 AM   Result Value Ref Range    Glucose (POC) 265 (H) 65 - 100 mg/dL    Performed by Daphne Marroquin            Assessment:     Patient is a 65 y.o. female with past medical history of HTN, arthritis, dyslipidemia, CKD, diabetes mellitus type 2 admitted 10/21 with syncopal episode at home along with reports of epigastric abdominal pain and dark tarry stools over few days prior to presentation. BUN and creatinine were elevated on admission. She was hospitalized previously on October 10 for a similar syncopal episode and discharged on oral protonix twice daily. Seen and examined today and states she is feeling well. She reports right upper quadrant abdominal pain that started when she was turned over in bed this morning. She reports persistent passage of black tarry stool, most recent episode yesterday. She has not yet had a bowel movement today. She states that she has not eaten yet today but is feeling hungry. Vitals today reveal hypertension. Labs today reveal hyperglycemia. Repeat hemoglobin and hematocrit levels not obtained today.     Plan:     1.) Acute lower GI bleed  - Status posttransfusion of 3 units packed RBC  - Status post EGD with proximal jejunal bleed with erosions  - CT abdomen 10/31 showed no evidence of active bleeding  - GI following  - Continue protonix   - Continue monitoring hemoglobin and hematocrit levels  - Transfusion indicated if HBG drops below 7  - No need for transfusion at this time  - Consider CTA for evaluation of continued bleeding     2.) ALEX with CKD stage IIIb  - Nephrology following, agree with their recommendations     3.) Syncope  - Likely secondary to blood loss from GI bleed  - Continue to monitor hemoglobin levels     4.) Hypertension  - Continue current medications  - Monitor blood pressure     5.) Hyperglycemia  - Monitor blood glucose levels  - Insulin lispro     6.) DVT & GI Prophylaxis  - Anticoagulation contraindicated at this time due to GI bleed  - Continue protonix for GI prophylaxis      CODE STATUS: Full Code    Derick Junes  11/3/2022  11:32 AM               *ATTENTION:  This note has been created by a medical student for educational purposes only. Please do not refer to the content of this note for clinical decision-making, billing, or other purposes. Please see attending physicians note to obtain clinical information on this patient. *

## 2022-11-03 NOTE — PROGRESS NOTES
0805: Chart reviewed. When medically stable, plan is for patient to discharge home self-care. : Daughter, Conception Rubinstein [phone]. CM will continue to follow patient and recs of medical team.    7531: Discharge summary/order noted when transport available. DME order for rolling walker noted. CM met with patient to verify understanding of discharge today with family and rolling walker. Choice letter signed, placed on chart and referral sent to [address] [place] [address] is ready for pick-up. Patient aware. Patient states her granddaughter, Surinder Siegel will pick her up. Medicare pt has received, reviewed, and signed 2nd IM letter informing them of their right to appeal the discharge. Signed copied has been placed on pt bedside chart. Discharge plan of care/case management plan validated with provider discharge order.

## 2022-11-05 NOTE — PROGRESS NOTES
Hospitalist Progress Note         Rock Hodgkins, APRN, FNP-C    Daily Progress Note: 11/5/2022      Subjective:   Subjective   Patient examined alert and oriented sitting in bed. No concerns offered on examination. No acute distress noted on examination. No overnight events reported. Review of Systems:   Review of Systems   Constitutional:  Negative for chills and fever. Respiratory:  Negative for cough, sputum production and shortness of breath. Cardiovascular:  Negative for chest pain and leg swelling. Gastrointestinal:  Negative for abdominal pain, nausea and vomiting. Genitourinary:  Negative for dysuria and urgency. Musculoskeletal:  Negative for back pain, myalgias and neck pain. Neurological:  Negative for dizziness and headaches. Psychiatric/Behavioral:  Negative for depression, substance abuse and suicidal ideas. Objective:   Objective      Vitals:   BP Temp Pulse Resp   08/12/22 0732 (!) 114/56 97.8 °F (36.6 °C) 66 18   08/12/22 0400 -- -- 76 --              Physical Exam  Vitals and nursing note reviewed. Constitutional:       Appearance: Normal appearance. Cardiovascular:      Rate and Rhythm: Normal rate. Heart sounds: Normal heart sounds. Abdominal:      General: Bowel sounds are normal.      Palpations: Abdomen is soft. Skin:     General: Skin is warm. Capillary Refill: Capillary refill takes less than 2 seconds. Neurological:      Mental Status: She is alert. Mental status is at baseline. Lab Results:  No results found for this or any previous visit (from the past 24 hour(s)). Results       ** No results found for the last 336 hours. **             Diagnostic Images:  [unfilled]      Current Medications:  No current facility-administered medications for this encounter. Current Outpatient Medications:     plecanatide (Trulance) 3 mg tab, Take 3 mg by mouth daily. , Disp: , Rfl:     pantoprazole (PROTONIX) 40 mg tablet, Take 1 Tablet by mouth two (2) times a day., Disp: 60 Tablet, Rfl: 0    albuterol (PROVENTIL VENTOLIN) 2.5 mg /3 mL (0.083 %) nebu, 2.5 mg by Nebulization route every six (6) hours as needed for Shortness of Breath., Disp: , Rfl:     torsemide (DEMADEX) 20 mg tablet, Take 20 mg by mouth two (2) times a day., Disp: , Rfl:     carvediloL (COREG) 6.25 mg tablet, Take 6.25 mg by mouth two (2) times daily (with meals). , Disp: , Rfl:     calcitRIOL (ROCALTROL) 0.25 mcg capsule, Take 0.25 mcg by mouth BID Mon Wed & Fri., Disp: , Rfl:     donepeziL (ARICEPT) 10 mg tablet, Take 10 mg by mouth nightly., Disp: , Rfl:     gabapentin (NEURONTIN) 300 mg capsule, Take 300 mg by mouth two (2) times a day., Disp: , Rfl:     Venlafaxine-ER 24 HR (EFFEXOR-ER) 75 mg tr24 tablet, Take 75 mg by mouth daily. , Disp: , Rfl:     latanoprost (XALATAN) 0.005 % ophthalmic solution, Administer 1 Drop to both eyes nightly., Disp: , Rfl:     pravastatin (PRAVACHOL) 80 mg tablet, Take 80 mg by mouth nightly., Disp: , Rfl:     hydrALAZINE (APRESOLINE) 50 mg tablet, Take 1 Tablet by mouth three (3) times daily for 30 days. , Disp: 90 Tablet, Rfl: 0    cetirizine (ZYRTEC) 10 mg tablet, Take 10 mg by mouth daily. , Disp: , Rfl:     acetaminophen (TYLENOL) 325 mg tablet, Take 325 mg by mouth as needed for Pain., Disp: , Rfl:     melatonin 5 mg tablet, Take 5 mg by mouth nightly., Disp: , Rfl:     insulin aspart U-100 (NOVOLOG) 100 unit/mL injection, by SubCUTAneous route Before breakfast, lunch, dinner and at bedtime. SS: 150-200=2 units 201-250=4 units 251-300=6 units 301-350=8 units 351-400=10 units, Disp: , Rfl:     insulin detemir U-100 (LEVEMIR) 100 unit/mL injection, 10 Units by SubCUTAneous route nightly., Disp: , Rfl:        ASSESSMENT:  Jolie Yuen is a 65 y.o. female who presents to the ED following syncope episode. Of note patient was recently discharged from here 2 days ago for same complaint. Today she was with family when she had another syncopal episode.  EMS reported that her blood pressure was systolic 60 but then improved. Patient herself is a poor historian. Family also reports increased lethargy after syncope episode with concern for seizure activity. Patient herself is a poor historian. Chronic anemia noted on routine lab. Will admit to observation status. Consult neurology. MRI brain pending. EEG pending    Syncope  Questionable seizure postictal reported  -Neuro following  -defer seizure medication initiation to neuro  -MRI and EEG pending     Acute anemia  -hgb appears stable 7.8  -previously on eliquis and asa  -continue to trend     Hypertension  -bp currently soft  -will hold amlodipine and coreg     Hld  -managed with pravastatin     Elevated troponin  -recent echo showed EF 50-55%  -EKG showed sinus bradycardia     DM Type 2  -ac/hs accu check  -med dose ssi  -continue gabapentin for neuropathy     Depression  -managed with effexor and aricept     Chronic renal failure w/ hx renal adenocarcinoma  -appears neat baseline  -renal dose all meds to current gfr     Chronic constipation  -managed with senna      Prior  GI PROPHYLAXIS protonix  DVT PROPHYLAXIS scd's      Above treatment plan reviewed and discussed with patient in detail at bedside, all questions answered. Care Plan discussed with: Patient/Family    Total time spent with patient: 30 minutes.     Lonny Mendiola NP

## 2022-11-13 ENCOUNTER — HOSPITAL ENCOUNTER (INPATIENT)
Age: 77
LOS: 3 days | Discharge: HOME | DRG: 684 | End: 2022-11-16
Attending: STUDENT IN AN ORGANIZED HEALTH CARE EDUCATION/TRAINING PROGRAM | Admitting: INTERNAL MEDICINE
Payer: MEDICARE

## 2022-11-13 ENCOUNTER — APPOINTMENT (OUTPATIENT)
Dept: GENERAL RADIOLOGY | Age: 77
DRG: 684 | End: 2022-11-13
Attending: EMERGENCY MEDICINE
Payer: MEDICARE

## 2022-11-13 DIAGNOSIS — R55 SYNCOPE AND COLLAPSE: Primary | ICD-10-CM

## 2022-11-13 DIAGNOSIS — I87.2 VENOUS INSUFFICIENCY OF LEFT LOWER EXTREMITY: ICD-10-CM

## 2022-11-13 DIAGNOSIS — N17.9 AKI (ACUTE KIDNEY INJURY) (HCC): ICD-10-CM

## 2022-11-13 LAB
ALBUMIN SERPL-MCNC: 3 G/DL (ref 3.5–5)
ALBUMIN/GLOB SERPL: 0.9 {RATIO} (ref 1.1–2.2)
ALP SERPL-CCNC: 111 U/L (ref 45–117)
ALT SERPL-CCNC: 16 U/L (ref 12–78)
ANION GAP SERPL CALC-SCNC: 6 MMOL/L (ref 5–15)
AST SERPL W P-5'-P-CCNC: 26 U/L (ref 15–37)
BASOPHILS # BLD: 0 K/UL (ref 0–0.1)
BASOPHILS NFR BLD: 0 % (ref 0–1)
BILIRUB SERPL-MCNC: 0.4 MG/DL (ref 0.2–1)
BUN SERPL-MCNC: 49 MG/DL (ref 6–20)
BUN/CREAT SERPL: 16 (ref 12–20)
CA-I BLD-MCNC: 9.2 MG/DL (ref 8.5–10.1)
CHLORIDE SERPL-SCNC: 96 MMOL/L (ref 97–108)
CO2 SERPL-SCNC: 33 MMOL/L (ref 21–32)
CREAT SERPL-MCNC: 3.03 MG/DL (ref 0.55–1.02)
DIFFERENTIAL METHOD BLD: ABNORMAL
EOSINOPHIL # BLD: 0.2 K/UL (ref 0–0.4)
EOSINOPHIL NFR BLD: 2 % (ref 0–7)
ERYTHROCYTE [DISTWIDTH] IN BLOOD BY AUTOMATED COUNT: 17.3 % (ref 11.5–14.5)
GLOBULIN SER CALC-MCNC: 3.3 G/DL (ref 2–4)
GLUCOSE BLD STRIP.AUTO-MCNC: 233 MG/DL (ref 65–100)
GLUCOSE SERPL-MCNC: 185 MG/DL (ref 65–100)
HCT VFR BLD AUTO: 29.9 % (ref 35–47)
HGB BLD-MCNC: 9.3 G/DL (ref 11.5–16)
IMM GRANULOCYTES # BLD AUTO: 0 K/UL (ref 0–0.04)
IMM GRANULOCYTES NFR BLD AUTO: 1 % (ref 0–0.5)
LYMPHOCYTES # BLD: 0.8 K/UL (ref 0.8–3.5)
LYMPHOCYTES NFR BLD: 10 % (ref 12–49)
MCH RBC QN AUTO: 26.8 PG (ref 26–34)
MCHC RBC AUTO-ENTMCNC: 31.1 G/DL (ref 30–36.5)
MCV RBC AUTO: 86.2 FL (ref 80–99)
MONOCYTES # BLD: 0.9 K/UL (ref 0–1)
MONOCYTES NFR BLD: 12 % (ref 5–13)
NEUTS SEG # BLD: 6.1 K/UL (ref 1.8–8)
NEUTS SEG NFR BLD: 75 % (ref 32–75)
NRBC # BLD: 0 K/UL (ref 0–0.01)
NRBC BLD-RTO: 0 PER 100 WBC
PERFORMED BY, TECHID: ABNORMAL
PLATELET # BLD AUTO: 260 K/UL (ref 150–400)
PMV BLD AUTO: 10.1 FL (ref 8.9–12.9)
POTASSIUM SERPL-SCNC: 3.8 MMOL/L (ref 3.5–5.1)
PROT SERPL-MCNC: 6.3 G/DL (ref 6.4–8.2)
RBC # BLD AUTO: 3.47 M/UL (ref 3.8–5.2)
SODIUM SERPL-SCNC: 135 MMOL/L (ref 136–145)
TROPONIN-HIGH SENSITIVITY: 43 NG/L (ref 0–51)
WBC # BLD AUTO: 8.1 K/UL (ref 3.6–11)

## 2022-11-13 PROCEDURE — 74011000250 HC RX REV CODE- 250: Performed by: INTERNAL MEDICINE

## 2022-11-13 PROCEDURE — 84484 ASSAY OF TROPONIN QUANT: CPT

## 2022-11-13 PROCEDURE — 99285 EMERGENCY DEPT VISIT HI MDM: CPT

## 2022-11-13 PROCEDURE — 80053 COMPREHEN METABOLIC PANEL: CPT

## 2022-11-13 PROCEDURE — 71045 X-RAY EXAM CHEST 1 VIEW: CPT

## 2022-11-13 PROCEDURE — 65270000029 HC RM PRIVATE

## 2022-11-13 PROCEDURE — 74011250637 HC RX REV CODE- 250/637: Performed by: INTERNAL MEDICINE

## 2022-11-13 PROCEDURE — 85025 COMPLETE CBC W/AUTO DIFF WBC: CPT

## 2022-11-13 PROCEDURE — 74011250636 HC RX REV CODE- 250/636: Performed by: STUDENT IN AN ORGANIZED HEALTH CARE EDUCATION/TRAINING PROGRAM

## 2022-11-13 PROCEDURE — 82962 GLUCOSE BLOOD TEST: CPT

## 2022-11-13 PROCEDURE — 36415 COLL VENOUS BLD VENIPUNCTURE: CPT

## 2022-11-13 PROCEDURE — 93005 ELECTROCARDIOGRAM TRACING: CPT

## 2022-11-13 RX ORDER — DEXTROSE MONOHYDRATE 100 MG/ML
0-250 INJECTION, SOLUTION INTRAVENOUS AS NEEDED
Status: DISCONTINUED | OUTPATIENT
Start: 2022-11-13 | End: 2022-11-16 | Stop reason: HOSPADM

## 2022-11-13 RX ORDER — INSULIN GLARGINE 100 [IU]/ML
10 INJECTION, SOLUTION SUBCUTANEOUS
Status: DISCONTINUED | OUTPATIENT
Start: 2022-11-14 | End: 2022-11-16 | Stop reason: HOSPADM

## 2022-11-13 RX ORDER — CALCITRIOL 0.25 UG/1
0.25 CAPSULE ORAL
Status: DISCONTINUED | OUTPATIENT
Start: 2022-11-14 | End: 2022-11-16 | Stop reason: HOSPADM

## 2022-11-13 RX ORDER — HYDRALAZINE HYDROCHLORIDE 25 MG/1
50 TABLET, FILM COATED ORAL 3 TIMES DAILY
Status: DISCONTINUED | OUTPATIENT
Start: 2022-11-13 | End: 2022-11-16 | Stop reason: HOSPADM

## 2022-11-13 RX ORDER — CETIRIZINE HYDROCHLORIDE 10 MG/1
10 TABLET ORAL DAILY
Status: DISCONTINUED | OUTPATIENT
Start: 2022-11-14 | End: 2022-11-16 | Stop reason: HOSPADM

## 2022-11-13 RX ORDER — INSULIN LISPRO 100 [IU]/ML
INJECTION, SOLUTION INTRAVENOUS; SUBCUTANEOUS
Status: DISCONTINUED | OUTPATIENT
Start: 2022-11-13 | End: 2022-11-14

## 2022-11-13 RX ORDER — ONDANSETRON 4 MG/1
4 TABLET, ORALLY DISINTEGRATING ORAL
Status: DISCONTINUED | OUTPATIENT
Start: 2022-11-13 | End: 2022-11-16 | Stop reason: HOSPADM

## 2022-11-13 RX ORDER — TORSEMIDE 10 MG/1
20 TABLET ORAL 2 TIMES DAILY
Status: DISCONTINUED | OUTPATIENT
Start: 2022-11-13 | End: 2022-11-15

## 2022-11-13 RX ORDER — SODIUM CHLORIDE 0.9 % (FLUSH) 0.9 %
5-40 SYRINGE (ML) INJECTION EVERY 8 HOURS
Status: DISCONTINUED | OUTPATIENT
Start: 2022-11-13 | End: 2022-11-16 | Stop reason: HOSPADM

## 2022-11-13 RX ORDER — ATORVASTATIN CALCIUM 10 MG/1
20 TABLET, FILM COATED ORAL
Status: DISCONTINUED | OUTPATIENT
Start: 2022-11-13 | End: 2022-11-16 | Stop reason: HOSPADM

## 2022-11-13 RX ORDER — MAGNESIUM SULFATE 100 %
4 CRYSTALS MISCELLANEOUS AS NEEDED
Status: DISCONTINUED | OUTPATIENT
Start: 2022-11-13 | End: 2022-11-16 | Stop reason: HOSPADM

## 2022-11-13 RX ORDER — ACETAMINOPHEN 325 MG/1
650 TABLET ORAL
Status: DISCONTINUED | OUTPATIENT
Start: 2022-11-13 | End: 2022-11-16 | Stop reason: HOSPADM

## 2022-11-13 RX ORDER — SODIUM CHLORIDE 9 MG/ML
75 INJECTION, SOLUTION INTRAVENOUS CONTINUOUS
Status: DISCONTINUED | OUTPATIENT
Start: 2022-11-13 | End: 2022-11-15

## 2022-11-13 RX ORDER — DONEPEZIL HYDROCHLORIDE 5 MG/1
10 TABLET, FILM COATED ORAL
Status: DISCONTINUED | OUTPATIENT
Start: 2022-11-13 | End: 2022-11-16 | Stop reason: HOSPADM

## 2022-11-13 RX ORDER — GABAPENTIN 300 MG/1
300 CAPSULE ORAL 2 TIMES DAILY
Status: DISCONTINUED | OUTPATIENT
Start: 2022-11-13 | End: 2022-11-15

## 2022-11-13 RX ORDER — ALBUTEROL SULFATE 2.5 MG/.5ML
2.5 SOLUTION RESPIRATORY (INHALATION)
Status: DISCONTINUED | OUTPATIENT
Start: 2022-11-13 | End: 2022-11-16 | Stop reason: HOSPADM

## 2022-11-13 RX ORDER — LATANOPROST 50 UG/ML
1 SOLUTION/ DROPS OPHTHALMIC
Status: DISCONTINUED | OUTPATIENT
Start: 2022-11-13 | End: 2022-11-16 | Stop reason: HOSPADM

## 2022-11-13 RX ORDER — ONDANSETRON 2 MG/ML
4 INJECTION INTRAMUSCULAR; INTRAVENOUS
Status: DISCONTINUED | OUTPATIENT
Start: 2022-11-13 | End: 2022-11-16 | Stop reason: HOSPADM

## 2022-11-13 RX ORDER — VENLAFAXINE HYDROCHLORIDE 37.5 MG/1
75 CAPSULE, EXTENDED RELEASE ORAL DAILY
Status: DISCONTINUED | OUTPATIENT
Start: 2022-11-14 | End: 2022-11-16 | Stop reason: HOSPADM

## 2022-11-13 RX ORDER — CARVEDILOL 3.12 MG/1
6.25 TABLET ORAL 2 TIMES DAILY WITH MEALS
Status: DISCONTINUED | OUTPATIENT
Start: 2022-11-13 | End: 2022-11-16 | Stop reason: HOSPADM

## 2022-11-13 RX ORDER — ACETAMINOPHEN 650 MG/1
650 SUPPOSITORY RECTAL
Status: DISCONTINUED | OUTPATIENT
Start: 2022-11-13 | End: 2022-11-16 | Stop reason: HOSPADM

## 2022-11-13 RX ORDER — PANTOPRAZOLE SODIUM 40 MG/1
40 TABLET, DELAYED RELEASE ORAL 2 TIMES DAILY
Status: DISCONTINUED | OUTPATIENT
Start: 2022-11-13 | End: 2022-11-16 | Stop reason: HOSPADM

## 2022-11-13 RX ORDER — SODIUM CHLORIDE 0.9 % (FLUSH) 0.9 %
5-40 SYRINGE (ML) INJECTION AS NEEDED
Status: DISCONTINUED | OUTPATIENT
Start: 2022-11-13 | End: 2022-11-16 | Stop reason: HOSPADM

## 2022-11-13 RX ORDER — CHOLECALCIFEROL (VITAMIN D3) 125 MCG
5 CAPSULE ORAL
Status: DISCONTINUED | OUTPATIENT
Start: 2022-11-13 | End: 2022-11-16 | Stop reason: HOSPADM

## 2022-11-13 RX ORDER — POLYETHYLENE GLYCOL 3350 17 G/17G
17 POWDER, FOR SOLUTION ORAL DAILY PRN
Status: DISCONTINUED | OUTPATIENT
Start: 2022-11-13 | End: 2022-11-16 | Stop reason: HOSPADM

## 2022-11-13 RX ADMIN — HYDRALAZINE HYDROCHLORIDE 50 MG: 25 TABLET, FILM COATED ORAL at 23:46

## 2022-11-13 RX ADMIN — PANTOPRAZOLE SODIUM 40 MG: 40 TABLET, DELAYED RELEASE ORAL at 23:46

## 2022-11-13 RX ADMIN — SODIUM CHLORIDE 1000 ML: 9 INJECTION, SOLUTION INTRAVENOUS at 18:38

## 2022-11-13 RX ADMIN — ATORVASTATIN CALCIUM 20 MG: 10 TABLET, FILM COATED ORAL at 23:46

## 2022-11-13 RX ADMIN — GABAPENTIN 300 MG: 300 CAPSULE ORAL at 23:47

## 2022-11-13 RX ADMIN — ACETAMINOPHEN 650 MG: 325 TABLET ORAL at 23:46

## 2022-11-13 RX ADMIN — CARVEDILOL 6.25 MG: 3.12 TABLET, FILM COATED ORAL at 23:46

## 2022-11-13 RX ADMIN — LATANOPROST 1 DROP: 50 SOLUTION OPHTHALMIC at 22:00

## 2022-11-13 RX ADMIN — MELATONIN TAB 5 MG 5 MG: 5 TAB at 23:46

## 2022-11-13 RX ADMIN — SODIUM CHLORIDE, PRESERVATIVE FREE 10 ML: 5 INJECTION INTRAVENOUS at 23:47

## 2022-11-13 RX ADMIN — DONEPEZIL HYDROCHLORIDE 10 MG: 5 TABLET, FILM COATED ORAL at 23:46

## 2022-11-13 NOTE — H&P
GENERAL GENERIC H&P/CONSULT    Subjective:    77F with CKD, CAD, DM, CVA, recently admitted to hospital for GI bleeding. 8/3/22 echocardiogram    Left Ventricle: Normal left ventricular systolic function with a visually estimated EF of 55 - 60%. Left ventricle size is normal. Mildly increased wall thickness. Normal wall motion. Normal diastolic function. Aortic Valve: Valve structure is normal. Mildly calcified cusp. Mitral Valve: Moderately thickened leaflet. Left Atrium: Left atrium is mildly dilated. 10/21/22 admitted and 11/3/22 discharged home. From discharge summary:  Dipak Crespo is a 65 y.o. female who presents with reports of syncopal episode at home. According to patient, she was seated on the couch watching TV and does not remember the details of what happened . She remembers waking up in an ambulance. Reports having epigastric abdominal pain for the last few days with dark tarry stools. Work-up in the ED shows a hemoglobin of 6.3 with a hematocrit of 20.1. She was last admitted in the hospital October 10 through October 13 for similar episode of syncope with bleeding. EGD done October 12 did not find any active upper GI bleed. She was discharged home on oral Protonix twice daily and advised to stop taking apixaban. S/p EGD on 10/21/22 showed gastritis without evidence of bleeding. S/p 2 unit of PRBC transfusion on 10/21/22. Colonoscopy on 10/24/22 showed mild diverticulosis, polyp and hemorrhoids. Capsule endoscopy done on 10/25/22 showed blood clot loaded at duodenal and proximal jejunum indicating active bleeding. Repeat EGD performed again active bleeding noted. Hemoglobin dropped to 6.3 and she was transfuse a unit of packed red blood cells. CTA abdomen/pelvis negative for acute bleed. She was monitored for several days without significant drop in hemoglobin.   If she has any further drop, she will need a small bowel enteroscopy which can be done at [facility] or higher tertiary [facility]. Overall stable for discharge to home with outpatient follow-up    11/13/22 presents to hospital for an episode of syncope, no head impact, returned to baseline. No neurologic symptoms, denies any motor weakness or change in sensation. Denies any gastrointestinal bleeding and otherwise reports feeling well. During the ED course her hemoglobin was good but her her creatinine was markedly elevated vs the prior admission. I have been asked to admit to hospital for further stabilization and workup of her condition. Past Medical History:   Diagnosis Date    Adenocarcinoma, renal cell ([facility] [address] 9/2/2020    Arthritis     CAD (coronary artery disease)     Chronic kidney disease     Diabetes ([facility] [address]     Gout     Hypercholesterolemia     Hypertension     Mental depression     P[facility] embol[facility] ([facility]     Seizures ([facility]     Stroke [facility])     Thyroid disease       Past Surgical History:   Procedure Laterality Date    COLONOSCOPY N/A 10/24/2022    COLONOSCOPY performed by Austin Hale MD at [place] [address]      HX NEPHRECTOMY Left 02/12/2015    HX ORTHOPAEDIC      HX UROLOGICAL  02/12/2015    PARTIAL URETERECTOMY     MT CARDIAC SURG PROCEDURE UNLIST      stents placed       Prior to Admission medications    Medication Sig Start Date End Date Taking? Authorizing Provider   hydrALAZINE (APRESOLINE) 50 mg tablet Take 1 Tablet by mouth three (3) times daily for 30 days. 11/3/22 12/3/22  Xiomara Mares MD   cetirizine (ZYRTEC) 10 mg tablet Take 10 mg by mouth daily. Provider, Historical   acetaminophen (TYLENOL) 325 mg tablet Take 325 mg by mouth as needed for Pain. Provider, Historical   melatonin 5 mg tablet Take 5 mg by mouth nightly. Provider, Historical   insulin aspart U-100 (NOVOLOG) 100 unit/mL injection by SubCUTAneous route Before breakfast, lunch, dinner and at bedtime.  SS: 150-200=2 units 201-250=4 units 251-300=6 units 301-350=8 units 351-400=10 units    Provider, Historical insulin detemir U-100 (LEVEMIR) 100 unit/mL injection 10 Units by SubCUTAneous route nightly. Provider, Historical   plecanatide (Trulance) 3 mg tab Take 3 mg by mouth daily. Provider, Historical   pantoprazole (PROTONIX) 40 mg tablet Take 1 Tablet by mouth two (2) times a day. 8/8/22   Annamarie Connolly MD   albuterol (PROVENTIL VENTOLIN) 2.5 mg /3 mL (0.083 %) nebu 2.5 mg by Nebulization route every six (6) hours as needed for Shortness of Breath. Provider, Historical   torsemide (DEMADEX) 20 mg tablet Take 20 mg by mouth two (2) times a day. Provider, Historical   carvediloL (COREG) 6.25 mg tablet Take 6.25 mg by mouth two (2) times daily (with meals). Provider, Historical   calcitRIOL (ROCALTROL) 0.25 mcg capsule Take 0.25 mcg by mouth BID Mon Wed & Fri. Provider, Historical   donepeziL (ARICEPT) 10 mg tablet Take 10 mg by mouth nightly. Provider, Historical   gabapentin (NEURONTIN) 300 mg capsule Take 300 mg by mouth two (2) times a day. 6/29/20   Provider, Historical   Venlafaxine-ER 24 HR (EFFEXOR-ER) 75 mg tr24 tablet Take 75 mg by mouth daily. Provider, Historical   latanoprost (XALATAN) 0.005 % ophthalmic solution Administer 1 Drop to both eyes nightly. Provider, Historical   pravastatin (PRAVACHOL) 80 mg tablet Take 80 mg by mouth nightly. Provider, Historical     No Known Allergies   Social History     Tobacco Use    Smoking status: Former     Years: 15.00     Types: Cigarettes    Smokeless tobacco: Never   Substance Use Topics    Alcohol use: Not Currently      Family History   Problem Relation Age of Onset    Heart Disease Mother     Heart Disease Father     Heart Disease Sister     Cancer Sister     Heart Disease Brother     Cancer Maternal Grandmother     Stroke Son     Cancer Sister       Review of Systems   All other systems reviewed and are negative. Objective:    No intake/output data recorded. No intake/output data recorded.   Patient Vitals for the past 8 hrs: BP Temp Pulse Resp SpO2 Height Weight   11/13/22 1805 (!) 133/54 -- 64 22 100 % -- --   11/13/22 1735 (!) 145/55 -- 69 21 98 % -- --   11/13/22 1705 (!) 156/57 -- 71 22 96 % -- --   11/13/22 1620 (!) 135/58 98.3 °F (36.8 °C) 68 16 97 % 5' 4\" (1.626 m) 88.5 kg (195 lb)     Physical Exam  Vitals reviewed. HENT:      Head: Normocephalic and atraumatic. Nose: Nose normal.      Mouth/Throat:      Mouth: Mucous membranes are dry. Eyes:      Extraocular Movements: Extraocular movements intact. Pupils: Pupils are equal, round, and reactive to light. Cardiovascular:      Rate and Rhythm: Normal rate and regular rhythm. Pulses: Normal pulses. Heart sounds: Normal heart sounds. Pulmonary:      Effort: Pulmonary effort is normal.      Breath sounds: Normal breath sounds. Abdominal:      General: Abdomen is flat. Palpations: Abdomen is soft. Musculoskeletal:      Cervical back: Normal range of motion and neck supple. Skin:     General: Skin is dry. Neurological:      General: No focal deficit present. Mental Status: She is alert and oriented to person, place, and time. Mental status is at baseline. Psychiatric:         Mood and Affect: Mood normal.        Labs:    Recent Results (from the past 24 hour(s))   CBC WITH AUTOMATED DIFF    Collection Time: 11/13/22  4:32 PM   Result Value Ref Range    WBC 8.1 3.6 - 11.0 K/uL    RBC 3.47 (L) 3.80 - 5.20 M/uL    HGB 9.3 (L) 11.5 - 16.0 g/dL    HCT 29.9 (L) 35.0 - 47.0 %    MCV 86.2 80.0 - 99.0 FL    MCH 26.8 26.0 - 34.0 PG    MCHC 31.1 30.0 - 36.5 g/dL    RDW 17.3 (H) 11.5 - 14.5 %    PLATELET 209 567 - 893 K/uL    MPV 10.1 8.9 - 12.9 FL    NRBC 0.0 0.0  WBC    ABSOLUTE NRBC 0.00 0.00 - 0.01 K/uL    NEUTROPHILS 75 32 - 75 %    LYMPHOCYTES 10 (L) 12 - 49 %    MONOCYTES 12 5 - 13 %    EOSINOPHILS 2 0 - 7 %    BASOPHILS 0 0 - 1 %    IMMATURE GRANULOCYTES 1 (H) 0 - 0.5 %    ABS. NEUTROPHILS 6.1 1.8 - 8.0 K/UL    ABS.  LYMPHOCYTES 0.8 0.8 - 3.5 K/UL    ABS. MONOCYTES 0.9 0.0 - 1.0 K/UL    ABS. EOSINOPHILS 0.2 0.0 - 0.4 K/UL    ABS. BASOPHILS 0.0 0.0 - 0.1 K/UL    ABS. IMM. GRANS. 0.0 0.00 - 0.04 K/UL    DF AUTOMATED     METABOLIC PANEL, COMPREHENSIVE    Collection Time: 11/13/22  4:32 PM   Result Value Ref Range    Sodium 135 (L) 136 - 145 mmol/L    Potassium 3.8 3.5 - 5.1 mmol/L    Chloride 96 (L) 97 - 108 mmol/L    CO2 33 (H) 21 - 32 mmol/L    Anion gap 6 5 - 15 mmol/L    Glucose 185 (H) 65 - 100 mg/dL    BUN 49 (H) 6 - 20 mg/dL    Creatinine 3.03 (H) 0.55 - 1.02 mg/dL    BUN/Creatinine ratio 16 12 - 20      eGFR 15 (L) >60 ml/min/1.73m2    Calcium 9.2 8.5 - 10.1 mg/dL    Bilirubin, total 0.4 0.2 - 1.0 mg/dL    AST (SGOT) 26 15 - 37 U/L    ALT (SGPT) 16 12 - 78 U/L    Alk. phosphatase 111 45 - 117 U/L    Protein, total 6.3 (L) 6.4 - 8.2 g/dL    Albumin 3.0 (L) 3.5 - 5.0 g/dL    Globulin 3.3 2.0 - 4.0 g/dL    A-G Ratio 0.9 (L) 1.1 - 2.2     TROPONIN-HIGH SENSITIVITY    Collection Time: 11/13/22  4:32 PM   Result Value Ref Range    Troponin-High Sensitivity 43 0 - 51 ng/L       ECG:   Telemetry monitor    Chest X-Ray:   EXAM:  XR CHEST PORT     INDICATION: Chest pain     COMPARISON: 10/10/2022     TECHNIQUE: Upright portable chest AP view     FINDINGS: Mild cardiomegaly. The pulmonary vasculature is within normal limits. The lungs and pleural spaces are clear. The visualized bones and upper abdomen  are age-appropriate. IMPRESSION     Mild cardiomegaly, stable. No acute cardiopulmonary pathology. Assessment:  Active Problems:    Acute renal failure (ARF) ([facility] [address] (11/13/2022)    77F with CKD, CAD, DM, CVA, recently admitted to hospital for GI bleeding. 8/3/22 echocardiogram    Left Ventricle: Normal left ventricular systolic function with a visually estimated EF of 55 - 60%. Left ventricle size is normal. Mildly increased wall thickness. Normal wall motion. Normal diastolic function.     Aortic Valve: Valve structure is normal. Mildly calcified cusp. Mitral Valve: Moderately thickened leaflet. Left Atrium: Left atrium is mildly dilated. 10/21/22 admitted and 11/3/22 discharged home. From discharge summary:  Darleen Lindo is a 65 y.o. female who presents with reports of syncopal episode at home. According to patient, she was seated on the couch watching TV and does not remember the details of what happened . She remembers waking up in an ambulance. Reports having epigastric abdominal pain for the last few days with dark tarry stools. Work-up in the ED shows a hemoglobin of 6.3 with a hematocrit of 20.1. She was last admitted in the hospital October 10 through October 13 for similar episode of syncope with bleeding. EGD done October 12 did not find any active upper GI bleed. She was discharged home on oral Protonix twice daily and advised to stop taking apixaban. S/p EGD on 10/21/22 showed gastritis without evidence of bleeding. S/p 2 unit of PRBC transfusion on 10/21/22. Colonoscopy on 10/24/22 showed mild diverticulosis, polyp and hemorrhoids. Capsule endoscopy done on 10/25/22 showed blood clot loaded at duodenal and proximal jejunum indicating active bleeding. Repeat EGD performed again active bleeding noted. Hemoglobin dropped to 6.3 and she was transfuse a unit of packed red blood cells. CTA abdomen/pelvis negative for acute bleed. She was monitored for several days without significant drop in hemoglobin. If she has any further drop, she will need a small bowel enteroscopy which can be done at [facility] or higher tertiary centers. Overall stable for discharge to home with outpatient follow-up    11/13/22 presents to hospital for an episode of syncope, no head impact, returned to baseline. No neurologic symptoms, denies any motor weakness or change in sensation. Denies any gastrointestinal bleeding and otherwise reports feeling well.  During the ED course her hemoglobin was good but her her creatinine was markedly elevated vs the prior admission. I have been asked to admit to hospital for further stabilization and workup of her condition. Plan:    1) Acute on chronic renal failure in the setting of volume depletion, presenting with syncope. Supportive care   Intravenous fluids   Hold torsemide for now   Continue remainder of her renal regimen   Nephrology    2) Cardiovascular issues including hypertension, coronary disease, history of stroke. Telemetry monitoring   Recent echocardiogram as above   No aspirin or Plavix because of recent GI bleeding   Holding torsemide for now   Carvedilol   Hydralazine   Pravastatin    3) Diabetes mellitus     Basal glargine   Sliding scale aspart AC HS    4) Recent gastrointestinal bleeding     No aspirin/plavix/heparin   Continue PPI    5) DVT prophylaxis.  No heparin in the setting of recent GI bleeding       Signed:  Deniz Mak MD 11/13/2022

## 2022-11-13 NOTE — ED PROVIDER NOTES
EMERGENCY DEPARTMENT HISTORY AND PHYSICAL EXAM      Date: 11/13/2022  Patient Name: Charlene York    History of Presenting Illness     Chief Complaint   Patient presents with    Dizziness    Syncope       History Provided By: Patient    HPI: Charlene York, 65 y.o. female medical history as reviewed below presents for evaluation of syncope. Reports multiple prior episodes of syncope with most recent admission being due to syncope in the setting of a GI bleed. She denies any BRBPR, tarry stool or other sources of bleeding now. She has been feeling well recently with no reported fatigue or weakness, no respiratory,  or GI symptoms. She denies any presyncopal syndrome. Per EMS, patient was found unresponsive with a GCS of 15 shortly thereafter following stimulation. Patient reports that she is back to baseline now, denies any motor weakness or change in sensation    There are no other complaints, changes, or physical findings at this time. PCP: Juany Tsang NP    No current facility-administered medications on file prior to encounter. Current Outpatient Medications on File Prior to Encounter   Medication Sig Dispense Refill    hydrALAZINE (APRESOLINE) 50 mg tablet Take 1 Tablet by mouth three (3) times daily for 30 days. 90 Tablet 0    cetirizine (ZYRTEC) 10 mg tablet Take 10 mg by mouth daily. acetaminophen (TYLENOL) 325 mg tablet Take 325 mg by mouth as needed for Pain.      melatonin 5 mg tablet Take 5 mg by mouth nightly. insulin aspart U-100 (NOVOLOG) 100 unit/mL injection by SubCUTAneous route Before breakfast, lunch, dinner and at bedtime. SS: 150-200=2 units 201-250=4 units 251-300=6 units 301-350=8 units 351-400=10 units      insulin detemir U-100 (LEVEMIR) 100 unit/mL injection 10 Units by SubCUTAneous route nightly. plecanatide (Trulance) 3 mg tab Take 3 mg by mouth daily. pantoprazole (PROTONIX) 40 mg tablet Take 1 Tablet by mouth two (2) times a day.  60 Tablet 0 albuterol (PROVENTIL VENTOLIN) 2.5 mg /3 mL (0.083 %) nebu 2.5 mg by Nebulization route every six (6) hours as needed for Shortness of Breath. torsemide (DEMADEX) 20 mg tablet Take 20 mg by mouth two (2) times a day. carvediloL (COREG) 6.25 mg tablet Take 6.25 mg by mouth two (2) times daily (with meals). calcitRIOL (ROCALTROL) 0.25 mcg capsule Take 0.25 mcg by mouth BID Mon Wed & Fri.      donepeziL (ARICEPT) 10 mg tablet Take 10 mg by mouth nightly.      gabapentin (NEURONTIN) 300 mg capsule Take 300 mg by mouth two (2) times a day. Venlafaxine-ER 24 HR (EFFEXOR-ER) 75 mg tr24 tablet Take 75 mg by mouth daily. latanoprost (XALATAN) 0.005 % ophthalmic solution Administer 1 Drop to both eyes nightly. pravastatin (PRAVACHOL) 80 mg tablet Take 80 mg by mouth nightly.          Past History     Past Medical History:  Past Medical History:   Diagnosis Date    Adenocarcinoma, renal cell ([facility] [address] 9/2/2020    Arthritis     CAD (coronary artery disease)     Chronic kidney disease     Diabetes ([facility] [address]     Gout     Hypercholesterolemia     Hypertension     Mental depression     Pulmonary embolism ([facility] [address]     Seizures ([facility] [address]     Stroke [facility])     Thyroid disease        Past Surgical History:  Past Surgical History:   Procedure Laterality Date    COLONOSCOPY N/A 10/24/2022    COLONOSCOPY performed by Mini Contreras MD at [address] [place] [address] ENDOSCOPY    HX GYN      HX HYSTERECTOMY      HX NEPHRECTOMY Left 02/12/2015    HX ORTHOPAEDIC      HX UROLOGICAL  02/12/2015    PARTIAL URETERECTOMY     VT CARDIAC SURG PROCEDURE UNLIST      stents placed        Family History:  Family History   Problem Relation Age of Onset    Heart Disease Mother     Heart Disease Father     Heart Disease Sister     Cancer Sister     Heart Disease Brother     Cancer Maternal Grandmother     Stroke Son     Cancer Sister        Social History:  Social History     Tobacco Use    Smoking status: Former     Years: 15.00     Types: Cigarettes    Smokeless tobacco: Never   Vaping Use    Vaping Use: Never used   Substance Use Topics    Alcohol use: Not Currently    Drug use: Never       Allergies:  No Known Allergies    Review of Systems   Review of Systems   Constitutional:  Negative for fever. HENT:  Negative for congestion. Respiratory:  Negative for cough and shortness of breath. Cardiovascular:  Negative for chest pain. Gastrointestinal:  Negative for abdominal pain, constipation, nausea and vomiting. Genitourinary:  Negative for dysuria. Musculoskeletal:  Negative for arthralgias and myalgias. Skin:  Negative for rash. Allergic/Immunologic: Negative for immunocompromised state. Neurological:  Positive for syncope. Psychiatric/Behavioral:  Negative for confusion. Physical Exam   Physical Exam  Constitutional:       Appearance: Normal appearance. HENT:      Head: Normocephalic and atraumatic. Nose: Nose normal.      Mouth/Throat:      Mouth: Mucous membranes are moist.   Eyes:      Conjunctiva/sclera: Conjunctivae normal.      Pupils: Pupils are equal, round, and reactive to light. Cardiovascular:      Rate and Rhythm: Normal rate and regular rhythm. Heart sounds: Normal heart sounds. Pulmonary:      Effort: Pulmonary effort is normal.      Breath sounds: Normal breath sounds. Abdominal:      General: There is no distension. Palpations: Abdomen is soft. Tenderness: There is no abdominal tenderness. Musculoskeletal:         General: No tenderness or deformity. Normal range of motion. Cervical back: Normal range of motion and neck supple. Skin:     General: Skin is warm and dry. Neurological:      General: No focal deficit present. Mental Status: She is alert and oriented to person, place, and time.    Psychiatric:         Mood and Affect: Mood normal.         Behavior: Behavior normal.       Lab and Diagnostic Study Results   Labs -     Recent Results (from the past 12 hour(s))   CBC WITH AUTOMATED DIFF    Collection Time: 11/13/22  4:32 PM   Result Value Ref Range    WBC 8.1 3.6 - 11.0 K/uL    RBC 3.47 (L) 3.80 - 5.20 M/uL    HGB 9.3 (L) 11.5 - 16.0 g/dL    HCT 29.9 (L) 35.0 - 47.0 %    MCV 86.2 80.0 - 99.0 FL    MCH 26.8 26.0 - 34.0 PG    MCHC 31.1 30.0 - 36.5 g/dL    RDW 17.3 (H) 11.5 - 14.5 %    PLATELET 297 788 - 231 K/uL    MPV 10.1 8.9 - 12.9 FL    NRBC 0.0 0.0  WBC    ABSOLUTE NRBC 0.00 0.00 - 0.01 K/uL    NEUTROPHILS 75 32 - 75 %    LYMPHOCYTES 10 (L) 12 - 49 %    MONOCYTES 12 5 - 13 %    EOSINOPHILS 2 0 - 7 %    BASOPHILS 0 0 - 1 %    IMMATURE GRANULOCYTES 1 (H) 0 - 0.5 %    ABS. NEUTROPHILS 6.1 1.8 - 8.0 K/UL    ABS. LYMPHOCYTES 0.8 0.8 - 3.5 K/UL    ABS. MONOCYTES 0.9 0.0 - 1.0 K/UL    ABS. EOSINOPHILS 0.2 0.0 - 0.4 K/UL    ABS. BASOPHILS 0.0 0.0 - 0.1 K/UL    ABS. IMM. GRANS. 0.0 0.00 - 0.04 K/UL    DF AUTOMATED     METABOLIC PANEL, COMPREHENSIVE    Collection Time: 11/13/22  4:32 PM   Result Value Ref Range    Sodium 135 (L) 136 - 145 mmol/L    Potassium 3.8 3.5 - 5.1 mmol/L    Chloride 96 (L) 97 - 108 mmol/L    CO2 33 (H) 21 - 32 mmol/L    Anion gap 6 5 - 15 mmol/L    Glucose 185 (H) 65 - 100 mg/dL    BUN 49 (H) 6 - 20 mg/dL    Creatinine 3.03 (H) 0.55 - 1.02 mg/dL    BUN/Creatinine ratio 16 12 - 20      eGFR 15 (L) >60 ml/min/1.73m2    Calcium 9.2 8.5 - 10.1 mg/dL    Bilirubin, total 0.4 0.2 - 1.0 mg/dL    AST (SGOT) 26 15 - 37 U/L    ALT (SGPT) 16 12 - 78 U/L    Alk.  phosphatase 111 45 - 117 U/L    Protein, total 6.3 (L) 6.4 - 8.2 g/dL    Albumin 3.0 (L) 3.5 - 5.0 g/dL    Globulin 3.3 2.0 - 4.0 g/dL    A-G Ratio 0.9 (L) 1.1 - 2.2     TROPONIN-HIGH SENSITIVITY    Collection Time: 11/13/22  4:32 PM   Result Value Ref Range    Troponin-High Sensitivity 43 0 - 51 ng/L       Radiologic Studies -   @lastxrresult@  CT Results  (Last 48 hours)      None          CXR Results  (Last 48 hours)                 11/13/22 1634  XR CHEST PORT Final result    Impression:      Mild cardiomegaly, stable. No acute cardiopulmonary pathology. Narrative:  EXAM:  XR CHEST PORT       INDICATION: Chest pain       COMPARISON: 10/10/2022       TECHNIQUE: Upright portable chest AP view       FINDINGS: Mild cardiomegaly. The pulmonary vasculature is within normal limits. The lungs and pleural spaces are clear. The visualized bones and upper abdomen   are age-appropriate. Medical Decision Making and ED Course   Differential Diagnosis & Medical Decision Making Provider Note:   78-year-old female presents for evaluation of a syncopal episode. She denies any pain now. She is back to her neurologic baseline and is neurologically intact on exam making intracranial process unlikely. Differential includes vasovagal, orthostatic, electrolyte abnormality, anemia and will evaluate with CBC, BMP    - I am the first provider for this patient. I reviewed the vital signs, available nursing notes, past medical history, past surgical history, family history and social history. The patients presenting problems have been discussed, and they are in agreement with the care plan formulated and outlined with them. I have encouraged them to ask questions as they arise throughout their visit. Vital Signs-Reviewed the patient's vital signs. Patient Vitals for the past 12 hrs:   Temp Pulse Resp BP SpO2   11/13/22 1620 98.3 °F (36.8 °C) 68 16 (!) 135/58 97 %       ED Course:   ED Course as of 11/13/22 1823   Sun Nov 13, 2022   1701 ECG performed at 1619 and interpreted by me shows sinus rhythm with occasional PVCs, as well as, no ST elevation or depression [BQ]      ED Course User Index  [BQ] Bang El MD         Procedures   Performed by: Andry Ennis MD  Procedures      Disposition   Disposition: Admitted to Floor Medical Floor the case was discussed with the admitting physician Azalia Daniels    Diagnosis/Clinical Impression     Clinical Impression:   1.  Syncope and collapse 2. ALEX (acute kidney injury) ([facility] [address]        Attestations: Elizabeth Reese MD, am the primary clinician of record. Please note that this dictation was completed with clinovo, the computer voice recognition software. Quite often unanticipated grammatical, syntax, homophones, and other interpretive errors are inadvertently transcribed by the computer software. Please disregard these errors. Please excuse any errors that have escaped final proofreading. Thank you.

## 2022-11-14 LAB
ALBUMIN SERPL-MCNC: 2.5 G/DL (ref 3.5–5)
ALBUMIN/GLOB SERPL: 0.7 {RATIO} (ref 1.1–2.2)
ALP SERPL-CCNC: 96 U/L (ref 45–117)
ALT SERPL-CCNC: 10 U/L (ref 12–78)
ANION GAP SERPL CALC-SCNC: 6 MMOL/L (ref 5–15)
AST SERPL W P-5'-P-CCNC: 15 U/L (ref 15–37)
ATRIAL RATE: 68 BPM
BASOPHILS # BLD: 0 K/UL (ref 0–0.1)
BASOPHILS NFR BLD: 1 % (ref 0–1)
BILIRUB SERPL-MCNC: 0.3 MG/DL (ref 0.2–1)
BUN SERPL-MCNC: 43 MG/DL (ref 6–20)
BUN/CREAT SERPL: 17 (ref 12–20)
CA-I BLD-MCNC: 9 MG/DL (ref 8.5–10.1)
CALCULATED P AXIS, ECG09: 50 DEGREES
CALCULATED R AXIS, ECG10: 56 DEGREES
CALCULATED T AXIS, ECG11: 46 DEGREES
CHLORIDE SERPL-SCNC: 98 MMOL/L (ref 97–108)
CO2 SERPL-SCNC: 32 MMOL/L (ref 21–32)
CREAT SERPL-MCNC: 2.55 MG/DL (ref 0.55–1.02)
DIAGNOSIS, 93000: NORMAL
DIFFERENTIAL METHOD BLD: ABNORMAL
EOSINOPHIL # BLD: 0.2 K/UL (ref 0–0.4)
EOSINOPHIL NFR BLD: 3 % (ref 0–7)
ERYTHROCYTE [DISTWIDTH] IN BLOOD BY AUTOMATED COUNT: 17.4 % (ref 11.5–14.5)
EST. AVERAGE GLUCOSE BLD GHB EST-MCNC: 117 MG/DL
GLOBULIN SER CALC-MCNC: 3.4 G/DL (ref 2–4)
GLUCOSE BLD STRIP.AUTO-MCNC: 156 MG/DL (ref 65–100)
GLUCOSE BLD STRIP.AUTO-MCNC: 179 MG/DL (ref 65–100)
GLUCOSE BLD STRIP.AUTO-MCNC: 228 MG/DL (ref 65–100)
GLUCOSE BLD STRIP.AUTO-MCNC: 88 MG/DL (ref 65–100)
GLUCOSE SERPL-MCNC: 231 MG/DL (ref 65–100)
HBA1C MFR BLD: 5.7 % (ref 4–5.6)
HCT VFR BLD AUTO: 25.7 % (ref 35–47)
HGB BLD-MCNC: 8.1 G/DL (ref 11.5–16)
IMM GRANULOCYTES # BLD AUTO: 0 K/UL (ref 0–0.04)
IMM GRANULOCYTES NFR BLD AUTO: 0 % (ref 0–0.5)
LYMPHOCYTES # BLD: 1 K/UL (ref 0.8–3.5)
LYMPHOCYTES NFR BLD: 15 % (ref 12–49)
MAGNESIUM SERPL-MCNC: 1.6 MG/DL (ref 1.6–2.4)
MCH RBC QN AUTO: 26.8 PG (ref 26–34)
MCHC RBC AUTO-ENTMCNC: 31.5 G/DL (ref 30–36.5)
MCV RBC AUTO: 85.1 FL (ref 80–99)
MONOCYTES # BLD: 0.6 K/UL (ref 0–1)
MONOCYTES NFR BLD: 9 % (ref 5–13)
NEUTS SEG # BLD: 4.8 K/UL (ref 1.8–8)
NEUTS SEG NFR BLD: 72 % (ref 32–75)
NRBC # BLD: 0 K/UL (ref 0–0.01)
NRBC BLD-RTO: 0 PER 100 WBC
P-R INTERVAL, ECG05: 146 MS
PERFORMED BY, TECHID: ABNORMAL
PERFORMED BY, TECHID: NORMAL
PLATELET # BLD AUTO: 240 K/UL (ref 150–400)
PMV BLD AUTO: 9.8 FL (ref 8.9–12.9)
POTASSIUM SERPL-SCNC: 3.5 MMOL/L (ref 3.5–5.1)
PROT SERPL-MCNC: 5.9 G/DL (ref 6.4–8.2)
Q-T INTERVAL, ECG07: 452 MS
QRS DURATION, ECG06: 92 MS
QTC CALCULATION (BEZET), ECG08: 480 MS
RBC # BLD AUTO: 3.02 M/UL (ref 3.8–5.2)
SODIUM SERPL-SCNC: 136 MMOL/L (ref 136–145)
VENTRICULAR RATE, ECG03: 68 BPM
WBC # BLD AUTO: 6.7 K/UL (ref 3.6–11)

## 2022-11-14 PROCEDURE — 74011000250 HC RX REV CODE- 250: Performed by: INTERNAL MEDICINE

## 2022-11-14 PROCEDURE — 85025 COMPLETE CBC W/AUTO DIFF WBC: CPT

## 2022-11-14 PROCEDURE — 83735 ASSAY OF MAGNESIUM: CPT

## 2022-11-14 PROCEDURE — 74011250637 HC RX REV CODE- 250/637: Performed by: INTERNAL MEDICINE

## 2022-11-14 PROCEDURE — 83036 HEMOGLOBIN GLYCOSYLATED A1C: CPT

## 2022-11-14 PROCEDURE — 82962 GLUCOSE BLOOD TEST: CPT

## 2022-11-14 PROCEDURE — 97530 THERAPEUTIC ACTIVITIES: CPT

## 2022-11-14 PROCEDURE — 74011636637 HC RX REV CODE- 636/637: Performed by: STUDENT IN AN ORGANIZED HEALTH CARE EDUCATION/TRAINING PROGRAM

## 2022-11-14 PROCEDURE — 74011636637 HC RX REV CODE- 636/637: Performed by: INTERNAL MEDICINE

## 2022-11-14 PROCEDURE — 92610 EVALUATE SWALLOWING FUNCTION: CPT

## 2022-11-14 PROCEDURE — 65270000029 HC RM PRIVATE

## 2022-11-14 PROCEDURE — 97165 OT EVAL LOW COMPLEX 30 MIN: CPT

## 2022-11-14 PROCEDURE — 36415 COLL VENOUS BLD VENIPUNCTURE: CPT

## 2022-11-14 PROCEDURE — 80053 COMPREHEN METABOLIC PANEL: CPT

## 2022-11-14 RX ORDER — NITROGLYCERIN 0.4 MG/1
TABLET SUBLINGUAL
COMMUNITY
Start: 2022-11-11

## 2022-11-14 RX ORDER — ZOSTER VACCINE RECOMBINANT, ADJUVANTED 50 MCG/0.5
KIT INTRAMUSCULAR
COMMUNITY
Start: 2022-11-11 | End: 2022-11-16

## 2022-11-14 RX ORDER — ALBUTEROL SULFATE 0.83 MG/ML
2.5 SOLUTION RESPIRATORY (INHALATION)
COMMUNITY
Start: 2022-07-20 | End: 2022-11-16

## 2022-11-14 RX ORDER — FLUTICASONE PROPIONATE 50 MCG
SPRAY, SUSPENSION (ML) NASAL
COMMUNITY

## 2022-11-14 RX ORDER — METOPROLOL SUCCINATE 25 MG/1
TABLET, EXTENDED RELEASE ORAL
COMMUNITY
End: 2022-11-16

## 2022-11-14 RX ORDER — LANCETS 33 GAUGE
EACH MISCELLANEOUS
COMMUNITY
End: 2022-11-16

## 2022-11-14 RX ORDER — FERROUS SULFATE TAB 325 MG (65 MG ELEMENTAL FE) 325 (65 FE) MG
TAB ORAL
COMMUNITY
Start: 2022-08-10

## 2022-11-14 RX ORDER — LORATADINE 10 MG/1
TABLET ORAL
COMMUNITY
Start: 2022-08-10 | End: 2022-11-16

## 2022-11-14 RX ORDER — NEBULIZER AND COMPRESSOR
EACH MISCELLANEOUS AS DIRECTED
COMMUNITY
Start: 2022-08-09

## 2022-11-14 RX ORDER — INSULIN LISPRO 100 [IU]/ML
INJECTION, SOLUTION INTRAVENOUS; SUBCUTANEOUS
Status: DISCONTINUED | OUTPATIENT
Start: 2022-11-14 | End: 2022-11-16 | Stop reason: HOSPADM

## 2022-11-14 RX ORDER — DICLOFENAC SODIUM 10 MG/G
4 GEL TOPICAL
Status: DISCONTINUED | OUTPATIENT
Start: 2022-11-14 | End: 2022-11-16 | Stop reason: HOSPADM

## 2022-11-14 RX ORDER — TRIAMCINOLONE ACETONIDE 1 MG/G
CREAM TOPICAL
COMMUNITY
Start: 2022-09-29 | End: 2022-11-16

## 2022-11-14 RX ORDER — CARVEDILOL 6.25 MG/1
6.25 TABLET ORAL 2 TIMES DAILY WITH MEALS
COMMUNITY
Start: 2022-07-20 | End: 2022-11-16

## 2022-11-14 RX ORDER — COVID-19 MOLECULAR TEST ASSAY
KIT MISCELLANEOUS
COMMUNITY
Start: 2022-08-13 | End: 2022-11-16

## 2022-11-14 RX ORDER — LANOLIN ALCOHOL/MO/W.PET/CERES
1 CREAM (GRAM) TOPICAL
Status: DISCONTINUED | OUTPATIENT
Start: 2022-11-15 | End: 2022-11-16 | Stop reason: HOSPADM

## 2022-11-14 RX ORDER — HUMAN INSULIN 100 [IU]/ML
INJECTION, SOLUTION SUBCUTANEOUS
COMMUNITY
Start: 2022-08-30 | End: 2022-11-16

## 2022-11-14 RX ORDER — AMLODIPINE BESYLATE 10 MG/1
10 TABLET ORAL DAILY
COMMUNITY
Start: 2022-07-21 | End: 2023-07-21

## 2022-11-14 RX ORDER — FLUTICASONE PROPIONATE 50 MCG
SPRAY, SUSPENSION (ML) NASAL
COMMUNITY
Start: 2022-11-11 | End: 2022-11-16

## 2022-11-14 RX ORDER — HUMAN INSULIN 100 [IU]/ML
INJECTION, SOLUTION SUBCUTANEOUS
COMMUNITY
Start: 2022-09-08 | End: 2022-11-16

## 2022-11-14 RX ORDER — INSULIN DETEMIR 100 [IU]/ML
INJECTION, SOLUTION SUBCUTANEOUS
COMMUNITY
Start: 2022-08-30 | End: 2022-11-16

## 2022-11-14 RX ORDER — ACETAMINOPHEN 500 MG
TABLET ORAL
COMMUNITY

## 2022-11-14 RX ORDER — BLOOD-GLUCOSE METER
EACH MISCELLANEOUS
COMMUNITY
End: 2022-11-16

## 2022-11-14 RX ORDER — BLOOD SUGAR DIAGNOSTIC
STRIP MISCELLANEOUS
COMMUNITY
Start: 2022-08-21 | End: 2022-11-16

## 2022-11-14 RX ORDER — TRIAMCINOLONE ACETONIDE 1 MG/G
CREAM TOPICAL
COMMUNITY
Start: 2022-09-30

## 2022-11-14 RX ORDER — GUAIFENESIN 100 MG/5ML
LIQUID (ML) ORAL
COMMUNITY
Start: 2022-08-10

## 2022-11-14 RX ORDER — BLOOD SUGAR DIAGNOSTIC
STRIP MISCELLANEOUS
COMMUNITY
End: 2022-11-16

## 2022-11-14 RX ORDER — TRAVOPROST OPHTHALMIC SOLUTION 0.04 MG/ML
SOLUTION OPHTHALMIC
COMMUNITY

## 2022-11-14 RX ORDER — VENLAFAXINE HYDROCHLORIDE 75 MG/1
CAPSULE, EXTENDED RELEASE ORAL
COMMUNITY
Start: 2022-10-12 | End: 2022-11-16

## 2022-11-14 RX ORDER — AMLODIPINE BESYLATE 5 MG/1
10 TABLET ORAL DAILY
Status: DISCONTINUED | OUTPATIENT
Start: 2022-11-15 | End: 2022-11-16 | Stop reason: HOSPADM

## 2022-11-14 RX ADMIN — SODIUM CHLORIDE, PRESERVATIVE FREE 10 ML: 5 INJECTION INTRAVENOUS at 10:04

## 2022-11-14 RX ADMIN — CALCITRIOL CAPSULES 0.25 MCG 0.25 MCG: 0.25 CAPSULE ORAL at 18:13

## 2022-11-14 RX ADMIN — SODIUM CHLORIDE, PRESERVATIVE FREE 10 ML: 5 INJECTION INTRAVENOUS at 20:38

## 2022-11-14 RX ADMIN — PANTOPRAZOLE SODIUM 40 MG: 40 TABLET, DELAYED RELEASE ORAL at 10:02

## 2022-11-14 RX ADMIN — VENLAFAXINE HYDROCHLORIDE 75 MG: 37.5 CAPSULE, EXTENDED RELEASE ORAL at 10:02

## 2022-11-14 RX ADMIN — DONEPEZIL HYDROCHLORIDE 10 MG: 5 TABLET, FILM COATED ORAL at 20:20

## 2022-11-14 RX ADMIN — INSULIN LISPRO 4 UNITS: 100 INJECTION, SOLUTION INTRAVENOUS; SUBCUTANEOUS at 12:18

## 2022-11-14 RX ADMIN — INSULIN LISPRO 2 UNITS: 100 INJECTION, SOLUTION INTRAVENOUS; SUBCUTANEOUS at 20:35

## 2022-11-14 RX ADMIN — CETIRIZINE HYDROCHLORIDE 10 MG: 10 TABLET, FILM COATED ORAL at 10:02

## 2022-11-14 RX ADMIN — HYDRALAZINE HYDROCHLORIDE 50 MG: 25 TABLET, FILM COATED ORAL at 10:22

## 2022-11-14 RX ADMIN — INSULIN GLARGINE 10 UNITS: 100 INJECTION, SOLUTION SUBCUTANEOUS at 20:34

## 2022-11-14 RX ADMIN — GABAPENTIN 300 MG: 300 CAPSULE ORAL at 20:20

## 2022-11-14 RX ADMIN — SODIUM CHLORIDE, PRESERVATIVE FREE 10 ML: 5 INJECTION INTRAVENOUS at 16:52

## 2022-11-14 RX ADMIN — ACETAMINOPHEN 650 MG: 325 TABLET ORAL at 20:18

## 2022-11-14 RX ADMIN — LATANOPROST 1 DROP: 50 SOLUTION OPHTHALMIC at 22:11

## 2022-11-14 RX ADMIN — PANTOPRAZOLE SODIUM 40 MG: 40 TABLET, DELAYED RELEASE ORAL at 20:21

## 2022-11-14 RX ADMIN — ATORVASTATIN CALCIUM 20 MG: 10 TABLET, FILM COATED ORAL at 20:20

## 2022-11-14 RX ADMIN — CALCITRIOL CAPSULES 0.25 MCG 0.25 MCG: 0.25 CAPSULE ORAL at 10:02

## 2022-11-14 RX ADMIN — GABAPENTIN 300 MG: 300 CAPSULE ORAL at 10:01

## 2022-11-14 RX ADMIN — HYDRALAZINE HYDROCHLORIDE 50 MG: 25 TABLET, FILM COATED ORAL at 20:20

## 2022-11-14 RX ADMIN — MELATONIN TAB 5 MG 5 MG: 5 TAB at 20:20

## 2022-11-14 NOTE — PROGRESS NOTES
OCCUPATIONAL THERAPY EVALUATION  Patient: Parveen Guaman (41 y.o. female)  Date: 11/14/2022  Primary Diagnosis: Acute renal failure (ARF) ([facility]) [N17.9]       Precautions: fall risk       ASSESSMENT  Pt is a 65 y.o. female presenting to [facility] with c/o episode syncope, admitted 11/13 and currently being treated for ARF. CT of abdomen shows no evidence of bleed. Pt received semi-supine in bed upon arrival, AXO x4, and agreeable to OT evaluation. Based on current observations, pt presents with deficits in generalized strength/AROM, bed mobility, static/dynamic standing balance (see PT note for gait details), functional activity tolerance, and pain currently impacting overall performance of ADLs and functional transfers/mobility (see below for objective details and assist levels). Overall, pt tolerates session fair with c/o 8/10 pain and stiffness in L knee. She req'd CGA for functional mobility using RW; ambulated with slow gait 2' stiffness in L knee. No LOB or unsteadiness noted. She tolerated standing at sinktop for ~2 minutes while completing hand hygiene w/out LOB or requiring rest before returning to chair. Pt req'd cues for hand placement during transfer for safety; demo'd good understanding following cues. HR fluctuated between 100-120s at EOB and decreased to 70-80s following session. No c/o dizziness during session. Pt would benefit from continued skilled OT services to address current impairments and improve IND and safety with self cares and functional transfers/mobility. Current OT d/c recommendation Home with Home Health Therapy and family care once medically appropriate. OT educated pt to utilize RW upon discharge. Other factors to consider for discharge: family/social support, DME, time since onset, severity of deficits, functional baseline     Patient will benefit from skilled therapy intervention to address the above noted impairments.        PLAN :  Recommendations and Planned Interventions: self care training, functional mobility training, therapeutic exercise, balance training, therapeutic activities, endurance activities, patient education, and home safety training    Recommend with staff: Amb to bathroom for toileting with gt belt and AD    Recommend next session: LB dressing , LB bathing, and Standing grooming    Frequency/Duration: Patient will be followed by occupational therapy:  3-5x/week to address goals. Recommendation for discharge: (in order for the patient to meet his/her long term goals)  Home with [facility]    This discharge recommendation:  Has been made in collaboration with the attending provider and/or case management    IF patient discharges home will need the following DME: pt reports she has a RW order but has to pick it up       SUBJECTIVE:   Patient stated My L leg is just stiff.     OBJECTIVE DATA SUMMARY:   HISTORY:   Past Medical History:   Diagnosis Date    Adenocarcinoma, renal cell ([facility] [address] 9/2/2020    Arthritis     CAD (coronary artery disease)     Chronic kidney disease     Diabetes ([facility] [address]     Gout     Hypercholesterolemia     Hypertension     Mental depression     Pulmonary embolism ([facility] [address]     Seizures ([facility] [address]     Stroke [facility])     Thyroid disease      Past Surgical History:   Procedure Laterality Date    COLONOSCOPY N/A 10/24/2022    COLONOSCOPY performed by Roxy Abraham MD at [facility] ENDOSCOPY    HX GYN      HX HYSTERECTOMY      HX NEPHRECTOMY Left 02/12/2015    HX ORTHOPAEDIC      HX UROLOGICAL  02/12/2015    PARTIAL URETERECTOMY     VT CARDIAC SURG PROCEDURE UNLIST      stents placed        Per pt:   Home Situation  Home Environment: Private residence  # Steps to Enter: 4  One/Two Story Residence: One story  Living Alone: No  Support Systems: Child(connie) (daughter)  Patient Expects to be Discharged to[de-identified] Home  Current DME Used/Available at Home: [employer], rolling, Walker, rollator, Cane, straight, Shower chair      EXAMINATION OF PERFORMANCE DEFICITS:  Cognitive/Behavioral Status:  Neurologic State: Alert  Orientation Level: Oriented X4  Cognition: Follows commands               Hearing: Auditory  Auditory Impairment: None      Range of Motion:  AROM: Generally decreased, functional                         Strength:  Strength: Generally decreased, functional                Coordination:     Fine Motor Skills-Upper: Left Intact; Right Intact    Gross Motor Skills-Upper: Left Intact; Right Intact    Tone & Sensation:     Sensation: Intact                      Balance:  Sitting: Intact; Without support  Standing: Impaired; With support  Standing - Static: Fair;Constant support  Standing - Dynamic : Fair;Constant support    Functional Mobility and Transfers for ADLs:  Bed Mobility:  Supine to Sit: Contact guard assistance  Scooting: Contact guard assistance    Transfers:  Sit to Stand: Contact guard assistance  Stand to Sit: Contact guard assistance  Bed to Chair: Contact guard assistance  Bathroom Mobility: Contact guard assistance  Toilet Transfer : Contact guard assistance  Assistive Device : Walker, rolling      ADL Intervention and task modifications:       Grooming  Washing Hands: Contact guard assistance                        Toileting  Bladder Hygiene: Contact guard assistance         Therapeutic Exercise:  Pt will benefit from BUE HEP to improve participation in ADLs and mobility. Plan will be initiated at next session. Functional Measure:    [employer] AM-PAC \"6 Clicks\"                                                       Daily Activity Inpatient Short Form  How much help from another person does the patient currently need. .. Total; A Lot A Little None   1. Putting on and taking off regular lower body clothing? []  1 []  2 [x]  3 []  4   2. Bathing (including washing, rinsing, drying)? []  1 []  2 [x]  3 []  4   3. Toileting, which includes using toilet, bedpan or urinal? [] 1 []  2 [x]  3 []  4   4.   Putting on and taking off regular upper body clothing? []  1 []  2 []  3 [x]  4   5. Taking care of personal grooming such as brushing teeth? []  1 []  2 [x]  3 []  4   6. Eating meals? []  1 []  2 []  3 [x]  4   © 2007, Trustees of [employer], under license to [employer]. All rights reserved     Score: 20/24     Interpretation of Tool:  Represents clinically-significant functional categories (i.e. Activities of daily living). Percentage of Impairment CH    0%   CI    1-19% CJ    20-39% CK    40-59% CL    60-79% CM    80-99% CN     100%   [facility]  Score 6-24 24 23 20-22 15-19 10-14 7-9 6     Occupational Therapy Evaluation Charge Determination   History Examination Decision-Making   LOW Complexity : Brief history review  LOW Complexity : 1-3 performance deficits relating to physical, cognitive , or psychosocial skils that result in activity limitations and / or participation restrictions  MEDIUM Complexity : Patient may present with comorbidities that affect occupational performnce. Miniml to moderate modification of tasks or assistance (eg, physical or verbal ) with assesment(s) is necessary to enable patient to complete evaluation       Based on the above components, the patient evaluation is determined to be of the following complexity level: LOW   Pain Rating:  See note above    Activity Tolerance:   Fair and requires rest breaks    After treatment patient left in no apparent distress:    Sitting in chair and Call bell within reach    COMMUNICATION/EDUCATION:   The patients plan of care was discussed with: Registered nurse. Patient/family have participated as able in goal setting and plan of care. and Patient/family agree to work toward stated goals and plan of care. This patients plan of care is appropriate for delegation to [facility].        Thank you for this referral.  Bryan Wolf OT  Time Calculation: 37 mins   Problem: Self Care Deficits Care Plan (Adult)  Goal: *Acute Goals and Plan of Care (Insert Text)  Description: FUNCTIONAL STATUS PRIOR TO ADMISSION: Pt reports she was IND w/ functional mobility/ADLs prior to admission. HOME SUPPORT: Pt lives with daughter who is home most of the time.     Occupational Therapy Goals  Initiated 11/14/2022    Pt stated goal I want to go home  Pt will be IND sup <> sit in prep for EOB ADLs  Pt will be Mod I grooming standing sink side LRAD  Pt will be IND UB dressing sitting EOB/long sit   Pt will be IND LE dressing sitting EOB/long sit  Pt will be Mod I sit <>  prep for toileting LRAD  Pt will be Mod I  toileting/toilet transfer/cloth mgmt LRAD  Pt will be IND following UE HEP in prep for self care tasks   11/14/2022 1609 by González Alamo OT  Outcome: Not Met  11/14/2022 1608 by González Alamo OT  Outcome: Not Met

## 2022-11-14 NOTE — PROGRESS NOTES
Bedside shift change report given to Lynda SLAUGHTER LPN (oncoming nurse) by Leonarda Grimes RN (offgoing nurse). Report included the following information SBAR, Kardex, Intake/Output, MAR, Recent Results, Med Rec Status, Cardiac Rhythm NS, Alarm Parameters , and Quality Measures.

## 2022-11-14 NOTE — PROGRESS NOTES
Hospitalist Progress Note    Subjective:   Daily Progress Note: 11/14/2022 11:03 AM    Hospital Course:  Yamile Paris is a 78-year-old female with a PMHx of CAD, stroke, seizures, hypertension, HLD, and diabetes mellitus who presents to the ED for an episode of syncope, no head impact, returned to baseline. No neurologic symptoms, denies any motor weakness or change in sensation. Reports multiple prior episodes of syncope with most recent admission from 10/21/2022-11/03/2022 being due to syncope in the setting of a GI bleed. Denies any BRBPR, tarry stools and otherwise reports feeling well. In the ED, initial hemoglobin stable, but creatinine was markedly elevated compared to recent prior admission. Patient admitted for ALEX. Nephrology consult placed. Started don IV fluids. Subjective:    Patient seen and examined at bedside. She reports feeling much better this morning. Denies any dizziness, abdominal pain, or change in stools.      Current Facility-Administered Medications   Medication Dose Route Frequency    insulin lispro (HUMALOG) injection   SubCUTAneous AC&HS    sodium chloride (NS) flush 5-40 mL  5-40 mL IntraVENous Q8H    sodium chloride (NS) flush 5-40 mL  5-40 mL IntraVENous PRN    acetaminophen (TYLENOL) tablet 650 mg  650 mg Oral Q6H PRN    Or    acetaminophen (TYLENOL) suppository 650 mg  650 mg Rectal Q6H PRN    polyethylene glycol (MIRALAX) packet 17 g  17 g Oral DAILY PRN    ondansetron (ZOFRAN ODT) tablet 4 mg  4 mg Oral Q8H PRN    Or    ondansetron (ZOFRAN) injection 4 mg  4 mg IntraVENous Q6H PRN    albuterol CONCENTRATE 2.5mg/0.5 mL neb soln  2.5 mg Nebulization Q6H PRN    calcitRIOL (ROCALTROL) capsule 0.25 mcg  0.25 mcg Oral BID Mon Wed & Fri    carvediloL (COREG) tablet 6.25 mg  6.25 mg Oral BID WITH MEALS    cetirizine (ZYRTEC) tablet 10 mg  10 mg Oral DAILY    donepeziL (ARICEPT) tablet 10 mg  10 mg Oral QHS    gabapentin (NEURONTIN) capsule 300 mg  300 mg Oral BID    hydrALAZINE (APRESOLINE) tablet 50 mg  50 mg Oral TID    . PHARMACY TO SUBSTITUTE PER PROTOCOL (Reordered from: insulin detemir U-100 (LEVEMIR) 100 unit/mL injection)    Per Protocol    latanoprost (XALATAN) 0.005 % ophthalmic solution 1 Drop  1 Drop Both Eyes QHS    melatonin tablet 5 mg  5 mg Oral QHS    pantoprazole (PROTONIX) tablet 40 mg  40 mg Oral BID    . PHARMACY TO SUBSTITUTE PER PROTOCOL (Reordered from: plecanatide (Trulance) 3 mg tab)    Per Protocol    atorvastatin (LIPITOR) tablet 20 mg  20 mg Oral QHS    [Held by provider] torsemide (DEMADEX) tablet 20 mg  20 mg Oral BID    venlafaxine-SR (EFFEXOR-XR) capsule 75 mg  75 mg Oral DAILY    glucose chewable tablet 16 g  4 Tablet Oral PRN    glucagon (GLUCAGEN) injection 1 mg  1 mg IntraMUSCular PRN    dextrose 10% infusion 0-250 mL  0-250 mL IntraVENous PRN    0.9% sodium chloride infusion  75 mL/hr IntraVENous CONTINUOUS        Review of Systems  Constitutional: No fevers, No chills, No sweats, No fatigue, No Weakness  Eyes: No redness  ENT: No nasal congestion, No sore throat, No voice change  Respiratory: No Shortness of Breath, No cough, No wheezing  Cardiovascular: No chest pain, No palpitations, No extremity edema  Gastrointestinal: No nausea, No vomiting, No diarrhea, No abdominal pain  Genitourinary: No frequency, No dysuria, No hematuria  Integument/breast: No skin lesion(s)   Neurological: No Confusion, No headaches, No dizziness      Objective:     Visit Vitals  BP (!) 152/65 (BP 1 Location: Right upper arm, BP Patient Position: At rest;Lying) Comment: nurse notified   Pulse 71   Temp 98.3 °F (36.8 °C)   Resp 18   Ht 5' 4\" (1.626 m)   Wt 88.2 kg (194 lb 8 oz)   SpO2 98%   Breastfeeding No   BMI 33.39 kg/m²      O2 Device: None (Room air)    Temp (24hrs), Av °F (36.7 °C), Min:97.7 °F (36.5 °C), Max:98.3 °F (36.8 °C)      No intake/output data recorded. No intake/output data recorded.     PHYSICAL EXAM:  Constitutional: No acute distress  Skin: Extremities and face reveal no rashes. HEENT: Sclerae anicteric. Extra-occular muscles are intact. No oral ulcers. The neck is supple and no masses. Cardiovascular: Regular rate and rhythm. Respiratory:  Clear breath sounds bilaterally with no wheezes, rales, or rhonchi. GI: Abdomen nondistended, soft, and nontender. Normal active bowel sounds. Rectal: Deferred   Musculoskeletal: No pitting edema of the lower legs. Able to move all ext  Neurological:  Patient is alert and oriented. Cranial nerves II-XII grossly intact  Psychiatric: Mood appears appropriate       Data Review    Recent Results (from the past 24 hour(s))   EKG, 12 LEAD, INITIAL    Collection Time: 11/13/22  4:19 PM   Result Value Ref Range    Ventricular Rate 68 BPM    Atrial Rate 68 BPM    P-R Interval 146 ms    QRS Duration 92 ms    Q-T Interval 452 ms    QTC Calculation (Bezet) 480 ms    Calculated P Axis 50 degrees    Calculated R Axis 56 degrees    Calculated T Axis 46 degrees    Diagnosis       Sinus rhythm with occasional Premature ventricular complexes  Nonspecific T wave abnormality  Prolonged QT  Abnormal ECG  No previous ECGs available  Confirmed by Kamila Chilel ([id number]) on 11/14/2022 9:30:28 AM     CBC WITH AUTOMATED DIFF    Collection Time: 11/13/22  4:32 PM   Result Value Ref Range    WBC 8.1 3.6 - 11.0 K/uL    RBC 3.47 (L) 3.80 - 5.20 M/uL    HGB 9.3 (L) 11.5 - 16.0 g/dL    HCT 29.9 (L) 35.0 - 47.0 %    MCV 86.2 80.0 - 99.0 FL    MCH 26.8 26.0 - 34.0 PG    MCHC 31.1 30.0 - 36.5 g/dL    RDW 17.3 (H) 11.5 - 14.5 %    PLATELET 809 075 - 457 K/uL    MPV 10.1 8.9 - 12.9 FL    NRBC 0.0 0.0  WBC    ABSOLUTE NRBC 0.00 0.00 - 0.01 K/uL    NEUTROPHILS 75 32 - 75 %    LYMPHOCYTES 10 (L) 12 - 49 %    MONOCYTES 12 5 - 13 %    EOSINOPHILS 2 0 - 7 %    BASOPHILS 0 0 - 1 %    IMMATURE GRANULOCYTES 1 (H) 0 - 0.5 %    ABS. NEUTROPHILS 6.1 1.8 - 8.0 K/UL    ABS. LYMPHOCYTES 0.8 0.8 - 3.5 K/UL    ABS. MONOCYTES 0.9 0.0 - 1.0 K/UL    ABS.  EOSINOPHILS 0.2 0.0 - 0.4 K/UL    ABS. BASOPHILS 0.0 0.0 - 0.1 K/UL    ABS. IMM. GRANS. 0.0 0.00 - 0.04 K/UL    DF AUTOMATED     METABOLIC PANEL, COMPREHENSIVE    Collection Time: 11/13/22  4:32 PM   Result Value Ref Range    Sodium 135 (L) 136 - 145 mmol/L    Potassium 3.8 3.5 - 5.1 mmol/L    Chloride 96 (L) 97 - 108 mmol/L    CO2 33 (H) 21 - 32 mmol/L    Anion gap 6 5 - 15 mmol/L    Glucose 185 (H) 65 - 100 mg/dL    BUN 49 (H) 6 - 20 mg/dL    Creatinine 3.03 (H) 0.55 - 1.02 mg/dL    BUN/Creatinine ratio 16 12 - 20      eGFR 15 (L) >60 ml/min/1.73m2    Calcium 9.2 8.5 - 10.1 mg/dL    Bilirubin, total 0.4 0.2 - 1.0 mg/dL    AST (SGOT) 26 15 - 37 U/L    ALT (SGPT) 16 12 - 78 U/L    Alk. phosphatase 111 45 - 117 U/L    Protein, total 6.3 (L) 6.4 - 8.2 g/dL    Albumin 3.0 (L) 3.5 - 5.0 g/dL    Globulin 3.3 2.0 - 4.0 g/dL    A-G Ratio 0.9 (L) 1.1 - 2.2     TROPONIN-HIGH SENSITIVITY    Collection Time: 11/13/22  4:32 PM   Result Value Ref Range    Troponin-High Sensitivity 43 0 - 51 ng/L   GLUCOSE, POC    Collection Time: 11/13/22 11:10 PM   Result Value Ref Range    Glucose (POC) 233 (H) 65 - 100 mg/dL    Performed by David Lawrence    GLUCOSE, POC    Collection Time: 11/14/22  8:52 AM   Result Value Ref Range    Glucose (POC) 156 (H) 65 - 100 mg/dL    Performed by Senait Bragg    CBC WITH AUTOMATED DIFF    Collection Time: 11/14/22 10:25 AM   Result Value Ref Range    WBC 6.7 3.6 - 11.0 K/uL    RBC 3.02 (L) 3.80 - 5.20 M/uL    HGB 8.1 (L) 11.5 - 16.0 g/dL    HCT 25.7 (L) 35.0 - 47.0 %    MCV 85.1 80.0 - 99.0 FL    MCH 26.8 26.0 - 34.0 PG    MCHC 31.5 30.0 - 36.5 g/dL    RDW 17.4 (H) 11.5 - 14.5 %    PLATELET 100 166 - 889 K/uL    MPV 9.8 8.9 - 12.9 FL    NRBC 0.0 0.0  WBC    ABSOLUTE NRBC 0.00 0.00 - 0.01 K/uL    NEUTROPHILS 72 32 - 75 %    LYMPHOCYTES 15 12 - 49 %    MONOCYTES 9 5 - 13 %    EOSINOPHILS 3 0 - 7 %    BASOPHILS 1 0 - 1 %    IMMATURE GRANULOCYTES 0 0 - 0.5 %    ABS. NEUTROPHILS 4.8 1.8 - 8.0 K/UL    ABS. LYMPHOCYTES 1.0 0.8 - 3.5 K/UL    ABS. MONOCYTES 0.6 0.0 - 1.0 K/UL    ABS. EOSINOPHILS 0.2 0.0 - 0.4 K/UL    ABS. BASOPHILS 0.0 0.0 - 0.1 K/UL    ABS. IMM. GRANS. 0.0 0.00 - 0.04 K/UL    DF AUTOMATED         XR CHEST PORT   Final Result      Mild cardiomegaly, stable. No acute cardiopulmonary pathology. Active Problems:    Acute renal failure (ARF) ([address] (11/13/2022)        Assessment/Plan:     1. Acute on chronic renal failure in the setting of volume depletion, presenting with syncope  - Supportive care  - Continue IV fluids  - Hold torsemide for now  - Urinalysis pending  - Nephrology consult     2. Hypertension  3. Hx CAD   4. Hx stroke   - Telemetry monitoring  - Holding torsemide for now  - Continue Carvedilol, Hydralazine, and Pravastatin     5. Diabetes mellitus  - ISS and accu-checks     6. Recent gastrointestinal bleeding  - No aspirin/plavix/heparin  - Continue PPI      DVT Prophylaxis: AC contraindicated in setting of recent GI bleed  GI Prophylaxis: Protonix   Code Status: Full  POA:    Discharge Barriers:    - Renal functions   - Urinalysis    - Nephrology consult     Care Plan discussed with: patient and nursing    Total time spent with patient: >35 minutes.

## 2022-11-14 NOTE — PROGRESS NOTES
SPEECH LANGUAGE PATHOLOGY BEDSIDE SWALLOW EVALUATION  Patient: Steve Lake (41 y.o. female)  Date: 11/14/2022  Primary Diagnosis: Acute renal failure (ARF) (HCC) [N17.9]       Precautions: aspiration       ASSESSMENT :  Based on the objective data described below, the patient does not present w/ a significant dysphagia at this time. Oral motor WFL. Patient denies any difficulty w/ swallowing. Oral phase WFL. Pharyngeal phase WFL. No clinical indicators of penetration or aspiration observed w/ thin liquids via straw and solid trials presented. No further acute ST to follow at this time. PLAN :  Recommendations and Planned Interventions:  Regular diet, thin liquids. No further acute ST services to follow at this time. Please reconsult if change in status. SUBJECTIVE:   Patient alert and agreeable to bedside swallow evaluation. OBJECTIVE:     CXR Results  (Last 48 hours)                 11/13/22 1634  XR CHEST PORT Final result    Impression:      Mild cardiomegaly, stable. No acute cardiopulmonary pathology. Narrative:  EXAM:  XR CHEST PORT       INDICATION: Chest pain       COMPARISON: 10/10/2022       TECHNIQUE: Upright portable chest AP view       FINDINGS: Mild cardiomegaly. The pulmonary vasculature is within normal limits. The lungs and pleural spaces are clear. The visualized bones and upper abdomen   are age-appropriate.                   CT Results  (Last 48 hours)      None             Past Medical History:   Diagnosis Date    Adenocarcinoma, renal cell ([address] 9/2/2020    Arthritis     CAD (coronary artery disease)     Chronic kidney disease     Diabetes ([address]     Gout     Hypercholesterolemia     Hypertension     Mental depression     Pulmonary embolism ([address]     Seizures ([address]     Stroke [facility])     Thyroid disease      Past Surgical History:   Procedure Laterality Date    COLONOSCOPY N/A 10/24/2022    COLONOSCOPY performed by Halle Thomas MD at [facility] ENDOSCOPY    HX GYN      HX HYSTERECTOMY      HX NEPHRECTOMY Left 02/12/2015    HX ORTHOPAEDIC      HX UROLOGICAL  02/12/2015    PARTIAL URETERECTOMY     [place] CARDIAC SURG PROCEDURE UNLIST      stents placed      Prior Level of Function/Home Situation: unknown  Home Situation  Home Environment: Private residence  One/Two Story Residence: Other (Comment)  Living Alone: No  Support Systems: Child(connie)  Patient Expects to be Discharged to[de-identified] Home  Current DME Used/Available at Home: Walker, rolling, Jodeen Risen, rollator, Nimesh Greener, straight, Nimesh Greener, quad  Diet prior to admission: regular, diabetic   Current Diet:  regular   Cognitive and Communication Status:  Neurologic State: Alert  Orientation Level: Oriented X4  Cognition: Appropriate decision making           Swallowing Evaluation:   Oral Assessment:  Oral Assessment  Labial: No impairment  Dentition: Upper & lower dentures  Oral Hygiene: WFL  Lingual: No impairment  Velum: No impairment  P.O. Trials:  Patient Position: upright in bed  Vocal quality prior to P.O.: No impairment  Consistency Presented: Thin liquid; Solid  How Presented: Straw;Self-fed/presented     Bolus Acceptance: No impairment  Bolus Formation/Control: No impairment     Propulsion: No impairment  Oral Residue: None  Initiation of Swallow: No impairment  Laryngeal Elevation: Functional  Aspiration Signs/Symptoms: None  Pharyngeal Phase Characteristics: No impairment, issues, or problems   Effective Modifications: None          Oral Phase Severity: No impairment  Pharyngeal Phase Severity : No impairment  Voice:        Vocal Quality: No impairment        Pain:  Pain Scale 1: Numeric (0 - 10)  Pain Intensity 1: 3  Pain Location 1: Leg    After treatment:   Patient left in no apparent distress in bed and Call bell within reach    COMMUNICATION/EDUCATION:   Patient was educated regarding purpose of SLP assessment, POC, diet recs and sw safety precautions.  Patient demonstrated Excellent understanding as evidenced by verbal understanding. The patient's plan of care including recommendations, planned interventions, and recommended diet changes were discussed with: Physician.        Thank you for this referral.  COLT Fung M.S. CCC-SLP  Time Calculation: 15 mins

## 2022-11-14 NOTE — PROGRESS NOTES
Reason for Admission:   ARF                 RUR Score:  25%         PCP: First and Last name:  Rodney Tristan NP     Name of Practice:   Are you a current patient: Yes/No:  Yes   Approximate date of last visit:   last week   Can you do a virtual visit with your PCP:   yes             Resources/supports as identified by patient/family:  Patient has family support                 Top Challenges facing patient (as identified by patient/family and CM): Finances/Medication cost?   None                 Transportation? None              Support system or lack thereof? None                     Living arrangements? None             Self-care/ADLs/Cognition? None          Current Advanced Directive/Advance Care Plan:  Full Code      Healthcare Decision Maker:   Click here to complete [address] including selection of the Healthcare Decision Maker Relationship (ie \"Primary\")      Primary Decision MakeRsoamaria Sunshine - [phone]    Payor Source Payor: [facility] MEDICARE / Plan: Rene SALVADOR CCP / Product Type: Managed Care Medicare /                             Plan for utilizing home health:   Patient declined [address]                  Transition of Care Plan:    CM discussed discharge planning with patient at bedside. Patient will discharge home self care. Patient lives in a one story home with daughter and grandson. There are five steps to enter home. Patient is independent with ADL/IADL care. Daughter will assist if needed. Patient self medicate. Daughter drive to appt and will transport at discharge. Pharmacy: [address] [place] [address] [phone].

## 2022-11-14 NOTE — CONSULTS
Renal Consult Note    Admit Date: 11/13/2022      HPI:   79-year-old -American lady well-known to me with history of chronic kidney disease stage IIIb/IV who was admitted with syncopal episode. She was in the hospital here 2 weeks ago with GI bleed. His CT of the abdomen did not show evidence of a bleed. Her baseline creatinine is about 2.1 mg. She is known to have hypertension diabetes and seizure disorder. Her appetite is good. She denies abdominal pain. She denies shortness of breath. There is no chest pain or palpitation.       Current Facility-Administered Medications   Medication Dose Route Frequency    insulin lispro (HUMALOG) injection   SubCUTAneous AC&HS    [START ON 11/15/2022] amLODIPine (NORVASC) tablet 10 mg  10 mg Oral DAILY    [START ON 11/15/2022] ferrous sulfate tablet 325 mg  1 Tablet Oral DAILY WITH BREAKFAST    sodium chloride (NS) flush 5-40 mL  5-40 mL IntraVENous Q8H    sodium chloride (NS) flush 5-40 mL  5-40 mL IntraVENous PRN    acetaminophen (TYLENOL) tablet 650 mg  650 mg Oral Q6H PRN    Or    acetaminophen (TYLENOL) suppository 650 mg  650 mg Rectal Q6H PRN    polyethylene glycol (MIRALAX) packet 17 g  17 g Oral DAILY PRN    ondansetron (ZOFRAN ODT) tablet 4 mg  4 mg Oral Q8H PRN    Or    ondansetron (ZOFRAN) injection 4 mg  4 mg IntraVENous Q6H PRN    albuterol CONCENTRATE 2.5mg/0.5 mL neb soln  2.5 mg Nebulization Q6H PRN    calcitRIOL (ROCALTROL) capsule 0.25 mcg  0.25 mcg Oral BID Mon Wed & Fri    carvediloL (COREG) tablet 6.25 mg  6.25 mg Oral BID WITH MEALS    cetirizine (ZYRTEC) tablet 10 mg  10 mg Oral DAILY    donepeziL (ARICEPT) tablet 10 mg  10 mg Oral QHS    gabapentin (NEURONTIN) capsule 300 mg  300 mg Oral BID    hydrALAZINE (APRESOLINE) tablet 50 mg  50 mg Oral TID    insulin glargine (LANTUS) injection 10 Units  10 Units SubCUTAneous QHS    latanoprost (XALATAN) 0.005 % ophthalmic solution 1 Drop  1 Drop Both Eyes QHS    melatonin tablet 5 mg  5 mg Oral QHS    pantoprazole (PROTONIX) tablet 40 mg  40 mg Oral BID    . PHARMACY TO SUBSTITUTE PER PROTOCOL (Reordered from: plecanatide (Trulance) 3 mg tab)    Per Protocol    atorvastatin (LIPITOR) tablet 20 mg  20 mg Oral QHS    [Held by provider] torsemide (DEMADEX) tablet 20 mg  20 mg Oral BID    venlafaxine-SR (EFFEXOR-XR) capsule 75 mg  75 mg Oral DAILY    glucose chewable tablet 16 g  4 Tablet Oral PRN    glucagon (GLUCAGEN) injection 1 mg  1 mg IntraMUSCular PRN    dextrose 10% infusion 0-250 mL  0-250 mL IntraVENous PRN    0.9% sodium chloride infusion  75 mL/hr IntraVENous CONTINUOUS        Past Medical History:   Diagnosis Date    Adenocarcinoma, renal cell ([facility] 9/2/2020    Arthritis     CAD (coronary artery disease)     Chronic kidney disease     Diabetes ([facility]     Gout     Hypercholesterolemia     Hypertension     Mental depression     Pulmonary embolism (HCC)     Seizures ([facility]     Stroke [facility])     Thyroid disease       Past Surgical History:   Procedure Laterality Date    COLONOSCOPY N/A 10/24/2022    COLONOSCOPY performed by Faisal Watkins MD at [address] [place] [address] ENDOSCOPY    HX GYN      HX HYSTERECTOMY      HX NEPHRECTOMY Left 02/12/2015    HX ORTHOPAEDIC      HX UROLOGICAL  02/12/2015    PARTIAL URETERECTOMY     [place] CARDIAC SURG PROCEDURE UNLIST      stents placed      Family History   Problem Relation Age of Onset    Heart Disease Mother     Heart Disease Father     Heart Disease Sister     Cancer Sister     Heart Disease Brother     Cancer Maternal Grandmother     Stroke Son     Cancer Sister       Social History     Tobacco Use    Smoking status: Former     Years: 15.00     Types: Cigarettes    Smokeless tobacco: Never   Substance Use Topics    Alcohol use: Not Currently         Review of Systems    Review of Systems   Constitutional:  Negative for fever. Respiratory:  Negative for cough and shortness of breath. Gastrointestinal:  Negative for abdominal pain. Musculoskeletal:  Positive for joint pain. Neurological:  Negative for headaches. Physical Exam:     Physical Exam  Cardiovascular:      Rate and Rhythm: Regular rhythm. Pulmonary:      Breath sounds: Normal breath sounds. Abdominal:      General: Bowel sounds are normal.      Palpations: Abdomen is soft. Neurological:      Mental Status: She is alert. No edema. Patient Vitals for the past 8 hrs:   BP Temp Pulse Resp SpO2   11/14/22 0731 (!) 152/65 98.3 °F (36.8 °C) 71 18 98 %     No intake/output data recorded. No intake/output data recorded. XR CHEST PORT   Final Result      Mild cardiomegaly, stable. No acute cardiopulmonary pathology. Data Review   Recent Labs     11/14/22  1025 11/13/22  1632   WBC 6.7 8.1   HGB 8.1* 9.3*   HCT 25.7* 29.9*    260     Recent Labs     11/14/22  1025 11/13/22  1632    135*   K 3.5 3.8   CL 98 96*   CO2 32 33*   * 185*   BUN 43* 49*   CREA 2.55* 3.03*   CA 9.0 9.2   MG 1.6  --    ALB 2.5* 3.0*   ALT 10* 16     No components found for: GLPOC  No results for input(s): PH, PCO2, PO2, HCO3, FIO2 in the last 72 hours. No results for input(s): INR, INREXT, INREXT in the last 72 hours. Assessment:           Active Problems:    Acute renal failure (ARF) ([facility] (11/13/2022)    Acute kidney injury on chronic kidney disease stage IIIb/IV. Renal function is better with a creatinine improving from 3 mg to 2.55 mg. She is currently on IV fluids at 75 mL/h. Anemia. Normocytic. Hypertension    Diabetes mellitus    Plan:   ALEX is improving. Her baseline creatinine is about 2 mg. Expect further improvement in the renal function. Can discontinue IV fluids after this liter is completed.   Thank you for this consult

## 2022-11-14 NOTE — PROGRESS NOTES
Trulance (plecanatide) is not stocked at [facility] and there is not a therapeutic substitution.  I spoke with patient's nurse, today at 1384; she will check with patient to see if the medication can be brought in

## 2022-11-15 LAB
ANION GAP SERPL CALC-SCNC: 4 MMOL/L (ref 5–15)
BUN SERPL-MCNC: 31 MG/DL (ref 6–20)
BUN/CREAT SERPL: 14 (ref 12–20)
CA-I BLD-MCNC: 9.1 MG/DL (ref 8.5–10.1)
CHLORIDE SERPL-SCNC: 103 MMOL/L (ref 97–108)
CO2 SERPL-SCNC: 30 MMOL/L (ref 21–32)
CREAT SERPL-MCNC: 2.22 MG/DL (ref 0.55–1.02)
ERYTHROCYTE [DISTWIDTH] IN BLOOD BY AUTOMATED COUNT: 17.6 % (ref 11.5–14.5)
GLUCOSE BLD STRIP.AUTO-MCNC: 137 MG/DL (ref 65–100)
GLUCOSE BLD STRIP.AUTO-MCNC: 214 MG/DL (ref 65–100)
GLUCOSE BLD STRIP.AUTO-MCNC: 270 MG/DL (ref 65–100)
GLUCOSE BLD STRIP.AUTO-MCNC: 99 MG/DL (ref 65–100)
GLUCOSE SERPL-MCNC: 220 MG/DL (ref 65–100)
HCT VFR BLD AUTO: 30 % (ref 35–47)
HGB BLD-MCNC: 9.2 G/DL (ref 11.5–16)
MCH RBC QN AUTO: 26.2 PG (ref 26–34)
MCHC RBC AUTO-ENTMCNC: 30.7 G/DL (ref 30–36.5)
MCV RBC AUTO: 85.5 FL (ref 80–99)
NRBC # BLD: 0 K/UL (ref 0–0.01)
NRBC BLD-RTO: 0 PER 100 WBC
PERFORMED BY, TECHID: ABNORMAL
PERFORMED BY, TECHID: NORMAL
PLATELET # BLD AUTO: 286 K/UL (ref 150–400)
PMV BLD AUTO: 10.7 FL (ref 8.9–12.9)
POTASSIUM SERPL-SCNC: 4.4 MMOL/L (ref 3.5–5.1)
RBC # BLD AUTO: 3.51 M/UL (ref 3.8–5.2)
SODIUM SERPL-SCNC: 137 MMOL/L (ref 136–145)
WBC # BLD AUTO: 6.8 K/UL (ref 3.6–11)

## 2022-11-15 PROCEDURE — 74011250637 HC RX REV CODE- 250/637: Performed by: STUDENT IN AN ORGANIZED HEALTH CARE EDUCATION/TRAINING PROGRAM

## 2022-11-15 PROCEDURE — 97530 THERAPEUTIC ACTIVITIES: CPT

## 2022-11-15 PROCEDURE — 74011250637 HC RX REV CODE- 250/637: Performed by: INTERNAL MEDICINE

## 2022-11-15 PROCEDURE — 82962 GLUCOSE BLOOD TEST: CPT

## 2022-11-15 PROCEDURE — 74011250636 HC RX REV CODE- 250/636: Performed by: HOSPITALIST

## 2022-11-15 PROCEDURE — 74011636637 HC RX REV CODE- 636/637: Performed by: INTERNAL MEDICINE

## 2022-11-15 PROCEDURE — 74011636637 HC RX REV CODE- 636/637: Performed by: STUDENT IN AN ORGANIZED HEALTH CARE EDUCATION/TRAINING PROGRAM

## 2022-11-15 PROCEDURE — 74011250636 HC RX REV CODE- 250/636: Performed by: INTERNAL MEDICINE

## 2022-11-15 PROCEDURE — 80048 BASIC METABOLIC PNL TOTAL CA: CPT

## 2022-11-15 PROCEDURE — 97161 PT EVAL LOW COMPLEX 20 MIN: CPT

## 2022-11-15 PROCEDURE — 85027 COMPLETE CBC AUTOMATED: CPT

## 2022-11-15 PROCEDURE — 74011000250 HC RX REV CODE- 250: Performed by: INTERNAL MEDICINE

## 2022-11-15 PROCEDURE — 65270000029 HC RM PRIVATE

## 2022-11-15 PROCEDURE — 87086 URINE CULTURE/COLONY COUNT: CPT

## 2022-11-15 RX ORDER — HYDRALAZINE HYDROCHLORIDE 20 MG/ML
10 INJECTION INTRAMUSCULAR; INTRAVENOUS
Status: DISCONTINUED | OUTPATIENT
Start: 2022-11-15 | End: 2022-11-16 | Stop reason: HOSPADM

## 2022-11-15 RX ORDER — GABAPENTIN 100 MG/1
200 CAPSULE ORAL 2 TIMES DAILY
Status: DISCONTINUED | OUTPATIENT
Start: 2022-11-15 | End: 2022-11-16 | Stop reason: HOSPADM

## 2022-11-15 RX ADMIN — POLYETHYLENE GLYCOL 3350 17 G: 17 POWDER, FOR SOLUTION ORAL at 08:53

## 2022-11-15 RX ADMIN — PANTOPRAZOLE SODIUM 40 MG: 40 TABLET, DELAYED RELEASE ORAL at 21:15

## 2022-11-15 RX ADMIN — VENLAFAXINE HYDROCHLORIDE 75 MG: 37.5 CAPSULE, EXTENDED RELEASE ORAL at 08:53

## 2022-11-15 RX ADMIN — SODIUM CHLORIDE, PRESERVATIVE FREE 10 ML: 5 INJECTION INTRAVENOUS at 17:43

## 2022-11-15 RX ADMIN — HYDRALAZINE HYDROCHLORIDE 50 MG: 25 TABLET, FILM COATED ORAL at 21:15

## 2022-11-15 RX ADMIN — HYDRALAZINE HYDROCHLORIDE 50 MG: 25 TABLET, FILM COATED ORAL at 17:41

## 2022-11-15 RX ADMIN — ATORVASTATIN CALCIUM 20 MG: 10 TABLET, FILM COATED ORAL at 21:15

## 2022-11-15 RX ADMIN — AMLODIPINE BESYLATE 10 MG: 5 TABLET ORAL at 08:53

## 2022-11-15 RX ADMIN — FERROUS SULFATE TAB 325 MG (65 MG ELEMENTAL FE) 325 MG: 325 (65 FE) TAB at 08:53

## 2022-11-15 RX ADMIN — LATANOPROST 1 DROP: 50 SOLUTION OPHTHALMIC at 21:20

## 2022-11-15 RX ADMIN — SODIUM CHLORIDE, PRESERVATIVE FREE 10 ML: 5 INJECTION INTRAVENOUS at 21:20

## 2022-11-15 RX ADMIN — MELATONIN TAB 5 MG 5 MG: 5 TAB at 21:15

## 2022-11-15 RX ADMIN — GABAPENTIN 200 MG: 100 CAPSULE ORAL at 21:15

## 2022-11-15 RX ADMIN — CETIRIZINE HYDROCHLORIDE 10 MG: 10 TABLET, FILM COATED ORAL at 08:53

## 2022-11-15 RX ADMIN — HYDRALAZINE HYDROCHLORIDE 50 MG: 25 TABLET, FILM COATED ORAL at 08:53

## 2022-11-15 RX ADMIN — INSULIN GLARGINE 10 UNITS: 100 INJECTION, SOLUTION SUBCUTANEOUS at 21:16

## 2022-11-15 RX ADMIN — CARVEDILOL 6.25 MG: 3.12 TABLET, FILM COATED ORAL at 17:41

## 2022-11-15 RX ADMIN — PANTOPRAZOLE SODIUM 40 MG: 40 TABLET, DELAYED RELEASE ORAL at 08:53

## 2022-11-15 RX ADMIN — INSULIN LISPRO 3 UNITS: 100 INJECTION, SOLUTION INTRAVENOUS; SUBCUTANEOUS at 11:50

## 2022-11-15 RX ADMIN — DONEPEZIL HYDROCHLORIDE 10 MG: 5 TABLET, FILM COATED ORAL at 21:16

## 2022-11-15 RX ADMIN — INSULIN LISPRO 4 UNITS: 100 INJECTION, SOLUTION INTRAVENOUS; SUBCUTANEOUS at 21:16

## 2022-11-15 RX ADMIN — SODIUM CHLORIDE 75 ML/HR: 9 INJECTION, SOLUTION INTRAVENOUS at 05:48

## 2022-11-15 RX ADMIN — GABAPENTIN 300 MG: 300 CAPSULE ORAL at 08:53

## 2022-11-15 RX ADMIN — CARVEDILOL 6.25 MG: 3.12 TABLET, FILM COATED ORAL at 08:53

## 2022-11-15 RX ADMIN — SODIUM CHLORIDE, PRESERVATIVE FREE 10 ML: 5 INJECTION INTRAVENOUS at 05:47

## 2022-11-15 RX ADMIN — HYDRALAZINE HYDROCHLORIDE 10 MG: 20 INJECTION, SOLUTION INTRAMUSCULAR; INTRAVENOUS at 05:47

## 2022-11-15 NOTE — PROGRESS NOTES
Problem: Falls - Risk of  Goal: *Absence of Falls  Description: Document Anushka Navarrete Fall Risk and appropriate interventions in the flowsheet. Outcome: Progressing Towards Goal  Note: Fall Risk Interventions:  Mobility Interventions: PT Consult for mobility concerns, OT consult for ADLs         Medication Interventions: Bed/chair exit alarm    Elimination Interventions: Bed/chair exit alarm, Call light in reach    History of Falls Interventions: Bed/chair exit alarm         Problem: Patient Education: Go to Patient Education Activity  Goal: Patient/Family Education  Outcome: Progressing Towards Goal     Problem: Pain  Goal: *Control of Pain  Outcome: Progressing Towards Goal  Goal: *PALLIATIVE CARE:  Alleviation of Pain  Outcome: Progressing Towards Goal     Problem: Discharge Planning  Goal: *Discharge to safe environment  Outcome: Progressing Towards Goal  Goal: *Knowledge of medication management  Outcome: Progressing Towards Goal  Goal: *Knowledge of discharge instructions  Outcome: Progressing Towards Goal     Problem: Diabetes Self-Management  Goal: *Disease process and treatment process  Description: Define diabetes and identify own type of diabetes; list 3 options for treating diabetes. Outcome: Progressing Towards Goal  Goal: *Incorporating nutritional management into lifestyle  Description: Describe effect of type, amount and timing of food on blood glucose; list 3 methods for planning meals. Outcome: Progressing Towards Goal  Goal: *Incorporating physical activity into lifestyle  Description: State effect of exercise on blood glucose levels. Outcome: Progressing Towards Goal  Goal: *Developing strategies to promote health/change behavior  Description: Define the ABC's of diabetes; identify appropriate screenings, schedule and personal plan for screenings.   Outcome: Progressing Towards Goal  Goal: *Using medications safely  Description: State effect of diabetes medications on diabetes; name diabetes medication taking, action and side effects. Outcome: Progressing Towards Goal  Goal: *Monitoring blood glucose, interpreting and using results  Description: Identify recommended blood glucose targets  and personal targets. Outcome: Progressing Towards Goal  Goal: *Prevention, detection, treatment of acute complications  Description: List symptoms of hyper- and hypoglycemia; describe how to treat low blood sugar and actions for lowering  high blood glucose level. Outcome: Progressing Towards Goal  Goal: *Prevention, detection and treatment of chronic complications  Description: Define the natural course of diabetes and describe the relationship of blood glucose levels to long term complications of diabetes.   Outcome: Progressing Towards Goal     Problem: Patient Education: Go to Patient Education Activity  Goal: Patient/Family Education  Outcome: Progressing Towards Goal     Problem: Hypertension  Goal: *Blood pressure within specified parameters  Outcome: Progressing Towards Goal

## 2022-11-15 NOTE — PROGRESS NOTES
OCCUPATIONAL THERAPY TREATMENT  Patient: Darleen Lindo (19 y.o. female)  Date: 11/15/2022  Diagnosis: Acute renal failure (ARF) (HCC) [N17.9] Acute renal failure (ARF) (HCC)      Precautions:    Chart, occupational therapy assessment, plan of care, and goals were reviewed. ASSESSMENT  Pt continues with skilled OT services and is progressing towards goals. Pt received semi-supine in bed upon arrival, AXO x4 and agreeable to OT tx at this time. Overall, pt continues to present with deficits in generalized strength/AROM, coordination, bed mobility, static/dynamic sitting and standing balance and functional activity tolerance during performance of ADLs/mobility (see below for objective details and assist levels). Pt tolerated session well. Pt very pleasant and cooperative through out session. Pt completed therex, see grid below, to increase strength/ activity tolerance to aid in adl performance. Pt required minimal vc's to remember to reach back before sitting down. Pt noted with improvements in overall mobility this date, able to complete bed mobility with less assistance/time and able to increase ambulation in preparation for household mobility. Education provided on energy conservation, safety awareness and UE HEP. Pt vocalized good understanding of all techniques. Will continue to progress. Recommend d/c to Home with Home Health Therapy once medically appropriate. Other factors to consider for discharge: PLOF, time since onset, severity of deficits and decline from functional baseline. PLAN :  Patient continues to benefit from skilled intervention to address the above impairments. Continue treatment per established plan of care. to address goals.     Recommendation for discharge: (in order for the patient to meet his/her long term goals)  Home with [address] [place] [address]    This discharge recommendation:  Has been made in collaboration with the attending provider and/or case management    IF patient discharges home will need the following DME: TBD       SUBJECTIVE:   Patient stated I think I might be going home tomorrow.     OBJECTIVE DATA SUMMARY:   Cognitive/Behavioral Status:  Neurologic State: Alert  Orientation Level: Oriented X4  Cognition: Follows commands; Appropriate for age attention/concentration; Appropriate decision making    Functional Mobility and Transfers for ADLs:  Bed Mobility:  Supine to Sit: Contact guard assistance  Sit to Supine: Stand-by assistance  Scooting: Contact guard assistance    Transfers:  Sit to Stand: Contact guard assistance  Functional Transfers  Toilet Transfer : Stand-by assistance    Balance:  Sitting: Intact; Without support  Standing: Impaired; With support  Standing - Static: Good;Constant support  Standing - Dynamic : Fair;Constant support    ADL Intervention:     Toileting  Toileting Assistance: Stand-by assistance  Bladder Hygiene: Independent  Clothing Management: Stand-by assistance  Cues: Verbal cues provided         Therapeutic Exercises:   Exercise Sets Reps AROM AAROM PROM Self PROM Comments   Shoulder flex/ext 2 10 [x] [] [] [] B UE's   Elbow flex/ext 2 10 [x] [] [] []    Wrist flex/ext 2 10 [x] [] [] []    Sit to stands 1 5 [x] [] [] []        Pain:  0/10    Activity Tolerance:   Good and requires rest breaks    After treatment patient left in no apparent distress:   Supine in bed, Call bell within reach, Bed / chair alarm activated, and Side rails x 3, bed locked and in lowest position    COMMUNICATION/COLLABORATION:   The patients plan of care was discussed with: Registered nurse. BETTINA Casey  Time Calculation: 25 mins    Problem: Self Care Deficits Care Plan (Adult)  Goal: *Acute Goals and Plan of Care (Insert Text)  Description: FUNCTIONAL STATUS PRIOR TO ADMISSION: Pt reports she was IND w/ functional mobility/ADLs prior to admission. HOME SUPPORT: Pt lives with daughter who is home most of the time.     Occupational Therapy Goals  Initiated 11/14/2022    Pt stated goal I want to go home  Pt will be IND sup <> sit in prep for EOB ADLs  Pt will be Mod I grooming standing sink side LRAD  Pt will be IND UB dressing sitting EOB/long sit   Pt will be IND LE dressing sitting EOB/long sit  Pt will be Mod I sit <>  prep for toileting LRAD  Pt will be Mod I  toileting/toilet transfer/cloth mgmt LRAD  Pt will be IND following UE HEP in prep for self care tasks   Outcome: Progressing Towards Goal

## 2022-11-15 NOTE — PROGRESS NOTES
PHYSICAL THERAPY EVALUATION  Patient: Steve Lake (89 y.o. female)  Date: 11/15/2022  Primary Diagnosis: Acute renal failure (ARF) (HCC) [N17.9]       Precautions: falls      ASSESSMENT  Pt is a 65 y.o. female admitted on 11/15/2022 for evaluation of syncope; pt currently being treated for syncope and collapse, ALEX . Pt semi-supine upon PT arrival, agreeable to evaluation. Based on the objective data described, the patient presents with grossly decr LE strength, decr transfers/mobility, LLE pain, and gait deficits / decr standing balance. (See below for objective details and assist levels). Cueing on completion for supine<>sit. Initially /76, therapist provided ed on breathing techniques and checked BP again at ~172/70's. No reported dizziness/lightheadedness. Cueing on sit<>stand completion regarding hand placement. Functional amb completed with RW, gait noted below, no specific LOB or buckling, with distance, overall steadiness levels improved, primarily CGA, at times stand by A. Once back in room, reviewed functional step navigation. Ascended/descended 1 step, ~4x with CGA, instruction on utilizing hand support. Once back at EOB cueing on hand placement, along with scooting back into bed, and completed sit>supine with stand by A. Ed on safety techniques for at home provided. Overall pt tolerated session well today with no reported lightheadedness/dizziness. Pt will benefit from continued skilled PT to address above deficits and return to PLOF. Current PT DC recommendation Home with Home Health Therapy once medically appropriate.     Other factors to consider for discharge: current mobility, baseline mobility, DME      PLAN :  Recommendations and Planned Interventions: bed mobility training, transfer training, gait training, therapeutic exercises, neuromuscular re-education, patient and family training/education, and therapeutic activities      Recommend for staff: Out of bed to chair for meals and Amb to bathroom for toileting with gt belt and AD    Frequency/Duration: Patient will be followed by physical therapy:  3-5x/week to address goals.     Recommendation for discharge: (in order for the patient to meet his/her long term goals)  Home with [address] [place] [address]    This discharge recommendation:  Has been made in collaboration with the attending provider and/or case management    IF patient discharges home will need the following DME: rolling walker         SUBJECTIVE:   Patient agreeable to participation in therapy    OBJECTIVE DATA SUMMARY:   HISTORY:    Past Medical History:   Diagnosis Date    Adenocarcinoma, renal cell ([address] 9/2/2020    Arthritis     CAD (coronary artery disease)     Chronic kidney disease     Diabetes [facility]     Gout     Hypercholesterolemia     Hypertension     Mental depression     Pulmonary embolism ([facility]     Seizures ([facility]     Stroke [facility])     Thyroid disease      Past Surgical History:   Procedure Laterality Date    COLONOSCOPY N/A 10/24/2022    COLONOSCOPY performed by Roxy Abraham MD at [facility] END[facility]COPY    HX GYN      HX HYSTERECTOMY      HX NEPHRECTOMY Left 02/12/2015    HX ORTHOPAEDIC      HX UROLOGICAL  02/12/2015    PARTIAL URETERECTOMY     MN CARDIAC SURG PROCEDURE UNLIST      stents placed        Home Situation  Home Environment: Private residence  # Steps to Enter: 4  Rails to Enter: Yes  Hand Rails : Bilateral  One/Two Story Residence: One story  Living Alone: No  Support Systems: Child(connie)  Patient Expects to be Discharged to[de-identified] Home  Current DME Used/Available at Home: Cane, straight (Needs to  walker)    EXAMINATION/PRESENTATION/DECISION MAKING:   Critical Behavior:  Neurologic State: Alert  Orientation Level: Oriented to person, Oriented to place, Oriented to time  Cognition: Follows commands     Range Of Motion:  AROM: Generally decreased, functional                       Strength:    Strength: Generally decreased, functional (3+/5 L knee ext, 4/5 DF B, 4/5 R knee ext)                    Tone & Sensation:                  Sensation: Impaired (LE)               Coordination:     Vision:      Functional Mobility:  Bed Mobility:     Supine to Sit: Contact guard assistance  Sit to Supine: Stand-by assistance  Scooting: Contact guard assistance  Transfers:  Sit to Stand: Contact guard assistance  Stand to Sit: Contact guard assistance                       Balance:   Sitting: Intact; Without support  Standing: Impaired; With support  Standing - Static: Good;Constant support  Standing - Dynamic : Fair;Constant support  Ambulation/Gait Training:  Distance (ft): 110 Feet (ft)  Assistive Device: Walker, rolling  Ambulation - Level of Assistance: Contact guard assistance     Gait Description (WDL): Exceptions to WDL  Gait Abnormalities:  (L flex knee posture, antalgic gait with LLE, which did improve with distance, step to progressed to through, no specific buckling observe)                                      Stairs:  Number of Stairs Trained: 4 (ascended/descended 1 step, 4x)  Stairs - Level of Assistance: Contact guard assistance   Rail Use: Left     Functional Measure:  [facility] AM-PAC 6 Clicks         Basic Mobility Inpatient Short Form  How much difficulty does the patient currently have. .. Unable A Lot A Little None   1. Turning over in bed (including adjusting bedclothes, sheets and blankets)? [] 1   [] 2   [x] 3   [] 4   2. Sitting down on and standing up from a chair with arms ( e.g., wheelchair, bedside commode, etc.)   [] 1   [] 2   [x] 3   [] 4   3. Moving from lying on back to sitting on the side of the bed? [] 1   [] 2   [x] 3   [] 4          How much help from another person does the patient currently need. .. Total A Lot A Little None   4. Moving to and from a bed to a chair (including a wheelchair)? [] 1   [] 2   [x] 3   [] 4   5. Need to walk in hospital room? [] 1   [] 2   [x] 3   [] 4   6. Climbing 3-5 steps with a railing?    [] 1   [] 2 [x] 3   [] 4   © , Trustees of [employer], under license to [employer]. All rights reserved     Score:  Initial:  Most Recent: X (Date: 11/15/22 )   Interpretation of Tool:  Represents activities that are increasingly more difficult (i.e. Bed mobility, Transfers, Gait). Score 24 23 22-20 19-15 14-10 9-7 6   Modifier CH CI CJ CK CL CM CN         Physical Therapy Evaluation Charge Determination   History Examination Presentation Decision-Making   MEDIUM  Complexity : 1-2 comorbidities / personal factors will impact the outcome/ POC  MEDIUM Complexity : 3 Standardized tests and measures addressing body structure, function, activity limitation and / or participation in recreation  LOW Complexity : Stable, uncomplicated  Other outcome measures [facility] 6  11/15/22      Based on the above components, the patient evaluation is determined to be of the following complexity level: LOW     Pain Ratin/10 L knee, RN made aware    Activity Tolerance:   Good    After treatment patient left in no apparent distress:   Bed locked and in lowest position Supine in bed and Call bell within reach and RN updated. GOALS:    11/15/2022 1245 by Abdon Lopez, PT  Outcome: Not Met  11/15/2022 1233 by Abdon Lopez, PT  Note: FUNCTIONAL STATUS PRIOR TO ADMISSION: Patient was modified independent using a rollator for functional mobility. HOME SUPPORT PRIOR TO ADMISSION: The patient lived with daughter. Physical Therapy Goals  Initiated 11/15/2022  1. Patient will move from supine to sit and sit to supine  in bed with modified independence within 7 day(s). 2.  Patient will transfer from bed to chair and chair to bed with modified independence using the least restrictive device within 7 day(s). 3.  Patient will perform sit to stand with modified independence within 7 day(s). 4.  Patient will ambulate with modified independence for 150-200 feet with the least restrictive device within 7 day(s).    5. Patient will ascend/descend 4 stairs with 1 handrail(s) with supervision/set-up within 7 day(s). COMMUNICATION/EDUCATION:   The patients plan of care was discussed with: Registered nurse. Fall prevention education was provided and the patient/caregiver indicated understanding. and Patient/family have participated as able in goal setting and plan of care.      Thank you for this referral.  Aaron Jernigan, PT, PT   Time Calculation: 26 mins

## 2022-11-15 NOTE — PROGRESS NOTES
Hospitalist Progress Note    Subjective:   Daily Progress Note: 11/15/2022 11:03 AM    Hospital Course:  Russel Carmona is a 69-year-old female with a PMHx of CAD, stroke, seizures, hypertension, HLD, and diabetes mellitus who presents to the ED for an episode of syncope, no head impact, returned to baseline. No neurologic symptoms, denies any motor weakness or change in sensation. Reports multiple prior episodes of syncope with most recent admission from 10/21/2022-11/03/2022 being due to syncope in the setting of a GI bleed. Denies any BRBPR, tarry stools and otherwise reports feeling well. In the ED, initial hemoglobin stable, but creatinine was markedly elevated compared to recent prior admission. Patient admitted for ALEX. Nephrology consult placed. Started don IV fluids. Subjective:    Patient examined alert and oriented sitting in bed. She endorses no dizziness or sob on examination.  No acute distress noted on examination    Current Facility-Administered Medications   Medication Dose Route Frequency    hydrALAZINE (APRESOLINE) 20 mg/mL injection 10 mg  10 mg IntraVENous Q4H PRN    insulin lispro (HUMALOG) injection   SubCUTAneous AC&HS    amLODIPine (NORVASC) tablet 10 mg  10 mg Oral DAILY    ferrous sulfate tablet 325 mg  1 Tablet Oral DAILY WITH BREAKFAST    diclofenac (VOLTAREN) 1 % topical gel 4 g  4 g Topical Q6H PRN    sodium chloride (NS) flush 5-40 mL  5-40 mL IntraVENous Q8H    sodium chloride (NS) flush 5-40 mL  5-40 mL IntraVENous PRN    acetaminophen (TYLENOL) tablet 650 mg  650 mg Oral Q6H PRN    Or    acetaminophen (TYLENOL) suppository 650 mg  650 mg Rectal Q6H PRN    polyethylene glycol (MIRALAX) packet 17 g  17 g Oral DAILY PRN    ondansetron (ZOFRAN ODT) tablet 4 mg  4 mg Oral Q8H PRN    Or    ondansetron (ZOFRAN) injection 4 mg  4 mg IntraVENous Q6H PRN    albuterol CONCENTRATE 2.5mg/0.5 mL neb soln  2.5 mg Nebulization Q6H PRN    calcitRIOL (ROCALTROL) capsule 0.25 mcg  0.25 mcg Oral BID Mon Wed & Fri    carvediloL (COREG) tablet 6.25 mg  6.25 mg Oral BID WITH MEALS    cetirizine (ZYRTEC) tablet 10 mg  10 mg Oral DAILY    donepeziL (ARICEPT) tablet 10 mg  10 mg Oral QHS    gabapentin (NEURONTIN) capsule 300 mg  300 mg Oral BID    hydrALAZINE (APRESOLINE) tablet 50 mg  50 mg Oral TID    insulin glargine (LANTUS) injection 10 Units  10 Units SubCUTAneous QHS    latanoprost (XALATAN) 0.005 % ophthalmic solution 1 Drop  1 Drop Both Eyes QHS    melatonin tablet 5 mg  5 mg Oral QHS    pantoprazole (PROTONIX) tablet 40 mg  40 mg Oral BID    . PHARMACY TO SUBSTITUTE PER PROTOCOL (Reordered from: plecanatide (Trulance) 3 mg tab)    Per Protocol    atorvastatin (LIPITOR) tablet 20 mg  20 mg Oral QHS    [Held by provider] torsemide (DEMADEX) tablet 20 mg  20 mg Oral BID    venlafaxine-SR (EFFEXOR-XR) capsule 75 mg  75 mg Oral DAILY    glucose chewable tablet 16 g  4 Tablet Oral PRN    glucagon (GLUCAGEN) injection 1 mg  1 mg IntraMUSCular PRN    dextrose 10% infusion 0-250 mL  0-250 mL IntraVENous PRN        Review of Systems  Constitutional: No fevers, No chills, No sweats, No fatigue, No Weakness  Eyes: No redness  ENT: No nasal congestion, No sore throat, No voice change  Respiratory: No Shortness of Breath, No cough, No wheezing  Cardiovascular: No chest pain, No palpitations, No extremity edema  Gastrointestinal: No nausea, No vomiting, No diarrhea, No abdominal pain  Genitourinary: No frequency, No dysuria, No hematuria  Integument/breast: No skin lesion(s)   Neurological: No Confusion, No headaches, No dizziness      Objective:     Visit Vitals  BP (!) 175/70   Pulse 76   Temp 97.7 °F (36.5 °C)   Resp 20   Ht 5' 4\" (1.626 m)   Wt 85.1 kg (187 lb 9.8 oz)   SpO2 98%   Breastfeeding No   BMI 32.20 kg/m²      O2 Device: None (Room air)    Temp (24hrs), Av.9 °F (36.6 °C), Min:97.7 °F (36.5 °C), Max:98.1 °F (36.7 °C)      No intake/output data recorded.    190 - 11/15 0700  In: 600 [I.V.:600]  Out: 1150 [Urine:1150]    Physical Exam  Vitals and nursing note reviewed. Eyes:      Extraocular Movements: Extraocular movements intact. Cardiovascular:      Rate and Rhythm: Normal rate. Pulmonary:      Breath sounds: Normal breath sounds. Abdominal:      General: Bowel sounds are normal.   Skin:     General: Skin is warm and dry. Neurological:      Mental Status: She is alert and oriented to person, place, and time.          Data Review    Recent Results (from the past 24 hour(s))   GLUCOSE, POC    Collection Time: 11/14/22  4:37 PM   Result Value Ref Range    Glucose (POC) 88 65 - 100 mg/dL    Performed by Anuj Galeana    GLUCOSE, POC    Collection Time: 11/14/22  8:16 PM   Result Value Ref Range    Glucose (POC) 179 (H) 65 - 100 mg/dL    Performed by [address] [place] [address] Street, POC    Collection Time: 11/15/22  7:29 AM   Result Value Ref Range    Glucose (POC) 99 65 - 100 mg/dL    Performed by Carmela Perez    CBC W/O DIFF    Collection Time: 11/15/22  9:41 AM   Result Value Ref Range    WBC 6.8 3.6 - 11.0 K/uL    RBC 3.51 (L) 3.80 - 5.20 M/uL    HGB 9.2 (L) 11.5 - 16.0 g/dL    HCT 30.0 (L) 35.0 - 47.0 %    MCV 85.5 80.0 - 99.0 FL    MCH 26.2 26.0 - 34.0 PG    MCHC 30.7 30.0 - 36.5 g/dL    RDW 17.6 (H) 11.5 - 14.5 %    PLATELET 286 706 - 893 K/uL    MPV 10.7 8.9 - 12.9 FL    NRBC 0.0 0.0  WBC    ABSOLUTE NRBC 0.00 0.00 - 4.41 K/uL   METABOLIC PANEL, BASIC    Collection Time: 11/15/22  9:41 AM   Result Value Ref Range    Sodium 137 136 - 145 mmol/L    Potassium 4.4 3.5 - 5.1 mmol/L    Chloride 103 97 - 108 mmol/L    CO2 30 21 - 32 mmol/L    Anion gap 4 (L) 5 - 15 mmol/L    Glucose 220 (H) 65 - 100 mg/dL    BUN 31 (H) 6 - 20 mg/dL    Creatinine 2.22 (H) 0.55 - 1.02 mg/dL    BUN/Creatinine ratio 14 12 - 20      eGFR 22 (L) >60 ml/min/1.73m2    Calcium 9.1 8.5 - 10.1 mg/dL   GLUCOSE, POC    Collection Time: 11/15/22 11:29 AM   Result Value Ref Range    Glucose (POC) 270 (H) 65 - 100 mg/dL    Performed by Kim Marin        XR CHEST PORT   Final Result      Mild cardiomegaly, stable. No acute cardiopulmonary pathology. Active Problems:    Acute renal failure (ARF) ([facility] (11/13/2022)      Assessment/Plan:     1. Acute on chronic renal failure in the setting of volume depletion, presenting with syncope  - Supportive care  - Continue IV fluids  - Hold torsemide for now  - Urinalysis pending  - Nephrology consult     2. Hypertension  - bp currently elevated  - continue norvasc 10mg, coreg 6.25mg bid, hydralazine 50mg tid    3. Hx CAD   4. Hx stroke   - Telemetry monitoring  - Holding torsemide for now  - Continue Pravastatin     5. Diabetes mellitus  - ISS and accu-checks     6. Recent gastrointestinal bleeding  - No aspirin/plavix/heparin  - Continue PPI      DVT Prophylaxis: AC contraindicated in setting of recent GI bleed  GI Prophylaxis: Protonix   Code Status: Full  POA:    Discharge Barriers:    - Renal functions   - Urinalysis    - Nephrology following   -Anticipate discharge in 24hrs pending AM labs    Care Plan discussed with: patient and nursing    Total time spent with patient: >35 minutes.

## 2022-11-15 NOTE — PROGRESS NOTES
Renal Daily Progress Note    Admit Date: 11/13/2022      Subjective:   She is off IVF. She  denies dizziness or sob. Appetite is good. No dysuria. No headache. Current Facility-Administered Medications   Medication Dose Route Frequency    hydrALAZINE (APRESOLINE) 20 mg/mL injection 10 mg  10 mg IntraVENous Q4H PRN    insulin lispro (HUMALOG) injection   SubCUTAneous AC&HS    amLODIPine (NORVASC) tablet 10 mg  10 mg Oral DAILY    ferrous sulfate tablet 325 mg  1 Tablet Oral DAILY WITH BREAKFAST    diclofenac (VOLTAREN) 1 % topical gel 4 g  4 g Topical Q6H PRN    sodium chloride (NS) flush 5-40 mL  5-40 mL IntraVENous Q8H    sodium chloride (NS) flush 5-40 mL  5-40 mL IntraVENous PRN    acetaminophen (TYLENOL) tablet 650 mg  650 mg Oral Q6H PRN    Or    acetaminophen (TYLENOL) suppository 650 mg  650 mg Rectal Q6H PRN    polyethylene glycol (MIRALAX) packet 17 g  17 g Oral DAILY PRN    ondansetron (ZOFRAN ODT) tablet 4 mg  4 mg Oral Q8H PRN    Or    ondansetron (ZOFRAN) injection 4 mg  4 mg IntraVENous Q6H PRN    albuterol CONCENTRATE 2.5mg/0.5 mL neb soln  2.5 mg Nebulization Q6H PRN    calcitRIOL (ROCALTROL) capsule 0.25 mcg  0.25 mcg Oral BID Mon Wed & Fri    carvediloL (COREG) tablet 6.25 mg  6.25 mg Oral BID WITH MEALS    cetirizine (ZYRTEC) tablet 10 mg  10 mg Oral DAILY    donepeziL (ARICEPT) tablet 10 mg  10 mg Oral QHS    gabapentin (NEURONTIN) capsule 300 mg  300 mg Oral BID    hydrALAZINE (APRESOLINE) tablet 50 mg  50 mg Oral TID    insulin glargine (LANTUS) injection 10 Units  10 Units SubCUTAneous QHS    latanoprost (XALATAN) 0.005 % ophthalmic solution 1 Drop  1 Drop Both Eyes QHS    melatonin tablet 5 mg  5 mg Oral QHS    pantoprazole (PROTONIX) tablet 40 mg  40 mg Oral BID    . PHARMACY TO SUBSTITUTE PER PROTOCOL (Reordered from: plecanatide (Trulance) 3 mg tab)    Per Protocol    atorvastatin (LIPITOR) tablet 20 mg  20 mg Oral QHS    [Held by provider] torsemide (DEMADEX) tablet 20 mg  20 mg Oral BID    venlafaxine-SR (EFFEXOR-XR) capsule 75 mg  75 mg Oral DAILY    glucose chewable tablet 16 g  4 Tablet Oral PRN    glucagon (GLUCAGEN) injection 1 mg  1 mg IntraMUSCular PRN    dextrose 10% infusion 0-250 mL  0-250 mL IntraVENous PRN        Review of Systems    Review of Systems   Constitutional:  Negative for fever. Respiratory:  Negative for shortness of breath. Cardiovascular:  Negative for chest pain. Gastrointestinal:  Negative for abdominal pain. Neurological:  Negative for headaches. Objective:     Patient Vitals for the past 8 hrs:   BP Temp Pulse Resp SpO2   11/15/22 1405 (!) 133/58 98.6 °F (37 °C) 63 20 100 %   11/15/22 1200 -- -- 76 -- --   11/15/22 0800 -- -- 76 -- --   11/15/22 0725 (!) 175/70 97.7 °F (36.5 °C) 67 20 98 %     No intake/output data recorded. 11/13 1901 - 11/15 0700  In: 600 [I.V.:600]  Out: 1150 [Urine:1150]    Physical Exam:   Physical Exam  Cardiovascular:      Rate and Rhythm: Regular rhythm. Pulmonary:      Breath sounds: Normal breath sounds. Abdominal:      Palpations: Abdomen is soft. Neurological:      Mental Status: She is alert. No edema        XR CHEST PORT   Final Result      Mild cardiomegaly, stable. No acute cardiopulmonary pathology. Data Review   Recent Labs     11/15/22  0941 11/14/22  1025 11/13/22  1632   WBC 6.8 6.7 8.1   HGB 9.2* 8.1* 9.3*   HCT 30.0* 25.7* 29.9*    240 260     Recent Labs     11/15/22  0941 11/14/22  1025 11/13/22  1632    136 135*   K 4.4 3.5 3.8    98 96*   CO2 30 32 33*   * 231* 185*   BUN 31* 43* 49*   CREA 2.22* 2.55* 3.03*   CA 9.1 9.0 9.2   MG  --  1.6  --    ALB  --  2.5* 3.0*   ALT  --  10* 16     No components found for: GLPOC  No results for input(s): PH, PCO2, PO2, HCO3, FIO2 in the last 72 hours. No results for input(s): INR, INREXT, INREXT in the last 72 hours.       Assessment:           Principal Problem:    Acute renal failure (ARF) ([facility] [address] (11/13/2022)    ALEX on CKD - ALEX resolving. Creat is close to her baseline. Baseline creat is 2.1 mg. Anemia    Hypertension. BP is higher due to IVF    DM    Plan:     Decrease Neurontin to 200 mg BID  Decrease Torsemide to 20 ng Q day from tomorrow.

## 2022-11-16 VITALS
TEMPERATURE: 97.7 F | BODY MASS INDEX: 32.03 KG/M2 | RESPIRATION RATE: 16 BRPM | WEIGHT: 187.61 LBS | HEART RATE: 66 BPM | SYSTOLIC BLOOD PRESSURE: 131 MMHG | HEIGHT: 64 IN | DIASTOLIC BLOOD PRESSURE: 61 MMHG | OXYGEN SATURATION: 99 %

## 2022-11-16 LAB
ANION GAP SERPL CALC-SCNC: 5 MMOL/L (ref 5–15)
BACTERIA SPEC CULT: NORMAL
BUN SERPL-MCNC: 27 MG/DL (ref 6–20)
BUN/CREAT SERPL: 16 (ref 12–20)
CA-I BLD-MCNC: 9.5 MG/DL (ref 8.5–10.1)
CHLORIDE SERPL-SCNC: 102 MMOL/L (ref 97–108)
CO2 SERPL-SCNC: 33 MMOL/L (ref 21–32)
COLONY COUNT,CNT: NORMAL
COLONY COUNT,CNT: NORMAL
CREAT SERPL-MCNC: 1.7 MG/DL (ref 0.55–1.02)
ERYTHROCYTE [DISTWIDTH] IN BLOOD BY AUTOMATED COUNT: 17.7 % (ref 11.5–14.5)
GLUCOSE BLD STRIP.AUTO-MCNC: 117 MG/DL (ref 65–100)
GLUCOSE BLD STRIP.AUTO-MCNC: 411 MG/DL (ref 65–100)
GLUCOSE SERPL-MCNC: 107 MG/DL (ref 65–100)
HCT VFR BLD AUTO: 29.6 % (ref 35–47)
HGB BLD-MCNC: 9.1 G/DL (ref 11.5–16)
MCH RBC QN AUTO: 26.1 PG (ref 26–34)
MCHC RBC AUTO-ENTMCNC: 30.7 G/DL (ref 30–36.5)
MCV RBC AUTO: 84.8 FL (ref 80–99)
NRBC # BLD: 0 K/UL (ref 0–0.01)
NRBC BLD-RTO: 0 PER 100 WBC
PERFORMED BY, TECHID: ABNORMAL
PERFORMED BY, TECHID: ABNORMAL
PLATELET # BLD AUTO: 273 K/UL (ref 150–400)
PMV BLD AUTO: 10.7 FL (ref 8.9–12.9)
POTASSIUM SERPL-SCNC: 4.2 MMOL/L (ref 3.5–5.1)
RBC # BLD AUTO: 3.49 M/UL (ref 3.8–5.2)
SODIUM SERPL-SCNC: 140 MMOL/L (ref 136–145)
SPECIAL REQUESTS,SREQ: NORMAL
WBC # BLD AUTO: 8.6 K/UL (ref 3.6–11)

## 2022-11-16 PROCEDURE — 36415 COLL VENOUS BLD VENIPUNCTURE: CPT

## 2022-11-16 PROCEDURE — 82962 GLUCOSE BLOOD TEST: CPT

## 2022-11-16 PROCEDURE — 74011636637 HC RX REV CODE- 636/637: Performed by: STUDENT IN AN ORGANIZED HEALTH CARE EDUCATION/TRAINING PROGRAM

## 2022-11-16 PROCEDURE — 74011000250 HC RX REV CODE- 250: Performed by: INTERNAL MEDICINE

## 2022-11-16 PROCEDURE — 74011250637 HC RX REV CODE- 250/637: Performed by: INTERNAL MEDICINE

## 2022-11-16 PROCEDURE — 85027 COMPLETE CBC AUTOMATED: CPT

## 2022-11-16 PROCEDURE — 80048 BASIC METABOLIC PNL TOTAL CA: CPT

## 2022-11-16 PROCEDURE — 74011250637 HC RX REV CODE- 250/637: Performed by: STUDENT IN AN ORGANIZED HEALTH CARE EDUCATION/TRAINING PROGRAM

## 2022-11-16 RX ORDER — TORSEMIDE 20 MG/1
20 TABLET ORAL DAILY
Qty: 30 TABLET | Refills: 0 | Status: SHIPPED | OUTPATIENT
Start: 2022-11-16 | End: 2022-12-16

## 2022-11-16 RX ORDER — GABAPENTIN 100 MG/1
200 CAPSULE ORAL 2 TIMES DAILY
Qty: 120 CAPSULE | Refills: 0 | Status: SHIPPED | OUTPATIENT
Start: 2022-11-16 | End: 2022-12-16

## 2022-11-16 RX ADMIN — AMLODIPINE BESYLATE 10 MG: 5 TABLET ORAL at 09:08

## 2022-11-16 RX ADMIN — CETIRIZINE HYDROCHLORIDE 10 MG: 10 TABLET, FILM COATED ORAL at 09:24

## 2022-11-16 RX ADMIN — POLYETHYLENE GLYCOL 3350 17 G: 17 POWDER, FOR SOLUTION ORAL at 09:24

## 2022-11-16 RX ADMIN — FERROUS SULFATE TAB 325 MG (65 MG ELEMENTAL FE) 325 MG: 325 (65 FE) TAB at 09:10

## 2022-11-16 RX ADMIN — PANTOPRAZOLE SODIUM 40 MG: 40 TABLET, DELAYED RELEASE ORAL at 09:10

## 2022-11-16 RX ADMIN — CARVEDILOL 6.25 MG: 3.12 TABLET, FILM COATED ORAL at 09:14

## 2022-11-16 RX ADMIN — HYDRALAZINE HYDROCHLORIDE 50 MG: 25 TABLET, FILM COATED ORAL at 09:11

## 2022-11-16 RX ADMIN — GABAPENTIN 200 MG: 100 CAPSULE ORAL at 09:17

## 2022-11-16 RX ADMIN — VENLAFAXINE HYDROCHLORIDE 75 MG: 37.5 CAPSULE, EXTENDED RELEASE ORAL at 09:24

## 2022-11-16 RX ADMIN — CALCITRIOL CAPSULES 0.25 MCG 0.25 MCG: 0.25 CAPSULE ORAL at 09:11

## 2022-11-16 RX ADMIN — SODIUM CHLORIDE, PRESERVATIVE FREE 10 ML: 5 INJECTION INTRAVENOUS at 06:35

## 2022-11-16 RX ADMIN — INSULIN LISPRO 15 UNITS: 100 INJECTION, SOLUTION INTRAVENOUS; SUBCUTANEOUS at 12:28

## 2022-11-16 NOTE — PROGRESS NOTES
Bedside and Verbal shift change report given to Argentina Winchester RN (oncoming nurse) by Irvine Kocher LPN (offgoing nurse). Report included the following information SBAR, Kardex, Procedure Summary, Intake/Output, MAR, Accordion, Recent Results, and Med Rec Status.

## 2022-11-16 NOTE — PROGRESS NOTES
Patient to discharge home self care today. [facility] Patient declined. She has family members in the home to assist her. Family will transport at discharge. Medicare pt has received, reviewed, and signed 2nd IM letter informing them of their right to appeal the discharge. Signed copied has been placed on pt bedside chart.

## 2022-11-16 NOTE — PROGRESS NOTES
Problem: Falls - Risk of  Goal: *Absence of Falls  Description: Document Shelly Patel Fall Risk and appropriate interventions in the flowsheet. Outcome: Resolved/Met  Note: Fall Risk Interventions:  Mobility Interventions: Bed/chair exit alarm         Medication Interventions: Teach patient to arise slowly    Elimination Interventions: Call light in reach    History of Falls Interventions: Bed/chair exit alarm         Problem: Patient Education: Go to Patient Education Activity  Goal: Patient/Family Education  Outcome: Resolved/Met     Problem: Pain  Goal: *Control of Pain  Outcome: Resolved/Met  Goal: *PALLIATIVE CARE:  Alleviation of Pain  Outcome: Resolved/Met     Problem: Patient Education: Go to Patient Education Activity  Goal: Patient/Family Education  Outcome: Resolved/Met     Problem: Discharge Planning  Goal: *Discharge to safe environment  Outcome: Resolved/Met  Goal: *Knowledge of medication management  Outcome: Resolved/Met  Goal: *Knowledge of discharge instructions  Outcome: Resolved/Met     Problem: Patient Education: Go to Patient Education Activity  Goal: Patient/Family Education  Outcome: Resolved/Met     Problem: Diabetes Self-Management  Goal: *Disease process and treatment process  Description: Define diabetes and identify own type of diabetes; list 3 options for treating diabetes. Outcome: Resolved/Met  Goal: *Incorporating nutritional management into lifestyle  Description: Describe effect of type, amount and timing of food on blood glucose; list 3 methods for planning meals. Outcome: Resolved/Met  Goal: *Incorporating physical activity into lifestyle  Description: State effect of exercise on blood glucose levels. Outcome: Resolved/Met  Goal: *Developing strategies to promote health/change behavior  Description: Define the ABC's of diabetes; identify appropriate screenings, schedule and personal plan for screenings.   Outcome: Resolved/Met  Goal: *Using medications safely  Description: [facility] effect of diabetes medications on diabetes; name diabetes medication taking, action and side effects. Outcome: Resolved/Met  Goal: *Monitoring blood glucose, interpreting and using results  Description: Identify recommended blood glucose targets  and personal targets. Outcome: Resolved/Met  Goal: *Prevention, detection, treatment of acute complications  Description: List symptoms of hyper- and hypoglycemia; describe how to treat low blood sugar and actions for lowering  high blood glucose level. Outcome: Resolved/Met  Goal: *Prevention, detection and treatment of chronic complications  Description: Define the natural course of diabetes and describe the relationship of blood glucose levels to long term complications of diabetes.   Outcome: Resolved/Met     Problem: Patient Education: Go to Patient Education Activity  Goal: Patient/Family Education  Outcome: Resolved/Met     Problem: Hypertension  Goal: *Blood pressure within specified parameters  Outcome: Resolved/Met     Problem: Patient Education: Go to Patient Education Activity  Goal: Patient/Family Education  Outcome: Resolved/Met     Problem: Patient Education: Go to Patient Education Activity  Goal: Patient/Family Education  Outcome: Resolved/Met

## 2022-11-16 NOTE — PROGRESS NOTES
Patient tube feed stopped for 1 hour then checked for residual no residual at this time.  Patient is tolerating tube feed at this time

## 2022-11-16 NOTE — PROGRESS NOTES
shift change report given to Robert John (oncoming nurse) by Paulina Berkowitz (offgoing nurse). Report included the following information .

## 2022-11-16 NOTE — DISCHARGE SUMMARY
Admit date: 11/13/2022   Admitting Provider: Farzana Garvin MD    Discharge date: 11/16/2022  Discharging Provider: Christ Shook NP      * Admission Diagnoses: Acute renal failure (ARF) [facility]) [N17.9]    * Discharge Diagnoses:    Hospital Problems as of 11/16/2022 Date Reviewed: 10/26/2022            Codes Class Noted - Resolved POA    * (Principal) [facility] ICD-10-CM: N17.9  ICD-9-CM: 584.9  11/13/2022 - Present Unknown           * Hospital Course: Russel Carmona is a 74-year-old female with a PMHx of CAD, stroke, seizures, hypertension, HLD, and diabetes mellitus who presents to the ED for an episode of syncope, no head impact, returned to baseline. No neurologic symptoms, denies any motor weakness or change in sensation. Reports multiple prior episodes of syncope with most recent admission from 10/21/2022-11/03/2022 being due to syncope in the setting of a GI bleed. Denies any BRBPR, tarry stools and otherwise reports feeling well. In the ED, initial hemoglobin stable, but creatinine was markedly elevated compared to recent prior admission. Patient admitted for ALEX. Nephrology consult placed. Started on IV fluids. Renal  function much improved. Torsemide and gabapentin dosage decreased. Patient medically stable for discharge.     * Procedures:   * No surgery found *      Consults: Nephrology    Significant Diagnostic Studies:   Recent Results (from the past 24 hour(s))   GLUCOSE, POC    Collection Time: 11/15/22 11:29 AM   Result Value Ref Range    Glucose (POC) 270 (H) 65 - 100 mg/dL    Performed by [facility]    GLUCOSE, POC    Collection Time: 11/15/22  4:08 PM   Result Value Ref Range    Glucose (POC) 137 (H) 65 - 100 mg/dL    Performed by [facility] [place]    GLUCOSE, POC    Collection Time: 11/15/22  7:35 PM   Result Value Ref Range    Glucose (POC) 214 (H) 65 - 100 mg/dL    Performed by Haydee Ramachandran    CBC W/O DIFF    Collection Time: 11/16/22  7:12 AM   Result Value Ref Range    WBC 8.6 3.6 - 11.0 K/uL    RBC 3.49 (L) 3.80 - 5.20 M/uL    HGB 9.1 (L) 11.5 - 16.0 g/dL    HCT 29.6 (L) 35.0 - 47.0 %    MCV 84.8 80.0 - 99.0 FL    MCH 26.1 26.0 - 34.0 PG    MCHC 30.7 30.0 - 36.5 g/dL    RDW 17.7 (H) 11.5 - 14.5 %    PLATELET 642 570 - 716 K/uL    MPV 10.7 8.9 - 12.9 FL    NRBC 0.0 0.0  WBC    ABSOLUTE NRBC 0.00 0.00 - 7.10 K/uL   METABOLIC PANEL, BASIC    Collection Time: 11/16/22  7:12 AM   Result Value Ref Range    Sodium 140 136 - 145 mmol/L    Potassium 4.2 3.5 - 5.1 mmol/L    Chloride 102 97 - 108 mmol/L    CO2 33 (H) 21 - 32 mmol/L    Anion gap 5 5 - 15 mmol/L    Glucose 107 (H) 65 - 100 mg/dL    BUN 27 (H) 6 - 20 mg/dL    Creatinine 1.70 (H) 0.55 - 1.02 mg/dL    BUN/Creatinine ratio 16 12 - 20      eGFR 31 (L) >60 ml/min/1.73m2    Calcium 9.5 8.5 - 10.1 mg/dL   GLUCOSE, POC    Collection Time: 11/16/22  7:34 AM   Result Value Ref Range    Glucose (POC) 117 (H) 65 - 100 mg/dL    Performed by Rosana Aburto          Discharge Exam:  Physical Exam  Vitals and nursing note reviewed. Constitutional:       Appearance: Normal appearance. Eyes:      Extraocular Movements: Extraocular movements intact. Cardiovascular:      Rate and Rhythm: Normal rate. Abdominal:      General: Bowel sounds are normal.      Palpations: Abdomen is soft. Skin:     General: Skin is warm and dry. Neurological:      Mental Status: She is alert and oriented to person, place, and time. * Discharge Condition: improved  * Disposition: Home    Discharge Medications:  Current Discharge Medication List        CONTINUE these medications which have CHANGED    Details   torsemide (DEMADEX) 20 mg tablet Take 1 Tablet by mouth daily for 30 days. Qty: 30 Tablet, Refills: 0  Start date: 11/16/2022, End date: 12/16/2022      gabapentin (NEURONTIN) 100 mg capsule Take 2 Capsules by mouth two (2) times a day for 30 days. Max Daily Amount: 400 mg.   Qty: 120 Capsule, Refills: 0  Start date: 11/16/2022, End date: 12/16/2022    Associated Diagnoses: Venous insufficiency of left lower extremity           CONTINUE these medications which have NOT CHANGED    Details   Nebulizer & Compressor (Comp-Air Nebulizer Compressor) machine as directed. amLODIPine (NORVASC) 10 mg tablet Take 10 mg by mouth daily. aspirin 81 mg chewable tablet CHEW AND SWALLOW 1 TABLET BY MOUTH ONCE DAILY FOR PREVENTION      FeroSuL 325 mg (65 mg iron) tablet TAKE 1 TABLET BY MOUTH ONCE DAILY FOR SUPPLEMENTATION      fluticasone propionate (FLONASE) 50 mcg/actuation nasal spray 1 spray in each nostril Nasally Once a day      nitroglycerin (NITROSTAT) 0.4 mg SL tablet DISSOLVE ONE TABLET UNDER THE TONGUE EVERY 5 MINUTES AS NEEDED FOR CHEST PAIN. DO NOT EXCEED A TOTAL OF 3 DOSES IN 15 MINUTES NOW      triamcinolone acetonide (KENALOG) 0.1 % topical cream APPLY 1 CREAM EXTERNALLY TWICE DAILY TO LOWER LEGS 30 DAYS      acetaminophen (Tylenol Extra Strength) 500 mg tablet 2 tablets as needed Orally every 6 hrs      travoprost (Travatan Z) 0.004 % ophthalmic solution Travatan      hydrALAZINE (APRESOLINE) 50 mg tablet Take 1 Tablet by mouth three (3) times daily for 30 days. Qty: 90 Tablet, Refills: 0      cetirizine (ZYRTEC) 10 mg tablet Take 10 mg by mouth daily. melatonin 5 mg tablet Take 5 mg by mouth nightly. insulin aspart U-100 (NOVOLOG) 100 unit/mL injection by SubCUTAneous route Before breakfast, lunch, dinner and at bedtime. SS: 150-200=2 units 201-250=4 units 251-300=6 units 301-350=8 units 351-400=10 units      insulin detemir U-100 (LEVEMIR) 100 unit/mL injection 10 Units by SubCUTAneous route nightly. plecanatide (Trulance) 3 mg tab Take 3 mg by mouth daily. pantoprazole (PROTONIX) 40 mg tablet Take 1 Tablet by mouth two (2) times a day. Qty: 60 Tablet, Refills: 0      albuterol (PROVENTIL VENTOLIN) 2.5 mg /3 mL (0.083 %) nebu 2.5 mg by Nebulization route every six (6) hours as needed for Shortness of Breath. carvediloL (COREG) 6.25 mg tablet Take 6.25 mg by mouth two (2) times daily (with meals). calcitRIOL (ROCALTROL) 0.25 mcg capsule Take 0.25 mcg by mouth BID Mon Wed & Fri.      donepeziL (ARICEPT) 10 mg tablet Take 10 mg by mouth nightly. Venlafaxine-ER 24 HR (EFFEXOR-ER) 75 mg tr24 tablet Take 75 mg by mouth daily. pravastatin (PRAVACHOL) 80 mg tablet Take 80 mg by mouth nightly. STOP taking these medications       insulin regular human (NovoLIN R Flexpen) 100 unit/mL (3 mL) inpn Comments:   Reason for Stopping:         varicella-zoster recombinant, PF, (Shingrix, PF,) 50 mcg/0.5 mL susr injection Comments:   Reason for Stopping:         OneTouch Verio test strips strip Comments:   Reason for Stopping:         BinaxNOW COVID-19 Ag Self Test kit Comments:   Reason for Stopping:         NovoLIN R Flexpen 100 unit/mL (3 mL) inpn Comments:   Reason for Stopping:         loratadine (CLARITIN) 10 mg tablet Comments:   Reason for Stopping:         metoprolol succinate (TOPROL-XL) 25 mg XL tablet Comments:   Reason for Stopping:         Levemir FlexTouch U-100 Insuln 100 unit/mL (3 mL) inpn Comments:   Reason for Stopping:         venlafaxine-SR (EFFEXOR-XR) 75 mg capsule Comments:   Reason for Stopping:         glucose blood VI test strips (OneTouch Verio test strips) strip Comments:   Reason for Stopping:         Blood-Glucose Meter (OneTouch Verio Reflect Meter) misc Comments:   Reason for Stopping:         lancets (OneTouch Delica Plus Lancet) 33 gauge misc Comments:   Reason for Stopping:         latanoprost (XALATAN) 0.005 % ophthalmic solution Comments:   Reason for Stopping:               * Follow-up Care/Patient Instructions:   Activity: Activity as tolerated  Diet: Cardiac Diet and Diabetic Diet  Wound Care: None needed    Follow-up Information       Follow up With Specialties Details Why Contact Info    Rosie Huggins NP Nurse Practitioner Follow up in 1 week(s)  [address] [facility] [address] [facility] [address] [facility]      Eliza Paz MD Nephrology, Internal Medicine Physician Follow up in 2 week(s)  [address]  [phone]            Time Spent: > 35 minutes    Signed:  Charanjit Cordova NP  11/16/2022  10:17 AM

## 2022-11-16 NOTE — PROGRESS NOTES
VSS. Patient given discharge instructions. Medications and follow-up appointments reviewed. IV and Telemetry removed. Case management, provider, and primary nurse aware of discharge. Discharge plan of care/case management plan validated with provider discharge order. Patient taken out via wheel chair.

## 2022-11-16 NOTE — PROGRESS NOTES
Renal Daily Progress Note    Admit Date: 11/13/2022      Subjective:   She feels well with no lightheadedness or dizziness. No headaches. She denies chest pain or shortness of breath. She has been walking in the hallway with help. Current Facility-Administered Medications   Medication Dose Route Frequency    hydrALAZINE (APRESOLINE) 20 mg/mL injection 10 mg  10 mg IntraVENous Q4H PRN    gabapentin (NEURONTIN) capsule 200 mg  200 mg Oral BID    insulin lispro (HUMALOG) injection   SubCUTAneous AC&HS    amLODIPine (NORVASC) tablet 10 mg  10 mg Oral DAILY    ferrous sulfate tablet 325 mg  1 Tablet Oral DAILY WITH BREAKFAST    diclofenac (VOLTAREN) 1 % topical gel 4 g  4 g Topical Q6H PRN    sodium chloride (NS) flush 5-40 mL  5-40 mL IntraVENous Q8H    sodium chloride (NS) flush 5-40 mL  5-40 mL IntraVENous PRN    acetaminophen (TYLENOL) tablet 650 mg  650 mg Oral Q6H PRN    Or    acetaminophen (TYLENOL) suppository 650 mg  650 mg Rectal Q6H PRN    polyethylene glycol (MIRALAX) packet 17 g  17 g Oral DAILY PRN    ondansetron (ZOFRAN ODT) tablet 4 mg  4 mg Oral Q8H PRN    Or    ondansetron (ZOFRAN) injection 4 mg  4 mg IntraVENous Q6H PRN    albuterol CONCENTRATE 2.5mg/0.5 mL neb soln  2.5 mg Nebulization Q6H PRN    calcitRIOL (ROCALTROL) capsule 0.25 mcg  0.25 mcg Oral BID Mon Wed & Fri    carvediloL (COREG) tablet 6.25 mg  6.25 mg Oral BID WITH MEALS    cetirizine (ZYRTEC) tablet 10 mg  10 mg Oral DAILY    donepeziL (ARICEPT) tablet 10 mg  10 mg Oral QHS    hydrALAZINE (APRESOLINE) tablet 50 mg  50 mg Oral TID    insulin glargine (LANTUS) injection 10 Units  10 Units SubCUTAneous QHS    latanoprost (XALATAN) 0.005 % ophthalmic solution 1 Drop  1 Drop Both Eyes QHS    melatonin tablet 5 mg  5 mg Oral QHS    pantoprazole (PROTONIX) tablet 40 mg  40 mg Oral BID    . PHARMACY TO SUBSTITUTE PER PROTOCOL (Reordered from: plecanatide (Trulance) 3 mg tab)    Per Protocol    atorvastatin (LIPITOR) tablet 20 mg  20 mg Oral QHS    venlafaxine-SR (EFFEXOR-XR) capsule 75 mg  75 mg Oral DAILY    glucose chewable tablet 16 g  4 Tablet Oral PRN    glucagon (GLUCAGEN) injection 1 mg  1 mg IntraMUSCular PRN    dextrose 10% infusion 0-250 mL  0-250 mL IntraVENous PRN        Review of Systems    Review of Systems   Constitutional:  Negative for fever. Respiratory:  Negative for shortness of breath. Cardiovascular:  Negative for chest pain. Gastrointestinal:  Negative for abdominal pain. Neurological:  Negative for headaches. Objective:     Patient Vitals for the past 8 hrs:   BP Temp Pulse Resp SpO2   11/16/22 1153 (!) 143/67 98.3 °F (36.8 °C) 61 18 97 %   11/16/22 0747 (!) 160/71 97.9 °F (36.6 °C) 72 16 99 %       No intake/output data recorded. 11/14 1901 - 11/16 0700  In: 600 [I.V.:600]  Out: 1150 [Urine:1150]    Physical Exam:   Physical Exam  Cardiovascular:      Rate and Rhythm: Regular rhythm. Pulmonary:      Breath sounds: Normal breath sounds. Abdominal:      Palpations: Abdomen is soft. Neurological:      Mental Status: She is alert. No edema  AVF left upper arm with good thrill and bruit. XR CHEST PORT   Final Result      Mild cardiomegaly, stable. No acute cardiopulmonary pathology. Data Review   Recent Labs     11/16/22  0712 11/15/22  0941 11/14/22  1025   WBC 8.6 6.8 6.7   HGB 9.1* 9.2* 8.1*   HCT 29.6* 30.0* 25.7*    286 240       Recent Labs     11/16/22  0712 11/15/22  0941 11/14/22  1025 11/13/22  1632    137 136 135*   K 4.2 4.4 3.5 3.8    103 98 96*   CO2 33* 30 32 33*   * 220* 231* 185*   BUN 27* 31* 43* 49*   CREA 1.70* 2.22* 2.55* 3.03*   CA 9.5 9.1 9.0 9.2   MG  --   --  1.6  --    ALB  --   --  2.5* 3.0*   ALT  --   --  10* 16       No components found for: GLPOC  No results for input(s): PH, PCO2, PO2, HCO3, FIO2 in the last 72 hours. No results for input(s): INR, INREXT, INREXT, INREXT in the last 72 hours.       Assessment:           Principal Problem:    Acute renal failure (ARF) ([facility] (11/13/2022)  ALEX on CKD - ALEX resolving. Creat is 1.7  mg. Anemia. Hemoglobin is stable at 9.1 g.    Hypertension. BP is higher due to IVF    DM    Plan:     Decrease Neurontin to 200 mg BID  Decrease Torsemide to 20 ng Q day at home. Fluid restriction of about 40 to 45 ounces per 24 hours. Follow-up with  in the office in 4 to 5 weeks.

## 2022-12-17 ENCOUNTER — HOSPITAL ENCOUNTER (INPATIENT)
Age: 77
LOS: 1 days | Discharge: HOME | DRG: 682 | End: 2022-12-19
Attending: STUDENT IN AN ORGANIZED HEALTH CARE EDUCATION/TRAINING PROGRAM | Admitting: HOSPITALIST
Payer: MEDICARE

## 2022-12-17 DIAGNOSIS — N17.9 AKI (ACUTE KIDNEY INJURY) (HCC): Primary | ICD-10-CM

## 2022-12-17 DIAGNOSIS — I21.4 NSTEMI (NON-ST ELEVATED MYOCARDIAL INFARCTION) (HCC): ICD-10-CM

## 2022-12-17 DIAGNOSIS — R55 SYNCOPE AND COLLAPSE: ICD-10-CM

## 2022-12-17 DIAGNOSIS — E86.0 DEHYDRATION: ICD-10-CM

## 2022-12-17 LAB
BASOPHILS # BLD: 0 K/UL (ref 0–0.1)
BASOPHILS NFR BLD: 0 % (ref 0–1)
DIFFERENTIAL METHOD BLD: ABNORMAL
EOSINOPHIL # BLD: 0.1 K/UL (ref 0–0.4)
EOSINOPHIL NFR BLD: 1 % (ref 0–7)
ERYTHROCYTE [DISTWIDTH] IN BLOOD BY AUTOMATED COUNT: 20.3 % (ref 11.5–14.5)
HCT VFR BLD AUTO: 29.7 % (ref 35–47)
HGB BLD-MCNC: 9.2 G/DL (ref 11.5–16)
IMM GRANULOCYTES # BLD AUTO: 0 K/UL (ref 0–0.04)
IMM GRANULOCYTES NFR BLD AUTO: 0 % (ref 0–0.5)
LYMPHOCYTES # BLD: 0.8 K/UL (ref 0.8–3.5)
LYMPHOCYTES NFR BLD: 11 % (ref 12–49)
MCH RBC QN AUTO: 25.6 PG (ref 26–34)
MCHC RBC AUTO-ENTMCNC: 31 G/DL (ref 30–36.5)
MCV RBC AUTO: 82.5 FL (ref 80–99)
MONOCYTES # BLD: 0.7 K/UL (ref 0–1)
MONOCYTES NFR BLD: 10 % (ref 5–13)
NEUTS SEG # BLD: 5.7 K/UL (ref 1.8–8)
NEUTS SEG NFR BLD: 78 % (ref 32–75)
NRBC # BLD: 0 K/UL (ref 0–0.01)
NRBC BLD-RTO: 0 PER 100 WBC
PLATELET # BLD AUTO: 219 K/UL (ref 150–400)
PMV BLD AUTO: 11.4 FL (ref 8.9–12.9)
RBC # BLD AUTO: 3.6 M/UL (ref 3.8–5.2)
WBC # BLD AUTO: 7.3 K/UL (ref 3.6–11)

## 2022-12-17 PROCEDURE — 83735 ASSAY OF MAGNESIUM: CPT

## 2022-12-17 PROCEDURE — 84484 ASSAY OF TROPONIN QUANT: CPT

## 2022-12-17 PROCEDURE — 36415 COLL VENOUS BLD VENIPUNCTURE: CPT

## 2022-12-17 PROCEDURE — 82550 ASSAY OF CK (CPK): CPT

## 2022-12-17 PROCEDURE — 93005 ELECTROCARDIOGRAM TRACING: CPT

## 2022-12-17 PROCEDURE — 99285 EMERGENCY DEPT VISIT HI MDM: CPT

## 2022-12-17 PROCEDURE — 85025 COMPLETE CBC W/AUTO DIFF WBC: CPT

## 2022-12-17 PROCEDURE — 80053 COMPREHEN METABOLIC PANEL: CPT

## 2022-12-18 LAB
ABO + RH BLD: NORMAL
ALBUMIN SERPL-MCNC: 2.8 G/DL (ref 3.5–5)
ALBUMIN SERPL-MCNC: 3 G/DL (ref 3.5–5)
ALBUMIN/GLOB SERPL: 0.8 (ref 1.1–2.2)
ALBUMIN/GLOB SERPL: 0.8 (ref 1.1–2.2)
ALP SERPL-CCNC: 143 U/L (ref 45–117)
ALP SERPL-CCNC: 155 U/L (ref 45–117)
ALT SERPL-CCNC: 36 U/L (ref 12–78)
ALT SERPL-CCNC: ABNORMAL U/L (ref 12–78)
ANION GAP SERPL CALC-SCNC: 6 MMOL/L (ref 5–15)
ANION GAP SERPL CALC-SCNC: 7 MMOL/L (ref 5–15)
APPEARANCE UR: CLEAR
AST SERPL W P-5'-P-CCNC: 53 U/L (ref 15–37)
AST SERPL W P-5'-P-CCNC: ABNORMAL U/L (ref 15–37)
ATRIAL RATE: 63 BPM
BACTERIA URNS QL MICRO: ABNORMAL /HPF
BILIRUB SERPL-MCNC: 0.4 MG/DL (ref 0.2–1)
BILIRUB SERPL-MCNC: 0.4 MG/DL (ref 0.2–1)
BILIRUB UR QL: NEGATIVE
BLOOD GROUP ANTIBODIES SERPL: NEGATIVE
BUN SERPL-MCNC: 57 MG/DL (ref 6–20)
BUN SERPL-MCNC: 57 MG/DL (ref 6–20)
BUN/CREAT SERPL: 18 (ref 12–20)
BUN/CREAT SERPL: 19 (ref 12–20)
CA-I BLD-MCNC: 8.6 MG/DL (ref 8.5–10.1)
CA-I BLD-MCNC: 8.6 MG/DL (ref 8.5–10.1)
CALCULATED P AXIS, ECG09: 69 DEGREES
CALCULATED R AXIS, ECG10: 33 DEGREES
CALCULATED T AXIS, ECG11: 48 DEGREES
CHLORIDE SERPL-SCNC: 103 MMOL/L (ref 97–108)
CHLORIDE SERPL-SCNC: 105 MMOL/L (ref 97–108)
CK SERPL-CCNC: 351 U/L (ref 26–192)
CO2 SERPL-SCNC: 28 MMOL/L (ref 21–32)
CO2 SERPL-SCNC: 28 MMOL/L (ref 21–32)
COLOR UR: ABNORMAL
COVID-19 RAPID TEST, COVR: NOT DETECTED
CREAT SERPL-MCNC: 3.03 MG/DL (ref 0.55–1.02)
CREAT SERPL-MCNC: 3.24 MG/DL (ref 0.55–1.02)
DIAGNOSIS, 93000: NORMAL
FLUAV AG NPH QL IA: NEGATIVE
FLUBV AG NOSE QL IA: NEGATIVE
GLOBULIN SER CALC-MCNC: 3.7 G/DL (ref 2–4)
GLOBULIN SER CALC-MCNC: 3.8 G/DL (ref 2–4)
GLUCOSE BLD STRIP.AUTO-MCNC: 168 MG/DL (ref 65–100)
GLUCOSE SERPL-MCNC: 179 MG/DL (ref 65–100)
GLUCOSE SERPL-MCNC: 187 MG/DL (ref 65–100)
GLUCOSE UR STRIP.AUTO-MCNC: NEGATIVE MG/DL
HGB UR QL STRIP: NEGATIVE
KETONES UR QL STRIP.AUTO: NEGATIVE MG/DL
LEUKOCYTE ESTERASE UR QL STRIP.AUTO: NEGATIVE
MAGNESIUM SERPL-MCNC: 1.9 MG/DL (ref 1.6–2.4)
NITRITE UR QL STRIP.AUTO: NEGATIVE
P-R INTERVAL, ECG05: 148 MS
PERFORMED BY, TECHID: ABNORMAL
PH UR STRIP: 5
POTASSIUM SERPL-SCNC: 2.9 MMOL/L (ref 3.5–5.1)
POTASSIUM SERPL-SCNC: ABNORMAL MMOL/L (ref 3.5–5.1)
PROT SERPL-MCNC: 6.5 G/DL (ref 6.4–8.2)
PROT SERPL-MCNC: 6.8 G/DL (ref 6.4–8.2)
PROT UR STRIP-MCNC: 30 MG/DL
Q-T INTERVAL, ECG07: 498 MS
QRS DURATION, ECG06: 84 MS
QTC CALCULATION (BEZET), ECG08: 509 MS
RBC #/AREA URNS HPF: ABNORMAL /HPF (ref 0–5)
SODIUM SERPL-SCNC: 137 MMOL/L (ref 136–145)
SODIUM SERPL-SCNC: 140 MMOL/L (ref 136–145)
SP GR UR REFRACTOMETRY: 1.02 (ref 1–1.03)
SPECIMEN EXP DATE BLD: NORMAL
TROPONIN-HIGH SENSITIVITY: 78 NG/L (ref 0–51)
TROPONIN-HIGH SENSITIVITY: 83 NG/L (ref 0–51)
TROPONIN-HIGH SENSITIVITY: 86 NG/L (ref 0–51)
UROBILINOGEN UR QL STRIP.AUTO: 0.1 EU/DL (ref 0.2–1)
VENTRICULAR RATE, ECG03: 63 BPM
WBC URNS QL MICRO: ABNORMAL /HPF (ref 0–4)

## 2022-12-18 PROCEDURE — 65270000029 HC RM PRIVATE

## 2022-12-18 PROCEDURE — 96372 THER/PROPH/DIAG INJ SC/IM: CPT

## 2022-12-18 PROCEDURE — 82570 ASSAY OF URINE CREATININE: CPT

## 2022-12-18 PROCEDURE — 74011000250 HC RX REV CODE- 250: Performed by: HOSPITALIST

## 2022-12-18 PROCEDURE — 82962 GLUCOSE BLOOD TEST: CPT

## 2022-12-18 PROCEDURE — 74011250637 HC RX REV CODE- 250/637: Performed by: HOSPITALIST

## 2022-12-18 PROCEDURE — 74011250637 HC RX REV CODE- 250/637: Performed by: STUDENT IN AN ORGANIZED HEALTH CARE EDUCATION/TRAINING PROGRAM

## 2022-12-18 PROCEDURE — 74011636637 HC RX REV CODE- 636/637: Performed by: HOSPITALIST

## 2022-12-18 PROCEDURE — 96374 THER/PROPH/DIAG INJ IV PUSH: CPT

## 2022-12-18 PROCEDURE — 74011250636 HC RX REV CODE- 250/636: Performed by: HOSPITALIST

## 2022-12-18 PROCEDURE — 84300 ASSAY OF URINE SODIUM: CPT

## 2022-12-18 PROCEDURE — G0378 HOSPITAL OBSERVATION PER HR: HCPCS

## 2022-12-18 PROCEDURE — 81001 URINALYSIS AUTO W/SCOPE: CPT

## 2022-12-18 PROCEDURE — 96375 TX/PRO/DX INJ NEW DRUG ADDON: CPT

## 2022-12-18 PROCEDURE — 74011250636 HC RX REV CODE- 250/636: Performed by: STUDENT IN AN ORGANIZED HEALTH CARE EDUCATION/TRAINING PROGRAM

## 2022-12-18 PROCEDURE — 87635 SARS-COV-2 COVID-19 AMP PRB: CPT

## 2022-12-18 PROCEDURE — 86900 BLOOD TYPING SEROLOGIC ABO: CPT

## 2022-12-18 PROCEDURE — 83930 ASSAY OF BLOOD OSMOLALITY: CPT

## 2022-12-18 PROCEDURE — 84484 ASSAY OF TROPONIN QUANT: CPT

## 2022-12-18 PROCEDURE — 83935 ASSAY OF URINE OSMOLALITY: CPT

## 2022-12-18 PROCEDURE — 80053 COMPREHEN METABOLIC PANEL: CPT

## 2022-12-18 PROCEDURE — 87804 INFLUENZA ASSAY W/OPTIC: CPT

## 2022-12-18 RX ORDER — POLYETHYLENE GLYCOL 3350 17 G/17G
17 POWDER, FOR SOLUTION ORAL DAILY PRN
Status: DISCONTINUED | OUTPATIENT
Start: 2022-12-18 | End: 2022-12-20 | Stop reason: HOSPADM

## 2022-12-18 RX ORDER — ONDANSETRON 4 MG/1
4 TABLET, ORALLY DISINTEGRATING ORAL
Status: DISCONTINUED | OUTPATIENT
Start: 2022-12-18 | End: 2022-12-20 | Stop reason: HOSPADM

## 2022-12-18 RX ORDER — ACETAMINOPHEN 325 MG/1
650 TABLET ORAL
Status: DISCONTINUED | OUTPATIENT
Start: 2022-12-18 | End: 2022-12-20 | Stop reason: HOSPADM

## 2022-12-18 RX ORDER — CHOLECALCIFEROL (VITAMIN D3) 125 MCG
5 CAPSULE ORAL
Status: DISCONTINUED | OUTPATIENT
Start: 2022-12-18 | End: 2022-12-20 | Stop reason: HOSPADM

## 2022-12-18 RX ORDER — ALBUTEROL SULFATE 2.5 MG/.5ML
2.5 SOLUTION RESPIRATORY (INHALATION)
Status: DISCONTINUED | OUTPATIENT
Start: 2022-12-18 | End: 2022-12-20 | Stop reason: HOSPADM

## 2022-12-18 RX ORDER — SODIUM CHLORIDE 9 MG/ML
100 INJECTION, SOLUTION INTRAVENOUS CONTINUOUS
Status: DISCONTINUED | OUTPATIENT
Start: 2022-12-18 | End: 2022-12-20 | Stop reason: HOSPADM

## 2022-12-18 RX ORDER — PANTOPRAZOLE SODIUM 40 MG/1
40 TABLET, DELAYED RELEASE ORAL 2 TIMES DAILY
Status: DISCONTINUED | OUTPATIENT
Start: 2022-12-18 | End: 2022-12-20 | Stop reason: HOSPADM

## 2022-12-18 RX ORDER — ASPIRIN 325 MG
325 TABLET ORAL
Status: COMPLETED | OUTPATIENT
Start: 2022-12-18 | End: 2022-12-18

## 2022-12-18 RX ORDER — MAGNESIUM SULFATE HEPTAHYDRATE 40 MG/ML
2 INJECTION, SOLUTION INTRAVENOUS ONCE
Status: COMPLETED | OUTPATIENT
Start: 2022-12-18 | End: 2022-12-18

## 2022-12-18 RX ORDER — FLUTICASONE PROPIONATE 50 MCG
2 SPRAY, SUSPENSION (ML) NASAL DAILY
Status: DISCONTINUED | OUTPATIENT
Start: 2022-12-18 | End: 2022-12-20 | Stop reason: HOSPADM

## 2022-12-18 RX ORDER — ACETAMINOPHEN 650 MG/1
650 SUPPOSITORY RECTAL
Status: DISCONTINUED | OUTPATIENT
Start: 2022-12-18 | End: 2022-12-20 | Stop reason: HOSPADM

## 2022-12-18 RX ORDER — DONEPEZIL HYDROCHLORIDE 5 MG/1
10 TABLET, FILM COATED ORAL
Status: DISCONTINUED | OUTPATIENT
Start: 2022-12-18 | End: 2022-12-20 | Stop reason: HOSPADM

## 2022-12-18 RX ORDER — PRAVASTATIN SODIUM 40 MG/1
80 TABLET ORAL
Status: DISCONTINUED | OUTPATIENT
Start: 2022-12-18 | End: 2022-12-20 | Stop reason: HOSPADM

## 2022-12-18 RX ORDER — SODIUM CHLORIDE 0.9 % (FLUSH) 0.9 %
5-40 SYRINGE (ML) INJECTION EVERY 8 HOURS
Status: DISCONTINUED | OUTPATIENT
Start: 2022-12-18 | End: 2022-12-20 | Stop reason: HOSPADM

## 2022-12-18 RX ORDER — POTASSIUM CHLORIDE 20 MEQ/1
40 TABLET, EXTENDED RELEASE ORAL 2 TIMES DAILY
Status: COMPLETED | OUTPATIENT
Start: 2022-12-18 | End: 2022-12-19

## 2022-12-18 RX ORDER — CALCITRIOL 0.25 UG/1
0.25 CAPSULE ORAL
Status: DISCONTINUED | OUTPATIENT
Start: 2022-12-19 | End: 2022-12-20 | Stop reason: HOSPADM

## 2022-12-18 RX ORDER — POTASSIUM CHLORIDE 7.45 MG/ML
10 INJECTION INTRAVENOUS ONCE
Status: COMPLETED | OUTPATIENT
Start: 2022-12-18 | End: 2022-12-18

## 2022-12-18 RX ORDER — GUAIFENESIN 100 MG/5ML
81 LIQUID (ML) ORAL DAILY
Status: DISCONTINUED | OUTPATIENT
Start: 2022-12-18 | End: 2022-12-20 | Stop reason: HOSPADM

## 2022-12-18 RX ORDER — GABAPENTIN 100 MG/1
200 CAPSULE ORAL 2 TIMES DAILY
Status: DISCONTINUED | OUTPATIENT
Start: 2022-12-18 | End: 2022-12-20 | Stop reason: HOSPADM

## 2022-12-18 RX ORDER — ONDANSETRON 2 MG/ML
4 INJECTION INTRAMUSCULAR; INTRAVENOUS
Status: DISCONTINUED | OUTPATIENT
Start: 2022-12-18 | End: 2022-12-20 | Stop reason: HOSPADM

## 2022-12-18 RX ORDER — INSULIN GLARGINE 100 [IU]/ML
10 INJECTION, SOLUTION SUBCUTANEOUS
Status: DISCONTINUED | OUTPATIENT
Start: 2022-12-18 | End: 2022-12-20 | Stop reason: HOSPADM

## 2022-12-18 RX ORDER — AMLODIPINE BESYLATE 5 MG/1
10 TABLET ORAL DAILY
Status: DISCONTINUED | OUTPATIENT
Start: 2022-12-18 | End: 2022-12-20 | Stop reason: HOSPADM

## 2022-12-18 RX ORDER — CARVEDILOL 3.12 MG/1
6.25 TABLET ORAL 2 TIMES DAILY WITH MEALS
Status: DISCONTINUED | OUTPATIENT
Start: 2022-12-18 | End: 2022-12-20 | Stop reason: HOSPADM

## 2022-12-18 RX ORDER — SODIUM CHLORIDE 0.9 % (FLUSH) 0.9 %
5-40 SYRINGE (ML) INJECTION AS NEEDED
Status: DISCONTINUED | OUTPATIENT
Start: 2022-12-18 | End: 2022-12-20 | Stop reason: HOSPADM

## 2022-12-18 RX ORDER — ENOXAPARIN SODIUM 100 MG/ML
30 INJECTION SUBCUTANEOUS DAILY
Status: DISCONTINUED | OUTPATIENT
Start: 2022-12-18 | End: 2022-12-20 | Stop reason: HOSPADM

## 2022-12-18 RX ORDER — CETIRIZINE HYDROCHLORIDE 10 MG/1
10 TABLET ORAL DAILY
Status: DISCONTINUED | OUTPATIENT
Start: 2022-12-18 | End: 2022-12-20 | Stop reason: HOSPADM

## 2022-12-18 RX ADMIN — SODIUM CHLORIDE 100 ML/HR: 9 INJECTION, SOLUTION INTRAVENOUS at 21:02

## 2022-12-18 RX ADMIN — AMLODIPINE BESYLATE 10 MG: 5 TABLET ORAL at 11:12

## 2022-12-18 RX ADMIN — ENOXAPARIN SODIUM 30 MG: 100 INJECTION SUBCUTANEOUS at 11:12

## 2022-12-18 RX ADMIN — DONEPEZIL HYDROCHLORIDE 10 MG: 5 TABLET, FILM COATED ORAL at 21:01

## 2022-12-18 RX ADMIN — ACETAMINOPHEN 650 MG: 325 TABLET, FILM COATED ORAL at 16:52

## 2022-12-18 RX ADMIN — CETIRIZINE HYDROCHLORIDE 10 MG: 10 TABLET, FILM COATED ORAL at 11:13

## 2022-12-18 RX ADMIN — FLUTICASONE PROPIONATE 2 SPRAY: 50 SPRAY, METERED NASAL at 14:01

## 2022-12-18 RX ADMIN — POTASSIUM CHLORIDE 40 MEQ: 1500 TABLET, EXTENDED RELEASE ORAL at 21:01

## 2022-12-18 RX ADMIN — PRAVASTATIN SODIUM 80 MG: 40 TABLET ORAL at 21:01

## 2022-12-18 RX ADMIN — ASPIRIN 81 MG 81 MG: 81 TABLET ORAL at 11:12

## 2022-12-18 RX ADMIN — SODIUM CHLORIDE 1000 ML: 9 INJECTION, SOLUTION INTRAVENOUS at 02:00

## 2022-12-18 RX ADMIN — INSULIN GLARGINE 10 UNITS: 100 INJECTION, SOLUTION SUBCUTANEOUS at 21:18

## 2022-12-18 RX ADMIN — MAGNESIUM SULFATE HEPTAHYDRATE 2 G: 40 INJECTION, SOLUTION INTRAVENOUS at 18:38

## 2022-12-18 RX ADMIN — MELATONIN TAB 5 MG 5 MG: 5 TAB at 21:01

## 2022-12-18 RX ADMIN — SODIUM CHLORIDE, PRESERVATIVE FREE 10 ML: 5 INJECTION INTRAVENOUS at 21:03

## 2022-12-18 RX ADMIN — POTASSIUM CHLORIDE 10 MEQ: 7.46 INJECTION, SOLUTION INTRAVENOUS at 18:37

## 2022-12-18 RX ADMIN — ASPIRIN 325 MG ORAL TABLET 325 MG: 325 PILL ORAL at 03:51

## 2022-12-18 RX ADMIN — SODIUM CHLORIDE 100 ML/HR: 9 INJECTION, SOLUTION INTRAVENOUS at 03:51

## 2022-12-18 RX ADMIN — SODIUM CHLORIDE, PRESERVATIVE FREE 10 ML: 5 INJECTION INTRAVENOUS at 16:53

## 2022-12-18 RX ADMIN — PANTOPRAZOLE SODIUM 40 MG: 40 TABLET, DELAYED RELEASE ORAL at 11:12

## 2022-12-18 RX ADMIN — CARVEDILOL 6.25 MG: 3.12 TABLET, FILM COATED ORAL at 16:53

## 2022-12-18 RX ADMIN — GABAPENTIN 200 MG: 100 CAPSULE ORAL at 21:01

## 2022-12-18 RX ADMIN — PANTOPRAZOLE SODIUM 40 MG: 40 TABLET, DELAYED RELEASE ORAL at 21:01

## 2022-12-18 NOTE — PROGRESS NOTES
Reason for Admission:  Syncope                     RUR Score:    N/A                 Plan for utilizing home health:   Not at this time       PCP: First and Last name:  Reymundo Saint, NP     Name of Practice:    Are you a current patient: Yes/No:    Approximate date of last visit: A month ago   Can you participate in a virtual visit with your PCP:                     Current Advanced Directive/Advance Care Plan: Full Code      Healthcare Decision Maker:   Click here to complete [address] including selection of the Healthcare Decision Maker Relationship (ie \"Primary\")             Primary Decision MakerFabilakeshia Hayden - Daughter - [phone]                  Transition of Care Plan:       Met f/f with Pt, she confirmed that the information on the face sheet is correct. Pt stated that she lives with her daughter. Pt stated no HH, she has a walker and cane, and is independent with ADL. Pt stated that she uses [address] in [place]. Pt stated that is family cannot pick her up she will need Medicaid Transport when she is D/C. CM dispo: home with no needs at this time.

## 2022-12-18 NOTE — H&P
History and Physical    Subjective:   Chief Complaint : I passed out in the morning  Source of information : patient     History of present illness:     73F, h/o CKDIII, HTN , DMII on levemir     She was here in November for GI bleed with ABLA- EGD showed gastritis     She said this morning she was sitting in the chair when she passed out, she has no recollection of events and when she woke up she saw EMS. She denies any prodrome or any symptoms upon waking up. Lives with family ans no head injury, when she passed out family held her and was safely laid on the floor. ED:  Troponin 78, , cr 3.24 ALEX on CKDIII      Past Medical History:   Diagnosis Date    Adenocarcinoma, renal cell ([facility] 9/2/2020    Arthritis     CAD (coronary artery disease)     Chronic kidney disease     Diabetes ([facility]     Gout     Hypercholesterolemia     Hypertension     Mental depression     Pulmonary embolism ([facility] [facility]     Seizures ([facility] [facility]     Stroke [facility])     Thyroid disease      Past Surgical History:   Procedure Laterality Date    COLONOSCOPY N/A 10/24/2022    COLONOSCOPY performed by Malini Burch MD at [facility] ENDOSCOPY    HX GYN      HX HYSTERECTOMY      HX NEPHRECTOMY Left 02/12/2015    HX ORTHOPAEDIC      HX UROLOGICAL  02/12/2015    PARTIAL URETERECTOMY     SC CARDIAC SURG PROCEDURE UNLIST      stents placed      Family History   Problem Relation Age of Onset    Heart Disease Mother     Heart Disease Father     Heart Disease Sister     Cancer Sister     Heart Disease Brother     Cancer Maternal Grandmother     Stroke Son     Cancer Sister       Social History     Tobacco Use    Smoking status: Former     Years: 15.00     Types: Cigarettes    Smokeless tobacco: Never   Substance Use Topics    Alcohol use: Not Currently       Prior to Admission medications    Medication Sig Start Date End Date Taking? Authorizing Provider   Nebulizer & Compressor (Comp-Air Nebulizer Compressor) machine as directed.  8/9/22   Provider, Historical aspirin 81 mg chewable tablet CHEW AND SWALLOW 1 TABLET BY MOUTH ONCE DAILY FOR PREVENTION 8/10/22   Provider, Historical   amLODIPine (NORVASC) 10 mg tablet Take 10 mg by mouth daily. 7/21/22 7/21/23  Provider, Historical   FeroSuL 325 mg (65 mg iron) tablet TAKE 1 TABLET BY MOUTH ONCE DAILY FOR SUPPLEMENTATION 8/10/22   Provider, Historical   fluticasone propionate (FLONASE) 50 mcg/actuation nasal spray 1 spray in each nostril Nasally Once a day    Provider, Historical   nitroglycerin (NITROSTAT) 0.4 mg SL tablet DISSOLVE ONE TABLET UNDER THE TONGUE EVERY 5 MINUTES AS NEEDED FOR CHEST PAIN. DO NOT EXCEED A TOTAL OF 3 DOSES IN 15 MINUTES NOW 11/11/22   Provider, Historical   triamcinolone acetonide (KENALOG) 0.1 % topical cream APPLY 1 CREAM EXTERNALLY TWICE DAILY TO LOWER LEGS 30 DAYS 9/30/22   Provider, Historical   acetaminophen (Tylenol Extra Strength) 500 mg tablet 2 tablets as needed Orally every 6 hrs    Provider, Historical   travoprost (Travatan Z) 0.004 % ophthalmic solution Travatan    Provider, Historical   cetirizine (ZYRTEC) 10 mg tablet Take 10 mg by mouth daily. Provider, Historical   melatonin 5 mg tablet Take 5 mg by mouth nightly. Provider, Historical   insulin aspart U-100 (NOVOLOG) 100 unit/mL injection by SubCUTAneous route Before breakfast, lunch, dinner and at bedtime. SS: 150-200=2 units 201-250=4 units 251-300=6 units 301-350=8 units 351-400=10 units    Provider, Historical   insulin detemir U-100 (LEVEMIR) 100 unit/mL injection 10 Units by SubCUTAneous route nightly. Provider, Historical   plecanatide (Trulance) 3 mg tab Take 3 mg by mouth daily. Provider, Historical   pantoprazole (PROTONIX) 40 mg tablet Take 1 Tablet by mouth two (2) times a day. 8/8/22   Sheryl Kumar MD   albuterol (PROVENTIL VENTOLIN) 2.5 mg /3 mL (0.083 %) nebu 2.5 mg by Nebulization route every six (6) hours as needed for Shortness of Breath.     Provider, Historical   carvediloL (COREG) 6.25 mg tablet Take 6.25 mg by mouth two (2) times daily (with meals). Provider, Historical   calcitRIOL (ROCALTROL) 0.25 mcg capsule Take 0.25 mcg by mouth BID Mon Wed & Fri. Provider, Historical   donepeziL (ARICEPT) 10 mg tablet Take 10 mg by mouth nightly. Provider, Historical   Venlafaxine-ER 24 HR (EFFEXOR-ER) 75 mg tr24 tablet Take 75 mg by mouth daily. Provider, Historical   pravastatin (PRAVACHOL) 80 mg tablet Take 80 mg by mouth nightly. Provider, Historical     No Known Allergies          Review of Systems:  Constitutional: Appetite is good, denies weight loss, no fever, no chills, no night sweats  Eye: No recent visual disturbances, no discharge, no double vision  Ear/nose/mouth/throat : No hearing disturbance, no ear pain, no nasal congestion, no sore throat, no trouble swallowing. Respiratory : No trouble breathing, no cough, no shortness of breath, no hemoptysis, no wheezing  Cardiovascular : No chest pain, no palpitation, no racing of heart, no orthopnea, no paroxysmal nocturnal dyspnea, no peripheral edema  Gastrointestinal : No nausea, no vomiting, no diarrhea, constipation, heartburn, abdominal pain  Genitourinary : No dysuria, no hematuria, no increased frequency, incontinence,  Lymphatics : No swollen glands -Neck, axillary, inguinal  Endocrine : No excessive thirst, no polyuria no cold intolerance, no heat intolerance.   Immunologic : No hives, urticaria, no seasonal allergies,   Musculoskeletal : No joint swelling, pain, effusion,  no back pain, no neck pain,   Integumentary : No rash, no pruritus, no ecchymosis  Hematology : No petechiae, No easy bruising,  No tendency to bleed easy  Neurology : Denies change in mental status, no abnormal balance, no headache, no confusion, numbness, tingling,  Psychiatric : No mood swings, no anxiety, depression    Vitals:   Visit Vitals  BP (!) 140/63   Pulse 70   Temp 98.7 °F (37.1 °C)   Resp 19   Ht 5' 4\" (1.626 m)   Wt 84.8 kg (187 lb)   SpO2 99% BMI 32.10 kg/m²       Physical Exam:     GENERAL: awake, alert, cooperative, not in distress  HEENT:  * Pupils equal, EOMI  * Head atraumatic  * Dry mucus membranes  CV:  * audible heart sounds  * warm and perfused extremities bilaterally  PULMONARY: Good air movement, no wheezes, no crackles  ABDOMEN/: soft, no distension, no guarding, no abdominal tenderness  EXTREMITIES/BACK: warm and perfused, no tenderness, no edema  SKIN: no rashes or signs of trauma  NEURO:  * Speech clear  * Moves U&LE to command        Data Review:   Recent Results (from the past 24 hour(s))   EKG, 12 LEAD, INITIAL    Collection Time: 12/17/22 11:15 PM   Result Value Ref Range    Ventricular Rate 63 BPM    Atrial Rate 63 BPM    P-R Interval 148 ms    QRS Duration 84 ms    Q-T Interval 498 ms    QTC Calculation (Bezet) 509 ms    Calculated P Axis 69 degrees    Calculated R Axis 33 degrees    Calculated T Axis 48 degrees    Diagnosis       Sinus rhythm with occasional Premature ventricular complexes  Prolonged QT  Abnormal ECG  When compared with ECG of 13-NOV-2022 16:19,  No significant change was found  Confirmed by Megan Jones MD, CEASAR ([id number]) on 12/18/2022 9:33:50 AM     CBC WITH AUTOMATED DIFF    Collection Time: 12/17/22 11:22 PM   Result Value Ref Range    WBC 7.3 3.6 - 11.0 K/uL    RBC 3.60 (L) 3.80 - 5.20 M/uL    HGB 9.2 (L) 11.5 - 16.0 g/dL    HCT 29.7 (L) 35.0 - 47.0 %    MCV 82.5 80.0 - 99.0 FL    MCH 25.6 (L) 26.0 - 34.0 PG    MCHC 31.0 30.0 - 36.5 g/dL    RDW 20.3 (H) 11.5 - 14.5 %    PLATELET 573 772 - 005 K/uL    MPV 11.4 8.9 - 12.9 FL    NRBC 0.0 0.0  WBC    ABSOLUTE NRBC 0.00 0.00 - 0.01 K/uL    NEUTROPHILS 78 (H) 32 - 75 %    LYMPHOCYTES 11 (L) 12 - 49 %    MONOCYTES 10 5 - 13 %    EOSINOPHILS 1 0 - 7 %    BASOPHILS 0 0 - 1 %    IMMATURE GRANULOCYTES 0 0 - 0.5 %    ABS. NEUTROPHILS 5.7 1.8 - 8.0 K/UL    ABS. LYMPHOCYTES 0.8 0.8 - 3.5 K/UL    ABS. MONOCYTES 0.7 0.0 - 1.0 K/UL    ABS.  EOSINOPHILS 0.1 0.0 - 0.4 K/UL ABS. BASOPHILS 0.0 0.0 - 0.1 K/UL    ABS. IMM. GRANS. 0.0 0.00 - 0.04 K/UL    DF AUTOMATED     METABOLIC PANEL, COMPREHENSIVE    Collection Time: 12/17/22 11:22 PM   Result Value Ref Range    Sodium 137 136 - 145 mmol/L    Potassium Hemolyzed, recollect requested 3.5 - 5.1 mmol/L    Chloride 103 97 - 108 mmol/L    CO2 28 21 - 32 mmol/L    Anion gap 6 5 - 15 mmol/L    Glucose 179 (H) 65 - 100 mg/dL    BUN 57 (H) 6 - 20 mg/dL    Creatinine 3.24 (H) 0.55 - 1.02 mg/dL    BUN/Creatinine ratio 18 12 - 20      eGFR 14 (L) >60 ml/min/1.73m2    Calcium 8.6 8.5 - 10.1 mg/dL    Bilirubin, total 0.4 0.2 - 1.0 mg/dL    AST (SGOT) Hemolyzed, recollect requested 15 - 37 U/L    ALT (SGPT) Hemolyzed, recollect requested 12 - 78 U/L    Alk.  phosphatase 155 (H) 45 - 117 U/L    Protein, total 6.8 6.4 - 8.2 g/dL    Albumin 3.0 (L) 3.5 - 5.0 g/dL    Globulin 3.8 2.0 - 4.0 g/dL    A-G Ratio 0.8 (L) 1.1 - 2.2     TROPONIN-HIGH SENSITIVITY    Collection Time: 12/17/22 11:22 PM   Result Value Ref Range    Troponin-High Sensitivity 86 (H) 0 - 51 ng/L   MAGNESIUM    Collection Time: 12/17/22 11:22 PM   Result Value Ref Range    Magnesium 1.9 1.6 - 2.4 mg/dL   CK    Collection Time: 12/17/22 11:22 PM   Result Value Ref Range     (H) 26 - 192 U/L   TROPONIN-HIGH SENSITIVITY    Collection Time: 12/18/22  1:58 AM   Result Value Ref Range    Troponin-High Sensitivity 78 (H) 0 - 51 ng/L   COVID-19 RAPID TEST    Collection Time: 12/18/22  1:58 AM   Result Value Ref Range    COVID-19 rapid test Not Detected Not Detected     INFLUENZA A & B AG (RAPID TEST)    Collection Time: 12/18/22  1:58 AM   Result Value Ref Range    Influenza A Antigen Negative Negative      Influenza B Antigen Negative Negative     URINALYSIS W/ RFLX MICROSCOPIC    Collection Time: 12/18/22  7:03 AM   Result Value Ref Range    Color Yellow/Straw      Appearance Clear Clear      Specific gravity 1.020 1.003 - 1.030      pH (UA) 5.0      Protein 30 (A) Negative mg/dL Glucose Negative Negative mg/dL    Ketone Negative Negative mg/dL    Bilirubin Negative Negative      Blood Negative Negative      Urobilinogen 0.1 (L) 0.2 - 1.0 EU/dL    Nitrites Negative Negative      Leukocyte Esterase Negative Negative     URINE MICROSCOPIC    Collection Time: 12/18/22  7:03 AM   Result Value Ref Range    WBC 0-4 0 - 4 /hpf    RBC 0-5 0 - 5 /hpf    Bacteria 1+ (A) Negative /hpf             Assessment and Plan :     (1) Syncope : cardiac VS dehydration, order orthostatic    (2) increased troponin: repeat troponin, consult cards.  ECHO In august 0497 no diastolic or systolic dysfunction    (3) ALEX on CKDIII: IVF    (4) HTN: resume home meds    (5) dementia: resume home meds    DVT ppx: lovenox     DISPO: home after cardaic eval.       Signed By: Sandy Dietrich MD     December 18, 2022

## 2022-12-18 NOTE — CONSULTS
[facility] Cardiology: [facility] End Office  [address], [address] [facility]  [phone]      Requesting/referring provider:   Reason for Consult:    HPI: Twila Morris, a 65y.o. year-old who presents for evaluation of  htn, syncope. Has hx of CAD, stents, etc. Presenting with alex passing out etc. Trop up, echo recently was ok, she gives a report of feeling short of breath, tired at times. No recent chest pain. Currently laying flat in NAD. Mld le edema x a few weeks. Not too bad now. Assessment/Plan:  CAD- s/p stents, no recent assessment  Slight trop elevation, trend  Syncope- possibly cardiac- tele x 24 hours  PVC on EKG  -echo evaluation  ALEX   HTN cont home meds  7. DM- glucose    She  has a past medical history of Adenocarcinoma, renal cell ([address] (9/2/2020), Arthritis, CAD (coronary artery disease), Chronic kidney disease, Diabetes ([address] Gout, Hypercholesterolemia, Hypertension, Mental depression, Pulmonary embolism ([address] Seizures ([address] Stroke ([address] and Thyroid disease. All other systems negative except as above. PE  Vitals:    12/17/22 0011 12/17/22 2314 12/18/22 0352 12/18/22 0945   BP: (!) 125/54  (!) 159/81 (!) 140/63   Pulse: 62  70 70   Resp: 16 16 16 19   Temp: 98.7 °F (37.1 °C)      SpO2: 98%  100% 99%   Weight: 84.8 kg (187 lb) 84.8 kg (187 lb)     Height: 5' 4\" (1.626 m) 5' 4\" (1.626 m)      Body mass index is 32.1 kg/m².    General appearance - alert, well appearing, and in no distress  Mental status - affect appropriate to mood  Eyes - sclera anicteric, moist mucous membranes  Neck - supple, no significant adenopathy  Lymphatics - no  lymphadenopathy  Chest - clear to auscultation, no wheezes, rales or rhonchi  Heart - normal rate, regular rhythm, normal S1, S2, no murmurs, rubs, clicks or gallops  Abdomen - soft, nontender, nondistended, no masses or organomegaly  Back exam - full range of motion, no tenderness  Neurological - cranial nerves II through XII grossly intact, no focal deficit  Musculoskeletal - no muscular tenderness noted, normal strength  Extremities - peripheral pulses normal, no pedal edema  Skin - normal coloration  no rashes    Recent Labs:  Lab Results   Component Value Date/Time    Cholesterol, total 164 11/22/2020 01:12 AM    HDL Cholesterol 64 11/22/2020 01:12 AM    LDL,Direct 61 06/20/2022 09:55 AM    LDL, calculated 73.2 11/22/2020 01:12 AM    Triglyceride 134 11/22/2020 01:12 AM    CHOL/HDL Ratio 2.6 11/22/2020 01:12 AM     Lab Results   Component Value Date/Time    Creatinine 3.24 (H) 12/17/2022 11:22 PM     Lab Results   Component Value Date/Time    BUN 57 (H) 12/17/2022 11:22 PM     Lab Results   Component Value Date/Time    Potassium Hemolyzed, recollect requested 12/17/2022 11:22 PM     Lab Results   Component Value Date/Time    Hemoglobin A1c 5.7 (H) 11/14/2022 10:25 AM    Hemoglobin A1c, External 6.5 03/14/2021 12:00 AM     Lab Results   Component Value Date/Time    HGB 9.2 (L) 12/17/2022 11:22 PM     Lab Results   Component Value Date/Time    PLATELET 833 79/68/7427 11:22 PM       Reviewed:  Past Medical History:   Diagnosis Date    Adenocarcinoma, renal cell ([facility] 9/2/2020    Arthritis     CAD (coronary artery disease)     Chronic kidney disease     Diabetes ([facility] [address]     Gout     Hypercholesterolemia     Hypertension     Mental depression     Pulmonary embolism ([facility])     Seizures ([facility])     [facility]     Thyroid disease      Social History     Tobacco Use   Smoking Status Former    Years: 15.00    Types: Cigarettes   Smokeless Tobacco Never     Social History     Substance and Sexual Activity   Alcohol Use Not Currently     No Known Allergies    Current Facility-Administered Medications   Medication Dose Route Frequency    0.9% sodium chloride infusion  100 mL/hr IntraVENous CONTINUOUS    albuterol CONCENTRATE 2.5mg/0.5 mL neb soln  2.5 mg Nebulization Q6H PRN    amLODIPine (NORVASC) tablet 10 mg  10 mg Oral DAILY    aspirin chewable tablet 81 mg  81 mg Oral DAILY    donepeziL (ARICEPT) tablet 10 mg  10 mg Oral QHS    [START ON 12/19/2022] calcitRIOL (ROCALTROL) capsule 0.25 mcg  0.25 mcg Oral BID Mon Wed & Fri    carvediloL (COREG) tablet 6.25 mg  6.25 mg Oral BID WITH MEALS    cetirizine (ZYRTEC) tablet 10 mg  10 mg Oral DAILY    fluticasone propionate (FLONASE) 50 mcg/actuation nasal spray 2 Spray  2 Spray Both Nostrils DAILY    sodium chloride (NS) flush 5-40 mL  5-40 mL IntraVENous Q8H    sodium chloride (NS) flush 5-40 mL  5-40 mL IntraVENous PRN    acetaminophen (TYLENOL) tablet 650 mg  650 mg Oral Q6H PRN    Or    acetaminophen (TYLENOL) suppository 650 mg  650 mg Rectal Q6H PRN    polyethylene glycol (MIRALAX) packet 17 g  17 g Oral DAILY PRN    ondansetron (ZOFRAN ODT) tablet 4 mg  4 mg Oral Q8H PRN    Or    ondansetron (ZOFRAN) injection 4 mg  4 mg IntraVENous Q6H PRN    enoxaparin (LOVENOX) injection 30 mg  30 mg SubCUTAneous DAILY    insulin glargine (LANTUS) injection 10 Units  10 Units SubCUTAneous QHS    pravastatin (PRAVACHOL) tablet 80 mg  80 mg Oral QHS    [START ON 12/19/2022] plecanatide tab 3 mg  3 mg Oral DAILY    pantoprazole (PROTONIX) tablet 40 mg  40 mg Oral BID    melatonin tablet 5 mg  5 mg Oral QHS     Current Outpatient Medications   Medication Sig    Nebulizer & Compressor (Comp-Air Nebulizer Compressor) machine as directed. aspirin 81 mg chewable tablet CHEW AND SWALLOW 1 TABLET BY MOUTH ONCE DAILY FOR PREVENTION    amLODIPine (NORVASC) 10 mg tablet Take 10 mg by mouth daily. FeroSuL 325 mg (65 mg iron) tablet TAKE 1 TABLET BY MOUTH ONCE DAILY FOR SUPPLEMENTATION    fluticasone propionate (FLONASE) 50 mcg/actuation nasal spray 1 spray in each nostril Nasally Once a day    nitroglycerin (NITROSTAT) 0.4 mg SL tablet DISSOLVE ONE TABLET UNDER THE TONGUE EVERY 5 MINUTES AS NEEDED FOR CHEST PAIN.  DO NOT EXCEED A TOTAL OF 3 DOSES IN 15 MINUTES NOW    triamcinolone acetonide (KENALOG) 0.1 % topical cream APPLY 1 CREAM EXTERNALLY TWICE DAILY TO LOWER LEGS 30 DAYS    acetaminophen (Tylenol Extra Strength) 500 mg tablet 2 tablets as needed Orally every 6 hrs    travoprost (Travatan Z) 0.004 % ophthalmic solution Travatan    cetirizine (ZYRTEC) 10 mg tablet Take 10 mg by mouth daily. melatonin 5 mg tablet Take 5 mg by mouth nightly. insulin aspart U-100 (NOVOLOG) 100 unit/mL injection by SubCUTAneous route Before breakfast, lunch, dinner and at bedtime. SS: 150-200=2 units 201-250=4 units 251-300=6 units 301-350=8 units 351-400=10 units    insulin detemir U-100 (LEVEMIR) 100 unit/mL injection 10 Units by SubCUTAneous route nightly. plecanatide (Trulance) 3 mg tab Take 3 mg by mouth daily. pantoprazole (PROTONIX) 40 mg tablet Take 1 Tablet by mouth two (2) times a day. albuterol (PROVENTIL VENTOLIN) 2.5 mg /3 mL (0.083 %) nebu 2.5 mg by Nebulization route every six (6) hours as needed for Shortness of Breath. carvediloL (COREG) 6.25 mg tablet Take 6.25 mg by mouth two (2) times daily (with meals). calcitRIOL (ROCALTROL) 0.25 mcg capsule Take 0.25 mcg by mouth BID Mon Wed & Fri.    donepeziL (ARICEPT) 10 mg tablet Take 10 mg by mouth nightly. Venlafaxine-ER 24 HR (EFFEXOR-ER) 75 mg tr24 tablet Take 75 mg by mouth daily. pravastatin (PRAVACHOL) 80 mg tablet Take 80 mg by mouth nightly.        Negrita Miranda MD    [address], [address] [place] [address]  [phone]

## 2022-12-18 NOTE — PROGRESS NOTES
Reason for Admission:  Syncope                     RUR Score:    N/A                 Plan for utilizing home health:   Not at this time       PCP: First and Last name:  Irma Hooks NP     Name of Practice:    Are you a current patient: Yes/No:    Approximate date of last visit: A month ago   Can you participate in a virtual visit with your PCP:                     Current Advanced Directive/Advance Care Plan: Full Code      Healthcare Decision Maker:   Click here to complete [address] including selection of the Healthcare Decision Maker Relationship (ie \"Primary\")             Primary Decision MakerRkanchan Donovan - Daughter - [phone]                  Transition of Care Plan:       Met f/f with Pt, she confirmed that the information on the face sheet is correct. Pt stated that she lives with her daughter. Pt stated no HH, she has a walker and cane, and is independent with ADL. Pt stated that she uses [address] in [place]. Pt stated that is family cannot pick her up she will need Medicaid Transport when she is D/C. CM dispo: home with no needs at this time.

## 2022-12-18 NOTE — ED TRIAGE NOTES
Pt presents by EMS from home for syncope, per EMS this is a frequent occurrence for this pt. Pt unresponsive in chair upon arrival, A&Ox4 and GCS 15 once loaded into medic, which is her typical presentation.  [id number]

## 2022-12-18 NOTE — ED PROVIDER NOTES
[address]  EMERGENCY DEPARTMENT ENCOUNTER NOTE    Date: 12/17/2022  Patient Name: Yue Rocha    History of Presenting Illness     Chief Complaint   Patient presents with    Syncope     HPI: Yue Rocha, 65 y.o. female with a past medical history and outpatient medications as listed and reviewed below  presents for syncope. She reports that she was in a chair, felt lightheaded, and passed out. There was no falls or head trauma. She feels generalized fatigue and mild decreased urine output. She reports eating and drinking okay. Denies any chest pain, shortness breath, abdominal pain, nausea, vomiting. No runny nose or sore throat. Appears to be mildly lethargic in the room however has no other complaints and answers questions appropriately. Symptoms started earlier today. On review of chart, the patient was recently admitted for syncope and was found to have ALEX with dehydration. At that time, her creatinine was markedly elevated compared to her recent prior admission. She was given fluids, renal function improved, and she was discharged after decrease of her torsemide and gabapentin doses. .    Medical History   I reviewed the medical, surgical, family, and social history, as well as allergies:    PCP: Chad Zamora NP    Past Medical History:  Past Medical History:   Diagnosis Date    Adenocarcinoma, renal cell ([facility] [address] 9/2/2020    Arthritis     CAD (coronary artery disease)     Chronic kidney disease     Diabetes ([address]     Gout     Hypercholesterolemia     Hypertension     Mental depression     Pulmonary embolism ([address]     Seizures ([facility] [address]     Stroke [facility])     Thyroid disease      Past Surgical History:  Past Surgical History:   Procedure Laterality Date    COLONOSCOPY N/A 10/24/2022    COLONOSCOPY performed by Peggy Valenzuela MD at [facility] 1      HX NEPHRECTOMY Left 02/12/2015    HX ORTHOPAEDIC      HX UROLOGICAL  02/12/2015 PARTIAL URETERECTOMY     ID CARDIAC SURG PROCEDURE UNLIST      stents placed      Current Outpatient Medications:  Current Outpatient Medications   Medication Instructions    acetaminophen (Tylenol Extra Strength) 500 mg tablet 2 tablets as needed Orally every 6 hrs    albuterol (PROVENTIL VENTOLIN) 2.5 mg, Nebulization, EVERY 6 HOURS AS NEEDED    amLODIPine (NORVASC) 10 mg, Oral, DAILY    aspirin 81 mg chewable tablet CHEW AND SWALLOW 1 TABLET BY MOUTH ONCE DAILY FOR PREVENTION    calcitRIOL (ROCALTROL) 0.25 mcg, Oral, 3 TIMES WEEK BID (MON WED &FRI    carvediloL (COREG) 6.25 mg, Oral, 2 TIMES DAILY WITH MEALS    cetirizine (ZYRTEC) 10 mg, Oral, DAILY    donepeziL (ARICEPT) 10 mg, Oral, EVERY BEDTIME    FeroSuL 325 mg (65 mg iron) tablet TAKE 1 TABLET BY MOUTH ONCE DAILY FOR SUPPLEMENTATION    fluticasone propionate (FLONASE) 50 mcg/actuation nasal spray 1 spray in each nostril Nasally Once a day    insulin aspart U-100 (NOVOLOG) 100 unit/mL injection SubCUTAneous, 4 TIMES DAILY BEFORE MEALS & NIGHTLY, SS: 150-200=2 units 201-250=4 units 251-300=6 units 301-350=8 units 351-400=10 units    insulin detemir U-100 (LEVEMIR) 10 Units, SubCUTAneous, EVERY BEDTIME    melatonin 5 mg, Oral, EVERY BEDTIME    Nebulizer & Compressor (Comp-Air Nebulizer Compressor) machine AS DIRECTED    nitroglycerin (NITROSTAT) 0.4 mg SL tablet DISSOLVE ONE TABLET UNDER THE TONGUE EVERY 5 MINUTES AS NEEDED FOR CHEST PAIN.  DO NOT EXCEED A TOTAL OF 3 DOSES IN 15 MINUTES NOW    pantoprazole (PROTONIX) 40 mg, Oral, 2 TIMES DAILY    pravastatin (PRAVACHOL) 80 mg, EVERY BEDTIME    travoprost (Travatan Z) 0.004 % ophthalmic solution Travatan    triamcinolone acetonide (KENALOG) 0.1 % topical cream APPLY 1 CREAM EXTERNALLY TWICE DAILY TO LOWER LEGS 30 DAYS    Trulance 3 mg, Oral, DAILY    Venlafaxine-ER 24 HR (EFFEXOR-ER) 75 mg, Oral, DAILY      Family History:  Family History   Problem Relation Age of Onset    Heart Disease Mother     Heart Disease Father     Heart Disease Sister     Cancer Sister     Heart Disease Brother     Cancer Maternal Grandmother     Stroke Son     Cancer Sister      Social History:  Social History     Tobacco Use    Smoking status: Former     Years: 15.00     Types: Cigarettes    Smokeless tobacco: Never   Vaping Use    Vaping Use: Never used   Substance Use Topics    Alcohol use: Not Currently    Drug use: Never     Allergies:  No Known Allergies    Review of Systems     Review of Systems  Negative: Positives and pertinent negatives as per HPI. All other systems were reviewed and are negative. Physical Exam & Vital Signs   Vital Signs - I reviewed the patient's vital signs. Patient Vitals for the past 12 hrs:   Pulse Resp BP SpO2   12/18/22 0352 70 16 (!) 159/81 100 %   12/17/22 2314 -- 16 -- --     Physical Exam:    GENERAL: awake, alert, cooperative, not in distress  HEENT:  * Pupils equal, EOMI  * Head atraumatic  * Dry mucus membranes  CV:  * audible heart sounds  * warm and perfused extremities bilaterally  PULMONARY: Good air movement, no wheezes, no crackles  ABDOMEN/: soft, no distension, no guarding, no abdominal tenderness  EXTREMITIES/BACK: warm and perfused, no tenderness, no edema  SKIN: no rashes or signs of trauma  NEURO:  * Speech clear  * Moves U&LE to command    Medical Decision Making     Patient is a 65 y.o. female presenting for syncope. Vitals reveal no significant abnormalities and physical exam reveals  dry mucus membranes . EKG showed  prolonged Qtc 509 . Based on the history, physical exam, risk factors, and vital signs, differential includes: Duration, ALEX, ACS, arrhythmia, COVID, flu. See ED Course and Reassessment for evaluation and discussion.       EMR Automatically Imported Results     Labs:  Recent Results (from the past 12 hour(s))   CBC WITH AUTOMATED DIFF    Collection Time: 12/17/22 11:22 PM   Result Value Ref Range    WBC 7.3 3.6 - 11.0 K/uL    RBC 3.60 (L) 3.80 - 5.20 M/uL HGB 9.2 (L) 11.5 - 16.0 g/dL    HCT 29.7 (L) 35.0 - 47.0 %    MCV 82.5 80.0 - 99.0 FL    MCH 25.6 (L) 26.0 - 34.0 PG    MCHC 31.0 30.0 - 36.5 g/dL    RDW 20.3 (H) 11.5 - 14.5 %    PLATELET 395 554 - 252 K/uL    MPV 11.4 8.9 - 12.9 FL    NRBC 0.0 0.0  WBC    ABSOLUTE NRBC 0.00 0.00 - 0.01 K/uL    NEUTROPHILS 78 (H) 32 - 75 %    LYMPHOCYTES 11 (L) 12 - 49 %    MONOCYTES 10 5 - 13 %    EOSINOPHILS 1 0 - 7 %    BASOPHILS 0 0 - 1 %    IMMATURE GRANULOCYTES 0 0 - 0.5 %    ABS. NEUTROPHILS 5.7 1.8 - 8.0 K/UL    ABS. LYMPHOCYTES 0.8 0.8 - 3.5 K/UL    ABS. MONOCYTES 0.7 0.0 - 1.0 K/UL    ABS. EOSINOPHILS 0.1 0.0 - 0.4 K/UL    ABS. BASOPHILS 0.0 0.0 - 0.1 K/UL    ABS. IMM. GRANS. 0.0 0.00 - 0.04 K/UL    DF AUTOMATED     METABOLIC PANEL, COMPREHENSIVE    Collection Time: 12/17/22 11:22 PM   Result Value Ref Range    Sodium 137 136 - 145 mmol/L    Potassium Hemolyzed, recollect requested 3.5 - 5.1 mmol/L    Chloride 103 97 - 108 mmol/L    CO2 28 21 - 32 mmol/L    Anion gap 6 5 - 15 mmol/L    Glucose 179 (H) 65 - 100 mg/dL    BUN 57 (H) 6 - 20 mg/dL    Creatinine 3.24 (H) 0.55 - 1.02 mg/dL    BUN/Creatinine ratio 18 12 - 20      eGFR 14 (L) >60 ml/min/1.73m2    Calcium 8.6 8.5 - 10.1 mg/dL    Bilirubin, total 0.4 0.2 - 1.0 mg/dL    AST (SGOT) Hemolyzed, recollect requested 15 - 37 U/L    ALT (SGPT) Hemolyzed, recollect requested 12 - 78 U/L    Alk.  phosphatase 155 (H) 45 - 117 U/L    Protein, total 6.8 6.4 - 8.2 g/dL    Albumin 3.0 (L) 3.5 - 5.0 g/dL    Globulin 3.8 2.0 - 4.0 g/dL    A-G Ratio 0.8 (L) 1.1 - 2.2     TROPONIN-HIGH SENSITIVITY    Collection Time: 12/17/22 11:22 PM   Result Value Ref Range    Troponin-High Sensitivity 86 (H) 0 - 51 ng/L   MAGNESIUM    Collection Time: 12/17/22 11:22 PM   Result Value Ref Range    Magnesium 1.9 1.6 - 2.4 mg/dL   CK    Collection Time: 12/17/22 11:22 PM   Result Value Ref Range     (H) 26 - 192 U/L     Radiologic Studies:  CT Results  (Last 48 hours)      None CXR Results  (Last 48 hours)      None          Medications ordered:  Medications   aspirin tablet 325 mg (has no administration in time range)   sodium chloride 0.9 % bolus infusion 1,000 mL (1,000 mL IntraVENous New Bag 12/18/22 0200)   0.9% sodium chloride infusion (has no administration in time range)       ED Course & Reassessment     ED Course:     ED Course as of 12/18/22 0638   Sun Dec 18, 2022   0123 CBC does not show any evidence of acute process. Leukocytosis not present to suggest infection. Hemoglobin not suggestive of acute anemia. No significant electrolyte derangements. Creatinine elevated, ALEX noted. Normal bilirubin. K hemolyzed, will repeat. AST ALT as well. Mild rhabdo. Trop 86, will trend. [SS]   [phone] testing is negative.   [SS]      ED Course User Index  [SS] Viky Lance MD       Reassessment:    Will admit renal workup initiated (urine lytes, osms, US retro)    Final Disposition     Admission: [address]    After completion of ED workup and discussion of results and diagnoses, patient was admitted to the hospital. Case was discussed with admitting provider. Procedures, Critical Care, & Clinical Tools   Performed by: Adamaris Mckeon MD  Procedures     EKG interpretation (Preliminary):  Rhythm: normal sinus rhythm; and regular . Rate (approx.): 74. Axis: normal;  MO interval: normal;  QRS interval: normal ;  ST/T wave: normal;  Other findings: normal.    HEART SCORE      CRITICAL CARE DOCUMENTATION  CARDIOVASCULAR CRITICAL CARE NOTE :  12:27 AM    Critical care time is being documented due to the fulfillment of at least one of the following:    - Critical conditions: condition that acutely impairs one or more vital organ systems such that there is a high probability of imminent or life-threatening deterioration in condition. Examples are diagnoses including but not limited to Afib RVR, DKA, PE, Etc. .    - Critical interventions: an action whose failure to initiate would likely allow a sudden, clinically significant decline in the patient's condition. These include  Requirement of transfer or ICU admission  Contemplation or provision of tPA  Drip initiation (pressors, antiarrhythmics, heparin, etc.)  Antidotes given (narcan, charcoal, epi for anaphylaxis, etc..)  >=2L fluid bolus  >=3 Duonebs  >1 IV/IM doses of sedatives, antiepileptics, BP meds, rate control meds, adenosine. Procedures that are suggestive of critical care: chest tubes, cardioversion, BiPAP, IO, etc..    Critical care time is documented based on continuous or non-continuous provision of care that includes face-to-face time, placing orders, chart review, documentation, discussion with consultants, discussion with family. This time calculation is a best approximation and does not include time spent on CPR, EKG interpretation, central line placement, intubation, laceration repairs, and other separately billed procedures. Amount of Critical Care Time: 35minutes    Details of critical care provision is documented above. A general summary is listed below:    IMPENDING DETERIORATION - Cardiovascular  ASSOCIATED RISK FACTORS - Cardiovascular Changes, Dysrhythmias  MANAGEMENT- Bedside Assessment  INTERPRETATION -  Xrays, ECG, and Blood Pressure  INTERVENTIONS - Hemodynamic and Metabolic interventions  CASE REVIEW - Hospitalist  TREATMENT RESPONSE - Stable  PERFORMED BY - Self    NOTES   :  During this entire length of time I was immediately available to the patient . Anna Cruz MD    Diagnosis     Clinical Impression:   1. ALEX (acute kidney injury) ([facility] [address]    2. NSTEMI (non-ST elevated myocardial infarction) ([facility] [address]    3. Syncope and collapse    4. Dehydration        Attestations:  Anna Cruz MD    Documentation Comments   - I am the first and primary provider for this patient and am the primary provider of record. - Initial assessment performed.  The patients presenting problems have been discussed, and the staff are in agreement with the care plan formulated and outlined with them. I have encouraged them to ask questions as they arise throughout their visit. - Available medical records, nursing notes, old EKGs, and EMS run sheets (if patient was EMS transported) were reviewed    Please note that this dictation was completed with [employer], the computer voice recognition software. Quite often unanticipated grammatical, syntax, homophones, and other interpretive errors are inadvertently transcribed by the computer software. Please disregard these errors. Please excuse any errors that have escaped final proofreading.

## 2022-12-18 NOTE — CONSULTS
[facility] Cardiology: [facility]  [address], [address] [facility]  [phone]      Requesting/referring provider:   Reason for Consult:    HPI: Jose Valle, a 65y.o. year-old who presents for evaluation of  htn, syncope. Has hx of CAD, stents, etc. Presenting with alex passing out etc. Trop up, echo recently was ok, she gives a report of feeling short of breath, tired at times. No recent chest pain. Currently laying flat in NAD. Mld le edema x a few weeks. Not too bad now. Assessment/Plan:  CAD- s/p stents, no recent assessment  Slight trop elevation, trend  Syncope- possibly cardiac- tele x 24 hours  PVC on EKG  -echo evaluation  ALEX   HTN cont home meds  7. DM- glucose    She  has a past medical history of Adenocarcinoma, renal cell ([address] (9/2/2020), Arthritis, CAD (coronary artery disease), Chronic kidney disease, Diabetes ([address] Gout, Hypercholesterolemia, Hypertension, Mental depression, Pulmonary embolism ([address] Seizures ([address] Stroke ([address] and Thyroid disease. All other systems negative except as above. PE  Vitals:    12/17/22 0011 12/17/22 2314 12/18/22 0352 12/18/22 0945   BP: (!) 125/54  (!) 159/81 (!) 140/63   Pulse: 62  70 70   Resp: 16 16 16 19   Temp: 98.7 °F (37.1 °C)      SpO2: 98%  100% 99%   Weight: 84.8 kg (187 lb) 84.8 kg (187 lb)     Height: 5' 4\" (1.626 m) 5' 4\" (1.626 m)      Body mass index is 32.1 kg/m².    General appearance - alert, well appearing, and in no distress  Mental status - affect appropriate to mood  Eyes - sclera anicteric, moist mucous membranes  Neck - supple, no significant adenopathy  Lymphatics - no  lymphadenopathy  Chest - clear to auscultation, no wheezes, rales or rhonchi  Heart - normal rate, regular rhythm, normal S1, S2, no murmurs, rubs, clicks or gallops  Abdomen - soft, nontender, nondistended, no masses or organomegaly  Back exam - full range of motion, no tenderness  Neurological - cranial nerves II through XII grossly intact, no focal deficit  Musculoskeletal - no muscular tenderness noted, normal strength  Extremities - peripheral pulses normal, no pedal edema  Skin - normal coloration  no rashes    Recent Labs:  Lab Results   Component Value Date/Time    Cholesterol, total 164 11/22/2020 01:12 AM    HDL Cholesterol 64 11/22/2020 01:12 AM    LDL,Direct 61 06/20/2022 09:55 AM    LDL, calculated 73.2 11/22/2020 01:12 AM    Triglyceride 134 11/22/2020 01:12 AM    CHOL/HDL Ratio 2.6 11/22/2020 01:12 AM     Lab Results   Component Value Date/Time    Creatinine 3.24 (H) 12/17/2022 11:22 PM     Lab Results   Component Value Date/Time    BUN 57 (H) 12/17/2022 11:22 PM     Lab Results   Component Value Date/Time    Potassium Hemolyzed, recollect requested 12/17/2022 11:22 PM     Lab Results   Component Value Date/Time    Hemoglobin A1c 5.7 (H) 11/14/2022 10:25 AM    Hemoglobin A1c, External 6.5 03/14/2021 12:00 AM     Lab Results   Component Value Date/Time    HGB 9.2 (L) 12/17/2022 11:22 PM     Lab Results   Component Value Date/Time    PLATELET 402 21/17/0047 11:22 PM       Reviewed:  Past Medical History:   Diagnosis Date    Adenocarcinoma, renal cell ([facility] [address] 9/2/2020    Arthritis     CAD (coronary artery disease)     Chronic kidney disease     Diabetes ([facility] [address]     Gout     Hypercholesterolemia     Hypertension     Mental depression     Pulmonary embolism ([facility])     Seizures ([facility])     Stroke ([facility] [address]     Thyroid disease      Social History     Tobacco Use   Smoking Status Former    Years: 15.00    Types: Cigarettes   Smokeless Tobacco Never     Social History     Substance and Sexual Activity   Alcohol Use Not Currently     No Known Allergies    Current Facility-Administered Medications   Medication Dose Route Frequency    0.9% sodium chloride infusion  100 mL/hr IntraVENous CONTINUOUS    albuterol CONCENTRATE 2.5mg/0.5 mL neb soln  2.5 mg Nebulization Q6H PRN    amLODIPine (NORVASC) tablet 10 mg  10 mg Oral DAILY    aspirin chewable tablet 81 mg  81 mg Oral DAILY    donepeziL (ARICEPT) tablet 10 mg  10 mg Oral QHS    [START ON 12/19/2022] calcitRIOL (ROCALTROL) capsule 0.25 mcg  0.25 mcg Oral BID Mon Wed & Fri    carvediloL (COREG) tablet 6.25 mg  6.25 mg Oral BID WITH MEALS    cetirizine (ZYRTEC) tablet 10 mg  10 mg Oral DAILY    fluticasone propionate (FLONASE) 50 mcg/actuation nasal spray 2 Spray  2 Spray Both Nostrils DAILY    sodium chloride (NS) flush 5-40 mL  5-40 mL IntraVENous Q8H    sodium chloride (NS) flush 5-40 mL  5-40 mL IntraVENous PRN    acetaminophen (TYLENOL) tablet 650 mg  650 mg Oral Q6H PRN    Or    acetaminophen (TYLENOL) suppository 650 mg  650 mg Rectal Q6H PRN    polyethylene glycol (MIRALAX) packet 17 g  17 g Oral DAILY PRN    ondansetron (ZOFRAN ODT) tablet 4 mg  4 mg Oral Q8H PRN    Or    ondansetron (ZOFRAN) injection 4 mg  4 mg IntraVENous Q6H PRN    enoxaparin (LOVENOX) injection 30 mg  30 mg SubCUTAneous DAILY    insulin glargine (LANTUS) injection 10 Units  10 Units SubCUTAneous QHS    pravastatin (PRAVACHOL) tablet 80 mg  80 mg Oral QHS    [START ON 12/19/2022] plecanatide tab 3 mg  3 mg Oral DAILY    pantoprazole (PROTONIX) tablet 40 mg  40 mg Oral BID    melatonin tablet 5 mg  5 mg Oral QHS     Current Outpatient Medications   Medication Sig    Nebulizer & Compressor (Comp-Air Nebulizer Compressor) machine as directed. aspirin 81 mg chewable tablet CHEW AND SWALLOW 1 TABLET BY MOUTH ONCE DAILY FOR PREVENTION    amLODIPine (NORVASC) 10 mg tablet Take 10 mg by mouth daily. FeroSuL 325 mg (65 mg iron) tablet TAKE 1 TABLET BY MOUTH ONCE DAILY FOR SUPPLEMENTATION    fluticasone propionate (FLONASE) 50 mcg/actuation nasal spray 1 spray in each nostril Nasally Once a day    nitroglycerin (NITROSTAT) 0.4 mg SL tablet DISSOLVE ONE TABLET UNDER THE TONGUE EVERY 5 MINUTES AS NEEDED FOR CHEST PAIN.  DO NOT EXCEED A TOTAL OF 3 DOSES IN 15 MINUTES NOW    triamcinolone acetonide (KENALOG) 0.1 % topical cream APPLY 1 CREAM EXTERNALLY TWICE DAILY TO LOWER LEGS 30 DAYS    acetaminophen (Tylenol Extra Strength) 500 mg tablet 2 tablets as needed Orally every 6 hrs    travoprost (Travatan Z) 0.004 % ophthalmic solution Travatan    cetirizine (ZYRTEC) 10 mg tablet Take 10 mg by mouth daily. melatonin 5 mg tablet Take 5 mg by mouth nightly. insulin aspart U-100 (NOVOLOG) 100 unit/mL injection by SubCUTAneous route Before breakfast, lunch, dinner and at bedtime. SS: 150-200=2 units 201-250=4 units 251-300=6 units 301-350=8 units 351-400=10 units    insulin detemir U-100 (LEVEMIR) 100 unit/mL injection 10 Units by SubCUTAneous route nightly. plecanatide (Trulance) 3 mg tab Take 3 mg by mouth daily. pantoprazole (PROTONIX) 40 mg tablet Take 1 Tablet by mouth two (2) times a day. albuterol (PROVENTIL VENTOLIN) 2.5 mg /3 mL (0.083 %) nebu 2.5 mg by Nebulization route every six (6) hours as needed for Shortness of Breath. carvediloL (COREG) 6.25 mg tablet Take 6.25 mg by mouth two (2) times daily (with meals). calcitRIOL (ROCALTROL) 0.25 mcg capsule Take 0.25 mcg by mouth BID Mon Wed & Fri.    donepeziL (ARICEPT) 10 mg tablet Take 10 mg by mouth nightly. Venlafaxine-ER 24 HR (EFFEXOR-ER) 75 mg tr24 tablet Take 75 mg by mouth daily. pravastatin (PRAVACHOL) 80 mg tablet Take 80 mg by mouth nightly.        Ludy Mar MD    [address]  [phone]

## 2022-12-18 NOTE — ED PROVIDER NOTES
[address]  EMERGENCY DEPARTMENT ENCOUNTER NOTE    Date: 12/17/2022  Patient Name: Blaine Rollins    History of Presenting Illness     Chief Complaint   Patient presents with    Syncope     HPI: Blaine Rollins, 65 y.o. female with a past medical history and outpatient medications as listed and reviewed below  presents for syncope. She reports that she was in a chair, felt lightheaded, and passed out. There was no falls or head trauma. She feels generalized fatigue and mild decreased urine output. She reports eating and drinking okay. Denies any chest pain, shortness breath, abdominal pain, nausea, vomiting. No runny nose or sore throat. Appears to be mildly lethargic in the room however has no other complaints and answers questions appropriately. Symptoms started earlier today. On review of chart, the patient was recently admitted for syncope and was found to have ALEX with dehydration. At that time, her creatinine was markedly elevated compared to her recent prior admission. She was given fluids, renal function improved, and she was discharged after decrease of her torsemide and gabapentin doses. .    Medical History   I reviewed the medical, surgical, family, and social history, as well as allergies:    PCP: Jeannie Lockett NP    Past Medical History:  Past Medical History:   Diagnosis Date    Adenocarcinoma, renal cell ([address] 9/2/2020    Arthritis     CAD (coronary artery disease)     Chronic kidney disease     Diabetes ([address]     Gout     Hypercholesterolemia     Hypertension     Mental depression     Pulmonary embolism ([address]     Seizures ([address]     Stroke [facility])     Thyroid disease      Past Surgical History:  Past Surgical History:   Procedure Laterality Date    COLONOSCOPY N/A 10/24/2022    COLONOSCOPY performed by Juanito Rizzo MD at [facility]      HX NEPHRECTOMY Left 02/12/2015    HX ORTHOPAEDIC      HX UROLOGICAL  02/12/2015 PARTIAL URETERECTOMY     MA CARDIAC SURG PROCEDURE UNLIST      stents placed      Current Outpatient Medications:  Current Outpatient Medications   Medication Instructions    acetaminophen (Tylenol Extra Strength) 500 mg tablet 2 tablets as needed Orally every 6 hrs    albuterol (PROVENTIL VENTOLIN) 2.5 mg, Nebulization, EVERY 6 HOURS AS NEEDED    amLODIPine (NORVASC) 10 mg, Oral, DAILY    aspirin 81 mg chewable tablet CHEW AND SWALLOW 1 TABLET BY MOUTH ONCE DAILY FOR PREVENTION    calcitRIOL (ROCALTROL) 0.25 mcg, Oral, 3 TIMES WEEK BID (MON WED &FRI    carvediloL (COREG) 6.25 mg, Oral, 2 TIMES DAILY WITH MEALS    cetirizine (ZYRTEC) 10 mg, Oral, DAILY    donepeziL (ARICEPT) 10 mg, Oral, EVERY BEDTIME    FeroSuL 325 mg (65 mg iron) tablet TAKE 1 TABLET BY MOUTH ONCE DAILY FOR SUPPLEMENTATION    fluticasone propionate (FLONASE) 50 mcg/actuation nasal spray 1 spray in each nostril Nasally Once a day    insulin aspart U-100 (NOVOLOG) 100 unit/mL injection SubCUTAneous, 4 TIMES DAILY BEFORE MEALS & NIGHTLY, SS: 150-200=2 units 201-250=4 units 251-300=6 units 301-350=8 units 351-400=10 units    insulin detemir U-100 (LEVEMIR) 10 Units, SubCUTAneous, EVERY BEDTIME    melatonin 5 mg, Oral, EVERY BEDTIME    Nebulizer & Compressor (Comp-Air Nebulizer Compressor) machine AS DIRECTED    nitroglycerin (NITROSTAT) 0.4 mg SL tablet DISSOLVE ONE TABLET UNDER THE TONGUE EVERY 5 MINUTES AS NEEDED FOR CHEST PAIN.  DO NOT EXCEED A TOTAL OF 3 DOSES IN 15 MINUTES NOW    pantoprazole (PROTONIX) 40 mg, Oral, 2 TIMES DAILY    pravastatin (PRAVACHOL) 80 mg, EVERY BEDTIME    travoprost (Travatan Z) 0.004 % ophthalmic solution Travatan    triamcinolone acetonide (KENALOG) 0.1 % topical cream APPLY 1 CREAM EXTERNALLY TWICE DAILY TO LOWER LEGS 30 DAYS    Trulance 3 mg, Oral, DAILY    Venlafaxine-ER 24 HR (EFFEXOR-ER) 75 mg, Oral, DAILY      Family History:  Family History   Problem Relation Age of Onset    Heart Disease Mother     Heart Disease Father     Heart Disease Sister     Cancer Sister     Heart Disease Brother     Cancer Maternal Grandmother     Stroke Son     Cancer Sister      Social History:  Social History     Tobacco Use    Smoking status: Former     Years: 15.00     Types: Cigarettes    Smokeless tobacco: Never   Vaping Use    Vaping Use: Never used   Substance Use Topics    Alcohol use: Not Currently    Drug use: Never     Allergies:  No Known Allergies    Review of Systems     Review of Systems  Negative: Positives and pertinent negatives as per HPI. All other systems were reviewed and are negative. Physical Exam & Vital Signs   Vital Signs - I reviewed the patient's vital signs. Patient Vitals for the past 12 hrs:   Pulse Resp BP SpO2   12/18/22 0352 70 16 (!) 159/81 100 %   12/17/22 2314 -- 16 -- --     Physical Exam:    GENERAL: awake, alert, cooperative, not in distress  HEENT:  * Pupils equal, EOMI  * Head atraumatic  * Dry mucus membranes  CV:  * audible heart sounds  * warm and perfused extremities bilaterally  PULMONARY: Good air movement, no wheezes, no crackles  ABDOMEN/: soft, no distension, no guarding, no abdominal tenderness  EXTREMITIES/BACK: warm and perfused, no tenderness, no edema  SKIN: no rashes or signs of trauma  NEURO:  * Speech clear  * Moves U&LE to command    Medical Decision Making     Patient is a 65 y.o. female presenting for syncope. Vitals reveal no significant abnormalities and physical exam reveals  dry mucus membranes . EKG showed  prolonged Qtc 509 . Based on the history, physical exam, risk factors, and vital signs, differential includes: Duration, ALEX, ACS, arrhythmia, COVID, flu. See ED Course and Reassessment for evaluation and discussion.       EMR Automatically Imported Results     Labs:  Recent Results (from the past 12 hour(s))   CBC WITH AUTOMATED DIFF    Collection Time: 12/17/22 11:22 PM   Result Value Ref Range    WBC 7.3 3.6 - 11.0 K/uL    RBC 3.60 (L) 3.80 - 5.20 M/uL HGB 9.2 (L) 11.5 - 16.0 g/dL    HCT 29.7 (L) 35.0 - 47.0 %    MCV 82.5 80.0 - 99.0 FL    MCH 25.6 (L) 26.0 - 34.0 PG    MCHC 31.0 30.0 - 36.5 g/dL    RDW 20.3 (H) 11.5 - 14.5 %    PLATELET 264 116 - 553 K/uL    MPV 11.4 8.9 - 12.9 FL    NRBC 0.0 0.0  WBC    ABSOLUTE NRBC 0.00 0.00 - 0.01 K/uL    NEUTROPHILS 78 (H) 32 - 75 %    LYMPHOCYTES 11 (L) 12 - 49 %    MONOCYTES 10 5 - 13 %    EOSINOPHILS 1 0 - 7 %    BASOPHILS 0 0 - 1 %    IMMATURE GRANULOCYTES 0 0 - 0.5 %    ABS. NEUTROPHILS 5.7 1.8 - 8.0 K/UL    ABS. LYMPHOCYTES 0.8 0.8 - 3.5 K/UL    ABS. MONOCYTES 0.7 0.0 - 1.0 K/UL    ABS. EOSINOPHILS 0.1 0.0 - 0.4 K/UL    ABS. BASOPHILS 0.0 0.0 - 0.1 K/UL    ABS. IMM. GRANS. 0.0 0.00 - 0.04 K/UL    DF AUTOMATED     METABOLIC PANEL, COMPREHENSIVE    Collection Time: 12/17/22 11:22 PM   Result Value Ref Range    Sodium 137 136 - 145 mmol/L    Potassium Hemolyzed, recollect requested 3.5 - 5.1 mmol/L    Chloride 103 97 - 108 mmol/L    CO2 28 21 - 32 mmol/L    Anion gap 6 5 - 15 mmol/L    Glucose 179 (H) 65 - 100 mg/dL    BUN 57 (H) 6 - 20 mg/dL    Creatinine 3.24 (H) 0.55 - 1.02 mg/dL    BUN/Creatinine ratio 18 12 - 20      eGFR 14 (L) >60 ml/min/1.73m2    Calcium 8.6 8.5 - 10.1 mg/dL    Bilirubin, total 0.4 0.2 - 1.0 mg/dL    AST (SGOT) Hemolyzed, recollect requested 15 - 37 U/L    ALT (SGPT) Hemolyzed, recollect requested 12 - 78 U/L    Alk.  phosphatase 155 (H) 45 - 117 U/L    Protein, total 6.8 6.4 - 8.2 g/dL    Albumin 3.0 (L) 3.5 - 5.0 g/dL    Globulin 3.8 2.0 - 4.0 g/dL    A-G Ratio 0.8 (L) 1.1 - 2.2     TROPONIN-HIGH SENSITIVITY    Collection Time: 12/17/22 11:22 PM   Result Value Ref Range    Troponin-High Sensitivity 86 (H) 0 - 51 ng/L   MAGNESIUM    Collection Time: 12/17/22 11:22 PM   Result Value Ref Range    Magnesium 1.9 1.6 - 2.4 mg/dL   CK    Collection Time: 12/17/22 11:22 PM   Result Value Ref Range     (H) 26 - 192 U/L     Radiologic Studies:  CT Results  (Last 48 hours)      None CXR Results  (Last 48 hours)      None          Medications ordered:  Medications   aspirin tablet 325 mg (has no administration in time range)   sodium chloride 0.9 % bolus infusion 1,000 mL (1,000 mL IntraVENous New Bag 12/18/22 0200)   0.9% sodium chloride infusion (has no administration in time range)       ED Course & Reassessment     ED Course:     ED Course as of 12/18/22 0638   Sun Dec 18, 2022   0123 CBC does not show any evidence of acute process. Leukocytosis not present to suggest infection. Hemoglobin not suggestive of acute anemia. No significant electrolyte derangements. Creatinine elevated, ALEX noted. Normal bilirubin. K hemolyzed, will repeat. AST ALT as well. Mild rhabdo. Trop 86, will trend. [SS]   [phone] testing is negative.   [SS]      ED Course User Index  [SS] Eduardo Stephens MD       Reassessment:    Will admit renal workup initiated (urine lytes, osms, US retro)    Final Disposition     Admission: [facility]    After completion of ED workup and discussion of results and diagnoses, patient was admitted to the hospital. Case was discussed with admitting provider. Procedures, Critical Care, & Clinical Tools   Performed by: Twyla Ontiveros MD  Procedures     EKG interpretation (Preliminary):  Rhythm: normal sinus rhythm; and regular . Rate (approx.): 74. Axis: normal;  WY interval: normal;  QRS interval: normal ;  ST/T wave: normal;  Other findings: normal.    HEART SCORE      CRITICAL CARE DOCUMENTATION  CARDIOVASCULAR CRITICAL CARE NOTE :  12:27 AM    Critical care time is being documented due to the fulfillment of at least one of the following:    - Critical conditions: condition that acutely impairs one or more vital organ systems such that there is a high probability of imminent or life-threatening deterioration in condition. Examples are diagnoses including but not limited to Afib RVR, DKA, PE, Etc. .    - Critical interventions: an action whose failure to initiate would likely allow a sudden, clinically significant decline in the patient's condition. These include  Requirement of transfer or ICU admission  Contemplation or provision of tPA  Drip initiation (pressors, antiarrhythmics, heparin, etc.)  Antidotes given (narcan, charcoal, epi for anaphylaxis, etc..)  >=2L fluid bolus  >=3 Duonebs  >1 IV/IM doses of sedatives, antiepileptics, BP meds, rate control meds, adenosine. Procedures that are suggestive of critical care: chest tubes, cardioversion, BiPAP, IO, etc..    Critical care time is documented based on continuous or non-continuous provision of care that includes face-to-face time, placing orders, chart review, documentation, discussion with consultants, discussion with family. This time calculation is a best approximation and does not include time spent on CPR, EKG interpretation, central line placement, intubation, laceration repairs, and other separately billed procedures. Amount of Critical Care Time: 35minutes    Details of critical care provision is documented above. A general summary is listed below:    IMPENDING DETERIORATION - Cardiovascular  ASSOCIATED RISK FACTORS - Cardiovascular Changes, Dysrhythmias  MANAGEMENT- Bedside Assessment  INTERPRETATION -  Xrays, ECG, and Blood Pressure  INTERVENTIONS - Hemodynamic and Metabolic interventions  CASE REVIEW - Hospitalist  TREATMENT RESPONSE - Stable  PERFORMED BY - Self    NOTES   :  During this entire length of time I was immediately available to the patient . Jhoana Huang MD    Diagnosis     Clinical Impression:   1. ALEX (acute kidney injury) ([facility]    2. NSTEMI (non-ST elevated myocardial infarction) ([facility]    3. Syncope and collapse    4. Dehydration        Attestations:  Jhoana Huang MD    Documentation Comments   - I am the first and primary provider for this patient and am the primary provider of record. - Initial assessment performed.  The patients presenting problems have been discussed, and the staff are in agreement with the care plan formulated and outlined with them. I have encouraged them to ask questions as they arise throughout their visit. - Available medical records, nursing notes, old EKGs, and EMS run sheets (if patient was EMS transported) were reviewed    Please note that this dictation was completed with [employer], the computer voice recognition software. Quite often unanticipated grammatical, syntax, homophones, and other interpretive errors are inadvertently transcribed by the computer software. Please disregard these errors. Please excuse any errors that have escaped final proofreading.

## 2022-12-18 NOTE — H&P
History and Physical    Subjective:   Chief Complaint : I passed out in the morning  Source of information : patient     History of present illness:     73F, h/o CKDIII, HTN , DMII on levemir     She was here in November for GI bleed with ABLA- EGD showed gastritis     She said this morning she was sitting in the chair when she passed out, she has no recollection of events and when she woke up she saw EMS. She denies any prodrome or any symptoms upon waking up. Lives with family ans no head injury, when she passed out family held her and was safely laid on the floor. ED:  Troponin 78, , cr 3.24 ALEX on CKDIII      Past Medical History:   Diagnosis Date    Adenocarcinoma, renal cell ([facility] 9/2/2020    Arthritis     CAD (coronary artery disease)     Chronic kidney disease     Diabetes ([facility]     Gout     Hypercholesterolemia     Hypertension     Mental depression     Pulmonary embolism ([facility]     Seizures ([facility] [facility]     Stroke [facility])     Thyroid disease      Past Surgical History:   Procedure Laterality Date    COLONOSCOPY N/A 10/24/2022    COLONOSCOPY performed by Tomas Nix MD at [facility] ENDOSCOPY    HX GYN      HX HYSTERECTOMY      HX NEPHRECTOMY Left 02/12/2015    HX ORTHOPAEDIC      HX UROLOGICAL  02/12/2015    PARTIAL URETERECTOMY     MI CARDIAC SURG PROCEDURE UNLIST      stents placed      Family History   Problem Relation Age of Onset    Heart Disease Mother     Heart Disease Father     Heart Disease Sister     Cancer Sister     Heart Disease Brother     Cancer Maternal Grandmother     Stroke Son     Cancer Sister       Social History     Tobacco Use    Smoking status: Former     Years: 15.00     Types: Cigarettes    Smokeless tobacco: Never   Substance Use Topics    Alcohol use: Not Currently       Prior to Admission medications    Medication Sig Start Date End Date Taking? Authorizing Provider   Nebulizer & Compressor (Comp-Air Nebulizer Compressor) machine as directed.  8/9/22   Provider, Historical aspirin 81 mg chewable tablet CHEW AND SWALLOW 1 TABLET BY MOUTH ONCE DAILY FOR PREVENTION 8/10/22   Provider, Historical   amLODIPine (NORVASC) 10 mg tablet Take 10 mg by mouth daily. 7/21/22 7/21/23  Provider, Historical   FeroSuL 325 mg (65 mg iron) tablet TAKE 1 TABLET BY MOUTH ONCE DAILY FOR SUPPLEMENTATION 8/10/22   Provider, Historical   fluticasone propionate (FLONASE) 50 mcg/actuation nasal spray 1 spray in each nostril Nasally Once a day    Provider, Historical   nitroglycerin (NITROSTAT) 0.4 mg SL tablet DISSOLVE ONE TABLET UNDER THE TONGUE EVERY 5 MINUTES AS NEEDED FOR CHEST PAIN. DO NOT EXCEED A TOTAL OF 3 DOSES IN 15 MINUTES NOW 11/11/22   Provider, Historical   triamcinolone acetonide (KENALOG) 0.1 % topical cream APPLY 1 CREAM EXTERNALLY TWICE DAILY TO LOWER LEGS 30 DAYS 9/30/22   Provider, Historical   acetaminophen (Tylenol Extra Strength) 500 mg tablet 2 tablets as needed Orally every 6 hrs    Provider, Historical   travoprost (Travatan Z) 0.004 % ophthalmic solution Travatan    Provider, Historical   cetirizine (ZYRTEC) 10 mg tablet Take 10 mg by mouth daily. Provider, Historical   melatonin 5 mg tablet Take 5 mg by mouth nightly. Provider, Historical   insulin aspart U-100 (NOVOLOG) 100 unit/mL injection by SubCUTAneous route Before breakfast, lunch, dinner and at bedtime. SS: 150-200=2 units 201-250=4 units 251-300=6 units 301-350=8 units 351-400=10 units    Provider, Historical   insulin detemir U-100 (LEVEMIR) 100 unit/mL injection 10 Units by SubCUTAneous route nightly. Provider, Historical   plecanatide (Trulance) 3 mg tab Take 3 mg by mouth daily. Provider, Historical   pantoprazole (PROTONIX) 40 mg tablet Take 1 Tablet by mouth two (2) times a day. 8/8/22   Elana Giron MD   albuterol (PROVENTIL VENTOLIN) 2.5 mg /3 mL (0.083 %) nebu 2.5 mg by Nebulization route every six (6) hours as needed for Shortness of Breath.     Provider, Historical   carvediloL (COREG) 6.25 mg tablet Take 6.25 mg by mouth two (2) times daily (with meals). Provider, Historical   calcitRIOL (ROCALTROL) 0.25 mcg capsule Take 0.25 mcg by mouth BID Mon Wed & Fri. Provider, Historical   donepeziL (ARICEPT) 10 mg tablet Take 10 mg by mouth nightly. Provider, Historical   Venlafaxine-ER 24 HR (EFFEXOR-ER) 75 mg tr24 tablet Take 75 mg by mouth daily. Provider, Historical   pravastatin (PRAVACHOL) 80 mg tablet Take 80 mg by mouth nightly. Provider, Historical     No Known Allergies          Review of Systems:  Constitutional: Appetite is good, denies weight loss, no fever, no chills, no night sweats  Eye: No recent visual disturbances, no discharge, no double vision  Ear/nose/mouth/throat : No hearing disturbance, no ear pain, no nasal congestion, no sore throat, no trouble swallowing. Respiratory : No trouble breathing, no cough, no shortness of breath, no hemoptysis, no wheezing  Cardiovascular : No chest pain, no palpitation, no racing of heart, no orthopnea, no paroxysmal nocturnal dyspnea, no peripheral edema  Gastrointestinal : No nausea, no vomiting, no diarrhea, constipation, heartburn, abdominal pain  Genitourinary : No dysuria, no hematuria, no increased frequency, incontinence,  Lymphatics : No swollen glands -Neck, axillary, inguinal  Endocrine : No excessive thirst, no polyuria no cold intolerance, no heat intolerance.   Immunologic : No hives, urticaria, no seasonal allergies,   Musculoskeletal : No joint swelling, pain, effusion,  no back pain, no neck pain,   Integumentary : No rash, no pruritus, no ecchymosis  Hematology : No petechiae, No easy bruising,  No tendency to bleed easy  Neurology : Denies change in mental status, no abnormal balance, no headache, no confusion, numbness, tingling,  Psychiatric : No mood swings, no anxiety, depression    Vitals:   Visit Vitals  BP (!) 140/63   Pulse 70   Temp 98.7 °F (37.1 °C)   Resp 19   Ht 5' 4\" (1.626 m)   Wt 84.8 kg (187 lb)   SpO2 99% BMI 32.10 kg/m²       Physical Exam:     GENERAL: awake, alert, cooperative, not in distress  HEENT:  * Pupils equal, EOMI  * Head atraumatic  * Dry mucus membranes  CV:  * audible heart sounds  * warm and perfused extremities bilaterally  PULMONARY: Good air movement, no wheezes, no crackles  ABDOMEN/: soft, no distension, no guarding, no abdominal tenderness  EXTREMITIES/BACK: warm and perfused, no tenderness, no edema  SKIN: no rashes or signs of trauma  NEURO:  * Speech clear  * Moves U&LE to command        Data Review:   Recent Results (from the past 24 hour(s))   EKG, 12 LEAD, INITIAL    Collection Time: 12/17/22 11:15 PM   Result Value Ref Range    Ventricular Rate 63 BPM    Atrial Rate 63 BPM    P-R Interval 148 ms    QRS Duration 84 ms    Q-T Interval 498 ms    QTC Calculation (Bezet) 509 ms    Calculated P Axis 69 degrees    Calculated R Axis 33 degrees    Calculated T Axis 48 degrees    Diagnosis       Sinus rhythm with occasional Premature ventricular complexes  Prolonged QT  Abnormal ECG  When compared with ECG of 13-NOV-2022 16:19,  No significant change was found  Confirmed by Eli Moreno MD, CEASAR ([id number]) on 12/18/2022 9:33:50 AM     CBC WITH AUTOMATED DIFF    Collection Time: 12/17/22 11:22 PM   Result Value Ref Range    WBC 7.3 3.6 - 11.0 K/uL    RBC 3.60 (L) 3.80 - 5.20 M/uL    HGB 9.2 (L) 11.5 - 16.0 g/dL    HCT 29.7 (L) 35.0 - 47.0 %    MCV 82.5 80.0 - 99.0 FL    MCH 25.6 (L) 26.0 - 34.0 PG    MCHC 31.0 30.0 - 36.5 g/dL    RDW 20.3 (H) 11.5 - 14.5 %    PLATELET 854 842 - 415 K/uL    MPV 11.4 8.9 - 12.9 FL    NRBC 0.0 0.0  WBC    ABSOLUTE NRBC 0.00 0.00 - 0.01 K/uL    NEUTROPHILS 78 (H) 32 - 75 %    LYMPHOCYTES 11 (L) 12 - 49 %    MONOCYTES 10 5 - 13 %    EOSINOPHILS 1 0 - 7 %    BASOPHILS 0 0 - 1 %    IMMATURE GRANULOCYTES 0 0 - 0.5 %    ABS. NEUTROPHILS 5.7 1.8 - 8.0 K/UL    ABS. LYMPHOCYTES 0.8 0.8 - 3.5 K/UL    ABS. MONOCYTES 0.7 0.0 - 1.0 K/UL    ABS.  EOSINOPHILS 0.1 0.0 - 0.4 K/UL ABS. BASOPHILS 0.0 0.0 - 0.1 K/UL    ABS. IMM. GRANS. 0.0 0.00 - 0.04 K/UL    DF AUTOMATED     METABOLIC PANEL, COMPREHENSIVE    Collection Time: 12/17/22 11:22 PM   Result Value Ref Range    Sodium 137 136 - 145 mmol/L    Potassium Hemolyzed, recollect requested 3.5 - 5.1 mmol/L    Chloride 103 97 - 108 mmol/L    CO2 28 21 - 32 mmol/L    Anion gap 6 5 - 15 mmol/L    Glucose 179 (H) 65 - 100 mg/dL    BUN 57 (H) 6 - 20 mg/dL    Creatinine 3.24 (H) 0.55 - 1.02 mg/dL    BUN/Creatinine ratio 18 12 - 20      eGFR 14 (L) >60 ml/min/1.73m2    Calcium 8.6 8.5 - 10.1 mg/dL    Bilirubin, total 0.4 0.2 - 1.0 mg/dL    AST (SGOT) Hemolyzed, recollect requested 15 - 37 U/L    ALT (SGPT) Hemolyzed, recollect requested 12 - 78 U/L    Alk.  phosphatase 155 (H) 45 - 117 U/L    Protein, total 6.8 6.4 - 8.2 g/dL    Albumin 3.0 (L) 3.5 - 5.0 g/dL    Globulin 3.8 2.0 - 4.0 g/dL    A-G Ratio 0.8 (L) 1.1 - 2.2     TROPONIN-HIGH SENSITIVITY    Collection Time: 12/17/22 11:22 PM   Result Value Ref Range    Troponin-High Sensitivity 86 (H) 0 - 51 ng/L   MAGNESIUM    Collection Time: 12/17/22 11:22 PM   Result Value Ref Range    Magnesium 1.9 1.6 - 2.4 mg/dL   CK    Collection Time: 12/17/22 11:22 PM   Result Value Ref Range     (H) 26 - 192 U/L   TROPONIN-HIGH SENSITIVITY    Collection Time: 12/18/22  1:58 AM   Result Value Ref Range    Troponin-High Sensitivity 78 (H) 0 - 51 ng/L   COVID-19 RAPID TEST    Collection Time: 12/18/22  1:58 AM   Result Value Ref Range    COVID-19 rapid test Not Detected Not Detected     INFLUENZA A & B AG (RAPID TEST)    Collection Time: 12/18/22  1:58 AM   Result Value Ref Range    Influenza A Antigen Negative Negative      Influenza B Antigen Negative Negative     URINALYSIS W/ RFLX MICROSCOPIC    Collection Time: 12/18/22  7:03 AM   Result Value Ref Range    Color Yellow/Straw      Appearance Clear Clear      Specific gravity 1.020 1.003 - 1.030      pH (UA) 5.0      Protein 30 (A) Negative mg/dL Glucose Negative Negative mg/dL    Ketone Negative Negative mg/dL    Bilirubin Negative Negative      Blood Negative Negative      Urobilinogen 0.1 (L) 0.2 - 1.0 EU/dL    Nitrites Negative Negative      Leukocyte Esterase Negative Negative     URINE MICROSCOPIC    Collection Time: 12/18/22  7:03 AM   Result Value Ref Range    WBC 0-4 0 - 4 /hpf    RBC 0-5 0 - 5 /hpf    Bacteria 1+ (A) Negative /hpf             Assessment and Plan :     (1) Syncope : cardiac VS dehydration, order orthostatic    (2) increased troponin: repeat troponin, consult cards.  ECHO In august 2372 no diastolic or systolic dysfunction    (3) ALEX on CKDIII: IVF    (4) HTN: resume home meds    (5) dementia: resume home meds    DVT ppx: lovenox     DISPO: home after cardaic eval.       Signed By: Dulce Maria Delgadillo MD     December 18, 2022

## 2022-12-19 ENCOUNTER — APPOINTMENT (OUTPATIENT)
Dept: ULTRASOUND IMAGING | Age: 77
DRG: 682 | End: 2022-12-19
Attending: STUDENT IN AN ORGANIZED HEALTH CARE EDUCATION/TRAINING PROGRAM
Payer: MEDICARE

## 2022-12-19 ENCOUNTER — APPOINTMENT (OUTPATIENT)
Dept: NUCLEAR MEDICINE | Age: 77
DRG: 682 | End: 2022-12-19
Attending: STUDENT IN AN ORGANIZED HEALTH CARE EDUCATION/TRAINING PROGRAM
Payer: MEDICARE

## 2022-12-19 ENCOUNTER — APPOINTMENT (OUTPATIENT)
Dept: NON INVASIVE DIAGNOSTICS | Age: 77
DRG: 682 | End: 2022-12-19
Attending: STUDENT IN AN ORGANIZED HEALTH CARE EDUCATION/TRAINING PROGRAM
Payer: MEDICARE

## 2022-12-19 VITALS
DIASTOLIC BLOOD PRESSURE: 62 MMHG | HEIGHT: 64 IN | SYSTOLIC BLOOD PRESSURE: 129 MMHG | OXYGEN SATURATION: 98 % | HEART RATE: 60 BPM | BODY MASS INDEX: 31.92 KG/M2 | RESPIRATION RATE: 18 BRPM | TEMPERATURE: 98 F | WEIGHT: 187 LBS

## 2022-12-19 LAB
ANION GAP SERPL CALC-SCNC: 10 MMOL/L (ref 5–15)
ATRIAL RATE: 58 BPM
BUN SERPL-MCNC: 63 MG/DL (ref 6–20)
BUN/CREAT SERPL: 20 (ref 12–20)
CA-I BLD-MCNC: 8.6 MG/DL (ref 8.5–10.1)
CALCULATED P AXIS, ECG09: 55 DEGREES
CALCULATED R AXIS, ECG10: 38 DEGREES
CALCULATED T AXIS, ECG11: 59 DEGREES
CHLORIDE SERPL-SCNC: 106 MMOL/L (ref 97–108)
CO2 SERPL-SCNC: 26 MMOL/L (ref 21–32)
CREAT SERPL-MCNC: 3.18 MG/DL (ref 0.55–1.02)
CREAT UR-MCNC: 103 MG/DL
DIAGNOSIS, 93000: NORMAL
GLUCOSE BLD STRIP.AUTO-MCNC: 106 MG/DL (ref 65–100)
GLUCOSE SERPL-MCNC: 122 MG/DL (ref 65–100)
NUC STRESS EJECTION FRACTION: 51 %
OSMOLALITY SERPL: 322 MOSM/KG H2O
OSMOLALITY UR: 292 MOSM/KG H2O
P-R INTERVAL, ECG05: 136 MS
PERFORMED BY, TECHID: ABNORMAL
POTASSIUM SERPL-SCNC: 3.7 MMOL/L (ref 3.5–5.1)
Q-T INTERVAL, ECG07: 466 MS
QRS DURATION, ECG06: 96 MS
QTC CALCULATION (BEZET), ECG08: 457 MS
SODIUM SERPL-SCNC: 142 MMOL/L (ref 136–145)
SODIUM UR-SCNC: 36 MMOL/L
STRESS BASELINE DIAS BP: 78 MMHG
STRESS BASELINE HR: 57 BPM
STRESS BASELINE ST DEPRESSION: 0 MM
STRESS BASELINE SYS BP: 160 MMHG
STRESS PEAK DIAS BP: 63 MMHG
STRESS PEAK SYS BP: 132 MMHG
STRESS PERCENT HR ACHIEVED: 44 %
STRESS POST PEAK HR: 62 BPM
STRESS RATE PRESSURE PRODUCT: 8184 BPM*MMHG
STRESS ST DEPRESSION: 0 MM
STRESS STAGE 1 DURATION: NORMAL MIN:SEC
STRESS STAGE 1 HR: 60 BPM
STRESS STAGE 2 BP: NORMAL MMHG
STRESS STAGE 2 DURATION: NORMAL MIN:SEC
STRESS STAGE 2 HR: 62 BPM
STRESS STAGE 3 DURATION: NORMAL MIN:SEC
STRESS STAGE 3 HR: 61 BPM
STRESS STAGE 4 BP: NORMAL MMHG
STRESS STAGE 4 DURATION: NORMAL MIN:SEC
STRESS STAGE 4 HR: 58 BPM
STRESS TARGET HR: 142 BPM
TID: 0.99
TROPONIN-HIGH SENSITIVITY: 80 NG/L (ref 0–51)
VENTRICULAR RATE, ECG03: 58 BPM

## 2022-12-19 PROCEDURE — 74011250637 HC RX REV CODE- 250/637: Performed by: HOSPITALIST

## 2022-12-19 PROCEDURE — 93005 ELECTROCARDIOGRAM TRACING: CPT

## 2022-12-19 PROCEDURE — A9500 TC99M SESTAMIBI: HCPCS

## 2022-12-19 PROCEDURE — 36415 COLL VENOUS BLD VENIPUNCTURE: CPT

## 2022-12-19 PROCEDURE — 74011250636 HC RX REV CODE- 250/636: Performed by: HOSPITALIST

## 2022-12-19 PROCEDURE — 74011250636 HC RX REV CODE- 250/636

## 2022-12-19 PROCEDURE — 76770 US EXAM ABDO BACK WALL COMP: CPT

## 2022-12-19 PROCEDURE — 80048 BASIC METABOLIC PNL TOTAL CA: CPT

## 2022-12-19 PROCEDURE — 96372 THER/PROPH/DIAG INJ SC/IM: CPT

## 2022-12-19 PROCEDURE — 84484 ASSAY OF TROPONIN QUANT: CPT

## 2022-12-19 PROCEDURE — 82962 GLUCOSE BLOOD TEST: CPT

## 2022-12-19 PROCEDURE — G0378 HOSPITAL OBSERVATION PER HR: HCPCS

## 2022-12-19 PROCEDURE — 74011000250 HC RX REV CODE- 250: Performed by: HOSPITALIST

## 2022-12-19 RX ORDER — TETRAKIS(2-METHOXYISOBUTYLISOCYANIDE)COPPER(I) TETRAFLUOROBORATE 1 MG/ML
10.87 INJECTION, POWDER, LYOPHILIZED, FOR SOLUTION INTRAVENOUS
Status: COMPLETED | OUTPATIENT
Start: 2022-12-19 | End: 2022-12-19

## 2022-12-19 RX ORDER — REGADENOSON 0.08 MG/ML
INJECTION, SOLUTION INTRAVENOUS
Status: COMPLETED
Start: 2022-12-19 | End: 2022-12-19

## 2022-12-19 RX ADMIN — CALCITRIOL CAPSULES 0.25 MCG 0.25 MCG: 0.25 CAPSULE ORAL at 13:54

## 2022-12-19 RX ADMIN — CARVEDILOL 6.25 MG: 3.12 TABLET, FILM COATED ORAL at 13:54

## 2022-12-19 RX ADMIN — PANTOPRAZOLE SODIUM 40 MG: 40 TABLET, DELAYED RELEASE ORAL at 13:54

## 2022-12-19 RX ADMIN — REGADENOSON 0.4 MG: 0.08 INJECTION, SOLUTION INTRAVENOUS at 10:59

## 2022-12-19 RX ADMIN — ASPIRIN 81 MG 81 MG: 81 TABLET ORAL at 13:54

## 2022-12-19 RX ADMIN — FLUTICASONE PROPIONATE 2 SPRAY: 50 SPRAY, METERED NASAL at 13:58

## 2022-12-19 RX ADMIN — SODIUM CHLORIDE, PRESERVATIVE FREE 10 ML: 5 INJECTION INTRAVENOUS at 14:02

## 2022-12-19 RX ADMIN — POTASSIUM CHLORIDE 40 MEQ: 1500 TABLET, EXTENDED RELEASE ORAL at 13:54

## 2022-12-19 RX ADMIN — CETIRIZINE HYDROCHLORIDE 10 MG: 10 TABLET, FILM COATED ORAL at 13:55

## 2022-12-19 RX ADMIN — GABAPENTIN 200 MG: 100 CAPSULE ORAL at 13:54

## 2022-12-19 RX ADMIN — AMLODIPINE BESYLATE 10 MG: 5 TABLET ORAL at 13:55

## 2022-12-19 RX ADMIN — SODIUM CHLORIDE, PRESERVATIVE FREE 10 ML: 5 INJECTION INTRAVENOUS at 06:16

## 2022-12-19 RX ADMIN — TETRAKIS(2-METHOXYISOBUTYLISOCYANIDE)COPPER(I) TETRAFLUOROBORATE 10.87 MILLICURIE: 1 INJECTION, POWDER, LYOPHILIZED, FOR SOLUTION INTRAVENOUS at 10:00

## 2022-12-19 RX ADMIN — ENOXAPARIN SODIUM 30 MG: 100 INJECTION SUBCUTANEOUS at 13:54

## 2022-12-19 NOTE — DISCHARGE SUMMARY
Physician Discharge Summary     Patient ID:    Sánchez Howard  [id number]  65 y.o.  1945    Admit date: 12/17/2022    Discharge date : 12/19/2022      Final Diagnoses:   Syncope [R55], recurrent  History of GI bleed  Chronic kidney disease  Renal cell cancer status post left nephrectomy  Acute type II non-STEMI  History of PE   Diabetes mellitus type 2  Hyperlipidemia  Hypertension  History of stroke    Reason for Hospitalization:  Sánchez Howard is a 65 y.o. BLACK/ female who has a history of advanced kidney disease, hypertension, diabetes, and renal cell cancer status post left nephrectomy. She also had recent GI bleed on Eliquis. She follows in the office with Dr. Maribell Correa. She has had recurrent syncopal episodes and ILR monitoring has been recommended but the patient has refused. She presents now with another episode of syncope. Says she was sitting in the chair and had no warning and suddenly passed out. No recollection of the event. Denies any chest pain or shortness of breath. Cardiac enzymes are mildly elevated. No chest pain. No shortness of breath. Hospital Course:   Patient was admitted to medical telemetry. There were no evidence for arrhythmias during her hospital stay    She was seen by cardiology and implanted loop recorder was again discussed with her which she declines. Nuclear cardiac stress test was recommended and was done on 12/19. This showed normal perfusion with no evidence for ischemia    Patient was felt stable for discharge on 12/19              Discharge Medications:   Current Discharge Medication List        CONTINUE these medications which have NOT CHANGED    Details   Nebulizer & Compressor machine as directed. aspirin 81 mg chewable tablet CHEW AND SWALLOW 1 TABLET BY MOUTH ONCE DAILY FOR PREVENTION      amLODIPine (NORVASC) 10 mg tablet Take 10 mg by mouth daily.       FeroSuL 325 mg (65 mg iron) tablet TAKE 1 TABLET BY MOUTH ONCE DAILY FOR SUPPLEMENTATION      fluticasone propionate (FLONASE) 50 mcg/actuation nasal spray 1 spray in each nostril Nasally Once a day      nitroglycerin (NITROSTAT) 0.4 mg SL tablet DISSOLVE ONE TABLET UNDER THE TONGUE EVERY 5 MINUTES AS NEEDED FOR CHEST PAIN. DO NOT EXCEED A TOTAL OF 3 DOSES IN 15 MINUTES NOW      triamcinolone acetonide (KENALOG) 0.1 % topical cream APPLY 1 CREAM EXTERNALLY TWICE DAILY TO LOWER LEGS 30 DAYS      acetaminophen (TYLENOL) 500 mg tablet 2 tablets as needed Orally every 6 hrs      travoprost (TRAVATAN Z) 0.004 % ophthalmic solution Travatan      cetirizine (ZYRTEC) 10 mg tablet Take 10 mg by mouth daily. melatonin 5 mg tablet Take 5 mg by mouth nightly. insulin aspart U-100 (NOVOLOG) 100 unit/mL injection by SubCUTAneous route Before breakfast, lunch, dinner and at bedtime. SS: 150-200=2 units 201-250=4 units 251-300=6 units 301-350=8 units 351-400=10 units      insulin detemir U-100 (LEVEMIR) 100 unit/mL injection 10 Units by SubCUTAneous route nightly. plecanatide (Trulance) 3 mg tab Take 3 mg by mouth daily. pantoprazole (PROTONIX) 40 mg tablet Take 1 Tablet by mouth two (2) times a day. Qty: 60 Tablet, Refills: 0      albuterol (PROVENTIL VENTOLIN) 2.5 mg /3 mL (0.083 %) nebu 2.5 mg by Nebulization route every six (6) hours as needed for Shortness of Breath. carvediloL (COREG) 6.25 mg tablet Take 6.25 mg by mouth two (2) times daily (with meals). calcitRIOL (ROCALTROL) 0.25 mcg capsule Take 0.25 mcg by mouth BID Mon Wed & Fri.      donepeziL (ARICEPT) 10 mg tablet Take 10 mg by mouth nightly. Venlafaxine-ER 24 HR (EFFEXOR-ER) 75 mg tr24 tablet Take 75 mg by mouth daily. pravastatin (PRAVACHOL) 80 mg tablet Take 80 mg by mouth nightly. Follow up Care:    1. Jane Villanueva NP in 1-2 weeks. Please call to set up an appointment shortly after discharge. Diet:  Cardiac Diet    Disposition:  Home.     Advanced Directive:   FULL    DNR      Discharge Exam:  General:  Alert, cooperative, no distress, appears stated age. Lungs:   Clear to auscultation bilaterally. Chest wall:  No tenderness or deformity. Heart:  Regular rate and rhythm, S1, S2 normal, no murmur, click, rub or gallop. Abdomen:   Soft, non-tender. Bowel sounds normal. No masses,  No organomegaly. Extremities: Extremities normal, atraumatic, no cyanosis or edema. Pulses: 2+ and symmetric all extremities. Skin: Skin color, texture, turgor normal. No rashes or lesions   Neurologic: CNII-XII intact. No gross sensory or motor deficits        CONSULTATIONS: Cardiology    Significant Diagnostic Studies:   12/17/2022: BUN 57 mg/dL (H; Ref range: 6 - 20 mg/dL); Calcium 8.6 mg/dL (Ref range: 8.5 - 10.1 mg/dL); CO2 28 mmol/L (Ref range: 21 - 32 mmol/L); Creatinine 3.24 mg/dL (H; Ref range: 0.55 - 1.02 mg/dL); Glucose 179 mg/dL (H; Ref range: 65 - 100 mg/dL); HCT 29.7 % (L; Ref range: 35.0 - 47.0 %); HGB 9.2 g/dL (L; Ref range: 11.5 - 16.0 g/dL); Potassium Hemolyzed, recollect requested mmol/L (Ref range: 3.5 - 5.1 mmol/L); Sodium 137 mmol/L (Ref range: 136 - 145 mmol/L)  12/18/2022: BUN 57 mg/dL (H; Ref range: 6 - 20 mg/dL); Calcium 8.6 mg/dL (Ref range: 8.5 - 10.1 mg/dL); CO2 28 mmol/L (Ref range: 21 - 32 mmol/L); Creatinine 3.03 mg/dL (H; Ref range: 0.55 - 1.02 mg/dL); Glucose 187 mg/dL (H; Ref range: 65 - 100 mg/dL); Potassium 2.9 mmol/L (L; Ref range: 3.5 - 5.1 mmol/L);  Sodium 140 mmol/L (Ref range: 136 - 145 mmol/L)  Recent Labs     12/17/22  2322   WBC 7.3   HGB 9.2*   HCT 29.7*        Recent Labs     12/19/22  0656 12/18/22  1647 12/17/22  2322    140 137   K 3.7 2.9* Hemolyzed, recollect requested    105 103   CO2 26 28 28   BUN 63* 57* 57*   CREA 3.18* 3.03* 3.24*   * 187* 179*   CA 8.6 8.6 8.6   MG  --   --  1.9     Recent Labs     12/18/22  1647 12/17/22  2322   ALT 36 Hemolyzed, recollect requested   * 155* TBILI 0.4 0.4   TP 6.5 6.8   ALB 2.8* 3.0*   GLOB 3.7 3.8     No results for input(s): INR, PTP, APTT, INREXT in the last 72 hours. No results for input(s): FE, TIBC, PSAT, FERR in the last 72 hours. No results for input(s): PH, PCO2, PO2 in the last 72 hours.   Recent Labs     12/17/22  2322   *     Lab Results   Component Value Date/Time    Glucose (POC) 106 (H) 12/19/2022 07:37 AM    Glucose (POC) 168 (H) 12/18/2022 09:08 PM    Glucose (POC) 411 (H) 11/16/2022 10:59 AM    Glucose (POC) 117 (H) 11/16/2022 07:34 AM    Glucose (POC) 214 (H) 11/15/2022 07:35 PM       Discharge time spent 35 minutes    Signed:  Eliseo Strickland MD  12/19/2022  1:18 PM

## 2022-12-19 NOTE — PROGRESS NOTES
Problem: Falls - Risk of  Goal: *Absence of Falls  Description: Document Raicésar Terry Fall Risk and appropriate interventions in the flowsheet. Outcome: Progressing Towards Goal  Note: Fall Risk Interventions:  Mobility Interventions: Bed/chair exit alarm, Patient to call before getting OOB                             Problem: Pressure Injury - Risk of  Goal: *Prevention of pressure injury  Description: Document Kirit Scale and appropriate interventions in the flowsheet. Outcome: Progressing Towards Goal  Note: Pressure Injury Interventions:  Sensory Interventions: Assess changes in LOC    Moisture Interventions: Minimize layers, Absorbent underpads    Activity Interventions: PT/OT evaluation, Assess need for specialty bed    Mobility Interventions: HOB 30 degrees or less, Turn and reposition approx.  every two hours(pillow and wedges)    Nutrition Interventions: Document food/fluid/supplement intake    Friction and Shear Interventions: HOB 30 degrees or less

## 2022-12-19 NOTE — DISCHARGE SUMMARY
Physician Discharge Summary     Patient ID:    Nishi Monsivais  [id number]  8 y.o.  1945    Admit date: 12/17/2022    Discharge date : 12/19/2022      Final Diagnoses:   Syncope [R55], recurrent  History of GI bleed  Chronic kidney disease  Renal cell cancer status post left nephrectomy  Acute type II non-STEMI  History of PE   Diabetes mellitus type 2  Hyperlipidemia  Hypertension  History of stroke    Reason for Hospitalization:  Nishi Monsivais is a 65 y.o. BLACK/ female who has a history of advanced kidney disease, hypertension, diabetes, and renal cell cancer status post left nephrectomy. She also had recent GI bleed on Eliquis. She follows in the office with Dr. Tulio Luna. She has had recurrent syncopal episodes and ILR monitoring has been recommended but the patient has refused. She presents now with another episode of syncope. Says she was sitting in the chair and had no warning and suddenly passed out. No recollection of the event. Denies any chest pain or shortness of breath. Cardiac enzymes are mildly elevated. No chest pain. No shortness of breath. Hospital Course:   Patient was admitted to medical telemetry. There were no evidence for arrhythmias during her hospital stay    She was seen by cardiology and implanted loop recorder was again discussed with her which she declines. Nuclear cardiac stress test was recommended and was done on 12/19. This showed normal perfusion with no evidence for ischemia    Patient was felt stable for discharge on 12/19              Discharge Medications:   Current Discharge Medication List        CONTINUE these medications which have NOT CHANGED    Details   Nebulizer & Compressor machine as directed. aspirin 81 mg chewable tablet CHEW AND SWALLOW 1 TABLET BY MOUTH ONCE DAILY FOR PREVENTION      amLODIPine (NORVASC) 10 mg tablet Take 10 mg by mouth daily.       FeroSuL 325 mg (65 mg iron) tablet TAKE 1 TABLET BY MOUTH ONCE DAILY FOR SUPPLEMENTATION      fluticasone propionate (FLONASE) 50 mcg/actuation nasal spray 1 spray in each nostril Nasally Once a day      nitroglycerin (NITROSTAT) 0.4 mg SL tablet DISSOLVE ONE TABLET UNDER THE TONGUE EVERY 5 MINUTES AS NEEDED FOR CHEST PAIN. DO NOT EXCEED A TOTAL OF 3 DOSES IN 15 MINUTES NOW      triamcinolone acetonide (KENALOG) 0.1 % topical cream APPLY 1 CREAM EXTERNALLY TWICE DAILY TO LOWER LEGS 30 DAYS      acetaminophen (TYLENOL) 500 mg tablet 2 tablets as needed Orally every 6 hrs      travoprost (TRAVATAN Z) 0.004 % ophthalmic solution Travatan      cetirizine (ZYRTEC) 10 mg tablet Take 10 mg by mouth daily. melatonin 5 mg tablet Take 5 mg by mouth nightly. insulin aspart U-100 (NOVOLOG) 100 unit/mL injection by SubCUTAneous route Before breakfast, lunch, dinner and at bedtime. SS: 150-200=2 units 201-250=4 units 251-300=6 units 301-350=8 units 351-400=10 units      insulin detemir U-100 (LEVEMIR) 100 unit/mL injection 10 Units by SubCUTAneous route nightly. plecanatide (Trulance) 3 mg tab Take 3 mg by mouth daily. pantoprazole (PROTONIX) 40 mg tablet Take 1 Tablet by mouth two (2) times a day. Qty: 60 Tablet, Refills: 0      albuterol (PROVENTIL VENTOLIN) 2.5 mg /3 mL (0.083 %) nebu 2.5 mg by Nebulization route every six (6) hours as needed for Shortness of Breath. carvediloL (COREG) 6.25 mg tablet Take 6.25 mg by mouth two (2) times daily (with meals). calcitRIOL (ROCALTROL) 0.25 mcg capsule Take 0.25 mcg by mouth BID Mon Wed & Fri.      donepeziL (ARICEPT) 10 mg tablet Take 10 mg by mouth nightly. Venlafaxine-ER 24 HR (EFFEXOR-ER) 75 mg tr24 tablet Take 75 mg by mouth daily. pravastatin (PRAVACHOL) 80 mg tablet Take 80 mg by mouth nightly. Follow up Care:    1. Rodney Tristan NP in 1-2 weeks. Please call to set up an appointment shortly after discharge. Diet:  Cardiac Diet    Disposition:  Home.     Advanced Directive:   FULL    DNR      Discharge Exam:  General:  Alert, cooperative, no distress, appears stated age. Lungs:   Clear to auscultation bilaterally. Chest wall:  No tenderness or deformity. Heart:  Regular rate and rhythm, S1, S2 normal, no murmur, click, rub or gallop. Abdomen:   Soft, non-tender. Bowel sounds normal. No masses,  No organomegaly. Extremities: Extremities normal, atraumatic, no cyanosis or edema. Pulses: 2+ and symmetric all extremities. Skin: Skin color, texture, turgor normal. No rashes or lesions   Neurologic: CNII-XII intact. No gross sensory or motor deficits        CONSULTATIONS: Cardiology    Significant Diagnostic Studies:   12/17/2022: BUN 57 mg/dL (H; Ref range: 6 - 20 mg/dL); Calcium 8.6 mg/dL (Ref range: 8.5 - 10.1 mg/dL); CO2 28 mmol/L (Ref range: 21 - 32 mmol/L); Creatinine 3.24 mg/dL (H; Ref range: 0.55 - 1.02 mg/dL); Glucose 179 mg/dL (H; Ref range: 65 - 100 mg/dL); HCT 29.7 % (L; Ref range: 35.0 - 47.0 %); HGB 9.2 g/dL (L; Ref range: 11.5 - 16.0 g/dL); Potassium Hemolyzed, recollect requested mmol/L (Ref range: 3.5 - 5.1 mmol/L); Sodium 137 mmol/L (Ref range: 136 - 145 mmol/L)  12/18/2022: BUN 57 mg/dL (H; Ref range: 6 - 20 mg/dL); Calcium 8.6 mg/dL (Ref range: 8.5 - 10.1 mg/dL); CO2 28 mmol/L (Ref range: 21 - 32 mmol/L); Creatinine 3.03 mg/dL (H; Ref range: 0.55 - 1.02 mg/dL); Glucose 187 mg/dL (H; Ref range: 65 - 100 mg/dL); Potassium 2.9 mmol/L (L; Ref range: 3.5 - 5.1 mmol/L);  Sodium 140 mmol/L (Ref range: 136 - 145 mmol/L)  Recent Labs     12/17/22  2322   WBC 7.3   HGB 9.2*   HCT 29.7*        Recent Labs     12/19/22  0656 12/18/22  1647 12/17/22  2322    140 137   K 3.7 2.9* Hemolyzed, recollect requested    105 103   CO2 26 28 28   BUN 63* 57* 57*   CREA 3.18* 3.03* 3.24*   * 187* 179*   CA 8.6 8.6 8.6   MG  --   --  1.9     Recent Labs     12/18/22  1647 12/17/22  2322   ALT 36 Hemolyzed, recollect requested   * 155* TBILI 0.4 0.4   TP 6.5 6.8   ALB 2.8* 3.0*   GLOB 3.7 3.8     No results for input(s): INR, PTP, APTT, INREXT in the last 72 hours. No results for input(s): FE, TIBC, PSAT, FERR in the last 72 hours. No results for input(s): PH, PCO2, PO2 in the last 72 hours.   Recent Labs     12/17/22  2322   *     Lab Results   Component Value Date/Time    Glucose (POC) 106 (H) 12/19/2022 07:37 AM    Glucose (POC) 168 (H) 12/18/2022 09:08 PM    Glucose (POC) 411 (H) 11/16/2022 10:59 AM    Glucose (POC) 117 (H) 11/16/2022 07:34 AM    Glucose (POC) 214 (H) 11/15/2022 07:35 PM       Discharge time spent 35 minutes    Signed:  Antoine Srinivasan MD  12/19/2022  1:18 PM

## 2022-12-19 NOTE — PROGRESS NOTES
"D: Admitted 1/17 s/p LVAD HM 3.    I: Monitored vitals and assessed pt status.   Changed: Converted from Afib/Aflutter to ST.  PRN: Oxy x3, Atarax x1    A: A0x4. VSS, on 2L. ST, 110's. Start of writers shift pt flipped into Afib/Aflutter with some SVT, asymptomatic. Cards 2 was paged and ordered 60 mEq of K+, with a recheck at 2330. Around 2145 patient had a 26 beat run of VT at 158 bpm. Mg was replaced. Pt converted into ST around 0045. Afebrile. Small BM 2/4. 2L FR. LVAD dressing CDI. BG's WDL, no insulin given overnight. LVAD #s WNL. Pt continuously endorsed 7-8/10 pain overnight despite staying up on pain medications via PRN and scheduled routes. pt still able to sleep majority of the night.     I/O this shift:  In: 590 [P.O.:590]  Out: 250 [Urine:250]    BP 96/76   Pulse 107   Temp 98.7  F (37.1  C) (Oral)   Resp 18   Ht 1.676 m (5' 6\")   Wt 65.4 kg (144 lb 3.2 oz)   SpO2 97%   BMI 23.27 kg/m       P: Continue to monitor Pt status and report changes to CVTS.        " Problem: Falls - Risk of  Goal: *Absence of Falls  Description: Document Benjamin Fails Fall Risk and appropriate interventions in the flowsheet. Outcome: Progressing Towards Goal  Note: Fall Risk Interventions:                                Problem: Patient Education: Go to Patient Education Activity  Goal: Patient/Family Education  Outcome: Progressing Towards Goal     Problem: Pressure Injury - Risk of  Goal: *Prevention of pressure injury  Description: Document Kirit Scale and appropriate interventions in the flowsheet.   Outcome: Progressing Towards Goal  Note: Pressure Injury Interventions:  Sensory Interventions: Assess changes in LOC, Keep linens dry and wrinkle-free    Moisture Interventions: Absorbent underpads         Mobility Interventions: PT/OT evaluation    Nutrition Interventions: Document food/fluid/supplement intake                     Problem: Patient Education: Go to Patient Education Activity  Goal: Patient/Family Education  Outcome: Progressing Towards Goal

## 2022-12-19 NOTE — PROGRESS NOTES
Dc order noted. CM met with patient at bedside to notify of IMM letter. Patient states that she will need Medicaid transport, either wheelchair transport or cab. CM notified primary nurse and . [facility]: home, self. Medicare pt has received, reviewed, and signed 2nd IM letter informing them of their right to appeal the discharge. Signed copied has been placed on pt bedside chart. Discharge plan of care/case management plan validated with provider discharge order.

## 2022-12-19 NOTE — PROGRESS NOTES
Problem: Falls - Risk of  Goal: *Absence of Falls  Description: Document Tanvirda Gamma Fall Risk and appropriate interventions in the flowsheet. Outcome: Progressing Towards Goal  Note: Fall Risk Interventions:  Mobility Interventions: Bed/chair exit alarm, Patient to call before getting OOB                             Problem: Pressure Injury - Risk of  Goal: *Prevention of pressure injury  Description: Document Kirit Scale and appropriate interventions in the flowsheet. Outcome: Progressing Towards Goal  Note: Pressure Injury Interventions:  Sensory Interventions: Assess changes in LOC    Moisture Interventions: Minimize layers, Absorbent underpads    Activity Interventions: PT/OT evaluation, Assess need for specialty bed    Mobility Interventions: HOB 30 degrees or less, Turn and reposition approx.  every two hours(pillow and wedges)    Nutrition Interventions: Document food/fluid/supplement intake    Friction and Shear Interventions: HOB 30 degrees or less

## 2022-12-19 NOTE — CONSULTS
Cardiology Consult    NAME: Domenica Garvin   :  1945   MRN:  [id number]     Date/Time:  2022 11:05 AM    Patient PCP: Geovanni Sagastume NP  ________________________________________________________________________     Assessment:   Nonspecific troponin elevation (83>78>86)  Acute type II non-Q MI  Recurrent syncopal episodes, patient refuses ILR  Recent GI bleed  CKD  Renal cell cancer status post left nephrectomy      Plan:   Nuclear stress testing today, results pending  Can discharge if low risk stress test  Outpatient follow-up with cardiology regarding syncope management  Discussed ILR with patient, still says NO to loop recorder      []        High complexity decision making was performed        Subjective:   CHIEF COMPLAINT: Syncope      REASON FOR CONSULT: Syncope      HISTORY OF PRESENT ILLNESS:     Domenica Garvin is a 65 y.o. BLACK/ female who has a history of advanced kidney disease, hypertension, diabetes, and renal cell cancer status post left nephrectomy. She also had recent GI bleed on Eliquis. She follows in the office with Dr. Marlene Nino. She has had recurrent syncopal episodes and ILR monitoring has been recommended but the patient has refused. She presents now with another episode of syncope. Says she was sitting in the chair and had no warning and suddenly passed out. No recollection of the event. Denies any chest pain or shortness of breath. Cardiac enzymes are mildly elevated. No chest pain. No shortness of breath.         Past Medical History:   Diagnosis Date    Adenocarcinoma, renal cell ([facility] [address] 2020    Arthritis     CAD (coronary artery disease)     Chronic kidney disease     Diabetes ([facility] [address]     Gout     Hypercholesterolemia     Hypertension     Mental depression     Pulmonary embolism ([facility] [address]     Seizures ([facility] [address]     Stroke [facility])     Thyroid disease       Past Surgical History:   Procedure Laterality Date    COLONOSCOPY N/A 10/24/2022 COLONOSCOPY performed by Valentina Freeman MD at [address]    HX GYN      HX HYSTERECTOMY      HX NEPHRECTOMY Left 02/12/2015    HX ORTHOPAEDIC      HX UROLOGICAL  02/12/2015    PARTIAL URETERECTOMY     [place] CARDIAC SURG PROCEDURE UNLIST      stents placed      No Known Allergies   Meds:  See below  Social History     Tobacco Use    Smoking status: Former     Years: 15.00     Types: Cigarettes    Smokeless tobacco: Never   Substance Use Topics    Alcohol use: Not Currently      Family History   Problem Relation Age of Onset    Heart Disease Mother     Heart Disease Father     Heart Disease Sister     Cancer Sister     Heart Disease Brother     Cancer Maternal Grandmother     Stroke Son     Cancer Sister        REVIEW OF SYSTEMS:     []         Unable to obtain  ROS due to ---   [x]         Total of 12 systems reviewed as follows:    Constitutional: negative fever, negative chills, negative weight loss  Eyes:   negative visual changes  ENT:   negative sore throat, tongue or lip swelling  Respiratory:  negative cough, negative dyspnea  Cards:  negative for chest pain, palpitations, lower extremity edema  GI:   negative for nausea, vomiting, diarrhea, and abdominal pain  Genitourinary: negative for frequency, dysuria  Integument:  negative for rash   Hematologic:  negative for easy bruising and gum/nose bleeding  Musculoskel: negative for myalgias,  back pain  Neurological:  +syncope  Behavl/Psych: negative for feelings of anxiety, depression     Pertinent Positives include :    Objective:      Physical Exam:    Last 24hrs VS reviewed since prior progress note.  Most recent are:    Visit Vitals  BP (!) 160/78   Pulse 62   Temp 98.2 °F (36.8 °C)   Resp 16   Ht 5' 4\" (1.626 m)   Wt 84.8 kg (187 lb)   SpO2 97%   BMI 32.10 kg/m²       Intake/Output Summary (Last 24 hours) at 12/19/2022 1105  Last data filed at 12/19/2022 0618  Gross per 24 hour   Intake --   Output 100 ml   Net -100 ml        General Appearance: Well developed, alert, no acute distress. Ears/Nose/Mouth/Throat: Moist mucosa  Neck: Supple. JVP within normal limits. Carotids good upstrokes  Chest: Lungs clear to auscultation bilaterally. Cardiovascular: Regular rate and rhythm, S1S2 normal, soft systolic murmur  Abdomen: Soft, non-tender, bowel sounds are active. Extremities: No edema bilaterally. distal pulses +1. Skin: Warm and dry. Neuro: Alert and oriented x3, normal speech; follows simple commands  Psychiatric: Cooperative; appropriate    []         Post-cath site without hematoma, bruit, tenderness, or thrill. Distal pulses intact. Data:      Telemetry: Normal sinus rhythm    EKG: Normal sinus, no ischemia  []  No new EKG for review. 08/01/22    ECHO ADULT FOLLOW-UP OR LIMITED 08/03/2022 8/3/2022    Interpretation Summary    Left Ventricle: Normal left ventricular systolic function with a visually estimated EF of 55 - 60%. Left ventricle size is normal. Mildly increased wall thickness. Normal wall motion. Normal diastolic function. Aortic Valve: Valve structure is normal. Mildly calcified cusp. Mitral Valve: Moderately thickened leaflet. Left Atrium: Left atrium is mildly dilated. Signed by: Lino Cheema MD on 8/3/2022  3:12 PM      Prior to Admission medications    Medication Sig Start Date End Date Taking? Authorizing Provider   Nebulizer & Compressor machine as directed. 8/9/22  Yes Provider, Historical   aspirin 81 mg chewable tablet CHEW AND SWALLOW 1 TABLET BY MOUTH ONCE DAILY FOR PREVENTION 8/10/22  Yes Provider, Historical   amLODIPine (NORVASC) 10 mg tablet Take 10 mg by mouth daily.  7/21/22 7/21/23 Yes Provider, Historical   FeroSuL 325 mg (65 mg iron) tablet TAKE 1 TABLET BY MOUTH ONCE DAILY FOR SUPPLEMENTATION 8/10/22  Yes Provider, Historical   fluticasone propionate (FLONASE) 50 mcg/actuation nasal spray 1 spray in each nostril Nasally Once a day   Yes Provider, Historical   nitroglycerin (NITROSTAT) 0.4 mg SL tablet DISSOLVE ONE TABLET UNDER THE TONGUE EVERY 5 MINUTES AS NEEDED FOR CHEST PAIN. DO NOT EXCEED A TOTAL OF 3 DOSES IN 15 MINUTES NOW 11/11/22  Yes Provider, Historical   triamcinolone acetonide (KENALOG) 0.1 % topical cream APPLY 1 CREAM EXTERNALLY TWICE DAILY TO LOWER LEGS 30 DAYS 9/30/22  Yes Provider, Historical   acetaminophen (TYLENOL) 500 mg tablet 2 tablets as needed Orally every 6 hrs   Yes Provider, Historical   travoprost (TRAVATAN Z) 0.004 % ophthalmic solution Travatan   Yes Provider, Historical   cetirizine (ZYRTEC) 10 mg tablet Take 10 mg by mouth daily. Yes Provider, Historical   melatonin 5 mg tablet Take 5 mg by mouth nightly. Yes Provider, Historical   insulin aspart U-100 (NOVOLOG) 100 unit/mL injection by SubCUTAneous route Before breakfast, lunch, dinner and at bedtime. SS: 150-200=2 units 201-250=4 units 251-300=6 units 301-350=8 units 351-400=10 units   Yes Provider, Historical   insulin detemir U-100 (LEVEMIR) 100 unit/mL injection 10 Units by SubCUTAneous route nightly. Yes Provider, Historical   plecanatide (Trulance) 3 mg tab Take 3 mg by mouth daily. Yes Provider, Historical   pantoprazole (PROTONIX) 40 mg tablet Take 1 Tablet by mouth two (2) times a day. 8/8/22  Yes Jolanta Jones MD   albuterol (PROVENTIL VENTOLIN) 2.5 mg /3 mL (0.083 %) nebu 2.5 mg by Nebulization route every six (6) hours as needed for Shortness of Breath. Yes Provider, Historical   carvediloL (COREG) 6.25 mg tablet Take 6.25 mg by mouth two (2) times daily (with meals). Yes Provider, Historical   calcitRIOL (ROCALTROL) 0.25 mcg capsule Take 0.25 mcg by mouth BID Mon Wed & Fri. Yes Provider, Historical   donepeziL (ARICEPT) 10 mg tablet Take 10 mg by mouth nightly. Yes Provider, Historical   Venlafaxine-ER 24 HR (EFFEXOR-ER) 75 mg tr24 tablet Take 75 mg by mouth daily. Yes Provider, Historical   pravastatin (PRAVACHOL) 80 mg tablet Take 80 mg by mouth nightly.    Yes Provider, Historical       Recent Results (from the past 24 hour(s))   METABOLIC PANEL, COMPREHENSIVE    Collection Time: 12/18/22  4:47 PM   Result Value Ref Range    Sodium 140 136 - 145 mmol/L    Potassium 2.9 (L) 3.5 - 5.1 mmol/L    Chloride 105 97 - 108 mmol/L    CO2 28 21 - 32 mmol/L    Anion gap 7 5 - 15 mmol/L    Glucose 187 (H) 65 - 100 mg/dL    BUN 57 (H) 6 - 20 mg/dL    Creatinine 3.03 (H) 0.55 - 1.02 mg/dL    BUN/Creatinine ratio 19 12 - 20      eGFR 15 (L) >60 ml/min/1.73m2    Calcium 8.6 8.5 - 10.1 mg/dL    Bilirubin, total 0.4 0.2 - 1.0 mg/dL    AST (SGOT) 53 (H) 15 - 37 U/L    ALT (SGPT) 36 12 - 78 U/L    Alk.  phosphatase 143 (H) 45 - 117 U/L    Protein, total 6.5 6.4 - 8.2 g/dL    Albumin 2.8 (L) 3.5 - 5.0 g/dL    Globulin 3.7 2.0 - 4.0 g/dL    A-G Ratio 0.8 (L) 1.1 - 2.2     TROPONIN-HIGH SENSITIVITY    Collection Time: 12/18/22  4:47 PM   Result Value Ref Range    Troponin-High Sensitivity 83 (H) 0 - 51 ng/L   TYPE & SCREEN    Collection Time: 12/18/22  4:48 PM   Result Value Ref Range    Crossmatch Expiration 12/21/2022,2359     ABO/Rh(D) A Positive     Antibody screen Negative    GLUCOSE, POC    Collection Time: 12/18/22  9:08 PM   Result Value Ref Range    Glucose (POC) 168 (H) 65 - 100 mg/dL    Performed by Gary Dotson    METABOLIC PANEL, BASIC    Collection Time: 12/19/22  6:56 AM   Result Value Ref Range    Sodium 142 136 - 145 mmol/L    Potassium 3.7 3.5 - 5.1 mmol/L    Chloride 106 97 - 108 mmol/L    CO2 26 21 - 32 mmol/L    Anion gap 10 5 - 15 mmol/L    Glucose 122 (H) 65 - 100 mg/dL    BUN 63 (H) 6 - 20 mg/dL    Creatinine 3.18 (H) 0.55 - 1.02 mg/dL    BUN/Creatinine ratio 20 12 - 20      eGFR 14 (L) >60 ml/min/1.73m2    Calcium 8.6 8.5 - 10.1 mg/dL   GLUCOSE, POC    Collection Time: 12/19/22  7:37 AM   Result Value Ref Range    Glucose (POC) 106 (H) 65 - 100 mg/dL    Performed by Aaron Avalos TEST    Collection Time: 12/19/22 11:03 AM   Result Value Ref Range    Stress Target  bpm        Sandra Gilman MD

## 2022-12-19 NOTE — PROGRESS NOTES
Was ask by Leanna Tran  to set up taxi or wheelchair for patient to   go home. I called [employer] and set up transportation for patient to go home by wheelchair. Reference # [id number]. They will call me back with who is coming and time.  Leanna Tran has been notified waiting on the insurance to call. Nurse Eliazar was notified of this also. 16:50 pm : was notified by patient that daughter will be picking her up due to how long it is taking transportation.

## 2022-12-19 NOTE — PROGRESS NOTES
..Discharge plan of care/case management plan validated with provider discharge order. Pt. Being discharged home with self care. Discharge instructions were reviewed with pt and granddaughter. Both verbalized an understanding of the discharge instructions.

## 2022-12-19 NOTE — ROUTINE PROCESS
TRANSFER - OUT REPORT:    Verbal report given to Lakeisha(name) on Sandeep Josué  being transferred to [facility](unit) for routine progression of care       Report consisted of patients Situation, Background, Assessment and   Recommendations(SBAR). Information from the following report(s) SBAR, Kardex, ED Summary, and MAR was reviewed with the receiving nurse. Lines:   Venous Access Device Fistula Arm, left (Active)       Peripheral IV 12/17/22 Posterior;Right Hand (Active)   Site Assessment Clean, dry, & intact 12/17/22 2308   Phlebitis Assessment 0 12/17/22 2308   Infiltration Assessment 0 12/17/22 2308   Dressing Status Clean, dry, & intact 12/17/22 2308   Dressing Type Tape;Transparent 12/17/22 2308   Hub Color/Line Status Pink 12/17/22 2308   Action Taken Blood drawn 12/17/22 2308   Alcohol Cap Used Yes 12/17/22 2308        Opportunity for questions and clarification was provided.       Patient transported with:   Monitor  Tech

## 2022-12-19 NOTE — PROGRESS NOTES
Problem: Falls - Risk of  Goal: *Absence of Falls  Description: Document Silvino Cuevas Fall Risk and appropriate interventions in the flowsheet. Outcome: Progressing Towards Goal  Note: Fall Risk Interventions:                                Problem: Patient Education: Go to Patient Education Activity  Goal: Patient/Family Education  Outcome: Progressing Towards Goal     Problem: Pressure Injury - Risk of  Goal: *Prevention of pressure injury  Description: Document Kirit Scale and appropriate interventions in the flowsheet.   Outcome: Progressing Towards Goal  Note: Pressure Injury Interventions:  Sensory Interventions: Assess changes in LOC, Keep linens dry and wrinkle-free    Moisture Interventions: Absorbent underpads         Mobility Interventions: PT/OT evaluation    Nutrition Interventions: Document food/fluid/supplement intake                     Problem: Patient Education: Go to Patient Education Activity  Goal: Patient/Family Education  Outcome: Progressing Towards Goal

## 2022-12-19 NOTE — PROGRESS NOTES
Was ask by Yelena Fry  to set up taxi or wheelchair for patient to   go home. I called [employer] and set up transportation for patient to go home by wheelchair. Reference # [id number]. They will call me back with who is coming and time.  Yelena Fry has been notified waiting on the insurance to call. Nurse Eliazar was notified of this also. 16:50 pm : was notified by patient that daughter will be picking her up due to how long it is taking transportation.

## 2022-12-19 NOTE — PROGRESS NOTES
Dc order noted. CM met with patient at bedside to notify of IMM letter. Patient states that she will need Medicaid transport, either wheelchair transport or cab. CM notified primary nurse and . [facility]: home, self. Medicare pt has received, reviewed, and signed 2nd IM letter informing them of their right to appeal the discharge. Signed copied has been placed on pt bedside chart. Discharge plan of care/case management plan validated with provider discharge order.

## 2022-12-19 NOTE — CONSULTS
Cardiology Consult    NAME: Chloe Trent   :  1945   MRN:  [id number]     Date/Time:  2022 11:05 AM    Patient PCP: Lisa Villafana, NP  ________________________________________________________________________     Assessment:   Nonspecific troponin elevation (83>78>86)  Acute type II non-Q MI  Recurrent syncopal episodes, patient refuses ILR  Recent GI bleed  CKD  Renal cell cancer status post left nephrectomy      Plan:   Nuclear stress testing today, results pending  Can discharge if low risk stress test  Outpatient follow-up with cardiology regarding syncope management  Discussed ILR with patient, still says NO to loop recorder      []        High complexity decision making was performed        Subjective:   CHIEF COMPLAINT: Syncope      REASON FOR CONSULT: Syncope      HISTORY OF PRESENT ILLNESS:     Chloe Trent is a 65 y.o. BLACK/ female who has a history of advanced kidney disease, hypertension, diabetes, and renal cell cancer status post left nephrectomy. She also had recent GI bleed on Eliquis. She follows in the office with Dr. Patrick Willett. She has had recurrent syncopal episodes and ILR monitoring has been recommended but the patient has refused. She presents now with another episode of syncope. Says she was sitting in the chair and had no warning and suddenly passed out. No recollection of the event. Denies any chest pain or shortness of breath. Cardiac enzymes are mildly elevated. No chest pain. No shortness of breath.         Past Medical History:   Diagnosis Date    Adenocarcinoma, renal cell ([facility] [address] 2020    Arthritis     CAD (coronary artery disease)     Chronic kidney disease     Diabetes ([facility] [address]     Gout     Hypercholesterolemia     Hypertension     Mental depression     Pulmonary embolism ([facility] [address]     Seizures ([facility] [address]     Stroke [facility])     Thyroid disease       Past Surgical History:   Procedure Laterality Date    COLONOSCOPY N/A 10/24/2022 COLONOSCOPY performed by Marlee York MD at [facility]    HX GYN      HX HYSTERECTOMY      HX NEPHRECTOMY Left 02/12/2015    HX ORTHOPAEDIC      HX UROLOGICAL  02/12/2015    PARTIAL URETERECTOMY     [place] CARDIAC SURG PROCEDURE UNLIST      stents placed      No Known Allergies   Meds:  See below  Social History     Tobacco Use    Smoking status: Former     Years: 15.00     Types: Cigarettes    Smokeless tobacco: Never   Substance Use Topics    Alcohol use: Not Currently      Family History   Problem Relation Age of Onset    Heart Disease Mother     Heart Disease Father     Heart Disease Sister     Cancer Sister     Heart Disease Brother     Cancer Maternal Grandmother     Stroke Son     Cancer Sister        REVIEW OF SYSTEMS:     []         Unable to obtain  ROS due to ---   [x]         Total of 12 systems reviewed as follows:    Constitutional: negative fever, negative chills, negative weight loss  Eyes:   negative visual changes  ENT:   negative sore throat, tongue or lip swelling  Respiratory:  negative cough, negative dyspnea  Cards:  negative for chest pain, palpitations, lower extremity edema  GI:   negative for nausea, vomiting, diarrhea, and abdominal pain  Genitourinary: negative for frequency, dysuria  Integument:  negative for rash   Hematologic:  negative for easy bruising and gum/nose bleeding  Musculoskel: negative for myalgias,  back pain  Neurological:  +syncope  Behavl/Psych: negative for feelings of anxiety, depression     Pertinent Positives include :    Objective:      Physical Exam:    Last 24hrs VS reviewed since prior progress note.  Most recent are:    Visit Vitals  BP (!) 160/78   Pulse 62   Temp 98.2 °F (36.8 °C)   Resp 16   Ht 5' 4\" (1.626 m)   Wt 84.8 kg (187 lb)   SpO2 97%   BMI 32.10 kg/m²       Intake/Output Summary (Last 24 hours) at 12/19/2022 1105  Last data filed at 12/19/2022 0618  Gross per 24 hour   Intake --   Output 100 ml   Net -100 ml        General Appearance: Well developed, alert, no acute distress. Ears/Nose/Mouth/Throat: Moist mucosa  Neck: Supple. JVP within normal limits. Carotids good upstrokes  Chest: Lungs clear to auscultation bilaterally. Cardiovascular: Regular rate and rhythm, S1S2 normal, soft systolic murmur  Abdomen: Soft, non-tender, bowel sounds are active. Extremities: No edema bilaterally. distal pulses +1. Skin: Warm and dry. Neuro: Alert and oriented x3, normal speech; follows simple commands  Psychiatric: Cooperative; appropriate    []         Post-cath site without hematoma, bruit, tenderness, or thrill. Distal pulses intact. Data:      Telemetry: Normal sinus rhythm    EKG: Normal sinus, no ischemia  []  No new EKG for review. 08/01/22    ECHO ADULT FOLLOW-UP OR LIMITED 08/03/2022 8/3/2022    Interpretation Summary    Left Ventricle: Normal left ventricular systolic function with a visually estimated EF of 55 - 60%. Left ventricle size is normal. Mildly increased wall thickness. Normal wall motion. Normal diastolic function. Aortic Valve: Valve structure is normal. Mildly calcified cusp. Mitral Valve: Moderately thickened leaflet. Left Atrium: Left atrium is mildly dilated. Signed by: Jermain Palumbo MD on 8/3/2022  3:12 PM      Prior to Admission medications    Medication Sig Start Date End Date Taking? Authorizing Provider   Nebulizer & Compressor machine as directed. 8/9/22  Yes Provider, Historical   aspirin 81 mg chewable tablet CHEW AND SWALLOW 1 TABLET BY MOUTH ONCE DAILY FOR PREVENTION 8/10/22  Yes Provider, Historical   amLODIPine (NORVASC) 10 mg tablet Take 10 mg by mouth daily.  7/21/22 7/21/23 Yes Provider, Historical   FeroSuL 325 mg (65 mg iron) tablet TAKE 1 TABLET BY MOUTH ONCE DAILY FOR SUPPLEMENTATION 8/10/22  Yes Provider, Historical   fluticasone propionate (FLONASE) 50 mcg/actuation nasal spray 1 spray in each nostril Nasally Once a day   Yes Provider, Historical   nitroglycerin (NITROSTAT) 0.4 mg SL tablet DISSOLVE ONE TABLET UNDER THE TONGUE EVERY 5 MINUTES AS NEEDED FOR CHEST PAIN. DO NOT EXCEED A TOTAL OF 3 DOSES IN 15 MINUTES NOW 11/11/22  Yes Provider, Historical   triamcinolone acetonide (KENALOG) 0.1 % topical cream APPLY 1 CREAM EXTERNALLY TWICE DAILY TO LOWER LEGS 30 DAYS 9/30/22  Yes Provider, Historical   acetaminophen (TYLENOL) 500 mg tablet 2 tablets as needed Orally every 6 hrs   Yes Provider, Historical   travoprost (TRAVATAN Z) 0.004 % ophthalmic solution Travatan   Yes Provider, Historical   cetirizine (ZYRTEC) 10 mg tablet Take 10 mg by mouth daily. Yes Provider, Historical   melatonin 5 mg tablet Take 5 mg by mouth nightly. Yes Provider, Historical   insulin aspart U-100 (NOVOLOG) 100 unit/mL injection by SubCUTAneous route Before breakfast, lunch, dinner and at bedtime. SS: 150-200=2 units 201-250=4 units 251-300=6 units 301-350=8 units 351-400=10 units   Yes Provider, Historical   insulin detemir U-100 (LEVEMIR) 100 unit/mL injection 10 Units by SubCUTAneous route nightly. Yes Provider, Historical   plecanatide (Trulance) 3 mg tab Take 3 mg by mouth daily. Yes Provider, Historical   pantoprazole (PROTONIX) 40 mg tablet Take 1 Tablet by mouth two (2) times a day. 8/8/22  Yes Jarett Torres MD   albuterol (PROVENTIL VENTOLIN) 2.5 mg /3 mL (0.083 %) nebu 2.5 mg by Nebulization route every six (6) hours as needed for Shortness of Breath. Yes Provider, Historical   carvediloL (COREG) 6.25 mg tablet Take 6.25 mg by mouth two (2) times daily (with meals). Yes Provider, Historical   calcitRIOL (ROCALTROL) 0.25 mcg capsule Take 0.25 mcg by mouth BID Mon Wed & Fri. Yes Provider, Historical   donepeziL (ARICEPT) 10 mg tablet Take 10 mg by mouth nightly. Yes Provider, Historical   Venlafaxine-ER 24 HR (EFFEXOR-ER) 75 mg tr24 tablet Take 75 mg by mouth daily. Yes Provider, Historical   pravastatin (PRAVACHOL) 80 mg tablet Take 80 mg by mouth nightly.    Yes Provider, Historical       Recent Results (from the past 24 hour(s))   METABOLIC PANEL, COMPREHENSIVE    Collection Time: 12/18/22  4:47 PM   Result Value Ref Range    Sodium 140 136 - 145 mmol/L    Potassium 2.9 (L) 3.5 - 5.1 mmol/L    Chloride 105 97 - 108 mmol/L    CO2 28 21 - 32 mmol/L    Anion gap 7 5 - 15 mmol/L    Glucose 187 (H) 65 - 100 mg/dL    BUN 57 (H) 6 - 20 mg/dL    Creatinine 3.03 (H) 0.55 - 1.02 mg/dL    BUN/Creatinine ratio 19 12 - 20      eGFR 15 (L) >60 ml/min/1.73m2    Calcium 8.6 8.5 - 10.1 mg/dL    Bilirubin, total 0.4 0.2 - 1.0 mg/dL    AST (SGOT) 53 (H) 15 - 37 U/L    ALT (SGPT) 36 12 - 78 U/L    Alk.  phosphatase 143 (H) 45 - 117 U/L    Protein, total 6.5 6.4 - 8.2 g/dL    Albumin 2.8 (L) 3.5 - 5.0 g/dL    Globulin 3.7 2.0 - 4.0 g/dL    A-G Ratio 0.8 (L) 1.1 - 2.2     TROPONIN-HIGH SENSITIVITY    Collection Time: 12/18/22  4:47 PM   Result Value Ref Range    Troponin-High Sensitivity 83 (H) 0 - 51 ng/L   TYPE & SCREEN    Collection Time: 12/18/22  4:48 PM   Result Value Ref Range    Crossmatch Expiration 12/21/2022,2359     ABO/Rh(D) A Positive     Antibody screen Negative    GLUCOSE, POC    Collection Time: 12/18/22  9:08 PM   Result Value Ref Range    Glucose (POC) 168 (H) 65 - 100 mg/dL    Performed by Melva Shay    METABOLIC PANEL, BASIC    Collection Time: 12/19/22  6:56 AM   Result Value Ref Range    Sodium 142 136 - 145 mmol/L    Potassium 3.7 3.5 - 5.1 mmol/L    Chloride 106 97 - 108 mmol/L    CO2 26 21 - 32 mmol/L    Anion gap 10 5 - 15 mmol/L    Glucose 122 (H) 65 - 100 mg/dL    BUN 63 (H) 6 - 20 mg/dL    Creatinine 3.18 (H) 0.55 - 1.02 mg/dL    BUN/Creatinine ratio 20 12 - 20      eGFR 14 (L) >60 ml/min/1.73m2    Calcium 8.6 8.5 - 10.1 mg/dL   GLUCOSE, POC    Collection Time: 12/19/22  7:37 AM   Result Value Ref Range    Glucose (POC) 106 (H) 65 - 100 mg/dL    Performed by Aaron Avalos TEST    Collection Time: 12/19/22 11:03 AM   Result Value Ref Range    Stress Target  bpm        Kelli Smith MD

## 2023-02-15 ENCOUNTER — HOSPITAL ENCOUNTER (OUTPATIENT)
Dept: WOUND CARE | Age: 78
Discharge: HOME | End: 2023-02-15
Payer: MEDICARE

## 2023-02-15 VITALS
BODY MASS INDEX: 31.24 KG/M2 | DIASTOLIC BLOOD PRESSURE: 73 MMHG | WEIGHT: 183 LBS | TEMPERATURE: 96 F | RESPIRATION RATE: 18 BRPM | SYSTOLIC BLOOD PRESSURE: 156 MMHG | HEART RATE: 63 BPM | HEIGHT: 64 IN

## 2023-02-15 DIAGNOSIS — L97.811 NON-PRESSURE CHRONIC ULCER OF OTHER PART OF RIGHT LOWER LEG LIMITED TO BREAKDOWN OF SKIN (HCC): ICD-10-CM

## 2023-02-15 DIAGNOSIS — L97.821 NON-PRESSURE CHRONIC ULCER OF OTHER PART OF LEFT LOWER LEG LIMITED TO BREAKDOWN OF SKIN (HCC): ICD-10-CM

## 2023-02-15 PROCEDURE — 99213 OFFICE O/P EST LOW 20 MIN: CPT

## 2023-02-15 PROCEDURE — 74011000250 HC RX REV CODE- 250: Performed by: SPECIALIST

## 2023-02-15 RX ORDER — MUPIROCIN 20 MG/G
OINTMENT TOPICAL ONCE
OUTPATIENT
Start: 2023-02-15 | End: 2023-02-15

## 2023-02-15 RX ORDER — LATANOPROST 50 UG/ML
1 SOLUTION/ DROPS OPHTHALMIC
COMMUNITY

## 2023-02-15 RX ORDER — SILVER SULFADIAZINE 10 G/1000G
CREAM TOPICAL ONCE
OUTPATIENT
Start: 2023-02-15 | End: 2023-02-15

## 2023-02-15 RX ORDER — TRIAMCINOLONE ACETONIDE 1 MG/G
OINTMENT TOPICAL ONCE
OUTPATIENT
Start: 2023-02-15 | End: 2023-02-15

## 2023-02-15 RX ORDER — LIDOCAINE HYDROCHLORIDE 20 MG/ML
15 SOLUTION OROPHARYNGEAL ONCE
OUTPATIENT
Start: 2023-02-15 | End: 2023-02-15

## 2023-02-15 RX ORDER — GABAPENTIN 100 MG/1
100 CAPSULE ORAL 3 TIMES DAILY
COMMUNITY

## 2023-02-15 RX ORDER — SENNOSIDES 8.6 MG/1
1 TABLET ORAL DAILY
COMMUNITY

## 2023-02-15 RX ORDER — LIDOCAINE HYDROCHLORIDE 20 MG/ML
15 SOLUTION OROPHARYNGEAL AS NEEDED
Status: DISCONTINUED | OUTPATIENT
Start: 2023-02-15 | End: 2023-02-17 | Stop reason: HOSPADM

## 2023-02-15 NOTE — WOUND CARE
[address] f: [phone] f: [phone] p: [phone] [place]@"[employer]       Ordering Center:     [address] [place] [address] [address] [place] [address] [phone]  [address] [place] [address] NUMBER [phone]    Patient Information:     Shauna Love   [address]  [address] [address] [phone] (home)     : 1945  AGE: 65 y.o. GENDER: female  TODAYS DATE: 2/15/2023    Insurance:     PRIMARY INSURANCE    [id number] - (Medicare)  Payer/Plan Subscr  Sex Relation Sub. Ins. ID Effective Group Num   1. 52 [facility] A 1945 Female Self [id number]                                    [address] [address]   [facility] JESSE BAILEY A 1945 Female Self [id number] 22 [facility]                                   [address]        Patient Wound Information:     Hospital Problems  Date Reviewed: 10/26/2022   None      WOUNDS REQUIRING DRESSING SUPPLIES    Wound Leg Left #1 02/15/23 (Active)   Wound Image   02/15/23 1027   Wound Etiology Venous 02/15/23 1027   Dressing Status Other (Comment) 02/15/23 1027   Cleansed Cleansed with saline 02/15/23 1027   Wound Length (cm) 2 cm 02/15/23 1027   Wound Width (cm) 2 cm 02/15/23 1027   Wound Depth (cm) 0.1 cm 02/15/23 1027   Wound Surface Area (cm^2) 4 cm^2 02/15/23 1027   Wound Volume (cm^3) 0.4 cm^3 02/15/23 1027   Wound Assessment Granulation tissue; Fluid filled blister 02/15/23 1027   Drainage Amount Small 02/15/23 1027   Drainage Description Serosanguinous 02/15/23 1027   Wound Odor None 02/15/23 1027   Radha-Wound/Incision Assessment Intact 02/15/23 1027   Edges Attached edges 02/15/23 1027   Wound Thickness Description Partial thickness 02/15/23 1027   Number of days: 0       Wound Leg Right;Posterior #2 02/15/23 (Active)   Wound Image   02/15/23 1027   Wound Etiology Venous 02/15/23 1027   Dressing Status Other (Comment) 02/15/23 1027   Cleansed Cleansed with saline 02/15/23 1027   Wound Length (cm) 0.5 cm 02/15/23 1027   Wound Width (cm) 0.5 cm 02/15/23 1027   Wound Depth (cm) 0.1 cm 02/15/23 1027   Wound Surface Area (cm^2) 0.25 cm^2 02/15/23 1027   Wound Volume (cm^3) 0.025 cm^3 02/15/23 1027   Wound Assessment Slough 02/15/23 1027   Drainage Amount Scant 02/15/23 1027   Drainage Description Serosanguinous 02/15/23 1027   Wound Odor None 02/15/23 1027   Radha-Wound/Incision Assessment Dry/flaky 02/15/23 1027   Edges Attached edges 02/15/23 1027   Wound Thickness Description Partial thickness 02/15/23 1027   Number of days: 0       Supplies Requested :     WOUND #:1   PRIMARY DRESSING:  None   4X4 gauze pad and Conforming roll gauze     FREQUENCY OF DRESSING CHANGES:  Daily       WOUND #: 2   PRIMARY DRESSING:  None   4X4 gauze pad and Conforming roll gauze     FREQUENCY OF DRESSING CHANGES:  Daily         ADDITIONAL ITEMS:  [] Gloves Small  [x] Gloves Medium [] Gloves Large [] Gloves Hessie Dubs  [x] Tape 4\"  [x] Medipore Tape  [x] Saline  [] Skin Prep   [] Adhesive Remover   [] Cotton Tip Applicators   [] Other:    Patient Wound(s) Debrided: [] Yes if yes please add minna  [x] No    Debribement Type:     Patient currently being seen by Home Health: [] Yes   [x] No    Duration for needed supplies:  []15  [x]30 Days    Electronically signed by Heather Wiggins LPN on 3/87/7511 at 54:47 AM     Provider Information:      PROVIDER'S NAME: Lucio Baldwin MD

## 2023-02-15 NOTE — WOUND CARE
Discharge Instructions for  [address] [facility], [facility]  Telephone: [phone]    NAME:  Racheal Arguello OF BIRTH:  1945  MEDICAL RECORD NUMBER:  [id number]  DATE:  2/15/2023      Return Appointment:  [] Dressing supply provider:   [] Home Healthcare:  [x] Return Appointment:   1   Week(s)  [] Nurse Visit:  Week(s)    [] Discharge from [facility]  [] Ordered tests:   [x] Referrals: [facility]  [] Rx:   [] Other:      Wound Cleansing:   Do not scrub or use excessive force. Cleanse wound prior to applying a clean dressing with:  [x] Normal Saline   [x] Mild Soap & Water/Baby Shampoo   [] Keep Wound Dry in Shower  [] Other:      Topical Treatments:  Do not apply lotions, creams, or ointments to wound bed unless directed. [] Apply moisturizing lotion to skin surrounding the wound prior to dressing change.  [] Apply antifungal ointment to skin surrounding the wound prior to dressing change.  [] Apply thin film of moisture barrier/zinc ointment to skin immediately around wound. [] Other:       Dressings:           Wound Location Bilateral Legs   [] Apply Primary Dressing:       [x] MediHoney Gel    [] MediHoney Alginate               [] Calcium Alginate      [] Calcium Alginate with Silver   [] Collagen                   [] Collagen with Silver                [] Santyl with Moistened saline gauze              [] Hydrofera Blue (cut to size and moistened)  [] Hydrofera Blue Ready (Cut to size)   [] NS wet to dry    [] Betadine wet to dry              [] Hydrogel                [] Mepitel     [] Bactroban/Mupirocin               [] Other:     [x] Cover and Secure with:     [x] Gauze [] Jerri [x] Kerlix   [] Foam [] Super Absorbant [] ABD     [] ACE Wrap            [] Other:    Limit contact of tape with skin.     [] Change dressing: [] Daily    [] Every Other Day [] Once per week   [] Twice per week   [] Three times per week [] Do Not Change Dressing   [] Other:     Negative Pressure:           Wound Location:   [] Pressure @  mm/Hg  []Continuous []Intermittent   [] Black Foam [] White Foam              [] Bridge:               [] Change vac dressing three times per week    Pressure Relief:  [] When sitting, shift position or do seat lifts every 15 minutes.  [] Wheelchair cushion [] Specialty Bed/Mattress  [] Turn every 2 hours when in bed. Avoid direct pressure on wound site. Limit side lying to 30 degree tilt. Limit HOB elevation to 30 degrees. Edema Control:  Apply: [x] Compression Stocking:   mmHg  [x]Right Leg [x]Left Leg   [] Tubigrip []Right Leg Double Layer []Left Leg Double Layer      []Right Leg Single Layer []Left Leg Single Layer     [] Elevate leg(s) above the level of the heart when sitting. [] Avoid prolonged standing in one place. Compression:  Apply: [] Multilayer Compression Wrap: []RightLeg []Left Leg                                 []Coflex   []Coflex Lite             []Unna     [] Do not get leg(s) with wrap wet. If wraps become too tight call the center or completely remove the wrap. [] Elevate leg(s) above the level of the heart when sitting. [] Avoid prolonged standing in one place. Off-Loading:   [] Off-loading when [] walking  [] in bed [] sitting  [] Total non-weight bearing  [] Right Leg  [] Left Leg   [] Assistive Device [] Walker [] Cane      [] Wheelchair  [] Crutches   [] Surgical shoe    [] Podus Boot(s)   [] Foam Boot(s)  [] Roll About    [] Cast Boot [] CROW Boot  [] Wedge Shoe  [] Other:    Contact Cast:  Apply: [] Total Contact Cast Applied in Clinic []RightLeg []Left Leg   [] Do not get cast wet. Contact center or go to emergency room if there is a foul odor or becomes uncomfortable due to feeling tight or swelling. Do not use objects inside of cast to scratch.       Dietary:  [x] Diet as tolerated: [] Calorie Diabetic Diet: [] No Added Salt:  [] Increase Protein: [] Other:     Activity:  [x] Activity as tolerated:    [] Patient has no activity restrictions       [] Strict Bedrest:   [] Remain off Work:       [] May return to full duty work:                                     [] Return to work with restrictions:               [address] Information: Should you experience any significant changes in your wound(s) or have questions about your wound care, please contact Jaimee Oneil [address] at [place] [address] 8:00 am - 4:30. If you need help with your wound outside of these hours and cannot wait until we are again available, contact your PCP or go to the hospital emergency room. PLEASE NOTE: IF YOU ARE UNABLE TO OBTAIN WOUND SUPPLIES, CONTINUE TO USE THE SUPPLIES YOU HAVE AVAILABLE UNTIL YOU ARE ABLE TO REACH US. IT IS MOST IMPORTANT TO KEEP THE WOUND COVERED AT ALL TIMES.     [x] Dr. Rafal Gillespie   [] Dr. John Snyder  [] Dr. Emmanuel Person

## 2023-02-15 NOTE — DISCHARGE INSTRUCTIONS
Discharge Instructions for  [address] [place] [address], [address] [place] [address]  Telephone: [phone]    NAME:  Shital Car OF BIRTH:  1945  MEDICAL RECORD NUMBER:  [id number]  DATE:  2/15/2023      Return Appointment:  [] Dressing supply provider:   [] Home Healthcare:  [x] Return Appointment:   1   Week(s)  [] Nurse Visit:  Week(s)    [] Discharge from [facility]  [] Ordered tests:   [x] Referrals: [facility]  [] Rx:   [] Other:      Wound Cleansing:   Do not scrub or use excessive force. Cleanse wound prior to applying a clean dressing with:  [x] Normal Saline   [x] Mild Soap & Water/Baby Shampoo   [] Keep Wound Dry in Shower  [] Other:      Topical Treatments:  Do not apply lotions, creams, or ointments to wound bed unless directed. [] Apply moisturizing lotion to skin surrounding the wound prior to dressing change.  [] Apply antifungal ointment to skin surrounding the wound prior to dressing change.  [] Apply thin film of moisture barrier/zinc ointment to skin immediately around wound. [] Other:       Dressings:           Wound Location Bilateral Legs   [] Apply Primary Dressing:       [x] MediHoney Gel    [] MediHoney Alginate               [] Calcium Alginate      [] Calcium Alginate with Silver   [] Collagen                   [] Collagen with Silver                [] Santyl with Moistened saline gauze              [] Hydrofera Blue (cut to size and moistened)  [] Hydrofera Blue Ready (Cut to size)   [] NS wet to dry    [] Betadine wet to dry              [] Hydrogel                [] Mepitel     [] Bactroban/Mupirocin               [] Other:     [x] Cover and Secure with:     [x] Gauze [] Jerri [x] Kerlix   [] Foam [] Super Absorbant [] ABD     [] ACE Wrap            [] Other:    Limit contact of tape with skin.     [] Change dressing: [] Daily    [] Every Other Day [] Once per week   [] Twice per week   [] Three times per week [] Do Not Change Dressing   [] Other:     Negative Pressure:           Wound Location:   [] Pressure @  mm/Hg  []Continuous []Intermittent   [] Black Foam [] White Foam              [] Bridge:               [] Change vac dressing three times per week    Pressure Relief:  [] When sitting, shift position or do seat lifts every 15 minutes.  [] Wheelchair cushion [] Specialty Bed/Mattress  [] Turn every 2 hours when in bed. Avoid direct pressure on wound site. Limit side lying to 30 degree tilt. Limit HOB elevation to 30 degrees. Edema Control:  Apply: [x] Compression Stocking:   mmHg  [x]Right Leg [x]Left Leg   [] Tubigrip []Right Leg Double Layer []Left Leg Double Layer      []Right Leg Single Layer []Left Leg Single Layer     [] Elevate leg(s) above the level of the heart when sitting. [] Avoid prolonged standing in one place. Compression:  Apply: [] Multilayer Compression Wrap: []RightLeg []Left Leg                                 []Coflex   []Coflex Lite             []Unna     [] Do not get leg(s) with wrap wet. If wraps become too tight call the center or completely remove the wrap. [] Elevate leg(s) above the level of the heart when sitting. [] Avoid prolonged standing in one place. Off-Loading:   [] Off-loading when [] walking  [] in bed [] sitting  [] Total non-weight bearing  [] Right Leg  [] Left Leg   [] Assistive Device [] Walker [] Cane      [] Wheelchair  [] Crutches   [] Surgical shoe    [] Podus Boot(s)   [] Foam Boot(s)  [] Roll About    [] Cast Boot [] CROW Boot  [] Wedge Shoe  [] Other:    Contact Cast:  Apply: [] Total Contact Cast Applied in Clinic []RightLeg []Left Leg   [] Do not get cast wet. Contact center or go to emergency room if there is a foul odor or becomes uncomfortable due to feeling tight or swelling. Do not use objects inside of cast to scratch.       Dietary:  [x] Diet as tolerated: [] Calorie Diabetic Diet: [] No Added Salt:  [] Increase Protein: [] Other:     Activity:  [x] Activity as tolerated:    [] Patient has no activity restrictions       [] Strict Bedrest:   [] Remain off Work:       [] May return to full duty work:                                     [] Return to work with restrictions:               [address] [facility] [address] [address] Information: Should you experience any significant changes in your wound(s) or have questions about your wound care, please contact Jaimee Oneil [address] at [place] [address] 8:00 am - 4:30. If you need help with your wound outside of these hours and cannot wait until we are again available, contact your PCP or go to the hospital emergency room. PLEASE NOTE: IF YOU ARE UNABLE TO OBTAIN WOUND SUPPLIES, CONTINUE TO USE THE SUPPLIES YOU HAVE AVAILABLE UNTIL YOU ARE ABLE TO REACH US. IT IS MOST IMPORTANT TO KEEP THE WOUND COVERED AT ALL TIMES.     [x] Dr. Shmuel Palomo   [] Dr. Dionna Singh  [] Dr. Dean Welch

## 2023-02-15 NOTE — WOUND CARE
[address]   Progress Note and Procedure Note   NO Procedure      Harper Duran  MEDICAL RECORD NUMBER:  [id number]  AGE: 65 y.o. RACE BLACK/  GENDER: female  : 1945  EPISODE DATE:  2/15/2023    Subjective:   74-year-old female previously seen in his clinic about a year ago for a problem with small ulcerations of her legs bilaterally  Chief Complaint   Patient presents with    Wound Check     R and L leg         HISTORY of PRESENT ILLNESS HPI    Harper Duran is a 65 y.o. female who presents today for wound/ulcer evaluation.    History of Wound Context: BLE  Wound/Ulcer Pain Timing/Severity: moderate  Quality of pain: aching  Severity:  2 / 10   Modifying Factors: None  Associated Signs/Symptoms: none    Ulcer Identification:  Ulcer Type: venous    Contributing Factors: venous stasis    Wound: BLE         PAST MEDICAL HISTORY    Past Medical History:   Diagnosis Date    Adenocarcinoma, renal cell ([facility] 2020    Arthritis     CAD (coronary artery disease)     Chronic kidney disease     Diabetes ([facility]     Gout     Hypercholesterolemia     Hypertension     Mental depression     Pulmonary embolism (HCC)     Seizures ([facility]     Stroke [facility])     Thyroid disease         PAST SURGICAL HISTORY    Past Surgical History:   Procedure Laterality Date    COLONOSCOPY N/A 10/24/2022    COLONOSCOPY performed by Dilan Osorio MD at [facility] 2    HX GYN      HX HYSTERECTOMY      HX NEPHRECTOMY Left 2015    HX ORTHOPAEDIC      HX UROLOGICAL  2015    PARTIAL URETERECTOMY     ID UNLISTED PROCEDURE CARDIAC SURGERY      stents placed        FAMILY HISTORY    Family History   Problem Relation Age of Onset    Heart Disease Mother     Heart Disease Father     Heart Disease Sister     Cancer Sister     Heart Disease Brother     Cancer Maternal Grandmother     Stroke Son     Cancer Sister        SOCIAL HISTORY    Social History     Tobacco Use    Smoking status: Former Years: 15.00     Types: Cigarettes    Smokeless tobacco: Never   Vaping Use    Vaping Use: Never used   Substance Use Topics    Alcohol use: Not Currently    Drug use: Never       ALLERGIES    No Known Allergies    MEDICATIONS    Current Outpatient Medications on File Prior to Encounter   Medication Sig Dispense Refill    gabapentin (NEURONTIN) 100 mg capsule Take 100 mg by mouth three (3) times daily. latanoprost (XALATAN) 0.005 % ophthalmic solution Administer 1 Drop to both eyes nightly. Senna 8.6 mg tablet Take 1 Tablet by mouth daily. Nebulizer & Compressor machine as directed. aspirin 81 mg chewable tablet CHEW AND SWALLOW 1 TABLET BY MOUTH ONCE DAILY FOR PREVENTION      FeroSuL 325 mg (65 mg iron) tablet TAKE 1 TABLET BY MOUTH ONCE DAILY FOR SUPPLEMENTATION      fluticasone propionate (FLONASE) 50 mcg/actuation nasal spray 1 spray in each nostril Nasally Once a day      nitroglycerin (NITROSTAT) 0.4 mg SL tablet DISSOLVE ONE TABLET UNDER THE TONGUE EVERY 5 MINUTES AS NEEDED FOR CHEST PAIN. DO NOT EXCEED A TOTAL OF 3 DOSES IN 15 MINUTES NOW      acetaminophen (TYLENOL) 500 mg tablet 2 tablets as needed Orally every 6 hrs      melatonin 5 mg tablet Take 5 mg by mouth nightly. insulin aspart U-100 (NOVOLOG) 100 unit/mL injection by SubCUTAneous route Before breakfast, lunch, dinner and at bedtime. SS: 150-200=2 units 201-250=4 units 251-300=6 units 301-350=8 units 351-400=10 units      insulin detemir U-100 (LEVEMIR) 100 unit/mL injection 10 Units by SubCUTAneous route nightly. plecanatide (Trulance) 3 mg tab Take 3 mg by mouth daily. pantoprazole (PROTONIX) 40 mg tablet Take 1 Tablet by mouth two (2) times a day. 60 Tablet 0    albuterol (PROVENTIL VENTOLIN) 2.5 mg /3 mL (0.083 %) nebu 2.5 mg by Nebulization route every six (6) hours as needed for Shortness of Breath. carvediloL (COREG) 6.25 mg tablet Take 6.25 mg by mouth two (2) times daily (with meals).       calcitRIOL (ROCALTROL) 0.25 mcg capsule Take 0.25 mcg by mouth BID Mon Wed & Fri.      donepeziL (ARICEPT) 10 mg tablet Take 10 mg by mouth nightly. Venlafaxine-ER 24 HR (EFFEXOR-ER) 75 mg tr24 tablet Take 75 mg by mouth daily. pravastatin (PRAVACHOL) 80 mg tablet Take 80 mg by mouth nightly. No current facility-administered medications on file prior to encounter. REVIEW OF SYSTEMS    Pertinent items are noted in HPI. Objective:     Visit Vitals  BP (!) 156/73 (BP 1 Location: Right arm, BP Patient Position: At rest;Sitting)   Pulse 63   Temp (!) 96 °F (35.6 °C)   Resp 18   Ht 5' 4\" (1.626 m)   Wt 83 kg (183 lb)   BMI 31.41 kg/m²       Wt Readings from Last 3 Encounters:   02/15/23 83 kg (183 lb)   12/19/22 84.8 kg (187 lb)   11/15/22 85.1 kg (187 lb 9.8 oz)       PHYSICAL EXAM  Several very small and very superficial wounds on both legs  Pertinent items are noted in HPI. Assessment:   Possible venous disease  Problem List Items Addressed This Visit       Non-pressure chronic ulcer of other part of right lower leg limited to breakdown of skin ([facility]    Non-pressure chronic ulcer of other part of left lower leg limited to breakdown of skin [facility])       Procedure Note  Indications:  Based on my examination of this patient's wound(s)/ulcer(s) today, debridement is not required to promote healing and evaluate the wound base. Wound Leg Left #1 02/15/23 (Active)   Wound Image   02/15/23 1027   Wound Etiology Venous 02/15/23 1027   Dressing Status Other (Comment) 02/15/23 1027   Cleansed Cleansed with saline 02/15/23 1027   Wound Length (cm) 2 cm 02/15/23 1027   Wound Width (cm) 2 cm 02/15/23 1027   Wound Depth (cm) 0.1 cm 02/15/23 1027   Wound Surface Area (cm^2) 4 cm^2 02/15/23 1027   Wound Volume (cm^3) 0.4 cm^3 02/15/23 1027   Wound Assessment Granulation tissue; Fluid filled blister 02/15/23 1027   Drainage Amount Small 02/15/23 1027   Drainage Description Serosanguinous 02/15/23 1027   Wound Odor None 02/15/23 1027   Radha-Wound/Incision Assessment Intact 02/15/23 1027   Edges Attached edges 02/15/23 1027   Wound Thickness Description Partial thickness 02/15/23 1027   Number of days: 0       Wound Leg Right;Posterior #2 02/15/23 (Active)   Wound Image   02/15/23 1027   Wound Etiology Venous 02/15/23 1027   Dressing Status Other (Comment) 02/15/23 1027   Cleansed Cleansed with saline 02/15/23 1027   Wound Length (cm) 0.5 cm 02/15/23 1027   Wound Width (cm) 0.5 cm 02/15/23 1027   Wound Depth (cm) 0.1 cm 02/15/23 1027   Wound Surface Area (cm^2) 0.25 cm^2 02/15/23 1027   Wound Volume (cm^3) 0.025 cm^3 02/15/23 1027   Wound Assessment Slough 02/15/23 1027   Drainage Amount Scant 02/15/23 1027   Drainage Description Serosanguinous 02/15/23 1027   Wound Odor None 02/15/23 1027   Radha-Wound/Incision Assessment Dry/flaky 02/15/23 1027   Edges Attached edges 02/15/23 1027   Wound Thickness Description Partial thickness 02/15/23 1027   Number of days: 0        Plan:   Medihoney gel daily  Refer to Dr. García Robin for venous evaluation  Tubigrip compression    Treatment Note please see attached Discharge Instructions    Written patient dismissal instructions given to patient or POA.          Electronically signed by Martha Franco MD on 2/15/2023 at 11:22 AM

## 2023-02-22 ENCOUNTER — HOSPITAL ENCOUNTER (OUTPATIENT)
Dept: WOUND CARE | Age: 78
Discharge: HOME | End: 2023-02-22
Payer: MEDICARE

## 2023-02-22 VITALS
HEART RATE: 66 BPM | TEMPERATURE: 95.7 F | SYSTOLIC BLOOD PRESSURE: 171 MMHG | DIASTOLIC BLOOD PRESSURE: 69 MMHG | RESPIRATION RATE: 18 BRPM

## 2023-02-22 DIAGNOSIS — L97.821 NON-PRESSURE CHRONIC ULCER OF OTHER PART OF LEFT LOWER LEG LIMITED TO BREAKDOWN OF SKIN (HCC): ICD-10-CM

## 2023-02-22 DIAGNOSIS — L97.811 NON-PRESSURE CHRONIC ULCER OF OTHER PART OF RIGHT LOWER LEG LIMITED TO BREAKDOWN OF SKIN (HCC): Primary | ICD-10-CM

## 2023-02-22 PROCEDURE — 99213 OFFICE O/P EST LOW 20 MIN: CPT

## 2023-02-22 PROCEDURE — 74011000250 HC RX REV CODE- 250: Performed by: SPECIALIST

## 2023-02-22 RX ORDER — LIDOCAINE HYDROCHLORIDE 20 MG/ML
15 SOLUTION OROPHARYNGEAL ONCE
Status: COMPLETED | OUTPATIENT
Start: 2023-02-22 | End: 2023-02-22

## 2023-02-22 RX ORDER — LIDOCAINE HYDROCHLORIDE 20 MG/ML
15 SOLUTION OROPHARYNGEAL ONCE
OUTPATIENT
Start: 2023-02-22 | End: 2023-02-22

## 2023-02-22 RX ORDER — TRIAMCINOLONE ACETONIDE 1 MG/G
OINTMENT TOPICAL ONCE
OUTPATIENT
Start: 2023-02-22 | End: 2023-02-22

## 2023-02-22 RX ORDER — SILVER SULFADIAZINE 10 G/1000G
CREAM TOPICAL ONCE
OUTPATIENT
Start: 2023-02-22 | End: 2023-02-22

## 2023-02-22 RX ORDER — MUPIROCIN 20 MG/G
OINTMENT TOPICAL ONCE
OUTPATIENT
Start: 2023-02-22 | End: 2023-02-22

## 2023-02-22 RX ADMIN — LIDOCAINE HYDROCHLORIDE 15 ML: 20 SOLUTION ORAL; TOPICAL at 10:34

## 2023-02-22 NOTE — DISCHARGE INSTRUCTIONS
Discharge Instructions for  [address] [place] [address], [address] [place] [address]  Telephone: [phone]    NAME:  Saba Clark OF BIRTH:  1945  MEDICAL RECORD NUMBER:  [id number]  DATE:  2/22/2023      Return Appointment:  [] Dressing supply provider:   [] Home Healthcare:  [x] Return Appointment:   1   Week(s)  [] Nurse Visit:  Week(s)    [] Discharge from [facility]  [] Ordered tests:   [x] Referrals: [facility] info given  [] Rx:   [] Other:      Wound Cleansing:   Do not scrub or use excessive force. Cleanse wound prior to applying a clean dressing with:  [x] Normal Saline   [x] Mild Soap & Water/Baby Shampoo   [] Keep Wound Dry in Shower  [] Other:      Topical Treatments:  Do not apply lotions, creams, or ointments to wound bed unless directed. [] Apply moisturizing lotion to skin surrounding the wound prior to dressing change.  [] Apply antifungal ointment to skin surrounding the wound prior to dressing change.  [] Apply thin film of moisture barrier/zinc ointment to skin immediately around wound. [] Other:       Dressings:           Wound Location Right  Leg   [] Apply Primary Dressing:       [x] MediHoney Gel    [] MediHoney Alginate               [] Calcium Alginate      [] Calcium Alginate with Silver   [] Collagen                   [] Collagen with Silver                [] Santyl with Moistened saline gauze              [] Hydrofera Blue (cut to size and moistened)  [] Hydrofera Blue Ready (Cut to size)   [] NS wet to dry    [] Betadine wet to dry              [] Hydrogel                [] Mepitel     [] Bactroban/Mupirocin               [] Other:     [x] Cover and Secure with:     [x] Gauze [] Jerri [x] Kerlix   [] Foam [] Super Absorbant [] ABD     [] ACE Wrap            [] Other:    Limit contact of tape with skin.     [] Change dressing: [] Daily    [] Every Other Day [] Once per week   [] Twice per week   [] Three times per week [] Do Not Change Dressing   [] Other:     Negative Pressure:           Wound Location:   [] Pressure @  mm/Hg  []Continuous []Intermittent   [] Black Foam [] White Foam              [] Bridge:               [] Change vac dressing three times per week    Pressure Relief:  [] When sitting, shift position or do seat lifts every 15 minutes.  [] Wheelchair cushion [] Specialty Bed/Mattress  [] Turn every 2 hours when in bed. Avoid direct pressure on wound site. Limit side lying to 30 degree tilt. Limit HOB elevation to 30 degrees. Edema Control:  Apply: [x] Compression Stocking:   mmHg  [x]Right Leg [x]Left Leg   [] Tubigrip []Right Leg Double Layer []Left Leg Double Layer      []Right Leg Single Layer []Left Leg Single Layer     [] Elevate leg(s) above the level of the heart when sitting. [] Avoid prolonged standing in one place. Compression:  Apply: [] Multilayer Compression Wrap: []RightLeg []Left Leg                                 []Coflex   []Coflex Lite             []Unna     [] Do not get leg(s) with wrap wet. If wraps become too tight call the center or completely remove the wrap. [] Elevate leg(s) above the level of the heart when sitting. [] Avoid prolonged standing in one place. Off-Loading:   [] Off-loading when [] walking  [] in bed [] sitting  [] Total non-weight bearing  [] Right Leg  [] Left Leg   [] Assistive Device [] Walker [] Cane      [] Wheelchair  [] Crutches   [] Surgical shoe    [] Podus Boot(s)   [] Foam Boot(s)  [] Roll About    [] Cast Boot [] CROW Boot  [] Wedge Shoe  [] Other:    Contact Cast:  Apply: [] Total Contact Cast Applied in Clinic []RightLeg []Left Leg   [] Do not get cast wet. Contact center or go to emergency room if there is a foul odor or becomes uncomfortable due to feeling tight or swelling. Do not use objects inside of cast to scratch.       Dietary:  [x] Diet as tolerated: [] Calorie Diabetic Diet: [] No Added Salt:  [] Increase Protein: [] Other:     Activity:  [x] Activity as tolerated:    [] Patient has no activity restrictions       [] Strict Bedrest:   [] Remain off Work:       [] May return to full duty work:                                     [] Return to work with restrictions:               [address] Information: Should you experience any significant changes in your wound(s) or have questions about your wound care, please contact Jaimee Oneil 26 at [place] [address] 8:00 am - 4:30. If you need help with your wound outside of these hours and cannot wait until we are again available, contact your PCP or go to the hospital emergency room. PLEASE NOTE: IF YOU ARE UNABLE TO OBTAIN WOUND SUPPLIES, CONTINUE TO USE THE SUPPLIES YOU HAVE AVAILABLE UNTIL YOU ARE ABLE TO REACH US. IT IS MOST IMPORTANT TO KEEP THE WOUND COVERED AT ALL TIMES.     [x] Dr. Mateo Andre   [] Dr. Jumana León  [] Dr. Lucia Thompson

## 2023-02-22 NOTE — WOUND CARE
Discharge Instructions for  [address] [place] [address], [address] [place] [address]  Telephone: [phone]    NAME:  Pacheco Delgado OF BIRTH:  1945  MEDICAL RECORD NUMBER:  [id number]  DATE:  2/22/2023      Return Appointment:  [] Dressing supply provider:   [] Home Healthcare:  [x] Return Appointment:   1   Week(s)  [] Nurse Visit:  Week(s)    [] Discharge from [facility]  [] Ordered tests:   [x] Referrals: [facility] info given  [] Rx:   [] Other:      Wound Cleansing:   Do not scrub or use excessive force. Cleanse wound prior to applying a clean dressing with:  [x] Normal Saline   [x] Mild Soap & Water/Baby Shampoo   [] Keep Wound Dry in Shower  [] Other:      Topical Treatments:  Do not apply lotions, creams, or ointments to wound bed unless directed. [] Apply moisturizing lotion to skin surrounding the wound prior to dressing change.  [] Apply antifungal ointment to skin surrounding the wound prior to dressing change.  [] Apply thin film of moisture barrier/zinc ointment to skin immediately around wound. [] Other:       Dressings:           Wound Location Right  Leg   [] Apply Primary Dressing:       [x] MediHoney Gel    [] MediHoney Alginate               [] Calcium Alginate      [] Calcium Alginate with Silver   [] Collagen                   [] Collagen with Silver                [] Santyl with Moistened saline gauze              [] Hydrofera Blue (cut to size and moistened)  [] Hydrofera Blue Ready (Cut to size)   [] NS wet to dry    [] Betadine wet to dry              [] Hydrogel                [] Mepitel     [] Bactroban/Mupirocin               [] Other:     [x] Cover and Secure with:     [x] Gauze [] Jerri [x] Kerlix   [] Foam [] Super Absorbant [] ABD     [] ACE Wrap            [] Other:    Limit contact of tape with skin.     [] Change dressing: [] Daily    [] Every Other Day [] Once per week   [] Twice per week   [] Three times per week [] Do Not Change Dressing   [] Other:     Negative Pressure:           Wound Location:   [] Pressure @  mm/Hg  []Continuous []Intermittent   [] Black Foam [] White Foam              [] Bridge:               [] Change vac dressing three times per week    Pressure Relief:  [] When sitting, shift position or do seat lifts every 15 minutes.  [] Wheelchair cushion [] Specialty Bed/Mattress  [] Turn every 2 hours when in bed. Avoid direct pressure on wound site. Limit side lying to 30 degree tilt. Limit HOB elevation to 30 degrees. Edema Control:  Apply: [x] Compression Stocking:   mmHg  [x]Right Leg [x]Left Leg   [] Tubigrip []Right Leg Double Layer []Left Leg Double Layer      []Right Leg Single Layer []Left Leg Single Layer     [] Elevate leg(s) above the level of the heart when sitting. [] Avoid prolonged standing in one place. Compression:  Apply: [] Multilayer Compression Wrap: []RightLeg []Left Leg                                 []Coflex   []Coflex Lite             []Unna     [] Do not get leg(s) with wrap wet. If wraps become too tight call the center or completely remove the wrap. [] Elevate leg(s) above the level of the heart when sitting. [] Avoid prolonged standing in one place. Off-Loading:   [] Off-loading when [] walking  [] in bed [] sitting  [] Total non-weight bearing  [] Right Leg  [] Left Leg   [] Assistive Device [] Walker [] Cane      [] Wheelchair  [] Crutches   [] Surgical shoe    [] Podus Boot(s)   [] Foam Boot(s)  [] Roll About    [] Cast Boot [] CROW Boot  [] Wedge Shoe  [] Other:    Contact Cast:  Apply: [] Total Contact Cast Applied in Clinic []RightLeg []Left Leg   [] Do not get cast wet. Contact center or go to emergency room if there is a foul odor or becomes uncomfortable due to feeling tight or swelling. Do not use objects inside of cast to scratch.       Dietary:  [x] Diet as tolerated: [] Calorie Diabetic Diet: [] No Added Salt:  [] Increase Protein: [] Other:     Activity:  [x] Activity as tolerated:    [] Patient has no activity restrictions       [] Strict Bedrest:   [] Remain off Work:       [] May return to full duty work:                                     [] Return to work with restrictions:               [address] Information: Should you experience any significant changes in your wound(s) or have questions about your wound care, please contact Jaimee Oropeza at [place] [address] 8:00 am - 4:30. If you need help with your wound outside of these hours and cannot wait until we are again available, contact your PCP or go to the hospital emergency room. PLEASE NOTE: IF YOU ARE UNABLE TO OBTAIN WOUND SUPPLIES, CONTINUE TO USE THE SUPPLIES YOU HAVE AVAILABLE UNTIL YOU ARE ABLE TO REACH US. IT IS MOST IMPORTANT TO KEEP THE WOUND COVERED AT ALL TIMES.     [x] Dr. Ernesto Maria   [] Dr. Neli Weathers  [] Dr. Kathleen Alejo

## 2023-02-27 ENCOUNTER — OFFICE VISIT (OUTPATIENT)
Dept: ENDOCRINOLOGY | Age: 78
End: 2023-02-27
Payer: MEDICARE

## 2023-02-27 VITALS
HEIGHT: 64 IN | SYSTOLIC BLOOD PRESSURE: 163 MMHG | TEMPERATURE: 98 F | DIASTOLIC BLOOD PRESSURE: 68 MMHG | HEART RATE: 65 BPM | OXYGEN SATURATION: 96 % | RESPIRATION RATE: 18 BRPM | BODY MASS INDEX: 30.05 KG/M2 | WEIGHT: 176 LBS

## 2023-02-27 DIAGNOSIS — I10 ESSENTIAL HYPERTENSION: ICD-10-CM

## 2023-02-27 DIAGNOSIS — E78.2 MIXED HYPERLIPIDEMIA: ICD-10-CM

## 2023-02-27 DIAGNOSIS — E11.65 TYPE 2 DIABETES MELLITUS WITH HYPERGLYCEMIA, WITH LONG-TERM CURRENT USE OF INSULIN (HCC): Primary | ICD-10-CM

## 2023-02-27 DIAGNOSIS — Z79.4 TYPE 2 DIABETES MELLITUS WITH HYPERGLYCEMIA, WITH LONG-TERM CURRENT USE OF INSULIN (HCC): Primary | ICD-10-CM

## 2023-02-27 PROCEDURE — G8427 DOCREV CUR MEDS BY ELIG CLIN: HCPCS | Performed by: INTERNAL MEDICINE

## 2023-02-27 PROCEDURE — 3078F DIAST BP <80 MM HG: CPT | Performed by: INTERNAL MEDICINE

## 2023-02-27 PROCEDURE — 99214 OFFICE O/P EST MOD 30 MIN: CPT | Performed by: INTERNAL MEDICINE

## 2023-02-27 PROCEDURE — G8536 NO DOC ELDER MAL SCRN: HCPCS | Performed by: INTERNAL MEDICINE

## 2023-02-27 PROCEDURE — 1090F PRES/ABSN URINE INCON ASSESS: CPT | Performed by: INTERNAL MEDICINE

## 2023-02-27 PROCEDURE — 1101F PT FALLS ASSESS-DOCD LE1/YR: CPT | Performed by: INTERNAL MEDICINE

## 2023-02-27 PROCEDURE — G8400 PT W/DXA NO RESULTS DOC: HCPCS | Performed by: INTERNAL MEDICINE

## 2023-02-27 PROCEDURE — 3044F HG A1C LEVEL LT 7.0%: CPT | Performed by: INTERNAL MEDICINE

## 2023-02-27 PROCEDURE — G8510 SCR DEP NEG, NO PLAN REQD: HCPCS | Performed by: INTERNAL MEDICINE

## 2023-02-27 PROCEDURE — 1123F ACP DISCUSS/DSCN MKR DOCD: CPT | Performed by: INTERNAL MEDICINE

## 2023-02-27 PROCEDURE — 3077F SYST BP >= 140 MM HG: CPT | Performed by: INTERNAL MEDICINE

## 2023-02-27 PROCEDURE — G8417 CALC BMI ABV UP PARAM F/U: HCPCS | Performed by: INTERNAL MEDICINE

## 2023-02-27 RX ORDER — CETIRIZINE HYDROCHLORIDE 10 MG/1
10 TABLET ORAL DAILY
COMMUNITY

## 2023-02-27 RX ORDER — POTASSIUM CHLORIDE 750 MG/1
20 TABLET, FILM COATED, EXTENDED RELEASE ORAL DAILY
COMMUNITY

## 2023-02-27 RX ORDER — FLASH GLUCOSE SENSOR
KIT MISCELLANEOUS
Qty: 2 KIT | Refills: 11 | Status: SHIPPED | OUTPATIENT
Start: 2023-02-27

## 2023-02-27 RX ORDER — LANOLIN ALCOHOL/MO/W.PET/CERES
400 CREAM (GRAM) TOPICAL DAILY
COMMUNITY

## 2023-02-27 RX ORDER — FLASH GLUCOSE SCANNING READER
EACH MISCELLANEOUS
Qty: 1 EACH | Refills: 0 | Status: SHIPPED | OUTPATIENT
Start: 2023-02-27

## 2023-02-27 NOTE — PROGRESS NOTES
Evan Howell is a 65 y.o. female here for   Chief Complaint   Patient presents with    Diabetes       1. Have you been to the ER or an urgent care clinic since your last visit?  - no    2. Have you been hospitalized since your last visit? - no    3. Have you seen or consulted any other health care providers outside of the [address] [place] [address] since your last visit?   Include any pap smears or colon screening.- PCP - [facility]

## 2023-02-27 NOTE — PROGRESS NOTES
Vanda Mancilla MD        Patient Information  Date:2/27/2023  Name : Vanessa Rutherford 65 y.o.     YOB: 1945         Referred by: Rodolfo Ford NP         Chief Complaint   Patient presents with    Diabetes       History of Present Illness: Vanessa Rutherford is a 65 y.o. female here for fu of  Type 2 Diabetes Mellitus. 2/27/23  Type 2 diabetes on insulin  Started on calcitriol by nephrology- calcium high now 11.2, complains of more constipation  C/o pain at fistula x1 week  She has difficulty checking the blood glucose, on 1 basal insulin, no prandial insulin  No severe hypoglycemia  Here with the daughter  No chest pain, no tingling and numbness  Fasting blood glucose less than 150    Prior history    She was initially referred by [facility] for evaluation and management of type 2 diabetes mellitus. She has diabetes since age 60  complicated by nephropathy as well as neuropathy.   Last visit was a year ago  She had TIA  No meter or the logbook  Reported low blood glucose, and she thought 110 is a low blood glucose      Fasting < 140  Bedtime no readings available  Used to have  freestyle melva, not anymore, requesting new prescription    Analog insulins are expensive            Wt Readings from Last 3 Encounters:   02/27/23 176 lb (79.8 kg)   02/15/23 183 lb (83 kg)   12/19/22 187 lb (84.8 kg)       BP Readings from Last 3 Encounters:   02/27/23 (!) 163/68   02/22/23 (!) 171/69   02/15/23 (!) 156/73           Past Medical History:   Diagnosis Date    Adenocarcinoma, renal cell ([facility] [address] 9/2/2020    Arthritis     CAD (coronary artery disease)     Chronic kidney disease     Diabetes (HCC)     Gout     Hypercholesterolemia     Hypertension     Mental depression     Pulmonary embolism (HCC)     Seizures (HCC)     Stroke (HCC)     Thyroid disease      Current Outpatient Medications   Medication Sig    cetirizine (ZYRTEC) 10 mg tablet Take 10 mg by mouth daily. magnesium oxide (MAG-OX) 400 mg tablet Take 400 mg by mouth daily. potassium chloride SR (KLOR-CON 10) 10 mEq tablet Take 20 mEq by mouth daily. torsemide 40 mg tab Take  by mouth. flash glucose scanning reader (FreeStyle [place] 2 [place]) misc Use to check BG as directed. Dx code E11.65    flash glucose sensor (FreeStyle [place] 2 Sensor) kit Use to check BG as directed. Dx code E11.65    gabapentin (NEURONTIN) 100 mg capsule Take 100 mg by mouth three (3) times daily. latanoprost (XALATAN) 0.005 % ophthalmic solution Administer 1 Drop to both eyes nightly. Nebulizer & Compressor machine as directed. FeroSuL 325 mg (65 mg iron) tablet TAKE 1 TABLET BY MOUTH ONCE DAILY FOR SUPPLEMENTATION    fluticasone propionate (FLONASE) 50 mcg/actuation nasal spray 1 spray in each nostril Nasally Once a day    acetaminophen (TYLENOL) 500 mg tablet 2 tablets as needed Orally every 6 hrs    insulin detemir U-100 (LEVEMIR) 100 unit/mL injection 10 Units by SubCUTAneous route nightly. pantoprazole (PROTONIX) 40 mg tablet Take 1 Tablet by mouth two (2) times a day. albuterol (PROVENTIL VENTOLIN) 2.5 mg /3 mL (0.083 %) nebu 2.5 mg by Nebulization route every six (6) hours as needed for Shortness of Breath. carvediloL (COREG) 6.25 mg tablet Take 6.25 mg by mouth two (2) times daily (with meals). calcitRIOL (ROCALTROL) 0.25 mcg capsule Take 0.25 mcg by mouth BID Mon Wed & Fri.    donepeziL (ARICEPT) 10 mg tablet Take 10 mg by mouth nightly. Venlafaxine-ER 24 HR (EFFEXOR-ER) 75 mg tr24 tablet Take 75 mg by mouth daily. pravastatin (PRAVACHOL) 80 mg tablet Take 80 mg by mouth nightly. senna (SENOKOT) 8.6 mg tablet Take 1 Tablet by mouth daily.  (Patient not taking: Reported on 2/27/2023)    aspirin 81 mg chewable tablet CHEW AND SWALLOW 1 TABLET BY MOUTH ONCE DAILY FOR PREVENTION (Patient not taking: Reported on 2/27/2023)    nitroglycerin (NITROSTAT) 0.4 mg SL tablet DISSOLVE ONE TABLET UNDER THE TONGUE EVERY 5 MINUTES AS NEEDED FOR CHEST PAIN. DO NOT EXCEED A TOTAL OF 3 DOSES IN 15 MINUTES NOW (Patient not taking: Reported on 2/27/2023)    melatonin 5 mg tablet Take 5 mg by mouth nightly. (Patient not taking: Reported on 2/27/2023)    insulin aspart U-100 (NOVOLOG) 100 unit/mL injection by SubCUTAneous route Before breakfast, lunch, dinner and at bedtime. SS: 150-200=2 units 201-250=4 units 251-300=6 units 301-350=8 units 351-400=10 units (Patient not taking: Reported on 2/27/2023)    plecanatide (Trulance) 3 mg tab Take 3 mg by mouth daily. (Patient not taking: Reported on 2/27/2023)     No current facility-administered medications for this visit. No Known Allergies      Review of Systems:    Per HPI    Physical Examination:   Blood pressure (!) 163/68, pulse 65, temperature 98 °F (36.7 °C), temperature source Temporal, resp. rate 18, height 5' 4\" (1.626 m), weight 176 lb (79.8 kg), SpO2 96 %. Estimated body mass index is 30.21 kg/m² as calculated from the following:    Height as of this encounter: 5' 4\" (1.626 m). Weight as of this encounter: 176 lb (79.8 kg).   General: pleasant, no distress, good eye contact  HEENT: no exophthalmos, no periorbital edema, EOMI  CVS: S1-S2 regular  Fistula bruit, no swelling of the arm, no cellulitis  RS: Normal respiratory effort  Musculoskeletal: no tremors  Neurological: alert and oriented  Psychiatric: normal mood and affect  Skin: Normal color            Data Reviewed:   Lab Results   Component Value Date/Time    Hemoglobin A1c 6.2 (H) 02/20/2023 12:03 PM    Hemoglobin A1c 5.7 (H) 11/14/2022 10:25 AM    Hemoglobin A1c 5.8 (H) 06/20/2022 09:55 AM    Hemoglobin A1c, External 6.5 03/14/2021 12:00 AM    Hemoglobin A1c, External 6.1 07/20/2018 12:00 AM    Glucose 160 (H) 02/20/2023 12:03 PM    Glucose (POC) 106 (H) 12/19/2022 07:37 AM    Microalbumin/Creat ratio (mg/g creat) 2,138 (H) 02/20/2023 12:03 PM    Microalbumin,urine random 51.30 02/20/2023 12:03 PM    LDL,Direct 66 02/20/2023 12:03 PM    LDL, calculated 73.2 11/22/2020 01:12 AM    Creatinine 2.89 (H) 02/20/2023 12:03 PM            Assessment/Plan:     1. Type 2 diabetes mellitus with hyperglycemia, with long-term current use of insulin ([facility] [address]    2. Mixed hyperlipidemia    3. Essential hypertension          1. Type 2 Diabetes Mellitus with nephropathy,neuropathy,  Lab Results   Component Value Date/Time    Hemoglobin A1c 6.2 (H) 02/20/2023 12:03 PM    Hemoglobin A1c (POC) 5.8 09/23/2020 01:15 PM    Hemoglobin A1c, External 6.5 03/14/2021 12:00 AM     Due to decrease in clearance of insulin secondary to CKD she is at  high risk for hypoglycemia  Levemir 10 units in the morning  Freestyle melva ordered  Patient is recommended to check the blood glucose 3 times a day. Patient is at risk for hypoglycemia. Annual eye exam, foot care    2. HTN : Continue current therapy     3. Hyperlipidemia : Continue statin. 4.Obesity:Body mass index is 30.21 kg/m². Discussed about the importance of  carbohydrate portion control. 5 CKD /-followed by nephrology, renal cell carcinoma       CAD - Dr Mirta Ozuna     6. Hypercalcemia: Recently started on calcitriol, labs sheet given to the patient, daughter to discuss with nephrology  We will forward the note to nephrology    Pain around the AV fistula, no redness, no swelling of the left arm to suggest occlusion, if it gets worse to follow-up with nephrology. There are no Patient Instructions on file for this visit. Follow-up and Dispositions    Return in about 5 months (around 7/27/2023) for labs before next visit and follow up. Thank you for allowing me to participate in the care of this patient.     Carmen Youssef MD      Patient verbalized understanding

## 2023-02-27 NOTE — LETTER
2/27/2023    Patient: Liyah Rios   YOB: 1945   Date of Visit: 2/27/2023     Herrera Eugene NP  [place] [address]  Via Fax: [phone]    Dear Herrera Eugene NP,      Thank you for referring Ms. Des Mitchell to [address] [place] [address] for evaluation. My notes for this consultation are attached. If you have questions, please do not hesitate to call me. I look forward to following your patient along with you.       Sincerely,    Lea Sapp MD

## 2023-03-01 ENCOUNTER — HOSPITAL ENCOUNTER (OUTPATIENT)
Dept: WOUND CARE | Age: 78
Discharge: HOME | End: 2023-03-01
Payer: MEDICARE

## 2023-03-01 VITALS
RESPIRATION RATE: 18 BRPM | TEMPERATURE: 98 F | SYSTOLIC BLOOD PRESSURE: 177 MMHG | DIASTOLIC BLOOD PRESSURE: 81 MMHG | HEART RATE: 69 BPM

## 2023-03-01 DIAGNOSIS — L97.811 NON-PRESSURE CHRONIC ULCER OF OTHER PART OF RIGHT LOWER LEG LIMITED TO BREAKDOWN OF SKIN (HCC): Primary | ICD-10-CM

## 2023-03-01 DIAGNOSIS — L97.821 NON-PRESSURE CHRONIC ULCER OF OTHER PART OF LEFT LOWER LEG LIMITED TO BREAKDOWN OF SKIN (HCC): ICD-10-CM

## 2023-03-01 PROCEDURE — 99212 OFFICE O/P EST SF 10 MIN: CPT

## 2023-03-01 RX ORDER — TRIAMCINOLONE ACETONIDE 1 MG/G
OINTMENT TOPICAL ONCE
Status: CANCELLED | OUTPATIENT
Start: 2023-03-01 | End: 2023-03-01

## 2023-03-01 RX ORDER — LIDOCAINE HYDROCHLORIDE 20 MG/ML
15 SOLUTION OROPHARYNGEAL ONCE
Status: CANCELLED | OUTPATIENT
Start: 2023-03-01 | End: 2023-03-01

## 2023-03-01 RX ORDER — MUPIROCIN 20 MG/G
OINTMENT TOPICAL ONCE
Status: CANCELLED | OUTPATIENT
Start: 2023-03-01 | End: 2023-03-01

## 2023-03-01 RX ORDER — SILVER SULFADIAZINE 10 G/1000G
CREAM TOPICAL ONCE
Status: CANCELLED | OUTPATIENT
Start: 2023-03-01 | End: 2023-03-01

## 2023-03-01 NOTE — DISCHARGE INSTRUCTIONS
Discharge Instructions for  [address] [place] [address], [address] [place] [address]  Telephone: [phone]    NAME:  Ziyad Rodriguez OF BIRTH:  1945  MEDICAL RECORD NUMBER:  [id number]  DATE:  3/1/2023      Return Appointment:  [] Dressing supply provider:   [] Home Healthcare:  [] Return Appointment:      [facility])  [] Nurse Visit:  [facility])    [x] Discharge from [facility]  [] Ordered tests:   [x] Referrals: [facility] info given  [] Rx:   [] Other:      Wound Cleansing:   Do not scrub or use excessive force. Cleanse wound prior to applying a clean dressing with:  [x] Normal Saline   [x] Mild Soap & Water/Baby Shampoo   [] Keep Wound Dry in Shower  [] Other:      Topical Treatments:  Do not apply lotions, creams, or ointments to wound bed unless directed. [] Apply moisturizing lotion to skin surrounding the wound prior to dressing change.  [] Apply antifungal ointment to skin surrounding the wound prior to dressing change.  [] Apply thin film of moisture barrier/zinc ointment to skin immediately around wound. [] Other:       Dressings:           Wound Location Right  Leg  HEALED   [] Apply Primary Dressing:       [] MediHoney Gel    [] MediHoney Alginate               [] Calcium Alginate      [] Calcium Alginate with Silver   [] Collagen                   [] Collagen with Silver                [] Santyl with Moistened saline gauze              [] Hydrofera Blue (cut to size and moistened)  [] Hydrofera Blue Ready (Cut to size)   [] NS wet to dry    [] Betadine wet to dry              [] Hydrogel                [] Mepitel     [] Bactroban/Mupirocin               [] Other:     [] Cover and Secure with:     [] Gauze [] Jerri [] Kerlix   [] Foam [] Super Absorbant [] ABD     [] ACE Wrap            [] Other:    Limit contact of tape with skin.     [] Change dressing: [] Daily    [] Every Other Day [] Once per week   [] Twice per week   [] Three times per week [] Do Not Change Dressing   [] Other:     Negative Pressure:           Wound Location:   [] Pressure @  mm/Hg  []Continuous []Intermittent   [] Black Foam [] White Foam              [] Bridge:               [] Change vac dressing three times per week    Pressure Relief:  [] When sitting, shift position or do seat lifts every 15 minutes.  [] Wheelchair cushion [] Specialty Bed/Mattress  [] Turn every 2 hours when in bed. Avoid direct pressure on wound site. Limit side lying to 30 degree tilt. Limit HOB elevation to 30 degrees. Edema Control:  Apply: [x] Compression Stocking:   mmHg  [x]Right Leg [x]Left Leg   [] Tubigrip []Right Leg Double Layer []Left Leg Double Layer      []Right Leg Single Layer []Left Leg Single Layer     [] Elevate leg(s) above the level of the heart when sitting. [] Avoid prolonged standing in one place. Compression:  Apply: [] Multilayer Compression Wrap: []RightLeg []Left Leg                                 []Coflex   []Coflex Lite             []Unna     [] Do not get leg(s) with wrap wet. If wraps become too tight call the center or completely remove the wrap. [] Elevate leg(s) above the level of the heart when sitting. [] Avoid prolonged standing in one place. Off-Loading:   [] Off-loading when [] walking  [] in bed [] sitting  [] Total non-weight bearing  [] Right Leg  [] Left Leg   [] Assistive Device [] Walker [] Cane      [] Wheelchair  [] Crutches   [] Surgical shoe    [] Podus Boot(s)   [] Foam Boot(s)  [] Roll About    [] Cast Boot [] CROW Boot  [] Wedge Shoe  [] Other:    Contact Cast:  Apply: [] Total Contact Cast Applied in Clinic []RightLeg []Left Leg   [] Do not get cast wet. Contact center or go to emergency room if there is a foul odor or becomes uncomfortable due to feeling tight or swelling. Do not use objects inside of cast to scratch.       Dietary:  [x] Diet as tolerated: [] Calorie Diabetic Diet: [] No Added Salt:  [] Increase Protein: [] Other:     Activity:  [x] Activity as tolerated:    [] Patient has no activity restrictions       [] Strict Bedrest:   [] Remain off Work:       [] May return to full duty work:                                     [] Return to work with restrictions:               [address] Information: Should you experience any significant changes in your wound(s) or have questions about your wound care, please contact Jaimee Oneil 26 at [place] [address] 8:00 am - 4:30. If you need help with your wound outside of these hours and cannot wait until we are again available, contact your PCP or go to the hospital emergency room. PLEASE NOTE: IF YOU ARE UNABLE TO OBTAIN WOUND SUPPLIES, CONTINUE TO USE THE SUPPLIES YOU HAVE AVAILABLE UNTIL YOU ARE ABLE TO REACH US. IT IS MOST IMPORTANT TO KEEP THE WOUND COVERED AT ALL TIMES.     [x] Dr. Tabitha Pierre   [] Dr. Clare Reyes  [] Dr. Rubén St

## 2023-03-01 NOTE — WOUND CARE
Discharge Instructions for  [address] [place] [address], [address] [place] [address]  Telephone: [phone]    NAME:  Lillie Avila OF BIRTH:  1945  MEDICAL RECORD NUMBER:  [id number]  DATE:  3/1/2023      Return Appointment:  [] Dressing supply provider:   [] Home Healthcare:  [] Return Appointment:      [facility])  [] Nurse Visit:  [facility])    [x] Discharge from [facility]  [] Ordered tests:   [x] Referrals: [facility]ros info given  [] Rx:   [] Other:      Wound Cleansing:   Do not scrub or use excessive force. Cleanse wound prior to applying a clean dressing with:  [x] Normal Saline   [x] Mild Soap & Water/Baby Shampoo   [] Keep Wound Dry in Shower  [] Other:      Topical Treatments:  Do not apply lotions, creams, or ointments to wound bed unless directed. [] Apply moisturizing lotion to skin surrounding the wound prior to dressing change.  [] Apply antifungal ointment to skin surrounding the wound prior to dressing change.  [] Apply thin film of moisture barrier/zinc ointment to skin immediately around wound. [] Other:       Dressings:           Wound Location Right  Leg  HEALED   [] Apply Primary Dressing:       [] MediHoney Gel    [] MediHoney Alginate               [] Calcium Alginate      [] Calcium Alginate with Silver   [] Collagen                   [] Collagen with Silver                [] Santyl with Moistened saline gauze              [] Hydrofera Blue (cut to size and moistened)  [] Hydrofera Blue Ready (Cut to size)   [] NS wet to dry    [] Betadine wet to dry              [] Hydrogel                [] Mepitel     [] Bactroban/Mupirocin               [] Other:     [] Cover and Secure with:     [] Gauze [] Jerri [] Kerlix   [] Foam [] Super Absorbant [] ABD     [] ACE Wrap            [] Other:    Limit contact of tape with skin.     [] Change dressing: [] Daily    [] Every Other Day [] Once per week   [] Twice per week   [] Three times per week [] Do Not Change Dressing   [] Other:     Negative Pressure:           Wound Location:   [] Pressure @  mm/Hg  []Continuous []Intermittent   [] Black Foam [] White Foam              [] Bridge:               [] Change vac dressing three times per week    Pressure Relief:  [] When sitting, shift position or do seat lifts every 15 minutes.  [] Wheelchair cushion [] Specialty Bed/Mattress  [] Turn every 2 hours when in bed. Avoid direct pressure on wound site. Limit side lying to 30 degree tilt. Limit HOB elevation to 30 degrees. Edema Control:  Apply: [x] Compression Stocking:   mmHg  [x]Right Leg [x]Left Leg   [] Tubigrip []Right Leg Double Layer []Left Leg Double Layer      []Right Leg Single Layer []Left Leg Single Layer     [] Elevate leg(s) above the level of the heart when sitting. [] Avoid prolonged standing in one place. Compression:  Apply: [] Multilayer Compression Wrap: []RightLeg []Left Leg                                 []Coflex   []Coflex Lite             []Unna     [] Do not get leg(s) with wrap wet. If wraps become too tight call the center or completely remove the wrap. [] Elevate leg(s) above the level of the heart when sitting. [] Avoid prolonged standing in one place. Off-Loading:   [] Off-loading when [] walking  [] in bed [] sitting  [] Total non-weight bearing  [] Right Leg  [] Left Leg   [] Assistive Device [] Walker [] Cane      [] Wheelchair  [] Crutches   [] Surgical shoe    [] Podus Boot(s)   [] Foam Boot(s)  [] Roll About    [] Cast Boot [] CROW Boot  [] Wedge Shoe  [] Other:    Contact Cast:  Apply: [] Total Contact Cast Applied in Clinic []RightLeg []Left Leg   [] Do not get cast wet. Contact center or go to emergency room if there is a foul odor or becomes uncomfortable due to feeling tight or swelling. Do not use objects inside of cast to scratch.       Dietary:  [x] Diet as tolerated: [] Calorie Diabetic Diet: [] No Added Salt:  [] Increase Protein: [] Other:     Activity:  [x] Activity as tolerated:    [] Patient has no activity restrictions       [] Strict Bedrest:   [] Remain off Work:       [] May return to full duty work:                                     [] Return to work with restrictions:               [address] Information: Should you experience any significant changes in your wound(s) or have questions about your wound care, please contact Jaimee Oneil [address] at [place] [phone] 8:00 am - 4:30. If you need help with your wound outside of these hours and cannot wait until we are again available, contact your PCP or go to the hospital emergency room. PLEASE NOTE: IF YOU ARE UNABLE TO OBTAIN WOUND SUPPLIES, CONTINUE TO USE THE SUPPLIES YOU HAVE AVAILABLE UNTIL YOU ARE ABLE TO REACH US. IT IS MOST IMPORTANT TO KEEP THE WOUND COVERED AT ALL TIMES.     [x] Dr. Ama Stewart   [] Dr. Jimmie Tijerina  [] Dr. Ladon Snellen

## 2023-03-01 NOTE — WOUND CARE
[address]   Progress Note and Procedure Note   NO Procedure      Epifanio Rodriges  MEDICAL RECORD NUMBER:  [id number]  AGE: 65 y.o. RACE BLACK/  GENDER: female  : 1945  EPISODE DATE:  3/1/2023    Subjective:   No problems reported  Chief Complaint   Patient presents with    Wound Check     R leg         HISTORY of PRESENT ILLNESS HPI    Epifanio Rodriges is a 65 y.o. female who presents today for wound/ulcer evaluation.    History of Wound Context: BLE  Wound/Ulcer Pain Timing/Severity: mild  Quality of pain: aching  Severity:  1 / 10   Modifying Factors: Pain is relieved/improved with rest  Associated Signs/Symptoms: edema    Ulcer Identification:  Ulcer Type: venous    Contributing Factors: edema    Wound: BLE         PAST MEDICAL HISTORY    Past Medical History:   Diagnosis Date    Adenocarcinoma, renal cell ([facility] 2020    Arthritis     CAD (coronary artery disease)     Chronic kidney disease     Diabetes ([facility] [facility]     Gout     Hypercholesterolemia     Hypertension     Mental depression     Pulmonary embolism (HCC)     Seizures (HCC)     Stroke ([facility] [facility]     Thyroid disease         PAST SURGICAL HISTORY    Past Surgical History:   Procedure Laterality Date    COLONOSCOPY N/A 10/24/2022    COLONOSCOPY performed by Jeff Garcia MD at [facility] ENDOSCOPY    HX GYN      HX HYSTERECTOMY      HX NEPHRECTOMY Left 2015    HX ORTHOPAEDIC      HX UROLOGICAL  2015    PARTIAL URETERECTOMY     [place] UNLISTED PROCEDURE CARDIAC SURGERY      stents placed        FAMILY HISTORY    Family History   Problem Relation Age of Onset    Heart Disease Mother     Heart Disease Father     Heart Disease Sister     Cancer Sister     Heart Disease Brother     Cancer Maternal Grandmother     Stroke Son     Cancer Sister        SOCIAL HISTORY    Social History     Tobacco Use    Smoking status: Former     Years: 15.00     Types: Cigarettes    Smokeless tobacco: Never   Vaping Use    Vaping Use: Never used   Substance Use Topics    Alcohol use: Not Currently    Drug use: Never       ALLERGIES    No Known Allergies    MEDICATIONS    Current Outpatient Medications on File Prior to Encounter   Medication Sig Dispense Refill    cetirizine (ZYRTEC) 10 mg tablet Take 10 mg by mouth daily. magnesium oxide (MAG-OX) 400 mg tablet Take 400 mg by mouth daily. potassium chloride SR (KLOR-CON 10) 10 mEq tablet Take 20 mEq by mouth daily. torsemide 40 mg tab Take  by mouth. flash glucose scanning reader (FreeStyle [place] 2 [place]) misc Use to check BG as directed. Dx code E11.65 1 Each 0    flash glucose sensor (FreeStyle [place] 2 Sensor) kit Use to check BG as directed. Dx code E11.65 2 Kit 11    gabapentin (NEURONTIN) 100 mg capsule Take 100 mg by mouth three (3) times daily. latanoprost (XALATAN) 0.005 % ophthalmic solution Administer 1 Drop to both eyes nightly. Nebulizer & Compressor machine as directed. FeroSuL 325 mg (65 mg iron) tablet TAKE 1 TABLET BY MOUTH ONCE DAILY FOR SUPPLEMENTATION      fluticasone propionate (FLONASE) 50 mcg/actuation nasal spray 1 spray in each nostril Nasally Once a day      acetaminophen (TYLENOL) 500 mg tablet 2 tablets as needed Orally every 6 hrs      insulin detemir U-100 (LEVEMIR) 100 unit/mL injection 10 Units by SubCUTAneous route nightly. pantoprazole (PROTONIX) 40 mg tablet Take 1 Tablet by mouth two (2) times a day. 60 Tablet 0    albuterol (PROVENTIL VENTOLIN) 2.5 mg /3 mL (0.083 %) nebu 2.5 mg by Nebulization route every six (6) hours as needed for Shortness of Breath. carvediloL (COREG) 6.25 mg tablet Take 6.25 mg by mouth two (2) times daily (with meals). calcitRIOL (ROCALTROL) 0.25 mcg capsule Take 0.25 mcg by mouth BID Mon Wed & Fri.      donepeziL (ARICEPT) 10 mg tablet Take 10 mg by mouth nightly. Venlafaxine-ER 24 HR (EFFEXOR-ER) 75 mg tr24 tablet Take 75 mg by mouth daily.       pravastatin (PRAVACHOL) 80 mg tablet Take 80 mg by mouth nightly. No current facility-administered medications on file prior to encounter. REVIEW OF SYSTEMS    Pertinent items are noted in HPI. Objective:     Visit Vitals  BP (!) 177/81 (BP 1 Location: Right upper arm, BP Patient Position: At rest;Sitting)   Pulse 69   Temp 98 °F (36.7 °C)   Resp 18       Wt Readings from Last 3 Encounters:   02/27/23 79.8 kg (176 lb)   02/15/23 83 kg (183 lb)   12/19/22 84.8 kg (187 lb)       PHYSICAL EXAM  Leg wounds closed    Pertinent items are noted in HPI. 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     Wound Leg Right;Posterior #2 02/15/23 (Active)   Wound Image   03/01/23 0950   Wound Etiology Venous 03/01/23 0950   Dressing Status Other (Comment) 03/01/23 0950   Cleansed Cleansed with saline 03/01/23 0950   Wound Length (cm) 0 cm 03/01/23 0950   Wound Width (cm) 0 cm 03/01/23 0950   Wound Depth (cm) 0 cm 03/01/23 0950   Wound Surface Area (cm^2) 0 cm^2 03/01/23 0950   Change in Wound Size % 100 03/01/23 0950   Wound Volume (cm^3) 0 cm^3 03/01/23 0950   Wound Healing % 100 03/01/23 0950   Wound Assessment Dry;Epithelialization 03/01/23 0950   Drainage Amount None 03/01/23 0950   Drainage Description Serosanguinous 02/15/23 1027   Wound Odor None 03/01/23 0950   Radha-Wound/Incision Assessment Dry/flaky 03/01/23 0950   Edges Attached edges 02/22/23 1030   Wound Thickness Description Partial thickness 02/22/23 1030   Number of days: 14        Plan:   Follow up with Dr Sushma Jean-Baptiste as needed    Treatment Note please see attached Discharge Instructions    Written patient dismissal instructions given to patient or POA.          Electronically signed by Josh Curiel MD on 3/1/2023 at 10:06 AM

## 2023-03-10 ENCOUNTER — TELEPHONE (OUTPATIENT)
Dept: ENDOCRINOLOGY | Age: 78
End: 2023-03-10

## 2023-03-10 NOTE — TELEPHONE ENCOUNTER
Pt's daughter left  asking for machine to check pt's BG without sticking her finger. Returned call and informed her Arnol Jarred was sent in on 2/27/23 to [facility] in [place]. It may be that it's not covered by her insurance. She verbalized understanding with no further questions or concerns at this time.

## 2023-04-14 NOTE — ED NOTES
Caller returning call to clinical team.     Connected to [facility]- Connect call to Triage queue- Route message to provider's clinical support pool    Patient calling regarding the pre-op/physical paperwork. Please advise.    Orthostatic vitals logged in chart

## 2023-04-21 NOTE — WOUND CARE
From: Danya Bacon  To: Vaishnavi Chin  Sent: 4/21/IP WOUND CONSULT    Yenny Vega  MEDICAL RECORD NUMBER:  [id number]  AGE: 67 y.o. GENDER: female  : 1945  TODAY'S DATE:  10/23/2021    GENERAL     [] Follow-up   [x] New Consult    Yenny Vega is a 67 y.o. female referred by:   [x] Physician  [] Nursing  [] Other:         PAST MEDICAL HISTORY    Past Medical History:   Diagnosis Date    Adenocarcinoma, renal cell ([facility] 2020    Arthritis     CAD (coronary artery disease)     Chronic kidney disease     Diabetes ([facility]     Gout     Hypercholesterolemia     Hypertension     Mental depression     Pulmonary embolism ([address]     Seizures ([address]     Stroke ([facility]     Thyroid disease         PAST SURGICAL HISTORY    Past Surgical History:   Procedure Laterality Date    HX GYN      HX HYSTERECTOMY      HX NEPHRECTOMY Left 2015    HX ORTHOPAEDIC      HX UROLOGICAL  2015    PARTIAL URETERECTOMY     [place] CARDIAC SURG PROCEDURE UNLIST      stents placed        FAMILY HISTORY    Family History   Problem Relation Age of Onset    Heart Disease Mother     Heart Disease Father     Heart Disease Sister     Cancer Sister     Heart Disease Brother     Cancer Maternal Grandmother     Stroke Son     Cancer Sister          ALLERGIES    No Known Allergies    MEDICATIONS    No current facility-administered medications on file prior to encounter. Current Outpatient Medications on File Prior to Encounter   Medication Sig Dispense Refill    insulin syringe-needle U-100 1 mL 31 gauge x 1564\" syrg USE TO INJECT INSULIN TWICE A  Pen Needle 5    NovoLIN N NPH U-100 Insulin 100 unit/mL injection INJECT 20 UNITS SUBCUTANEOUSLY IN THE EVENING **STOP  HUMULIN** 10 mL 11    omeprazole (PRILOSEC) 20 mg capsule Take 20 mg by mouth daily.  nitroglycerin (NITROSTAT) 0.4 mg SL tablet 0.4 mg by SubLINGual route every five (5) minutes as needed for Chest Pain. Up to 3 doses.       lubiPROStone (Amitiza) 24 mcg capsule Take 24 mcg by mouth as needed for Constipation.  apixaban (ELIQUIS) 2.5 mg tablet Take 2.5 mg by mouth two (2) times a day.  isosorbide mononitrate ER (IMDUR) 60 mg CR tablet Take 60 mg by mouth two (2) times a day.  hydrALAZINE (APRESOLINE) 25 mg tablet Take 25 mg by mouth two (2) times a day.  metoprolol succinate (TOPROL-XL) 25 mg XL tablet Take 25 mg by mouth daily.  gabapentin (NEURONTIN) 300 mg capsule Take 300 mg by mouth two (2) times a day.  furosemide (LASIX) 40 mg tablet Take 40 mg by mouth daily.  fluticasone propionate (FLONASE) 50 mcg/actuation nasal spray fluticasone propionate 50 mcg/actuation nasal spray,suspension      cetirizine (ZYRTEC) 10 mg tablet Take 10 mg by mouth daily.  venlafaxine (EFFEXOR) 75 mg tablet Take 75 mg by mouth daily.  latanoprost (XALATAN) 0.005 % ophthalmic solution Administer 1 Drop to both eyes nightly.  pravastatin (PRAVACHOL) 80 mg tablet Take 80 mg by mouth nightly.  aspirin 81 mg chewable tablet Take 81 mg by mouth daily.  allopurinol (ZYLOPRIM) 100 mg tablet Take 200 mg by mouth daily.            [unfilled]  Visit Vitals  BP (!) 160/73   Pulse 73   Temp 97.7 °F (36.5 °C)   Resp 18   Ht 5' 6\" (1.676 m)   Wt 90.7 kg (200 lb)   SpO2 100%   BMI 32.28 kg/m²       ASSESSMENT     Wound Identification & Type: DU to LLE medial  Dressing change: Yes, see flow chart  Verbal consent for picture: Yes    Contributing Factors: anticoagulation therapy, diabetes, poor glucose control, decreased mobility, shear force, incontinence of urine and obesity    Wound Pretibial Left;Medial Partial Thickness 10/23/21 (Active)   Wound Image   10/23/21 1830   Wound Etiology Diabetic 10/23/21 1830   Dressing Status New dressing applied 10/23/21 1830   Cleansed Cleansed with saline 10/23/21 1830   Dressing/Treatment Honey gel/honey paste;ABD pad;Roll gauze;Tape/Soft cloth adhesive tape 10/23/21 1830   Dressing Change Due 10/25/21 10/23/21 1830   Wound Length (cm) 2.3 cm 10/23/21 1830   Wound Width (cm) 1.2 cm 10/23/21 1830   Wound Depth (cm) 0.1 cm 10/23/21 1830   Wound Surface Area (cm^2) 2.76 cm^2 10/23/21 1830   Wound Volume (cm^3) 0.276 cm^3 10/23/21 1830   Wound Assessment Dry;Pink/red 10/23/21 1830   Drainage Amount None 10/23/21 1830   Wound Odor None 10/23/21 1830   Radha-Wound/Incision Assessment Intact 10/23/21 1830   Edges Defined edges 10/23/21 1830   Wound Thickness Description Partial thickness 10/23/21 1830   Number of days: 0          PLAN     Skin Care & Pressure Relief Recommendations  Minimize layers of linen  Turn/reposition approximately every 2 hours  Pillow wedges  Manage incontinence   Promote continence; Skin Protective lotion/cream to buttocks and sacrum daily and as needed with incontinence care  Offload heels pillows    Kirit 19  Blood Glucose: 201 on 10/23/21                             Albumin: 2.9 on 10/23/21  WBCs: 6.6 on 10/23/21    Support Surface: Foam mattress    Physician/Provider notified:   Recommendations: Apply Therahoney every other day to wound to LLE medial, see dressing order. Maintain Purewick to manage  incontinence. Apply zinc paste TID and prn for soiling to buttocks and gluteal cleft / folds to protectant skin. Patient moves independently in bed as witnessed. PT/OT working with patient to maintain mobility. No other wound care needs at this time. Will continue to follow. Discharge Wound Care Needs: For LLE medial: rinse with NS, apply Therahoney gel, and cover with Optifoam Gentle Lite every other day and prn for soiling.       Teaching completed with:   [] Patient           [] Family member       [] Caregiver          [] Nursing  [] Other    Patient/Caregiver Teaching:  Level of patient/caregiver understanding able to:   [] Indicates understanding       [] Needs reinforcement  [] Unsuccessful      [] Verbal Understanding  [] Demonstrated understanding       [] No evidence of learning  [] Refused teaching         [] N/A       Electronically signed by Sourav Anderson RN on 10/23/2021 at 6:34 PM

## 2023-04-22 DIAGNOSIS — E78.2 MIXED HYPERLIPIDEMIA: ICD-10-CM

## 2023-04-22 DIAGNOSIS — Z79.4 TYPE 2 DIABETES MELLITUS WITH HYPERGLYCEMIA, WITH LONG-TERM CURRENT USE OF INSULIN (HCC): Primary | ICD-10-CM

## 2023-04-22 DIAGNOSIS — E11.65 TYPE 2 DIABETES MELLITUS WITH HYPERGLYCEMIA, WITH LONG-TERM CURRENT USE OF INSULIN (HCC): Primary | ICD-10-CM

## 2023-04-24 DIAGNOSIS — Z79.4 TYPE 2 DIABETES MELLITUS WITH HYPERGLYCEMIA, WITH LONG-TERM CURRENT USE OF INSULIN (HCC): Primary | ICD-10-CM

## 2023-04-24 DIAGNOSIS — E11.65 TYPE 2 DIABETES MELLITUS WITH HYPERGLYCEMIA, WITH LONG-TERM CURRENT USE OF INSULIN (HCC): Primary | ICD-10-CM

## 2023-04-24 DIAGNOSIS — E78.2 MIXED HYPERLIPIDEMIA: ICD-10-CM

## 2023-05-22 RX ORDER — GABAPENTIN 100 MG/1
100 CAPSULE ORAL 3 TIMES DAILY
COMMUNITY

## 2023-05-22 RX ORDER — MAGNESIUM OXIDE 400 MG/1
400 TABLET ORAL DAILY
COMMUNITY

## 2023-05-22 RX ORDER — FERROUS SULFATE 325(65) MG
TABLET ORAL
COMMUNITY
Start: 2022-08-10

## 2023-05-22 RX ORDER — VENLAFAXINE HYDROCHLORIDE 75 MG/1
75 TABLET, EXTENDED RELEASE ORAL DAILY
COMMUNITY

## 2023-05-22 RX ORDER — CALCITRIOL 0.25 UG/1
0.25 CAPSULE, LIQUID FILLED ORAL
COMMUNITY

## 2023-05-22 RX ORDER — LATANOPROST 50 UG/ML
1 SOLUTION/ DROPS OPHTHALMIC
COMMUNITY

## 2023-05-22 RX ORDER — PRAVASTATIN SODIUM 80 MG/1
80 TABLET ORAL NIGHTLY
COMMUNITY

## 2023-05-22 RX ORDER — PANTOPRAZOLE SODIUM 40 MG/1
40 TABLET, DELAYED RELEASE ORAL 2 TIMES DAILY
COMMUNITY
Start: 2022-08-08

## 2023-05-22 RX ORDER — CETIRIZINE HYDROCHLORIDE 10 MG/1
10 TABLET ORAL DAILY
COMMUNITY

## 2023-05-22 RX ORDER — ALBUTEROL SULFATE 2.5 MG/3ML
2.5 SOLUTION RESPIRATORY (INHALATION) EVERY 6 HOURS PRN
COMMUNITY

## 2023-05-22 RX ORDER — CARVEDILOL 6.25 MG/1
6.25 TABLET ORAL 2 TIMES DAILY WITH MEALS
COMMUNITY

## 2023-05-22 RX ORDER — ACETAMINOPHEN 500 MG
TABLET ORAL
COMMUNITY

## 2023-05-22 RX ORDER — FLUTICASONE PROPIONATE 50 MCG
SPRAY, SUSPENSION (ML) NASAL
COMMUNITY

## 2023-05-22 RX ORDER — DONEPEZIL HYDROCHLORIDE 10 MG/1
10 TABLET, FILM COATED ORAL NIGHTLY
COMMUNITY

## 2023-05-22 RX ORDER — POTASSIUM CHLORIDE 750 MG/1
20 TABLET, FILM COATED, EXTENDED RELEASE ORAL DAILY
COMMUNITY

## 2023-08-12 ENCOUNTER — HOSPITAL ENCOUNTER (EMERGENCY)
Facility: HOSPITAL | Age: 78
Discharge: HOME | End: 2023-08-12
Attending: STUDENT IN AN ORGANIZED HEALTH CARE EDUCATION/TRAINING PROGRAM
Payer: MEDICAID

## 2023-08-12 VITALS
OXYGEN SATURATION: 97 % | RESPIRATION RATE: 25 BRPM | HEIGHT: 64 IN | DIASTOLIC BLOOD PRESSURE: 80 MMHG | HEART RATE: 60 BPM | TEMPERATURE: 98.2 F | WEIGHT: 185 LBS | SYSTOLIC BLOOD PRESSURE: 201 MMHG | BODY MASS INDEX: 31.58 KG/M2

## 2023-08-12 DIAGNOSIS — E16.2 HYPOGLYCEMIA: Primary | ICD-10-CM

## 2023-08-12 LAB
ANION GAP SERPL CALC-SCNC: 7 MMOL/L (ref 5–15)
APPEARANCE UR: ABNORMAL
BACTERIA URNS QL MICRO: NEGATIVE /HPF
BASOPHILS # BLD: 0 K/UL (ref 0–0.1)
BASOPHILS NFR BLD: 1 % (ref 0–1)
BILIRUB UR QL: NEGATIVE
BUN SERPL-MCNC: 41 MG/DL (ref 6–20)
BUN/CREAT SERPL: 16 (ref 12–20)
CA-I BLD-MCNC: 9.1 MG/DL (ref 8.5–10.1)
CHLORIDE SERPL-SCNC: 108 MMOL/L (ref 97–108)
CO2 SERPL-SCNC: 26 MMOL/L (ref 21–32)
COLOR UR: ABNORMAL
CREAT SERPL-MCNC: 2.59 MG/DL (ref 0.55–1.02)
DIFFERENTIAL METHOD BLD: ABNORMAL
EKG ATRIAL RATE: 57 BPM
EKG DIAGNOSIS: NORMAL
EKG P AXIS: 52 DEGREES
EKG P-R INTERVAL: 162 MS
EKG Q-T INTERVAL: 506 MS
EKG QRS DURATION: 94 MS
EKG QTC CALCULATION (BAZETT): 492 MS
EKG R AXIS: 60 DEGREES
EKG T AXIS: 81 DEGREES
EKG VENTRICULAR RATE: 57 BPM
EOSINOPHIL # BLD: 0 K/UL (ref 0–0.4)
EOSINOPHIL NFR BLD: 1 % (ref 0–7)
EPITH CASTS URNS QL MICRO: ABNORMAL /LPF
ERYTHROCYTE [DISTWIDTH] IN BLOOD BY AUTOMATED COUNT: 15.7 % (ref 11.5–14.5)
GLUCOSE BLD STRIP.AUTO-MCNC: 100 MG/DL (ref 65–100)
GLUCOSE BLD STRIP.AUTO-MCNC: 163 MG/DL (ref 65–100)
GLUCOSE BLD STRIP.AUTO-MCNC: 94 MG/DL (ref 65–100)
GLUCOSE SERPL-MCNC: 130 MG/DL (ref 65–100)
GLUCOSE UR STRIP.AUTO-MCNC: NEGATIVE MG/DL
HCT VFR BLD AUTO: 34.6 % (ref 35–47)
HGB BLD-MCNC: 11.7 G/DL (ref 11.5–16)
HGB UR QL STRIP: NEGATIVE
IMM GRANULOCYTES # BLD AUTO: 0 K/UL (ref 0–0.04)
IMM GRANULOCYTES NFR BLD AUTO: 0 % (ref 0–0.5)
KETONES UR QL STRIP.AUTO: NEGATIVE MG/DL
LEUKOCYTE ESTERASE UR QL STRIP.AUTO: NEGATIVE
LYMPHOCYTES # BLD: 0.6 K/UL (ref 0.8–3.5)
LYMPHOCYTES NFR BLD: 14 % (ref 12–49)
MCH RBC QN AUTO: 29.7 PG (ref 26–34)
MCHC RBC AUTO-ENTMCNC: 33.8 G/DL (ref 30–36.5)
MCV RBC AUTO: 87.8 FL (ref 80–99)
MONOCYTES # BLD: 0.3 K/UL (ref 0–1)
MONOCYTES NFR BLD: 7 % (ref 5–13)
MUCOUS THREADS URNS QL MICRO: ABNORMAL /LPF
NEUTS SEG # BLD: 3.2 K/UL (ref 1.8–8)
NEUTS SEG NFR BLD: 77 % (ref 32–75)
NITRITE UR QL STRIP.AUTO: NEGATIVE
NRBC # BLD: 0 K/UL (ref 0–0.01)
NRBC BLD-RTO: 0 PER 100 WBC
PERFORMED BY:: ABNORMAL
PERFORMED BY:: NORMAL
PERFORMED BY:: NORMAL
PH UR STRIP: 5 (ref 5–8)
PLATELET # BLD AUTO: 68 K/UL (ref 150–400)
PMV BLD AUTO: 11.3 FL (ref 8.9–12.9)
POTASSIUM SERPL-SCNC: 4.1 MMOL/L (ref 3.5–5.1)
PROT UR STRIP-MCNC: 100 MG/DL
RBC # BLD AUTO: 3.94 M/UL (ref 3.8–5.2)
RBC #/AREA URNS HPF: ABNORMAL /HPF (ref 0–5)
SODIUM SERPL-SCNC: 141 MMOL/L (ref 136–145)
SP GR UR REFRACTOMETRY: 1.01 (ref 1–1.03)
URINE CULTURE IF INDICATED: ABNORMAL
UROBILINOGEN UR QL STRIP.AUTO: 0.1 EU/DL (ref 0.1–1)
WBC # BLD AUTO: 4.1 K/UL (ref 3.6–11)
WBC URNS QL MICRO: ABNORMAL /HPF (ref 0–4)

## 2023-08-12 PROCEDURE — 85025 COMPLETE CBC W/AUTO DIFF WBC: CPT

## 2023-08-12 PROCEDURE — 80048 BASIC METABOLIC PNL TOTAL CA: CPT

## 2023-08-12 PROCEDURE — 99284 EMERGENCY DEPT VISIT MOD MDM: CPT

## 2023-08-12 PROCEDURE — 81001 URINALYSIS AUTO W/SCOPE: CPT

## 2023-08-12 PROCEDURE — 93005 ELECTROCARDIOGRAM TRACING: CPT | Performed by: STUDENT IN AN ORGANIZED HEALTH CARE EDUCATION/TRAINING PROGRAM

## 2023-08-12 PROCEDURE — 6370000000 HC RX 637 (ALT 250 FOR IP): Performed by: STUDENT IN AN ORGANIZED HEALTH CARE EDUCATION/TRAINING PROGRAM

## 2023-08-12 PROCEDURE — 36415 COLL VENOUS BLD VENIPUNCTURE: CPT

## 2023-08-12 PROCEDURE — 82962 GLUCOSE BLOOD TEST: CPT

## 2023-08-12 RX ORDER — CARVEDILOL 3.12 MG/1
6.25 TABLET ORAL
Status: COMPLETED | OUTPATIENT
Start: 2023-08-12 | End: 2023-08-12

## 2023-08-12 RX ADMIN — CARVEDILOL 6.25 MG: 3.12 TABLET, FILM COATED ORAL at 12:48

## 2023-08-12 ASSESSMENT — LIFESTYLE VARIABLES
HOW OFTEN DO YOU HAVE A DRINK CONTAINING ALCOHOL: NEVER
HOW MANY STANDARD DRINKS CONTAINING ALCOHOL DO YOU HAVE ON A TYPICAL DAY: PATIENT DOES NOT DRINK

## 2023-08-12 ASSESSMENT — PAIN - FUNCTIONAL ASSESSMENT: PAIN_FUNCTIONAL_ASSESSMENT: 0-10

## 2023-08-12 ASSESSMENT — PAIN SCALES - GENERAL: PAINLEVEL_OUTOF10: 0

## 2023-08-12 NOTE — DISCHARGE INSTRUCTIONS
Thank you! Thank you for allowing me to care for you in the emergency department. I sincerely hope that you are satisfied with your visit today. It is my goal to provide you with excellent care. Below you will find a list of your labs and imaging from your visit today if applicable. Should you have any questions regarding these results please do not hesitate to call the emergency department. Please review [employer] for a more detailed result list since the below list may not be comprehensive. Instructions on how to sign up to [facility] should be provided in this packet.     Labs -     Recent Results (from the past 12 hour(s))   POCT Glucose    Collection Time: 08/12/23 10:10 AM   Result Value Ref Range    POC Glucose 100 65 - 100 mg/dL    Performed by: Hernandez Jones    CBC with Auto Differential    Collection Time: 08/12/23 10:44 AM   Result Value Ref Range    WBC 4.1 3.6 - 11.0 K/uL    RBC 3.94 3.80 - 5.20 M/uL    Hemoglobin 11.7 11.5 - 16.0 g/dL    Hematocrit 34.6 (L) 35.0 - 47.0 %    MCV 87.8 80.0 - 99.0 FL    MCH 29.7 26.0 - 34.0 PG    MCHC 33.8 30.0 - 36.5 g/dL    RDW 15.7 (H) 11.5 - 14.5 %    Platelets 68 (L) 479 - 400 K/uL    MPV 11.3 8.9 - 12.9 FL    Nucleated RBCs 0.0 0.0  WBC    nRBC 0.00 0.00 - 0.01 K/uL    Neutrophils % 77 (H) 32 - 75 %    Lymphocytes % 14 12 - 49 %    Monocytes % 7 5 - 13 %    Eosinophils % 1 0 - 7 %    Basophils % 1 0 - 1 %    Immature Granulocytes 0 0 - 0.5 %    Neutrophils Absolute 3.2 1.8 - 8.0 K/UL    Lymphocytes Absolute 0.6 (L) 0.8 - 3.5 K/UL    Monocytes Absolute 0.3 0.0 - 1.0 K/UL    Eosinophils Absolute 0.0 0.0 - 0.4 K/UL    Basophils Absolute 0.0 0.0 - 0.1 K/UL    Absolute Immature Granulocyte 0.0 0.00 - 0.04 K/UL    Differential Type AUTOMATED     BMP    Collection Time: 08/12/23 10:44 AM   Result Value Ref Range    Sodium 141 136 - 145 mmol/L    Potassium 4.1 3.5 - 5.1 mmol/L    Chloride 108 97 - 108 mmol/L    CO2 26 21 - 32 mmol/L    Anion Gap 7 5 - 15 mmol/L

## 2023-08-12 NOTE — ED TRIAGE NOTES
Pt came in from home for hypoglycemia. Upon ems arrival glucose was 50, ems gave 2 tubes of glucagon and recheck bg was 72.      Hx: diabetic, stroke

## 2023-08-12 NOTE — ED PROVIDER NOTES
EMERGENCY DEPARTMENT HISTORY AND PHYSICAL EXAM      Date: 8/12/2023  Patient Name: Kobe Valdez  MRN: [id number]  YOB: 1945  Date of evaluation: 8/12/2023  Provider: Lauro Hernandez MD     History of Present Illness     Chief Complaint   Patient presents with    Hypoglycemia       History Provided By: Patient, EMS    HPI: Kobe Valdez, 77 y.o. female with past medical history as listed and reviewed below presenting to the ED for hyperglycemia. The patient reports she checked her blood sugar this morning and it was low and EMS was called. EMS reports blood sugar 50 on arrival, glucagon was given. On arrival to ED the patient was able to eat a peanut butter sandwich. Patient reports she did not sleep well last night, she denies any changes in her medications, she is not on a sulfonylurea. She denies any other symptoms and has otherwise been feeling well.     Family reports blood sugar was low prior to arrival.     Medical History     Past Medical History:  Past Medical History:   Diagnosis Date    Adenocarcinoma, renal cell ([facility]) 9/2/2020    Arthritis     CAD (coronary artery disease)     Chronic kidney disease     Diabetes ([facility])     Gout     Hypercholesterolemia     Hypertension     Mental depression     Pulmonary embolism (HCC)     Seizures (HCC)     Stroke [facility])     Thyroid disease        Past Surgical History:  Past Surgical History:   Procedure Laterality Date    COLONOSCOPY N/A 10/24/2022    COLONOSCOPY performed by Crispin Guy MD at [address] [place] [address] ([facility])      KIDNEY REMOVAL Left 02/12/2015    ORTHOPEDIC SURGERY      VT UNLISTED PROCEDURE CARDIAC SURGERY      stents placed     UROLOGICAL SURGERY  02/12/2015    PARTIAL URETERECTOMY        Family History:  Family History   Problem Relation Age of Onset    Cancer Sister     Cancer Sister     Heart Disease Brother     Cancer Maternal Grandmother     Stroke Son     Heart Disease Mother     Heart Disease parties, and agreed upon. Understanding was insured that at this time there is no evidence for a more malignant underlying process, but that early in the process of an illness, an emergency department workup can be falsely reassuring. Routine discharge counseling was given including the fact that any worsening, changing or persistent symptoms should prompt an immediate call or follow up with their primary physician or the emergency department. The importance of appropriate follow up was also discussed as was the importance of review of all lab work and imaging conducted in the emergency department with an outside physician. More extensive discharge instructions were given in the patient's discharge paperwork. After completion of evaluation and discussion of results and diagnoses, all the questions were answered. If required, all follow up appointments and treatments were discussed and explained. Understanding was insured prior to discharge. Additional Disposition Considerations:      Smoking Cessation:Not Applicable    Patient Referrals:  No follow-ups on file.       Discharge Medications:     Medication List        ASK your doctor about these medications      acetaminophen 500 MG tablet  Commonly known as: TYLENOL     albuterol (2.5 MG/3ML) 0.083% nebulizer solution  Commonly known as: PROVENTIL     calcitRIOL 0.25 MCG capsule  Commonly known as: ROCALTROL     carvedilol 6.25 MG tablet  Commonly known as: COREG     cetirizine 10 MG tablet  Commonly known as: ZYRTEC     donepezil 10 MG tablet  Commonly known as: ARICEPT     ferrous sulfate 325 (65 Fe) MG tablet  Commonly known as: IRON 325     fluticasone 50 MCG/ACT nasal spray  Commonly known as: FLONASE     gabapentin 100 MG capsule  Commonly known as: NEURONTIN     insulin detemir 100 UNIT/ML injection vial  Commonly known as: LEVEMIR     latanoprost 0.005 % ophthalmic solution  Commonly known as: XALATAN     magnesium oxide 400 MG tablet  Commonly known as:

## 2023-08-18 DIAGNOSIS — I10 ESSENTIAL (PRIMARY) HYPERTENSION: ICD-10-CM

## 2023-08-18 DIAGNOSIS — E11.65 TYPE 2 DIABETES MELLITUS WITH HYPERGLYCEMIA, WITH LONG-TERM CURRENT USE OF INSULIN (HCC): Primary | ICD-10-CM

## 2023-08-18 DIAGNOSIS — Z79.4 TYPE 2 DIABETES MELLITUS WITH HYPERGLYCEMIA, WITH LONG-TERM CURRENT USE OF INSULIN (HCC): Primary | ICD-10-CM

## 2023-08-18 DIAGNOSIS — E78.2 MIXED HYPERLIPIDEMIA: ICD-10-CM

## 2023-09-05 ENCOUNTER — APPOINTMENT (OUTPATIENT)
Facility: HOSPITAL | Age: 78
End: 2023-09-05
Payer: MEDICARE

## 2023-09-05 ENCOUNTER — HOSPITAL ENCOUNTER (EMERGENCY)
Facility: HOSPITAL | Age: 78
Discharge: HOME | End: 2023-09-05
Attending: EMERGENCY MEDICINE
Payer: MEDICARE

## 2023-09-05 VITALS
RESPIRATION RATE: 22 BRPM | HEIGHT: 64 IN | BODY MASS INDEX: 46.26 KG/M2 | DIASTOLIC BLOOD PRESSURE: 63 MMHG | TEMPERATURE: 97.5 F | OXYGEN SATURATION: 97 % | SYSTOLIC BLOOD PRESSURE: 149 MMHG | WEIGHT: 271 LBS | HEART RATE: 54 BPM

## 2023-09-05 DIAGNOSIS — R55 SYNCOPE AND COLLAPSE: Primary | ICD-10-CM

## 2023-09-05 DIAGNOSIS — R53.83 OTHER FATIGUE: ICD-10-CM

## 2023-09-05 DIAGNOSIS — N30.00 ACUTE CYSTITIS WITHOUT HEMATURIA: ICD-10-CM

## 2023-09-05 LAB
ALBUMIN SERPL-MCNC: 3.2 G/DL (ref 3.5–5)
ALBUMIN/GLOB SERPL: 1.1 (ref 1.1–2.2)
ALP SERPL-CCNC: 186 U/L (ref 45–117)
ALT SERPL-CCNC: 21 U/L (ref 12–78)
ANION GAP SERPL CALC-SCNC: 9 MMOL/L (ref 5–15)
APPEARANCE UR: ABNORMAL
AST SERPL W P-5'-P-CCNC: ABNORMAL U/L (ref 15–37)
BACTERIA URNS QL MICRO: NEGATIVE /HPF
BASOPHILS # BLD: 0 K/UL (ref 0–0.1)
BASOPHILS NFR BLD: 0 % (ref 0–1)
BILIRUB SERPL-MCNC: 0.8 MG/DL (ref 0.2–1)
BILIRUB UR QL: NEGATIVE
BNP SERPL-MCNC: 5234 PG/ML
BUN SERPL-MCNC: 77 MG/DL (ref 6–20)
BUN/CREAT SERPL: 18 (ref 12–20)
CA-I BLD-MCNC: 9.2 MG/DL (ref 8.5–10.1)
CHLORIDE SERPL-SCNC: 93 MMOL/L (ref 97–108)
CO2 SERPL-SCNC: 24 MMOL/L (ref 21–32)
COLOR UR: YELLOW
CREAT SERPL-MCNC: 4.18 MG/DL (ref 0.55–1.02)
DIFFERENTIAL METHOD BLD: ABNORMAL
EKG ATRIAL RATE: 52 BPM
EKG DIAGNOSIS: NORMAL
EKG P AXIS: 42 DEGREES
EKG P-R INTERVAL: 166 MS
EKG Q-T INTERVAL: 490 MS
EKG QRS DURATION: 94 MS
EKG QTC CALCULATION (BAZETT): 455 MS
EKG R AXIS: 29 DEGREES
EKG T AXIS: 90 DEGREES
EKG VENTRICULAR RATE: 52 BPM
EOSINOPHIL # BLD: 0.1 K/UL (ref 0–0.4)
EOSINOPHIL NFR BLD: 2 % (ref 0–7)
EPITH CASTS URNS QL MICRO: ABNORMAL /LPF
ERYTHROCYTE [DISTWIDTH] IN BLOOD BY AUTOMATED COUNT: 15.8 % (ref 11.5–14.5)
GLOBULIN SER CALC-MCNC: 3 G/DL (ref 2–4)
GLUCOSE SERPL-MCNC: 140 MG/DL (ref 65–100)
GLUCOSE UR STRIP.AUTO-MCNC: NEGATIVE MG/DL
HCT VFR BLD AUTO: 34.3 % (ref 35–47)
HGB BLD-MCNC: 12 G/DL (ref 11.5–16)
HGB UR QL STRIP: ABNORMAL
IMM GRANULOCYTES # BLD AUTO: 0 K/UL (ref 0–0.04)
IMM GRANULOCYTES NFR BLD AUTO: 1 % (ref 0–0.5)
KETONES UR QL STRIP.AUTO: NEGATIVE MG/DL
LEUKOCYTE ESTERASE UR QL STRIP.AUTO: ABNORMAL
LIPASE SERPL-CCNC: 118 U/L (ref 73–393)
LYMPHOCYTES # BLD: 0.7 K/UL (ref 0.8–3.5)
LYMPHOCYTES NFR BLD: 13 % (ref 12–49)
MAGNESIUM SERPL-MCNC: NORMAL MG/DL (ref 1.6–2.4)
MCH RBC QN AUTO: 30.2 PG (ref 26–34)
MCHC RBC AUTO-ENTMCNC: 35 G/DL (ref 30–36.5)
MCV RBC AUTO: 86.2 FL (ref 80–99)
MONOCYTES # BLD: 0.6 K/UL (ref 0–1)
MONOCYTES NFR BLD: 10 % (ref 5–13)
NEUTS SEG # BLD: 4.2 K/UL (ref 1.8–8)
NEUTS SEG NFR BLD: 74 % (ref 32–75)
NITRITE UR QL STRIP.AUTO: NEGATIVE
NRBC # BLD: 0 K/UL (ref 0–0.01)
NRBC BLD-RTO: 0 PER 100 WBC
PH UR STRIP: 5 (ref 5–8)
PLATELET # BLD AUTO: 122 K/UL (ref 150–400)
PMV BLD AUTO: 10.5 FL (ref 8.9–12.9)
POTASSIUM SERPL-SCNC: ABNORMAL MMOL/L (ref 3.5–5.1)
PROT SERPL-MCNC: 6.2 G/DL (ref 6.4–8.2)
PROT UR STRIP-MCNC: 100 MG/DL
RBC # BLD AUTO: 3.98 M/UL (ref 3.8–5.2)
RBC #/AREA URNS HPF: ABNORMAL /HPF (ref 0–5)
SODIUM SERPL-SCNC: 126 MMOL/L (ref 136–145)
SP GR UR REFRACTOMETRY: 1.01 (ref 1–1.03)
TROPONIN I SERPL HS-MCNC: 57 NG/L (ref 0–51)
URINE CULTURE IF INDICATED: ABNORMAL
UROBILINOGEN UR QL STRIP.AUTO: 0.1 EU/DL (ref 0.1–1)
WBC # BLD AUTO: 5.7 K/UL (ref 3.6–11)
WBC URNS QL MICRO: >100 /HPF (ref 0–4)

## 2023-09-05 PROCEDURE — 71045 X-RAY EXAM CHEST 1 VIEW: CPT

## 2023-09-05 PROCEDURE — 70450 CT HEAD/BRAIN W/O DYE: CPT

## 2023-09-05 PROCEDURE — 87086 URINE CULTURE/COLONY COUNT: CPT

## 2023-09-05 PROCEDURE — 99285 EMERGENCY DEPT VISIT HI MDM: CPT

## 2023-09-05 PROCEDURE — 87077 CULTURE AEROBIC IDENTIFY: CPT

## 2023-09-05 PROCEDURE — 94761 N-INVAS EAR/PLS OXIMETRY MLT: CPT

## 2023-09-05 PROCEDURE — 83880 ASSAY OF NATRIURETIC PEPTIDE: CPT

## 2023-09-05 PROCEDURE — 87186 SC STD MICRODIL/AGAR DIL: CPT

## 2023-09-05 PROCEDURE — 36415 COLL VENOUS BLD VENIPUNCTURE: CPT

## 2023-09-05 PROCEDURE — 83690 ASSAY OF LIPASE: CPT

## 2023-09-05 PROCEDURE — 83735 ASSAY OF MAGNESIUM: CPT

## 2023-09-05 PROCEDURE — 81001 URINALYSIS AUTO W/SCOPE: CPT

## 2023-09-05 PROCEDURE — 84484 ASSAY OF TROPONIN QUANT: CPT

## 2023-09-05 PROCEDURE — 85025 COMPLETE CBC W/AUTO DIFF WBC: CPT

## 2023-09-05 PROCEDURE — 93005 ELECTROCARDIOGRAM TRACING: CPT | Performed by: EMERGENCY MEDICINE

## 2023-09-05 PROCEDURE — 80053 COMPREHEN METABOLIC PANEL: CPT

## 2023-09-05 RX ORDER — CEPHALEXIN 500 MG/1
500 CAPSULE ORAL 3 TIMES DAILY
Qty: 21 CAPSULE | Refills: 0 | Status: SHIPPED | OUTPATIENT
Start: 2023-09-05 | End: 2023-09-12

## 2023-09-05 ASSESSMENT — PAIN - FUNCTIONAL ASSESSMENT
PAIN_FUNCTIONAL_ASSESSMENT: 0-10
PAIN_FUNCTIONAL_ASSESSMENT: NONE - DENIES PAIN

## 2023-09-05 ASSESSMENT — PAIN SCALES - GENERAL: PAINLEVEL_OUTOF10: 9

## 2023-09-05 ASSESSMENT — PAIN DESCRIPTION - LOCATION: LOCATION: ABDOMEN;HEAD

## 2023-09-05 ASSESSMENT — LIFESTYLE VARIABLES
HOW MANY STANDARD DRINKS CONTAINING ALCOHOL DO YOU HAVE ON A TYPICAL DAY: PATIENT DOES NOT DRINK
HOW OFTEN DO YOU HAVE A DRINK CONTAINING ALCOHOL: NEVER

## 2023-09-05 NOTE — ED NOTES
Pt d/c with instructions given. Pt verbalized understanding.   Pt d/c and waiting in room for family ride at [facility] hr.           Tony Grover RN  09/05/23 [address] RN  09/05/23 7679

## 2023-09-05 NOTE — ED PROVIDER NOTES
[id number]  [phone]      As needed        DISCHARGE MEDICATIONS:     Medication List        START taking these medications      cephALEXin 500 MG capsule  Commonly known as: Keflex  Take 1 capsule by mouth 3 times daily for 7 days            ASK your doctor about these medications      acetaminophen 500 MG tablet  Commonly known as: TYLENOL     albuterol (2.5 MG/3ML) 0.083% nebulizer solution  Commonly known as: PROVENTIL     calcitRIOL 0.25 MCG capsule  Commonly known as: ROCALTROL     carvedilol 6.25 MG tablet  Commonly known as: COREG     cetirizine 10 MG tablet  Commonly known as: ZYRTEC     donepezil 10 MG tablet  Commonly known as: ARICEPT     ferrous sulfate 325 (65 Fe) MG tablet  Commonly known as: IRON 325     fluticasone 50 MCG/ACT nasal spray  Commonly known as: FLONASE     gabapentin 100 MG capsule  Commonly known as: NEURONTIN     insulin detemir 100 UNIT/ML injection vial  Commonly known as: LEVEMIR     latanoprost 0.005 % ophthalmic solution  Commonly known as: XALATAN     magnesium oxide 400 MG tablet  Commonly known as: MAG-OX     pantoprazole 40 MG tablet  Commonly known as: PROTONIX     potassium chloride 10 MEQ extended release tablet  Commonly known as: KLOR-CON     pravastatin 80 MG tablet  Commonly known as: PRAVACHOL     Torsemide 40 MG Tabs     venlafaxine 75 MG extended release tablet               Where to Get Your Medications        These medications were sent to [address] [place] [address] [phone]  [address] [place] [address]      Phone: [phone]   cephALEXin 500 MG capsule           DISCONTINUED MEDICATIONS:  Discharge Medication List as of 9/5/2023  2:49 PM          I am the Primary Clinician of Record. Prince Lucas MD (electronically signed)    (Please note that parts of this dictation were completed with voice recognition software.  Quite often unanticipated grammatical, syntax, homophones, and other

## 2023-09-05 NOTE — ED TRIAGE NOTES
Per EMS, pt had a syncopal episad at home, pts sister said pt was passed otu for about 10 minutes. When EMS arrived, pt was axo 4, gcs 15, , HR in the 40s upon arrival. Pt states she recently had a medication adjustment on her Lasix. Pt reports weakness at this time. Pt reports cp and sob, weakness.

## 2023-09-05 NOTE — DISCHARGE INSTRUCTIONS
RBC, UA 20-50 0 - 5 /hpf    Epithelial Cells UA Few Few /lpf    BACTERIA, URINE Negative Negative /hpf    Urine Culture if Indicated Urine Culture Ordered (A) Culture not indicated by UA result         Radiologic Studies  CT HEAD WO CONTRAST   Final Result   Extensive chronic small vessel ischemic disease. No acute intracranial   abnormality. XR CHEST PORTABLE   Final Result      No acute process on portable chest.           ------------------------------------------------------------------------------------------------------------  The exam and treatment you received in the Emergency Department were for an urgent problem and are not intended as complete care. It is important that you follow-up with a doctor, nurse practitioner, or physician assistant to:  (1) confirm your diagnosis,  (2) re-evaluation of changes in your illness and treatment, and  (3) for ongoing care. Please take your discharge instructions with you when you go to your follow-up appointment. If you have any problem arranging a follow-up appointment, contact the Emergency Department. If your symptoms become worse or you do not improve as expected and you are unable to reach your health care provider, please return to the Emergency Department. We are available 24 hours a day. If a prescription has been provided, please have it filled as soon as possible to prevent a delay in treatment. If you have any questions or reservations about taking the medication due to side effects or interactions with other medications, please call your primary care provider or contact the ER.

## 2023-09-09 LAB
BACTERIA SPEC CULT: ABNORMAL
BACTERIA SPEC CULT: ABNORMAL
COLONY COUNT, CNT: ABNORMAL
Lab: ABNORMAL

## 2024-02-17 ENCOUNTER — HOSPITAL ENCOUNTER (EMERGENCY)
Facility: HOSPITAL | Age: 79
Discharge: HOME | End: 2024-02-17
Attending: EMERGENCY MEDICINE
Payer: MEDICARE

## 2024-02-17 VITALS
HEIGHT: 64 IN | HEART RATE: 64 BPM | RESPIRATION RATE: 19 BRPM | DIASTOLIC BLOOD PRESSURE: 55 MMHG | OXYGEN SATURATION: 98 % | WEIGHT: 200 LBS | SYSTOLIC BLOOD PRESSURE: 137 MMHG | TEMPERATURE: 97.6 F | BODY MASS INDEX: 34.15 KG/M2

## 2024-02-17 DIAGNOSIS — R55 RECURRENT SYNCOPE: Primary | ICD-10-CM

## 2024-02-17 LAB
ALBUMIN SERPL-MCNC: 3 G/DL (ref 3.5–5)
ALBUMIN/GLOB SERPL: 0.9 (ref 1.1–2.2)
ALP SERPL-CCNC: 115 U/L (ref 45–117)
ALT SERPL-CCNC: 21 U/L (ref 12–78)
ANION GAP SERPL CALC-SCNC: 5 MMOL/L (ref 5–15)
AST SERPL W P-5'-P-CCNC: 23 U/L (ref 15–37)
BASOPHILS # BLD: 0 K/UL (ref 0–0.1)
BASOPHILS NFR BLD: 1 % (ref 0–1)
BILIRUB SERPL-MCNC: 0.4 MG/DL (ref 0.2–1)
BUN SERPL-MCNC: 38 MG/DL (ref 6–20)
BUN/CREAT SERPL: 9 (ref 12–20)
CA-I BLD-MCNC: 9 MG/DL (ref 8.5–10.1)
CHLORIDE SERPL-SCNC: 102 MMOL/L (ref 97–108)
CO2 SERPL-SCNC: 31 MMOL/L (ref 21–32)
CREAT SERPL-MCNC: 4.06 MG/DL (ref 0.55–1.02)
DIFFERENTIAL METHOD BLD: ABNORMAL
EOSINOPHIL # BLD: 0.3 K/UL (ref 0–0.4)
EOSINOPHIL NFR BLD: 4 % (ref 0–7)
ERYTHROCYTE [DISTWIDTH] IN BLOOD BY AUTOMATED COUNT: 13.8 % (ref 11.5–14.5)
GLOBULIN SER CALC-MCNC: 3.3 G/DL (ref 2–4)
GLUCOSE SERPL-MCNC: 100 MG/DL (ref 65–100)
HCT VFR BLD AUTO: 24.6 % (ref 35–47)
HGB BLD-MCNC: 8.2 G/DL (ref 11.5–16)
IMM GRANULOCYTES # BLD AUTO: 0.1 K/UL (ref 0–0.04)
IMM GRANULOCYTES NFR BLD AUTO: 1 % (ref 0–0.5)
LACTATE BLD-SCNC: 0.41 MMOL/L (ref 0.4–2)
LYMPHOCYTES # BLD: 0.9 K/UL (ref 0.8–3.5)
LYMPHOCYTES NFR BLD: 12 % (ref 12–49)
MCH RBC QN AUTO: 31.7 PG (ref 26–34)
MCHC RBC AUTO-ENTMCNC: 33.3 G/DL (ref 30–36.5)
MCV RBC AUTO: 95 FL (ref 80–99)
MONOCYTES # BLD: 0.7 K/UL (ref 0–1)
MONOCYTES NFR BLD: 9 % (ref 5–13)
NEUTS SEG # BLD: 5.5 K/UL (ref 1.8–8)
NEUTS SEG NFR BLD: 73 % (ref 32–75)
NRBC # BLD: 0 K/UL (ref 0–0.01)
NRBC BLD-RTO: 0 PER 100 WBC
PERFORMED BY:: NORMAL
PLATELET # BLD AUTO: 141 K/UL (ref 150–400)
PMV BLD AUTO: 9.9 FL (ref 8.9–12.9)
POTASSIUM SERPL-SCNC: 3.8 MMOL/L (ref 3.5–5.1)
PROT SERPL-MCNC: 6.3 G/DL (ref 6.4–8.2)
RBC # BLD AUTO: 2.59 M/UL (ref 3.8–5.2)
SODIUM SERPL-SCNC: 138 MMOL/L (ref 136–145)
TROPONIN I SERPL HS-MCNC: 61 NG/L (ref 0–51)
WBC # BLD AUTO: 7.4 K/UL (ref 3.6–11)

## 2024-02-17 PROCEDURE — 80053 COMPREHEN METABOLIC PANEL: CPT

## 2024-02-17 PROCEDURE — 99284 EMERGENCY DEPT VISIT MOD MDM: CPT

## 2024-02-17 PROCEDURE — 84484 ASSAY OF TROPONIN QUANT: CPT

## 2024-02-17 PROCEDURE — 87040 BLOOD CULTURE FOR BACTERIA: CPT

## 2024-02-17 PROCEDURE — 83605 ASSAY OF LACTIC ACID: CPT

## 2024-02-17 PROCEDURE — 85025 COMPLETE CBC W/AUTO DIFF WBC: CPT

## 2024-02-17 PROCEDURE — 93005 ELECTROCARDIOGRAM TRACING: CPT | Performed by: EMERGENCY MEDICINE

## 2024-02-17 PROCEDURE — 36415 COLL VENOUS BLD VENIPUNCTURE: CPT

## 2024-02-17 ASSESSMENT — PAIN - FUNCTIONAL ASSESSMENT: PAIN_FUNCTIONAL_ASSESSMENT: NONE - DENIES PAIN

## 2024-02-17 NOTE — ED TRIAGE NOTES
Patient arrives via EMS with complaints of being unresponsive during dialysis treatment, BP found to be 60's systolic, patient given 700cc of saline with improvement to BP, is A&O upon arrival, received 2 hours of dialysis

## 2024-02-17 NOTE — ED PROVIDER NOTES
hour(s)).      EKG:.EKG interpreted by me. Shows accelerated Dexsol rhythm with a rate of 77.  No STEMI.  Results significantly affected by artifact.  Will repeat.      Repeat EKG interpreted by me.  Normal sinus rhythm with rate of 67.  No STEMI.    Radiologic Studies:  Non-plain film images such as CT, Ultrasound and MRI are read by the radiologist. Plain radiographic images are visualized and preliminarily interpreted by the ED Provider with the following findings: Not Applicable.    Interpretation per the Radiologist below, if available at the time of this note:  No orders to display        EMERGENCY DEPARTMENT COURSE and DIFFERENTIAL DIAGNOSIS/MDM   3:03 PM DDx, ED Course, and Reassessment:     Records Reviewed (source and summary of external notes): Prior medical records and Nursing notes.    Vitals:    Vitals:    02/17/24 1715 02/17/24 1730 02/17/24 1745 02/17/24 1810   BP: (!) 143/58 139/63 (!) 137/58 (!) 137/55   Pulse: 66 63 62 64   Resp: 18 12 24 19   Temp:    97.6 °F (36.4 °C)   TempSrc:    Oral   SpO2: 98% 96% 97% 98%   Weight:       Height:            ED COURSE  ED Course as of 02/18/24 1503   Sat Feb 17, 2024   1506 78-year-old female presents for evaluation of syncope during dialysis.  Was sitting.  Did not hit her head.  Her blood pressure was found to be 60 over palp at that time so she was given a total of 900 cc of IV fluids and her pressure normalized.  Her mental status normalized as well.  Her daughter of note reports that 2 days ago she had a similar syncopal episode not during dialysis also when she was sitting that resolved spontaneously.  Differential diagnosis includes dysrhythmia versus hypotension versus electrolyte disarray versus ACS.  Getting labs including a CBC, CMP, troponin, blood cultures, lactate.  Will likely require admission. [LW]   1508 Potassium is 3.8 [LW]   1841 The physicians assistant, Aguila, saw the patient at bedside.  He came to speak with me about the patient and  IRON 325     fluticasone 50 MCG/ACT nasal spray  Commonly known as: FLONASE     gabapentin 100 MG capsule  Commonly known as: NEURONTIN     insulin detemir 100 UNIT/ML injection vial  Commonly known as: LEVEMIR     latanoprost 0.005 % ophthalmic solution  Commonly known as: XALATAN     magnesium oxide 400 MG tablet  Commonly known as: MAG-OX     pantoprazole 40 MG tablet  Commonly known as: PROTONIX     potassium chloride 10 MEQ extended release tablet  Commonly known as: KLOR-CON     pravastatin 80 MG tablet  Commonly known as: PRAVACHOL     Torsemide 40 MG Tabs     venlafaxine 75 MG extended release tablet                DISCONTINUED MEDICATIONS:  Discharge Medication List as of 2/17/2024  6:49 PM          I am the Primary Clinician of Record. Kelli Cormier MD (electronically signed)    (Please note that parts of this dictation were completed with voice recognition software. Quite often unanticipated grammatical, syntax, homophones, and other interpretive errors are inadvertently transcribed by the computer software. Please disregards these errors. Please excuse any errors that have escaped final proofreading.)        Kelli Cormier MD  02/18/24 0863

## 2024-02-18 LAB
BACTERIA SPEC CULT: NORMAL
BACTERIA SPEC CULT: NORMAL
Lab: NORMAL
Lab: NORMAL

## 2024-02-19 LAB
EKG ATRIAL RATE: 394 BPM
EKG ATRIAL RATE: 67 BPM
EKG DIAGNOSIS: NORMAL
EKG DIAGNOSIS: NORMAL
EKG P AXIS: 49 DEGREES
EKG P-R INTERVAL: 154 MS
EKG Q-T INTERVAL: 384 MS
EKG Q-T INTERVAL: 442 MS
EKG QRS DURATION: 78 MS
EKG QRS DURATION: 90 MS
EKG QTC CALCULATION (BAZETT): 434 MS
EKG QTC CALCULATION (BAZETT): 467 MS
EKG R AXIS: 36 DEGREES
EKG R AXIS: 5 DEGREES
EKG T AXIS: 78 DEGREES
EKG T AXIS: 92 DEGREES
EKG VENTRICULAR RATE: 67 BPM
EKG VENTRICULAR RATE: 77 BPM

## 2024-02-23 LAB
BACTERIA SPEC CULT: NORMAL
BACTERIA SPEC CULT: NORMAL
Lab: NORMAL
Lab: NORMAL

## 2024-03-01 ENCOUNTER — HOSPITAL ENCOUNTER (EMERGENCY)
Facility: HOSPITAL | Age: 79
Discharge: HOME | End: 2024-03-01
Attending: EMERGENCY MEDICINE
Payer: MEDICARE

## 2024-03-01 ENCOUNTER — APPOINTMENT (OUTPATIENT)
Facility: HOSPITAL | Age: 79
End: 2024-03-01
Attending: EMERGENCY MEDICINE
Payer: MEDICARE

## 2024-03-01 VITALS
TEMPERATURE: 98.2 F | BODY MASS INDEX: 27.14 KG/M2 | HEIGHT: 64 IN | HEART RATE: 76 BPM | OXYGEN SATURATION: 98 % | SYSTOLIC BLOOD PRESSURE: 154 MMHG | RESPIRATION RATE: 16 BRPM | WEIGHT: 159 LBS | DIASTOLIC BLOOD PRESSURE: 74 MMHG

## 2024-03-01 DIAGNOSIS — D64.9 ANEMIA, UNSPECIFIED TYPE: ICD-10-CM

## 2024-03-01 DIAGNOSIS — S70.12XA CONTUSION OF LEFT THIGH, INITIAL ENCOUNTER: Primary | ICD-10-CM

## 2024-03-01 DIAGNOSIS — N18.9 CHRONIC KIDNEY DISEASE, UNSPECIFIED CKD STAGE: ICD-10-CM

## 2024-03-01 LAB
ANION GAP SERPL CALC-SCNC: 0 MMOL/L (ref 5–15)
BASOPHILS # BLD: 0.1 K/UL (ref 0–0.1)
BASOPHILS NFR BLD: 1 % (ref 0–1)
BUN SERPL-MCNC: 35 MG/DL (ref 6–20)
BUN/CREAT SERPL: 8 (ref 12–20)
CA-I BLD-MCNC: 10.4 MG/DL (ref 8.5–10.1)
CHLORIDE SERPL-SCNC: 107 MMOL/L (ref 97–108)
CO2 SERPL-SCNC: 34 MMOL/L (ref 21–32)
CREAT SERPL-MCNC: 4.15 MG/DL (ref 0.55–1.02)
DIFFERENTIAL METHOD BLD: ABNORMAL
ECHO BSA: 1.8 M2
EOSINOPHIL # BLD: 0.3 K/UL (ref 0–0.4)
EOSINOPHIL NFR BLD: 4 % (ref 0–7)
ERYTHROCYTE [DISTWIDTH] IN BLOOD BY AUTOMATED COUNT: 14.9 % (ref 11.5–14.5)
GLUCOSE SERPL-MCNC: 74 MG/DL (ref 65–100)
HCT VFR BLD AUTO: 29.6 % (ref 35–47)
HGB BLD-MCNC: 9.5 G/DL (ref 11.5–16)
IMM GRANULOCYTES # BLD AUTO: 0 K/UL (ref 0–0.04)
IMM GRANULOCYTES NFR BLD AUTO: 0 % (ref 0–0.5)
LYMPHOCYTES # BLD: 1.5 K/UL (ref 0.8–3.5)
LYMPHOCYTES NFR BLD: 22 % (ref 12–49)
MCH RBC QN AUTO: 32.1 PG (ref 26–34)
MCHC RBC AUTO-ENTMCNC: 32.1 G/DL (ref 30–36.5)
MCV RBC AUTO: 100 FL (ref 80–99)
MONOCYTES # BLD: 0.6 K/UL (ref 0–1)
MONOCYTES NFR BLD: 9 % (ref 5–13)
NEUTS SEG # BLD: 4.4 K/UL (ref 1.8–8)
NEUTS SEG NFR BLD: 64 % (ref 32–75)
NRBC # BLD: 0 K/UL (ref 0–0.01)
NRBC BLD-RTO: 0 PER 100 WBC
PLATELET # BLD AUTO: 233 K/UL (ref 150–400)
PMV BLD AUTO: 9.8 FL (ref 8.9–12.9)
POTASSIUM SERPL-SCNC: 4.7 MMOL/L (ref 3.5–5.1)
RBC # BLD AUTO: 2.96 M/UL (ref 3.8–5.2)
SODIUM SERPL-SCNC: 141 MMOL/L (ref 136–145)
WBC # BLD AUTO: 6.9 K/UL (ref 3.6–11)

## 2024-03-01 PROCEDURE — 36415 COLL VENOUS BLD VENIPUNCTURE: CPT

## 2024-03-01 PROCEDURE — 80048 BASIC METABOLIC PNL TOTAL CA: CPT

## 2024-03-01 PROCEDURE — 99284 EMERGENCY DEPT VISIT MOD MDM: CPT

## 2024-03-01 PROCEDURE — 85025 COMPLETE CBC W/AUTO DIFF WBC: CPT

## 2024-03-01 PROCEDURE — 93971 EXTREMITY STUDY: CPT

## 2024-03-01 PROCEDURE — 6370000000 HC RX 637 (ALT 250 FOR IP): Performed by: EMERGENCY MEDICINE

## 2024-03-01 RX ORDER — ACETAMINOPHEN 500 MG
1000 TABLET ORAL
Status: COMPLETED | OUTPATIENT
Start: 2024-03-01 | End: 2024-03-01

## 2024-03-01 RX ADMIN — ACETAMINOPHEN 1000 MG: 500 TABLET ORAL at 14:08

## 2024-03-01 ASSESSMENT — PAIN SCALES - GENERAL
PAINLEVEL_OUTOF10: 8
PAINLEVEL_OUTOF10: 2

## 2024-03-01 ASSESSMENT — PAIN - FUNCTIONAL ASSESSMENT
PAIN_FUNCTIONAL_ASSESSMENT: 0-10
PAIN_FUNCTIONAL_ASSESSMENT: 0-10

## 2024-03-01 NOTE — ED PROVIDER NOTES
Judgment normal.           SCREENINGS                   LAB, EKG AND DIAGNOSTIC RESULTS   Labs:  Recent Results (from the past 48 hour(s))   BMP    Collection Time: 03/01/24  1:02 PM   Result Value Ref Range    Sodium 141 136 - 145 mmol/L    Potassium 4.7 3.5 - 5.1 mmol/L    Chloride 107 97 - 108 mmol/L    CO2 34 (H) 21 - 32 mmol/L    Anion Gap 0 (L) 5 - 15 mmol/L    Glucose 74 65 - 100 mg/dL    BUN 35 (H) 6 - 20 mg/dL    Creatinine 4.15 (H) 0.55 - 1.02 mg/dL    Bun/Cre Ratio 8 (L) 12 - 20      Est, Glom Filt Rate 10 (L) >60 ml/min/1.73m2    Calcium 10.4 (H) 8.5 - 10.1 mg/dL   CBC with Auto Differential    Collection Time: 03/01/24  1:02 PM   Result Value Ref Range    WBC 6.9 3.6 - 11.0 K/uL    RBC 2.96 (L) 3.80 - 5.20 M/uL    Hemoglobin 9.5 (L) 11.5 - 16.0 g/dL    Hematocrit 29.6 (L) 35.0 - 47.0 %    .0 (H) 80.0 - 99.0 FL    MCH 32.1 26.0 - 34.0 PG    MCHC 32.1 30.0 - 36.5 g/dL    RDW 14.9 (H) 11.5 - 14.5 %    Platelets 233 150 - 400 K/uL    MPV 9.8 8.9 - 12.9 FL    Nucleated RBCs 0.0 0.0  WBC    nRBC 0.00 0.00 - 0.01 K/uL    Neutrophils % 64 32 - 75 %    Lymphocytes % 22 12 - 49 %    Monocytes % 9 5 - 13 %    Eosinophils % 4 0 - 7 %    Basophils % 1 0 - 1 %    Immature Granulocytes 0 0 - 0.5 %    Neutrophils Absolute 4.4 1.8 - 8.0 K/UL    Lymphocytes Absolute 1.5 0.8 - 3.5 K/UL    Monocytes Absolute 0.6 0.0 - 1.0 K/UL    Eosinophils Absolute 0.3 0.0 - 0.4 K/UL    Basophils Absolute 0.1 0.0 - 0.1 K/UL    Absolute Immature Granulocyte 0.0 0.00 - 0.04 K/UL    Differential Type AUTOMATED     Vascular duplex lower extremity venous left    Collection Time: 03/01/24  1:50 PM   Result Value Ref Range    Body Surface Area 1.8 m2       EKG:.See ED Course Below    Radiologic Studies:  Non-plain film images such as CT, Ultrasound and MRI are read by the radiologist. Plain radiographic images are visualized and preliminarily interpreted by the ED Provider with the following findings: Not  Please excuse any errors that have escaped final proofreading.)       Odalys Andre MD  03/02/24 2866

## 2024-03-01 NOTE — DISCHARGE INSTRUCTIONS
Thank you!  Thank you for allowing me to care for you in the emergency department. It is my goal to provide you with excellent care.  Please fill out the survey that will come to you by mail or email since we listen to your feedback!     Below you will find a list of your tests from today's visit.  Should you have any questions, please do not hesitate to call the emergency department.    Labs  Recent Results (from the past 12 hour(s))   BMP    Collection Time: 03/01/24  1:02 PM   Result Value Ref Range    Sodium 141 136 - 145 mmol/L    Potassium 4.7 3.5 - 5.1 mmol/L    Chloride 107 97 - 108 mmol/L    CO2 34 (H) 21 - 32 mmol/L    Anion Gap 0 (L) 5 - 15 mmol/L    Glucose 74 65 - 100 mg/dL    BUN 35 (H) 6 - 20 mg/dL    Creatinine 4.15 (H) 0.55 - 1.02 mg/dL    Bun/Cre Ratio 8 (L) 12 - 20      Est, Glom Filt Rate 10 (L) >60 ml/min/1.73m2    Calcium 10.4 (H) 8.5 - 10.1 mg/dL   CBC with Auto Differential    Collection Time: 03/01/24  1:02 PM   Result Value Ref Range    WBC 6.9 3.6 - 11.0 K/uL    RBC 2.96 (L) 3.80 - 5.20 M/uL    Hemoglobin 9.5 (L) 11.5 - 16.0 g/dL    Hematocrit 29.6 (L) 35.0 - 47.0 %    .0 (H) 80.0 - 99.0 FL    MCH 32.1 26.0 - 34.0 PG    MCHC 32.1 30.0 - 36.5 g/dL    RDW 14.9 (H) 11.5 - 14.5 %    Platelets 233 150 - 400 K/uL    MPV 9.8 8.9 - 12.9 FL    Nucleated RBCs 0.0 0.0  WBC    nRBC 0.00 0.00 - 0.01 K/uL    Neutrophils % 64 32 - 75 %    Lymphocytes % 22 12 - 49 %    Monocytes % 9 5 - 13 %    Eosinophils % 4 0 - 7 %    Basophils % 1 0 - 1 %    Immature Granulocytes 0 0 - 0.5 %    Neutrophils Absolute 4.4 1.8 - 8.0 K/UL    Lymphocytes Absolute 1.5 0.8 - 3.5 K/UL    Monocytes Absolute 0.6 0.0 - 1.0 K/UL    Eosinophils Absolute 0.3 0.0 - 0.4 K/UL    Basophils Absolute 0.1 0.0 - 0.1 K/UL    Absolute Immature Granulocyte 0.0 0.00 - 0.04 K/UL    Differential Type AUTOMATED         Radiologic Studies  Vascular duplex lower extremity venous left    (Results Pending)      ------------------------------------------------------------------------------------------------------------  The exam and treatment you received in the Emergency Department were for an urgent problem and are not intended as complete care. It is important that you follow-up with a doctor, nurse practitioner, or physician assistant to:  (1) confirm your diagnosis,  (2) re-evaluation of changes in your illness and treatment, and (3) for ongoing care. Please take your discharge instructions with you when you go to your follow-up appointment.     If you have any problem arranging a follow-up appointment, contact the Emergency Department.  If your symptoms become worse or you do not improve as expected and you are unable to reach your health care provider, please return to the Emergency Department. We are available 24 hours a day.     If a prescription has been provided, please have it filled as soon as possible to prevent a delay in treatment. If you have any questions or reservations about taking the medication due to side effects or interactions with other medications, please call your primary care provider or contact the ER.

## 2024-05-09 ENCOUNTER — HOSPITAL ENCOUNTER (OUTPATIENT)
Facility: HOSPITAL | Age: 79
Discharge: HOME | End: 2024-05-09
Attending: LEGAL MEDICINE
Payer: MEDICARE

## 2024-05-09 DIAGNOSIS — Z85.528 PERSONAL HISTORY OF KIDNEY CANCER: ICD-10-CM

## 2024-05-09 DIAGNOSIS — N18.6 END STAGE RENAL DISEASE (HCC): ICD-10-CM

## 2024-05-09 PROCEDURE — 76770 US EXAM ABDO BACK WALL COMP: CPT

## 2024-10-02 NOTE — DISCHARGE INSTRUCTIONS
Condition:: Acne scars Discharge Instructions for  [facility], [facility]  Telephone: [phone]     NAME:  Daniela Carter OF BIRTH:  1945  MEDICAL RECORD NUMBER:  [id number]  DATE:  2/24/2022        Return Appointment:  []? Dressing supply provider: OLYA  []? Home Healthcare:  []? Return Appointment:   [facility])  []? Nurse Visit:   Week(s)     [x]? [facility]charge from [facility]  []? Ordered tests:  [x]? Referrals: DR MONZON- VASCULAR- PATIENT TO SCHEDULE APPOINTMENT  []? Rx:      Wound Cleansing:   Do not scrub or use excessive force. Cleanse wound prior to applying a clean dressing with:  []? Normal Saline          []? Mild Soap & Water    []? Keep Wound Dry in Shower  []? Other:       Topical Treatments:  Do not apply lotions, creams, or ointments to wound bed unless directed. []? Apply moisturizing lotion to skin surrounding the wound prior to dressing change. []? Apply antifungal ointment to skin surrounding the wound prior to dressing change. []? Apply thin film of moisture barrier ointment to skin immediately around wound. []? Other:                  Dressings:                  Wound Location LEFT LEG              []? Apply Primary Dressing:                                          []? MediHoney Gel         []? MediHoney Alginate                     []? Calcium Alginate      []? Calcium Alginate with Silver              []? Collagen                   []? Collagen with Silver                []? Santyl with Moistened saline gauze              []? Hydrofera Blue (cut to size and moistened)  []? Hydrofera Blue Ready (Cut to size)              []? NS wet to dry            []? Betadine wet to dry              []? Hydrogel                   []? Mepitel                   []? Bactroban/Mupirocin                       []? Other:      []? Cover and Secure with:                   []? Gauze        []? Almon Trumbull           []?  Kerlix              []? Foam Please Describe Your Condition:: . []? Super Absorbant    []? ABD                                      []? ACE Wrap            []? Other:               Limit contact of tape with skin.     []? Change dressing:  []? Daily           []? Every Other Day    []? Once per week   []? Twice per week              []? Three times per week        []? Do Not Change Dressing    []? Other:                Negative Pressure:           Wound Location:   []? Pressure @  mm/Hg                      []?Continuous  []? Intermittent              []? Black          []? White Foam              []? Bridge:               []? Change vac dressing three times per week     Pressure Relief:  []? When sitting, shift position or do seat lifts every 15 minutes. []? Wheelchair cushion           []? Specialty Bed/Mattress  []? Turn every 2 hours when in bed. Avoid direct pressure on wound site. Limit side lying to 30 degree tilt. Limit HOB elevation to 30 degrees. Edema Control:  Apply:  [x]? Compression Stocking:    mmHg   [x]? Right Leg     [x]? Left Leg              []? Tubigrip      []? Right Leg Double Layer      []? Left Leg Double Layer                                      []?Right Leg Single Layer       []? Left Leg Single Layer                 [x]? Elevate leg(s) above the level of the heart when sitting. [x]? Avoid prolonged standing in one place.         Compression:  Apply:  []? Multilayer Compression Wrap:      []? RightLeg      []? Left Leg                                 []?Coflex              []?Coflex Lite             []?Unna                 []? Do not get leg(s) with wrap wet. If wraps become too tight call the center or completely remove the wrap. []? Elevate leg(s) above the level of the heart when sitting. []? Avoid prolonged standing in one place.     Off-Loading:   []? Off-loading when   []? walking       []? in bed         []? sitting  []? Total non-weight bearing  []? Right Leg  []?  Left Leg []? Assistive Device    []? Sunita Reinoso        []? Cane      []? Wheelchair      []? Crutches              []? Surgical shoe    []? Podus Boot(s)   []? Foam Boot(s)  []? Roll About              []? Cast Boot   []? CROW Boot  []? Wedge Shoe  []? Other:     Contact Cast:  Apply:  []? Total Contact Cast Applied in Clinic          []? RightLeg      []? Left Leg              []? Do not get cast wet. Contact center or go to emergency room if there is a foul odor or becomes uncomfortable due to feeling tight or swelling. Do not use objects inside of cast to scratch.                  Dietary:  []? Diet as tolerated:   [x]? Calorie Diabetic Diet:         []? No Added Salt:  [x]? Increase Protein:   []? Other:              Activity:  [x]? Activity as tolerated:    [x]? Patient has no activity restrictions       []? Strict Bedrest:          []? Remain off Work:       []? May return to full duty work:                                               []? Return to work with restrictions:      [address] [place] [address] Avenue: Should you experience any significant changes in your wound(s) or have questions about your wound care, please contact Jaimee Oropeza at [place] [address] 8:00 am - 4:30. If you need help with your wound outside of these hours and cannot wait until we are again available, contact your PCP or go to the hospital emergency room.      PLEASE NOTE: IF YOU ARE UNABLE TO [place] [address], CONTINUE TO USE THE SUPPLIES YOU HAVE AVAILABLE UNTIL YOU ARE ABLE TO [facility]. IT IS MOST IMPORTANT TO KEEP THE WOUND COVERED AT ALL TIMES.     []? Dr. Melissa Robb   [x]? Dr. Mark Velazquez  []?  Dr. Nelia Palacio

## 2024-11-25 NOTE — PROGRESS NOTES
Patient Quality Outreach    Patient is due for the following:       Topic Date Due    Hepatitis B Vaccine (1 of 3 - 3-dose series) Never done    Polio Vaccine (1 of 3 - 4-dose series) Never done    Measles Mumps Rubella (MMR) Vaccine (1 of 2 - Standard series) Never done    Varicella Vaccine (1 of 2 - 2-dose childhood series) Never done    Hepatitis A Vaccine (1 of 2 - 2-dose series) Never done    Diptheria Tetanus Pertussis (DTAP/TDAP/TD) Vaccine (1 - Tdap) Never done    HPV Vaccine (1 - Male 2-dose series) Never done    Meningitis A Vaccine (1 - 2-dose series) Never done    Flu Vaccine (1) Never done    COVID-19 Vaccine (1 - Pediatric 2024-25 season) Never done         Type of outreach:    Mailed Letter    Questions for provider review:    None           Darlene Cruz MA       Pt had a discharge order, removed her IV and tele box. Pt given discharge instructions and she verbalized understanding of instructions, med rec and follow ups.   Pt wheeled down by hospital staff to her ride

## 2025-01-07 ENCOUNTER — TRANSCRIBE ORDERS (OUTPATIENT)
Facility: HOSPITAL | Age: 80
End: 2025-01-07

## 2025-01-07 DIAGNOSIS — Z87.448 HISTORY OF HEMATURIA: ICD-10-CM

## 2025-01-07 DIAGNOSIS — Z85.528 HISTORY OF RENAL CELL CARCINOMA: ICD-10-CM

## 2025-01-07 DIAGNOSIS — R10.9 ABDOMINAL DISCOMFORT: Primary | ICD-10-CM

## 2025-01-24 ENCOUNTER — HOSPITAL ENCOUNTER (OUTPATIENT)
Facility: HOSPITAL | Age: 80
Discharge: HOME | End: 2025-01-27
Payer: MEDICARE

## 2025-01-24 DIAGNOSIS — R10.9 ABDOMINAL DISCOMFORT: ICD-10-CM

## 2025-01-24 DIAGNOSIS — Z87.448 HISTORY OF HEMATURIA: ICD-10-CM

## 2025-01-24 DIAGNOSIS — Z85.528 HISTORY OF RENAL CELL CARCINOMA: ICD-10-CM

## 2025-01-24 PROCEDURE — 76700 US EXAM ABDOM COMPLETE: CPT

## 2025-02-15 ENCOUNTER — HOSPITAL ENCOUNTER (EMERGENCY)
Facility: HOSPITAL | Age: 80
Discharge: HOME | End: 2025-02-15
Attending: EMERGENCY MEDICINE
Payer: MEDICARE

## 2025-02-15 ENCOUNTER — APPOINTMENT (OUTPATIENT)
Facility: HOSPITAL | Age: 80
End: 2025-02-15
Payer: MEDICARE

## 2025-02-15 VITALS
OXYGEN SATURATION: 98 % | DIASTOLIC BLOOD PRESSURE: 53 MMHG | BODY MASS INDEX: 27.14 KG/M2 | WEIGHT: 159 LBS | SYSTOLIC BLOOD PRESSURE: 148 MMHG | HEIGHT: 64 IN | TEMPERATURE: 98.7 F | HEART RATE: 67 BPM | RESPIRATION RATE: 18 BRPM

## 2025-02-15 DIAGNOSIS — K59.00 CONSTIPATION, UNSPECIFIED CONSTIPATION TYPE: Primary | ICD-10-CM

## 2025-02-15 LAB
ANION GAP SERPL CALC-SCNC: 5 MMOL/L (ref 2–12)
BASOPHILS # BLD: 0.03 K/UL (ref 0–0.1)
BASOPHILS NFR BLD: 0.5 % (ref 0–1)
BUN SERPL-MCNC: 47 MG/DL (ref 6–20)
BUN/CREAT SERPL: 15 (ref 12–20)
CA-I BLD-MCNC: 9.6 MG/DL (ref 8.5–10.1)
CHLORIDE SERPL-SCNC: 103 MMOL/L (ref 97–108)
CO2 SERPL-SCNC: 34 MMOL/L (ref 21–32)
CREAT SERPL-MCNC: 3.15 MG/DL (ref 0.55–1.02)
DIFFERENTIAL METHOD BLD: ABNORMAL
EOSINOPHIL # BLD: 0.09 K/UL (ref 0–0.4)
EOSINOPHIL NFR BLD: 1.4 % (ref 0–7)
ERYTHROCYTE [DISTWIDTH] IN BLOOD BY AUTOMATED COUNT: 14.6 % (ref 11.5–14.5)
GLUCOSE SERPL-MCNC: 141 MG/DL (ref 65–100)
HCT VFR BLD AUTO: 34.8 % (ref 35–47)
HGB BLD-MCNC: 11 G/DL (ref 11.5–16)
IMM GRANULOCYTES # BLD AUTO: 0.02 K/UL (ref 0–0.04)
IMM GRANULOCYTES NFR BLD AUTO: 0.3 % (ref 0–0.5)
LIPASE SERPL-CCNC: 28 U/L (ref 13–75)
LYMPHOCYTES # BLD: 1.24 K/UL (ref 0.8–3.5)
LYMPHOCYTES NFR BLD: 18.8 % (ref 12–49)
MCH RBC QN AUTO: 31.6 PG (ref 26–34)
MCHC RBC AUTO-ENTMCNC: 31.6 G/DL (ref 30–36.5)
MCV RBC AUTO: 100 FL (ref 80–99)
MONOCYTES # BLD: 0.52 K/UL (ref 0–1)
MONOCYTES NFR BLD: 7.9 % (ref 5–13)
NEUTS SEG # BLD: 4.71 K/UL (ref 1.8–8)
NEUTS SEG NFR BLD: 71.1 % (ref 32–75)
NRBC # BLD: 0 K/UL (ref 0–0.01)
NRBC BLD-RTO: 0 PER 100 WBC
PLATELET # BLD AUTO: 184 K/UL (ref 150–400)
PMV BLD AUTO: 10.5 FL (ref 8.9–12.9)
POTASSIUM SERPL-SCNC: 3.8 MMOL/L (ref 3.5–5.1)
RBC # BLD AUTO: 3.48 M/UL (ref 3.8–5.2)
SODIUM SERPL-SCNC: 142 MMOL/L (ref 136–145)
WBC # BLD AUTO: 6.6 K/UL (ref 3.6–11)

## 2025-02-15 PROCEDURE — 6360000002 HC RX W HCPCS: Performed by: EMERGENCY MEDICINE

## 2025-02-15 PROCEDURE — 85025 COMPLETE CBC W/AUTO DIFF WBC: CPT

## 2025-02-15 PROCEDURE — 80048 BASIC METABOLIC PNL TOTAL CA: CPT

## 2025-02-15 PROCEDURE — 83690 ASSAY OF LIPASE: CPT

## 2025-02-15 PROCEDURE — 36415 COLL VENOUS BLD VENIPUNCTURE: CPT

## 2025-02-15 PROCEDURE — 96374 THER/PROPH/DIAG INJ IV PUSH: CPT

## 2025-02-15 PROCEDURE — 74176 CT ABD & PELVIS W/O CONTRAST: CPT

## 2025-02-15 PROCEDURE — 99284 EMERGENCY DEPT VISIT MOD MDM: CPT

## 2025-02-15 RX ORDER — LACTULOSE 10 G/10G
10 SOLUTION ORAL 2 TIMES DAILY PRN
Qty: 20 PACKET | Refills: 0 | Status: SHIPPED | OUTPATIENT
Start: 2025-02-15

## 2025-02-15 RX ADMIN — HYDROMORPHONE HYDROCHLORIDE 0.5 MG: 1 INJECTION, SOLUTION INTRAMUSCULAR; INTRAVENOUS; SUBCUTANEOUS at 19:39

## 2025-02-15 ASSESSMENT — PAIN SCALES - GENERAL
PAINLEVEL_OUTOF10: 6
PAINLEVEL_OUTOF10: 9
PAINLEVEL_OUTOF10: 4
PAINLEVEL_OUTOF10: 10

## 2025-02-15 ASSESSMENT — PAIN - FUNCTIONAL ASSESSMENT: PAIN_FUNCTIONAL_ASSESSMENT: 0-10

## 2025-02-15 NOTE — ED NOTES
Called Dialysis at this time, explained needle continues in left arm shunt, could they remove this from her arm.

## 2025-02-15 NOTE — ED PROVIDER NOTES
[facility] EMERGENCY DEPT  EMERGENCY DEPARTMENT HISTORY AND PHYSICAL EXAM      Date: 2/15/2025  Patient Name: Almaz Curtis  MRN: [id number]  Birthdate 1945  Date of evaluation: 2/15/2025  Provider: Pan Nelson MD   Note Started: 1:33 PM EST 2/15/25    HISTORY OF PRESENT ILLNESS     Chief Complaint   Patient presents with    Abdominal Pain    Constipation       History Provided By: Patient and EMS    HPI: Almaz Curtis is a 79 y.o. female presents for evaluation of rectal pain and constipation.  Patient states her last bowel movement was 2 days ago.  Patient sent for evaluation from dialysis after receiving 1 hour for dialysis.    PAST MEDICAL HISTORY   Past Medical History:  Past Medical History:   Diagnosis Date    Adenocarcinoma, renal cell (HCC) 9/2/2020    Arthritis     CAD (coronary artery disease)     Chronic kidney disease     Constipation in female     Diabetes (HCC)     Gout     Hypercholesterolemia     Hypertension     Mental depression     Pulmonary embolism (HCC)     Seizures (HCC)     Stroke (HCC)     Thyroid disease        Past Surgical History:  Past Surgical History:   Procedure Laterality Date    COLONOSCOPY N/A 10/24/2022    COLONOSCOPY performed by Marline Barcenas MD at [facility] ENDOSCOPY    GYN      HYSTERECTOMY (CERVIX STATUS UNKNOWN)      KIDNEY REMOVAL Left 02/12/2015    ORTHOPEDIC SURGERY      AK UNLISTED PROCEDURE CARDIAC SURGERY      stents placed     UROLOGICAL SURGERY  02/12/2015    PARTIAL URETERECTOMY        Family History:  Family History   Problem Relation Age of Onset    Cancer Sister     Cancer Sister     Heart Disease Brother     Cancer Maternal Grandmother     Stroke Son     Heart Disease Mother     Heart Disease Father     Heart Disease Sister        Social History:  Social History     Tobacco Use    Smoking status: Former    Smokeless tobacco: Never   Substance Use Topics    Alcohol use: Not Currently    Drug use: Never       Allergies:  No Known Allergies    PCP: Esdras  unanticipated grammatical, syntax, homophones, and other interpretive errors are inadvertently transcribed by the computer software. Please disregards these errors. Please excuse any errors that have escaped final proofreading.)     Pan Nelson MD  02/15/25 3002

## 2025-02-15 NOTE — DIALYSIS
Removed 2 dialysis needles from LUE. Pressure held to sites until bleeding stopped. Gauze secured with paper tape, no active bleeding noted.

## 2025-02-15 NOTE — ED TRIAGE NOTES
Patient here c/o abd pain, rectal pain and constipation. States last BM was 2 days ago. Holding rectal area with hand at this time. Patient dialysis patient, takes Laxative daily. Completed 1 hour of dialysis today, sent from dialysis with shunt still in place.

## 2025-02-16 NOTE — DISCHARGE INSTRUCTIONS
Thank you for choosing our Emergency Department for your care.  It is our privilege to care for you in your time of need.  In the next several days, you may receive a survey via email or mailed to your home about your experience with our team.  We would greatly appreciate you taking a few minutes to complete the survey, as we use this information to learn what we have done well and what we could be doing better. Thank you for trusting us with your care!    Below you will find a list of your tests from today's visit.   Labs and Radiology Studies  Recent Results (from the past 12 hour(s))   Lipase    Collection Time: 02/15/25  2:13 PM   Result Value Ref Range    Lipase 28 13 - 75 U/L   BMP    Collection Time: 02/15/25  2:13 PM   Result Value Ref Range    Sodium 142 136 - 145 mmol/L    Potassium 3.8 3.5 - 5.1 mmol/L    Chloride 103 97 - 108 mmol/L    CO2 34 (H) 21 - 32 mmol/L    Anion Gap 5 2 - 12 mmol/L    Glucose 141 (H) 65 - 100 mg/dL    BUN 47 (H) 6 - 20 mg/dL    Creatinine 3.15 (H) 0.55 - 1.02 mg/dL    BUN/Creatinine Ratio 15 12 - 20      Est, Glom Filt Rate 14 (L) >60 ml/min/1.73m2    Calcium 9.6 8.5 - 10.1 mg/dL   CBC with Auto Differential    Collection Time: 02/15/25  2:13 PM   Result Value Ref Range    WBC 6.6 3.6 - 11.0 K/uL    RBC 3.48 (L) 3.80 - 5.20 M/uL    Hemoglobin 11.0 (L) 11.5 - 16.0 g/dL    Hematocrit 34.8 (L) 35.0 - 47.0 %    .0 (H) 80.0 - 99.0 FL    MCH 31.6 26.0 - 34.0 PG    MCHC 31.6 30.0 - 36.5 g/dL    RDW 14.6 (H) 11.5 - 14.5 %    Platelets 184 150 - 400 K/uL    MPV 10.5 8.9 - 12.9 FL    Nucleated RBCs 0.0 0.0  WBC    nRBC 0.00 0.00 - 0.01 K/uL    Neutrophils % 71.1 32.0 - 75.0 %    Lymphocytes % 18.8 12.0 - 49.0 %    Monocytes % 7.9 5.0 - 13.0 %    Eosinophils % 1.4 0.0 - 7.0 %    Basophils % 0.5 0.0 - 1.0 %    Immature Granulocytes % 0.3 0 - 0.5 %    Neutrophils Absolute 4.71 1.80 - 8.00 K/UL    Lymphocytes Absolute 1.24 0.80 - 3.50 K/UL    Monocytes Absolute 0.52 0.00 -

## 2025-04-24 ENCOUNTER — HOSPITAL ENCOUNTER (OUTPATIENT)
Facility: HOSPITAL | Age: 80
Discharge: HOME | End: 2025-04-26
Payer: MEDICARE

## 2025-04-24 DIAGNOSIS — I73.9 PERIPHERAL ARTERY DISEASE: ICD-10-CM

## 2025-04-24 PROCEDURE — 93922 UPR/L XTREMITY ART 2 LEVELS: CPT

## 2025-04-27 LAB
VAS LEFT ABI: 0.92
VAS LEFT DORSALIS PEDIS BP: 106 MMHG
VAS LEFT PTA BP: 126 MMHG
VAS LEFT TBI: 0.36
VAS LEFT TOE PRESSURE: 50 MMHG
VAS RIGHT ARM BP: 137 MMHG
VAS RIGHT TBI: 0.67
VAS RIGHT TOE PRESSURE: 92 MMHG

## 2025-05-05 ENCOUNTER — TRANSCRIBE ORDERS (OUTPATIENT)
Facility: HOSPITAL | Age: 80
End: 2025-05-05

## 2025-05-05 DIAGNOSIS — I70.222 CRITICAL LIMB ISCHEMIA OF LEFT LOWER EXTREMITY (HCC): Primary | ICD-10-CM

## 2025-05-11 PROBLEM — I73.9 PERIPHERAL ARTERY DISEASE: Status: ACTIVE | Noted: 2020-09-02

## 2025-05-19 ENCOUNTER — HOSPITAL ENCOUNTER (OUTPATIENT)
Facility: HOSPITAL | Age: 80
Discharge: HOME | End: 2025-05-21
Payer: MEDICARE

## 2025-05-19 DIAGNOSIS — I70.222 CRITICAL LIMB ISCHEMIA OF LEFT LOWER EXTREMITY (HCC): ICD-10-CM

## 2025-05-19 PROCEDURE — 93922 UPR/L XTREMITY ART 2 LEVELS: CPT

## 2025-05-20 LAB
VAS LEFT ABI: 1.17
VAS LEFT DORSALIS PEDIS BP: 198 MMHG
VAS LEFT TBI: 0.63
VAS LEFT TOE PRESSURE: 106 MMHG
VAS RIGHT ABI: 1.27
VAS RIGHT ARM BP: 169 MMHG
VAS RIGHT DORSALIS PEDIS BP: 215 MMHG
VAS RIGHT PTA BP: 133 MMHG
VAS RIGHT TBI: 0.54
VAS RIGHT TOE PRESSURE: 92 MMHG

## 2025-05-20 PROCEDURE — 93922 UPR/L XTREMITY ART 2 LEVELS: CPT | Performed by: SURGERY

## 2025-05-28 PROBLEM — I70.222 CRITICAL LIMB ISCHEMIA OF LEFT LOWER EXTREMITY (HCC): Status: ACTIVE | Noted: 2025-05-28

## 2025-06-24 NOTE — TELEPHONE ENCOUNTER
Here is the note from our most recent visit. FYI delivery at [facility] will be scheduled. Please let me know if you have any questions. Peewee Please call to discuss blood sugar meter

## 2025-07-09 ENCOUNTER — OFFICE VISIT (OUTPATIENT)
Age: 80
End: 2025-07-09

## 2025-07-09 VITALS
HEART RATE: 62 BPM | OXYGEN SATURATION: 98 % | DIASTOLIC BLOOD PRESSURE: 60 MMHG | RESPIRATION RATE: 20 BRPM | SYSTOLIC BLOOD PRESSURE: 155 MMHG | BODY MASS INDEX: 26.12 KG/M2 | WEIGHT: 153 LBS | TEMPERATURE: 97.5 F | HEIGHT: 64 IN

## 2025-07-09 DIAGNOSIS — Z86.0100 HISTORY OF COLON POLYPS: ICD-10-CM

## 2025-07-09 DIAGNOSIS — K57.30 DIVERTICULAR DISEASE OF COLON: ICD-10-CM

## 2025-07-09 DIAGNOSIS — K59.04 CHRONIC IDIOPATHIC CONSTIPATION: Primary | ICD-10-CM

## 2025-07-09 RX ORDER — POLYETHYLENE GLYCOL 3350 17 G/17G
POWDER, FOR SOLUTION ORAL
Qty: 510 G | Refills: 0 | Status: SHIPPED | OUTPATIENT
Start: 2025-07-09

## 2025-07-09 ASSESSMENT — ENCOUNTER SYMPTOMS
VOMITING: 0
BACK PAIN: 1
RECTAL PAIN: 0
DIARRHEA: 0
ABDOMINAL DISTENTION: 0
NAUSEA: 0
ABDOMINAL PAIN: 1
BLOOD IN STOOL: 0
CONSTIPATION: 1
ANAL BLEEDING: 0

## 2025-07-09 NOTE — PROGRESS NOTES
Chief Complaint   Patient presents with    New Patient    Constipation     Last BM last night took meds to go     Have you been to the ER, urgent care clinic since your last visit?  Hospitalized since your last visit?   NO    Have you seen or consulted any other health care providers outside our system since your last visit?   NO

## 2025-07-09 NOTE — PROGRESS NOTES
Almaz Curtis is a 80 y.o. female who presents today for the following:  Chief Complaint   Patient presents with    New Patient    Constipation     Last BM last night took meds to go         No Known Allergies    Current Outpatient Medications   Medication Sig Dispense Refill    Plecanatide 3 MG TABS Take 3 mg by mouth daily 30 tablet 5    polyethylene glycol (GLYCOLAX) 17 GM/SCOOP powder Use as directed by physician. 510 g 0    lactulose (CEPHULAC) 10 g packet Take 1 packet by mouth 2 times daily as needed (Constipation) 20 packet 0    acetaminophen (TYLENOL) 500 MG tablet 2 tablets as needed Orally every 6 hrs      albuterol (PROVENTIL) (2.5 MG/3ML) 0.083% nebulizer solution Inhale 3 mLs into the lungs every 6 hours as needed      calcitRIOL (ROCALTROL) 0.25 MCG capsule Take 1 capsule by mouth      carvedilol (COREG) 6.25 MG tablet Take 1 tablet by mouth 2 times daily (with meals)      cetirizine (ZYRTEC) 10 MG tablet Take 1 tablet by mouth daily      donepezil (ARICEPT) 10 MG tablet Take 1 tablet by mouth nightly      ferrous sulfate (IRON 325) 325 (65 Fe) MG tablet TAKE 1 TABLET BY MOUTH ONCE DAILY FOR SUPPLEMENTATION      fluticasone (FLONASE) 50 MCG/ACT nasal spray 1 spray in each nostril Nasally Once a day      gabapentin (NEURONTIN) 100 MG capsule Take 1 capsule by mouth 3 times daily. Max Daily Amount: 300 mg      insulin detemir (LEVEMIR) 100 UNIT/ML injection vial Inject 10 Units into the skin      latanoprost (XALATAN) 0.005 % ophthalmic solution Apply 1 drop to eye      magnesium oxide (MAG-OX) 400 MG tablet Take 1 tablet by mouth daily      pantoprazole (PROTONIX) 40 MG tablet Take 1 tablet by mouth 2 times daily      potassium chloride (KLOR-CON) 10 MEQ extended release tablet Take 2 tablets by mouth daily      pravastatin (PRAVACHOL) 80 MG tablet Take 1 tablet by mouth nightly      Torsemide 40 MG TABS Take by mouth      venlafaxine 75 MG extended release tablet Take 1 tablet by mouth daily       No

## 2025-07-15 PROBLEM — Z86.0100 HX OF COLONIC POLYPS: Status: ACTIVE | Noted: 2025-07-15

## 2025-07-15 PROBLEM — K59.04 CHRONIC IDIOPATHIC CONSTIPATION: Status: ACTIVE | Noted: 2025-07-15

## 2025-07-23 ENCOUNTER — TRANSCRIBE ORDERS (OUTPATIENT)
Facility: HOSPITAL | Age: 80
End: 2025-07-23

## 2025-07-23 DIAGNOSIS — I70.222 CRITICAL LIMB ISCHEMIA OF LEFT LOWER EXTREMITY (HCC): Primary | ICD-10-CM

## 2025-07-28 ENCOUNTER — TELEPHONE (OUTPATIENT)
Age: 80
End: 2025-07-28

## 2025-07-28 NOTE — TELEPHONE ENCOUNTER
Dghtr cld & std [facility] in [facility] rec'd script for miralax but not for the other medication that dr was supposed to have sent.  Please check and advise pt myahhtr @ [phone]

## 2025-07-29 NOTE — TELEPHONE ENCOUNTER
The prescription was written on the same day and it was in the sytem. Pt was advised to check back with the pharmacy.

## 2025-08-11 ENCOUNTER — HOSPITAL ENCOUNTER (OUTPATIENT)
Facility: HOSPITAL | Age: 80
Discharge: HOME | End: 2025-08-13
Payer: MEDICARE

## 2025-08-11 DIAGNOSIS — I70.222 CRITICAL LIMB ISCHEMIA OF LEFT LOWER EXTREMITY (HCC): ICD-10-CM

## 2025-08-11 LAB
VAS LEFT ABI: 0.94
VAS LEFT DORSALIS PEDIS BP: 172 MMHG
VAS LEFT PTA BP: 77 MMHG
VAS LEFT TBI: 0.37
VAS LEFT TOE PRESSURE: 67 MMHG
VAS RIGHT ABI: 1.2
VAS RIGHT ARM BP: 183 MMHG
VAS RIGHT DORSALIS PEDIS BP: 220 MMHG
VAS RIGHT PTA BP: 129 MMHG
VAS RIGHT TBI: 0.48
VAS RIGHT TOE PRESSURE: 88 MMHG

## 2025-08-11 PROCEDURE — 93922 UPR/L XTREMITY ART 2 LEVELS: CPT

## 2025-08-11 PROCEDURE — 93926 LOWER EXTREMITY STUDY: CPT

## 2025-08-12 LAB
VAS LEFT ABI: 0.94
VAS LEFT ATA DIST PSV: 227 CM/S
VAS LEFT CFA PROX PSV: 99.9 CM/S
VAS LEFT DORSALIS PEDIS BP: 172 MMHG
VAS LEFT PERONEAL DIST PSV: 0 CM/S
VAS LEFT PFA PROX PSV: 60.2 CM/S
VAS LEFT POP A DIST PSV: 78.8 CM/S
VAS LEFT POP A PROX PSV: 74.7 CM/S
VAS LEFT POP A PROX VEL RATIO: 0.85
VAS LEFT PTA BP: 77 MMHG
VAS LEFT PTA DIST PSV: 91.9 CM/S
VAS LEFT SFA DIST PSV: 87.7 CM/S
VAS LEFT SFA DIST VEL RATIO: 0.42
VAS LEFT SFA MID PSV: 209 CM/S
VAS LEFT SFA MID VEL RATIO: 2.24
VAS LEFT SFA PROX PSV: 93.5 CM/S
VAS LEFT SFA PROX VEL RATIO: 0.94
VAS LEFT TBI: 0.37
VAS LEFT TOE PRESSURE: 67 MMHG
VAS RIGHT ABI: 1.2
VAS RIGHT ARM BP: 183 MMHG
VAS RIGHT CFA PROX PSV: 78.5 CM/S
VAS RIGHT DORSALIS PEDIS BP: 220 MMHG
VAS RIGHT PTA BP: 129 MMHG
VAS RIGHT TBI: 0.48
VAS RIGHT TOE PRESSURE: 88 MMHG

## 2025-08-12 PROCEDURE — 93926 LOWER EXTREMITY STUDY: CPT | Performed by: SURGERY

## 2025-08-14 ENCOUNTER — HOSPITAL ENCOUNTER (OUTPATIENT)
Facility: HOSPITAL | Age: 80
Setting detail: OUTPATIENT SURGERY
Discharge: HOME | End: 2025-08-14
Attending: INTERNAL MEDICINE | Admitting: INTERNAL MEDICINE
Payer: MEDICARE

## 2025-08-14 ENCOUNTER — ANESTHESIA EVENT (OUTPATIENT)
Facility: HOSPITAL | Age: 80
End: 2025-08-14
Payer: MEDICARE

## 2025-08-14 ENCOUNTER — ANESTHESIA (OUTPATIENT)
Facility: HOSPITAL | Age: 80
End: 2025-08-14
Payer: MEDICARE

## 2025-08-14 VITALS
TEMPERATURE: 97.5 F | HEIGHT: 62 IN | DIASTOLIC BLOOD PRESSURE: 83 MMHG | SYSTOLIC BLOOD PRESSURE: 135 MMHG | BODY MASS INDEX: 27.88 KG/M2 | HEART RATE: 74 BPM | WEIGHT: 151.5 LBS | OXYGEN SATURATION: 100 % | RESPIRATION RATE: 18 BRPM

## 2025-08-14 LAB
GLUCOSE BLD STRIP.AUTO-MCNC: 83 MG/DL (ref 65–100)
PERFORMED BY:: NORMAL

## 2025-08-14 PROCEDURE — 3600007502: Performed by: INTERNAL MEDICINE

## 2025-08-14 PROCEDURE — 2580000003 HC RX 258: Performed by: INTERNAL MEDICINE

## 2025-08-14 PROCEDURE — 45380 COLONOSCOPY AND BIOPSY: CPT | Performed by: INTERNAL MEDICINE

## 2025-08-14 PROCEDURE — 6360000002 HC RX W HCPCS: Performed by: NURSE ANESTHETIST, CERTIFIED REGISTERED

## 2025-08-14 PROCEDURE — 88305 TISSUE EXAM BY PATHOLOGIST: CPT

## 2025-08-14 PROCEDURE — 7100000010 HC PHASE II RECOVERY - FIRST 15 MIN: Performed by: INTERNAL MEDICINE

## 2025-08-14 PROCEDURE — APPNB15 APP NON BILLABLE TIME 0-15 MINS: Performed by: INTERNAL MEDICINE

## 2025-08-14 PROCEDURE — 3600007512: Performed by: INTERNAL MEDICINE

## 2025-08-14 PROCEDURE — 2500000003 HC RX 250 WO HCPCS: Performed by: NURSE ANESTHETIST, CERTIFIED REGISTERED

## 2025-08-14 PROCEDURE — 2709999900 HC NON-CHARGEABLE SUPPLY: Performed by: INTERNAL MEDICINE

## 2025-08-14 PROCEDURE — 3700000000 HC ANESTHESIA ATTENDED CARE: Performed by: INTERNAL MEDICINE

## 2025-08-14 PROCEDURE — 3700000001 HC ADD 15 MINUTES (ANESTHESIA): Performed by: INTERNAL MEDICINE

## 2025-08-14 PROCEDURE — 82962 GLUCOSE BLOOD TEST: CPT

## 2025-08-14 PROCEDURE — 7100000011 HC PHASE II RECOVERY - ADDTL 15 MIN: Performed by: INTERNAL MEDICINE

## 2025-08-14 RX ORDER — EPHEDRINE SULFATE 50 MG/ML
INJECTION INTRAVENOUS
Status: DISCONTINUED | OUTPATIENT
Start: 2025-08-14 | End: 2025-08-14 | Stop reason: SDUPTHER

## 2025-08-14 RX ORDER — SODIUM CHLORIDE 450 MG/100ML
INJECTION, SOLUTION INTRAVENOUS CONTINUOUS
Status: DISCONTINUED | OUTPATIENT
Start: 2025-08-14 | End: 2025-08-14 | Stop reason: HOSPADM

## 2025-08-14 RX ORDER — PROPOFOL 10 MG/ML
INJECTION, EMULSION INTRAVENOUS
Status: DISCONTINUED | OUTPATIENT
Start: 2025-08-14 | End: 2025-08-14 | Stop reason: SDUPTHER

## 2025-08-14 RX ORDER — GLYCOPYRROLATE 0.2 MG/ML
INJECTION INTRAMUSCULAR; INTRAVENOUS
Status: DISCONTINUED | OUTPATIENT
Start: 2025-08-14 | End: 2025-08-14 | Stop reason: SDUPTHER

## 2025-08-14 RX ORDER — SODIUM CHLORIDE 9 MG/ML
INJECTION, SOLUTION INTRAVENOUS CONTINUOUS
Status: DISCONTINUED | OUTPATIENT
Start: 2025-08-14 | End: 2025-08-14

## 2025-08-14 RX ADMIN — PROPOFOL 30 MG: 10 INJECTION, EMULSION INTRAVENOUS at 11:58

## 2025-08-14 RX ADMIN — PROPOFOL 50 MG: 10 INJECTION, EMULSION INTRAVENOUS at 11:54

## 2025-08-14 RX ADMIN — PROPOFOL 50 MG: 10 INJECTION, EMULSION INTRAVENOUS at 11:42

## 2025-08-14 RX ADMIN — GLYCOPYRROLATE 0.2 MG: 0.2 INJECTION, SOLUTION INTRAMUSCULAR; INTRAVENOUS at 11:51

## 2025-08-14 RX ADMIN — SODIUM CHLORIDE: 0.45 INJECTION, SOLUTION INTRAVENOUS at 11:08

## 2025-08-14 RX ADMIN — EPHEDRINE SULFATE 10 MG: 50 INJECTION INTRAVENOUS at 11:47

## 2025-08-14 RX ADMIN — PROPOFOL 100 MG: 10 INJECTION, EMULSION INTRAVENOUS at 11:31

## 2025-08-14 ASSESSMENT — PAIN - FUNCTIONAL ASSESSMENT
PAIN_FUNCTIONAL_ASSESSMENT: 0-10

## 2025-08-14 ASSESSMENT — PAIN SCALES - GENERAL
PAINLEVEL_OUTOF10: 0

## 2025-09-03 ENCOUNTER — RESULTS FOLLOW-UP (OUTPATIENT)
Age: 80
End: 2025-09-03

## (undated) DEVICE — THE ENDO CARRY-ON PROCEDURE KIT CONTAINS ALL OF THE SUPPLIES AND INFECTION PREVENTION PRODUCTS NEEDED FOR ENDOSCOPIC PROCEDURES: Brand: ENDO CARRY-ON PROCEDURE KIT

## (undated) DEVICE — CANNULA NSL O2 AD 7 FT END-TIDAL CARBON DIOX VENTFLO

## (undated) DEVICE — PAD PREP W24XL48IN SURG INCREDIBLE FLD REPELLENCY

## (undated) DEVICE — CANISTER SUCT 1200CC 90DEG ADPT HRD LOK LID AUTO OVERFLOW

## (undated) DEVICE — LUBRICANT EXAM 4OZ GREASELESS H2O SOL JELLY FRM A TRNSLUC

## (undated) DEVICE — MOUTHPIECE ENDOSCP 20X27MM --

## (undated) DEVICE — FORCEPS BX L240CM JAW DIA2.4MM ORNG L CAP W/ NDL DISP RAD

## (undated) DEVICE — GLOVE ORANGE PI 7 1/2   MSG9075

## (undated) DEVICE — TUBING SCTION CONN 9/32X10 RIB

## (undated) DEVICE — ENDOSCOPIC KIT 1.1+ DE BOWL

## (undated) DEVICE — BLOCK BITE STD GRN ORAL AD W/O STRP SLD PLAS DISP BITEGARD

## (undated) DEVICE — MASK POM PROCEDURE OXY W/ HI CONCENTRATION CO2 MONITOR

## (undated) DEVICE — NEEDLE SCLERO 23GA L240CM OD064MM ID032MM CLR INTERJECT

## (undated) DEVICE — SOLUTION IRRIG 1000ML H2O PIC PLAS SHATTERPROOF CONTAINER

## (undated) DEVICE — MASK ANES INF SZ 2 PREM TAIL VLV INFL PRT UNSCENTED SGL PT

## (undated) DEVICE — SPONGE GZ W4XL4IN COT 12 PLY TYP VII WVN C FLD DSGN STERILE

## (undated) DEVICE — AIRLIFE™ OXYGEN TUBING 7 FEET (2.1 M) CRUSH RESISTANT OXYGEN TUBING, VINYL TIPPED: Brand: AIRLIFE™

## (undated) DEVICE — ENDO KIT 1: Brand: MEDLINE INDUSTRIES, INC.

## (undated) DEVICE — Device: Brand: ENDOCAPSULE SMALL INTESTINAL CAPSULE ENDOSCOPE SET